# Patient Record
Sex: FEMALE | Race: BLACK OR AFRICAN AMERICAN | NOT HISPANIC OR LATINO | Employment: FULL TIME | URBAN - METROPOLITAN AREA
[De-identification: names, ages, dates, MRNs, and addresses within clinical notes are randomized per-mention and may not be internally consistent; named-entity substitution may affect disease eponyms.]

---

## 2018-06-06 ENCOUNTER — HOSPITAL ENCOUNTER (EMERGENCY)
Facility: HOSPITAL | Age: 43
Discharge: HOME | End: 2018-06-06
Attending: EMERGENCY MEDICINE | Admitting: EMERGENCY MEDICINE
Payer: COMMERCIAL

## 2018-06-06 VITALS
TEMPERATURE: 98.4 F | DIASTOLIC BLOOD PRESSURE: 90 MMHG | SYSTOLIC BLOOD PRESSURE: 167 MMHG | HEART RATE: 74 BPM | OXYGEN SATURATION: 98 % | WEIGHT: 293 LBS | RESPIRATION RATE: 18 BRPM

## 2018-06-06 DIAGNOSIS — R20.8 BURNING SENSATION OF FEET: ICD-10-CM

## 2018-06-06 DIAGNOSIS — R20.2 PARESTHESIA OF BOTH FEET: Primary | ICD-10-CM

## 2018-06-06 DIAGNOSIS — E63.9 NUTRITIONAL DEFICIENCY: ICD-10-CM

## 2018-06-06 DIAGNOSIS — R11.0 NAUSEA: ICD-10-CM

## 2018-06-06 DIAGNOSIS — E87.6 HYPOKALEMIA: ICD-10-CM

## 2018-06-06 DIAGNOSIS — Z98.84 S/P GASTRIC BYPASS: ICD-10-CM

## 2018-06-06 LAB
ALBUMIN SERPL BCP-MCNC: 3.2 G/DL (ref 3.5–5)
ALP SERPL-CCNC: 84 U/L (ref 46–116)
ALT SERPL W P-5'-P-CCNC: 31 U/L (ref 12–78)
ANION GAP SERPL CALCULATED.3IONS-SCNC: 10 MMOL/L (ref 4–13)
AST SERPL W P-5'-P-CCNC: 25 U/L (ref 5–45)
BACTERIA UR QL AUTO: ABNORMAL /HPF
BASOPHILS # BLD AUTO: 0 THOUSANDS/ΜL (ref 0–0.1)
BASOPHILS NFR BLD AUTO: 0 % (ref 0–1)
BILIRUB SERPL-MCNC: 0.56 MG/DL (ref 0.2–1)
BILIRUB UR QL STRIP: ABNORMAL
BUN SERPL-MCNC: 6 MG/DL (ref 5–25)
CALCIUM SERPL-MCNC: 9.4 MG/DL (ref 8.3–10.1)
CHLORIDE SERPL-SCNC: 101 MMOL/L (ref 100–108)
CLARITY UR: CLEAR
CO2 SERPL-SCNC: 31 MMOL/L (ref 21–32)
COLOR UR: ABNORMAL
CREAT SERPL-MCNC: 0.82 MG/DL (ref 0.6–1.3)
EOSINOPHIL # BLD AUTO: 0.04 THOUSAND/ΜL (ref 0–0.61)
EOSINOPHIL NFR BLD AUTO: 1 % (ref 0–6)
ERYTHROCYTE [DISTWIDTH] IN BLOOD BY AUTOMATED COUNT: 14.8 % (ref 11.6–15.1)
EXT PREG TEST URINE: NEGATIVE
FOLATE SERPL-MCNC: 4 NG/ML (ref 3.1–17.5)
GFR SERPL CREATININE-BSD FRML MDRD: 102 ML/MIN/1.73SQ M
GLUCOSE SERPL-MCNC: 104 MG/DL (ref 65–140)
GLUCOSE UR STRIP-MCNC: NEGATIVE MG/DL
HCT VFR BLD AUTO: 44.2 % (ref 34.8–46.1)
HGB BLD-MCNC: 14.9 G/DL (ref 11.5–15.4)
HGB UR QL STRIP.AUTO: NEGATIVE
HYALINE CASTS #/AREA URNS LPF: ABNORMAL /LPF
KETONES UR STRIP-MCNC: ABNORMAL MG/DL
LEUKOCYTE ESTERASE UR QL STRIP: ABNORMAL
LYMPHOCYTES # BLD AUTO: 2.86 THOUSANDS/ΜL (ref 0.6–4.47)
LYMPHOCYTES NFR BLD AUTO: 50 % (ref 14–44)
MCH RBC QN AUTO: 31.2 PG (ref 26.8–34.3)
MCHC RBC AUTO-ENTMCNC: 33.7 G/DL (ref 31.4–37.4)
MCV RBC AUTO: 93 FL (ref 82–98)
MONOCYTES # BLD AUTO: 0.55 THOUSAND/ΜL (ref 0.17–1.22)
MONOCYTES NFR BLD AUTO: 10 % (ref 4–12)
MUCOUS THREADS UR QL AUTO: ABNORMAL
NEUTROPHILS # BLD AUTO: 2.22 THOUSANDS/ΜL (ref 1.85–7.62)
NEUTS SEG NFR BLD AUTO: 39 % (ref 43–75)
NITRITE UR QL STRIP: NEGATIVE
NON-SQ EPI CELLS URNS QL MICRO: ABNORMAL /HPF
NRBC BLD AUTO-RTO: 0 /100 WBCS
PH UR STRIP.AUTO: 7 [PH] (ref 4.5–8)
PLATELET # BLD AUTO: 324 THOUSANDS/UL (ref 149–390)
PMV BLD AUTO: 9.4 FL (ref 8.9–12.7)
POTASSIUM SERPL-SCNC: 3.1 MMOL/L (ref 3.5–5.3)
PROT SERPL-MCNC: 7.5 G/DL (ref 6.4–8.2)
PROT UR STRIP-MCNC: ABNORMAL MG/DL
RBC # BLD AUTO: 4.77 MILLION/UL (ref 3.81–5.12)
RBC #/AREA URNS AUTO: ABNORMAL /HPF
SODIUM SERPL-SCNC: 142 MMOL/L (ref 136–145)
SP GR UR STRIP.AUTO: 1.02 (ref 1–1.03)
UROBILINOGEN UR QL STRIP.AUTO: 1 E.U./DL
VIT B12 SERPL-MCNC: 525 PG/ML (ref 100–900)
WBC # BLD AUTO: 5.67 THOUSAND/UL (ref 4.31–10.16)
WBC #/AREA URNS AUTO: ABNORMAL /HPF

## 2018-06-06 PROCEDURE — 82607 VITAMIN B-12: CPT | Performed by: EMERGENCY MEDICINE

## 2018-06-06 PROCEDURE — 96374 THER/PROPH/DIAG INJ IV PUSH: CPT

## 2018-06-06 PROCEDURE — 99283 EMERGENCY DEPT VISIT LOW MDM: CPT

## 2018-06-06 PROCEDURE — 82746 ASSAY OF FOLIC ACID SERUM: CPT | Performed by: EMERGENCY MEDICINE

## 2018-06-06 PROCEDURE — 36415 COLL VENOUS BLD VENIPUNCTURE: CPT | Performed by: EMERGENCY MEDICINE

## 2018-06-06 PROCEDURE — 87086 URINE CULTURE/COLONY COUNT: CPT

## 2018-06-06 PROCEDURE — 85025 COMPLETE CBC W/AUTO DIFF WBC: CPT | Performed by: EMERGENCY MEDICINE

## 2018-06-06 PROCEDURE — 81001 URINALYSIS AUTO W/SCOPE: CPT

## 2018-06-06 PROCEDURE — 80053 COMPREHEN METABOLIC PANEL: CPT | Performed by: EMERGENCY MEDICINE

## 2018-06-06 PROCEDURE — 81025 URINE PREGNANCY TEST: CPT | Performed by: EMERGENCY MEDICINE

## 2018-06-06 PROCEDURE — 96361 HYDRATE IV INFUSION ADD-ON: CPT

## 2018-06-06 RX ORDER — ACETAMINOPHEN 325 MG/1
650 TABLET ORAL ONCE
Status: COMPLETED | OUTPATIENT
Start: 2018-06-06 | End: 2018-06-06

## 2018-06-06 RX ORDER — ONDANSETRON 4 MG/1
4 TABLET, ORALLY DISINTEGRATING ORAL EVERY 8 HOURS PRN
Qty: 20 TABLET | Refills: 0 | Status: SHIPPED | OUTPATIENT
Start: 2018-06-06 | End: 2018-08-22 | Stop reason: HOSPADM

## 2018-06-06 RX ORDER — ONDANSETRON 2 MG/ML
4 INJECTION INTRAMUSCULAR; INTRAVENOUS ONCE
Status: COMPLETED | OUTPATIENT
Start: 2018-06-06 | End: 2018-06-06

## 2018-06-06 RX ORDER — POTASSIUM CHLORIDE 20 MEQ/1
40 TABLET, EXTENDED RELEASE ORAL ONCE
Status: COMPLETED | OUTPATIENT
Start: 2018-06-06 | End: 2018-06-06

## 2018-06-06 RX ADMIN — ONDANSETRON 4 MG: 2 INJECTION INTRAMUSCULAR; INTRAVENOUS at 02:34

## 2018-06-06 RX ADMIN — ACETAMINOPHEN 650 MG: 325 TABLET ORAL at 03:26

## 2018-06-06 RX ADMIN — POTASSIUM CHLORIDE 40 MEQ: 1500 TABLET, EXTENDED RELEASE ORAL at 03:16

## 2018-06-06 RX ADMIN — SODIUM CHLORIDE 1000 ML: 0.9 INJECTION, SOLUTION INTRAVENOUS at 02:30

## 2018-06-06 NOTE — DISCHARGE INSTRUCTIONS
Gastrectomy   WHAT YOU NEED TO KNOW:   A gastrectomy is surgery to remove part or all of your stomach  DISCHARGE INSTRUCTIONS:   Medicines:   · Antibiotic medicine: This is given to fight or prevent an infection caused by bacteria  Take as directed  · Pain medicine: You may be given a prescription medicine to decrease pain  Do not wait until the pain is severe before you take this medicine  · Take your medicine as directed  Contact your healthcare provider if you think your medicine is not helping or if you have side effects  Tell him or her if you are allergic to any medicine  Keep a list of the medicines, vitamins, and herbs you take  Include the amounts, and when and why you take them  Bring the list or the pill bottles to follow-up visits  Carry your medicine list with you in case of an emergency  Follow up with your healthcare provider as directed: You may need to return to have your stitches, staples, or drains removed  Write down your questions so you remember to ask them during your visits  Bathing with stitches: Follow your healthcare provider's instructions on when you can bathe  Gently wash the part of your body that has the stitches  Do not rub on the stitches to dry your skin  Pat the area gently with a towel  When the area is dry, put on a clean, new bandage as directed  Nutrition:  You may need to eat foods that are soft and easy to digest for up to 8 weeks after your surgery  Examples of soft foods are applesauce, bananas, cooked cereal, cottage cheese, eggs, pudding, and yogurt  Eat small meals often  As you heal, you may be able to increase the variety and amount of food you eat  You may need additional iron, folate, and vitamin B12  Ask for more information about food and dietary supplements  Contact your healthcare provider if:   · You get frequent heartburn  · You have chills, a cough, a sore throat, or feel weak and achy  · You have nausea or are vomiting      · You have frequent constipation or diarrhea  · You have questions or concerns about your condition or care  Seek care immediately or call 911 if:   · You get a fever or chills  · You have abdominal pain that does not go away or gets worse  · Your incision is swollen, red, has pus coming from it, or it starts to come apart  · Blood soaks through your bandage  · You vomit blood  · You suddenly feel lightheaded and short of breath  · You have chest pain when you take a deep breath or cough  You may cough up blood  · Your arm or leg feels warm, tender, and painful  It may look swollen and red  © 2017 [employer]  Information is for End User's use only and may not be sold, redistributed or otherwise used for commercial purposes  All illustrations and images included in CareNotes® are the copyrighted property of [employer]  or [employer]  The above information is an  only  It is not intended as medical advice for individual conditions or treatments  Talk to your doctor, nurse or pharmacist before following any medical regimen to see if it is safe and effective for you  Paresthesia   WHAT YOU NEED TO KNOW:   Paresthesia is numbness and tingling  It can happen in any part of your body, but usually occurs in your legs, feet, arms, or hands  There are a large number of conditions that can cause paresthesia  It affects the nerves that provide sensation and happens when there are changes in nerves or nerve pathways  These changes can be temporary, such as if you take certain medicines or you are not getting enough vitamin B  Paresthesia can become permanent when there is nerve damage  Conditions that may cause nerve damage include diabetes, carpel tunnel syndrome, stroke, and multiple sclerosis  The exact cause of your paresthesia may be unknown    DISCHARGE INSTRUCTIONS:   Medicines:  Ask for more information about medicines you may need to treat the condition causing your paresthesias  · NSAIDs  help decrease swelling and pain or fever  This medicine is available with or without a doctor's order  NSAIDs can cause stomach bleeding or kidney problems in certain people  If you take blood thinner medicine, always ask your healthcare provider if NSAIDs are safe for you  Always read the medicine label and follow directions  · Take your medicine as directed  Contact your healthcare provider if you think your medicine is not helping or if you have side effects  Tell him or her if you are allergic to any medicine  Keep a list of the medicines, vitamins, and herbs you take  Include the amounts, and when and why you take them  Bring the list or the pill bottles to follow-up visits  Carry your medicine list with you in case of an emergency  Follow up with your healthcare provider or neurologist as directed:  Write down your questions so you remember to ask them during your visits  Contact your healthcare provider or neurologist if:   · Your symptoms do not improve  · You have symptoms in more than one part of your body  · You have questions about your condition or care  Return to the emergency department if:   · You have any of the following signs of a stroke:      ¨ Numbness or drooping on one side of your face     ¨ Weakness in an arm or leg    ¨ Confusion or difficulty speaking    ¨ Dizziness, a severe headache, or vision loss    · You are not able to control your urine or bowel movements  · You have severe pain along with numbness and tingling  · Your legs suddenly become cold  You have trouble moving your legs, and they ache  · You have increased weakness in a part of your body  · You have uncontrolled movements, or you have a seizure  © 2017 [employer] Information is for End User's use only and may not be sold, redistributed or otherwise used for commercial purposes   All illustrations and images included in CareNotes® are the copyrighted property of [employer]  or [employer] White  The above information is an  only  It is not intended as medical advice for individual conditions or treatments  Talk to your doctor, nurse or pharmacist before following any medical regimen to see if it is safe and effective for you

## 2018-06-06 NOTE — ED PROVIDER NOTES
History  Chief Complaint   Patient presents with    Foot Pain     Bilateral foot burning for the past 5 days  Weight loss sx in January, believes pain may be realted to nutrient imbalance because she has not been eating well lately  42 yo morbidly obese female presenting with burning and tingling of b/l feet x 5 days  Per pt she had gastric bypass surgery in January at [facility] by Dr Rhonda Sousa and has not been eating great, loss of appetite and nausea at times  Denies fever or chills, no abd pain, no changes in stools  Does report not being compliant with vitamins  Discussed likelihood of this being a nutritional deficiency due to vitamin noncompliance and dietary restrictions  History provided by:  Patient   used: No    Medical Problem   Severity:  Moderate  Onset quality:  Gradual  Duration:  5 days  Timing:  Constant  Progression:  Unchanged  Chronicity:  New  Associated symptoms: nausea    Associated symptoms: no abdominal pain, no chest pain, no congestion, no cough, no diarrhea, no fatigue, no fever, no headaches, no loss of consciousness, no myalgias, no rash, no rhinorrhea, no shortness of breath, no sore throat, no vomiting and no wheezing        None       History reviewed  No pertinent past medical history  History reviewed  No pertinent surgical history  History reviewed  No pertinent family history  I have reviewed and agree with the history as documented  Social History   Substance Use Topics    Smoking status: Current Every Day Smoker     Packs/day: 0 25    Smokeless tobacco: Never Used    Alcohol use No        Review of Systems   Constitutional: Positive for appetite change  Negative for chills, fatigue and fever  HENT: Negative for congestion, rhinorrhea and sore throat  Eyes: Negative for visual disturbance  Respiratory: Negative for cough, shortness of breath and wheezing  Cardiovascular: Negative for chest pain     Gastrointestinal: Positive for nausea  Negative for abdominal pain, diarrhea and vomiting  Genitourinary: Negative for dysuria, frequency, vaginal bleeding and vaginal discharge  Musculoskeletal: Negative for back pain, myalgias, neck pain and neck stiffness  Skin: Negative for pallor and rash  Allergic/Immunologic: Negative for immunocompromised state  Neurological: Positive for numbness (b/l foot tingling, numbness and burning)  Negative for loss of consciousness, syncope, speech difficulty, light-headedness and headaches  Psychiatric/Behavioral: Negative for confusion and hallucinations  All other systems reviewed and are negative  Physical Exam  Physical Exam   Constitutional: She is oriented to person, place, and time  She appears well-developed and well-nourished  No distress  HENT:   Head: Normocephalic and atraumatic  MM tachy   Eyes: EOM are normal  Pupils are equal, round, and reactive to light  Neck: Normal range of motion  Neck supple  Cardiovascular: Normal rate and regular rhythm  No murmur heard  Pulmonary/Chest: Effort normal and breath sounds normal  No respiratory distress  She exhibits no tenderness  Abdominal: Soft  Bowel sounds are normal  There is no tenderness  Musculoskeletal: Normal range of motion  Neurological: She is alert and oriented to person, place, and time  She displays normal reflexes  No cranial nerve deficit or sensory deficit  She exhibits normal muscle tone  Coordination normal    Skin: Skin is warm  Capillary refill takes less than 2 seconds  No rash noted  No pallor  Psychiatric: She has a normal mood and affect  Her behavior is normal    Nursing note and vitals reviewed        Vital Signs  ED Triage Vitals [06/06/18 0210]   Temperature Pulse Respirations Blood Pressure SpO2   98 4 °F (36 9 °C) 80 20 (!) 179/100 99 %      Temp Source Heart Rate Source Patient Position - Orthostatic VS BP Location FiO2 (%)   Oral Monitor Sitting Right arm --      Pain Score Worst Possible Pain           Vitals:    06/06/18 0210   BP: (!) 179/100   Pulse: 80   Patient Position - Orthostatic VS: Sitting       Visual Acuity      ED Medications  Medications   sodium chloride 0 9 % bolus 1,000 mL (1,000 mL Intravenous New Bag 6/6/18 0230)   ondansetron (ZOFRAN) injection 4 mg (4 mg Intravenous Given 6/6/18 0234)   potassium chloride (K-DUR,KLOR-CON) CR tablet 40 mEq (40 mEq Oral Given 6/6/18 0316)   acetaminophen (TYLENOL) tablet 650 mg (650 mg Oral Given 6/6/18 0326)       Diagnostic Studies  Results Reviewed     Procedure Component Value Units Date/Time    Urine Microscopic [[id number]] Collected:  06/06/18 0322    Lab Status:   In process Specimen:  Urine from Urine, Clean Catch Updated:  06/06/18 0323    POCT urinalysis dipstick [[id number]]  (Abnormal) Resulted:  06/06/18 0320    Lab Status:  Final result Specimen:  Urine Updated:  06/06/18 0320    POCT pregnancy, urine [[id number]]  (Normal) Resulted:  06/06/18 0320    Lab Status:  Final result Updated:  06/06/18 0320     EXT PREG TEST UR (Ref: Negative) negative    ED Urine Macroscopic [[id number]]  (Abnormal) Collected:  06/06/18 0322    Lab Status:  Final result Specimen:  Urine Updated:  06/06/18 0319     Color, UA Zulema     Clarity, UA Clear     pH, UA 7 0     Leukocytes, UA Small (A)     Nitrite, UA Negative     Protein, UA 30 (1+) (A) mg/dl      Glucose, UA Negative mg/dl      Ketones, UA Trace (A) mg/dl      Urobilinogen, UA 1 0 E U /dl      Bilirubin, UA Interference- unable to analyze (A)     Blood, UA Negative     Specific Gravity, UA 1 020    Narrative:       CLINITEK RESULT    Comprehensive metabolic panel [[id number]]  (Abnormal) Collected:  06/06/18 0230    Lab Status:  Final result Specimen:  Blood from Arm, Left Updated:  06/06/18 0259     Sodium 142 mmol/L      Potassium 3 1 (L) mmol/L      Chloride 101 mmol/L      CO2 31 mmol/L      Anion Gap 10 mmol/L      BUN 6 mg/dL      Creatinine 0 82 mg/dL      Glucose 104 mg/dL Calcium 9 4 mg/dL      AST 25 U/L      ALT 31 U/L      Alkaline Phosphatase 84 U/L      Total Protein 7 5 g/dL      Albumin 3 2 (L) g/dL      Total Bilirubin 0 56 mg/dL      eGFR 102 ml/min/1 73sq m     Narrative:         National Kidney Disease Education Program recommendations are as follows:  GFR calculation is accurate only with a steady state creatinine  Chronic Kidney disease less than 60 ml/min/1 73 sq  meters  Kidney failure less than 15 ml/min/1 73 sq  meters  CBC and differential [[id number]]  (Abnormal) Collected:  06/06/18 0230    Lab Status:  Final result Specimen:  Blood from Arm, Left Updated:  06/06/18 0240     WBC 5 67 Thousand/uL      RBC 4 77 Million/uL      Hemoglobin 14 9 g/dL      Hematocrit 44 2 %      MCV 93 fL      MCH 31 2 pg      MCHC 33 7 g/dL      RDW 14 8 %      MPV 9 4 fL      Platelets 464 Thousands/uL      nRBC 0 /100 WBCs      Neutrophils Relative 39 (L) %      Lymphocytes Relative 50 (H) %      Monocytes Relative 10 %      Eosinophils Relative 1 %      Basophils Relative 0 %      Neutrophils Absolute 2 22 Thousands/µL      Lymphocytes Absolute 2 86 Thousands/µL      Monocytes Absolute 0 55 Thousand/µL      Eosinophils Absolute 0 04 Thousand/µL      Basophils Absolute 0 00 Thousands/µL     Vitamin B12 [[id number]] Collected:  06/06/18 0230    Lab Status: In process Specimen:  Blood from Arm, Left Updated:  06/06/18 0238    Folate [[id number]] Collected:  06/06/18 0230    Lab Status: In process Specimen:  Blood from Arm, Left Updated:  06/06/18 0238                 No orders to display              Procedures  Procedures       Phone Contacts  ED Phone Contact    ED Course  ED Course as of Jun 06 0343   Wed Jun 06, 2018   0214 Pt seen and examined  44 yo morbidly obese female presenting with burning and tingling of b/l feet x 5 days  Per pt she had gastric bypass surgery in January at [facility] by Dr Keny Monroe and has not been eating great, loss of appetite and nausea at times    Denies fever or chills, no abd pain, no changes in stools  Does report not being compliant with vitamins  Discussed likelihood of this being a nutritional deficiency due to vitamin noncompliance and dietary restrictions  Will give IVF, zofran and check labs including B12/folate - aware she will need to f/u with PCP and her surgeon for results  3081 K 3 1 - will orally replete      7524 Reviewed results with pt - requests something for pain, tylenol ordered  Will d/c home for f/u with PCP and Dr Bladimir Rae her surgeon  [facility]  CritCare Time    Disposition  Final diagnoses:   Paresthesia of both feet   Burning sensation of feet   Nutritional deficiency   S/P gastric bypass   Nausea   Hypokalemia     Time reflects when diagnosis was documented in both MDM as applicable and the Disposition within this note     Time User Action Codes Description Comment    6/6/2018  2:47 AM Bernadene Seo M Add [R20 2] Paresthesia of both feet     6/6/2018  2:47 AM Bernadene Seo M Add [R20 8] Burning sensation of feet     6/6/2018  2:47 AM Bernadene Seo M Add [E63 9] Nutritional deficiency     6/6/2018  2:48 AM Bernadene Seo M Add [Z98 84] S/P gastric bypass     6/6/2018  2:49 AM Bernadene Seo M Add [R11 0] Nausea     6/6/2018  3:07 AM Annemarie Peraza Add [E87 6] Hypokalemia       ED Disposition     ED Disposition Condition Comment    Discharge  [address] [facility] discharge to home/self care      Condition at discharge: Good        Follow-up Information     Follow up With Specialties Details Why Contact Info    your PCP and surgeon at [facility]  Schedule an appointment as soon as possible for a visit            Patient's Medications   Discharge Prescriptions    ONDANSETRON (ZOFRAN-ODT) 4 MG DISINTEGRATING TABLET    Take 1 tablet (4 mg total) by mouth every 8 (eight) hours as needed for nausea or vomiting for up to 7 days       Start Date: 6/6/2018  End Date: 6/13/2018       Order Dose: 4 mg       Quantity: 20 tablet Refills: 0     No discharge procedures on file      ED Provider  Electronically Signed by           Venkat Lyman DO  06/06/18 5316

## 2018-06-07 LAB — BACTERIA UR CULT: NORMAL

## 2018-06-22 LAB
EST. AVERAGE GLUCOSE BLD GHB EST-MCNC: 108 MG/DL
HBA1C MFR BLD HPLC: 5.4 %

## 2018-06-25 ENCOUNTER — TELEPHONE (OUTPATIENT)
Dept: FAMILY MEDICINE CLINIC | Facility: CLINIC | Age: 43
End: 2018-06-25

## 2018-06-26 ENCOUNTER — TELEPHONE (OUTPATIENT)
Dept: FAMILY MEDICINE CLINIC | Facility: CLINIC | Age: 43
End: 2018-06-26

## 2018-06-26 NOTE — TELEPHONE ENCOUNTER
Patient's blood work came back fine  I believe Dr Rory Jackson is the doctor who was initially seeing this patient for the issue of tingling of her toes  Would you mind calling her to let her know of the result and schedule her to see Dr Rory Jackson if she wishes to make another appointment for this

## 2018-06-29 ENCOUNTER — TELEPHONE (OUTPATIENT)
Dept: FAMILY MEDICINE CLINIC | Facility: CLINIC | Age: 43
End: 2018-06-29

## 2018-06-29 NOTE — TELEPHONE ENCOUNTER
I had the  call her about her blood work results earlier this week when she called the first time- blood work was normal  She has been here 3 times for the same thing  Unsure of what to tell her  Dr Kandis Rodriguez initiated the work up for her  I don't know where to go from here with everything coming back normal  Even if she does come in, I really don't have a solution to her pain  She was insinuating she wanted narcotics last time  I told her no

## 2018-06-29 NOTE — TELEPHONE ENCOUNTER
Pt tells me she has been having a rash on her face since starting the 600mg gabapentin  She also states it is not working for her pain  She was upset that she hasn't received a call back with her blood work results  I offered her an appt for next week but she asked "then what am I supposed to over the weekend?  Be in pain?"

## 2018-07-02 ENCOUNTER — TELEPHONE (OUTPATIENT)
Dept: FAMILY MEDICINE CLINIC | Facility: CLINIC | Age: 43
End: 2018-07-02

## 2018-07-02 ENCOUNTER — OFFICE VISIT (OUTPATIENT)
Dept: FAMILY MEDICINE CLINIC | Facility: CLINIC | Age: 43
End: 2018-07-02

## 2018-07-02 VITALS
OXYGEN SATURATION: 99 % | HEART RATE: 89 BPM | DIASTOLIC BLOOD PRESSURE: 106 MMHG | BODY MASS INDEX: 45.99 KG/M2 | HEIGHT: 67 IN | SYSTOLIC BLOOD PRESSURE: 136 MMHG | RESPIRATION RATE: 16 BRPM | TEMPERATURE: 97.4 F | WEIGHT: 293 LBS

## 2018-07-02 DIAGNOSIS — G62.9 PERIPHERAL POLYNEUROPATHY: Primary | ICD-10-CM

## 2018-07-02 DIAGNOSIS — L70.0 COMEDONAL ACNE: ICD-10-CM

## 2018-07-02 PROBLEM — E66.01 MORBID OBESITY (HCC): Status: ACTIVE | Noted: 2017-06-20

## 2018-07-02 PROBLEM — I26.99 PULMONARY EMBOLISM (HCC): Status: ACTIVE | Noted: 2017-06-20

## 2018-07-02 PROBLEM — M79.2 PERIPHERAL NEUROGENIC PAIN: Status: ACTIVE | Noted: 2018-07-02

## 2018-07-02 PROCEDURE — 99213 OFFICE O/P EST LOW 20 MIN: CPT | Performed by: FAMILY MEDICINE

## 2018-07-02 RX ORDER — GABAPENTIN 600 MG/1
600 TABLET ORAL 3 TIMES DAILY
Refills: 1 | COMMUNITY
Start: 2018-06-14 | End: 2018-07-02

## 2018-07-02 RX ORDER — CYANOCOBALAMIN 1000 UG/ML
INJECTION INTRAMUSCULAR; SUBCUTANEOUS
COMMUNITY
Start: 2018-06-08 | End: 2020-08-16 | Stop reason: ALTCHOICE

## 2018-07-02 RX ORDER — PREGABALIN 25 MG/1
25 CAPSULE ORAL 3 TIMES DAILY
Qty: 90 CAPSULE | Refills: 0 | Status: SHIPPED | OUTPATIENT
Start: 2018-07-02 | End: 2018-07-11 | Stop reason: SDUPTHER

## 2018-07-02 RX ORDER — ONDANSETRON HYDROCHLORIDE 8 MG/1
TABLET, FILM COATED ORAL
Refills: 0 | COMMUNITY
Start: 2018-06-14 | End: 2018-08-22 | Stop reason: HOSPADM

## 2018-07-02 NOTE — ASSESSMENT & PLAN NOTE
Occurred in 2012  Provoked as she was on birth control pills and smoking  Completed treatment for 6 months

## 2018-07-02 NOTE — ASSESSMENT & PLAN NOTE
Started recently  No pain    - Use benzyl peroxide with moisturizer  - avoid direct sunlight/use sunscreen for duration of use with benzyl peroxide

## 2018-07-02 NOTE — PROGRESS NOTES
Assessment/Plan:    Pulmonary embolism ([facility] )  Occurred in 2012  Provoked as she was on birth control pills and smoking  Completed treatment for 6 months  Peripheral neurogenic pain  Started about 1 month ago  Started in bilateral toes  A couple days later feet started burning with stabbing, sharp pains shooting up to the hips  Numbness in the toes  Only worsening  Gabapentin is not helping  Feels like it is causing acne on the face  No accidents or injuries  Get an EMG to assess whether it is a large nerve vs small superficial nerves causing the symptoms   - Check TSH, vit b1,2,6,12  - stop using gabapentin  - start lyrica 25 mg BID and titrate up by one tablet per week to max of QID, call if experiencing side effects  - RTC in 2 weeks for fu    Morbid obesity ([facility] )  Had baratric surgery in January 2018  Has been consistently losing weight    - fu with surgery as directed  - take vitamin suppliments including vit d, b complex, b12, folate, vitamin c    Comedonal acne  Started recently  No pain  - Use benzyl peroxide with moisturizer  - avoid direct sunlight/use sunscreen for duration of use with benzyl peroxide       Diagnoses and all orders for this visit:    Peripheral polyneuropathy  -     EMG 2 limb lower extremity; Future  -     TSH baseline; Future  -     Vitamin B12; Future  -     Ferritin; Future  -     Vitamin D 25 hydroxy; Future  -     b complex vitamins tablet; Take 1 tablet by mouth daily  -     Vitamin B1, whole blood; Future  -     Vitamin B6; Future  -     Vitamin B2; Future  -     pregabalin (LYRICA) 25 mg capsule; Take 1 capsule (25 mg total) by mouth 3 (three) times a day    Comedonal acne  -     benzoyl peroxide 5 % gel; Apply topically daily    Other orders  -     cyanocobalamin 1,000 mcg/mL;   -     Discontinue: gabapentin (NEURONTIN) 600 MG tablet;  Take 600 mg by mouth 3 (three) times a day  -     ondansetron (ZOFRAN) 8 mg tablet; TAKE 1 TABLET BY ORAL ROUTE EVERY 8 HOURS AS NEEDED FOR NAUSEA          Subjective:      Patient ID: Irma Marcos is a 43 y o  female  Started about 1 month ago  Started in bilateral toes  A couple days later feet started burning with stabbing, sharp pains shooting up to the hips  Numbness in the toes  Only worsening  Gabapentin is not helping  Feels like it is causing acne on the face  Had labs drawn at last visit  Is taking vitamins as directed but was not taking vit b complex  Current Outpatient Prescriptions:     b complex vitamins tablet, Take 1 tablet by mouth daily, Disp: 30 tablet, Rfl: 2    benzoyl peroxide 5 % gel, Apply topically daily, Disp: 45 g, Rfl: 0    cyanocobalamin 1,000 mcg/mL, , Disp: , Rfl:     ondansetron (ZOFRAN) 8 mg tablet, TAKE 1 TABLET BY ORAL ROUTE EVERY 8 HOURS AS NEEDED FOR NAUSEA, Disp: , Rfl: 0    ondansetron (ZOFRAN-ODT) 4 mg disintegrating tablet, Take 1 tablet (4 mg total) by mouth every 8 (eight) hours as needed for nausea or vomiting for up to 7 days, Disp: 20 tablet, Rfl: 0    pregabalin (LYRICA) 25 mg capsule, Take 1 capsule (25 mg total) by mouth 3 (three) times a day, Disp: 90 capsule, Rfl: 0    The following portions of the patient's history were reviewed and updated as appropriate: allergies, current medications, past family history, past medical history, past social history, past surgical history and problem list     Review of Systems   Constitutional: Negative for fever and unexpected weight change  HENT: Negative for ear pain, sore throat and trouble swallowing  Eyes: Negative for pain and visual disturbance  Respiratory: Negative for cough, chest tightness, shortness of breath and wheezing  Cardiovascular: Negative for chest pain  Gastrointestinal: Negative for abdominal distention, abdominal pain, blood in stool, constipation, diarrhea, nausea and vomiting  Endocrine: Negative for polydipsia and polyuria  Genitourinary: Negative for dysuria and hematuria     Musculoskeletal: Negative for back pain and myalgias  Skin: Negative for rash  Neurological: Positive for dizziness and numbness  Negative for seizures, syncope, weakness and headaches  Psychiatric/Behavioral: Negative for suicidal ideas  Objective:      BP (!) 136/106 (BP Location: Left arm, Patient Position: Sitting, Cuff Size: Thigh)   Pulse 89   Temp (!) 97 4 °F (36 3 °C) (Tympanic)   Resp 16   Ht 5' 7" (1 702 m)   Wt (!) 155 kg (341 lb 6 oz)   SpO2 99%   BMI 53 47 kg/m²          Physical Exam   Constitutional: She is oriented to person, place, and time  She appears well-developed  Morbidly obese   HENT:   Head: Normocephalic and atraumatic  Right Ear: External ear normal    Left Ear: External ear normal    Mouth/Throat: No oropharyngeal exudate  Eyes: Conjunctivae and EOM are normal  Pupils are equal, round, and reactive to light  No scleral icterus  Neck: Normal range of motion  Neck supple  Cardiovascular: Normal rate, regular rhythm and intact distal pulses  Exam reveals no gallop and no friction rub  No murmur heard  Pulmonary/Chest: Effort normal and breath sounds normal  No respiratory distress  She has no wheezes  She has no rales  Abdominal: Soft  Bowel sounds are normal  She exhibits no distension and no mass  There is no tenderness  There is no rebound  Musculoskeletal: Normal range of motion  She exhibits no edema  Neurological: She is alert and oriented to person, place, and time  She has normal reflexes  She displays normal reflexes  No cranial nerve deficit  She exhibits normal muscle tone (proprioception and sensation intact, 5/5 strength and no pain with straight leg raise)  Skin: Skin is warm and dry  Psychiatric: She has a normal mood and affect

## 2018-07-02 NOTE — ASSESSMENT & PLAN NOTE
Started about 1 month ago  Started in bilateral toes  A couple days later feet started burning with stabbing, sharp pains shooting up to the hips  Numbness in the toes  Only worsening  Gabapentin is not helping  Feels like it is causing acne on the face  No accidents or injuries   Get an EMG to assess whether it is a large nerve vs small superficial nerves causing the symptoms   - Check TSH, vit b1,2,6,12  - stop using gabapentin  - start lyrica 25 mg BID and titrate up by one tablet per week to max of QID, call if experiencing side effects  - RTC in 2 weeks for fu

## 2018-07-02 NOTE — ASSESSMENT & PLAN NOTE
Had baratric surgery in January 2018   Has been consistently losing weight    - fu with surgery as directed  - take vitamin suppliments including vit d, b complex, b12, folate, vitamin c

## 2018-07-05 ENCOUNTER — TELEPHONE (OUTPATIENT)
Dept: FAMILY MEDICINE CLINIC | Facility: CLINIC | Age: 43
End: 2018-07-05

## 2018-07-05 NOTE — TELEPHONE ENCOUNTER
Talked with patient  Lyrica was denied by insurance but I did not receive a notice about this  Told the patient to ask pharmacy how much the lyrica will cost out of pocket  Will have to look for form and find out if insurance provided alternatives

## 2018-07-05 NOTE — TELEPHONE ENCOUNTER
Pt called again would like to know the status of medication lyrica pt states is in a lot of pain has not had med in couple of days

## 2018-07-05 NOTE — TELEPHONE ENCOUNTER
Pt states insurance will not pay for medication lyrica please call pt states has none left    Bartholome Pinks Bartholome Pinks Bartholome Pinks

## 2018-07-06 ENCOUNTER — TELEPHONE (OUTPATIENT)
Dept: FAMILY MEDICINE CLINIC | Facility: CLINIC | Age: 43
End: 2018-07-06

## 2018-07-06 NOTE — TELEPHONE ENCOUNTER
Pt is in the office today 7/6/18 about her lyrica we printed out prior auth form from web site to be completed and faxed over to insurance so pt can have meds    Or alternate that is covered pt states she is without medication the form will be in front office if doctor has not received form   due to insurance it was denied pt is in a lot of pain if you can send alternate medication to pharmacy    Peyman Cochran

## 2018-07-09 NOTE — TELEPHONE ENCOUNTER
Need to prescribe an alternate medication  Called patient but answering machine is full  Left text message to call office back

## 2018-07-11 DIAGNOSIS — G62.9 PERIPHERAL POLYNEUROPATHY: ICD-10-CM

## 2018-07-11 DIAGNOSIS — G60.9 IDIOPATHIC PERIPHERAL NEUROPATHY: Primary | ICD-10-CM

## 2018-07-11 RX ORDER — PREGABALIN 25 MG/1
25 CAPSULE ORAL 3 TIMES DAILY
Qty: 90 CAPSULE | Refills: 0 | Status: SHIPPED | OUTPATIENT
Start: 2018-07-11 | End: 2018-07-11 | Stop reason: ALTCHOICE

## 2018-07-11 RX ORDER — DULOXETIN HYDROCHLORIDE 30 MG/1
30 CAPSULE, DELAYED RELEASE ORAL DAILY
Qty: 60 CAPSULE | Refills: 0 | Status: SHIPPED | OUTPATIENT
Start: 2018-07-11 | End: 2018-08-13 | Stop reason: SINTOL

## 2018-07-11 NOTE — PROGRESS NOTES
Talked with patient  Lyrica was denied  She bought 16 pills and has been feeling some relief with that but cannot afford any more  Will give duloxetine 30mg for a week and then if tolerated the patient will increase to 60mg a day  Aware of black box warning suicidallity and if having suicidal ideations will stop medication and call office  Will come for a follow up appt after EMG is done

## 2018-07-11 NOTE — TELEPHONE ENCOUNTER
Pt states insurance is not covering medication  Pt is requesting other type of medication since pt states has been in pain

## 2018-08-13 ENCOUNTER — TELEPHONE (OUTPATIENT)
Dept: FAMILY MEDICINE CLINIC | Facility: CLINIC | Age: 43
End: 2018-08-13

## 2018-08-13 DIAGNOSIS — G60.9 IDIOPATHIC PERIPHERAL NEUROPATHY: Primary | ICD-10-CM

## 2018-08-13 RX ORDER — GABAPENTIN 800 MG/1
800 TABLET ORAL 3 TIMES DAILY
Qty: 90 TABLET | Refills: 2 | Status: SHIPPED | OUTPATIENT
Start: 2018-08-13 | End: 2018-08-22 | Stop reason: HOSPADM

## 2018-08-14 ENCOUNTER — TELEPHONE (OUTPATIENT)
Dept: FAMILY MEDICINE CLINIC | Facility: CLINIC | Age: 43
End: 2018-08-14

## 2018-08-17 ENCOUNTER — TELEPHONE (OUTPATIENT)
Dept: FAMILY MEDICINE CLINIC | Facility: CLINIC | Age: 43
End: 2018-08-17

## 2018-08-17 NOTE — TELEPHONE ENCOUNTER
Pt left message requesting refill of her "water pill"  States she does not know the name  Pt has 2 charts, which I have marked for merge  I found nothing on this chart but it appears she was given Lasix previously on her other chart

## 2018-08-18 DIAGNOSIS — R60.0 LOWER EXTREMITY EDEMA: Primary | ICD-10-CM

## 2018-08-18 RX ORDER — FUROSEMIDE 20 MG/1
20 TABLET ORAL 2 TIMES DAILY
Qty: 3 TABLET | Refills: 2 | Status: SHIPPED | OUTPATIENT
Start: 2018-08-18 | End: 2018-08-30

## 2018-08-18 NOTE — PROGRESS NOTES
Health call  Pt requests lasix  She was prescribed it once from [facility] for recurrent left LE edema to be used PRN  It resolved the issue in the past and she wants refills  Refills were sent to the pharmacy  The patient says these symptoms have been occurring for years  Started after having a blood clot in that leg  Likely she has post thrombotic syndrome  She wears compression stockings and swelling resolves with leg elevation  She says the leg is warm but this is not new  No pain  Advised to schedule and appt for evaluation in clinic because there could possible be a recurrent DVT or a cellulitis  Advised to go to ER is fevers/chills or leg pain occur

## 2018-08-19 ENCOUNTER — APPOINTMENT (EMERGENCY)
Dept: CT IMAGING | Facility: HOSPITAL | Age: 43
DRG: 134 | End: 2018-08-19
Payer: COMMERCIAL

## 2018-08-19 ENCOUNTER — HOSPITAL ENCOUNTER (INPATIENT)
Facility: HOSPITAL | Age: 43
LOS: 3 days | Discharge: HOME | DRG: 134 | End: 2018-08-22
Attending: EMERGENCY MEDICINE | Admitting: FAMILY MEDICINE
Payer: COMMERCIAL

## 2018-08-19 DIAGNOSIS — N39.0 URINARY TRACT INFECTION: ICD-10-CM

## 2018-08-19 DIAGNOSIS — R77.8 ELEVATED TROPONIN: ICD-10-CM

## 2018-08-19 DIAGNOSIS — R06.00 DYSPNEA: ICD-10-CM

## 2018-08-19 DIAGNOSIS — B37.0 THRUSH: ICD-10-CM

## 2018-08-19 DIAGNOSIS — A41.9 SEPSIS (HCC): ICD-10-CM

## 2018-08-19 DIAGNOSIS — I26.99 PULMONARY EMBOLISM (HCC): Primary | ICD-10-CM

## 2018-08-19 PROBLEM — F17.200 SMOKING: Status: ACTIVE | Noted: 2018-08-19

## 2018-08-19 PROBLEM — Z98.84 H/O GASTRIC BYPASS: Status: ACTIVE | Noted: 2018-08-19

## 2018-08-19 PROBLEM — M79.89 SWELLING OF LOWER EXTREMITY: Status: ACTIVE | Noted: 2018-08-19

## 2018-08-19 PROBLEM — R79.89 ELEVATED LACTIC ACID LEVEL: Status: ACTIVE | Noted: 2018-08-19

## 2018-08-19 PROBLEM — N30.00 ACUTE CYSTITIS: Status: ACTIVE | Noted: 2018-08-19

## 2018-08-19 PROBLEM — IMO0001 SMOKING: Status: ACTIVE | Noted: 2018-08-19

## 2018-08-19 PROBLEM — E87.6 HYPOKALEMIA: Status: ACTIVE | Noted: 2018-08-19

## 2018-08-19 PROBLEM — R79.89 ELEVATED TROPONIN I LEVEL: Status: ACTIVE | Noted: 2018-08-19

## 2018-08-19 LAB
ALBUMIN SERPL BCP-MCNC: 3.7 G/DL (ref 3–5.2)
ALP SERPL-CCNC: 100 U/L (ref 43–122)
ALT SERPL W P-5'-P-CCNC: 29 U/L (ref 9–52)
ANION GAP SERPL CALCULATED.3IONS-SCNC: 12 MMOL/L (ref 5–14)
APTT PPP: 27 SECONDS (ref 23–34)
AST SERPL W P-5'-P-CCNC: 23 U/L (ref 14–36)
BACTERIA UR QL AUTO: ABNORMAL /HPF
BASOPHILS # BLD AUTO: 0.1 THOUSANDS/ΜL (ref 0–0.1)
BASOPHILS NFR BLD AUTO: 1 % (ref 0–1)
BILIRUB SERPL-MCNC: 0.7 MG/DL
BILIRUB UR QL STRIP: ABNORMAL
BUN SERPL-MCNC: 7 MG/DL (ref 5–25)
CALCIUM SERPL-MCNC: 8.8 MG/DL (ref 8.4–10.2)
CHLORIDE SERPL-SCNC: 104 MMOL/L (ref 97–108)
CK SERPL-CCNC: 75 U/L (ref 30–135)
CLARITY UR: CLEAR
CO2 SERPL-SCNC: 27 MMOL/L (ref 22–30)
COLOR UR: ABNORMAL
CREAT SERPL-MCNC: 0.69 MG/DL (ref 0.6–1.2)
D-DIMER: 3.93 MG/L
EOSINOPHIL # BLD AUTO: 0 THOUSAND/ΜL (ref 0–0.4)
EOSINOPHIL NFR BLD AUTO: 1 % (ref 0–6)
ERYTHROCYTE [DISTWIDTH] IN BLOOD BY AUTOMATED COUNT: 16 %
GFR SERPL CREATININE-BSD FRML MDRD: 124 ML/MIN/1.73SQ M
GLUCOSE SERPL-MCNC: 94 MG/DL (ref 70–99)
GLUCOSE UR STRIP-MCNC: NEGATIVE MG/DL
HCG SERPL QL: NEGATIVE
HCT VFR BLD AUTO: 41.7 % (ref 36–46)
HGB BLD-MCNC: 13.8 G/DL (ref 12–16)
HGB UR QL STRIP.AUTO: 25
INR PPP: 1.03 (ref 0.89–1.1)
KETONES UR STRIP-MCNC: ABNORMAL MG/DL
LACTATE SERPL-SCNC: 1.5 MMOL/L (ref 0.7–2)
LACTATE SERPL-SCNC: 2.2 MMOL/L (ref 0.7–2)
LACTATE SERPL-SCNC: 3.1 MMOL/L (ref 0.7–2)
LEUKOCYTE ESTERASE UR QL STRIP: 500
LYMPHOCYTES # BLD AUTO: 2 THOUSANDS/ΜL (ref 0.5–4)
LYMPHOCYTES NFR BLD AUTO: 36 % (ref 20–50)
MCH RBC QN AUTO: 32.8 PG (ref 26–34)
MCHC RBC AUTO-ENTMCNC: 33.2 G/DL (ref 31–36)
MCV RBC AUTO: 99 FL (ref 80–100)
MONOCYTES # BLD AUTO: 0.4 THOUSAND/ΜL (ref 0.2–0.9)
MONOCYTES NFR BLD AUTO: 7 % (ref 1–10)
MUCOUS THREADS UR QL AUTO: ABNORMAL
NEUTROPHILS # BLD AUTO: 3.1 THOUSANDS/ΜL (ref 1.8–7.8)
NEUTS SEG NFR BLD AUTO: 55 % (ref 45–65)
NITRITE UR QL STRIP: POSITIVE
NON-SQ EPI CELLS URNS QL MICRO: ABNORMAL /HPF
NT-PROBNP SERPL-MCNC: 2430 PG/ML (ref 0–299)
PH UR STRIP.AUTO: 5 [PH] (ref 4.5–8)
PLATELET # BLD AUTO: 247 THOUSANDS/UL (ref 150–450)
PMV BLD AUTO: 7.2 FL (ref 8.9–12.7)
POTASSIUM SERPL-SCNC: 3.3 MMOL/L (ref 3.6–5)
PROT SERPL-MCNC: 7.2 G/DL (ref 5.9–8.4)
PROT UR STRIP-MCNC: ABNORMAL MG/DL
PROTHROMBIN TIME: 10.9 SECONDS (ref 9.5–11.6)
RBC # BLD AUTO: 4.22 MILLION/UL (ref 4–5.2)
RBC #/AREA URNS AUTO: ABNORMAL /HPF
SODIUM SERPL-SCNC: 143 MMOL/L (ref 137–147)
SP GR UR STRIP.AUTO: 1.02 (ref 1–1.04)
TROPONIN I SERPL-MCNC: 0.16 NG/ML (ref 0–0.03)
TROPONIN I SERPL-MCNC: 0.17 NG/ML (ref 0–0.03)
TROPONIN I SERPL-MCNC: 0.18 NG/ML (ref 0–0.03)
TSH SERPL DL<=0.05 MIU/L-ACNC: 2.12 UIU/ML (ref 0.47–4.68)
UROBILINOGEN UA: 4 MG/DL
WBC # BLD AUTO: 5.6 THOUSAND/UL (ref 4.5–11)
WBC #/AREA URNS AUTO: ABNORMAL /HPF

## 2018-08-19 PROCEDURE — 87086 URINE CULTURE/COLONY COUNT: CPT | Performed by: EMERGENCY MEDICINE

## 2018-08-19 PROCEDURE — 85025 COMPLETE CBC W/AUTO DIFF WBC: CPT | Performed by: EMERGENCY MEDICINE

## 2018-08-19 PROCEDURE — 99285 EMERGENCY DEPT VISIT HI MDM: CPT

## 2018-08-19 PROCEDURE — 81001 URINALYSIS AUTO W/SCOPE: CPT | Performed by: EMERGENCY MEDICINE

## 2018-08-19 PROCEDURE — 99223 1ST HOSP IP/OBS HIGH 75: CPT | Performed by: FAMILY MEDICINE

## 2018-08-19 PROCEDURE — 84484 ASSAY OF TROPONIN QUANT: CPT | Performed by: EMERGENCY MEDICINE

## 2018-08-19 PROCEDURE — 84484 ASSAY OF TROPONIN QUANT: CPT | Performed by: FAMILY MEDICINE

## 2018-08-19 PROCEDURE — 84703 CHORIONIC GONADOTROPIN ASSAY: CPT | Performed by: EMERGENCY MEDICINE

## 2018-08-19 PROCEDURE — 85610 PROTHROMBIN TIME: CPT | Performed by: EMERGENCY MEDICINE

## 2018-08-19 PROCEDURE — 71275 CT ANGIOGRAPHY CHEST: CPT

## 2018-08-19 PROCEDURE — 85379 FIBRIN DEGRADATION QUANT: CPT | Performed by: EMERGENCY MEDICINE

## 2018-08-19 PROCEDURE — 96372 THER/PROPH/DIAG INJ SC/IM: CPT

## 2018-08-19 PROCEDURE — 36415 COLL VENOUS BLD VENIPUNCTURE: CPT | Performed by: EMERGENCY MEDICINE

## 2018-08-19 PROCEDURE — 83880 ASSAY OF NATRIURETIC PEPTIDE: CPT | Performed by: EMERGENCY MEDICINE

## 2018-08-19 PROCEDURE — 87040 BLOOD CULTURE FOR BACTERIA: CPT | Performed by: EMERGENCY MEDICINE

## 2018-08-19 PROCEDURE — 93005 ELECTROCARDIOGRAM TRACING: CPT

## 2018-08-19 PROCEDURE — 96374 THER/PROPH/DIAG INJ IV PUSH: CPT

## 2018-08-19 PROCEDURE — 85730 THROMBOPLASTIN TIME PARTIAL: CPT | Performed by: EMERGENCY MEDICINE

## 2018-08-19 PROCEDURE — 81003 URINALYSIS AUTO W/O SCOPE: CPT | Performed by: EMERGENCY MEDICINE

## 2018-08-19 PROCEDURE — 82550 ASSAY OF CK (CPK): CPT | Performed by: EMERGENCY MEDICINE

## 2018-08-19 PROCEDURE — 80053 COMPREHEN METABOLIC PANEL: CPT | Performed by: EMERGENCY MEDICINE

## 2018-08-19 PROCEDURE — 84443 ASSAY THYROID STIM HORMONE: CPT | Performed by: EMERGENCY MEDICINE

## 2018-08-19 PROCEDURE — 83605 ASSAY OF LACTIC ACID: CPT | Performed by: EMERGENCY MEDICINE

## 2018-08-19 PROCEDURE — 83605 ASSAY OF LACTIC ACID: CPT | Performed by: FAMILY MEDICINE

## 2018-08-19 PROCEDURE — 96375 TX/PRO/DX INJ NEW DRUG ADDON: CPT

## 2018-08-19 RX ORDER — POTASSIUM CHLORIDE 750 MG/1
20 TABLET, EXTENDED RELEASE ORAL ONCE
Status: COMPLETED | OUTPATIENT
Start: 2018-08-19 | End: 2018-08-19

## 2018-08-19 RX ORDER — HEPARIN SODIUM 10000 [USP'U]/100ML
INJECTION, SOLUTION INTRAVENOUS
Status: COMPLETED
Start: 2018-08-19 | End: 2018-08-19

## 2018-08-19 RX ORDER — GABAPENTIN 600 MG/1
800 TABLET ORAL 3 TIMES DAILY
Refills: 1 | COMMUNITY
Start: 2018-07-11 | End: 2018-08-30

## 2018-08-19 RX ORDER — POTASSIUM CHLORIDE 750 MG/1
TABLET, EXTENDED RELEASE ORAL
Status: COMPLETED
Start: 2018-08-19 | End: 2018-08-19

## 2018-08-19 RX ORDER — ACETAMINOPHEN 325 MG/1
650 TABLET ORAL EVERY 6 HOURS PRN
Status: DISCONTINUED | OUTPATIENT
Start: 2018-08-19 | End: 2018-08-22 | Stop reason: HOSPADM

## 2018-08-19 RX ORDER — HEPARIN SODIUM 5000 [USP'U]/ML
5000 INJECTION, SOLUTION INTRAVENOUS; SUBCUTANEOUS ONCE
Status: COMPLETED | OUTPATIENT
Start: 2018-08-19 | End: 2018-08-19

## 2018-08-19 RX ORDER — SULFAMETHOXAZOLE AND TRIMETHOPRIM 800; 160 MG/1; MG/1
1 TABLET ORAL EVERY 12 HOURS SCHEDULED
Status: DISCONTINUED | OUTPATIENT
Start: 2018-08-20 | End: 2018-08-22 | Stop reason: HOSPADM

## 2018-08-19 RX ORDER — HEPARIN SODIUM 1000 [USP'U]/ML
10000 INJECTION, SOLUTION INTRAVENOUS; SUBCUTANEOUS AS NEEDED
Status: DISCONTINUED | OUTPATIENT
Start: 2018-08-19 | End: 2018-08-21

## 2018-08-19 RX ORDER — FOLIC ACID 1 MG/1
1 TABLET ORAL EVERY 24 HOURS
COMMUNITY
Start: 2018-02-09

## 2018-08-19 RX ORDER — HEPARIN SODIUM 5000 [USP'U]/ML
INJECTION, SOLUTION INTRAVENOUS; SUBCUTANEOUS
Status: COMPLETED
Start: 2018-08-19 | End: 2018-08-19

## 2018-08-19 RX ORDER — METHION/INOS/CHOL BT/B COM/LIV 110MG-86MG
100 CAPSULE ORAL DAILY
COMMUNITY
Start: 2018-02-09 | End: 2018-09-11 | Stop reason: SDUPTHER

## 2018-08-19 RX ORDER — FUROSEMIDE 10 MG/ML
80 INJECTION INTRAMUSCULAR; INTRAVENOUS ONCE
Status: COMPLETED | OUTPATIENT
Start: 2018-08-19 | End: 2018-08-19

## 2018-08-19 RX ORDER — FUROSEMIDE 10 MG/ML
INJECTION INTRAMUSCULAR; INTRAVENOUS
Status: COMPLETED
Start: 2018-08-19 | End: 2018-08-19

## 2018-08-19 RX ORDER — FOLIC ACID 1 MG/1
1 TABLET ORAL EVERY 24 HOURS
Status: DISCONTINUED | OUTPATIENT
Start: 2018-08-19 | End: 2018-08-19 | Stop reason: ALTCHOICE

## 2018-08-19 RX ORDER — ASPIRIN 81 MG/1
324 TABLET, CHEWABLE ORAL ONCE
Status: COMPLETED | OUTPATIENT
Start: 2018-08-19 | End: 2018-08-19

## 2018-08-19 RX ORDER — LANOLIN ALCOHOL/MO/W.PET/CERES
1000 CREAM (GRAM) TOPICAL DAILY
Status: DISCONTINUED | OUTPATIENT
Start: 2018-08-20 | End: 2018-08-22 | Stop reason: HOSPADM

## 2018-08-19 RX ORDER — CLOPIDOGREL BISULFATE 75 MG/1
TABLET ORAL
Status: COMPLETED
Start: 2018-08-19 | End: 2018-08-19

## 2018-08-19 RX ORDER — CLOPIDOGREL BISULFATE 75 MG/1
300 TABLET ORAL ONCE
Status: COMPLETED | OUTPATIENT
Start: 2018-08-19 | End: 2018-08-19

## 2018-08-19 RX ORDER — LEVOFLOXACIN 5 MG/ML
750 INJECTION, SOLUTION INTRAVENOUS ONCE
Status: COMPLETED | OUTPATIENT
Start: 2018-08-19 | End: 2018-08-19

## 2018-08-19 RX ORDER — GABAPENTIN 400 MG/1
800 CAPSULE ORAL 3 TIMES DAILY
Status: DISCONTINUED | OUTPATIENT
Start: 2018-08-19 | End: 2018-08-20

## 2018-08-19 RX ORDER — HEPARIN SODIUM 1000 [USP'U]/ML
5000 INJECTION, SOLUTION INTRAVENOUS; SUBCUTANEOUS AS NEEDED
Status: DISCONTINUED | OUTPATIENT
Start: 2018-08-19 | End: 2018-08-21

## 2018-08-19 RX ORDER — ASPIRIN 81 MG/1
TABLET, CHEWABLE ORAL
Status: COMPLETED
Start: 2018-08-19 | End: 2018-08-19

## 2018-08-19 RX ORDER — SODIUM CHLORIDE 9 MG/ML
125 INJECTION, SOLUTION INTRAVENOUS CONTINUOUS
Status: DISCONTINUED | OUTPATIENT
Start: 2018-08-19 | End: 2018-08-21

## 2018-08-19 RX ORDER — HEPARIN SODIUM 1000 [USP'U]/ML
INJECTION, SOLUTION INTRAVENOUS; SUBCUTANEOUS
Status: DISCONTINUED
Start: 2018-08-19 | End: 2018-08-19 | Stop reason: WASHOUT

## 2018-08-19 RX ORDER — HEPARIN SODIUM 10000 [USP'U]/100ML
3-30 INJECTION, SOLUTION INTRAVENOUS
Status: DISCONTINUED | OUTPATIENT
Start: 2018-08-19 | End: 2018-08-21

## 2018-08-19 RX ORDER — LEVOFLOXACIN 5 MG/ML
INJECTION, SOLUTION INTRAVENOUS
Status: COMPLETED
Start: 2018-08-19 | End: 2018-08-19

## 2018-08-19 RX ORDER — HEPARIN SODIUM 1000 [USP'U]/ML
80 INJECTION, SOLUTION INTRAVENOUS; SUBCUTANEOUS ONCE
Status: COMPLETED | OUTPATIENT
Start: 2018-08-19 | End: 2018-08-19

## 2018-08-19 RX ORDER — CHOLECALCIFEROL (VITAMIN D3) 25 MCG
1 TABLET,CHEWABLE ORAL DAILY
Refills: 2 | COMMUNITY
Start: 2018-08-07 | End: 2020-08-16 | Stop reason: ALTCHOICE

## 2018-08-19 RX ADMIN — HEPARIN SODIUM 12400 UNITS: 1000 INJECTION, SOLUTION INTRAVENOUS; SUBCUTANEOUS at 18:55

## 2018-08-19 RX ADMIN — HEPARIN SODIUM AND DEXTROSE 25000 UNITS: 10000; 5 INJECTION INTRAVENOUS at 18:56

## 2018-08-19 RX ADMIN — CLOPIDOGREL BISULFATE 300 MG: 75 TABLET ORAL at 16:10

## 2018-08-19 RX ADMIN — LEVOFLOXACIN 750 MG: 5 INJECTION, SOLUTION INTRAVENOUS at 16:26

## 2018-08-19 RX ADMIN — LEVOFLOXACIN 750 MG: 750 INJECTION, SOLUTION INTRAVENOUS at 16:26

## 2018-08-19 RX ADMIN — POTASSIUM CHLORIDE 20 MEQ: 750 TABLET, EXTENDED RELEASE ORAL at 16:09

## 2018-08-19 RX ADMIN — SODIUM CHLORIDE 1000 ML: 9 INJECTION, SOLUTION INTRAVENOUS at 16:11

## 2018-08-19 RX ADMIN — HEPARIN SODIUM 5000 UNITS: 5000 INJECTION, SOLUTION INTRAVENOUS; SUBCUTANEOUS at 16:08

## 2018-08-19 RX ADMIN — ASPIRIN 81 MG 324 MG: 81 TABLET ORAL at 16:08

## 2018-08-19 RX ADMIN — GABAPENTIN 800 MG: 400 CAPSULE ORAL at 22:30

## 2018-08-19 RX ADMIN — FUROSEMIDE 80 MG: 10 INJECTION, SOLUTION INTRAVENOUS at 16:25

## 2018-08-19 RX ADMIN — FUROSEMIDE 80 MG: 10 INJECTION INTRAMUSCULAR; INTRAVENOUS at 16:25

## 2018-08-19 RX ADMIN — IOHEXOL 100 ML: 350 INJECTION, SOLUTION INTRAVENOUS at 15:51

## 2018-08-19 RX ADMIN — SODIUM CHLORIDE 125 ML/HR: 9 INJECTION, SOLUTION INTRAVENOUS at 20:00

## 2018-08-19 RX ADMIN — ASPIRIN 324 MG: 81 TABLET, CHEWABLE ORAL at 16:08

## 2018-08-19 NOTE — ED PROVIDER NOTES
History  Chief Complaint   Patient presents with    Shortness of Breath     "I'm SOB and dizzy since yesterday and I have CP in the middle of my chest   I had the same thing 5 yrs ago when I had blood clots in my lungs"    Dizziness     Patient reported a 1 day history of shortness of breath described as smile associated with coughing denied wheezing  Onset gradual recurrence which prompted the patient to come to ER because the symptoms as similar to when she had a blood clot 5 years ago  Prior to Admission Medications   Prescriptions Last Dose Informant Patient Reported? Taking?   b complex vitamins tablet   No No   Sig: Take 1 tablet by mouth daily   benzoyl peroxide 5 % gel   No No   Sig: Apply topically daily   cyanocobalamin 1,000 mcg/mL   Yes No   furosemide (LASIX) 20 mg tablet   No No   Sig: Take 1 tablet (20 mg total) by mouth 2 (two) times a day   gabapentin (NEURONTIN) 800 mg tablet   No No   Sig: Take 1 tablet (800 mg total) by mouth 3 (three) times a day   ondansetron (ZOFRAN) 8 mg tablet   Yes No   Sig: TAKE 1 TABLET BY ORAL ROUTE EVERY 8 HOURS AS NEEDED FOR NAUSEA   ondansetron (ZOFRAN-ODT) 4 mg disintegrating tablet   No No   Sig: Take 1 tablet (4 mg total) by mouth every 8 (eight) hours as needed for nausea or vomiting for up to 7 days      Facility-Administered Medications: None       Past Medical History:   Diagnosis Date    DVT (deep venous thrombosis) (HCC)     Pulmonary embolism (HCC)        Past Surgical History:   Procedure Laterality Date    ANKLE SURGERY Left 1995    GASTRIC BYPASS         Family History   Problem Relation Age of Onset    Hypertension Mother      I have reviewed and agree with the history as documented      Social History   Substance Use Topics    Smoking status: Current Every Day Smoker     Packs/day: 0 25     Years: 24 00    Smokeless tobacco: Never Used      Comment: 5-10 cigarettes / daily  [employer]AN[employer] FORMER [employer]MO[employer]ER QUIT DATE 1/1/2017    Alcohol use Yes      Comment: social drinker        Review of Systems   Respiratory: Negative  Cardiovascular: Negative  Gastrointestinal: Negative for abdominal distention  Neurological: Positive for dizziness  All other systems reviewed and are negative  Physical Exam  Physical Exam   Constitutional: She is oriented to person, place, and time  She appears well-developed and well-nourished  HENT:   Head: Normocephalic  Eyes: Pupils are equal, round, and reactive to light  Neck: Normal range of motion  Cardiovascular: Normal rate  Pulmonary/Chest: Effort normal    Abdominal: Soft  Bowel sounds are normal    Musculoskeletal: Normal range of motion  Neurological: She is alert and oriented to person, place, and time  Skin: Skin is warm and dry  Psychiatric: She has a normal mood and affect  Nursing note and vitals reviewed        Vital Signs  ED Triage Vitals   Temperature Pulse Respirations Blood Pressure SpO2   08/19/18 1301 08/19/18 1301 08/19/18 1301 08/19/18 1301 08/19/18 1301   97 5 °F (36 4 °C) 100 (!) 30 146/81 95 %      Temp src Heart Rate Source Patient Position - Orthostatic VS BP Location FiO2 (%)   -- 08/19/18 1515 08/19/18 1515 08/19/18 1515 --    Monitor Lying Right arm       Pain Score       08/19/18 1406       6           Vitals:    08/19/18 1301 08/19/18 1515 08/19/18 1604   BP: 146/81 115/75 113/61   Pulse: 100 90 93   Patient Position - Orthostatic VS:  Lying Lying       Visual Acuity      ED Medications  Medications   levofloxacin (LEVAQUIN) IVPB (premix) 750 mg (750 mg Intravenous New Bag 8/19/18 1626)   sodium chloride 0 9 % bolus 1,000 mL (1,000 mL Intravenous New Bag 8/19/18 1611)   iohexol (OMNIPAQUE) 350 MG/ML injection (MULTI-DOSE) 100 mL (100 mL Intravenous Given 8/19/18 1551)   aspirin chewable tablet 324 mg (324 mg Oral Given 8/19/18 1608)   clopidogrel (PLAVIX) tablet 300 mg (300 mg Oral Given 8/19/18 1610)   heparin (porcine) subcutaneous injection 5,000 Units (5,000 Units Subcutaneous Given 8/19/18 1608)   potassium chloride (K-DUR,KLOR-CON) CR tablet 20 mEq (20 mEq Oral Given 8/19/18 1609)   furosemide (LASIX) injection 80 mg (80 mg Intravenous Given 8/19/18 1625)       Diagnostic Studies  Results Reviewed     Procedure Component Value Units Date/Time    Lactic acid, plasma [[id number]] Collected:  08/19/18 1621    Lab Status:  No result Specimen:  Blood from Arm, Left     D-dimer, quantitative [[id number]]  (Abnormal) Collected:  08/19/18 1528    Lab Status:  Final result Specimen:  Blood from Arm, Left Updated:  08/19/18 1551     D-DIMER 3 93 (H) mg/L     Narrative:       D-DIMER:      Blood culture #2 [[id number]] Collected:  08/19/18 1528    Lab Status: In process Specimen:  Blood from Arm, Left Updated:  08/19/18 1534    Blood culture #1 [[id number]] Collected:  08/19/18 1528    Lab Status:  No result Specimen:  Blood from Arm, Left     TSH, 3rd generation [[id number]]  (Normal) Collected:  08/19/18 1359    Lab Status:  Final result Specimen:  Blood from Arm, Left Updated:  08/19/18 1503     TSH 3RD GENERATON 2 120 uIU/mL     Narrative:         Patients undergoing fluorescein dye angiography may retain small amounts of fluorescein in the body for 48-72 hours post procedure  Samples containing fluorescein can produce falsely depressed TSH values  If the patient had this procedure,a specimen should be resubmitted post fluorescein clearance            The recommended reference ranges for TSH during pregnancy are as follows:  First trimester 0 1 to 2 5 uIU/mL  Second trimester  0 2 to 3 0 uIU/mL  Third trimester 0 3 to 3 0 uIU/m      Pregnancy Test (HCG Qualitative) [[id number]]  (Normal) Collected:  08/19/18 1359    Lab Status:  Final result Specimen:  Blood from Arm, Left Updated:  08/19/18 1447     Preg, Serum Negative    Narrative:       PREGNANCY, SERUM: ef5    Troponin I [[id number]]  (Abnormal) Collected:  08/19/18 1359    Lab Status:  Final result Specimen:  Blood from Arm, Left Updated:  08/19/18 1442     Troponin I 0 17 (H) ng/mL     Narrative:       Hemolysis    NT-BNP PRO [[id number]]  (Abnormal) Collected:  08/19/18 1359    Lab Status:  Final result Specimen:  Blood from Arm, Left Updated:  08/19/18 1442     NT-proBNP 2,430 (H) pg/mL     Protime-INR [[id number]]  (Normal) Collected:  08/19/18 1359    Lab Status:  Final result Specimen:  Blood from Arm, Left Updated:  08/19/18 1439     Protime 10 9 seconds      INR 1 03    Narrative:       INR:  ,PROTIME:      APTT [[id number]]  (Normal) Collected:  08/19/18 1359    Lab Status:  Final result Specimen:  Blood from Arm, Left Updated:  08/19/18 1439     PTT 27 seconds     Narrative:       PTT:      Urine Microscopic [[id number]]  (Abnormal) Collected:  08/19/18 1359    Lab Status:  Final result Specimen:  Urine from Urine, Clean Catch Updated:  08/19/18 1439     RBC, UA 4-10 (A) /hpf      WBC, UA 10-20 (A) /hpf      Epithelial Cells Innumerable (A) /hpf      Bacteria, UA Moderate (A) /hpf      MUCOUS THREADS Innumerable (A)    Urine culture [[id number]] Collected:  08/19/18 1359    Lab Status: In process Specimen:  Urine from Urine, Clean Catch Updated:  08/19/18 1439    Lactic acid, plasma [[id number]]  (Abnormal) Collected:  08/19/18 1359    Lab Status:  Final result Specimen:  Blood from Arm, Left Updated:  08/19/18 1432     LACTIC ACID 3 1 (HH) mmol/L     Narrative:         Result may be elevated if tourniquet was used during collection      CK [[id number]]  (Normal) Collected:  08/19/18 1359    Lab Status:  Final result Specimen:  Blood from Arm, Left Updated:  08/19/18 1431     Total CK 75 U/L     Comprehensive metabolic panel [[id number]]  (Abnormal) Collected:  08/19/18 1359    Lab Status:  Final result Specimen:  Blood from Arm, Left Updated:  08/19/18 1431     Sodium 143 mmol/L      Potassium 3 3 (L) mmol/L      Chloride 104 mmol/L      CO2 27 mmol/L      Anion Gap 12 mmol/L      BUN 7 mg/dL      Creatinine 0 69 mg/dL      Glucose 94 mg/dL Calcium 8 8 mg/dL      AST 23 U/L      ALT 29 U/L      Alkaline Phosphatase 100 U/L      Total Protein 7 2 g/dL      Albumin 3 7 g/dL      Total Bilirubin 0 70 mg/dL      eGFR 124 ml/min/1 73sq m     Narrative:         National Kidney Disease Education Program recommendations are as follows:  GFR calculation is accurate only with a steady state creatinine  Chronic Kidney disease less than 60 ml/min/1 73 sq  meters  Kidney failure less than 15 ml/min/1 73 sq  meters  UA w Reflex to Microscopic w Reflex to Culture [[id number]]  (Abnormal) Collected:  08/19/18 1359    Lab Status:  Final result Specimen:  Urine from Urine, Clean Catch Updated:  08/19/18 1424     Color, UA Zulema (A)     Clarity, UA Clear     Specific Gravity, UA 1 025     pH, UA 5 0     Leukocytes,  0 (A)     Nitrite, UA Positive (A)     Protein,  (2+) (A) mg/dl      Glucose, UA Negative mg/dl      Ketones, UA 5 (Trace) (A) mg/dl      Bilirubin, UA 1 mg/dL (A)     Blood, UA 25 0 (A)     UROBILINOGEN UA 4 0 (A) mg/dL     CBC and differential [[id number]]  (Abnormal) Collected:  08/19/18 1359    Lab Status:  Final result Specimen:  Blood from Arm, Left Updated:  08/19/18 1419     WBC 5 60 Thousand/uL      RBC 4 22 Million/uL      Hemoglobin 13 8 g/dL      Hematocrit 41 7 %      MCV 99 fL      MCH 32 8 pg      MCHC 33 2 g/dL      RDW 16 0 (H) %      MPV 7 2 (L) fL      Platelets 332 Thousands/uL      Neutrophils Relative 55 %      Lymphocytes Relative 36 %      Monocytes Relative 7 %      Eosinophils Relative 1 %      Basophils Relative 1 %      Neutrophils Absolute 3 10 Thousands/µL      Lymphocytes Absolute 2 00 Thousands/µL      Monocytes Absolute 0 40 Thousand/µL      Eosinophils Absolute 0 00 Thousand/µL      Basophils Absolute 0 10 Thousands/µL                  CTA ED chest PE study   Final Result by Clare Acosta MD (08/19 1608)      Large bilateral pulmonary emboli        The calculated ratio of right ventricular to left ventricular diameter (RV/LV ratio) is 1 6  This is greater than 0 9, which is abnormal and indicates right heart strain  An abnormal RV/LV ratio has been shown to be associated with an increased risk of    30 day mortality in the setting of acute pulmonary embolism  Fatty liver  I personally discussed this study with Verónica Prieto on 8/19/2018 at 3:58 PM                Workstation performed: [id number]                    Procedures  Procedures       Phone Contacts  ED Phone Contact    ED Course     the 30 mL/kg dose of NS was not administered secondary to pt's BNP of 2430, troponin of  17 & ECG indicated cardiac ischemia     I spoke to the admitting resident who consulted with her attending admission excepted directed that I admit patient to the step-down unit 4th floor                Initial Sepsis Screening     [address] Name 08/19/18 7440                Is the patient's history suggestive of a new or worsening infection? (!)  Yes (Proceed)  -RF        Suspected source of infection urinary tract infection  -RF        Are two or more of the following signs & symptoms of infection both present and new to the patient? (!)  Yes (Proceed)  -RF        Indicate SIRS criteria Tachycardia > 90 bpm;Tachypnea > 20 resp per min  -RF        If the answer is yes to both questions, suspicion of sepsis is present          If severe sepsis is present AND tissue hypoperfusion perists in the hour after fluid resuscitation or lactate > 4, the patient meets criteria for SEPTIC SHOCK          Are any of the following organ dysfunction criteria present within 6 hours of suspected infection and SIRS criteria that are NOT considered to be chronic conditions?  (!)  Yes  -RF        Organ dysfunction Lactate > 2 0 mmol/L  -RF        Date of presentation of severe sepsis          Time of presentation of severe sepsis          Tissue hypoperfusion persists in the hour after crystalloid fluid administration, evidenced, by either:   Was hypotension present within one hour of the conclusion of crystalloid fluid administration? No  -RF        Date of presentation of septic shock          Time of presentation of septic shock            User Key  (r) = Recorded By, (t) = Taken By, (c) = Cosigned By    Initials Name Provider Type    RF Saul Luis MD Physician                  MDM  CritCare Time    Disposition  Final diagnoses:   Pulmonary embolism ([address] )   Elevated troponin   Dyspnea   Sepsis ([facility] [address] )   Urinary tract infection     Time reflects when diagnosis was documented in both MDM as applicable and the Disposition within this note     Time User Action Codes Description Comment    8/19/2018  4:24 PM Melvena Cones Add [I26 99] Pulmonary embolism ([address] )     8/19/2018  4:24 PM Melvena Cones Add [R74 8] Elevated troponin     8/19/2018  4:25 PM Melvena Cones Add [R06 00] Dyspnea     8/19/2018  4:25 PM Melvena Cones Add [A41 9] Sepsis ([facility] [address] )     8/19/2018  4:25 PM Melvena Cones Add [N39 0] Urinary tract infection       ED Disposition     ED Disposition Condition Comment    Admit  Case was discussed with the resident & Dr Reubin Phoenix    None         Patient's Medications   Discharge Prescriptions    No medications on file     No discharge procedures on file      ED Provider  Electronically Signed by           Saul Luis MD  08/19/18 8304

## 2018-08-19 NOTE — NURSING NOTE
Patient received from ER in stable condition  Ambulated from litter to bedside commode independently  +SOB on exertion noted  Voided clear yellow urine  VSS  SR on monitor with occasional tachycardia noted  O2 at 2L with sats %  Per patient she did not receive dinner, sandwich provided and patient tolerated without difficulty  Heparin drip started per md orders and bolus amount given  Discussed with Lynn Marroquin from pharmacy regarding rate,confirmed max starting dose at 125kg weight  Spoke with Dr Dewayne Borges regarding same  Lab times adjusted based on start time of heparin gtt  No additional complaints at this time, call bell within reach, will continue to monitor

## 2018-08-19 NOTE — ED NOTES
Patient urine preg  done results given to [address]  08/19/18 1635 Ronnie Hutchison  08/19/18 6773

## 2018-08-19 NOTE — SEPSIS NOTE
Sepsis Note   Ruth Cox 43 y o  female MRN: [id number]  Unit/Bed#: ED 02 Encounter: [id number]            Initial Sepsis Screening     [address] Name 08/19/18 5005                Is the patient's history suggestive of a new or worsening infection? (!)  Yes (Proceed)  -RF        Suspected source of infection urinary tract infection  -RF        Are two or more of the following signs & symptoms of infection both present and new to the patient? (!)  Yes (Proceed)  -RF        Indicate SIRS criteria Tachycardia > 90 bpm;Tachypnea > 20 resp per min  -RF        If the answer is yes to both questions, suspicion of sepsis is present          If severe sepsis is present AND tissue hypoperfusion perists in the hour after fluid resuscitation or lactate > 4, the patient meets criteria for SEPTIC SHOCK          Are any of the following organ dysfunction criteria present within 6 hours of suspected infection and SIRS criteria that are NOT considered to be chronic conditions? (!)  Yes  -RF        Organ dysfunction Lactate > 2 0 mmol/L  -RF        Date of presentation of severe sepsis          Time of presentation of severe sepsis          Tissue hypoperfusion persists in the hour after crystalloid fluid administration, evidenced, by either:          Was hypotension present within one hour of the conclusion of crystalloid fluid administration?  No  -RF        Date of presentation of septic shock          Time of presentation of septic shock            User Key  (r) = Recorded By, (t) = Taken By, (c) = Cosigned By    [address] Name Provider Type    RF Lynn Ponce MD Physician

## 2018-08-19 NOTE — H&P
H&P- Maria E Asif 1975, 43 y o  female MRN: [id number]    Unit/Bed#:  Encounter: [id number]    Primary Care Provider: Cora Arias MD   Date and time admitted to hospital: 8/19/2018  1:04 PM        * Pulmonary embolism [facility])   Assessment & Plan    History of provoked PE 6 years ago (2012)  Presented to ED after sudden onset SOB with dizziness and pleuritic chest pain  Well's score of 6 for history of diagnosed PE, with PE being #1 diagnosis, as well as tachycardia, representing moderate risk for PE  CTPE confirmed large filling defect involving the distal aspect of both the left and right pulmonary arteries  ECG showed S wave in lead I, Q wave and T wave inversions in lead III  BNP 2430, troponin 0 17, lactic acid 3 1 --> 2 2  PT/INR 10 9/1 03 PTT 27  Received aspirin 324 mg, plavix 300 mg, and heparin 5000 units subq in ED      - admit as level 1 step down  - heparin bolus 80 units/kg followed by heparin drip 18 units/kg/hr  - echo in am  - ecg in am  - trend troponin  - trend lactic acid  - monitor H/H  - cardiology consult   - monitor for any signs of bleeding  Elevated lactic acid level   Assessment & Plan    Likely 2/2 acute PE  Found to be 3 1 on admission  Repeat lactic acid was 2 2     - received IV NS bolus in the ED  - trend lactic acid  - continue IV NS at 125cc/hr        Elevated troponin I level   Assessment & Plan    Likely 2/2 acute PE  Found to be 0 17 on admission      - will trend troponin  - cardiology consulted  Acute cystitis   Assessment & Plan    Pt complained of several day history of dysuria and suprapubic tenderness  Afebrile, no leukocytosis, no evidence of pyelonephritis  U/a was positive for nitrites, leukocytes, 2+ protein, and moderate bacteria  Received dose of levofloxacin 750 mg in the ED  - will continue with bactrim 800-160 mg q12h starting in am        Hypokalemia   Assessment & Plan    Hypokalemia noted on admission  3 3  Received 20 mEq PO in the ED      - BMP am        H/O gastric bypass   Assessment & Plan    History of gastric bypass surgery in January 2018  BMI 53 52     - bariatric diet  Morbid obesity ([address] )   Assessment & Plan    BMI 53 52  Post bariatric surgery  - counseled healthy diet  Smoking   Assessment & Plan    Has a 12 5 pack year smoking history  - counseled cessation  Peripheral neurogenic pain   Assessment & Plan    History of numbness/tingling in the bilateral lower extremities  Was on gabapentin 600 mg TID at home  PCP recently increased to 800 mg TID  - continue gabapentin 800 mg TID  Swelling of lower extremity   Assessment & Plan    History of bilateral lower extremity edema  Was taking lasix 20 mg PRN  VTE Prophylaxis: acute bilateral PE currently on heparin drip  / reason for no mechanical VTE prophylaxis currently on heparin drip   Code Status: Level 1  POLST: POLST form is not discussed and not completed at this time  Discussion with family: none    Anticipated Length of Stay:  Patient will be admitted on an Inpatient basis with an anticipated length of stay of  > 2 midnights  Justification for Hospital Stay: acute bilateral PE    Total Time for Visit, including Counseling / Coordination of Care: 45 minutes  Greater than 50% of this total time spent on direct patient counseling and coordination of care  Chief Complaint:   SOB and chest pain     History of Present Illness:    Jia Mcclelland is a 43 y o  female who presents with complaints of SOB  She has a history of provoked PE 6 years ago (2012), which was treated for 6 months  She was on oral contraceptives and smoking at the same time  She complains of several days of cough  Then, yesterday, she experienced sudden onset SOB and dizziness when she go home from work  She assumed it was because she did not eat   The next morning, she continued to experience SOB and dizziness, as well as substernal chest pain associated with cough and worsened with deep breathing  She called a cab and came to the ED  She denies any recent prolonged travel, but admits that her work keeps her sedentary  She sits for almost 8 hours daily  She is not currently taking any oral contraceptives  In the ED, she was tachypneic and tachycardic  She was found to have elevated troponin (0 17), elevated lactic acid (3 1), d-dimer (3 93), CTPE was positive for large bilateral PE  She was given 5000 units heparin subcutaneously, as well as aspirin 324 mg and plavix 300 mg  On admission she was given a bolus of heparin 80 units/kg, and started on a heparin drip  She was also complaining of several days of dysuria and suprapubic discomfort  She was scheduled to see her PCP on Tuesday on 21-AUG-2018  Urinalysis in the ED was positive for nitrites, leukocytes, 2+ protein and moderate bacteria  She was given levaquin 750 mg in the ED  She is s/p bariatric surgery 6 months ago  She states she is not following bariatric diet, but eats small portions  She is currently on multiple vitamins  She also notes bilateral lower extremity edema more than her baseline  She was on lasix 20 mg PRN at home  She also complains of numbness and tingling in her lower extremities which she reports is secondary to her gastric bypass surgery  She is currently taking B complex vitamins and folic acid  She denies any other complains except as described above  Review of Systems:    Review of Systems   Constitutional: Negative for fatigue and fever  Respiratory: Positive for cough and shortness of breath  Cardiovascular: Positive for chest pain and leg swelling  Negative for palpitations  Gastrointestinal: Negative for abdominal pain  Genitourinary: Positive for dysuria  Negative for flank pain and hematuria  Musculoskeletal: Negative for back pain and myalgias  Skin: Negative for rash  Neurological: Positive for numbness  Negative for dizziness and weakness  Past Medical and Surgical History:     Past Medical History:   Diagnosis Date    DVT (deep venous thrombosis) (HCC)     Pulmonary embolism (HCC)        Past Surgical History:   Procedure Laterality Date    ANKLE SURGERY Left 1995    GASTRIC BYPASS         Meds/Allergies:    Prior to Admission medications    Medication Sig Start Date End Date Taking? Authorizing Provider   Acetaminophen (TYLENOL) 325 MG CAPS 325 mg every 6 (six) hours 2/9/18  Yes Historical Provider, MD   Cyanocobalamin (VITAMIN B 12 PO) 1,000 mg 2/9/18  Yes Historical Provider, MD   folic acid (FOLVITE) 1 mg tablet Take 1 mg by mouth every 24 hours 2/9/18  Yes Historical Provider, MD   Thiamine HCl (VITAMIN B-1) 100 MG TABS Take 100 mg by mouth daily 2/9/18  Yes Historical Provider, MD   b complex vitamins tablet Take 1 tablet by mouth daily 7/2/18   Radha Vogt MD   B Complex-C-Folic Acid (SUPER B-COMPLEX/VIT C/FA) TABS Take 1 tablet by mouth daily 8/7/18   Historical Provider, MD   benzoyl peroxide 5 % gel Apply topically daily 7/2/18   Radha Vogt MD   cyanocobalamin 1,000 mcg/mL  6/8/18   Historical Provider, MD   furosemide (LASIX) 20 mg tablet Take 1 tablet (20 mg total) by mouth 2 (two) times a day 8/18/18   Radha Vogt MD   gabapentin (NEURONTIN) 600 MG tablet Take 800 mg by mouth 3 (three) times a day 7/11/18   Historical Provider, MD   gabapentin (NEURONTIN) 800 mg tablet Take 1 tablet (800 mg total) by mouth 3 (three) times a day 8/13/18   Radha Vogt MD   ondansetron (ZOFRAN) 8 mg tablet TAKE 1 TABLET BY ORAL ROUTE EVERY 8 HOURS AS NEEDED FOR NAUSEA 6/14/18   Historical Provider, MD   ondansetron (ZOFRAN-ODT) 4 mg disintegrating tablet Take 1 tablet (4 mg total) by mouth every 8 (eight) hours as needed for nausea or vomiting for up to 7 days 6/6/18 6/13/18  Beronica Ng, DO     I have reviewed home medications with patient personally      Allergies:   No Known Allergies    Social History:     Marital Status: Unknown   Occupation: works in an office  Patient Pre-hospital Living Situation: home  Patient Pre-hospital Level of Mobility: ambulates without assistance  Patient Pre-hospital Diet Restrictions: none  Substance Use History:   History   Alcohol Use    Yes     Comment: social drinker     History   Smoking Status    Current Every Day Smoker    Packs/day: 0 00    Years: 25 00   Smokeless Tobacco    Never Used     Comment: 5-10 cigarettes / daily  NEXGEN SAYS FORMER SMOKER QUIT DATE 1/1/2017     History   Drug Use No       Family History:    Family History   Problem Relation Age of Onset    Hypertension Mother        Physical Exam:     Vitals:   Blood Pressure: 150/95 (08/19/18 1900)  Pulse: 96 (08/19/18 1900)  Temperature: 97 8 °F (36 6 °C) (08/19/18 1900)  Temp Source: Temporal (08/19/18 1900)  Respirations: 16 (08/19/18 1900)  Height: 5' 7" (170 2 cm) (08/19/18 1849)  Weight - Scale: (!) 155 kg (341 lb 7 9 oz) (08/19/18 1849)  SpO2: 100 % (08/19/18 1900)    Physical Exam   Constitutional: She is oriented to person, place, and time  She appears well-developed and well-nourished  No distress  obese   HENT:   Head: Normocephalic and atraumatic  Eyes: Conjunctivae and EOM are normal  Pupils are equal, round, and reactive to light  No scleral icterus  Neck: Normal range of motion  Neck supple  Cardiovascular: Normal rate, regular rhythm and normal heart sounds  Exam reveals no friction rub  No murmur heard  Pulmonary/Chest: Breath sounds normal  She is in respiratory distress (mild)  She has no wheezes  Abdominal: Soft  Bowel sounds are normal  There is no tenderness  Obese   Musculoskeletal: Normal range of motion  She exhibits edema (3+ bilateral lower extremities  )  She exhibits no deformity  Lymphadenopathy:     She has no cervical adenopathy  Neurological: She is alert and oriented to person, place, and time  No cranial nerve deficit  Skin: Skin is warm and dry  No rash noted  Psychiatric: She has a normal mood and affect  Additional Data:     Lab Results: I have personally reviewed pertinent reports  Results from last 7 days  Lab Units 08/19/18  1359   WBC Thousand/uL 5 60   HEMOGLOBIN g/dL 13 8   HEMATOCRIT % 41 7   PLATELETS Thousands/uL 247   NEUTROS PCT % 55   LYMPHS PCT % 36   MONOS PCT % 7   EOS PCT % 1       Results from last 7 days  Lab Units 08/19/18  1359   SODIUM mmol/L 143   POTASSIUM mmol/L 3 3*   CHLORIDE mmol/L 104   CO2 mmol/L 27   BUN mg/dL 7   CREATININE mg/dL 0 69   CALCIUM mg/dL 8 8   TOTAL PROTEIN g/dL 7 2   BILIRUBIN TOTAL mg/dL 0 70   ALK PHOS U/L 100   ALT U/L 29   AST U/L 23   GLUCOSE RANDOM mg/dL 94       Results from last 7 days  Lab Units 08/19/18  1359   INR  1 03               Imaging: I have personally reviewed pertinent reports  CTA ED chest PE study   Final Result by Afshin Mcdaniels MD (08/19 1608)      Large bilateral pulmonary emboli  The calculated ratio of right ventricular to left ventricular diameter (RV/LV ratio) is 1 6  This is greater than 0 9, which is abnormal and indicates right heart strain  An abnormal RV/LV ratio has been shown to be associated with an increased risk of    30 day mortality in the setting of acute pulmonary embolism  Fatty liver  I personally discussed this study with Ally Reese on 8/19/2018 at 3:58 PM                Workstation performed: [id number]             EKG, Pathology, and Other Studies Reviewed on Admission:   · EKG: S wave in lead I, Q waves and T wave inversions noted in lead III  Allscripts / Epic Records Reviewed: Yes     ** Please Note: This note has been constructed using a voice recognition system   **

## 2018-08-20 ENCOUNTER — APPOINTMENT (INPATIENT)
Dept: NON INVASIVE DIAGNOSTICS | Facility: HOSPITAL | Age: 43
DRG: 134 | End: 2018-08-20
Payer: COMMERCIAL

## 2018-08-20 LAB
ALBUMIN SERPL BCP-MCNC: 2.8 G/DL (ref 3–5.2)
ALP SERPL-CCNC: 76 U/L (ref 43–122)
ALT SERPL W P-5'-P-CCNC: 23 U/L (ref 9–52)
ANION GAP SERPL CALCULATED.3IONS-SCNC: 7 MMOL/L (ref 5–14)
APTT PPP: 52 SECONDS (ref 23–34)
APTT PPP: 81 SECONDS (ref 23–34)
APTT PPP: 98 SECONDS (ref 23–34)
AST SERPL W P-5'-P-CCNC: 49 U/L (ref 14–36)
ATRIAL RATE: 89 BPM
ATRIAL RATE: 95 BPM
BACTERIA UR CULT: NORMAL
BILIRUB SERPL-MCNC: 1.3 MG/DL
BUN SERPL-MCNC: 8 MG/DL (ref 5–25)
CALCIUM SERPL-MCNC: 7.6 MG/DL (ref 8.4–10.2)
CHLORIDE SERPL-SCNC: 107 MMOL/L (ref 97–108)
CO2 SERPL-SCNC: 23 MMOL/L (ref 22–30)
CREAT SERPL-MCNC: 0.55 MG/DL (ref 0.6–1.2)
ERYTHROCYTE [DISTWIDTH] IN BLOOD BY AUTOMATED COUNT: 15.9 %
GFR SERPL CREATININE-BSD FRML MDRD: 134 ML/MIN/1.73SQ M
GLUCOSE SERPL-MCNC: 92 MG/DL (ref 70–99)
HCT VFR BLD AUTO: 38.3 % (ref 36–46)
HGB BLD-MCNC: 12.6 G/DL (ref 12–16)
MCH RBC QN AUTO: 32.7 PG (ref 26–34)
MCHC RBC AUTO-ENTMCNC: 32.9 G/DL (ref 31–36)
MCV RBC AUTO: 99 FL (ref 80–100)
P AXIS: 62 DEGREES
PLATELET # BLD AUTO: 232 THOUSANDS/UL (ref 150–450)
PMV BLD AUTO: 7.4 FL (ref 8.9–12.7)
POTASSIUM SERPL-SCNC: 3.2 MMOL/L (ref 3.6–5)
PR INTERVAL: 108 MS
PR INTERVAL: 142 MS
PROT SERPL-MCNC: 5.8 G/DL (ref 5.9–8.4)
QRS AXIS: 0 DEGREES
QRS AXIS: 27 DEGREES
QRSD INTERVAL: 86 MS
QRSD INTERVAL: 96 MS
QT INTERVAL: 406 MS
QT INTERVAL: 434 MS
QTC INTERVAL: 510 MS
QTC INTERVAL: 528 MS
RBC # BLD AUTO: 3.85 MILLION/UL (ref 4–5.2)
SODIUM SERPL-SCNC: 137 MMOL/L (ref 137–147)
T WAVE AXIS: -14 DEGREES
T WAVE AXIS: -34 DEGREES
TROPONIN I SERPL-MCNC: 0.05 NG/ML (ref 0–0.03)
TROPONIN I SERPL-MCNC: 0.12 NG/ML (ref 0–0.03)
VENTRICULAR RATE: 89 BPM
VENTRICULAR RATE: 95 BPM
WBC # BLD AUTO: 6.4 THOUSAND/UL (ref 4.5–11)

## 2018-08-20 PROCEDURE — 94762 N-INVAS EAR/PLS OXIMTRY CONT: CPT

## 2018-08-20 PROCEDURE — 80053 COMPREHEN METABOLIC PANEL: CPT | Performed by: FAMILY MEDICINE

## 2018-08-20 PROCEDURE — 85730 THROMBOPLASTIN TIME PARTIAL: CPT | Performed by: FAMILY MEDICINE

## 2018-08-20 PROCEDURE — 84484 ASSAY OF TROPONIN QUANT: CPT | Performed by: FAMILY MEDICINE

## 2018-08-20 PROCEDURE — 94664 DEMO&/EVAL PT USE INHALER: CPT

## 2018-08-20 PROCEDURE — 85027 COMPLETE CBC AUTOMATED: CPT | Performed by: FAMILY MEDICINE

## 2018-08-20 PROCEDURE — 93306 TTE W/DOPPLER COMPLETE: CPT

## 2018-08-20 PROCEDURE — 93306 TTE W/DOPPLER COMPLETE: CPT | Performed by: INTERNAL MEDICINE

## 2018-08-20 PROCEDURE — 93005 ELECTROCARDIOGRAM TRACING: CPT

## 2018-08-20 PROCEDURE — 93010 ELECTROCARDIOGRAM REPORT: CPT | Performed by: INTERNAL MEDICINE

## 2018-08-20 PROCEDURE — 94640 AIRWAY INHALATION TREATMENT: CPT

## 2018-08-20 PROCEDURE — 99233 SBSQ HOSP IP/OBS HIGH 50: CPT | Performed by: FAMILY MEDICINE

## 2018-08-20 PROCEDURE — 99254 IP/OBS CNSLTJ NEW/EST MOD 60: CPT | Performed by: INTERNAL MEDICINE

## 2018-08-20 RX ORDER — GABAPENTIN 300 MG/1
600 CAPSULE ORAL 3 TIMES DAILY
Status: DISCONTINUED | OUTPATIENT
Start: 2018-08-20 | End: 2018-08-22 | Stop reason: HOSPADM

## 2018-08-20 RX ORDER — POTASSIUM CHLORIDE 750 MG/1
40 TABLET, EXTENDED RELEASE ORAL ONCE
Status: COMPLETED | OUTPATIENT
Start: 2018-08-20 | End: 2018-08-20

## 2018-08-20 RX ORDER — IPRATROPIUM BROMIDE AND ALBUTEROL SULFATE 2.5; .5 MG/3ML; MG/3ML
3 SOLUTION RESPIRATORY (INHALATION)
Status: DISCONTINUED | OUTPATIENT
Start: 2018-08-20 | End: 2018-08-20

## 2018-08-20 RX ORDER — IPRATROPIUM BROMIDE AND ALBUTEROL SULFATE 2.5; .5 MG/3ML; MG/3ML
3 SOLUTION RESPIRATORY (INHALATION) AS NEEDED
Status: DISCONTINUED | OUTPATIENT
Start: 2018-08-20 | End: 2018-08-22 | Stop reason: HOSPADM

## 2018-08-20 RX ADMIN — SODIUM CHLORIDE 125 ML/HR: 9 INJECTION, SOLUTION INTRAVENOUS at 03:53

## 2018-08-20 RX ADMIN — SODIUM CHLORIDE 125 ML/HR: 9 INJECTION, SOLUTION INTRAVENOUS at 12:20

## 2018-08-20 RX ADMIN — SULFAMETHOXAZOLE AND TRIMETHOPRIM 1 TABLET: 800; 160 TABLET ORAL at 09:06

## 2018-08-20 RX ADMIN — HEPARIN SODIUM AND DEXTROSE 15 UNITS/KG/HR: 10000; 5 INJECTION INTRAVENOUS at 02:33

## 2018-08-20 RX ADMIN — NYSTATIN 500000 UNITS: 100000 SUSPENSION ORAL at 10:25

## 2018-08-20 RX ADMIN — GABAPENTIN 600 MG: 300 CAPSULE ORAL at 22:04

## 2018-08-20 RX ADMIN — POTASSIUM CHLORIDE 40 MEQ: 750 TABLET, EXTENDED RELEASE ORAL at 16:01

## 2018-08-20 RX ADMIN — HEPARIN SODIUM AND DEXTROSE 13 UNITS/KG/HR: 10000; 5 INJECTION INTRAVENOUS at 22:08

## 2018-08-20 RX ADMIN — IPRATROPIUM BROMIDE AND ALBUTEROL SULFATE 3 ML: 2.5; .5 SOLUTION RESPIRATORY (INHALATION) at 10:45

## 2018-08-20 RX ADMIN — SULFAMETHOXAZOLE AND TRIMETHOPRIM 1 TABLET: 800; 160 TABLET ORAL at 22:04

## 2018-08-20 RX ADMIN — HEPARIN SODIUM AND DEXTROSE 15 UNITS/KG/HR: 10000; 5 INJECTION INTRAVENOUS at 06:54

## 2018-08-20 RX ADMIN — SODIUM CHLORIDE 125 ML/HR: 9 INJECTION, SOLUTION INTRAVENOUS at 21:11

## 2018-08-20 RX ADMIN — GABAPENTIN 600 MG: 300 CAPSULE ORAL at 16:02

## 2018-08-20 RX ADMIN — POTASSIUM CHLORIDE 40 MEQ: 10 TABLET, EXTENDED RELEASE ORAL at 10:24

## 2018-08-20 RX ADMIN — B-COMPLEX W/ C & FOLIC ACID TAB 1 TABLET: TAB at 09:06

## 2018-08-20 RX ADMIN — GABAPENTIN 800 MG: 400 CAPSULE ORAL at 09:04

## 2018-08-20 RX ADMIN — CYANOCOBALAMIN TAB 1000 MCG 1000 MCG: 1000 TAB at 09:04

## 2018-08-20 RX ADMIN — NYSTATIN 500000 UNITS: 100000 SUSPENSION ORAL at 22:04

## 2018-08-20 RX ADMIN — NYSTATIN 500000 UNITS: 100000 SUSPENSION ORAL at 17:08

## 2018-08-20 NOTE — CASE MANAGEMENT
Initial Clinical Review    Admission: Date/Time/Statement: Inpatient 8/19/18 @ 1626     Orders Placed This Encounter   Procedures    Inpatient Admission (expected length of stay for this patient is greater than two midnights)     Standing Status:   Standing     Number of Occurrences:   1     Order Specific Question:   Admitting Physician     Answer:   Kayla St     Order Specific Question:   Level of Care     Answer:   Level 2 Stepdown / HOT [14]     Order Specific Question:   Estimated length of stay     Answer:   More than 2 Midnights     Order Specific Question:   Certification     Answer:   I certify that inpatient services are medically necessary for this patient for a duration of greater than two midnights  See H&P and MD Progress Notes for additional information about the patient's course of treatment  ED: Date/Time/Mode of Arrival:   ED Arrival Information     Expected Arrival Acuity Means of Arrival Escorted By Service Admission Type    - 8/19/2018 12:43 Urgent Walk-In Self General Medicine Urgent    Arrival Complaint    dizziness sob          Chief Complaint:   Patient presents with    Shortness of Breath     "I'm SOB and dizzy since yesterday and I have CP in the middle of my chest   I had the same thing 5 yrs ago when I had blood clots in my lungs"    Dizziness       History of Illness: 43 y o  female who presents with complaints of SOB  She has a history of provoked PE 6 years ago (2012), which was treated for 6 months  She was on oral contraceptives and smoking at the same time  She complains of several days of cough  Yesterday she experienced sudden onset SOB and dizziness when she go home from work  The next morning, she continued to experience SOB and dizziness, as well as substernal chest pain associated with cough and worsened with deep breathing  She called a cab and came to the ED  She denies any recent prolonged travel, but admits that her work keeps her sedentary   She sits for almost 8 hours daily  She is not currently taking any oral contraceptives  In the ED, she was tachypneic and tachycardic  She was found to have elevated troponin (0 17), elevated lactic acid (3 1), d-dimer (3 93), CTPE was positive for large bilateral PE  She was given 5000 units heparin subcutaneously, as well as aspirin 324 mg and plavix 300 mg  On admission she was given a bolus of heparin 80 units/kg, and started on a heparin drip       She was also complaining of several days of dysuria and suprapubic discomfort  She was scheduled to see her PCP on Tuesday on 21-AUG-2018  Urinalysis in the ED was positive for nitrites, leukocytes, 2+ protein and moderate bacteria  She was given levaquin 750 mg in the ED        She is s/p bariatric surgery 6 months ago  She states she is not following bariatric diet, but eats small portions  She is currently on multiple vitamins       She also notes bilateral lower extremity edema more than her baseline  She was on lasix 20 mg PRN at home    ED Vital Signs:   Temperature Pulse Respirations Blood Pressure SpO2   97 5 °F (36 4 °C) 100 (!) 30 146/81 95 %      Temporal Monitor Lying Right arm       6        Wt Readings from Last 1 Encounters:   08/20/18 (!) 152 kg (335 lb 1 6 oz)       Abnormal Labs/Diagnostic Test Results:   CTA chest Large bilateral pulmonary emboli  The calculated ratio of right ventricular to left ventricular diameter (RV/LV ratio) is 1 6  This is greater than 0 9, which is abnormal and indicates right heart strain  An abnormal RV/LV ratio has been shown to be associated with an increased risk of   30 day mortality in the setting of acute pulmonary embolism  Fatty liver  K 3 3  BNP 2430  D dimer 3 93   Troponin 0 17 > 0 16 > 0 18 > 0 12 > 0 05   Lactic acid 3 1 > 2 2   ECG sinus rhythm at 95 beats per minute with presence of left axis deviation, QT prolongation corrected QT interval of 510 milliseconds, presence of acute 50 and T inversion 3 pattern  Nonspecific ST T wave abnormalities  U/A Blood 25, (+) nitrites, 500 leukocytes, 4-10 rbc's, 10-20 wbc's, moderate bacteria, innumerable mucous     8/20 EKG Repeat sinus rhythm at 89 beats per minute with presence of normal axis presence of QT prolongation, corrected QT interval of 528 milliseconds, presence of the QT 3 and diffuse downsloping ST changes and T-wave inversions in all precordial leads and in V3 and AVF, suggestive of repolarization changes or possible ischemia  ED Treatment:   Medication Administration from 08/19/2018 1243 to 08/19/2018 1834       Date/Time Order Dose Route     08/19/2018 1608 aspirin chewable tablet 324 mg 324 mg Oral     08/19/2018 1610 clopidogrel (PLAVIX) tablet 300 mg 300 mg Oral     08/19/2018 1608 heparin (porcine) subcutaneous injection 5,000 Units 5,000 Units Subcutaneous     08/19/2018 1626 levofloxacin (LEVAQUIN) IVPB (premix) 750 mg 750 mg Intravenous     08/19/2018 1609 potassium chloride (K-DUR,KLOR-CON) CR tablet 20 mEq 20 mEq Oral     08/19/2018 1611 sodium chloride 0 9 % bolus 1,000 mL 1,000 mL Intravenous     08/19/2018 1625 furosemide (LASIX) injection 80 mg 80 mg Intravenous          Past Medical/Surgical History:    Active Ambulatory Problems     Diagnosis Date Noted    Morbid obesity ([facility] [address] ) 06/20/2017    Pulmonary embolism ([facility] [address] ) 06/20/2017    Peripheral neurogenic pain 07/02/2018    Comedonal acne 07/02/2018    Swelling of lower extremity 08/19/2018     Resolved Ambulatory Problems     Diagnosis Date Noted    No Resolved Ambulatory Problems     Past Medical History:   Diagnosis Date    DVT (deep venous thrombosis) (HCC)     Pulmonary embolism (HCC)        Admitting Diagnosis: Shortness of breath [R06 02]  Pulmonary embolism (HCC) [I26 99]  Dyspnea [R06 00]  Urinary tract infection [N39 0]  Elevated troponin [R74 8]  Sepsis ([facility] [address] ) [A41 9]    Age/Sex: 43 y o  female    Assessment/Plan:   Pulmonary embolism (HCC)   Assessment & Plan     History of provoked PE 6 years ago (2012)  Presented to ED after sudden onset SOB with dizziness and pleuritic chest pain  Well's score of 6 for history of diagnosed PE, with PE being #1 diagnosis, as well as tachycardia, representing moderate risk for PE  CTPE confirmed large filling defect involving the distal aspect of both the left and right pulmonary arteries  ECG showed S wave in lead I, Q wave and T wave inversions in lead III  BNP 2430, troponin 0 17, lactic acid 3 1 --> 2 2  PT/INR 10 9/1 03 PTT 27  Received aspirin 324 mg, plavix 300 mg, and heparin 5000 units subq in ED       - admit as level 1 step down  - heparin bolus 80 units/kg followed by heparin drip 18 units/kg/hr  - echo in am  - ecg in am  - trend troponin  - trend lactic acid  - monitor H/H  - cardiology consult   - monitor for any signs of bleeding         Elevated lactic acid level   Assessment & Plan     Likely 2/2 acute PE  Found to be 3 1 on admission  Repeat lactic acid was 2 2    received IV NS bolus in the ED  -trend lactic acid  -continue IV NS at 125cc/hr   Elevated troponin I level   Assessment & Plan     Likely 2/2 acute PE  Found to be 0 17 on admission     will trend troponin    -cardiology consulted  Acute cystitis   Assessment & Plan     Pt complained of several day history of dysuria and suprapubic tenderness  Afebrile, no leukocytosis, no evidence of pyelonephritis  U/a was positive for nitrites, leukocytes, 2+ protein, and moderate bacteria  Received dose of levofloxacin 750 mg in the ED     - will continue with bactrim 800-160 mg q12h starting in am       Hypokalemia   Assessment & Plan     Hypokalemia noted on admission  3 3  Received 20 mEq PO in the ED     BMP am   H/O gastric bypass   Assessment & Plan     History of gastric bypass surgery in January 2018  BMI 53  52    bariatric diet  Morbid obesity ([address] )   Assessment & Plan     BMI 53 52  Post bariatric surgery     counseled healthy diet      Peripheral neurogenic pain Assessment & Plan     History of numbness/tingling in the bilateral lower extremities  Was on gabapentin 600 mg TID at home  PCP recently increased to 800 mg TID    continue gabapentin 800 mg TID        Swelling of lower extremity   Assessment & Plan     History of bilateral lower extremity edema  Was taking lasix 20 mg PRN        Pulmonary/Chest: Breath sounds normal  She is in respiratory distress (mild)  She has no wheezes  Abdominal: Soft  Bowel sounds are normal  There is no tenderness  Obese   Musculoskeletal: Normal range of motion  She exhibits edema (3+ bilateral lower extremities  )  Anticipated Length of Stay:  Patient will be admitted on an Inpatient basis with an anticipated length of stay of  > 2 midnights  Justification for Hospital Stay: acute bilateral PE    8/20 Per Cardiology Large unprovoked bilateral pulmonary emboli  Suspected secondary pulmonary hypertension  Hypoxemic respiratory failure due to PE  Suspected hypercoagulable state  QT prolongation     CARDIOLOGY PLAN:  -lower extremity ultrasound studies pending to evaluate for DVT  -echocardiogram to assess cardiac structure and function and evaluate for right ventricular strain and pulmonary hypertension   -Transition to newer oral anticoagulation agent Xarelto 15 mg twice daily for 3 weeks followed by 20 mg once daily  Discontinue heparin drip with was dose of Xarelto tomorrow morning   -workup for hypercoagulable disorder upon discharge  Please arrange consultation with Hematology upon discharge   -patient advised and counseled about complete smoking cessation   -check ambulatory oxygen saturations this afternoon or tomorrow morning   -out of bed to chair today  -replace potassium  Check magnesium with lax lab draw    -patient is on levofloxacin  Has QT prolongation  Recommend changing this medicine if possible  Monitor closely for any arrhythmias  Repeat ECG in a m       Admission Orders:  Scheduled Meds:   Current Facility-Administered Medications:  acetaminophen 650 mg Oral Q6H PRN   vitamin B-12 1,000 mcg Oral Daily   gabapentin 800 mg Oral TID   heparin (porcine) 3-30 Units/kg/hr (Order-Specific) Intravenous Titrated   heparin (porcine) 10,000 Units Intravenous PRN   heparin (porcine) 5,000 Units Intravenous PRN   ipratropium-albuterol 3 mL Nebulization PRN   multivitamin stress formula 1 tablet Oral Daily   nystatin 500,000 Units Swish & Swallow 4x Daily   sodium chloride 125 mL/hr Intravenous Continuous   sulfamethoxazole-trimethoprim 1 tablet Oral Q12H [facility]     Continuous Infusions:   sodium chloride 125 mL/hr Last Rate: 125 mL/hr (08/20/18 0353)     Consult cardiology   Telemetry  Echocardiogram   Daily weight   Heparin drip per protocol   PTT per protocol   Bariatric diet   Venous U/S b/l LE's     8/20/18 Remains on heparin drip  Will start xarelto in am    Thank you,  [facility]  Utilization Review Department  Phone: [phone]; Fax [phone]  ATTENTION: Please call with any questions or concerns to [phone]  and carefully follow the prompts so that you are directed to the right person  Send all requests for admission clinical reviews, approved or denied determinations and any other requests to fax [phone]   All voicemails are confidential

## 2018-08-20 NOTE — NURSING NOTE
Received to 7T via w/c from ICU  Transferred to bed from wheelchair with guidance  Dyspnea with minimal activity  BS clear, diminished at bases   O2 sat on 2L NC, 96% after activity

## 2018-08-20 NOTE — PROGRESS NOTES
Progress Note - Vinny Becerra 1975, 43 y o  female MRN: [id number]    Unit/Bed#:  Encounter: [id number]    Primary Care Provider: Dallas Martin MD   Date and time admitted to hospital: 8/19/2018  1:04 PM    * Pulmonary embolism [facility])   Assessment & Plan    -Well's score of 6 (moderate)  H/O provoked PE in 2012 (smoking, OCP use)  -CTPE confirmed large filling defect involving the distal aspect of both the left and right pulmonary arteries  -ECG on admission showed S wave in lead I, Q wave and T wave inversions in lead III  -Continue with telemetry  -Troponin (#3) and repeat lactic acid pending  -Saturating at 100% on O2 3L via nasal canula  -Continue with heparin @ 18 units/kg/hr (heparin infusion protocol)  -ECHO and repeat ECG in AM  Cardiology consult in AM   -Monitor H/H  Patient educated about signs of bleeding, all questions answered  Acute cystitis   Assessment & Plan    -C/O H/O dysuria and suprapubic tenderness  Afebrile, no leukocytosis, UA consistent w/ cystitis  -S/P one time dose of levofloxacin 750 mg in the ED  -Start Bactrim 800-160 mg Q12H in AM        Elevated troponin I level   Assessment & Plan    -Likely 2/2 acute PE  -0 17 on admission => 0 16 => pending  -Cardiology consult in AM        Elevated lactic acid level   Assessment & Plan    -Likely 2/2 acute PE  -3 1 on admission => 2 2 => pending  -Will stop trending @ < 2 0  -Continue IV NS at 125cc/hr   -Continue to monitor I/Os        Hypokalemia   Assessment & Plan    -Potassium of 3 3 on admission  -Received 20 mEq PO in ED   -Repeat BMP in AM        H/O gastric bypass   Assessment & Plan    -S/P bariatric surgery 1/2018  -Bariatric diet and multivitamin ordered        Smoking   Assessment & Plan    -12 5 pack year smoking history    -Counseled about smoking cessation     -NRT held due to persistent tachycardia and right heart strain on EKG        Peripheral neurogenic pain   Assessment & Plan    -Unclear etiology as B12 WNL and A1C 5 4 done 6/2018   -Was started on B12 and gabapentin by PCP, will continue during hospitalization  -Continue vitamin B12 supplements daily  -Continue home regimen of Gabapentin 800 mg PO TID and Tylenol 650 mg Q8H PRN        Morbid obesity (HCC)   Assessment & Plan    -S/P bariatric surgery 1/2018  -BMI 53 49          Subjective/Objective     Subjective:   Patient seen and examined at bedside  She was talking on her cell phone  Appeared mildly short of breath  On 3L O2 via NC  Receiving heparin drip and IVF  Does not offer any new complaints  Reports improvement in SOB since arrival at hospital     Objective:  Vitals: Blood pressure 150/95, pulse 96, temperature 97 8 °F (36 6 °C), temperature source Temporal, resp  rate 16, height 5' 7" (1 702 m), weight (!) 155 kg (341 lb 7 9 oz), last menstrual period 08/14/2018, SpO2 100 %, not currently breastfeeding  ,Body mass index is 53 49 kg/m²  Intake/Output Summary (Last 24 hours) at 08/19/18 2132  Last data filed at 08/19/18 1901   Gross per 24 hour   Intake              630 ml   Output              200 ml   Net              430 ml       Invasive Devices     Peripheral Intravenous Line            Peripheral IV 08/19/18 Left Antecubital less than 1 day                Physical Exam   Constitutional: She is oriented to person, place, and time  She appears well-developed and well-nourished  No distress  HENT:   Head: Normocephalic and atraumatic  Cardiovascular: Normal rate, regular rhythm and normal heart sounds  Exam reveals no gallop and no friction rub  No murmur heard  Pulmonary/Chest: Breath sounds normal  Tachypnea noted  She is in respiratory distress  Neurological: She is alert and oriented to person, place, and time  Skin: Skin is warm and dry  Psychiatric: She has a normal mood and affect  Her behavior is normal  Judgment and thought content normal    Vitals reviewed      Lab, Imaging and other studies: I have personally reviewed pertinent reports      VTE Pharmacologic Prophylaxis: Heparin  VTE Mechanical Prophylaxis: reason for no mechanical VTE prophylaxis - receiving Heparin

## 2018-08-20 NOTE — PROGRESS NOTES
Progress Note - Maria E Asif 1975, 43 y o  female MRN: [id number]    Unit/Bed#: 7[facility] 716-01 Encounter: [id number]    Primary Care Provider: Cora Arias MD   Date and time admitted to hospital: 8/19/2018  1:04 PM        * Pulmonary embolism ([address] )   Assessment & Plan    -Well's score of 6 (moderate)  H/O provoked PE in 2012 (smoking, OCP use)  -CTPE confirmed large filling defect involving the distal aspect of both the left and right pulmonary arteries  -ECG on admission showed S wave in lead I, Q wave and T wave inversions in lead III  -Continue with telemetry  - Troponin elevated X3  See management below    -Saturating at 100% on O2 3L via nasal canula  - Initially on  heparin @ 18 units/ Kg, but now @ 15 units/kg/hr per heparin infusion protocol based on aPTT 98 this morning  -ECHO and repeat ECG in AM  Cardiology consult placed  -Monitor H/H  Patient educated about signs of bleeding and all questions answered  Acute cystitis   Assessment & Plan    -C/O H/O dysuria and suprapubic tenderness  Afebrile, no leukocytosis, UA consistent w/ cystitis on admission  -S/P one time dose of levofloxacin 750 mg in the ED  -Continue Bactrim 800-160 mg Q12H in AM  Today is day 2/5  Elevated troponin I level   Assessment & Plan    Patient without chest pain this am    -Likely 2/2 acute PE causing a strain of the heart    -Troponin 0 17 on admission => 0 16 => 0 18=> 0 12  -Cardiology consult by Dr Astrid Guzman- recommends echocardiography  Will F/U          Peripheral neurogenic pain   Assessment & Plan    -Unclear etiology as B12 WNL and A1C 5 4 done 6/2018   Onset was about two months ago  Patient was sent for Venous duplex ultrasound, but she didn't go for the test    There is tenderness without erythema of the left calf  I am concerned that a DVT might be present especially in the setting of bilateral PE two months after "neuropathic pain" onset   Will reconsider getting the venous duplex ultrasound  Although it will not change the management of the PE, it might eliminate the need of Gabapentin once DVT if present, gets resolved  -Continue vitamin B12 supplements daily  -Continue home regimen of Gabapentin 600 mg PO TID and Tylenol 650 mg Q8H PRN          H/O gastric bypass   Assessment & Plan    -S/P bariatric surgery 1/2018, lost 140 lb so far  -Bariatric diet and multivitamin ordered  - Patient counseled extensively on smoking cessation especially on its risk of gastric ulcers post Bypass  Patient mentioned that she has quit smoking as from one day ago  Smoking   Assessment & Plan    -12 5 pack year smoking history    -Counseled about smoking cessation  -NRT held due to persistent tachycardia and right heart strain on EKG  Patient is now determined to quit completely  She is scared for her health  I reassured her that will support her and refer to resources to help her succeed  Elevated lactic acid level   Assessment & Plan    -Likely 2/2 acute PE  -3 1 on admission => 2 2 =>1 5  -Continue IV NS at 125cc/hr   - I&O- +1819 4 since admission  Continue to monitor  Hypokalemia   Assessment & Plan    -Potassium of 3 3 on admission  3 2 this morning    -Received 20 mEq PO in ED   - Will supplement once more today  Patient records reveal a potassium of 3 1 in 06/2018    -Repeat BMP in AM        Morbid obesity ([address] )   Assessment & Plan    -S/P bariatric surgery 1/2018  -BMI 53 49  - On Bariatric diet    -Continue multivitamins  VTE Pharmacologic Prophylaxis:   Pharmacologic: Enoxaparin (Lovenox)  Mechanical VTE Prophylaxis in Place: No    Patient Centered Rounds: I have performed bedside rounds with nursing staff today  Discussions with Specialists or Other Care Team Provider: Cardiology    Education and Discussions with Family / Patient: Patient    Time Spent for Care: 30 minutes    More than 50% of total time spent on counseling and coordination of care as described above  Current Length of Stay: 1 day(s)    Current Patient Status: Inpatient   Certification Statement: The patient will continue to require additional inpatient hospital stay due to bilateral pulmonary embolism  Discharge Plan: no    Code Status: Level 1 - Full Code      Subjective:   Patient seen an examined at bedside  She reports resolution of the chest pain and improved dyspnea  She also denied dizziness and white sports in her visionh; these syptoms were present on admission  She feels as though she is improving  Since admission, patient received one loading dose of Heparin 80u/kg and was then  Placed on Heparin drip at 18u/kg/hour  APTT was drawn and was at 98  Heparin dose was reduced to 15 units/ kg/ hour  Heparin protocol in place  Objective:     Vitals:   Temp (24hrs), Av 3 °F (36 3 °C), Min:96 8 °F (36 °C), Max:98 °F (36 7 °C)    HR:  [] 82  Resp:  [14-24] 24  BP: (107-151)/() 145/66  SpO2:  [91 %-100 %] 96 %  Body mass index is 54 11 kg/m²  Input and Output Summary (last 24 hours): Intake/Output Summary (Last 24 hours) at 18 1515  Last data filed at 18 1300   Gross per 24 hour   Intake          2459 41 ml   Output              600 ml   Net          1859 41 ml       Physical Exam:     Physical Exam   Constitutional: She is oriented to person, place, and time  She appears well-developed and well-nourished  She has a sickly appearance  No distress  Nasal cannula in place  HENT:   Head: Normocephalic and atraumatic  Nose: Nose normal    Mouth/Throat: Uvula is midline  Oropharyngeal exudate present  Oral thrush on tongue   Eyes: Conjunctivae are normal  Pupils are equal, round, and reactive to light  Right eye exhibits no discharge  Left eye exhibits no discharge  No scleral icterus  Neck: Neck supple  No JVD present  No tracheal deviation present  No thyromegaly present     Cardiovascular: Normal rate, regular rhythm and normal heart sounds  Exam reveals no gallop and no friction rub  No murmur heard  Pulmonary/Chest: She is in respiratory distress (increased respiratory effort  )  She has wheezes in the right upper field, the right middle field, the right lower field, the left upper field, the left middle field and the left lower field  She has no rales  She exhibits no tenderness  Abdominal: Soft  Bowel sounds are normal  She exhibits distension  She exhibits no mass  There is no tenderness  There is no rebound and no guarding  Musculoskeletal: Normal range of motion  She exhibits no edema or deformity  Right lower leg: Normal  She exhibits no tenderness  Left lower leg: She exhibits tenderness  She exhibits no swelling, no edema, no deformity and no laceration  Lymphadenopathy:     She has no cervical adenopathy  Neurological: She is alert and oriented to person, place, and time  No cranial nerve deficit  Coordination normal    Skin: Skin is warm and dry  No rash noted  She is not diaphoretic  No erythema  No pallor  Psychiatric: She has a normal mood and affect  Her behavior is normal  Judgment and thought content normal          Additional Data:     Labs:      Results from last 7 days  Lab Units 08/20/18  0647 08/19/18  1359   WBC Thousand/uL 6 40 5 60   HEMOGLOBIN g/dL 12 6 13 8   HEMATOCRIT % 38 3 41 7   PLATELETS Thousands/uL 232 247   NEUTROS PCT %  --  55   LYMPHS PCT %  --  36   MONOS PCT %  --  7   EOS PCT %  --  1       Results from last 7 days  Lab Units 08/20/18  0548   SODIUM mmol/L 137   POTASSIUM mmol/L 3 2*   CHLORIDE mmol/L 107   CO2 mmol/L 23   BUN mg/dL 8   CREATININE mg/dL 0 55*   CALCIUM mg/dL 7 6*   TOTAL PROTEIN g/dL 5 8*   BILIRUBIN TOTAL mg/dL 1 30*   ALK PHOS U/L 76   ALT U/L 23   AST U/L 49*   GLUCOSE RANDOM mg/dL 92       Results from last 7 days  Lab Units 08/19/18  1359   INR  1 03                 * I Have Reviewed All Lab Data Listed Above    * Additional Pertinent Lab Tests Reviewed: All Labs Within Last 24 Hours Reviewed    Imaging:    Imaging Reports Reviewed Today Include: CTA   Imaging Personally Reviewed by Myself Includes:      Recent Cultures (last 7 days):           Last 24 Hours Medication List:     Current Facility-Administered Medications:  acetaminophen 650 mg Oral Q6H PRN Helder Iraheta MD    vitamin B-12 1,000 mcg Oral Daily Olivia Salmon MD    gabapentin 600 mg Oral TID Tirso Mcclellan MD    heparin (porcine) 3-30 Units/kg/hr (Order-Specific) Intravenous Titrated Hilda Kimbrough MD Last Rate: 13 Units/kg/hr (08/20/18 1230)   heparin (porcine) 10,000 Units Intravenous PRN Hilda Kimbrough MD    heparin (porcine) 5,000 Units Intravenous PRN Hilda Kimbrough MD    ipratropium-albuterol 3 mL Nebulization PRN Tirso Mcclellan MD    multivitamin stress formula 1 tablet Oral Daily Olivia Salmon MD    nystatin 500,000 Units Swish & Swallow 4x Daily Tirso Mcclellan MD    potassium chloride 40 mEq Oral Once Tirso Mcclellan MD    sodium chloride 125 mL/hr Intravenous Continuous Hilda Kimbrough MD Last Rate: 125 mL/hr (08/20/18 1220)   sulfamethoxazole-trimethoprim 1 tablet Oral Q12H Karen Shepherd MD         Today, Patient Was Seen By: Tirso Mcclellan MD    ** Please Note: Dictation voice to text software may have been used in the creation of this document   **

## 2018-08-20 NOTE — RESPIRATORY THERAPY NOTE
RT Protocol Note  Audelia Potts 43 y o  female MRN: [id number]  Unit/Bed#:  Encounter: [id number]    Assessment    Principal Problem:    Pulmonary embolism ([address] )  Active Problems: Morbid obesity (HCC)    Peripheral neurogenic pain    Hypokalemia    Elevated lactic acid level    Smoking    Elevated troponin I level    Acute cystitis    H/O gastric bypass      Home Pulmonary Medications:    Home Devices/Therapy:  (none)    Past Medical History:   Diagnosis Date    DVT (deep venous thrombosis) (HCC)     Pulmonary embolism (HCC)      Social History     Social History    Marital status: Unknown     Spouse name: N/A    Number of children: N/A    Years of education: N/A     Social History Main Topics    Smoking status: Current Every Day Smoker     Packs/day: 0 00     Years: 25 00    Smokeless tobacco: Never Used      Comment: 5-10 cigarettes / daily  [employer] FORMER SMOKER QUIT DATE 1/1/2017    Alcohol use Yes      Comment: social drinker    Drug use: No    Sexual activity: Not Asked     Other Topics Concern    None     Social History Narrative    None       Subjective    Subjective Data: Pt gets SOB on exceration     Objective    Physical Exam:   Assessment Type: During-treatment  General Appearance: Alert, Awake  Respiratory Pattern: Normal, Irregular  Chest Assessment: Chest expansion asymmetrical  Bilateral Breath Sounds: Clear, Diminished  Cough: None    Vitals:  Blood pressure 124/80, pulse 70, temperature 98 °F (36 7 °C), temperature source Temporal, resp  rate 19, height 5' 7" (1 702 m), weight (!) 152 kg (335 lb 1 6 oz), last menstrual period 08/14/2018, SpO2 100 %, not currently breastfeeding  Imaging and other studies: I have personally reviewed pertinent reports              Plan    Respiratory Plan: No distress/Pulmonary history        Resp Comments: pt currently resting on chair, pt need a PRN treatment and need to change her current to tid

## 2018-08-20 NOTE — CONSULTS
ENCOUNTER DATE: 08/20/18 8:42 AM    PATIENT NAME: Elgin Byrd   1975    [id number]  Age: 43 y o  Sex: female    AUTHOR: Donna Souza MD  PRIMARYCARE PHYSICIAN: Ag Saez MD  PRIMARY INPATIENT PHYSICIAN: Daron Freeman MD  *-*-*-*-*-*-*-*-*-*-*-*-*-*-*-*-*-*-*-*-*-*-*-*-*-*-*-*-*-*-*-*-*-*-*-*-*-*-*-*-*-*-*-*-*-*-*-*-*-*-*-*-*-*-  CURRENT PROBLEM LIST:   Patient Active Problem List   Diagnosis    Morbid obesity ([facility] )    Pulmonary embolism ([facility] )    Peripheral neurogenic pain    Comedonal acne    Hypokalemia    Elevated lactic acid level    Smoking    Elevated troponin I level    Acute cystitis    H/O gastric bypass    Swelling of lower extremity         *-*-*-*-*-*-*-*-*-*-*-*-*-*-*-*-*-*-*-*-*-*-*-*-*-*-*-*-*-*-*-*-*-*-*-*-*-*-*-*-*-*-*-*-*-*-*-*-*-*-*-*-*-*-  HISTORY OF PRESENT ILLNESS:  Patient is a pleasant 59-year-old  female with prior medical history significant for history of morbid obesity status post gastric bypass surgery in January 2018, history of for chronic smoking, history of DVT and PE in 2012 and history of for chronic lower extremity swelling and peripheral neuropathy  Patient presented to the emergency room with 1 day history of sudden onset shortness of breath at rest and with activity at along with midsternal chest pain  Patient reports that she was in her usual state of health until suddenly she developed shortness of breath day before yesterday afternoon  This was associated with substernal chest pain felt as tightness and some dizziness  In the emergency room patient was noted to be tachypneic  There were ECG abnormalities concerning for ischemia and patient was given Plavix 300 mg  A CT scan of the chest confirmed presence of bilateral distal pulmonary emboli  She was started on heparin drip  Overnight she reports feeling better although she is still tachypneic with activity  Patient denies any pain in her legs    She denies any recent long distance travel or prolonged sedentary state  She does report that she has lost approximately 130 lb since undergoing the gastric bypass in January  She is not currently on any birth control pills  *-*-*-*-*-*-*-*-*-*-*-*-*-*-*-*-*-*-*-*-*-*-*-*-*-*-*-*-*-*-*-*-*-*-*-*-*-*-*-*-*-*-*-*-*-*-*-*-*-*-*-*-*-*  PAST MEDICAL HISTORY:   Past Medical History:   Diagnosis Date    DVT (deep venous thrombosis) (HCC)     Pulmonary embolism (HCC)      She has no prior history of hypertension, coronary artery disease, dyslipidemia  She has no prior history of miscarriages or the history of clotting disorder  PAST SURGICAL HISTORY:   Past Surgical History:   Procedure Laterality Date    ANKLE SURGERY Left 1995    GASTRIC BYPASS         FAMILY HISTORY:  Family History   Problem Relation Age of Onset    Hypertension Mother      There is no family history of premature CAD or any history of clotting disorders      SOCIAL HISTORY:  [unfilled]  History   Smoking Status    Current Every Day Smoker    Packs/day: 0 00    Years: 25 00   Smokeless Tobacco    Never Used     Comment: 5-10 cigarettes / daily  NEXGEN SAYS FORMER SMOKER QUIT DATE 1/1/2017     History   Alcohol Use    Yes     Comment: social drinker     History   Drug Use No     [unfilled]    *-*-*-*-*-*-*-*-*-*-*-*-*-*-*-*-*-*-*-*-*-*-*-*-*-*-*-*-*-*-*-*-*-*-*-*-*-*-*-*-*-*-*-*-*-*-*-*-*-*-*-*-*-*  ALLERGIES:  No Known Allergies    CURRENT HOSPITAL MEDICATIONS:   Current Facility-Administered Medications   Medication Dose Route Frequency Provider Last Rate Last Dose    acetaminophen (TYLENOL) tablet 650 mg  650 mg Oral Q6H PRN Arvind Conn MD        cyanocobalamin (VITAMIN B-12) tablet 1,000 mcg  1,000 mcg Oral Daily Shanthi Woods MD        gabapentin (NEURONTIN) capsule 800 mg  800 mg Oral TID Arvind Conn MD   800 mg at 08/19/18 1200    heparin (porcine) 25,000 units in 250 mL infusion (premix)  3-30 Units/kg/hr (Order-Specific) Intravenous Titrated Allyson Aragon MD 18 8 mL/hr at 08/20/18 0654 15 Units/kg/hr at 08/20/18 0654    heparin (porcine) injection 10,000 Units  10,000 Units Intravenous PRN Allyson Aragon MD        heparin (porcine) injection 5,000 Units  5,000 Units Intravenous PRN Allyson Aragon MD        multivitamin stress formula tablet 1 tablet  1 tablet Oral Daily Angeles Hinojosa MD        sodium chloride 0 9 % infusion  125 mL/hr Intravenous Continuous Allyson Aragon  mL/hr at 08/20/18 0353 125 mL/hr at 08/20/18 0353    sulfamethoxazole-trimethoprim (BACTRIM DS) 800-160 mg per tablet 1 tablet  1 tablet Oral Q12H [address] Allyson Aragon MD           CURRENT OUTPATIENT MEDICATIONS:   [unfilled]    *-*-*-*-*-*-*-*-*-*-*-*-*-*-*-*-*-*-*-*-*-*-*-*-*-*-*-*-*-*-*-*-*-*-*-*-*-*-*-*-*-*-*-*-*-*-*-*-*-*-*-*-*-*  REVIEW OF SYMPTOMS:    Positive for:  Shortness of breath, substernal chest pain, dizziness  Negative for: All remaining as reviewed below and in HPI  SYSTEM SYMPTOMS REVIEWED:  Generalweight change, fever, night sweats  Respirato      Cardiovascularchest pain, syncope, dyspnea on exertion, edema, decline in exercise tolerance, claudication   Gastrointestinalpersistent vomiting, diarrhea, abdominal distention, blood in stool   Muscular or skeletaljoint pain or swelling   Neurologicheadaches, syncope, abnormal movement  Hematologichistory of easy bruising and bleeding   Endocrinethyroid enlargement, heat or cold intolerance, polyuria   Psychiatricanxiety, depression     *-*-*-*-*-*-*-*-*-*-*-*-*-*-*-*-*-*-*-*-*-*-*-*-*-*-*-*-*-*-*-*-*-*-*-*-*-*-*-*-*-*-*-*-*-*-*-*-*-*-*-*-*-*-  VITAL SIGNS:  Vitals:    08/20/18 0200 08/20/18 0300 08/20/18 0500 08/20/18 0600   BP: 108/77 107/69 109/73 124/80   BP Location: Left arm Left arm  Left arm   Pulse: 87 85 87 77   Resp: 18 16 14 20   Temp:       TempSrc:       SpO2: 99% 97% 97% 97%   Weight:    (!) 152 kg (335 lb 1 6 oz)   Height: *-*-*-*-*-*-*-*-*-*-*-*-*-*-*-*-*-*-*-*-*-*-*-*-*-*-*-*-*-*-*-*-*-*-*-*-*-*-*-*-*-*-*-*-*-*-*-*-*-*-*-*-*-*-  PHYSICAL EXAM:  General Appearance:  Obese female, in mild respiratory distress during conversations, alert, orientedx3   Head, Eyes, ENT:    No obvious abnormality, moist mucous mebranes  Neck:   Supple, no carotid bruit or JVD   Back:     Symmetric, no curvature  Lungs:     Respirations unlabored at rest  Dicreased BSs bilaterally  Symmetrical    Chest wall:    No tenderness or deformity   Heart:    Regular rate and rhythm, slightly distant HSs, no murmur, rub  or gallop  Abdomen:     Soft, non-tender, No obvious masses, or organomegaly   Extremities:    chronic mild bilateral lower extremity edema  Extremities gautam No cough tenderness noted  Skin:   Skin color, texture, turgor normal, no rashes or lesions     *-*-*-*-*-*-*-*-*-*-*-*-*-*-*-*-*-*-*-*-*-*-*-*-*-*-*-*-*-*-*-*-*-*-*-*-*-*-*-*-*-*-*-*-*-*-*-*-*-*-*-*-*-*-  LABORATORY DATA:  I have personally reviewed pertinent labs      Results from last 7 days  Lab Units 08/20/18  0548 08/19/18  1359   SODIUM mmol/L 137 143   POTASSIUM mmol/L 3 2* 3 3*   CHLORIDE mmol/L 107 104   CO2 mmol/L 23 27   ANION GAP mmol/L 7 12   BUN mg/dL 8 7   CREATININE mg/dL 0 55* 0 69   EGFR ml/min/1 73sq m 134 124   GLUCOSE RANDOM mg/dL 92 94   CALCIUM mg/dL 7 6* 8 8   AST U/L 49* 23   ALT U/L 23 29   ALK PHOS U/L 76 100   TOTAL PROTEIN g/dL 5 8* 7 2   BILIRUBIN TOTAL mg/dL 1 30* 0 70       Results from last 7 days  Lab Units 08/20/18  0647 08/19/18  1359   WBC Thousand/uL 6 40 5 60   HEMOGLOBIN g/dL 12 6 13 8   PLATELETS Thousands/uL 232 247       Results from last 7 days  Lab Units 08/20/18  0033 08/19/18  1359   PTT seconds 98* 27   INR   --  1 03           Invalid input(s): TROPI, CK, CKMBP            Invalid input(s): LDLHDLRAT   Lab Results   Component Value Date    HGBA1C 5 4 06/22/2018           Magnesium:       Coags:   Results from last 7 days  Lab Units 08/20/18  0033 08/19/18  1359   PTT seconds 98* 27   INR   --  1 03       Hemoglobin A1C       Lipid Profile:         *-*-*-*-*-*-*-*-*-*-*-*-*-*-*-*-*-*-*-*-*-*-*-*-*-*-*-*-*-*-*-*-*-*-*-*-*-*-*-*-*-*-*-*-*-*-*-*-*-*-*-*-*-*-  RADIOLOGY RESULTS:  Cta Ed Chest Pe Study    Result Date: 8/19/2018  Impression: Large bilateral pulmonary emboli  The calculated ratio of right ventricular to left ventricular diameter (RV/LV ratio) is 1 6  This is greater than 0 9, which is abnormal and indicates right heart strain  An abnormal RV/LV ratio has been shown to be associated with an increased risk of 30 day mortality in the setting of acute pulmonary embolism  Fatty liver  I personally discussed this study with Myrtle Dobson on 8/19/2018 at 3:58 PM  Workstation performed: [id number]       *-*-*-*-*-*-*-*-*-*-*-*-*-*-*-*-*-*-*-*-*-*-*-*-*-*-*-*-*-*-*-*-*-*-*-*-*-*-*-*-*-*-*-*-*-*-*-*-*-*-*-*-*-*-  CURRENT ECG:  ECG performed August 19th at 2:08 p m  shows sinus rhythm at 95 beats per minute with presence of left axis deviation, QT prolongation corrected QT interval of 510 milliseconds, presence of acute 50 and T inversion 3 pattern  Nonspecific ST T wave abnormalities  Repeat ECG performed this morning at 6:32 a m  shows sinus rhythm at 89 beats per minute with presence of normal axis presence of QT prolongation, corrected QT interval of 528 milliseconds, presence of the QT 3 and diffuse downsloping ST changes and T-wave inversions in all precordial leads and in V3 and AVF, suggestive of repolarization changes or possible ischemia  ECHOCARDIOGRAM AND OTHER CARDIOLOGY RESULTS:  No results found for this or any previous visit  No results found for this or any previous visit  No results found for this or any previous visit  No results found for this or any previous visit  *-*-*-*-*-*-*-*-*-*-*-*-*-*-*-*-*-*-*-*-*-*-*-*-*-*-*-*-*-*-*-*-*-*-*-*-*-*-*-*-*-*-*-*-*-*-*-*-*-*-*-*-*-*-    CARDIOLOGY ASSESSMENT:  1  Large unprovoked bilateral pulmonary emboli  2   Suspected secondary pulmonary hypertension  3  Hypoxemic respiratory failure due to PE  4  Suspected hypercoagulable state  5  QT prolongation    Patient is currently hemodynamically stable though still slightly short of breath with activity  Most recent PTT is in therapeutic range  CARDIOLOGY PLAN:  -lower extremity ultrasound studies pending to evaluate for DVT  -echocardiogram to assess cardiac structure and function and evaluate for right ventricular strain and pulmonary hypertension   -Transition to newer oral anticoagulation agent Xarelto 15 mg twice daily for 3 weeks followed by 20 mg once daily  Discontinue heparin drip with was dose of Xarelto tomorrow morning   -workup for hypercoagulable disorder upon discharge  Please arrange consultation with Hematology upon discharge   -patient advised and counseled about complete smoking cessation   -check ambulatory oxygen saturations this afternoon or tomorrow morning   -out of bed to chair today  -replace potassium  Check magnesium with lax lab draw    -patient is on levofloxacin  Has QT prolongation  Recommend changing this medicine if possible  Monitor closely for any arrhythmias  Repeat ECG in a m       *-*-*-*-*-*-*-*-*-*-*-*-*-*-*-*-*-*-*-*-*-*-*-*-*-*-*-*-*-*-*-*-*-*-*-*-*-*-*-*-*-*-*-*-*-*-*-*-*-*-*-*-*-*-  SIGNATURES:   @YNX@   Donna Souza MD     CC:   Baudilio Saucedo MD   No ref   provider found

## 2018-08-20 NOTE — NURSING NOTE
Pt  As per shift assessment  Noted to have mild SOB with just the exertion of conversation   Seen by Kael Gomez and Harley

## 2018-08-20 NOTE — PROGRESS NOTES
Patient resting in bed' family at bed side , continue with Heparin 18 units/kg/hr  Per heparin infusion protocol, next PTT at 0100 AM, no respiratory distress, SOB on exertion, pox 98% 2 L NC  tolerating Bariatric diet , Afebril appear stable , will continue to monitor

## 2018-08-20 NOTE — SOCIAL WORK
Pt admitted for unprovoked PE to start on Xarelto- 30 day free and monthly discount coupons given and explained to pt with verbal understanding given-being worked up for possible hypercoagulable causes  Pt lives in an apartment with 25 y o  Son having 2 flights of steps to navigate- typically with no difficulty navigating having increased SOB the last few days-> likely related to current dx  Current smoker states is to quit due to current situation verbally understanding of negative effects and higher risk for reoccurrence if fails to quit  Pt uses [facility] on [address] for prescriptions and has an appointment with Dr Brenden Day of [facility] scheduled for 9/6/18 @ 1p m-on AVS   No other dc needs identified at this time- will continue to follow throughout hospitalization

## 2018-08-21 LAB
ANION GAP SERPL CALCULATED.3IONS-SCNC: 3 MMOL/L (ref 5–14)
APTT PPP: 48 SECONDS (ref 23–34)
APTT PPP: 87 SECONDS (ref 23–34)
BUN SERPL-MCNC: 10 MG/DL (ref 5–25)
CALCIUM SERPL-MCNC: 8.3 MG/DL (ref 8.4–10.2)
CHLORIDE SERPL-SCNC: 106 MMOL/L (ref 97–108)
CO2 SERPL-SCNC: 29 MMOL/L (ref 22–30)
CREAT SERPL-MCNC: 0.61 MG/DL (ref 0.6–1.2)
ERYTHROCYTE [DISTWIDTH] IN BLOOD BY AUTOMATED COUNT: 16.1 %
GFR SERPL CREATININE-BSD FRML MDRD: 129 ML/MIN/1.73SQ M
GLUCOSE SERPL-MCNC: 93 MG/DL (ref 70–99)
HCT VFR BLD AUTO: 36 % (ref 36–46)
HGB BLD-MCNC: 12.1 G/DL (ref 12–16)
MCH RBC QN AUTO: 33.3 PG (ref 26–34)
MCHC RBC AUTO-ENTMCNC: 33.5 G/DL (ref 31–36)
MCV RBC AUTO: 99 FL (ref 80–100)
PLATELET # BLD AUTO: 240 THOUSANDS/UL (ref 150–450)
PMV BLD AUTO: 7.6 FL (ref 8.9–12.7)
POTASSIUM SERPL-SCNC: 3.8 MMOL/L (ref 3.6–5)
RBC # BLD AUTO: 3.62 MILLION/UL (ref 4–5.2)
SODIUM SERPL-SCNC: 138 MMOL/L (ref 137–147)
WBC # BLD AUTO: 5.9 THOUSAND/UL (ref 4.5–11)

## 2018-08-21 PROCEDURE — 85730 THROMBOPLASTIN TIME PARTIAL: CPT | Performed by: FAMILY MEDICINE

## 2018-08-21 PROCEDURE — 80048 BASIC METABOLIC PNL TOTAL CA: CPT | Performed by: FAMILY MEDICINE

## 2018-08-21 PROCEDURE — 85027 COMPLETE CBC AUTOMATED: CPT | Performed by: FAMILY MEDICINE

## 2018-08-21 PROCEDURE — 99233 SBSQ HOSP IP/OBS HIGH 50: CPT | Performed by: FAMILY MEDICINE

## 2018-08-21 RX ORDER — SENNOSIDES 8.6 MG
1 TABLET ORAL
Status: DISCONTINUED | OUTPATIENT
Start: 2018-08-21 | End: 2018-08-22 | Stop reason: HOSPADM

## 2018-08-21 RX ORDER — WARFARIN SODIUM 10 MG/1
10 TABLET ORAL
Status: DISCONTINUED | OUTPATIENT
Start: 2018-08-21 | End: 2018-08-22 | Stop reason: HOSPADM

## 2018-08-21 RX ORDER — POLYETHYLENE GLYCOL 3350 17 G/17G
17 POWDER, FOR SOLUTION ORAL DAILY
Status: DISCONTINUED | OUTPATIENT
Start: 2018-08-21 | End: 2018-08-22 | Stop reason: HOSPADM

## 2018-08-21 RX ADMIN — GABAPENTIN 600 MG: 300 CAPSULE ORAL at 09:44

## 2018-08-21 RX ADMIN — SULFAMETHOXAZOLE AND TRIMETHOPRIM 1 TABLET: 800; 160 TABLET ORAL at 21:36

## 2018-08-21 RX ADMIN — WARFARIN SODIUM 10 MG: 10 TABLET ORAL at 18:07

## 2018-08-21 RX ADMIN — GABAPENTIN 600 MG: 300 CAPSULE ORAL at 15:24

## 2018-08-21 RX ADMIN — GABAPENTIN 600 MG: 300 CAPSULE ORAL at 21:36

## 2018-08-21 RX ADMIN — SULFAMETHOXAZOLE AND TRIMETHOPRIM 1 TABLET: 800; 160 TABLET ORAL at 09:43

## 2018-08-21 RX ADMIN — ACETAMINOPHEN 650 MG: 325 TABLET ORAL at 15:12

## 2018-08-21 RX ADMIN — SENNOSIDES 8.6 MG: 8.6 TABLET, FILM COATED ORAL at 21:36

## 2018-08-21 RX ADMIN — HEPARIN SODIUM 5000 UNITS: 1000 INJECTION, SOLUTION INTRAVENOUS; SUBCUTANEOUS at 07:58

## 2018-08-21 RX ADMIN — SODIUM CHLORIDE 125 ML/HR: 9 INJECTION, SOLUTION INTRAVENOUS at 13:29

## 2018-08-21 RX ADMIN — POLYETHYLENE GLYCOL 3350 17 G: 17 POWDER, FOR SOLUTION ORAL at 16:23

## 2018-08-21 RX ADMIN — ENOXAPARIN SODIUM 160 MG: 80 INJECTION SUBCUTANEOUS at 15:45

## 2018-08-21 RX ADMIN — NYSTATIN 500000 UNITS: 100000 SUSPENSION ORAL at 21:36

## 2018-08-21 RX ADMIN — B-COMPLEX W/ C & FOLIC ACID TAB 1 TABLET: TAB at 09:43

## 2018-08-21 RX ADMIN — SODIUM CHLORIDE 125 ML/HR: 9 INJECTION, SOLUTION INTRAVENOUS at 05:18

## 2018-08-21 RX ADMIN — CYANOCOBALAMIN TAB 1000 MCG 1000 MCG: 1000 TAB at 09:43

## 2018-08-21 RX ADMIN — NYSTATIN 500000 UNITS: 100000 SUSPENSION ORAL at 18:07

## 2018-08-21 RX ADMIN — NYSTATIN 500000 UNITS: 100000 SUSPENSION ORAL at 09:43

## 2018-08-21 RX ADMIN — NYSTATIN 500000 UNITS: 100000 SUSPENSION ORAL at 15:13

## 2018-08-21 NOTE — NURSING NOTE
Patient is resting comfortably in bed  No complaints of pain or SOB at this time  Call light within reach  Will continue to monitor

## 2018-08-21 NOTE — NURSING NOTE
Patient's VS: Blood pressure 128/64, pulse 96, temperature 97 5 °F (36 4 °C), temperature source Temporal, resp  rate 20, height 5' 7" (1 702 m), weight (!) 157 kg (345 lb 7 4 oz), last menstrual period 08/14/2018, SpO2 95 %, not currently breastfeeding  Monitor: SR  Dyspnea on exertion; while changing gown OOB on feet; steady gait  No complaints of SOB while in bed resting  SCD's on  Remains AAOx4  Call light within reach  Bed is locked and in lowest position  Will continue to monitor

## 2018-08-21 NOTE — NURSING NOTE
Patient is awake, alert, and oriented to person, place, and time  Denies any pain  States she has shortness of breath but oxygen saturation in 98%  Vital signs are stable and telemetry shows Sinus rhythm  Remains on heparin drip at least until next APTT  Results

## 2018-08-21 NOTE — PROGRESS NOTES
Progress Note - Lobo Pendleton 1975, 43 y o  female MRN: [id number]    Unit/Bed#: 7T [facility] 716-01 Encounter: [id number]    Primary Care Provider: Jerilyn Graham MD   Date and time admitted to hospital: 8/19/2018  1:04 PM        * Pulmonary embolism [facility])   Assessment & Plan    Improving  -Well's score of 6 (moderate) on admission  H/O provoked PE in 2012 (smoking, OCP use)  -CTPE confirmed large filling defect involving the distal aspect of both the left and right pulmonary arteries  -ECG on admission showed S wave in lead I, Q wave and T wave inversions in lead III  -Continue with telemetry  - Troponin elevated X3  See management below    -Saturating at 98% on O2 1 5L via nasal canula  - Initially on  heparin @ 18 units/ Kg, but now @ 15 units/kg/hr per heparin infusion protocol based on aPTT 48  this morning  Patient also received a 5000 units heparin bolus this morning since PTT was subtherapeutic   -ECHO pertinent for mildly elevated pulmonary pressure 45 6 mmHg  -Monitor H/H and platelets  Based on next PTT, we will transition to Lovenox @ 1mg/kg BID and begin 10 mg Coumadin  Patient will be on both Lovenox and Coumadin until 24 hours post reaching INR of 2-3  We will get authorization for 2 weeks of Lovenox  Patient educated about signs of bleeding and all questions answered  Reading material about diet on Vit K antagonist will also be provided upon discharge  Acute cystitis   Assessment & Plan    Improving  C/O H/O dysuria and suprapubic tenderness  Afebrile, no leukocytosis, UA consistent w/ cystitis on admission  -S/P one time dose of levofloxacin 750 mg in the ED  -Continue Bactrim 800-160 mg Q12H in AM  Today is day 3/5          Elevated troponin I level   Assessment & Plan    Patient without chest pain this am    -Likely 2/2 acute PE causing a strain of the heart    -Troponin 0 17 on admission => 0 16 => 0 18=> 0 12  -Cardiology consult by Dr Jacquie Fregoso- recommends echocardiography  Echocardiography  Significant for mild pulmonary hypertension at 45 6mmHg    -Will follow up with cardiology for possible stress testing as outpatient  Peripheral neurogenic pain   Assessment & Plan    -Unclear etiology as B12 WNL and A1C 5 4 done 6/2018   Onset was about two months ago  Patient was sent for Venous duplex ultrasound, but she didn't go for the test    -She is scheduled for Nerve conduction studies on 9/6/18   -Continue vitamin B12 supplements daily  -Continue home regimen of Gabapentin 600 mg PO TID and Tylenol 650 mg Q8H PRN          H/O gastric bypass   Assessment & Plan    -S/P bariatric surgery 1/2018, lost 140 lb so far  -Bariatric diet and multivitamin ordered  - Patient counseled extensively on smoking cessation especially on its risk of gastric ulcers post Bypass  Patient mentioned that she has quit smoking as of the day of admission  Smoking   Assessment & Plan    -12 5 pack year smoking history    -Counseled about smoking cessation  -NRT held due to persistent tachycardia and right heart strain on EKG  Patient is now determined to quit completely  She is scared for her health  I reassured her that will support her and refer to resources to help her succeed  Elevated lactic acid level   Assessment & Plan    Resolved  -Likely 2/2 acute PE  -3 1 on admission => 2 2 =>1 5  -Continue IV NS at 125cc/hr   - I&O- +1819 4 since admission  Continue to monitor  Hypokalemia   Assessment & Plan    Resolved  Potassium of 3 3 on admission  3 8 this morning    -Received 20 mEq PO in ED and 40 mEq on 8/20  Morbid obesity ([address] )   Assessment & Plan    -S/P bariatric surgery 1/2018  -BMI 53 49  - On Bariatric diet    -Continue multivitamins            VTE Pharmacologic Prophylaxis:   Pharmacologic: Enoxaparin (Lovenox)  Mechanical VTE Prophylaxis in Place: Yes    Patient Centered Rounds: I have performed bedside rounds with nursing staff today     Discussions with Specialists or Other Care Team Provider: Cardiology    Education and Discussions with Family / Patient: Patient    Time Spent for Care: 30 minutes  More than 50% of total time spent on counseling and coordination of care as described above  Current Length of Stay: 2 day(s)    Current Patient Status: Inpatient   Certification Statement: The patient will continue to require additional inpatient hospital stay due to bilateral pulmonary embolism  Discharge Plan:     Code Status: Level 1 - Full Code      Subjective:   Patient seen and examined at bedside  She is doing well, feeling better  Slept throughout the night  Today she has mild chest pain which was present since admission  Objective:     Vitals:   Temp (24hrs), Av 7 °F (36 5 °C), Min:96 8 °F (36 °C), Max:98 2 °F (36 8 °C)    HR:  [70-96] 74  Resp:  [17-24] 20  BP: (113-145)/(64-86) 113/67  SpO2:  [95 %-100 %] 98 %  Body mass index is 54 94 kg/m²  Input and Output Summary (last 24 hours): Intake/Output Summary (Last 24 hours) at 18 0847  Last data filed at 18 0836   Gross per 24 hour   Intake              480 ml   Output              200 ml   Net              280 ml       Physical Exam:     Physical Exam   Constitutional: She is oriented to person, place, and time  She appears well-developed and well-nourished  No distress  Morbidly obese   HENT:   Head: Normocephalic and atraumatic  Nose: Nose normal    Mouth/Throat: Oropharynx is clear and moist  No oropharyngeal exudate  Eyes: Conjunctivae are normal  Right eye exhibits no discharge  Left eye exhibits no discharge  No scleral icterus  Neck: Normal range of motion  Neck supple  No JVD present  No tracheal deviation present  No thyromegaly present  Cardiovascular: Normal rate, regular rhythm and normal heart sounds  Exam reveals no gallop and no friction rub  No murmur heard    Pulmonary/Chest: Effort normal and breath sounds normal  No respiratory distress  She has no wheezes  She has no rales  She exhibits no tenderness  Abdominal: Soft  Bowel sounds are normal  She exhibits distension  She exhibits no mass  There is tenderness (epigastric)  There is no rebound and no guarding  Musculoskeletal: Normal range of motion  She exhibits no edema, tenderness or deformity  Lymphadenopathy:     She has no cervical adenopathy  Neurological: She is alert and oriented to person, place, and time  No cranial nerve deficit  Coordination normal    Skin: Skin is warm and dry  No rash noted  She is not diaphoretic  No erythema  No pallor  Psychiatric: She has a normal mood and affect  Her behavior is normal  Thought content normal        Additional Data:     Labs:      Results from last 7 days  Lab Units 08/21/18  0255  08/19/18  1359   WBC Thousand/uL 5 90  < > 5 60   HEMOGLOBIN g/dL 12 1  < > 13 8   HEMATOCRIT % 36 0  < > 41 7   PLATELETS Thousands/uL 240  < > 247   NEUTROS PCT %  --   --  55   LYMPHS PCT %  --   --  36   MONOS PCT %  --   --  7   EOS PCT %  --   --  1   < > = values in this interval not displayed  Results from last 7 days  Lab Units 08/21/18  0255 08/20/18  0548   SODIUM mmol/L 138 137   POTASSIUM mmol/L 3 8 3 2*   CHLORIDE mmol/L 106 107   CO2 mmol/L 29 23   BUN mg/dL 10 8   CREATININE mg/dL 0 61 0 55*   CALCIUM mg/dL 8 3* 7 6*   TOTAL PROTEIN g/dL  --  5 8*   BILIRUBIN TOTAL mg/dL  --  1 30*   ALK PHOS U/L  --  76   ALT U/L  --  23   AST U/L  --  49*   GLUCOSE RANDOM mg/dL 93 92       Results from last 7 days  Lab Units 08/19/18  1359   INR  1 03                 * I Have Reviewed All Lab Data Listed Above  * Additional Pertinent Lab Tests Reviewed:  All Labs Within Last 24 Hours Reviewed    Imaging:    Imaging Reports Reviewed Today Include:   Imaging Personally Reviewed by Myself Includes:      Recent Cultures (last 7 days):       Results from last 7 days  Lab Units 08/19/18  1528 08/19/18  1359   BLOOD CULTURE  No Growth at 24 hrs   No Growth at 24 hrs   --    URINE CULTURE   --  <10,000 cfu/ml        Last 24 Hours Medication List:     Current Facility-Administered Medications:  acetaminophen 650 mg Oral Q6H PRN Dusty Pollard MD    vitamin B-12 1,000 mcg Oral Daily Kehinde Sosa MD    gabapentin 600 mg Oral TID Pari Dorman MD    heparin (porcine) 3-30 Units/kg/hr (Order-Specific) Intravenous Titrated Keaton Guzman MD Last Rate: 15 Units/kg/hr (08/21/18 0805)   heparin (porcine) 10,000 Units Intravenous PRN Keaton Guzman MD    heparin (porcine) 5,000 Units Intravenous PRN Keaton Guzman MD    ipratropium-albuterol 3 mL Nebulization PRN Pari Dorman MD    multivitamin stress formula 1 tablet Oral Daily Kehinde Sosa MD    nystatin 500,000 Units Swish & Swallow 4x Daily Pari Dorman MD    sodium chloride 125 mL/hr Intravenous Continuous Keaton Guzman MD Last Rate: 125 mL/hr (08/21/18 0518)   sulfamethoxazole-trimethoprim 1 tablet Oral Q12H Shirley Treviño MD         Today, Patient Was Seen By: Pari Dorman MD    ** Please Note: Dictation voice to text software may have been used in the creation of this document   **

## 2018-08-21 NOTE — NURSING NOTE
Patient had no complaints throughout the night  No chest pain  VS remain stable  Monitor: SR  Call light within reach

## 2018-08-22 ENCOUNTER — ANTICOAG VISIT (OUTPATIENT)
Dept: FAMILY MEDICINE CLINIC | Facility: CLINIC | Age: 43
End: 2018-08-22

## 2018-08-22 VITALS
DIASTOLIC BLOOD PRESSURE: 81 MMHG | SYSTOLIC BLOOD PRESSURE: 158 MMHG | BODY MASS INDEX: 45.99 KG/M2 | WEIGHT: 293 LBS | HEIGHT: 67 IN | OXYGEN SATURATION: 93 % | TEMPERATURE: 97 F | HEART RATE: 70 BPM | RESPIRATION RATE: 16 BRPM

## 2018-08-22 DIAGNOSIS — I26.99 OTHER ACUTE PULMONARY EMBOLISM WITHOUT ACUTE COR PULMONALE (HCC): ICD-10-CM

## 2018-08-22 DIAGNOSIS — Z79.01 LONG TERM (CURRENT) USE OF ANTICOAGULANTS: ICD-10-CM

## 2018-08-22 PROBLEM — G47.33 OBSTRUCTIVE SLEEP APNEA: Status: ACTIVE | Noted: 2017-06-20

## 2018-08-22 PROBLEM — E66.9 OBESITY: Status: ACTIVE | Noted: 2017-06-20

## 2018-08-22 PROBLEM — B37.0 THRUSH: Status: ACTIVE | Noted: 2018-08-22

## 2018-08-22 PROBLEM — R20.2 PARESTHESIA OF LOWER EXTREMITY: Status: ACTIVE | Noted: 2018-06-08

## 2018-08-22 LAB
ANION GAP SERPL CALCULATED.3IONS-SCNC: 4 MMOL/L (ref 5–14)
BUN SERPL-MCNC: 8 MG/DL (ref 5–25)
CALCIUM SERPL-MCNC: 8.7 MG/DL (ref 8.4–10.2)
CHLORIDE SERPL-SCNC: 106 MMOL/L (ref 97–108)
CO2 SERPL-SCNC: 28 MMOL/L (ref 22–30)
CREAT SERPL-MCNC: 0.65 MG/DL (ref 0.6–1.2)
ERYTHROCYTE [DISTWIDTH] IN BLOOD BY AUTOMATED COUNT: 15.6 %
GFR SERPL CREATININE-BSD FRML MDRD: 127 ML/MIN/1.73SQ M
GLUCOSE SERPL-MCNC: 90 MG/DL (ref 70–99)
HCT VFR BLD AUTO: 36.6 % (ref 36–46)
HGB BLD-MCNC: 12.1 G/DL (ref 12–16)
INR PPP: 1.02 (ref 0.89–1.1)
MCH RBC QN AUTO: 33.1 PG (ref 26–34)
MCHC RBC AUTO-ENTMCNC: 33 G/DL (ref 31–36)
MCV RBC AUTO: 100 FL (ref 80–100)
PLATELET # BLD AUTO: 264 THOUSANDS/UL (ref 150–450)
PMV BLD AUTO: 7.6 FL (ref 8.9–12.7)
POTASSIUM SERPL-SCNC: 3.7 MMOL/L (ref 3.6–5)
PROTHROMBIN TIME: 10.8 SECONDS (ref 9.5–11.6)
RBC # BLD AUTO: 3.64 MILLION/UL (ref 4–5.2)
SODIUM SERPL-SCNC: 138 MMOL/L (ref 137–147)
WBC # BLD AUTO: 4.1 THOUSAND/UL (ref 4.5–11)

## 2018-08-22 PROCEDURE — 85260 CLOT FACTOR X STUART-POWER: CPT | Performed by: FAMILY MEDICINE

## 2018-08-22 PROCEDURE — 85027 COMPLETE CBC AUTOMATED: CPT | Performed by: FAMILY MEDICINE

## 2018-08-22 PROCEDURE — 80048 BASIC METABOLIC PNL TOTAL CA: CPT | Performed by: FAMILY MEDICINE

## 2018-08-22 PROCEDURE — 85610 PROTHROMBIN TIME: CPT | Performed by: FAMILY MEDICINE

## 2018-08-22 PROCEDURE — 99239 HOSP IP/OBS DSCHRG MGMT >30: CPT | Performed by: FAMILY MEDICINE

## 2018-08-22 RX ORDER — SULFAMETHOXAZOLE AND TRIMETHOPRIM 800; 160 MG/1; MG/1
1 TABLET ORAL EVERY 12 HOURS SCHEDULED
Qty: 1 TABLET | Refills: 0 | Status: SHIPPED | OUTPATIENT
Start: 2018-08-22 | End: 2018-08-23

## 2018-08-22 RX ORDER — WARFARIN SODIUM 5 MG/1
10 TABLET ORAL
Qty: 60 TABLET | Refills: 0 | Status: SHIPPED | OUTPATIENT
Start: 2018-08-22 | End: 2018-09-21

## 2018-08-22 RX ORDER — WARFARIN SODIUM 2 MG/1
2 TABLET ORAL
Qty: 30 TABLET | Refills: 0 | Status: SHIPPED | OUTPATIENT
Start: 2018-08-22 | End: 2018-08-30

## 2018-08-22 RX ADMIN — NYSTATIN 500000 UNITS: 100000 SUSPENSION ORAL at 12:41

## 2018-08-22 RX ADMIN — B-COMPLEX W/ C & FOLIC ACID TAB 1 TABLET: TAB at 09:51

## 2018-08-22 RX ADMIN — GABAPENTIN 600 MG: 300 CAPSULE ORAL at 09:51

## 2018-08-22 RX ADMIN — SULFAMETHOXAZOLE AND TRIMETHOPRIM 1 TABLET: 800; 160 TABLET ORAL at 09:52

## 2018-08-22 RX ADMIN — POLYETHYLENE GLYCOL 3350 17 G: 17 POWDER, FOR SOLUTION ORAL at 09:52

## 2018-08-22 RX ADMIN — CYANOCOBALAMIN TAB 1000 MCG 1000 MCG: 1000 TAB at 09:51

## 2018-08-22 RX ADMIN — ENOXAPARIN SODIUM 160 MG: 80 INJECTION SUBCUTANEOUS at 03:15

## 2018-08-22 RX ADMIN — NYSTATIN 500000 UNITS: 100000 SUSPENSION ORAL at 09:52

## 2018-08-22 NOTE — NURSING NOTE
Patient's VS: Blood pressure 120/65, pulse 71, temperature 97 5 °F (36 4 °C), temperature source Temporal, resp  rate 20, height 5' 7" (1 702 m), weight (!) 159 kg (350 lb 12 oz), last menstrual period 08/14/2018, SpO2 95 %, not currently breastfeeding  Monitor: NSR  No complaints of pain, no C/P  No dizziness or SOB  Patient resting comfortably in bed  SCDs are on  Call light within reach  Bed is in lowest position and locked  Will continue to monitor

## 2018-08-22 NOTE — DISCHARGE INSTRUCTIONS
Dysuria   WHAT YOU NEED TO KNOW:   Dysuria is difficulty urinating, or pain, burning, or discomfort with urination  Dysuria is usually a symptom of another problem  DISCHARGE INSTRUCTIONS:   Return to the emergency department if:   · You have severe back, side, or abdominal pain  · You have fever and shaking chills  · You vomit several times in a row  Contact your healthcare provider if:   · Your symptoms do not go away, even after treatment  · You have questions or concerns about your condition or care  Medicines:   · Medicines  may be given to help treat a bacterial infection or help decrease bladder spasms  · Take your medicine as directed  Contact your healthcare provider if you think your medicine is not helping or if you have side effects  Tell him of her if you are allergic to any medicine  Keep a list of the medicines, vitamins, and herbs you take  Include the amounts, and when and why you take them  Bring the list or the pill bottles to follow-up visits  Carry your medicine list with you in case of an emergency  Follow up with your healthcare provider as directed: Your healthcare provider may also refer you to a urologist or nephrologist to have additional testing  Write down your questions so you remember to ask them during your visits  Manage your dysuria:   · Drink more liquids  Liquids help flush out bacteria that may be causing an infection  Ask your healthcare provider how much liquid to drink each day and which liquids are best for you  · Take sitz baths as directed  Fill a bathtub with 4 to 6 inches of warm water  You may also use a sitz bath pan that fits over a toilet  Sit in the sitz bath for 20 minutes  Do this 2 to 3 times a day, or as directed  The warm water can help decrease pain and swelling  © 2017 [employer] Information is for End User's use only and may not be sold, redistributed or otherwise used for commercial purposes   All illustrations and images included in CareNotes® are the copyrighted property of [employer]  or Marcio White  The above information is an  only  It is not intended as medical advice for individual conditions or treatments  Talk to your doctor, nurse or pharmacist before following any medical regimen to see if it is safe and effective for you  Warfarin (WAR-far-in)  Prevents and treats blood clots  May lower the risk of serious complications after a heart attack  This medicine is a blood thinner  Brand Name(s): Coumadin, Jantoven   There may be other brand names for this medicine  When This Medicine Should Not Be Used: This medicine is not right for everyone  Do not use it if you had an allergic reaction to warfarin, if you are pregnant, or if you have health problems that could cause bleeding  How to Use This Medicine:   Tablet  · Take your medicine as directed  Your dose may need to be changed several times to find what works best for you  · This medicine should come with a Medication Guide  Ask your pharmacist for a copy if you do not have one  · Missed dose: Take a dose as soon as you remember  If it is almost time for your next dose, wait until then and take a regular dose  Do not take extra medicine to make up for a missed dose  · Store the medicine in a closed container at room temperature, away from heat, moisture, and direct light  Drugs and Foods to Avoid:   Ask your doctor or pharmacist before using any other medicine, including over-the-counter medicines, vitamins, and herbal products  · Many medicines and foods can affect how warfarin works and may affect the PT/INR test results   Tell your doctor before you start or stop any medicine, especially the following:   ¨ Ginkgo, echinacea, goldenseal, or Catalina's wort  ¨ Another blood thinner, including apixaban, argatroban, bivalirudin, cilostazol, clopidogrel, dabigatran, desirudin, dipyridamole, heparin, lepirudin, prasugrel, rivaroxaban, ticlopidine  ¨ Medicine to treat depression or anxiety, including citalopram, desvenlafaxine, duloxetine, escitalopram, fluoxetine, fluvoxamine, milnacipran, paroxetine, sertraline, venlafaxine, vilazodone  ¨ Medicine to treat an infection  ¨ NSAID pain or arthritis medicine, including aspirin, celecoxib, diclofenac, diflunisal, fenoprofen, ibuprofen, indomethacin, ketoprofen, ketorolac, mefenamic acid, naproxen, oxaprozin, piroxicam, sulindac  Check labels for over-the-counter medicines to find out if they contain an NSAID  ¨ Steroid medicine, including dexamethasone, hydrocortisone, methylprednisolone, prednisolone, prednisone  · Warfarin works best if you eat about the same amount of vitamin K every day  Foods high in vitamin K include asparagus, broccoli, brussels sprouts, cabbage, green leafy vegetables, plums, rhubarb, and canola oil  Talk to your doctor before you make changes to your normal diet  · Do not eat grapefruit or drink grapefruit juice while you are using this medicine  Warnings While Using This Medicine:   · It is not safe to take this medicine during pregnancy  It could harm an unborn baby  Tell your doctor right away if you become pregnant  Use an effective form of birth control to keep from getting pregnant during treatment and for at least 1 month after your last dose  · Tell your doctor if you are breastfeeding, or if you have kidney disease, liver disease, diabetes, heart disease, heart failure, high blood pressure, an infection, a stomach ulcer, or protein C deficiency  Also tell your doctor if you had recent surgery or an injury, or a history of stroke, anemia, severe bleeding or bruising, or problems caused by heparin use    · This medicine may cause the following problems:   ¨ Bleeding, which may be life-threatening  ¨ Gangrene (skin or tissue damage)  ¨ Calciphylaxis or calcium uremic arteriolopathy  ¨ Purple toes syndrome  · You must have a PT/INR blood test while you use this medicine to check how well your blood is clotting  Your doctor will use the test results to make sure the medicine is working properly  Keep all appointments for the PT/INR blood tests  · You may bleed or bruise more easily with warfarin  To prevent injury or cuts, do not play rough sports, be careful with sharp objects, and brush and floss your teeth gently  Conde Colonel your nose gently, and do not pick your nose  · Carry an ID card or wear a medical alert bracelet to let emergency caregivers know that you use warfarin  · Tell any doctor or dentist who treats you that you are using this medicine  You may need to stop using this medicine several days before you have surgery or medical tests  · Keep all medicine out of the reach of children  Never share your medicine with anyone  Possible Side Effects While Using This Medicine:   Call your doctor right away if you notice any of these side effects:  · Allergic reaction: Itching or hives, swelling in your face or hands, swelling or tingling in your mouth or throat, chest tightness, trouble breathing  · Bleeding from your gums or nose, coughing up blood, heavy monthly periods  · Bleeding that does not stop, bruising, or weakness  · Dizziness, fainting, lightheadedness  · Pain, brown or black skin, or skin that is cool to the touch  · Purple toes or feet, or new pain in your leg, foot, or toes  · Purplish red, net-like, blotchy spots on the skin  · Red or dark brown urine, or red or black, tarry stools  · Vomiting blood or material that looks like coffee grounds  If you notice other side effects that you think are caused by this medicine, tell your doctor  Call your doctor for medical advice about side effects  You may report side effects to FDA at 7-791-FDA-2172  © 2017 [address] [employer] Information is for End User's use only and may not be sold, redistributed or otherwise used for commercial purposes    The above information is an  only  It is not intended as medical advice for individual conditions or treatments  Talk to your doctor, nurse or pharmacist before following any medical regimen to see if it is safe and effective for you  Vitamin K in Foods  WHAT YOU NEED TO KNOW:  What do I need to know about warfarin and vitamin K?  · Warfarin is a type of medicine called a blood thinner  It makes your blood clot more slowly  This can help prevent dangerous problems, such as a stroke (a blood clot in the brain)  Vitamin K helps your blood to clot (thicken to stop bleeding)  Warfarin works by making it harder for your body to use vitamin K to clot blood  Changes in the amount of vitamin K that you normally eat can affect how warfarin works  · Your healthcare provider can tell how well warfarin is working from a blood test that you will have regularly  This test is called an international normalized ratio (INR)  It shows how quickly your blood clots  To keep your INR at a healthy level, you need to manage how much vitamin K you eat  How much vitamin K should I eat while I take warfarin? Eat the same amount of vitamin K each day  Do not change the amount of vitamin K you normally have from foods or supplements  This helps keep your INR at the same healthy level  · A big increase in vitamin K can lower your INR  This can cause dangerous clotting in your blood  · A big decrease in vitamin K can raise your INR  This can make it harder for your blood to clot  It can cause you to bleed too much  Do not avoid foods that contain vitamin K  Which foods have vitamin K? Dark green leafy vegetables have the highest amounts of vitamin K  Foods that contain vitamin K include the following:  · Foods with more than 100 mcg per serving:  ? ½ cup of cooked kale (531 mcg)    ? ½ cup of cooked spinach (444 mcg)    ? ½ cup of cooked yazmin greens (418 mcg)    ? 1 cup of cooked broccoli (220 mcg)    ? 1 cup of cooked brussels sprouts (219 mcg)    ?  1 cup of raw yazmin greens (184 mcg)    ? 1 cup of raw spinach (145 mcg)    ? 1 cup of raw endive (116 mcg)    · Foods with 50 to 100 mcg per serving:  ? 1 cup of raw broccoli (89 mcg)    ? ½ cup of cooked cabbage (82 mcg)    ? 1 cup of green leaf lettuce (71 mcg)    ? 1 cup of carlene lettuce (57 mcg)    · Foods with 15 to 50 mcg per serving:  ? 4 varner of asparagus (48 mcg)    ? 1 medium kiwi fruit (31 mcg)    ? 1 cup of raw blackberries or blueberries (29 mcg)    ? 1 cup of red or green grapes (23 mcg)    ? ½ cup of cooked peas (19 mcg)  What are other sources of vitamin K? Multivitamins and other supplements may contain 10 to 80 mcg of vitamin K  These amounts can cause changes in your INR  Read the labels of any supplements you take  Do not take more than 1 supplement that contains vitamin K  Talk to your healthcare provider about the supplements you are taking  When should I contact my healthcare provider? · You have a poor appetite  · You have an upset stomach  · You have hair loss  · You bruise more easily than normal      · You have questions about your medicines, supplements, or the amount of vitamin K you eat  When should I seek immediate care? · You cough up blood  · You have red or black bowel movements  · Your urine is red or dark brown  · You have bleeding from your gums or nose  · You have heavy bleeding from a wound that does not stop, or unusually heavy monthly periods  · You have a severe headache  CARE AGREEMENT:  You have the right to help plan your care  Learn about your health condition and how it may be treated  Discuss treatment options with your caregivers to decide what care you want to receive  You always have the right to refuse treatment  Warfarin (By mouth)   Warfarin (WAR-far-in)  Prevents and treats blood clots  May lower the risk of serious complications after a heart attack  This medicine is a blood thinner     Brand Name(s): Kath Beltran There may be other brand names for this medicine  When This Medicine Should Not Be Used: This medicine is not right for everyone  Do not use it if you had an allergic reaction to warfarin, if you are pregnant, or if you have health problems that could cause bleeding  How to Use This Medicine:   Tablet  · Take your medicine as directed  Your dose may need to be changed several times to find what works best for you  · This medicine should come with a Medication Guide  Ask your pharmacist for a copy if you do not have one  · Missed dose: Take a dose as soon as you remember  If it is almost time for your next dose, wait until then and take a regular dose  Do not take extra medicine to make up for a missed dose  · Store the medicine in a closed container at room temperature, away from heat, moisture, and direct light  Drugs and Foods to Avoid:   Ask your doctor or pharmacist before using any other medicine, including over-the-counter medicines, vitamins, and herbal products  · Many medicines and foods can affect how warfarin works and may affect the PT/INR test results  Tell your doctor before you start or stop any medicine, especially the following:   ¨ Ginkgo, echinacea, goldenseal, or Catalina's wort  ¨ Another blood thinner, including apixaban, argatroban, bivalirudin, cilostazol, clopidogrel, dabigatran, desirudin, dipyridamole, heparin, lepirudin, prasugrel, rivaroxaban, ticlopidine  ¨ Medicine to treat depression or anxiety, including citalopram, desvenlafaxine, duloxetine, escitalopram, fluoxetine, fluvoxamine, milnacipran, paroxetine, sertraline, venlafaxine, vilazodone  ¨ Medicine to treat an infection  ¨ NSAID pain or arthritis medicine, including aspirin, celecoxib, diclofenac, diflunisal, fenoprofen, ibuprofen, indomethacin, ketoprofen, ketorolac, mefenamic acid, naproxen, oxaprozin, piroxicam, sulindac  Check labels for over-the-counter medicines to find out if they contain an NSAID    ¨ Steroid medicine, including dexamethasone, hydrocortisone, methylprednisolone, prednisolone, prednisone  · Warfarin works best if you eat about the same amount of vitamin K every day  Foods high in vitamin K include asparagus, broccoli, brussels sprouts, cabbage, green leafy vegetables, plums, rhubarb, and canola oil  Talk to your doctor before you make changes to your normal diet  · Do not eat grapefruit or drink grapefruit juice while you are using this medicine  Warnings While Using This Medicine:   · It is not safe to take this medicine during pregnancy  It could harm an unborn baby  Tell your doctor right away if you become pregnant  Use an effective form of birth control to keep from getting pregnant during treatment and for at least 1 month after your last dose  · Tell your doctor if you are breastfeeding, or if you have kidney disease, liver disease, diabetes, heart disease, heart failure, high blood pressure, an infection, a stomach ulcer, or protein C deficiency  Also tell your doctor if you had recent surgery or an injury, or a history of stroke, anemia, severe bleeding or bruising, or problems caused by heparin use  · This medicine may cause the following problems:   ¨ Bleeding, which may be life-threatening  ¨ Gangrene (skin or tissue damage)  ¨ Calciphylaxis or calcium uremic arteriolopathy  ¨ Purple toes syndrome  · You must have a PT/INR blood test while you use this medicine to check how well your blood is clotting  Your doctor will use the test results to make sure the medicine is working properly  Keep all appointments for the PT/INR blood tests  · You may bleed or bruise more easily with warfarin  To prevent injury or cuts, do not play rough sports, be careful with sharp objects, and brush and floss your teeth gently  Dorean Yaw your nose gently, and do not pick your nose  · Carry an ID card or wear a medical alert bracelet to let emergency caregivers know that you use warfarin    · Tell any doctor or dentist who treats you that you are using this medicine  You may need to stop using this medicine several days before you have surgery or medical tests  · Keep all medicine out of the reach of children  Never share your medicine with anyone  Possible Side Effects While Using This Medicine:   Call your doctor right away if you notice any of these side effects:  · Allergic reaction: Itching or hives, swelling in your face or hands, swelling or tingling in your mouth or throat, chest tightness, trouble breathing  · Bleeding from your gums or nose, coughing up blood, heavy monthly periods  · Bleeding that does not stop, bruising, or weakness  · Dizziness, fainting, lightheadedness  · Pain, brown or black skin, or skin that is cool to the touch  · Purple toes or feet, or new pain in your leg, foot, or toes  · Purplish red, net-like, blotchy spots on the skin  · Red or dark brown urine, or red or black, tarry stools  · Vomiting blood or material that looks like coffee grounds  If you notice other side effects that you think are caused by this medicine, tell your doctor  Call your doctor for medical advice about side effects  You may report side effects to FDA at 7-111-FDA-1088  © 2017 [address] [employer] Information is for End User's use only and may not be sold, redistributed or otherwise used for commercial purposes  The above information is an  only  It is not intended as medical advice for individual conditions or treatments  Talk to your doctor, nurse or pharmacist before following any medical regimen to see if it is safe and effective for you

## 2018-08-22 NOTE — PLAN OF CARE
Patient continues progressing  Expected discharge home today or tomorrow  Lovenox for home was delivered from the pharmacy  Patient states she doesn't know if she can inject herself  Education on injection process, procedure

## 2018-08-22 NOTE — NURSING NOTE
Patient's VS stable  Remains afebrile  Monitor: SR  Patient was a little SOB upon ambulating to the bathroom  Patient states she feels much better  Denies any pain  SCDs on  Remains AAOx4  Call light within reach

## 2018-08-22 NOTE — PATIENT INSTRUCTIONS
Warfarin (By mouth)   Warfarin (WAR-far-in)  Prevents and treats blood clots  May lower the risk of serious complications after a heart attack  This medicine is a blood thinner  Brand Name(s): Coumadin, Jantoven   There may be other brand names for this medicine  When This Medicine Should Not Be Used: This medicine is not right for everyone  Do not use it if you had an allergic reaction to warfarin, if you are pregnant, or if you have health problems that could cause bleeding  How to Use This Medicine:   Tablet  · Take your medicine as directed  Your dose may need to be changed several times to find what works best for you  · This medicine should come with a Medication Guide  Ask your pharmacist for a copy if you do not have one  · Missed dose: Take a dose as soon as you remember  If it is almost time for your next dose, wait until then and take a regular dose  Do not take extra medicine to make up for a missed dose  · Store the medicine in a closed container at room temperature, away from heat, moisture, and direct light  Drugs and Foods to Avoid:   Ask your doctor or pharmacist before using any other medicine, including over-the-counter medicines, vitamins, and herbal products  · Many medicines and foods can affect how warfarin works and may affect the PT/INR test results   Tell your doctor before you start or stop any medicine, especially the following:   ¨ Ginkgo, echinacea, goldenseal, or Catalina's wort  ¨ Another blood thinner, including apixaban, argatroban, bivalirudin, cilostazol, clopidogrel, dabigatran, desirudin, dipyridamole, heparin, lepirudin, prasugrel, rivaroxaban, ticlopidine  ¨ Medicine to treat depression or anxiety, including citalopram, desvenlafaxine, duloxetine, escitalopram, fluoxetine, fluvoxamine, milnacipran, paroxetine, sertraline, venlafaxine, vilazodone  ¨ Medicine to treat an infection  ¨ NSAID pain or arthritis medicine, including aspirin, celecoxib, diclofenac, diflunisal, fenoprofen, ibuprofen, indomethacin, ketoprofen, ketorolac, mefenamic acid, naproxen, oxaprozin, piroxicam, sulindac  Check labels for over-the-counter medicines to find out if they contain an NSAID  ¨ Steroid medicine, including dexamethasone, hydrocortisone, methylprednisolone, prednisolone, prednisone  · Warfarin works best if you eat about the same amount of vitamin K every day  Foods high in vitamin K include asparagus, broccoli, brussels sprouts, cabbage, green leafy vegetables, plums, rhubarb, and canola oil  Talk to your doctor before you make changes to your normal diet  · Do not eat grapefruit or drink grapefruit juice while you are using this medicine  Warnings While Using This Medicine:   · It is not safe to take this medicine during pregnancy  It could harm an unborn baby  Tell your doctor right away if you become pregnant  Use an effective form of birth control to keep from getting pregnant during treatment and for at least 1 month after your last dose  · Tell your doctor if you are breastfeeding, or if you have kidney disease, liver disease, diabetes, heart disease, heart failure, high blood pressure, an infection, a stomach ulcer, or protein C deficiency  Also tell your doctor if you had recent surgery or an injury, or a history of stroke, anemia, severe bleeding or bruising, or problems caused by heparin use  · This medicine may cause the following problems:   ¨ Bleeding, which may be life-threatening  ¨ Gangrene (skin or tissue damage)  ¨ Calciphylaxis or calcium uremic arteriolopathy  ¨ Purple toes syndrome  · You must have a PT/INR blood test while you use this medicine to check how well your blood is clotting  Your doctor will use the test results to make sure the medicine is working properly  Keep all appointments for the PT/INR blood tests  · You may bleed or bruise more easily with warfarin   To prevent injury or cuts, do not play rough sports, be careful with sharp objects, and brush and floss your teeth gently  Shelda Robbie your nose gently, and do not pick your nose  · Carry an ID card or wear a medical alert bracelet to let emergency caregivers know that you use warfarin  · Tell any doctor or dentist who treats you that you are using this medicine  You may need to stop using this medicine several days before you have surgery or medical tests  · Keep all medicine out of the reach of children  Never share your medicine with anyone  Possible Side Effects While Using This Medicine:   Call your doctor right away if you notice any of these side effects:  · Allergic reaction: Itching or hives, swelling in your face or hands, swelling or tingling in your mouth or throat, chest tightness, trouble breathing  · Bleeding from your gums or nose, coughing up blood, heavy monthly periods  · Bleeding that does not stop, bruising, or weakness  · Dizziness, fainting, lightheadedness  · Pain, brown or black skin, or skin that is cool to the touch  · Purple toes or feet, or new pain in your leg, foot, or toes  · Purplish red, net-like, blotchy spots on the skin  · Red or dark brown urine, or red or black, tarry stools  · Vomiting blood or material that looks like coffee grounds  If you notice other side effects that you think are caused by this medicine, tell your doctor  Call your doctor for medical advice about side effects  You may report side effects to FDA at 0-436-FDA-9951  © 2017 [address] [employer] Information is for End User's use only and may not be sold, redistributed or otherwise used for commercial purposes  The above information is an  only  It is not intended as medical advice for individual conditions or treatments  Talk to your doctor, nurse or pharmacist before following any medical regimen to see if it is safe and effective for you  Pulmonary Embolism   WHAT YOU NEED TO KNOW:   A pulmonary embolism (PE) is the sudden blockage of a blood vessel in the lungs by an embolus  An embolus is a small piece of blood clot, fat, air, or tumor cells  The embolus cuts off the blood supply to your lungs  A pulmonary embolism can become life-threatening  DISCHARGE INSTRUCTIONS:   Call 911 for any of the following:   · You feel lightheaded, short of breath, and have chest pain  · You cough up blood  · You have a seizure  · You have slurred speech, increased sleepiness, or problems seeing, talking, or thinking  · You have weakness or cannot move your arm or leg on one side of your body  Seek care immediately if:   · You feel faint  · You have a severe headache  · Your heart is beating faster than normal   Contact your healthcare provider if:   · The skin on any part of your legs or hips turns purple  · Your gums or nose bleed  · You see blood in your urine or bowel movements  · Your bowel movements are black or darker than normal     · You have questions or concerns about your condition or care  Medicines:   · Blood thinners  help treat the PE and prevent new clots from forming  Examples of blood thinners include heparin, rivaroxaban, apixiban, and warfarin  The following are general safety guidelines to follow while you are taking a blood thinner:     ¨ Watch for bleeding and bruising  Watch for bleeding from your gums or nose  Watch for blood in your urine and bowel movements  Use a soft washcloth on your skin, and a soft toothbrush to brush your teeth  This can keep your skin and gums from bleeding  If you shave, use an electric shaver  Do not play contact sports  ¨ Tell your dentist and other healthcare providers that you take a blood thinner  Wear a bracelet or necklace that says you take this medicine  ¨ Do not start or stop any medicines unless your healthcare provider tells you to  Many medicines cannot be used with blood thinners       ¨ Tell your healthcare provider right away if you forget to take the blood thinner , or if you take too much     ¨ Warfarin  is a blood thinner that you may need to take  The following are additional things you should be aware of if you take warfarin:    § Foods and medicines can affect the amount of warfarin in your blood  Do not make major changes to your diet  Warfarin works best when you eat about the same amount of vitamin K every day  Vitamin K is found in green leafy vegetables and certain other foods  Ask for more information about what to eat or not to eat  § You will need to see your healthcare provider for follow-up visits  You will need regular blood tests to decide how much warfarin you need  · Take your medicine as directed  Contact your healthcare provider if you think your medicine is not helping or if you have side effects  Tell him or her if you are allergic to any medicine  Keep a list of the medicines, vitamins, and herbs you take  Include the amounts, and when and why you take them  Bring the list or the pill bottles to follow-up visits  Carry your medicine list with you in case of an emergency  Prevent another PE:   · Wear pressure stockings  The stockings are tight and put pressure on your legs  This improves blood flow and helps prevent clots  Wear the stockings during the day  Do not wear them when you sleep  · Exercise regularly  Ask about the best exercise plan for you  When you travel by car or work at a desk, take breaks to stand up and move around as much as possible  Rotate your feet in circles often if you sit for a long period of time  · Maintain a healthy weight  Ask your healthcare provider how much you should weigh  Ask him to help you create a weight loss plan if you are overweight  · Do not smoke  Nicotine and other chemicals in cigarettes and cigars can damage blood vessels and increase your risk for another PE  Ask your healthcare provider for information if you currently smoke and need help to quit   E-cigarettes or smokeless tobacco still contain nicotine  Talk to your healthcare provider before you use these products  Follow up with your healthcare provider as directed:  Write down your questions so you remember to ask them during your visits  © 2017 [employer] Information is for End User's use only and may not be sold, redistributed or otherwise used for commercial purposes  All illustrations and images included in CareNotes® are the copyrighted property of [employer]  or Marcio White  The above information is an  only  It is not intended as medical advice for individual conditions or treatments  Talk to your doctor, nurse or pharmacist before following any medical regimen to see if it is safe and effective for you

## 2018-08-22 NOTE — DISCHARGE SUMMARY
Discharge- Julian Tye 1975, 43 y o  female MRN: [id number]    Unit/Bed#: 7T [facility] 716-01 Encounter: [id number]    Primary Care Provider: Imelda Santoro MD   Date and time admitted to hospital: 8/19/2018  1:04 PM        * Pulmonary embolism [facility])   Assessment & Plan    Improving  -Well's score of 6 (moderate) on admission  H/O provoked PE in 2012 (smoking, OCP use)  -CTPE confirmed large filling defect involving the distal aspect of both the left and right pulmonary arteries  -ECG on admission showed S wave in lead I, Q wave and T wave inversions in lead III  - Troponin elevated X3  See management below    -Saturating at 98% on room air  Significant clinical improvement  -ECHO pertinent for mildly elevated pulmonary pressure 45 6 mmHg  - Most recent aPTT 87  8/21/2018  Switched to Lovenox 160 mg q12 hours and Coumadin 10mg daily first dose 8/21/2918  Patient will be on both Lovenox and Coumadin until 24 hours post reaching INR of 2-3  Patient educated about signs of bleeding and all questions answered  Reading material about diet on Vit K antagonist has been provided  INR check on 8/25/2018 ordered  Outpatient follow up scheduled on 8/30/2018        Acute cystitis   Assessment & Plan    Improving  C/O H/O dysuria and suprapubic tenderness  Afebrile, no leukocytosis, UA consistent w/ cystitis on admission  -S/P one time dose of levofloxacin 750 mg in the ED  -Continue Bactrim 800-160 mg Q12H in AM  Today is day 4/5   -one more dose tomorrow 8/23 to complete course  Elevated troponin I level   Assessment & Plan    Resolved  Patient without chest pain this am    -Likely 2/2 acute PE causing a strain of the heart    -Troponin 0 17 on admission => 0 16 => 0 18=> 0 12  -Cardiology consult by Dr Loida Holm- recommended echocardiography  Echocardiography  Significant for mild pulmonary hypertension at 45 6mmHg    -Will follow up with cardiology for possible stress testing as outpatient  Peripheral neurogenic pain   Assessment & Plan    -Unclear etiology as B12 WNL and A1C 5 4 done 6/2018   Onset was about two months ago  Patient was sent for Venous duplex ultrasound, but she didn't go for the test    -She is scheduled for Nerve conduction studies on 9/6/18   -Continue vitamin B12 supplements daily  -Continue home regimen of Gabapentin 600 mg PO TID and Tylenol 650 mg Q8H PRN          Thrush   Assessment & Plan    Continue Nystatin swish and swallow        Personal history of gastric bypass   Assessment & Plan    -S/P bariatric surgery 1/2018, lost 140 lb so far  -Bariatric diet and multivitamin ordered  - Patient counseled extensively on smoking cessation especially on its risk of gastric ulcers post Bypass  Patient mentioned that she has quit smoking as of the day of admission  Smoking   Assessment & Plan    -12 5 pack year smoking history    -Counseled about smoking cessation  -NRT held due to persistent tachycardia and right heart strain on EKG  Patient is now determined to quit completely  She is scared for her health  I reassured her that will support her and refer to resources to help her succeed  She was offered and refused nicotine patch  Elevated lactic acid level   Assessment & Plan    Resolved  -Likely 2/2 acute PE  -3 1 on admission => 2 2 =>1 5  -Continue IV NS at 125cc/hr   - I&O- +1819 4 since admission  Continue to monitor  Hypokalemia   Assessment & Plan    Resolved  Potassium of 3 3 on admission  3 8 this morning    -Received 20 mEq PO in ED and 40 mEq on 8/20  Obesity   Assessment & Plan    -S/P bariatric surgery 1/2018  -BMI 53 49  - On Bariatric diet    -Continue multivitamins                    Discharging Physician / Practitioner: Patrick Diamond MD  PCP: Mariza Rivera MD  Admission Date:   Admission Orders     Ordered        08/19/18 1626  Inpatient Admission (expected length of stay for this patient is greater than two midnights)  Once             Discharge Date: 08/22/18    Resolved Problems  Date Reviewed: 8/19/2018    None          Consultations During Hospital Stay:  · Cardiology    Procedures Performed:     · None    Significant Findings / Test Results:     · Bilateral pulmonary embolism    Incidental Findings:   · none     Test Results Pending at Discharge (will require follow up):   · none     Outpatient Tests Requested:  · INR- 4/17/5052    Complications:  None    Reason for Admission: bilateral pulmonary embolism    Hospital Course:     Reema Valdes is a 43 y o  female patient presented to the hospital on 8/19/2018 with chief complaints of SOB  Patient reported several days of non-productive cough followed by  sudden onset SOB and dizziness  At first, she attributed the SOB to failure to eat the entire day, but symptoms persisted and then she developed substernal chest pain worsened with deep breathing  She has a history of provoked PE 6 in 2007 which was treated with coumadin for 6 months  At that time, she was on oral contraceptives and was actively smoking  Patient denies any recent travel, OCP use or surgery in the past 3 months  Patient also denies any history of miscarriage or pain and swelling in her lower extremities  She admits to a sedentary lifestyle as she sits for almost 8 hours daily at work  In the ED, she was tachypneic and tachycardic  She was found to have elevated troponin (0 17), elevated lactic acid (3 1), d-dimer (3 93), CTPE was positive for large bilateral PE  She was given 5000 units heparin subcutaneously, as well as aspirin 324 mg and plavix 300 mg  On admission she was given a bolus of heparin 80 units/kg, and started on a heparin drip       She was also complaining of several days of dysuria and suprapubic discomfort  She was scheduled to see her PCP on Tuesday on 21-AUG-2018  Urinalysis in the ED was positive for nitrites, leukocytes, 2+ protein and moderate bacteria   She was given levaquin 750 mg in the ED        She is s/p bariatric surgery 6 months ago  She states she is not following bariatric diet, but eats small portions  She is currently on multiple vitamins       She also notes equal bilateral lower extremity edema more than her baseline  She was on lasix 20 mg PRN at home      She also complains of numbness and tingling in her lower extremities for about three months and attributes it to the gastric bypass surgery  She is currently taking B complex vitamins and folic acid and Gabapentin for neuropathy      She denies any other complains except as described above    Please see above list of diagnoses and related plan for additional information  Patient was mildly short of breath  On 3L O2 via NC  Receiving heparin 18 units/kg/hr drip and IVF  Does not offer any new complaints  Reports improvement in SOB since arrival at hospital     HD 1  Patient reports resolution of the chest pain and improved dyspnea  She also denied dizziness and white sports in her visionh; these syptoms were present on admission  She feels as though she is improving  Since admission, patient received one loading dose of Heparin 80u/kg and was then  Placed on Heparin drip at 18u/kg/hour  APTT was drawn and was at 98  Heparin dose was reduced to 15 units/ kg/ hour  Heparin protocol in place  We started Bactrim for UTI  Day 2/5 of antibiotics  HD 2  Patient seen and examined at bedside  She is doing well, feeling better  Slept throughout the night  Today she has mild chest pain which was present since admission  She is on 2L NC  Appears to have labored breathing but otherwise able to complete her sentences  Continuous telemetry  Initially on  heparin @ 18 units/ Kg,=>15=>13 per heparin infusion protocol  aPTT 48  this morning  Patient received a 5000 units heparin bolus this morning since PTT was subtherapeutic and Heparin dose increased to 15u/kg/hr   We followed the PTT in the afternoon and it was 87  We started Lovenox at 1mg/kg q12hr and d/c heparin drip with one hour overlap  We also started patient on 10mg Coumadin  Condition at Discharge: stable     Discharge Day Visit / Exam:     Subjective:  Patient seen and examined at bedside  She is comfortable  Significant clinical improvement  She is breathing ambient air and speaking in complete sentences  She complained of constipation and abdominal distention yesterday and was given miralax  Patient reported one normal bowel movement overnight and resolution of her symptoms  Vitals: Blood Pressure: 158/81 (08/22/18 0800)  Pulse: 70 (08/22/18 0800)  Temperature: (!) 97 °F (36 1 °C) (08/22/18 0800)  Temp Source: Temporal (08/22/18 0800)  Respirations: 16 (08/22/18 0800)  Height: 5' 7" (170 2 cm) (08/19/18 1849)  Weight - Scale: (!) 161 kg (354 lb 0 9 oz) (08/22/18 0600)  SpO2: 93 % (08/22/18 0800)  Exam:   Physical Exam   Constitutional: She is oriented to person, place, and time  She appears well-developed and well-nourished  No distress  Morbidly obese   HENT:   Head: Normocephalic and atraumatic  Nose: Nose normal    Mouth/Throat: Oropharynx is clear and moist  No oropharyngeal exudate  Eyes: Conjunctivae are normal  Pupils are equal, round, and reactive to light  Right eye exhibits no discharge  Left eye exhibits no discharge  No scleral icterus  Neck: Normal range of motion  Neck supple  No JVD present  No tracheal deviation present  No thyromegaly present  Cardiovascular: Normal rate, regular rhythm and normal heart sounds  Exam reveals no gallop and no friction rub  No murmur heard  Pulmonary/Chest: Effort normal and breath sounds normal  No respiratory distress  She has no wheezes  She has no rales  She exhibits no tenderness  Abdominal: Soft  Bowel sounds are normal  She exhibits distension  She exhibits no mass  There is no tenderness  There is no rebound and no guarding  Musculoskeletal: Normal range of motion   She exhibits no edema, tenderness or deformity  Lymphadenopathy:     She has no cervical adenopathy  Neurological: She is alert and oriented to person, place, and time  No cranial nerve deficit  Coordination normal    Skin: Skin is warm and dry  No rash noted  She is not diaphoretic  No erythema  No pallor  Psychiatric: She has a normal mood and affect  Her behavior is normal  Judgment and thought content normal        Discussion with Family: No    Discharge instructions/Information to patient and family:   See after visit summary for information provided to patient and family  Provisions for Follow-Up Care:  See after visit summary for information related to follow-up care and any pertinent home health orders  Disposition:     Home    For Discharges to [facility] SNF:   · Not Applicable to this Patient - Not Applicable to this Patient    Planned Readmission: no     Discharge Statement:  I spent 30 minutes discharging the patient  This time was spent on the day of discharge  I had direct contact with the patient on the day of discharge  Greater than 50% of the total time was spent examining patient, answering all patient questions, arranging and discussing plan of care with patient as well as directly providing post-discharge instructions  Additional time then spent on discharge activities  Discharge Medications:  See after visit summary for reconciled discharge medications provided to patient and family        ** Please Note: This note has been constructed using a voice recognition system **

## 2018-08-22 NOTE — NURSING NOTE
IV removed with tip intact  Pressure held to control bleeding  Discharge instructions discussed with patient and verbalizes understanding  Patient left with all their belongings, prescriptions and discharge instructions

## 2018-08-23 NOTE — CASE MANAGEMENT
Notification of Discharge  This is a Notification of Discharge from our facility [address]  Please be advised that this patient has been discharge from our facility  Below you will find the admission and discharge date and time including the patients disposition  PRESENTATION DATE: 8/19/2018  1:04 PM  IP ADMISSION DATE: 8/19/18 1626  DISCHARGE DATE: 8/22/2018  3:45 PM  DISPOSITION: Home/Self Care    [address] [facility] in the [facility] Util[facility] Review Department  Phone: [phone]; Fax [phone]  ATTENTION: The Network Utilization Review Department is now centralized for our 9 Facilities  Make a note that we have a new phone and fax numbers for our Department  Please call with any questions or concerns to [phone] and carefully follow the prompts so that you are directed to the right person  All voicemails are confidential  Fax any determinations, approvals, denials, and requests for initial or continue stay review clinical to [phone]  Due to HIGH CALL volume, it would be easier if you could please send faxed requests to expedite your requests and in part, help us provide discharge notifications faster

## 2018-08-24 ENCOUNTER — APPOINTMENT (OUTPATIENT)
Dept: LAB | Facility: HOSPITAL | Age: 43
End: 2018-08-24
Payer: COMMERCIAL

## 2018-08-24 ENCOUNTER — TRANSITIONAL CARE MANAGEMENT (OUTPATIENT)
Dept: FAMILY MEDICINE CLINIC | Facility: CLINIC | Age: 43
End: 2018-08-24

## 2018-08-24 DIAGNOSIS — I26.99 PULMONARY EMBOLISM (HCC): ICD-10-CM

## 2018-08-24 LAB
BACTERIA BLD CULT: NORMAL
FACT X ACT/NOR PPP: 98 % (ref 76–183)
INR PPP: 1.25 (ref 0.89–1.1)
PROTHROMBIN TIME: 13.1 SECONDS (ref 9.5–11.6)

## 2018-08-24 PROCEDURE — 36415 COLL VENOUS BLD VENIPUNCTURE: CPT

## 2018-08-24 PROCEDURE — 85610 PROTHROMBIN TIME: CPT

## 2018-08-27 LAB — BACTERIA BLD CULT: NORMAL

## 2018-08-29 DIAGNOSIS — I26.99 OTHER ACUTE PULMONARY EMBOLISM WITHOUT ACUTE COR PULMONALE (HCC): ICD-10-CM

## 2018-08-29 DIAGNOSIS — R04.0 EPISTAXIS: Primary | ICD-10-CM

## 2018-08-30 ENCOUNTER — ANTICOAG VISIT (OUTPATIENT)
Dept: FAMILY MEDICINE CLINIC | Facility: CLINIC | Age: 43
End: 2018-08-30

## 2018-08-30 ENCOUNTER — APPOINTMENT (OUTPATIENT)
Dept: LAB | Facility: HOSPITAL | Age: 43
End: 2018-08-30
Payer: COMMERCIAL

## 2018-08-30 ENCOUNTER — OFFICE VISIT (OUTPATIENT)
Dept: FAMILY MEDICINE CLINIC | Facility: CLINIC | Age: 43
End: 2018-08-30

## 2018-08-30 ENCOUNTER — TRANSCRIBE ORDERS (OUTPATIENT)
Dept: ADMINISTRATIVE | Facility: HOSPITAL | Age: 43
End: 2018-08-30

## 2018-08-30 VITALS
SYSTOLIC BLOOD PRESSURE: 130 MMHG | BODY MASS INDEX: 45.99 KG/M2 | DIASTOLIC BLOOD PRESSURE: 90 MMHG | OXYGEN SATURATION: 98 % | HEART RATE: 73 BPM | HEIGHT: 67 IN | WEIGHT: 293 LBS | TEMPERATURE: 97.4 F | RESPIRATION RATE: 16 BRPM

## 2018-08-30 DIAGNOSIS — G62.9 PERIPHERAL POLYNEUROPATHY: ICD-10-CM

## 2018-08-30 DIAGNOSIS — R04.0 EPISTAXIS: ICD-10-CM

## 2018-08-30 DIAGNOSIS — I26.99 OTHER ACUTE PULMONARY EMBOLISM WITHOUT ACUTE COR PULMONALE (HCC): ICD-10-CM

## 2018-08-30 DIAGNOSIS — Z79.01 LONG TERM (CURRENT) USE OF ANTICOAGULANTS: ICD-10-CM

## 2018-08-30 DIAGNOSIS — F17.200 TOBACCO DEPENDENCE SYNDROME: ICD-10-CM

## 2018-08-30 DIAGNOSIS — Z09 HOSPITAL DISCHARGE FOLLOW-UP: Primary | ICD-10-CM

## 2018-08-30 LAB
25(OH)D3 SERPL-MCNC: 5.5 NG/ML (ref 30–100)
EOSINOPHIL # BLD AUTO: 0.07 THOUSAND/UL (ref 0–0.4)
EOSINOPHIL NFR BLD MANUAL: 2 % (ref 0–6)
ERYTHROCYTE [DISTWIDTH] IN BLOOD BY AUTOMATED COUNT: 15.3 %
FERRITIN SERPL-MCNC: 22 NG/ML (ref 8–388)
HCT VFR BLD AUTO: 39.6 % (ref 36–46)
HGB BLD-MCNC: 12.9 G/DL (ref 12–16)
INR PPP: 3.59 (ref 0.89–1.1)
LYMPHOCYTES # BLD AUTO: 1.75 THOUSAND/UL (ref 0.5–4)
LYMPHOCYTES # BLD AUTO: 50 % (ref 20–50)
MCH RBC QN AUTO: 32.6 PG (ref 26–34)
MCHC RBC AUTO-ENTMCNC: 32.6 G/DL (ref 31–36)
MCV RBC AUTO: 100 FL (ref 80–100)
MONOCYTES # BLD AUTO: 0.32 THOUSAND/UL (ref 0.2–0.9)
MONOCYTES NFR BLD AUTO: 9 % (ref 1–10)
NEUTS SEG # BLD: 1.37 THOUSAND/UL (ref 1.8–7.8)
NEUTS SEG NFR BLD AUTO: 39 %
PLATELET # BLD AUTO: 356 THOUSANDS/UL (ref 150–450)
PLATELET BLD QL SMEAR: ADEQUATE
PMV BLD AUTO: 8.6 FL (ref 8.9–12.7)
PROTHROMBIN TIME: 35.4 SECONDS (ref 9.5–11.6)
RBC # BLD AUTO: 3.97 MILLION/UL (ref 4–5.2)
RBC MORPH BLD: NORMAL
TOTAL CELLS COUNTED SPEC: 100
TSH SERPL DL<=0.05 MIU/L-ACNC: 2.55 UIU/ML (ref 0.47–4.68)
VIT B12 SERPL-MCNC: 1081 PG/ML (ref 100–900)
WBC # BLD AUTO: 3.5 THOUSAND/UL (ref 4.5–11)

## 2018-08-30 PROCEDURE — 85007 BL SMEAR W/DIFF WBC COUNT: CPT

## 2018-08-30 PROCEDURE — 82607 VITAMIN B-12: CPT

## 2018-08-30 PROCEDURE — 3008F BODY MASS INDEX DOCD: CPT | Performed by: FAMILY MEDICINE

## 2018-08-30 PROCEDURE — 84207 ASSAY OF VITAMIN B-6: CPT

## 2018-08-30 PROCEDURE — 99213 OFFICE O/P EST LOW 20 MIN: CPT | Performed by: FAMILY MEDICINE

## 2018-08-30 PROCEDURE — 82306 VITAMIN D 25 HYDROXY: CPT

## 2018-08-30 PROCEDURE — 85027 COMPLETE CBC AUTOMATED: CPT

## 2018-08-30 PROCEDURE — 85610 PROTHROMBIN TIME: CPT

## 2018-08-30 PROCEDURE — 84443 ASSAY THYROID STIM HORMONE: CPT

## 2018-08-30 PROCEDURE — 36415 COLL VENOUS BLD VENIPUNCTURE: CPT

## 2018-08-30 PROCEDURE — 84425 ASSAY OF VITAMIN B-1: CPT

## 2018-08-30 PROCEDURE — 84252 ASSAY OF VITAMIN B-2: CPT

## 2018-08-30 PROCEDURE — 82728 ASSAY OF FERRITIN: CPT

## 2018-08-30 RX ORDER — GABAPENTIN 800 MG/1
800 TABLET ORAL 2 TIMES DAILY
COMMUNITY

## 2018-08-30 RX ORDER — NICOTINE 21 MG/24HR
1 PATCH, TRANSDERMAL 24 HOURS TRANSDERMAL EVERY 24 HOURS
Qty: 28 PATCH | Refills: 0 | Status: SHIPPED | OUTPATIENT
Start: 2018-08-30 | End: 2020-08-16 | Stop reason: ALTCHOICE

## 2018-08-30 NOTE — ASSESSMENT & PLAN NOTE
INR is supratheraputic 3 5  On coumadin 7mg in the am 5mg in pm and lovenox BID      - change coumadin to 10mg at bedtime  - stop taking lovenox  - get INR on monday  - will start process of getting approved for newer anticoagulants  - no cancer or clots in the family  - will discuss with dr Conrad Mueller about workup for coagulation disorder while on coumadin  - referral to pulmonology

## 2018-09-04 LAB
VIT B1 BLD-SCNC: 37.2 NMOL/L (ref 66.5–200)
VIT B6 SERPL-MCNC: 18.7 UG/L (ref 2–32.8)

## 2018-09-05 LAB — VIT B2 BLD-MCNC: 190 UG/L (ref 137–370)

## 2018-09-06 ENCOUNTER — TELEPHONE (OUTPATIENT)
Dept: FAMILY MEDICINE CLINIC | Facility: CLINIC | Age: 43
End: 2018-09-06

## 2018-09-11 ENCOUNTER — APPOINTMENT (OUTPATIENT)
Dept: LAB | Facility: HOSPITAL | Age: 43
End: 2018-09-11
Payer: COMMERCIAL

## 2018-09-11 ENCOUNTER — ANTICOAG VISIT (OUTPATIENT)
Dept: FAMILY MEDICINE CLINIC | Facility: CLINIC | Age: 43
End: 2018-09-11

## 2018-09-11 DIAGNOSIS — Z09 HOSPITAL DISCHARGE FOLLOW-UP: ICD-10-CM

## 2018-09-11 DIAGNOSIS — E51.9 THIAMINE DEFICIENCY: ICD-10-CM

## 2018-09-11 DIAGNOSIS — I26.99 OTHER PULMONARY EMBOLISM WITHOUT ACUTE COR PULMONALE, UNSPECIFIED CHRONICITY (HCC): Primary | ICD-10-CM

## 2018-09-11 DIAGNOSIS — Z79.01 LONG TERM (CURRENT) USE OF ANTICOAGULANTS: ICD-10-CM

## 2018-09-11 DIAGNOSIS — E55.9 VITAMIN D DEFICIENCY: ICD-10-CM

## 2018-09-11 DIAGNOSIS — I26.99 OTHER ACUTE PULMONARY EMBOLISM WITHOUT ACUTE COR PULMONALE (HCC): ICD-10-CM

## 2018-09-11 LAB
INR PPP: 1.48 (ref 0.89–1.1)
PROTHROMBIN TIME: 15.3 SECONDS (ref 9.5–11.6)

## 2018-09-11 PROCEDURE — 36415 COLL VENOUS BLD VENIPUNCTURE: CPT

## 2018-09-11 PROCEDURE — 85610 PROTHROMBIN TIME: CPT

## 2018-09-11 RX ORDER — METHION/INOS/CHOL BT/B COM/LIV 110MG-86MG
100 CAPSULE ORAL DAILY
Qty: 30 TABLET | Refills: 1 | Status: SHIPPED | OUTPATIENT
Start: 2018-09-11

## 2018-09-11 RX ORDER — ERGOCALCIFEROL 1.25 MG/1
50000 CAPSULE ORAL WEEKLY
Qty: 8 CAPSULE | Refills: 0 | Status: SHIPPED | OUTPATIENT
Start: 2018-09-11

## 2018-09-11 RX ORDER — WARFARIN SODIUM 2 MG/1
TABLET ORAL
Refills: 0
Start: 2018-09-11

## 2018-09-11 NOTE — PROGRESS NOTES
Spoke with patient  INR subtherapeutic at 1 48 on 10mg daily  Previously was on 12mg daily and INR was 3 59  Increased to coumadin 12mg daily except on Mon, Wed, Fri where she will be taking 10mg  Recheck in 1 week  Also placed on 50,000 units vit d weekly for 8 weeks and thiamine 100mg daily for vit D def and thiamine def respectively

## 2018-10-09 ENCOUNTER — TELEPHONE (OUTPATIENT)
Dept: FAMILY MEDICINE CLINIC | Facility: CLINIC | Age: 43
End: 2018-10-09

## 2018-10-09 NOTE — TELEPHONE ENCOUNTER
Called patient to go for INR  She said her insurance is not currently active and won't activate until the 17th  I told her to go as soon as her insurance is active and to call us if she has any issues with insurance   ER precautions given

## 2018-10-09 NOTE — TELEPHONE ENCOUNTER
Discussed the importance of having INR monitored  She will go to [facility] and possibly pay out of pocket  Also given resources for discount laboratories

## 2018-11-27 ENCOUNTER — TELEPHONE (OUTPATIENT)
Dept: FAMILY MEDICINE CLINIC | Facility: CLINIC | Age: 43
End: 2018-11-27

## 2018-11-27 NOTE — TELEPHONE ENCOUNTER
Left message to call back  I was just following up since we last spoke she was without insurance and she needed to have INR done

## 2018-12-20 ENCOUNTER — TELEPHONE (OUTPATIENT)
Dept: FAMILY MEDICINE CLINIC | Facility: CLINIC | Age: 43
End: 2018-12-20

## 2018-12-20 NOTE — TELEPHONE ENCOUNTER
Spoke with pt  She will get INR done at a discount lab place  She will schedule an appt for Monday with me

## 2018-12-29 NOTE — SEPSIS NOTE
-- Message is from the [facility]--    Patient is requesting a medication refill - the medication was not listed on the patient's active medication list.    Prescribing Provider: Dr Garcia     Amphetamine-Dextroamphetamine 5 MG Oral Tablet New     Add as: amphetamine-dextroamphetamine (ADDERALL) 5 MG tablet    One tablet in the morning and one tablet at noon.         Duration: 30 days    Pharmacy  [address] [place]    Caller Information       Type Contact Phone    12/29/2018 03:46 PM Phone (Incoming) Wendy (Mother) [phone]          Alternative phone number: n/a    Turnaround time given to caller:   \"This message will be sent to [state Provider's name]. The clinical team will fulfill your request as soon as they review your message when the office opens on Monday (or after the holiday).\"   Sepsis Note   Missy Taylor 43 y o  female MRN: [id number]  Unit/Bed#: ED 02 Encounter: [id number]            Initial Sepsis Screening     [address] Name 08/19/18 5442                Is the patient's history suggestive of a new or worsening infection? (!)  Yes (Proceed)  -RF        Suspected source of infection urinary tract infection  -RF        Are two or more of the following signs & symptoms of infection both present and new to the patient? (!)  Yes (Proceed)  -RF        Indicate SIRS criteria Tachycardia > 90 bpm;Tachypnea > 20 resp per min  -RF        If the answer is yes to both questions, suspicion of sepsis is present          If severe sepsis is present AND tissue hypoperfusion perists in the hour after fluid resuscitation or lactate > 4, the patient meets criteria for SEPTIC SHOCK          Are any of the following organ dysfunction criteria present within 6 hours of suspected infection and SIRS criteria that are NOT considered to be chronic conditions? (!)  Yes  -RF        Organ dysfunction Lactate > 2 0 mmol/L  -RF        Date of presentation of severe sepsis          Time of presentation of severe sepsis          Tissue hypoperfusion persists in the hour after crystalloid fluid administration, evidenced, by either:          Was hypotension present within one hour of the conclusion of crystalloid fluid administration?  No  -RF        Date of presentation of septic shock          Time of presentation of septic shock            User Key  (r) = Recorded By, (t) = Taken By, (c) = Cosigned By    [address] Name Provider Type    RF Denisa Yang MD Physician

## 2019-03-26 ENCOUNTER — TELEPHONE (OUTPATIENT)
Dept: OBGYN CLINIC | Facility: CLINIC | Age: 44
End: 2019-03-26

## 2019-04-02 ENCOUNTER — HOSPITAL ENCOUNTER (EMERGENCY)
Facility: HOSPITAL | Age: 44
Discharge: HOME | End: 2019-04-03
Attending: EMERGENCY MEDICINE | Admitting: EMERGENCY MEDICINE

## 2019-04-02 DIAGNOSIS — Z86.718 HISTORY OF DVT (DEEP VEIN THROMBOSIS): ICD-10-CM

## 2019-04-02 DIAGNOSIS — M79.89 LEFT LEG SWELLING: Primary | ICD-10-CM

## 2019-04-02 PROCEDURE — 99284 EMERGENCY DEPT VISIT MOD MDM: CPT

## 2019-04-02 PROCEDURE — 99283 EMERGENCY DEPT VISIT LOW MDM: CPT | Performed by: EMERGENCY MEDICINE

## 2019-04-03 VITALS
SYSTOLIC BLOOD PRESSURE: 158 MMHG | DIASTOLIC BLOOD PRESSURE: 90 MMHG | RESPIRATION RATE: 16 BRPM | HEART RATE: 73 BPM | BODY MASS INDEX: 53.52 KG/M2 | WEIGHT: 293 LBS | OXYGEN SATURATION: 98 % | TEMPERATURE: 97.9 F

## 2019-04-03 LAB
PLATELET # BLD AUTO: 255 THOUSANDS/UL (ref 149–390)
PMV BLD AUTO: 9 FL (ref 8.9–12.7)

## 2019-04-03 PROCEDURE — 96372 THER/PROPH/DIAG INJ SC/IM: CPT

## 2019-04-03 PROCEDURE — 36415 COLL VENOUS BLD VENIPUNCTURE: CPT | Performed by: EMERGENCY MEDICINE

## 2019-04-03 PROCEDURE — 85049 AUTOMATED PLATELET COUNT: CPT | Performed by: EMERGENCY MEDICINE

## 2019-04-03 RX ORDER — ENOXAPARIN SODIUM 300 MG/3ML
1 INJECTION INTRAVENOUS; SUBCUTANEOUS ONCE
Status: DISCONTINUED | OUTPATIENT
Start: 2019-04-03 | End: 2019-04-03 | Stop reason: CLARIF

## 2019-04-03 RX ADMIN — ENOXAPARIN SODIUM 160 MG: 80 INJECTION SUBCUTANEOUS at 01:47

## 2019-05-31 ENCOUNTER — TELEPHONE (OUTPATIENT)
Dept: FAMILY MEDICINE CLINIC | Facility: CLINIC | Age: 44
End: 2019-05-31

## 2019-12-27 ENCOUNTER — HOSPITAL ENCOUNTER (EMERGENCY)
Facility: HOSPITAL | Age: 44
Discharge: HOME | End: 2019-12-27
Attending: EMERGENCY MEDICINE | Admitting: EMERGENCY MEDICINE

## 2019-12-27 VITALS
WEIGHT: 291.45 LBS | OXYGEN SATURATION: 100 % | DIASTOLIC BLOOD PRESSURE: 93 MMHG | BODY MASS INDEX: 45.65 KG/M2 | SYSTOLIC BLOOD PRESSURE: 123 MMHG | HEART RATE: 72 BPM | TEMPERATURE: 97.3 F | RESPIRATION RATE: 14 BRPM

## 2019-12-27 DIAGNOSIS — I10 HYPERTENSION: ICD-10-CM

## 2019-12-27 DIAGNOSIS — H10.9 CONJUNCTIVITIS: Primary | ICD-10-CM

## 2019-12-27 PROCEDURE — 99282 EMERGENCY DEPT VISIT SF MDM: CPT

## 2019-12-27 PROCEDURE — 99284 EMERGENCY DEPT VISIT MOD MDM: CPT | Performed by: EMERGENCY MEDICINE

## 2019-12-27 RX ORDER — ERYTHROMYCIN 5 MG/G
OINTMENT OPHTHALMIC
Qty: 3.5 G | Refills: 0 | Status: SHIPPED | OUTPATIENT
Start: 2019-12-27

## 2019-12-27 RX ORDER — ERYTHROMYCIN 5 MG/G
0.5 OINTMENT OPHTHALMIC ONCE
Status: COMPLETED | OUTPATIENT
Start: 2019-12-27 | End: 2019-12-27

## 2019-12-27 RX ADMIN — ERYTHROMYCIN 0.5 INCH: 5 OINTMENT OPHTHALMIC at 07:35

## 2019-12-27 NOTE — ED PROVIDER NOTES
History  Chief Complaint   Patient presents with    Eye Redness     bilat eye redness and irritation for a few days  states that she had crust around eyes this morning     42-year-old female presents with complaint eye itching and drainage  She reports that symptoms have been progressive over the past several days  Symptoms are more pronounced in her right eye compared her left she is now having bilateral symptoms  She denies any visual disturbance or other acute issues or concerns  She was noted to hypertensive on arrival   On recheck her blood pressure had improved  She denies any symptoms related to this and denies any known history of hypertension  Eye Problem   Location:  Both eyes  Quality:  Dull and tearing  Severity:  Moderate  Onset quality:  Gradual  Timing:  Constant  Progression:  Worsening  Chronicity:  New  Relieved by:  Nothing  Worsened by:  Nothing  Ineffective treatments:  None tried  Associated symptoms: crusting, redness and tearing    Associated symptoms: no blurred vision, no decreased vision, no discharge, no double vision, no facial rash, no headaches, no inflammation, no itching, no nausea, no numbness, no photophobia, no scotomas, no swelling, no tingling, no vomiting and no weakness        Prior to Admission Medications   Prescriptions Last Dose Informant Patient Reported? Taking?    Acetaminophen (TYLENOL) 325 MG CAPS   Yes No   Si mg every 6 (six) hours   B Complex-C-Folic Acid (SUPER B-COMPLEX/VIT C/FA) TABS   Yes No   Sig: Take 1 tablet by mouth daily   Cyanocobalamin (VITAMIN B 12 PO)   Yes No   Si,000 mg   Thiamine HCl (VITAMIN B-1) 100 MG TABS   No No   Sig: Take 1 tablet (100 mg total) by mouth daily   b complex vitamins tablet   No No   Sig: Take 1 tablet by mouth daily   benzoyl peroxide 5 % gel   No No   Sig: Apply topically daily   cyanocobalamin 1,000 mcg/mL   Yes No   ergocalciferol (VITAMIN D2) 50,000 units   No No   Sig: Take 1 capsule (50,000 Units total) by mouth once a week   folic acid (FOLVITE) 1 mg tablet   Yes No   Sig: Take 1 mg by mouth every 24 hours   gabapentin (NEURONTIN) 800 mg tablet   Yes No   Sig: Take 800 mg by mouth 2 (two) times a day   nicotine (NICODERM CQ) 14 mg/24hr TD 24 hr patch   No No   Sig: Place 1 patch on the skin every 24 hours   nystatin (MYCOSTATIN) 100,000 units/mL suspension   No No   Sig: Swish and swallow 5 mL (500,000 Units total) 4 (four) times a day   warfarin (COUMADIN) 2 mg tablet   No No   Sig: Take Monday, Wednesday, Friday and Saturday along with 10mg warfarin      Facility-Administered Medications: None       Past Medical History:   Diagnosis Date    DVT (deep venous thrombosis) ([facility] )     Pulmonary embolism (HCC)        Past Surgical History:   Procedure Laterality Date    ANKLE SURGERY Left     GASTRIC BYPASS         Family History   Problem Relation Age of Onset    Hypertension Mother      I have reviewed and agree with the history as documented  Social History     Tobacco Use    Smoking status: Current Every Day Smoker     Packs/day: 0 25     Years: 25 00     Pack years: 6 25     Last attempt to quit: 2018     Years since quittin 3    Smokeless tobacco: Never Used    Tobacco comment: 5-10 cigarettes / daily  GIANFR[employer]CO [employer] FORMER SMOKER QUIT DATE 2017   Substance Use Topics    Alcohol use: Yes     Comment: social drinker    Drug use: No        Review of Systems   Constitutional: Negative for appetite change, chills, fatigue and fever  HENT: Negative for postnasal drip, sinus pain and trouble swallowing  Eyes: Positive for redness  Negative for blurred vision, double vision, photophobia, discharge and itching  Respiratory: Negative for chest tightness, shortness of breath and wheezing  Cardiovascular: Negative for chest pain and leg swelling  Gastrointestinal: Negative for abdominal pain, constipation, diarrhea, nausea and vomiting  Endocrine: Negative      Genitourinary: Negative for difficulty urinating and dysuria  Musculoskeletal: Negative for back pain and myalgias  Skin: Negative for rash  Allergic/Immunologic: Negative  Neurological: Negative for dizziness, tingling, weakness, numbness and headaches  Hematological: Negative  Psychiatric/Behavioral: Negative  Physical Exam  Physical Exam   Constitutional: She is oriented to person, place, and time  She appears well-developed and well-nourished  HENT:   Head: Normocephalic and atraumatic  Nose: Nose normal    Mouth/Throat: Oropharynx is clear and moist    Eyes: Pupils are equal, round, and reactive to light  EOM and lids are normal  Right conjunctiva is injected  Moderate injection the right conjunctiva with associated tearing  Minimal injection of the left  Neck: Normal range of motion  Neck supple  Cardiovascular: Normal rate, regular rhythm and normal heart sounds  Pulmonary/Chest: Effort normal and breath sounds normal  No respiratory distress  She has no wheezes  Abdominal: Soft  Bowel sounds are normal  There is no tenderness  There is no guarding  Musculoskeletal: She exhibits no edema, tenderness or deformity  Neurological: She is alert and oriented to person, place, and time  Skin: Skin is warm and dry  Capillary refill takes less than 2 seconds  No rash noted  Psychiatric: She has a normal mood and affect  Her behavior is normal    Nursing note and vitals reviewed        Vital Signs  ED Triage Vitals [12/27/19 0717]   Temperature Pulse Respirations Blood Pressure SpO2   (!) 97 3 °F (36 3 °C) 72 14 (!) 184/79 100 %      Temp Source Heart Rate Source Patient Position - Orthostatic VS BP Location FiO2 (%)   Tympanic Monitor Sitting Left arm --      Pain Score       No Pain           Vitals:    12/27/19 0717 12/27/19 0724   BP: (!) 184/79 123/93   Pulse: 72    Patient Position - Orthostatic VS: Sitting Sitting         Visual Acuity      ED Medications  Medications   erythromycin (ILOTYCIN) 0 5 % ophthalmic ointment 0 5 inch (has no administration in time range)       Diagnostic Studies  Results Reviewed     None                 No orders to display              Procedures  Procedures         ED Course                               MDM  Number of Diagnoses or Management Options  Conjunctivitis:   Hypertension:   Diagnosis management comments: 44-year-old female presents with complaints consistent with conjunctivitis  She was also noted to have an elevated blood pressure  On re-evaluation this had improved  She is not having any symptoms in association with her blood pressure  Discussed need to regularly monitor her blood pressure and she will be re-evaluated by her primary care clinician for this  Disposition  Final diagnoses:   Conjunctivitis   Hypertension     Time reflects when diagnosis was documented in both MDM as applicable and the Disposition within this note     Time User Action Codes Description Comment    12/27/2019  7:27 AM Shelley Beth Add [H10 9] Conjunctivitis     12/27/2019  7:28 AM Shanna Chen Hypertension       ED Disposition     ED Disposition Condition Date/Time Comment    Discharge Stable Fri Dec 27, 2019  7:27 AM Tanesha Hinojosa discharge to home/self care  Follow-up Information    None         Patient's Medications   Discharge Prescriptions    ERYTHROMYCIN (ILOTYCIN) OPHTHALMIC OINTMENT    Place a 1/2 inch ribbon of ointment into the lower eyelid of both eyes 6x/day x 7 days  Start Date: 12/27/2019End Date: --       Order Dose: --       Quantity: 3 5 g    Refills: 0     No discharge procedures on file      ED Provider  Electronically Signed by           Louis Espana DO  12/27/19 6096

## 2020-04-29 ENCOUNTER — TELEMEDICINE (OUTPATIENT)
Dept: FAMILY MEDICINE CLINIC | Facility: CLINIC | Age: 45
End: 2020-04-29

## 2020-04-29 DIAGNOSIS — R21 PAPULAR RASH: Primary | ICD-10-CM

## 2020-04-29 PROCEDURE — G2012 BRIEF CHECK IN BY MD/QHP: HCPCS | Performed by: FAMILY MEDICINE

## 2020-05-04 ENCOUNTER — TELEMEDICINE (OUTPATIENT)
Dept: FAMILY MEDICINE CLINIC | Facility: CLINIC | Age: 45
End: 2020-05-04

## 2020-05-04 DIAGNOSIS — K04.7 DENTAL ABSCESS: Primary | ICD-10-CM

## 2020-05-04 PROCEDURE — G2012 BRIEF CHECK IN BY MD/QHP: HCPCS | Performed by: INTERNAL MEDICINE

## 2020-05-04 RX ORDER — AMOXICILLIN 500 MG/1
500 CAPSULE ORAL EVERY 12 HOURS SCHEDULED
Qty: 20 CAPSULE | Refills: 0 | Status: SHIPPED | OUTPATIENT
Start: 2020-05-04 | End: 2020-05-04

## 2020-05-04 RX ORDER — AMOXICILLIN 500 MG/1
500 CAPSULE ORAL EVERY 8 HOURS SCHEDULED
Qty: 21 CAPSULE | Refills: 0 | Status: SHIPPED | OUTPATIENT
Start: 2020-05-04 | End: 2020-05-11

## 2020-07-15 ENCOUNTER — HOSPITAL ENCOUNTER (EMERGENCY)
Facility: HOSPITAL | Age: 45
Discharge: HOME | End: 2020-07-15
Attending: EMERGENCY MEDICINE | Admitting: EMERGENCY MEDICINE
Payer: COMMERCIAL

## 2020-07-15 VITALS
WEIGHT: 279.76 LBS | HEART RATE: 87 BPM | DIASTOLIC BLOOD PRESSURE: 77 MMHG | TEMPERATURE: 97.1 F | RESPIRATION RATE: 20 BRPM | OXYGEN SATURATION: 100 % | SYSTOLIC BLOOD PRESSURE: 150 MMHG | BODY MASS INDEX: 43.82 KG/M2
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
## 2020-07-15 DIAGNOSIS — M54.9 BACK PAIN: ICD-10-CM

## 2020-07-15 DIAGNOSIS — S46.819A TRAPEZIUS MUSCLE STRAIN, UNSPECIFIED LATERALITY, INITIAL ENCOUNTER: ICD-10-CM

## 2020-07-15 PROCEDURE — 99283 EMERGENCY DEPT VISIT LOW MDM: CPT

## 2020-07-15 PROCEDURE — 99284 EMERGENCY DEPT VISIT MOD MDM: CPT | Performed by: PHYSICIAN ASSISTANT

## 2020-07-15 RX ORDER — ACETAMINOPHEN 325 MG/1
650 TABLET ORAL EVERY 6 HOURS PRN
Qty: 30 TABLET | Refills: 0 | Status: SHIPPED | OUTPATIENT
Start: 2020-07-15 | End: 2020-08-16 | Stop reason: ALTCHOICE

## 2020-07-15 RX ORDER — METHOCARBAMOL 500 MG/1
500 TABLET, FILM COATED ORAL ONCE
Status: COMPLETED | OUTPATIENT
Start: 2020-07-15 | End: 2020-07-15

## 2020-07-15 RX ORDER — ACETAMINOPHEN 325 MG/1
650 TABLET ORAL ONCE
Status: COMPLETED | OUTPATIENT
Start: 2020-07-15 | End: 2020-07-15

## 2020-07-15 RX ORDER — METHOCARBAMOL 500 MG/1
500 TABLET, FILM COATED ORAL 2 TIMES DAILY
Qty: 20 TABLET | Refills: 0 | Status: SHIPPED | OUTPATIENT
Start: 2020-07-15 | End: 2020-08-16 | Stop reason: ALTCHOICE

## 2020-07-15 RX ADMIN — METHOCARBAMOL TABLETS 500 MG: 500 TABLET, COATED ORAL at 20:51

## 2020-07-15 RX ADMIN — ACETAMINOPHEN 650 MG: 325 TABLET ORAL at 20:51
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
## 2020-08-15 ENCOUNTER — HOSPITAL ENCOUNTER (EMERGENCY)
Facility: HOSPITAL | Age: 45
Discharge: HOME | End: 2020-08-16
Attending: EMERGENCY MEDICINE | Admitting: EMERGENCY MEDICINE

## 2020-08-15 ENCOUNTER — APPOINTMENT (EMERGENCY)
Dept: CT IMAGING | Facility: HOSPITAL | Age: 45
End: 2020-08-15

## 2020-08-15 VITALS
HEART RATE: 77 BPM | WEIGHT: 283.51 LBS | TEMPERATURE: 98.1 F | OXYGEN SATURATION: 98 % | BODY MASS INDEX: 44.4 KG/M2 | RESPIRATION RATE: 18 BRPM | SYSTOLIC BLOOD PRESSURE: 167 MMHG | DIASTOLIC BLOOD PRESSURE: 96 MMHG
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
## 2020-08-15 DIAGNOSIS — S00.83XA CONTUSION OF FACE, INITIAL ENCOUNTER: ICD-10-CM

## 2020-08-15 LAB
EXT PREG TEST URINE: NEGATIVE
EXT. CONTROL ED NAV: NORMAL

## 2020-08-15 PROCEDURE — 72125 CT NECK SPINE W/O DYE: CPT

## 2020-08-15 PROCEDURE — 99285 EMERGENCY DEPT VISIT HI MDM: CPT | Performed by: PHYSICIAN ASSISTANT

## 2020-08-15 PROCEDURE — G1004 CDSM NDSC: HCPCS

## 2020-08-15 PROCEDURE — 96372 THER/PROPH/DIAG INJ SC/IM: CPT

## 2020-08-15 PROCEDURE — 70486 CT MAXILLOFACIAL W/O DYE: CPT

## 2020-08-15 PROCEDURE — 99284 EMERGENCY DEPT VISIT MOD MDM: CPT

## 2020-08-15 PROCEDURE — 81025 URINE PREGNANCY TEST: CPT | Performed by: PHYSICIAN ASSISTANT

## 2020-08-15 RX ADMIN — MORPHINE SULFATE 2 MG: 2 INJECTION, SOLUTION INTRAMUSCULAR; INTRAVENOUS at 23:24

## 2020-08-15 NOTE — Clinical Note
Michelle Hatfield was seen and treated in our emergency department on 8/15/2020  Diagnosis:     M[place]    She may return on 08/19/2020  If you have any questions or concerns, please don't hesitate to call        Perez Fields PA-C    ______________________________           _______________          _______________  Hospital Representative                              Date                                Time

## 2020-08-16 RX ORDER — KETOROLAC TROMETHAMINE 10 MG/1
10 TABLET, FILM COATED ORAL EVERY 6 HOURS PRN
Qty: 20 TABLET | Refills: 0 | Status: SHIPPED | OUTPATIENT
Start: 2020-08-16

## 2020-08-16 NOTE — DISCHARGE INSTRUCTIONS
Please follow up with your family doctor  Your imaging today was normal than an enlarged thyroid gland  You should follow up with a family doctor about this  Keep ice to affected area  Tylenol for pain  Please return to the ER with any worsening symptoms

## 2020-08-16 NOTE — ED PROVIDER NOTES
History  Chief Complaint   Patient presents with   Amy [place] Fall     pt states that an hour ago he tripped over a wire and hit the right side of her face and shoulder  pt has fascial swelling  pt denies LOC  pt denies taking anything OTC for the pain     Patient presents for an evaluation of a fall injury  Patient tripped and fell over wires that were on the ground and face planted  She suffered trauma to her lower jaw  Complaining of malocclusion on her right side  Denies any loss of consciousness  Denies any vision changes  Denies any use of blood thinners - she has not taken Coumadin in over a year  Denies any trauma elsewhere  Pain worse on right side jaw and her chin  Complaining also of swelling of her lips  No shortness of breath or difficulty breathing  Denies any dysphagia  No other complaints          Prior to Admission Medications   Prescriptions Last Dose Informant Patient Reported? Taking? Acetaminophen (TYLENOL) 325 MG CAPS   Yes No   Si mg every 6 (six) hours   B Complex-C-Folic Acid (SUPER B-COMPLEX/VIT C/FA) TABS   Yes No   Sig: Take 1 tablet by mouth daily   Cyanocobalamin (VITAMIN B 12 PO)   Yes No   Si,000 mg   Thiamine HCl (VITAMIN B-1) 100 MG TABS   No No   Sig: Take 1 tablet (100 mg total) by mouth daily   acetaminophen (TYLENOL) 325 mg tablet   No No   Sig: Take 2 tablets (650 mg total) by mouth every 6 (six) hours as needed for mild pain Take 2 tablets by mouth every 6 hours as needed for pain   b complex vitamins tablet   No No   Sig: Take 1 tablet by mouth daily   benzoyl peroxide 5 % gel   No No   Sig: Apply topically daily   cyanocobalamin 1,000 mcg/mL   Yes No   ergocalciferol (VITAMIN D2) 50,000 units   No No   Sig: Take 1 capsule (50,000 Units total) by mouth once a week   erythromycin (ILOTYCIN) ophthalmic ointment   No No   Sig: Place a 1/2 inch ribbon of ointment into the lower eyelid of both eyes 6x/day x 7 days     folic acid (FOLVITE) 1 mg tablet   Yes No   Sig: Take 1 mg by mouth every 24 hours   gabapentin (NEURONTIN) 800 mg tablet   Yes No   Sig: Take 800 mg by mouth 2 (two) times a day   methocarbamol (ROBAXIN) 500 mg tablet   No No   Sig: Take 1 tablet (500 mg total) by mouth 2 (two) times a day   mupirocin (BACTROBAN) 2 % ointment   No No   Sig: Apply topically 3 (three) times a day   nicotine (NICODERM CQ) 14 mg/24hr TD 24 hr patch   No No   Sig: Place 1 patch on the skin every 24 hours   nystatin (MYCOSTATIN) 100,000 units/mL suspension   No No   Sig: Swish and swallow 5 mL (500,000 Units total) 4 (four) times a day   warfarin (COUMADIN) 2 mg tablet   No No   Sig: Take Monday, Wednesday, Friday and Saturday along with 10mg warfarin      Facility-Administered Medications: None       Past Medical History:   Diagnosis Date    DVT (deep venous thrombosis) (HCC)     Pulmonary embolism (HCC)        Past Surgical History:   Procedure Laterality Date    ANKLE SURGERY Left     GASTRIC BYPASS         Family History   Problem Relation Age of Onset    Hypertension Mother      I have reviewed and agree with the history as documented  E-Cigarette/Vaping    E-Cigarette Use Never User      E-Cigarette/Vaping Substances     Social History     Tobacco Use    Smoking status: Current Every Day Smoker     Packs/day: 0 50     Years: 25 00     Pack years: 12 50     Last attempt to quit: 2018     Years since quittin 9    Smokeless tobacco: Never Used    Tobacco comment: 5-10 cigarettes / daily  GIAN[employer] FORMER SMOKER QUIT DATE 2017   Substance Use Topics    Alcohol use: Yes     Comment: social drinker    Drug use: No       Review of Systems   Constitutional: Negative for chills and fever  HENT: Negative for congestion, ear pain and sore throat  Eyes: Negative for pain  Respiratory: Negative for cough and shortness of breath  Cardiovascular: Negative for chest pain  Gastrointestinal: Negative for abdominal pain, nausea and vomiting     Genitourinary: Negative for dysuria  Musculoskeletal: Negative for back pain  Skin: Positive for wound  Negative for rash  Neurological: Negative for dizziness and numbness  Psychiatric/Behavioral: Negative for suicidal ideas  All other systems reviewed and are negative  Physical Exam  Physical Exam  Vitals signs reviewed  Constitutional:       General: She is not in acute distress  Appearance: Normal appearance  HENT:      Head: Normocephalic  No raccoon eyes, Gee's sign, right periorbital erythema or left periorbital erythema  Jaw: Tenderness, pain on movement and malocclusion present  No trismus  Right Ear: Tympanic membrane, ear canal and external ear normal       Left Ear: Tympanic membrane, ear canal and external ear normal       Nose: Nose normal       Mouth/Throat:      Mouth: Mucous membranes are moist       Dentition: Dental caries present  Pharynx: Oropharynx is clear  Uvula midline  Comments: Large amount of swelling noted to lower lip  No bleeding noted  Eyes:      Extraocular Movements: Extraocular movements intact  Pupils: Pupils are equal, round, and reactive to light  Neck:      Musculoskeletal: Normal range of motion  Muscular tenderness present  Cardiovascular:      Rate and Rhythm: Normal rate and regular rhythm  Pulmonary:      Effort: Pulmonary effort is normal       Breath sounds: Normal breath sounds  Abdominal:      General: Abdomen is flat  Palpations: Abdomen is soft  Musculoskeletal: Normal range of motion  General: No tenderness, deformity or signs of injury  Skin:     General: Skin is warm and dry  Neurological:      Mental Status: She is alert           Vital Signs  ED Triage Vitals [08/15/20 2313]   Temperature Pulse Respirations Blood Pressure SpO2   98 1 °F (36 7 °C) 77 18 167/96 98 %      Temp Source Heart Rate Source Patient Position - Orthostatic VS BP Location FiO2 (%)   Tympanic Monitor Sitting Left arm -- Pain Score       --           Vitals:    08/15/20 2313   BP: 167/96   Pulse: 77   Patient Position - Orthostatic VS: Sitting         Visual Acuity      ED Medications  Medications   morphine injection 2 mg (2 mg Intramuscular Given 8/15/20 2324)       Diagnostic Studies  Results Reviewed     Procedure Component Value Units Date/Time    POCT pregnancy, urine [[id number]]  (Normal) Resulted:  08/15/20 2323    Lab Status:  Final result Updated:  08/15/20 2324     EXT PREG TEST UR (Ref: Negative) Negative     Control Valid                 CT spine cervical wo contrast   Final Result by Constantino Navarro DO (08/16 3820)      No evidence of acute cervical spine injury  Mild global enlargement of the thyroid gland  Consider nonemergent correlation with the patient's thyroid function tests  CT FACIAL BONES WITHOUT INTRAVENOUS CONTRAST      INDICATION:   Neck pain, recent trauma  COMPARISON: None  TECHNIQUE:  Axial CT images were obtained through the facial bones with additional sagittal and coronal reconstructions  Radiation dose length product (DLP) for this visit:  466 mGy-cm  (accession [id number]), 471 mGy-cm  (accession [id number])  This examination, like all CT scans performed in the [facility], was performed utilizing techniques to minimize    radiation dose exposure, including the use of iterative reconstruction and automated exposure control  IMAGE QUALITY:  Diagnostic  FINDINGS:       FACIAL BONES:   No facial bone fracture identified  Normal alignment of the temporomandibular joints  No no suspicious appearing osseous lesion  ORBITS:  Orbital globes, optic nerves, and extraocular muscles appear symmetric and normal  There is no evidence of retrobulbar mass, abscess, or hematoma  SINUSES:  Mild mucosal thickening in the bilateral maxillary and ethmoid sinuses  Paranasal sinuses otherwise appear grossly clear        SOFT TISSUES: Prominent anterior and right perimandibular soft tissue swelling  IMPRESSION:      Prominent anterior and right perimandibular soft tissue swelling but no acute fracture seen  Other findings as above  Workstation performed: [id number]         CT facial bones without contrast   Final Result by Hallie Bustamante DO (08/16 7927)      No evidence of acute cervical spine injury  Mild global enlargement of the thyroid gland  Consider nonemergent correlation with the patient's thyroid function tests  CT FACIAL BONES WITHOUT INTRAVENOUS CONTRAST      INDICATION:   Neck pain, recent trauma  COMPARISON: None  TECHNIQUE:  Axial CT images were obtained through the facial bones with additional sagittal and coronal reconstructions  Radiation dose length product (DLP) for this visit:  466 mGy-cm  (accession [id number]), 471 mGy-cm  (accession [id number])  This examination, like all CT scans performed in the [facility], was performed utilizing techniques to minimize    radiation dose exposure, including the use of iterative reconstruction and automated exposure control  IMAGE QUALITY:  Diagnostic  FINDINGS:       FACIAL BONES:   No facial bone fracture identified  Normal alignment of the temporomandibular joints  No no suspicious appearing osseous lesion  ORBITS:  Orbital globes, optic nerves, and extraocular muscles appear symmetric and normal  There is no evidence of retrobulbar mass, abscess, or hematoma  SINUSES:  Mild mucosal thickening in the bilateral maxillary and ethmoid sinuses  Paranasal sinuses otherwise appear grossly clear  SOFT TISSUES:  Prominent anterior and right perimandibular soft tissue swelling  IMPRESSION:      Prominent anterior and right perimandibular soft tissue swelling but no acute fracture seen  Other findings as above                     Workstation performed: [id number] Procedures  Procedures         ED Course       US AUDIT      Most Recent Value   Initial Alcohol Screen: US AUDIT-C    1  How often do you have a drink containing alcohol? 4 Filed at: 08/15/2020 2327   2  How many drinks containing alcohol do you have on a typical day you are drinking? 1 Filed at: 08/15/2020 2327   3b  FEMALE Any Age, or MALE 65+: How often do you have 4 or more drinks on one occassion? 4 Filed at: 08/15/2020 2327   Audit-C Score  (!) 9 Filed at: 08/15/2020 2327   Full Alcohol Screen: US AUDIT   4  How often during the last year have you found that you were not able to stop drinking once you had started? 0 Filed at: 08/15/2020 2327   5  How often during past year have you failed to do what was normally expected of you because of drinking? 0 Filed at: 08/15/2020 2327   6  How often in past year have you needed a first drink in the morning to get yourself going after a heavy drinking session? 0 Filed at: 08/15/2020 2327   7  How often in past year have you had feeling of guilt or remorse after drinking? 0 Filed at: 08/15/2020 2327   8  How often in past year have you been unable to remember what happened night before because you had been drinking? 0 Filed at: 08/15/2020 2327   9  Have you or someone else been injured as a result of your drinking? 0 Filed at: 08/15/2020 2327   10  Has a relative, friend, doctor or other health worker been concerned about your drinking and suggested you cut down?   0 Filed at: 08/15/2020 2327   AUDIT Total Score  9 Filed at: 08/15/2020 2327                  DANYELL/DAST-10      Most Recent Value   How many times in the past year have you    Used an illegal drug or used a prescription medication for non-medical reasons? Never Filed at: 08/15/2020 2324                                MDM  Number of Diagnoses or Management Options  Contusion of face, initial encounter:   Fall, initial encounter:   Diagnosis management comments: Imaging negative  Patient made aware of enlarged thyroid  Requesting work note  Stable for discharge    Patient verbalizes understanding and agrees with plan  The management plan was discussed in detail with the patient at bedside and all questions were answered  Prior to discharge, I provided both verbal and written instructions  I discussed with the patient the signs and symptoms for which to return to the emergency department  All questions were answered and patient was comfortable with the plan of care and discharged to home  The patient agrees to return to the Emergency Department for concerns and/or progression of illness  Disposition  Final diagnoses:   Fall, initial encounter   Contusion of face, initial encounter     Time reflects when diagnosis was documented in both MDM as applicable and the Disposition within this note     Time User Action Codes Description Comment    8/16/2020  1:02 AM Cassie Perez Add [O79  XXXA] Fall, initial encounter     8/16/2020  1:02 AM Cassie Perez Add [[id number]] Contusion of face, initial encounter       ED Disposition     ED Disposition Condition Date/Time Comment    Discharge Stable Sun Aug 16, 2020  1:02 AM Alisha Shows discharge to home/self care  Follow-up Information     Follow up With Specialties Details Why [address] [place] [address],[address]  [address]            Patient's Medications   Discharge Prescriptions    KETOROLAC (TORADOL) 10 MG TABLET    Take 1 tablet (10 mg total) by mouth every 6 (six) hours as needed for moderate pain       Start Date: 8/16/2020 End Date: --       Order Dose: 10 mg       Quantity: 20 tablet    Refills: 0     No discharge procedures on file      PDMP Review     None          ED Provider  Electronically Signed by           Anthony Mcclure PA-C  08/16/20 4663

## 2020-12-03 ENCOUNTER — APPOINTMENT (EMERGENCY)
Dept: CT IMAGING | Facility: HOSPITAL | Age: 45
End: 2020-12-03

## 2020-12-03 ENCOUNTER — HOSPITAL ENCOUNTER (EMERGENCY)
Facility: HOSPITAL | Age: 45
Discharge: HOME | End: 2020-12-03
Attending: EMERGENCY MEDICINE | Admitting: EMERGENCY MEDICINE

## 2020-12-03 VITALS
OXYGEN SATURATION: 100 % | SYSTOLIC BLOOD PRESSURE: 142 MMHG | BODY MASS INDEX: 41.38 KG/M2 | HEART RATE: 80 BPM | WEIGHT: 264.2 LBS | TEMPERATURE: 99 F | DIASTOLIC BLOOD PRESSURE: 84 MMHG | RESPIRATION RATE: 16 BRPM

## 2020-12-03 DIAGNOSIS — K80.20 CHOLELITHIASIS: ICD-10-CM

## 2020-12-03 DIAGNOSIS — N39.0 URINARY TRACT INFECTION: Primary | ICD-10-CM

## 2020-12-03 LAB
ALBUMIN SERPL BCP-MCNC: 4 G/DL (ref 3–5.2)
ALP SERPL-CCNC: 94 U/L (ref 43–122)
ALT SERPL W P-5'-P-CCNC: 82 U/L (ref 9–52)
ANION GAP SERPL CALCULATED.3IONS-SCNC: 8 MMOL/L (ref 5–14)
APTT PPP: 31 SECONDS (ref 23–37)
AST SERPL W P-5'-P-CCNC: 121 U/L (ref 14–36)
BACTERIA UR QL AUTO: ABNORMAL /HPF
BASOPHILS # BLD AUTO: 0 THOUSANDS/ΜL (ref 0–0.1)
BASOPHILS NFR BLD AUTO: 0 % (ref 0–1)
BILIRUB SERPL-MCNC: 1.7 MG/DL
BILIRUB UR QL STRIP: NEGATIVE
BUN SERPL-MCNC: 7 MG/DL (ref 5–25)
CALCIUM SERPL-MCNC: 9 MG/DL (ref 8.4–10.2)
CHLORIDE SERPL-SCNC: 101 MMOL/L (ref 97–108)
CLARITY UR: ABNORMAL
CO2 SERPL-SCNC: 29 MMOL/L (ref 22–30)
COLOR UR: ABNORMAL
CREAT SERPL-MCNC: 0.58 MG/DL (ref 0.6–1.2)
EOSINOPHIL # BLD AUTO: 0 THOUSAND/ΜL (ref 0–0.4)
EOSINOPHIL NFR BLD AUTO: 0 % (ref 0–6)
ERYTHROCYTE [DISTWIDTH] IN BLOOD BY AUTOMATED COUNT: 19.8 %
EXT PREG TEST URINE: NEGATIVE
EXT. CONTROL ED NAV: NORMAL
GFR SERPL CREATININE-BSD FRML MDRD: 130 ML/MIN/1.73SQ M
GLUCOSE SERPL-MCNC: 109 MG/DL (ref 70–99)
GLUCOSE UR STRIP-MCNC: NEGATIVE MG/DL
HCT VFR BLD AUTO: 31.9 % (ref 36–46)
HGB BLD-MCNC: 10.4 G/DL (ref 12–16)
HGB UR QL STRIP.AUTO: 10
INR PPP: 1.03 (ref 0.84–1.19)
KETONES UR STRIP-MCNC: ABNORMAL MG/DL
LEUKOCYTE ESTERASE UR QL STRIP: 100
LIPASE SERPL-CCNC: 61 U/L (ref 23–300)
LYMPHOCYTES # BLD AUTO: 0.5 THOUSANDS/ΜL (ref 0.5–4)
LYMPHOCYTES NFR BLD AUTO: 6 % (ref 25–45)
MAGNESIUM SERPL-MCNC: 1.3 MG/DL (ref 1.6–2.3)
MCH RBC QN AUTO: 29.2 PG (ref 26–34)
MCHC RBC AUTO-ENTMCNC: 32.5 G/DL (ref 31–36)
MCV RBC AUTO: 90 FL (ref 80–100)
MONOCYTES # BLD AUTO: 0.4 THOUSAND/ΜL (ref 0.2–0.9)
MONOCYTES NFR BLD AUTO: 4 % (ref 1–10)
MUCOUS THREADS UR QL AUTO: ABNORMAL
NEUTROPHILS # BLD AUTO: 8.2 THOUSANDS/ΜL (ref 1.8–7.8)
NEUTS SEG NFR BLD AUTO: 90 % (ref 45–65)
NITRITE UR QL STRIP: NEGATIVE
NON-SQ EPI CELLS URNS QL MICRO: ABNORMAL /HPF
PH UR STRIP.AUTO: 6 [PH]
PLATELET # BLD AUTO: 330 THOUSANDS/UL (ref 150–450)
PMV BLD AUTO: 8.2 FL (ref 8.9–12.7)
POTASSIUM SERPL-SCNC: 3.8 MMOL/L (ref 3.6–5)
PROT SERPL-MCNC: 7.8 G/DL (ref 5.9–8.4)
PROT UR STRIP-MCNC: ABNORMAL MG/DL
PROTHROMBIN TIME: 13.6 SECONDS (ref 11.6–14.5)
RBC # BLD AUTO: 3.55 MILLION/UL (ref 4–5.2)
RBC #/AREA URNS AUTO: ABNORMAL /HPF
SODIUM SERPL-SCNC: 138 MMOL/L (ref 137–147)
SP GR UR STRIP.AUTO: 1.03 (ref 1–1.04)
UROBILINOGEN UA: 1 MG/DL
WBC # BLD AUTO: 9.2 THOUSAND/UL (ref 4.5–11)
WBC #/AREA URNS AUTO: ABNORMAL /HPF

## 2020-12-03 PROCEDURE — 36415 COLL VENOUS BLD VENIPUNCTURE: CPT | Performed by: PHYSICIAN ASSISTANT

## 2020-12-03 PROCEDURE — 99284 EMERGENCY DEPT VISIT MOD MDM: CPT

## 2020-12-03 PROCEDURE — 74177 CT ABD & PELVIS W/CONTRAST: CPT

## 2020-12-03 PROCEDURE — 83690 ASSAY OF LIPASE: CPT | Performed by: PHYSICIAN ASSISTANT

## 2020-12-03 PROCEDURE — 80053 COMPREHEN METABOLIC PANEL: CPT | Performed by: PHYSICIAN ASSISTANT

## 2020-12-03 PROCEDURE — 81003 URINALYSIS AUTO W/O SCOPE: CPT | Performed by: PHYSICIAN ASSISTANT

## 2020-12-03 PROCEDURE — G1004 CDSM NDSC: HCPCS

## 2020-12-03 PROCEDURE — 96366 THER/PROPH/DIAG IV INF ADDON: CPT

## 2020-12-03 PROCEDURE — 81001 URINALYSIS AUTO W/SCOPE: CPT | Performed by: PHYSICIAN ASSISTANT

## 2020-12-03 PROCEDURE — 96375 TX/PRO/DX INJ NEW DRUG ADDON: CPT

## 2020-12-03 PROCEDURE — 85730 THROMBOPLASTIN TIME PARTIAL: CPT | Performed by: PHYSICIAN ASSISTANT

## 2020-12-03 PROCEDURE — 85025 COMPLETE CBC W/AUTO DIFF WBC: CPT | Performed by: PHYSICIAN ASSISTANT

## 2020-12-03 PROCEDURE — 85610 PROTHROMBIN TIME: CPT | Performed by: PHYSICIAN ASSISTANT

## 2020-12-03 PROCEDURE — 81025 URINE PREGNANCY TEST: CPT | Performed by: PHYSICIAN ASSISTANT

## 2020-12-03 PROCEDURE — 83735 ASSAY OF MAGNESIUM: CPT | Performed by: PHYSICIAN ASSISTANT

## 2020-12-03 PROCEDURE — 99284 EMERGENCY DEPT VISIT MOD MDM: CPT | Performed by: PHYSICIAN ASSISTANT

## 2020-12-03 PROCEDURE — 96365 THER/PROPH/DIAG IV INF INIT: CPT

## 2020-12-03 RX ORDER — ONDANSETRON 2 MG/ML
4 INJECTION INTRAMUSCULAR; INTRAVENOUS ONCE
Status: COMPLETED | OUTPATIENT
Start: 2020-12-03 | End: 2020-12-03

## 2020-12-03 RX ORDER — SODIUM CHLORIDE 9 MG/ML
125 INJECTION, SOLUTION INTRAVENOUS CONTINUOUS
Status: DISCONTINUED | OUTPATIENT
Start: 2020-12-03 | End: 2020-12-04 | Stop reason: HOSPADM

## 2020-12-03 RX ORDER — NITROFURANTOIN 25; 75 MG/1; MG/1
100 CAPSULE ORAL 2 TIMES DAILY
Qty: 10 CAPSULE | Refills: 0 | Status: SHIPPED | OUTPATIENT
Start: 2020-12-03

## 2020-12-03 RX ORDER — PHENAZOPYRIDINE HYDROCHLORIDE 200 MG/1
200 TABLET, FILM COATED ORAL 3 TIMES DAILY
Qty: 6 TABLET | Refills: 0 | Status: SHIPPED | OUTPATIENT
Start: 2020-12-03

## 2020-12-03 RX ORDER — CEFTRIAXONE 1 G/50ML
1000 INJECTION, SOLUTION INTRAVENOUS ONCE
Status: COMPLETED | OUTPATIENT
Start: 2020-12-03 | End: 2020-12-03

## 2020-12-03 RX ORDER — KETOROLAC TROMETHAMINE 30 MG/ML
15 INJECTION, SOLUTION INTRAMUSCULAR; INTRAVENOUS ONCE
Status: COMPLETED | OUTPATIENT
Start: 2020-12-03 | End: 2020-12-03

## 2020-12-03 RX ADMIN — SODIUM CHLORIDE 125 ML/HR: 0.9 INJECTION, SOLUTION INTRAVENOUS at 20:38

## 2020-12-03 RX ADMIN — CEFTRIAXONE 1000 MG: 1 INJECTION, SOLUTION INTRAVENOUS at 21:02

## 2020-12-03 RX ADMIN — IOHEXOL 100 ML: 350 INJECTION, SOLUTION INTRAVENOUS at 21:14

## 2020-12-03 RX ADMIN — KETOROLAC TROMETHAMINE 15 MG: 30 INJECTION, SOLUTION INTRAMUSCULAR; INTRAVENOUS at 20:37

## 2020-12-03 RX ADMIN — ONDANSETRON 4 MG: 2 INJECTION INTRAMUSCULAR; INTRAVENOUS at 21:02

## 2022-01-21 NOTE — ED NOTES
Patient calling back, he would like to speak with a nurse, first available appointment for writer is not until CARLOS Lopez was also offered, Patient prefers nurse return call    Patient informed that she will need to give a urine sample at this time  Patient states that she will go to the bathroom soon       Chris Dear, MOHIT  06/06/18 3090

## 2022-02-22 NOTE — ASSESSMENT & PLAN NOTE
-12 5 pack year smoking history    -Counseled about smoking cessation     -NRT held due to persistent tachycardia and right heart strain on EKG
-12 5 pack year smoking history    -Counseled about smoking cessation  -NRT held due to persistent tachycardia and right heart strain on EKG  Patient is now determined to quit completely  She is scared for her health  I reassured her that will support her and refer to resources to help her succeed 
-12 5 pack year smoking history    -Counseled about smoking cessation  -NRT held due to persistent tachycardia and right heart strain on EKG  Patient is now determined to quit completely  She is scared for her health  I reassured her that will support her and refer to resources to help her succeed 
-12 5 pack year smoking history    -Counseled about smoking cessation  -NRT held due to persistent tachycardia and right heart strain on EKG  Patient is now determined to quit completely  She is scared for her health  I reassured her that will support her and refer to resources to help her succeed  She was offered and refused nicotine patch 
-C/O H/O dysuria and suprapubic tenderness  Afebrile, no leukocytosis, UA consistent w/ cystitis    -S/P one time dose of levofloxacin 750 mg in the ED  -Start Bactrim 800-160 mg Q12H in AM
-C/O H/O dysuria and suprapubic tenderness  Afebrile, no leukocytosis, UA consistent w/ cystitis on admission  -S/P one time dose of levofloxacin 750 mg in the ED  -Continue Bactrim 800-160 mg Q12H in AM  Today is day 2/5 
-Likely 2/2 acute PE  -0 17 on admission => 0 16 => pending  -Cardiology consult in AM
-Likely 2/2 acute PE  -3 1 on admission => 2 2 => pending  -Will stop trending @ < 2 0  -Continue IV NS at 125cc/hr   -Continue to monitor I/Os
-Likely 2/2 acute PE  -3 1 on admission => 2 2 =>1 5  -Continue IV NS at 125cc/hr   - I&O- +1819 4 since admission  Continue to monitor 
-Potassium of 3 3 on admission  -Received 20 mEq PO in ED   -Repeat BMP in AM
-Potassium of 3 3 on admission  3 2 this morning    -Received 20 mEq PO in ED   - Will supplement once more today   Patient records reveal a potassium of 3 1 in 06/2018    -Repeat BMP in AM
-S/P bariatric surgery 1/2018  -BMI 53 49
-S/P bariatric surgery 1/2018  -BMI 53 49  - On Bariatric diet    -Continue multivitamins 
-S/P bariatric surgery 1/2018  -Bariatric diet and multivitamin ordered
-S/P bariatric surgery 1/2018, lost 140 lb so far  -Bariatric diet and multivitamin ordered  - Patient counseled extensively on smoking cessation especially on its risk of gastric ulcers post Bypass  Patient mentioned that she has quit smoking as from one day ago 
-S/P bariatric surgery 1/2018, lost 140 lb so far  -Bariatric diet and multivitamin ordered  - Patient counseled extensively on smoking cessation especially on its risk of gastric ulcers post Bypass  Patient mentioned that she has quit smoking as of the day of admission 
-S/P bariatric surgery 1/2018, lost 140 lb so far  -Bariatric diet and multivitamin ordered  - Patient counseled extensively on smoking cessation especially on its risk of gastric ulcers post Bypass  Patient mentioned that she has quit smoking as of the day of admission 
-Unclear etiology as B12 WNL and A1C 5 4 done 6/2018   -Was started on B12 and gabapentin by PCP, will continue during hospitalization  -Continue vitamin B12 supplements daily  -Continue home regimen of Gabapentin 800 mg PO TID and Tylenol 650 mg Q8H PRN
-Unclear etiology as B12 WNL and A1C 5 4 done 6/2018   Onset was about two months ago   Patient was sent for Venous duplex ultrasound, but she didn't go for the test    -She is scheduled for Nerve conduction studies on 9/6/18   -Continue vitamin B12 supplements daily  -Continue home regimen of Gabapentin 600 mg PO TID and Tylenol 650 mg Q8H PRN
-Unclear etiology as B12 WNL and A1C 5 4 done 6/2018   Onset was about two months ago   Patient was sent for Venous duplex ultrasound, but she didn't go for the test    -She is scheduled for Nerve conduction studies on 9/6/18   -Continue vitamin B12 supplements daily  -Continue home regimen of Gabapentin 600 mg PO TID and Tylenol 650 mg Q8H PRN
-Unclear etiology as B12 WNL and A1C 5 4 done 6/2018   Onset was about two months ago  Patient was sent for Venous duplex ultrasound, but she didn't go for the test    There is tenderness without erythema of the left calf  I am concerned that a DVT might be present especially in the setting of bilateral PE two months after "neuropathic pain" onset  Will reconsider getting the venous duplex ultrasound  Although it will not change the management of the PE, it might eliminate the need of Gabapentin once DVT if present, gets resolved       -Continue vitamin B12 supplements daily  -Continue home regimen of Gabapentin 600 mg PO TID and Tylenol 650 mg Q8H PRN
-Well's score of 6 (moderate)  H/O provoked PE in 2012 (smoking, OCP use)  -CTPE confirmed large filling defect involving the distal aspect of both the left and right pulmonary arteries  -ECG on admission showed S wave in lead I, Q wave and T wave inversions in lead III  -Continue with telemetry  - Troponin elevated X3  See management below    -Saturating at 100% on O2 3L via nasal canula  - Initially on  heparin @ 18 units/ Kg, but now @ 15 units/kg/hr per heparin infusion protocol based on aPTT 98 this morning  -ECHO and repeat ECG in AM  Cardiology consult placed  -Monitor H/H  Patient educated about signs of bleeding and all questions answered 
-Well's score of 6 (moderate)  H/O provoked PE in 2012 (smoking, OCP use)  -CTPE confirmed large filling defect involving the distal aspect of both the left and right pulmonary arteries  -ECG on admission showed S wave in lead I, Q wave and T wave inversions in lead III  -Continue with telemetry  -Troponin (#3) and repeat lactic acid pending  -Saturating at 100% on O2 3L via nasal canula  -Continue with heparin @ 18 units/kg/hr (heparin infusion protocol)  -ECHO and repeat ECG in AM  Cardiology consult in AM   -Monitor H/H  Patient educated about signs of bleeding, all questions answered 
BMI 53 52  Post bariatric surgery  - counseled healthy diet 
Continue Nystatin swish and swallow
Has a 12 5 pack year smoking history  - counseled cessation 
History of bilateral lower extremity edema  Was taking lasix 20 mg PRN 
History of gastric bypass surgery in January 2018  BMI 53 52     - bariatric diet 
History of numbness/tingling in the bilateral lower extremities  Was on gabapentin 600 mg TID at home  PCP recently increased to 800 mg TID  - continue gabapentin 800 mg TID 
History of provoked PE 6 years ago (2012)  Presented to ED after sudden onset SOB with dizziness and pleuritic chest pain  Well's score of 6 for history of diagnosed PE, with PE being #1 diagnosis, as well as tachycardia, representing moderate risk for PE  CTPE confirmed large filling defect involving the distal aspect of both the left and right pulmonary arteries  ECG showed S wave in lead I, Q wave and T wave inversions in lead III  BNP 2430, troponin 0 17, lactic acid 3 1 --> 2 2  PT/INR 10 9/1 03 PTT 27  Received aspirin 324 mg, plavix 300 mg, and heparin 5000 units subq in ED      - admit as level 1 step down  - heparin bolus 80 units/kg followed by heparin drip 18 units/kg/hr  - echo in am  - ecg in am  - trend troponin  - trend lactic acid  - monitor H/H  - cardiology consult   - monitor for any signs of bleeding 
Hypokalemia noted on admission  3 3   Received 20 mEq PO in the ED      - BMP am
Improving  -Well's score of 6 (moderate) on admission  H/O provoked PE in 2012 (smoking, OCP use)  -CTPE confirmed large filling defect involving the distal aspect of both the left and right pulmonary arteries  -ECG on admission showed S wave in lead I, Q wave and T wave inversions in lead III  - Troponin elevated X3  See management below    -Saturating at 98% on room air  Significant clinical improvement  -ECHO pertinent for mildly elevated pulmonary pressure 45 6 mmHg  - Most recent aPTT 87  8/21/2018  Switched to Lovenox 160 mg q12 hours and Coumadin 10mg daily first dose 8/21/2918  Patient will be on both Lovenox and Coumadin until 24 hours post reaching INR of 2-3  Patient educated about signs of bleeding and all questions answered  Reading material about diet on Vit K antagonist has been provided    INR check on 8/25/2018 ordered  Outpatient follow up scheduled on 8/30/2018
Improving  -Well's score of 6 (moderate) on admission  H/O provoked PE in 2012 (smoking, OCP use)  -CTPE confirmed large filling defect involving the distal aspect of both the left and right pulmonary arteries  -ECG on admission showed S wave in lead I, Q wave and T wave inversions in lead III  -Continue with telemetry  - Troponin elevated X3  See management below    -Saturating at 98% on O2 1 5L via nasal canula  - Initially on  heparin @ 18 units/ Kg, but now @ 15 units/kg/hr per heparin infusion protocol based on aPTT 48  this morning  Patient also received a 5000 units heparin bolus this morning since PTT was subtherapeutic   -ECHO pertinent for mildly elevated pulmonary pressure 45 6 mmHg  -Monitor H/H and platelets  Based on next PTT, we will transition to Lovenox @ 1mg/kg BID and begin 10 mg Coumadin  Patient will be on both Lovenox and Coumadin until 24 hours post reaching INR of 2-3  We will get authorization for 2 weeks of Lovenox  Patient educated about signs of bleeding and all questions answered  Reading material about diet on Vit K antagonist will also be provided upon discharge 
Improving  C/O H/O dysuria and suprapubic tenderness  Afebrile, no leukocytosis, UA consistent w/ cystitis on admission  -S/P one time dose of levofloxacin 750 mg in the ED  -Continue Bactrim 800-160 mg Q12H in AM  Today is day 3/5 
Improving  C/O H/O dysuria and suprapubic tenderness  Afebrile, no leukocytosis, UA consistent w/ cystitis on admission  -S/P one time dose of levofloxacin 750 mg in the ED  -Continue Bactrim 800-160 mg Q12H in AM  Today is day 4/5   -one more dose tomorrow 8/23 to complete course 
Likely 2/2 acute PE  Found to be 0 17 on admission      - will trend troponin  - cardiology consulted 
Likely 2/2 acute PE  Found to be 3 1 on admission   Repeat lactic acid was 2 2     - received IV NS bolus in the ED  - trend lactic acid  - continue IV NS at 125cc/hr
Patient without chest pain this am    -Likely 2/2 acute PE causing a strain of the heart    -Troponin 0 17 on admission => 0 16 => 0 18=> 0 12  -Cardiology consult by Dr Garry Fuentes- recommends echocardiography  Echocardiography  Significant for mild pulmonary hypertension at 45 6mmHg    -Will follow up with cardiology for possible stress testing as outpatient 
Patient without chest pain this am    -Likely 2/2 acute PE causing a strain of the heart    -Troponin 0 17 on admission => 0 16 => 0 18=> 0 12  -Cardiology consult by Dr Nery Taylor- recommends echocardiography   Will F/U
Pt complained of several day history of dysuria and suprapubic tenderness  Afebrile, no leukocytosis, no evidence of pyelonephritis  U/a was positive for nitrites, leukocytes, 2+ protein, and moderate bacteria  Received dose of levofloxacin 750 mg in the ED       - will continue with bactrim 800-160 mg q12h starting in am
Resolved  -Likely 2/2 acute PE  -3 1 on admission => 2 2 =>1 5  -Continue IV NS at 125cc/hr   - I&O- +1819 4 since admission  Continue to monitor 
Resolved  -Likely 2/2 acute PE  -3 1 on admission => 2 2 =>1 5  -Continue IV NS at 125cc/hr   - I&O- +1819 4 since admission  Continue to monitor 
Resolved  Patient without chest pain this am    -Likely 2/2 acute PE causing a strain of the heart    -Troponin 0 17 on admission => 0 16 => 0 18=> 0 12  -Cardiology consult by Dr Donna Romo- recommended echocardiography  Echocardiography  Significant for mild pulmonary hypertension at 45 6mmHg    -Will follow up with cardiology for possible stress testing as outpatient 
Resolved  Potassium of 3 3 on admission  3 8 this morning    -Received 20 mEq PO in ED and 40 mEq on 8/20 
Resolved  Potassium of 3 3 on admission  3 8 this morning    -Received 20 mEq PO in ED and 40 mEq on 8/20 
Yes

## 2022-05-09 ENCOUNTER — TELEPHONE (OUTPATIENT)
Dept: FAMILY MEDICINE CLINIC | Facility: CLINIC | Age: 47
End: 2022-05-09

## 2022-05-09 NOTE — TELEPHONE ENCOUNTER
----- Message from Lloyd Never sent at 5/9/2022  8:54 AM EDT -----  Regarding: no ins  Good Morning,    Several attempts to reach patient via ph and mail, no answer  No insurance listed and sfs was mailed back in April  Patient needs to see a FC please  Thank you!

## 2022-05-10 ENCOUNTER — OFFICE VISIT (OUTPATIENT)
Dept: FAMILY MEDICINE CLINIC | Facility: CLINIC | Age: 47
End: 2022-05-10

## 2022-05-10 VITALS
HEART RATE: 76 BPM | OXYGEN SATURATION: 99 % | HEIGHT: 67 IN | RESPIRATION RATE: 16 BRPM | TEMPERATURE: 97.9 F | BODY MASS INDEX: 40.74 KG/M2 | SYSTOLIC BLOOD PRESSURE: 126 MMHG | WEIGHT: 259.6 LBS | DIASTOLIC BLOOD PRESSURE: 88 MMHG

## 2022-05-10 DIAGNOSIS — M79.89 PAIN AND SWELLING OF LEFT LOWER EXTREMITY: Primary | ICD-10-CM

## 2022-05-10 DIAGNOSIS — E66.01 CLASS 3 SEVERE OBESITY DUE TO EXCESS CALORIES WITHOUT SERIOUS COMORBIDITY WITH BODY MASS INDEX (BMI) OF 40.0 TO 44.9 IN ADULT (HCC): ICD-10-CM

## 2022-05-10 DIAGNOSIS — M79.604 PAIN AND SWELLING OF RIGHT LOWER EXTREMITY: ICD-10-CM

## 2022-05-10 DIAGNOSIS — M79.89 PAIN AND SWELLING OF RIGHT LOWER EXTREMITY: ICD-10-CM

## 2022-05-10 DIAGNOSIS — Z13.31 DEPRESSION SCREEN: ICD-10-CM

## 2022-05-10 DIAGNOSIS — Z86.718 HISTORY OF DVT (DEEP VEIN THROMBOSIS): ICD-10-CM

## 2022-05-10 DIAGNOSIS — F17.200 TOBACCO DEPENDENCE SYNDROME: ICD-10-CM

## 2022-05-10 DIAGNOSIS — Z86.711 HISTORY OF PULMONARY EMBOLUS (PE): ICD-10-CM

## 2022-05-10 DIAGNOSIS — M79.605 PAIN AND SWELLING OF LEFT LOWER EXTREMITY: Primary | ICD-10-CM

## 2022-05-10 PROCEDURE — 99214 OFFICE O/P EST MOD 30 MIN: CPT | Performed by: PHYSICIAN ASSISTANT

## 2022-05-10 NOTE — PROGRESS NOTES
Assessment/Plan:  - Patient currently does not have insurance  Will have patient meet with financial counselors  Tobacco dependence syndrome  - Currently smoking about 5-6 cigarettes per day  - Counseled patient on smoking cessation, however patient is not ready to quit at this time and denies nicotine replacement therapy  History of DVT/PE/pain and swelling of bilateral lower extremities  - Per chart review, last PE in August 2018 and patient was placed on Coumadin  Patient was advised to continue anticoagulation for least 1 year, maybe longer  However, patient only was taking Coumadin for about 1 month before she stopped  Patient with excessive swelling of bilateral lower extremities, left leg > right leg with associated pain  High suspicion for recurrent DVT  - Strongly advised patient to report to emergency department for further evaluation, but patient declines  Discussed with patient possible complications if she does not receive treatment quickly including, but not limited to, heart attack, stroke, death  Patient understands  - Patient is agreeable to completing STAT venous duplex bilateral lower extremities  Patient aware to call the office or report ED if symptoms persist, worsen, or new symptoms arise such as chest pain, shortness of breath, and/or worsening swelling/redness/pain        Diagnoses and all orders for this visit:    Pain and swelling of left lower extremity  -     VAS lower limb venous duplex study, complete bilateral; Future    Pain and swelling of right lower extremity  -     VAS lower limb venous duplex study, complete bilateral; Future    History of DVT (deep vein thrombosis)  -     VAS lower limb venous duplex study, complete bilateral; Future    History of pulmonary embolus (PE)  -     VAS lower limb venous duplex study, complete bilateral; Future    Depression screen    Class 3 severe obesity due to excess calories without serious comorbidity with body mass index (BMI) of 40 0 to 44 9 in adult [facility])    Tobacco dependence syndrome          All of patients questions were answered  Patient understands and agrees with the above plan  Return in about 4 weeks (around 6/7/2022) for Next scheduled follow up swelling of legs  Clark Matson PA-C  05/10/22  [address] [facility]          Subjective:     Patient ID: Donnell Chol  is a 55 y o  female with known PHM of history of DVT/PE, peripheral neurogenic pain, tobacco dependence, history of gastric bypass, obstructive sleep apnea who presents today in office for swollen legs  - Patient is a 55 y o  female who presents today for swollen legs  Patient was last seen in office in August 2018  Patient notes that was the last time she took any medications  Patient has history of DVT left lower extremity and PE in 2012, which was provoked as patient was on birth control pills and smoking  Patient did complete treatment with Coumadin for 6 months  Patient was then admitted in August 2018 for PE  No duplex of lower extremities was done because it would not have changed management  Patient was discharged with Coumadin and Lovenox  A few days later, Lovenox was discontinued and patient was to continue Coumadin  However, patient notes today she only took Coumadin until never ran out and then she has been taking it since then  Patient currently does not have insurance  - Patient notes for long time both lower legs have continued to be swollen, but for the past 3 days the swelling has increased  Patient admits to associated pain of bilateral lower extremities  Patient denies any recent travel, birth control use, surgery  Patient notes she was planning on going to the emergency room, but she had this appointment today so she decided to come here instead    Patient denies any fevers, chills, shortness of breath, chest pain, palpitations, headaches, dizziness, nausea, vomiting     - Patient notes she does currently smoke cigarettes, about 5-6 cigarettes a day  Patient notes she drinks alcohol about 3 times a week and denies any drug use including marijuana  Patient notes she does experience peripheral neuropathy and was previously taking gabapentin, but she experienced nausea so the medication was discontinued  Per chart review, patient was then started on Lyrica, but patient is unsure if she actually never started taking his medication  Patient notes she does continue with continuous burning sensation and tingling of the bottom of her feet  Patient notes the symptoms are worse when the weather is bad  Patient notes she also is taking a women's 1 a day  The following portions of the patient's history were reviewed and updated as appropriate: allergies, current medications, past family history, past medical history, past social history, past surgical history and problem list         Review of Systems   Constitutional: Negative for chills, fatigue and fever  HENT: Negative for congestion and sore throat  Eyes: Negative for pain and visual disturbance  Respiratory: Negative for cough, chest tightness and shortness of breath  Cardiovascular: Positive for leg swelling  Negative for chest pain and palpitations  Gastrointestinal: Negative for abdominal pain, constipation, diarrhea, nausea and vomiting  Genitourinary: Negative for difficulty urinating and dysuria  Musculoskeletal: Positive for arthralgias (Bilateral lower legs)  Negative for back pain  Skin: Negative for rash  Neurological: Negative for dizziness and headaches  Psychiatric/Behavioral: The patient is not nervous/anxious  BMI Counseling: Body mass index is 41 27 kg/m²   The BMI is above normal  Nutrition recommendations include decreasing portion sizes, encouraging healthy choices of fruits and vegetables, consuming healthier snacks, limiting drinks that contain sugar, moderation in carbohydrate intake, increasing intake of lean protein and reducing intake of cholesterol  Exercise recommendations include moderate physical activity 150 minutes/week  No pharmacotherapy was ordered  Rationale for BMI follow-up plan is due to patient being overweight or obese  Depression Screening and Follow-up Plan: Patient was screened for depression during today's encounter  They screened negative with a PHQ-2 score of 0  Tobacco Cessation Counseling: Tobacco cessation counseling was provided  The patient is sincerely urged to quit consumption of tobacco  She is not ready to quit tobacco  Medication options and side effects of medication discussed  Patient refused medication  Objective:   Vitals:    05/10/22 1410   BP: 126/88   BP Location: Left arm   Patient Position: Sitting   Cuff Size: Adult   Pulse: 76   Resp: 16   Temp: 97 9 °F (36 6 °C)   TempSrc: Temporal   SpO2: 99%   Weight: 118 kg (259 lb 9 6 oz)   Height: 5' 6 5" (1 689 m)         Physical Exam  Vitals and nursing note reviewed  Constitutional:       General: She is not in acute distress  Appearance: She is well-developed  HENT:      Head: Normocephalic and atraumatic  Right Ear: External ear normal       Left Ear: External ear normal       Nose: Nose normal    Eyes:      Conjunctiva/sclera: Conjunctivae normal    Cardiovascular:      Rate and Rhythm: Normal rate and regular rhythm  Pulses: Normal pulses  Heart sounds: Normal heart sounds  Pulmonary:      Effort: Pulmonary effort is normal  No respiratory distress  Breath sounds: Normal breath sounds  No wheezing  Musculoskeletal:         General: Normal range of motion  Cervical back: Normal range of motion and neck supple  Right lower leg: Edema (+2, calf tenderness noted) present  Left lower leg: Edema (+3; calf tenderness noted) present  Skin:     General: Skin is warm and dry  Neurological:      Mental Status: She is alert and oriented to person, place, and time  Psychiatric:         Behavior: Behavior normal            PHQ-2/9 Depression Screening    Little interest or pleasure in doing things: 0 - not at all  Feeling down, depressed, or hopeless: 0 - not at all  PHQ-2 Score: 0  PHQ-2 Interpretation: Negative depression screen

## 2022-05-12 ENCOUNTER — TELEPHONE (OUTPATIENT)
Dept: FAMILY MEDICINE CLINIC | Facility: CLINIC | Age: 47
End: 2022-05-12

## 2022-05-12 PROBLEM — Z86.718 HISTORY OF DVT (DEEP VEIN THROMBOSIS): Status: ACTIVE | Noted: 2022-05-12

## 2022-05-12 PROBLEM — Z86.711 HISTORY OF PULMONARY EMBOLUS (PE): Status: ACTIVE | Noted: 2022-05-12

## 2022-05-12 NOTE — ASSESSMENT & PLAN NOTE
- Currently smoking about 5-6 cigarettes per day  - Counseled patient on smoking cessation, however patient is not ready to quit at this time and denies nicotine replacement therapy

## 2022-05-13 ENCOUNTER — HOSPITAL ENCOUNTER (OUTPATIENT)
Dept: NON INVASIVE DIAGNOSTICS | Facility: HOSPITAL | Age: 47
Discharge: HOME | End: 2022-05-13

## 2022-05-13 ENCOUNTER — TELEPHONE (OUTPATIENT)
Dept: FAMILY MEDICINE CLINIC | Facility: CLINIC | Age: 47
End: 2022-05-13

## 2022-05-13 DIAGNOSIS — M79.89 PAIN AND SWELLING OF RIGHT LOWER EXTREMITY: ICD-10-CM

## 2022-05-13 DIAGNOSIS — M79.605 PAIN AND SWELLING OF LEFT LOWER EXTREMITY: ICD-10-CM

## 2022-05-13 DIAGNOSIS — M79.89 PAIN AND SWELLING OF LEFT LOWER EXTREMITY: ICD-10-CM

## 2022-05-13 DIAGNOSIS — Z86.718 HISTORY OF DVT (DEEP VEIN THROMBOSIS): ICD-10-CM

## 2022-05-13 DIAGNOSIS — Z86.711 HISTORY OF PULMONARY EMBOLUS (PE): ICD-10-CM

## 2022-05-13 DIAGNOSIS — M79.604 PAIN AND SWELLING OF RIGHT LOWER EXTREMITY: ICD-10-CM

## 2022-05-13 PROCEDURE — 93970 EXTREMITY STUDY: CPT | Performed by: SURGERY

## 2022-05-13 PROCEDURE — 93970 EXTREMITY STUDY: CPT

## 2022-05-13 NOTE — TELEPHONE ENCOUNTER
Pt called the nurse regarding VAS lower limb venous duplex study results completed today  I called pt an advise once resulted we will call her back

## 2022-05-16 NOTE — TELEPHONE ENCOUNTER
Please advised patient her ultrasound was negative for blood clot, which is great news! Would recommend patient schedule follow-up appointment if she is still experiencing lower leg swelling  Thanks!

## 2022-09-28 ENCOUNTER — APPOINTMENT (OUTPATIENT)
Dept: RADIOLOGY | Facility: HOSPITAL | Age: 47
DRG: 280 | End: 2022-09-28
Payer: COMMERCIAL

## 2022-09-28 ENCOUNTER — HOSPITAL ENCOUNTER (INPATIENT)
Facility: HOSPITAL | Age: 47
LOS: 8 days | Discharge: HOME HEALTH | DRG: 280 | End: 2022-10-06
Attending: EMERGENCY MEDICINE | Admitting: FAMILY MEDICINE
Payer: COMMERCIAL

## 2022-09-28 ENCOUNTER — APPOINTMENT (EMERGENCY)
Dept: CT IMAGING | Facility: HOSPITAL | Age: 47
DRG: 280 | End: 2022-09-28
Payer: COMMERCIAL

## 2022-09-28 DIAGNOSIS — N39.0 UTI (URINARY TRACT INFECTION): ICD-10-CM

## 2022-09-28 DIAGNOSIS — Z78.9 PROBLEM WITH VASCULAR ACCESS: ICD-10-CM

## 2022-09-28 DIAGNOSIS — N92.1 MENOMETRORRHAGIA: ICD-10-CM

## 2022-09-28 DIAGNOSIS — K70.10 ALCOHOLIC HEPATITIS: ICD-10-CM

## 2022-09-28 DIAGNOSIS — K81.9 CHOLECYSTITIS: ICD-10-CM

## 2022-09-28 DIAGNOSIS — K59.09 OTHER CONSTIPATION: ICD-10-CM

## 2022-09-28 DIAGNOSIS — R10.11 RIGHT UPPER QUADRANT PAIN: ICD-10-CM

## 2022-09-28 DIAGNOSIS — Z86.718 HISTORY OF DVT (DEEP VEIN THROMBOSIS): ICD-10-CM

## 2022-09-28 DIAGNOSIS — D64.89 ANEMIA DUE TO OTHER CAUSE, NOT CLASSIFIED: ICD-10-CM

## 2022-09-28 DIAGNOSIS — Z79.01 LONG TERM (CURRENT) USE OF ANTICOAGULANTS: ICD-10-CM

## 2022-09-28 DIAGNOSIS — K72.90 LIVER FAILURE (HCC): ICD-10-CM

## 2022-09-28 DIAGNOSIS — K74.60 LIVER CIRRHOSIS (HCC): Primary | ICD-10-CM

## 2022-09-28 DIAGNOSIS — F10.10 ALCOHOL ABUSE: ICD-10-CM

## 2022-09-28 DIAGNOSIS — Z59.9 FINANCIAL DIFFICULTIES: ICD-10-CM

## 2022-09-28 PROBLEM — Z72.0 TOBACCO ABUSE: Status: ACTIVE | Noted: 2022-09-28

## 2022-09-28 PROBLEM — K74.5 BILIARY CIRRHOSIS (HCC): Status: ACTIVE | Noted: 2022-09-28

## 2022-09-28 LAB
ALBUMIN SERPL BCP-MCNC: 2.7 G/DL (ref 3.5–5)
ALP SERPL-CCNC: 193 U/L (ref 43–122)
ALT SERPL W P-5'-P-CCNC: 75 U/L
ANION GAP SERPL CALCULATED.3IONS-SCNC: 9 MMOL/L (ref 5–14)
APTT PPP: 39 SECONDS (ref 23–37)
AST SERPL W P-5'-P-CCNC: 191 U/L (ref 14–36)
BASOPHILS # BLD AUTO: 0.02 THOUSANDS/ΜL (ref 0–0.1)
BASOPHILS NFR BLD AUTO: 0 % (ref 0–1)
BILIRUB SERPL-MCNC: 13.68 MG/DL (ref 0.2–1)
BUN SERPL-MCNC: 6 MG/DL (ref 5–25)
CALCIUM ALBUM COR SERPL-MCNC: 8.9 MG/DL (ref 8.3–10.1)
CALCIUM SERPL-MCNC: 7.9 MG/DL (ref 8.4–10.2)
CHLORIDE SERPL-SCNC: 95 MMOL/L (ref 96–108)
CO2 SERPL-SCNC: 29 MMOL/L (ref 21–32)
CREAT SERPL-MCNC: 0.76 MG/DL (ref 0.6–1.2)
EOSINOPHIL # BLD AUTO: 0.02 THOUSAND/ΜL (ref 0–0.61)
EOSINOPHIL NFR BLD AUTO: 0 % (ref 0–6)
ETHANOL SERPL-MCNC: <10 MG/DL (ref 0–10)
EXT PREG TEST URINE: NEGATIVE
EXT. CONTROL ED NAV: NORMAL
GFR SERPL CREATININE-BSD FRML MDRD: 94 ML/MIN/1.73SQ M
GLUCOSE SERPL-MCNC: 90 MG/DL (ref 70–99)
HCT VFR BLD AUTO: 23.4 % (ref 34.8–46.1)
HGB BLD-MCNC: 8 G/DL (ref 11.5–15.4)
IMM GRANULOCYTES # BLD AUTO: 0.05 THOUSAND/UL (ref 0–0.2)
IMM GRANULOCYTES NFR BLD AUTO: 0 % (ref 0–2)
INR PPP: 1.61 (ref 0.84–1.19)
LIPASE SERPL-CCNC: 47 U/L (ref 23–300)
LYMPHOCYTES # BLD AUTO: 2.11 THOUSANDS/ΜL (ref 0.6–4.47)
LYMPHOCYTES NFR BLD AUTO: 18 % (ref 14–44)
MCH RBC QN AUTO: 36.9 PG (ref 26.8–34.3)
MCHC RBC AUTO-ENTMCNC: 34.2 G/DL (ref 31.4–37.4)
MCV RBC AUTO: 108 FL (ref 82–98)
MONOCYTES # BLD AUTO: 1.37 THOUSAND/ΜL (ref 0.17–1.22)
MONOCYTES NFR BLD AUTO: 11 % (ref 4–12)
NEUTROPHILS # BLD AUTO: 8.51 THOUSANDS/ΜL (ref 1.85–7.62)
NEUTS SEG NFR BLD AUTO: 71 % (ref 43–75)
NRBC BLD AUTO-RTO: 0 /100 WBCS
NT-PROBNP SERPL-MCNC: 237 PG/ML (ref 0–299)
PLATELET # BLD AUTO: 257 THOUSANDS/UL (ref 149–390)
PMV BLD AUTO: 10.3 FL (ref 8.9–12.7)
POTASSIUM SERPL-SCNC: 3.2 MMOL/L (ref 3.5–5.3)
PROT SERPL-MCNC: 7.1 G/DL (ref 6.4–8.4)
PROTHROMBIN TIME: 19.5 SECONDS (ref 11.6–14.5)
RBC # BLD AUTO: 2.17 MILLION/UL (ref 3.81–5.12)
SODIUM SERPL-SCNC: 133 MMOL/L (ref 135–147)
WBC # BLD AUTO: 12.08 THOUSAND/UL (ref 4.31–10.16)

## 2022-09-28 PROCEDURE — 99222 1ST HOSP IP/OBS MODERATE 55: CPT | Performed by: FAMILY MEDICINE

## 2022-09-28 PROCEDURE — 99222 1ST HOSP IP/OBS MODERATE 55: CPT | Performed by: INTERNAL MEDICINE

## 2022-09-28 PROCEDURE — 81025 URINE PREGNANCY TEST: CPT | Performed by: PHYSICIAN ASSISTANT

## 2022-09-28 PROCEDURE — 71046 X-RAY EXAM CHEST 2 VIEWS: CPT

## 2022-09-28 PROCEDURE — 83880 ASSAY OF NATRIURETIC PEPTIDE: CPT | Performed by: PHYSICIAN ASSISTANT

## 2022-09-28 PROCEDURE — 96361 HYDRATE IV INFUSION ADD-ON: CPT

## 2022-09-28 PROCEDURE — 36415 COLL VENOUS BLD VENIPUNCTURE: CPT | Performed by: PHYSICIAN ASSISTANT

## 2022-09-28 PROCEDURE — 85730 THROMBOPLASTIN TIME PARTIAL: CPT | Performed by: PHYSICIAN ASSISTANT

## 2022-09-28 PROCEDURE — 99285 EMERGENCY DEPT VISIT HI MDM: CPT

## 2022-09-28 PROCEDURE — 99285 EMERGENCY DEPT VISIT HI MDM: CPT | Performed by: PHYSICIAN ASSISTANT

## 2022-09-28 PROCEDURE — 85025 COMPLETE CBC W/AUTO DIFF WBC: CPT | Performed by: PHYSICIAN ASSISTANT

## 2022-09-28 PROCEDURE — 85610 PROTHROMBIN TIME: CPT | Performed by: PHYSICIAN ASSISTANT

## 2022-09-28 PROCEDURE — 83690 ASSAY OF LIPASE: CPT | Performed by: PHYSICIAN ASSISTANT

## 2022-09-28 PROCEDURE — 96374 THER/PROPH/DIAG INJ IV PUSH: CPT

## 2022-09-28 PROCEDURE — 82077 ASSAY SPEC XCP UR&BREATH IA: CPT | Performed by: PHYSICIAN ASSISTANT

## 2022-09-28 PROCEDURE — 74177 CT ABD & PELVIS W/CONTRAST: CPT

## 2022-09-28 PROCEDURE — 80053 COMPREHEN METABOLIC PANEL: CPT | Performed by: PHYSICIAN ASSISTANT

## 2022-09-28 RX ORDER — LORAZEPAM 1 MG/1
1 TABLET ORAL EVERY 6 HOURS
Status: COMPLETED | OUTPATIENT
Start: 2022-09-28 | End: 2022-09-29

## 2022-09-28 RX ORDER — KETOROLAC TROMETHAMINE 30 MG/ML
30 INJECTION, SOLUTION INTRAMUSCULAR; INTRAVENOUS EVERY 8 HOURS PRN
Status: DISCONTINUED | OUTPATIENT
Start: 2022-09-28 | End: 2022-09-30

## 2022-09-28 RX ORDER — ONDANSETRON 4 MG/1
4 TABLET, ORALLY DISINTEGRATING ORAL ONCE
Status: COMPLETED | OUTPATIENT
Start: 2022-09-28 | End: 2022-09-28

## 2022-09-28 RX ORDER — POTASSIUM CHLORIDE 20 MEQ/1
40 TABLET, EXTENDED RELEASE ORAL ONCE
Status: COMPLETED | OUTPATIENT
Start: 2022-09-28 | End: 2022-09-28

## 2022-09-28 RX ORDER — FOLIC ACID 1 MG/1
1 TABLET ORAL DAILY
Status: DISCONTINUED | OUTPATIENT
Start: 2022-09-28 | End: 2022-10-06 | Stop reason: HOSPADM

## 2022-09-28 RX ORDER — LORAZEPAM 1 MG/1
1 TABLET ORAL EVERY 12 HOURS
Status: DISCONTINUED | OUTPATIENT
Start: 2022-09-30 | End: 2022-10-01

## 2022-09-28 RX ORDER — SODIUM CHLORIDE 9 MG/ML
125 INJECTION, SOLUTION INTRAVENOUS CONTINUOUS
Status: DISCONTINUED | OUTPATIENT
Start: 2022-09-28 | End: 2022-10-01

## 2022-09-28 RX ORDER — LORAZEPAM 1 MG/1
1 TABLET ORAL EVERY 8 HOURS
Status: COMPLETED | OUTPATIENT
Start: 2022-09-29 | End: 2022-09-30

## 2022-09-28 RX ORDER — TRAZODONE HYDROCHLORIDE 50 MG/1
50 TABLET ORAL
Status: DISCONTINUED | OUTPATIENT
Start: 2022-09-28 | End: 2022-10-02

## 2022-09-28 RX ORDER — LORAZEPAM 1 MG/1
1 TABLET ORAL EVERY 24 HOURS
Status: DISCONTINUED | OUTPATIENT
Start: 2022-10-02 | End: 2022-10-01

## 2022-09-28 RX ORDER — KETOROLAC TROMETHAMINE 30 MG/ML
30 INJECTION, SOLUTION INTRAMUSCULAR; INTRAVENOUS ONCE
Status: COMPLETED | OUTPATIENT
Start: 2022-09-28 | End: 2022-09-28

## 2022-09-28 RX ORDER — IBUPROFEN 400 MG/1
800 TABLET ORAL EVERY 6 HOURS PRN
Status: DISCONTINUED | OUTPATIENT
Start: 2022-09-28 | End: 2022-09-28

## 2022-09-28 RX ORDER — LANOLIN ALCOHOL/MO/W.PET/CERES
100 CREAM (GRAM) TOPICAL DAILY
Status: DISCONTINUED | OUTPATIENT
Start: 2022-09-28 | End: 2022-10-06 | Stop reason: HOSPADM

## 2022-09-28 RX ORDER — MORPHINE SULFATE 4 MG/ML
4 INJECTION, SOLUTION INTRAMUSCULAR; INTRAVENOUS ONCE
Status: COMPLETED | OUTPATIENT
Start: 2022-09-28 | End: 2022-09-28

## 2022-09-28 RX ORDER — DOCUSATE SODIUM 100 MG/1
100 CAPSULE, LIQUID FILLED ORAL 2 TIMES DAILY
Status: DISCONTINUED | OUTPATIENT
Start: 2022-09-28 | End: 2022-09-29

## 2022-09-28 RX ORDER — PANTOPRAZOLE SODIUM 40 MG/1
40 TABLET, DELAYED RELEASE ORAL
Status: DISCONTINUED | OUTPATIENT
Start: 2022-09-28 | End: 2022-09-30

## 2022-09-28 RX ORDER — ENOXAPARIN SODIUM 100 MG/ML
40 INJECTION SUBCUTANEOUS 2 TIMES DAILY
Status: DISCONTINUED | OUTPATIENT
Start: 2022-09-28 | End: 2022-09-30

## 2022-09-28 RX ADMIN — MORPHINE SULFATE 4 MG: 4 INJECTION INTRAVENOUS at 03:58

## 2022-09-28 RX ADMIN — DOCUSATE SODIUM 100 MG: 100 CAPSULE, LIQUID FILLED ORAL at 17:32

## 2022-09-28 RX ADMIN — SODIUM CHLORIDE 1000 ML: 0.9 INJECTION, SOLUTION INTRAVENOUS at 03:58

## 2022-09-28 RX ADMIN — ENOXAPARIN SODIUM 40 MG: 100 INJECTION SUBCUTANEOUS at 17:32

## 2022-09-28 RX ADMIN — FOLIC ACID 1 MG: 1 TABLET ORAL at 10:32

## 2022-09-28 RX ADMIN — MULTIPLE VITAMINS W/ MINERALS TAB 1 TABLET: TAB ORAL at 10:32

## 2022-09-28 RX ADMIN — SODIUM CHLORIDE 125 ML/HR: 0.9 INJECTION, SOLUTION INTRAVENOUS at 20:28

## 2022-09-28 RX ADMIN — LORAZEPAM 1 MG: 1 TABLET ORAL at 10:32

## 2022-09-28 RX ADMIN — ENOXAPARIN SODIUM 40 MG: 100 INJECTION SUBCUTANEOUS at 10:32

## 2022-09-28 RX ADMIN — POTASSIUM CHLORIDE 40 MEQ: 1500 TABLET, EXTENDED RELEASE ORAL at 14:05

## 2022-09-28 RX ADMIN — DOCUSATE SODIUM 100 MG: 100 CAPSULE, LIQUID FILLED ORAL at 10:32

## 2022-09-28 RX ADMIN — ONDANSETRON 4 MG: 4 TABLET, ORALLY DISINTEGRATING ORAL at 03:14

## 2022-09-28 RX ADMIN — SODIUM CHLORIDE 125 ML/HR: 0.9 INJECTION, SOLUTION INTRAVENOUS at 10:33

## 2022-09-28 RX ADMIN — LORAZEPAM 1 MG: 1 TABLET ORAL at 22:48

## 2022-09-28 RX ADMIN — TRAZODONE HYDROCHLORIDE 50 MG: 50 TABLET ORAL at 22:48

## 2022-09-28 RX ADMIN — KETOROLAC TROMETHAMINE 30 MG: 30 INJECTION, SOLUTION INTRAMUSCULAR; INTRAVENOUS at 22:47

## 2022-09-28 RX ADMIN — THIAMINE HCL TAB 100 MG 100 MG: 100 TAB at 10:31

## 2022-09-28 RX ADMIN — IOHEXOL 100 ML: 350 INJECTION, SOLUTION INTRAVENOUS at 05:37

## 2022-09-28 RX ADMIN — LORAZEPAM 1 MG: 1 TABLET ORAL at 16:41

## 2022-09-28 SDOH — ECONOMIC STABILITY - INCOME SECURITY: PROBLEM RELATED TO HOUSING AND ECONOMIC CIRCUMSTANCES, UNSPECIFIED: Z59.9

## 2022-09-28 NOTE — POST FALL NOTE
Post Fall Note    Date of Fall: 9/28/2022  Observer/Reported time of Fall: 1930  Name of Provider Notified: Sujatha Aviles Provider Notified: 1930  Assessment of Patient Injury: No injury noted  Post Fall Interventions: Physician notified; Comforts rounds continued; Fall risk precautions implemented/maitained  Family/Next of kin notified?: No      Brief Description of Events  PCA reported that the patient was on the floor at [address]  Went into patients room and she was sitting on the floor between the toilet and shower  Pt reported that she did not hit her head, that she got up to go to the bathroom  Patient urinated on the floor and slipped  Patient A&O, no injury noted, able to stand up with minimal assist  Patient taken back to bed, doctors at the bedside  Last Recorded Vitals  Blood pressure 100/62, pulse 89, temperature 97 8 °F (36 6 °C), temperature source Temporal, resp  rate 18, height 5' 7" (1 702 m), weight 125 kg (276 lb 3 8 oz), SpO2 99 %, not currently breastfeeding        Principal Problem:    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

## 2022-09-28 NOTE — ASSESSMENT & PLAN NOTE
C/o Nausea, bloating, constipation, and epigastric pain  Hx: PE, DVT, gastric bypass, smoker, alcohol abuse  Labs: Transminitis, anemia, hyperbilirubinemia  Imaging: CT: hepatomegaly; severe steatosis, gallstones with no pericholecystic inflammation  PE: Icteric, abd distention, +4 pitting edema    - Low sodium diet  - Continue CIWA protocol  - Lactulose 20 g bid  - GI consult; appreciate recommendations  - Detox when medically stable  Once liver issue is better, would suggest elective egd/colon for anemia and for screening since pt is over 39 yr old

## 2022-09-28 NOTE — ASSESSMENT & PLAN NOTE
Endorses she stopped taking all her medications because she was unable to afford them  -CM referral in place

## 2022-09-28 NOTE — ASSESSMENT & PLAN NOTE
No nausea, vomiting or constipation  C/o epigastric pain, improved  Hx: alcohol abuse x6 years; 4 cups of vodka daily  AST: 191 > 138 > 122 > 115>118>107  ALT: 75 > 57 > 52 > 58 > 57 >57  ALP: 193 > 149 > 137 > 155 > 135> 136  CT Abd/Pelvis (9/28/22): hepatomegaly; severe steatosis, gallstones with no pericholecystic inflammation  Appreciate GI recommendations  - Ativan 1 mg PO Q6hr PRN for withdrawal symptoms   - Folic acid 1 mg/Thaimine 100 mg/MVit PO daily   - Pantoprazole 40 mg BID  - Transfer to acute rehab when medically stable  - AM CMP and PT-INR    - Alcohol cessation counseling

## 2022-09-28 NOTE — ASSESSMENT & PLAN NOTE
Patient had one soft stool yesterday  Scleral icterus  Appreciate GI recommendations  - Lactulose 20 g PO BID

## 2022-09-28 NOTE — CONSULTS
Patient MRN: [id number]  Date of Service: 9/28/2022  Referring Physician: Loyd Barrientos  Provider Creating Note: Corrina Sandhoff, MD  PCP: Teo Fiore    Reason for Consult: "Cirrhosis"    HISTORY OF PRESENT ILLNESS:  Christine Leach is a 55 y o  female who was admitted with upper abdominal pain constipation and jaundice  She apparently had stopped a lot of her medications  She has a history of PE and recently had swelling of extremities with a negative ultrasound for clot  She noticed her eyes yellow a day or so ago and her urine was dark as well  She admits to drinking 3 to 5 vodka drinks daily depending on the day  Her daughter is present in the room  She denies any history of known hepatitis  She thinks she was hepatitis checked years ago and was negative  Her LFTs were elevated previously but the bilirubin was not as markedly elevated  Her AST is greater than the ALT  She is status post gastric bypass and has a hemoglobin of 8 but denies any melena or changes in bowels  She does complain of a dry mouth with some oropharyngeal symptoms but no actual esophageal dysphagia heartburn etcetera  She was admitted to the medical service with consideration of toxicology evaluation  We are asked to address her liver disease    Review of Systems:    General:   No fever or chills; No significant weight loss or gain  EENT:   No ear pain, facial swelling; No sneezing, sore throat  Skin:   No rashes, color changes  Respiratory:     No shortness of breath, cough, wheezing, stridor  Cardiovascular:     No chest pain, palpitations  Gastrointestinal:    As per HPI  Musculoskeletal:     No arthralgias, myalgias, positive bilateral leg swelling  Neurologic:   No dizziness, numbness, weakness  No speech difficulties     Psych:   No agitation, suicidal ideations    Otherwise, All other twelve-point review of systems normal      Past Medical History:   Diagnosis Date    DVT (deep venous thrombosis) ([facility] [address] )  Pulmonary embolism ([facility] [address] )      Past Surgical History:   Procedure Laterality Date    ANKLE SURGERY Left 1995    GASTRIC BYPASS       Allergies   Allergen Reactions    Gabapentin Rash     Pt is currently on gabapentin  Denies allergy       Medications:  Home Medications  Prior to Admission medications    Medication Sig Start Date End Date Taking? Authorizing Provider   ergocalciferol (VITAMIN D2) 50,000 units Take 1 capsule (50,000 Units total) by mouth once a week  Patient not taking: No sig reported 9/11/18   Ethel Ruiz MD   erythromycin (ILOTYCIN) ophthalmic ointment Place a 1/2 inch ribbon of ointment into the lower eyelid of both eyes 6x/day x 7 days    Patient not taking: No sig reported 12/27/19   Brett Starr DO   folic acid (FOLVITE) 1 mg tablet Take 1 mg by mouth every 24 hours  Patient not taking: No sig reported 2/9/18   Historical Provider, MD   gabapentin (NEURONTIN) 800 mg tablet Take 800 mg by mouth 2 (two) times a day  Patient not taking: No sig reported    Historical Provider, MD   ketorolac (TORADOL) 10 mg tablet Take 1 tablet (10 mg total) by mouth every 6 (six) hours as needed for moderate pain  Patient not taking: No sig reported 8/16/20   Dagoberto Specking LINDSAY Perez   nitrofurantoin (MACROBID) 100 mg capsule Take 1 capsule (100 mg total) by mouth 2 (two) times a day  Patient not taking: No sig reported 12/3/20   Ayanna Mcclure PA-C   phenazopyridine (PYRIDIUM) 200 mg tablet Take 1 tablet (200 mg total) by mouth 3 (three) times a day  Patient not taking: No sig reported 12/3/20   Ayanna Mcclure PA-C   Thiamine HCl (VITAMIN B-1) 100 MG TABS Take 1 tablet (100 mg total) by mouth daily  Patient not taking: No sig reported 9/11/18   Ethel Ruiz MD   warfarin (COUMADIN) 2 mg tablet Take Monday, Wednesday, Friday and Saturday along with 10mg warfarin  Patient not taking: No sig reported 9/11/18   Ethel Ruiz MD       Inhouse Medications    Current Facility-Administered Medications:     docusate sodium (COLACE) capsule 100 mg, 100 mg, Oral, BID, 100 mg at 09/28/22 1032    enoxaparin (LOVENOX) subcutaneous injection 40 mg, 40 mg, Subcutaneous, BID, 40 mg at 69/22/41 4250    folic acid (FOLVITE) tablet 1 mg, 1 mg, Oral, Daily, 1 mg at 09/28/22 1032    LORazepam (ATIVAN) tablet 1 mg, 1 mg, Oral, Q6H, 1 mg at 09/28/22 1032 **FOLLOWED BY** [START ON 9/29/2022] LORazepam (ATIVAN) tablet 1 mg, 1 mg, Oral, Q8H **FOLLOWED BY** [START ON 9/30/2022] LORazepam (ATIVAN) tablet 1 mg, 1 mg, Oral, Q12H **FOLLOWED BY** [START ON 10/2/2022] LORazepam (ATIVAN) tablet 1 mg, 1 mg, Oral, Q24H    multivitamin-minerals (CENTRUM) tablet 1 tablet, 1 tablet, Oral, Daily, 1 tablet at 09/28/22 1032    sodium chloride 0 9 % infusion, 125 mL/hr, Intravenous, Continuous, 125 mL/hr at 09/28/22 1033    thiamine tablet 100 mg, 100 mg, Oral, Daily, 100 mg at 09/28/22 1031      Social History   reports that she has been smoking  She has a 12 50 pack-year smoking history  She has never used smokeless tobacco  She reports current alcohol use  She reports that she does not use drugs      Family History  Family History   Problem Relation Age of Onset    Hypertension Mother          OBJECTIVE:    /74 (BP Location: Left arm)   Pulse 92   Temp (!) 97 °F (36 1 °C) (Temporal)   Resp 18   Ht 5' 7" (1 702 m)   Wt 125 kg (276 lb 3 8 oz)   SpO2 97%   BMI 43 26 kg/m²   HEENT-icteric no nystagmus  Abdomen mild epigastric tenderness, liver not well felt but does not appear pulsatile  Extremities reveal significant peripheral edema    Laboratory Studies:  Results from last 7 days   Lab Units 09/28/22  0357 09/28/22  0317   WBC Thousand/uL  --  12 08*   HEMOGLOBIN g/dL  --  8 0*   HEMATOCRIT %  --  23 4*   MCV fL  --  108*   PLATELETS Thousands/uL  --  257   INR  1 61*  --      Results from last 7 days   Lab Units 09/28/22  0357   POTASSIUM mmol/L 3 2*   CHLORIDE mmol/L 95*   CO2 mmol/L 29   BUN mg/dL 6   CREATININE mg/dL 0 76   CALCIUM mg/dL 7 9*   ALK PHOS U/L 193*   ALT U/L 75*   AST U/L 191*           Imaging and Other Studies:  XR chest pa & lateral    Result Date: 9/28/2022  Narrative: CHEST INDICATION:   abdominal pain  COMPARISON:  1/15/2018 EXAM PERFORMED/VIEWS:  XR CHEST PA & LATERAL Images: 2 FINDINGS: Cardiomediastinal silhouette appears unremarkable  The lungs are clear  No pneumothorax or pleural effusion  Osseous structures appear within normal limits for patient age  Impression: No acute cardiopulmonary disease  Findings are stable Workstation performed: [id number]     CT abdomen pelvis w contrast    Result Date: 9/28/2022  Narrative: CT ABDOMEN AND PELVIS WITH IV CONTRAST INDICATION:   Abdominal Pain  COMPARISON:  12/3/2020 TECHNIQUE:  CT examination of the abdomen and pelvis was performed  Axial, sagittal, and coronal 2D reformatted images were created from the source data and submitted for interpretation  Radiation dose length product (DLP) for this visit:  0113 mGy-cm   This examination, like all CT scans performed in the [facility], was performed utilizing techniques to minimize radiation dose exposure, including the use of iterative reconstruction and automated exposure control  IV Contrast:  100 mL of iohexol (OMNIPAQUE) Enteric Contrast:  Enteric contrast was not administered  FINDINGS: ABDOMEN LOWER CHEST:  No clinically significant abnormality identified in the visualized lower chest  LIVER/BILIARY TREE:  Hepatomegaly with severe fatty infiltration, similar to the prior study  No CT evidence of suspicious hepatic mass  Normal hepatic contours  No biliary dilatation  GALLBLADDER:  There are gallstone(s) within the gallbladder, without pericholecystic inflammatory changes  SPLEEN:  Unremarkable  PANCREAS:  Unremarkable  ADRENAL GLANDS:  Unremarkable  KIDNEYS/URETERS:  Unremarkable  No hydronephrosis   STOMACH AND BOWEL:  Status post Noemi-en-Y gastric bypass  No obstruction or acute inflammatory changes  APPENDIX:  No findings to suggest appendicitis  ABDOMINOPELVIC CAVITY:  Small ascites  No pneumoperitoneum  No lymphadenopathy  VESSELS:  Unremarkable for patient's age  PELVIS REPRODUCTIVE ORGANS:  Unremarkable for patient's age  URINARY BLADDER:  Unremarkable  ABDOMINAL WALL/INGUINAL REGIONS:  Unremarkable  OSSEOUS STRUCTURES:  No acute fracture or destructive osseous lesion  Impression: No evidence of acute pathology  Small ascites  Hepatomegaly with severe steatosis  Workstation performed: [id number]         ASSESSMENT AND PLAN:  1  Probable alcohol-related liver disease although other etiologies need to be ruled out  2  Anemia with probable element of iron deficiency-status post gastric bypass and denies history of colonoscopy  2  Multiple other medical problems including significant peripheral edema  Suggest full hepatitis serologies, reassess LFTs in a m  And consider Maddrey score  Would prefer hepatitis negative, would give empiric PPI  Consider echo because of peripheral edema but has had echo several years ago which only showed trace tricuspid regurg and picture does not seem compatible with right-sided heart failure from a liver standpoint  Watch for renal insufficiency  Absolutely needs to refrain from all alcohol-discussed with patient and daughter in room            Oliverio Whitfield MD

## 2022-09-28 NOTE — H&P
History and Physical - [facility]    Patient Information: Charity Katz 55 y o  female MRN: [id number]  Unit/Bed#: 7T [facility] 708-01 Encounter: [id number]  Admitting Physician: Dr Rhett Zelaya MD  PCP: Tanesha Polanco PA-C  Date of Admission:  09/28/22    Assessment and Plan    * Biliary cirrhosis ([facility] [address] )  Assessment & Plan  C/o Nausea, bloating, constipation, and epigastric pain  Hx: PE, DVT, gastric bypass, smoker, alcohol abuse  Labs: Transminitis, anemia, hyperbilirubinemia  Imaging: CT: hepatomegaly; severe steatosis, gallstones with no pericholecystic inflammation  PE: Icteric, abd distention, +4 pitting edema  No home meds    - NPO  - CIWA protocol  - Lovenox 40 mg SubQ BID  - GI consult; appreciate recommendations  - Detox when medically stable  Alcohol abuse  Assessment & Plan  C/o Nausea, bloating, constipation, and epigastric pain  Hx: alcohol abuse, drinks 4 cups of vodka daily  Labs: Transminitis, anemia  Imaging: CT: hepatomegaly; severe steatosis, gallstones with no pericholecystic inflammation  PE: Sclera Icteric, abd distention, +4 pitting edema      - CIWA protocol  - Ativan 1mg PO Q6hr PO PRN for withdrawal symptoms  - Folic acid 1 mg/Thaimine 100 mg/MVit PO daily  - Transfer to detox when medically stable    - Consider toxicology referral   - GI consult; appreciate recommendations      Other constipation  Assessment & Plan  C/o constipation, bloating and epigastric pain  Sclera icterus    - Colace 100 mg BID   - Appreciate GI recommendation      Hypokalemia  Assessment & Plan  K= 3 2, stable and asymptomatic from cardiac standpoint     - Replete with 40meq KCL PO x 1  - Repeat CMP in AM  - Treat as needed      Tobacco abuse  Assessment & Plan  Smokes 1/2 a PPD  Not decided on quitting    - Nicotine replacement patch pending pregnancy test  - Smoking cessation counseling      Financial difficulties  Assessment & Plan  Endorses she stopped taking all her medications because of she was unable to afford them      - referral in place  VTE Prophylaxis: Enoxaparin (Lovenox)  Code Status: Level 1 - Full Code  Anticipated Length of Stay:  Patient will be admitted on an Inpatient basis with an anticipated length of stay of  than 2 midnights  Justification for Hospital Stay: Suspected Liver failure 2/2 steatosis and alcohol abuse  Total Time for Visit, including Counseling / Coordination of Care: 60 mins  Greater than 50% of this total time spent on direct patient counseling and coordination of care  Patient Information Sharing: With the consent of Nick Moscoso , their loved ones (Robb Marie) were notified today by inpatient team of the patients condition and current plan  All questions answered  Chief Complaint:     Chief Complaint   Patient presents with    Abdominal Pain     Constipation x2 weeks along with rash from hemorrhoid cream  Drinks 5 cups of vodka/day  Took OTC laxatives with no relief  History of Present Illness:    Nick Moscoso is a 55 y o  female  with a pmhx of gastric bypass, pulmonary embolism, half pack/day smoker and alcohol abuse (5 cups of vodka daily) who presented to the ED today with c/o constipation for 2 weeks, consequent hemorrhoid with rash, decreased void (9/27 at 6:00 p m ) and epigastric abdominal pain  She endorses cough that has been ongoing for a duration she could not remember "a long time" and swelling in the lower extremities  She denies chest tenderness/pain, SOB, or headaches  Patient was admitted to our service for possible biliary cirrhosis and transfer to detox once medically cleared       ED Course    VS: 135/65; 110; 100% on RA  Labs: Na: 133, K: 3 2, PT/INR: 19 5/1 61; PTT: 39; WBC: 12 08; hgb:8 0  Imaging: CT abd/pelvis unremarkable except for hepatomegaly with severe steatosis  Meds: NS bolus 1000 ml; Morphine 4 mg IVP, Zofran 4 mg PO    Review of Systems:  Review of Systems   Constitutional: Negative for chills, diaphoresis and fever  HENT: Negative for sinus pressure and sinus pain  Eyes: Negative for visual disturbance  Respiratory: Positive for cough  Negative for choking, chest tightness, shortness of breath and wheezing  Cardiovascular: Positive for leg swelling  Negative for chest pain and palpitations  Gastrointestinal: Positive for abdominal distention, abdominal pain, constipation and nausea  Genitourinary: Positive for decreased urine volume and dysuria  Negative for flank pain and frequency  Skin:        Dry callous feet, no skin breakdown  Neurological: Negative for light-headedness and headaches  Psychiatric/Behavioral: Negative for agitation and confusion  Past Medical and Surgical History:   Past Medical History:   Diagnosis Date    DVT (deep venous thrombosis) (H[facility])     Pulmonary embolism (H[facility])      Past Surgical History:   Procedure Laterality Date    ANKLE SURGERY Left 1995    GASTRIC BYPASS       Meds/Allergies: Allergies: Allergies   Allergen Reactions    Gabapentin Rash     Pt is currently on gabapentin  Denies allergy     Prior to Admission Medications   Prescriptions Last Dose Informant Patient Reported? Taking? Thiamine HCl (VITAMIN B-1) 100 MG TABS Not Taking at Unknown time  No No   Sig: Take 1 tablet (100 mg total) by mouth daily   Patient not taking: No sig reported   ergocalciferol (VITAMIN D2) 50,000 units Not Taking at Unknown time  No No   Sig: Take 1 capsule (50,000 Units total) by mouth once a week   Patient not taking: No sig reported   erythromycin (ILOTYCIN) ophthalmic ointment Not Taking at Unknown time  No No   Sig: Place a 1/2 inch ribbon of ointment into the lower eyelid of both eyes 6x/day x 7 days     Patient not taking: No sig reported   folic acid (FOLVITE) 1 mg tablet Not Taking at Unknown time  Yes No   Sig: Take 1 mg by mouth every 24 hours   Patient not taking: No sig reported   gabapentin (NEURONTIN) 800 mg tablet Not Taking at Unknown time  Yes No   Sig: Take 800 mg by mouth 2 (two) times a day   Patient not taking: No sig reported   ketorolac (TORADOL) 10 mg tablet Not Taking at Unknown time  No No   Sig: Take 1 tablet (10 mg total) by mouth every 6 (six) hours as needed for moderate pain   Patient not taking: No sig reported   nitrofurantoin (MACROBID) 100 mg capsule Not Taking at Unknown time  No No   Sig: Take 1 capsule (100 mg total) by mouth 2 (two) times a day   Patient not taking: No sig reported   phenazopyridine (PYRIDIUM) 200 mg tablet Not Taking at Unknown time  No No   Sig: Take 1 tablet (200 mg total) by mouth 3 (three) times a day   Patient not taking: No sig reported   warfarin (COUMADIN) 2 mg tablet Not Taking at Unknown time  No No   Sig: Take Monday, Wednesday, Friday and Saturday along with 10mg warfarin   Patient not taking: No sig reported      Facility-Administered Medications: None     Social History:     Social History     Socioeconomic History    Marital status: Single     Spouse name: Not on file    Number of children: Not on file    Years of education: Not on file    Highest education level: Not on file   Occupational History    Not on file   Tobacco Use    Smoking status: Current Every Day Smoker     Packs/day: 0 50     Years: 25 00     Pack years: 12 50     Last attempt to quit: 2018     Years since quittin 1    Smokeless tobacco: Never Used    Tobacco comment: 5-10 cigarettes / daily  NUSRATGEN SAYS FORMER SMOKER QUIT DATE 2017   Vaping Use    Vaping Use: Never used   Substance and Sexual Activity    Alcohol use: Yes     Comment: 5 cups vodka/day    Drug use: No    Sexual activity: Not on file   Other Topics Concern    Not on file   Social History Narrative    Not on file     Social Determinants of Health     Financial Resource Strain: High Risk    Difficulty of Paying Living Expenses: Hard   Food Insecurity: No Food Insecurity    Worried About Running Out of Food in the Last Year: Never true    Sarah of Food in the Last Year: Never true   Transportation Needs: No Transportation Needs    Lack of Transportation (Medical): No    Lack of Transportation (Non-Medical): No   Physical Activity: Not on file   Stress: Not on file   Social Connections: Not on file   Intimate Partner Violence: Not on file   Housing Stability: Not on file     Patient Pre-hospital Living Situation: Home  Patient Pre-hospital Level of Mobility: Ambulatory  Patient Pre-hospital Diet Restrictions: None    Family History:  Family History   Problem Relation Age of Onset    Hypertension Mother        Physical Exam:   Vitals:   Blood Pressure: 102/59 (09/28/22 1539)  Pulse: 88 (09/28/22 1539)  Temperature: 97 8 °F (36 6 °C) (09/28/22 1539)  Temp Source: Temporal (09/28/22 1539)  Respirations: 18 (09/28/22 1539)  Height: 5' 7" (170 2 cm) (09/28/22 1432)  Weight - Scale: 125 kg (276 lb 3 8 oz) (09/28/22 1432)  SpO2: 100 % (09/28/22 1539)    Physical Exam  Constitutional:       General: She is not in acute distress  Appearance: She is obese  She is not toxic-appearing  HENT:      Head: Normocephalic  Right Ear: External ear normal       Left Ear: External ear normal       Nose: Nose normal    Eyes:      General: Scleral icterus present  Extraocular Movements: Extraocular movements intact  Cardiovascular:      Rate and Rhythm: Normal rate and regular rhythm  Pulses: Normal pulses  Heart sounds: Normal heart sounds  No murmur heard  No friction rub  No gallop  Pulmonary:      Effort: Pulmonary effort is normal  No accessory muscle usage, respiratory distress or retractions  Breath sounds: Examination of the right-upper field reveals rhonchi  Examination of the left-upper field reveals rhonchi  Examination of the right-lower field reveals rhonchi  Examination of the left-lower field reveals rhonchi  Rhonchi present  Musculoskeletal:         General: Swelling present        Right lower leg: Edema present  Left lower leg: Edema present  Comments: +4 pitting edema up to the knees bilaterally  Pain and numbness in the toes  Feet:      Right foot:      Skin integrity: Callus and dry skin present  No skin breakdown  Toenail Condition: Right toenails are abnormally thick  Left foot:      Skin integrity: Callus and dry skin present  No skin breakdown  Toenail Condition: Left toenails are abnormally thick  Skin:     General: Skin is warm  Neurological:      Mental Status: She is alert  Psychiatric:         Mood and Affect: Mood normal          Behavior: Behavior normal          Lab Results: I have personally reviewed pertinent reports  Results from last 7 days   Lab Units 09/28/22  0317   WBC Thousand/uL 12 08*   HEMOGLOBIN g/dL 8 0*   HEMATOCRIT % 23 4*   PLATELETS Thousands/uL 257   NEUTROS PCT % 71   LYMPHS PCT % 18   MONOS PCT % 11   EOS PCT % 0     Results from last 7 days   Lab Units 09/28/22  0357   POTASSIUM mmol/L 3 2*   CHLORIDE mmol/L 95*   CO2 mmol/L 29   BUN mg/dL 6   CREATININE mg/dL 0 76   CALCIUM mg/dL 7 9*   ALK PHOS U/L 193*   ALT U/L 75*   AST U/L 191*   EGFR ml/min/1 73sq m 94     Results from last 7 days   Lab Units 09/28/22  0357   INR  1 61*                 Results from last 7 days   Lab Units 09/28/22  0357   NT-PRO BNP pg/mL 237            Invalid input(s): URIBILINOGEN          Imaging: I have personally reviewed pertinent reports  XR chest pa & lateral    Result Date: 9/28/2022  Narrative: CHEST INDICATION:   abdominal pain  COMPARISON:  1/15/2018 EXAM PERFORMED/VIEWS:  XR CHEST PA & LATERAL Images: 2 FINDINGS: Cardiomediastinal silhouette appears unremarkable  The lungs are clear  No pneumothorax or pleural effusion  Osseous structures appear within normal limits for patient age  Impression: No acute cardiopulmonary disease   Findings are stable Workstation performed: [id number]     CT abdomen pelvis w contrast    Result Date: 9/28/2022  Narrative: CT ABDOMEN AND PELVIS WITH IV CONTRAST INDICATION:   Abdominal Pain  COMPARISON:  12/3/2020 TECHNIQUE:  CT examination of the abdomen and pelvis was performed  Axial, sagittal, and coronal 2D reformatted images were created from the source data and submitted for interpretation  Radiation dose length product (DLP) for this visit:  6706 mGy-cm   This examination, like all CT scans performed in the [facility], was performed utilizing techniques to minimize radiation dose exposure, including the use of iterative reconstruction and automated exposure control  IV Contrast:  100 mL of iohexol (OMNIPAQUE) Enteric Contrast:  Enteric contrast was not administered  FINDINGS: ABDOMEN LOWER CHEST:  No clinically significant abnormality identified in the visualized lower chest  LIVER/BILIARY TREE:  Hepatomegaly with severe fatty infiltration, similar to the prior study  No CT evidence of suspicious hepatic mass  Normal hepatic contours  No biliary dilatation  GALLBLADDER:  There are gallstone(s) within the gallbladder, without pericholecystic inflammatory changes  SPLEEN:  Unremarkable  PANCREAS:  Unremarkable  ADRENAL GLANDS:  Unremarkable  KIDNEYS/URETERS:  Unremarkable  No hydronephrosis  STOMACH AND BOWEL:  Status post Noemi-en-Y gastric bypass  No obstruction or acute inflammatory changes  APPENDIX:  No findings to suggest appendicitis  ABDOMINOPELVIC CAVITY:  Small ascites  No pneumoperitoneum  No lymphadenopathy  VESSELS:  Unremarkable for patient's age  PELVIS REPRODUCTIVE ORGANS:  Unremarkable for patient's age  URINARY BLADDER:  Unremarkable  ABDOMINAL WALL/INGUINAL REGIONS:  Unremarkable  OSSEOUS STRUCTURES:  No acute fracture or destructive osseous lesion  Impression: No evidence of acute pathology  Small ascites  Hepatomegaly with severe steatosis   Workstation performed: [id number]       EKG, Pathology, and Other Studies Reviewed on Admission:   EKG  Result Date: 09/28/22  Impression:  Not done    Entire H&P was discussed with Dr Andrew Lam who agreed to what is noted above      @ME@  09/28/22  4:28 PM

## 2022-09-28 NOTE — ASSESSMENT & PLAN NOTE
Smokes 1/2 a PPD  Not decided on quitting  Pregnancy test negative (9/28/22)  - Nicotine replacement patch order placed  - Smoking cessation counseling

## 2022-09-28 NOTE — ED PROVIDER NOTES
History  Chief Complaint   Patient presents with    Abdominal Pain     Constipation x2 weeks along with rash from hemorrhoid cream  Drinks 5 cups of vodka/day  Took OTC laxatives with no relief  51-year-old female with history alcohol use, prior pulmonary emboli as well as DVTs presents with several complaints  Patient states that she has been constipated for a week and tried using preparation H because she believes she gave herself a hemorrhoid and now has a rash in the area however on arrival patient states that she thinks she is constipated because her abdomen is swollen bloated and painful  This has been worsening for the past 2-3 weeks  Patient also noted bilateral lower extremity edema and nausea  Difficulty tolerating p o  Intake  Denies urinary complaints  Denies fevers  Denies any other complaints       History provided by:  Patient   used: No        Prior to Admission Medications   Prescriptions Last Dose Informant Patient Reported? Taking? Thiamine HCl (VITAMIN B-1) 100 MG TABS Not Taking at Unknown time  No No   Sig: Take 1 tablet (100 mg total) by mouth daily   Patient not taking: No sig reported   ergocalciferol (VITAMIN D2) 50,000 units Not Taking at Unknown time  No No   Sig: Take 1 capsule (50,000 Units total) by mouth once a week   Patient not taking: No sig reported   erythromycin (ILOTYCIN) ophthalmic ointment Not Taking at Unknown time  No No   Sig: Place a 1/2 inch ribbon of ointment into the lower eyelid of both eyes 6x/day x 7 days     Patient not taking: No sig reported   folic acid (FOLVITE) 1 mg tablet Not Taking at Unknown time  Yes No   Sig: Take 1 mg by mouth every 24 hours   Patient not taking: No sig reported   gabapentin (NEURONTIN) 800 mg tablet Not Taking at Unknown time  Yes No   Sig: Take 800 mg by mouth 2 (two) times a day   Patient not taking: No sig reported   ketorolac (TORADOL) 10 mg tablet Not Taking at Unknown time  No No   Sig: Take 1 tablet (10 mg total) by mouth every 6 (six) hours as needed for moderate pain   Patient not taking: No sig reported   nitrofurantoin (MACROBID) 100 mg capsule Not Taking at Unknown time  No No   Sig: Take 1 capsule (100 mg total) by mouth 2 (two) times a day   Patient not taking: No sig reported   phenazopyridine (PYRIDIUM) 200 mg tablet Not Taking at Unknown time  No No   Sig: Take 1 tablet (200 mg total) by mouth 3 (three) times a day   Patient not taking: No sig reported   warfarin (COUMADIN) 2 mg tablet Not Taking at Unknown time  No No   Sig: Take Monday, Wednesday, Friday and Saturday along with 10mg warfarin   Patient not taking: No sig reported      Facility-Administered Medications: None       Past Medical History:   Diagnosis Date    DVT (deep venous thrombosis) ([facility] [address] )     Pulmonary embolism (HCC)        Past Surgical History:   Procedure Laterality Date    ANKLE SURGERY Left     GASTRIC BYPASS         Family History   Problem Relation Age of Onset    Hypertension Mother      I have reviewed and agree with the history as documented  E-Cigarette/Vaping    E-Cigarette Use Never User      E-Cigarette/Vaping Substances     Social History     Tobacco Use    Smoking status: Current Every Day Smoker     Packs/day: 0 50     Years: 25 00     Pack years: 12 50     Last attempt to quit: 2018     Years since quittin 1    Smokeless tobacco: Never Used    Tobacco comment: 5-10 cigarettes / daily  N[employer]GEN [employer] FORMER SMOKER QUIT DATE 2017   Vaping Use    Vaping Use: Never used   Substance Use Topics    Alcohol use: Yes     Comment: 5 cups vodka/day    Drug use: No       Review of Systems   Constitutional: Negative  Negative for chills and fatigue  HENT: Negative for ear pain and sore throat  Eyes: Negative for photophobia and redness  Respiratory: Negative for apnea, cough and shortness of breath  Cardiovascular: Positive for leg swelling  Negative for chest pain  Gastrointestinal: Positive for abdominal distention, abdominal pain, constipation and nausea  Negative for vomiting  Genitourinary: Negative for dysuria  Musculoskeletal: Negative for arthralgias, neck pain and neck stiffness  Skin: Negative for rash  Neurological: Negative for dizziness, tremors, syncope and weakness  Psychiatric/Behavioral: Negative for suicidal ideas  Physical Exam  Physical Exam  Constitutional:       General: She is not in acute distress  Appearance: She is well-developed  She is not diaphoretic  Eyes:      General: Scleral icterus present  Pupils: Pupils are equal, round, and reactive to light  Cardiovascular:      Rate and Rhythm: Normal rate and regular rhythm  Pulmonary:      Effort: Pulmonary effort is normal  No respiratory distress  Breath sounds: Normal breath sounds  Abdominal:      General: Bowel sounds are normal  There is distension  Palpations: Abdomen is soft  Tenderness: There is abdominal tenderness  Comments: Distended, diffusely tender with guarding worse in the bilateral upper quadrants and epigastrium no rebound no rigidity no peritoneal signs negative CVA tenderness   Genitourinary:     Comments: Rectal exam with perirectal soft tissue irritation however no obvious hemorrhoid or fissure appreciated  Musculoskeletal:         General: Normal range of motion  Cervical back: Normal range of motion and neck supple  Right lower leg: Edema present  Left lower leg: Edema present  Comments: Bilateral 3+ edema   Skin:     General: Skin is warm and dry  Neurological:      Mental Status: She is alert and oriented to person, place, and time           Vital Signs  ED Triage Vitals   Temperature Pulse Respirations Blood Pressure SpO2   09/28/22 0312 09/28/22 0312 09/28/22 0312 09/28/22 0312 09/28/22 0312   98 2 °F (36 8 °C) (!) 110 18 135/65 100 %      Temp Source Heart Rate Source Patient Position - Orthostatic VS BP Location FiO2 (%)   09/28/22 0312 09/28/22 0312 09/28/22 0312 09/28/22 0312 --   Oral Monitor Sitting Left arm       Pain Score       09/28/22 0358       10 - Worst Possible Pain           Vitals:    09/28/22 1300 09/28/22 1539 09/28/22 1709 09/28/22 2003   BP: 112/74 102/59 100/62 130/78   Pulse: 92 88 89 89   Patient Position - Orthostatic VS: Sitting Lying Lying Lying         Visual Acuity      ED Medications  Medications   sodium chloride 0 9 % infusion (125 mL/hr Intravenous New Bag 9/28/22 1033)   docusate sodium (COLACE) capsule 100 mg (100 mg Oral Given 9/28/22 1732)   enoxaparin (LOVENOX) subcutaneous injection 40 mg (40 mg Subcutaneous Given 9/28/22 1732)   LORazepam (ATIVAN) tablet 1 mg (1 mg Oral Given 9/28/22 1641)     Followed by   LORazepam (ATIVAN) tablet 1 mg (has no administration in time range)     Followed by   LORazepam (ATIVAN) tablet 1 mg (has no administration in time range)     Followed by   LORazepam (ATIVAN) tablet 1 mg (has no administration in time range)   thiamine tablet 100 mg (100 mg Oral Given 1/00/64 6178)   folic acid (FOLVITE) tablet 1 mg (1 mg Oral Given 9/28/22 1032)   multivitamin-minerals (CENTRUM) tablet 1 tablet (1 tablet Oral Given 9/28/22 1032)   pantoprazole (PROTONIX) EC tablet 40 mg (40 mg Oral Not Given 9/28/22 1548)   sodium chloride 0 9 % bolus 1,000 mL (0 mL Intravenous Stopped 9/28/22 0700)   morphine injection 4 mg (4 mg Intravenous Given 9/28/22 0358)   ondansetron (ZOFRAN-ODT) dispersible tablet 4 mg (4 mg Oral Given 9/28/22 0314)   iohexol (OMNIPAQUE) 350 MG/ML injection (SINGLE-DOSE) 100 mL (100 mL Intravenous Given 9/28/22 0537)   potassium chloride (K-DUR,KLOR-CON) CR tablet 40 mEq (40 mEq Oral Given 9/28/22 1405)       Diagnostic Studies  Results Reviewed     Procedure Component Value Units Date/Time    TSH, 3rd generation [[id number]]     Lab Status: No result Specimen: Blood     UA w Reflex to Microscopic w Reflex to Culture [[id number]]     Lab Status: No result Specimen: Urine     POCT pregnancy, urine [[id number]]     Lab Status: No result     POCT pregnancy, urine [[id number]]  (Normal) Resulted: 09/28/22 1005    Lab Status: Final result Updated: 09/28/22 1005     EXT PREG TEST UR (Ref: Negative) negative     Control valid    Magnesium [[id number]]     Lab Status: No result Specimen: Blood     Phosphorus [[id number]]     Lab Status: No result Specimen: Blood     Equal mix, APTT [[id number]]     Lab Status: No result Specimen: Blood     Equal mix, PT / INR [[id number]]     Lab Status: No result Specimen: Blood     Thrombin Time Mixing Study [[id number]]     Lab Status: No result Specimen: Blood     Platelet count [[id number]]     Lab Status: No result Specimen: Blood     Protime-INR [[id number]]     Lab Status: No result Specimen: Blood     APTT [[id number]]     Lab Status: No result Specimen: Blood     Thrombin time [[id number]]     Lab Status: No result Specimen: Blood     Fibrinogen [[id number]]     Lab Status: No result Specimen: Blood     NT-BNP PRO [[id number]]  (Normal) Collected: 09/28/22 0357    Lab Status: Final result Specimen: Blood from Arm, Left Updated: 09/28/22 0439     NT-proBNP 237 pg/mL     Lipase [[id number]]  (Normal) Collected: 09/28/22 0357    Lab Status: Final result Specimen: Blood from Arm, Left Updated: 09/28/22 0432     Lipase 47 u/L     Comprehensive metabolic panel [[id number]]  (Abnormal) Collected: 09/28/22 0357    Lab Status: Final result Specimen: Blood from Arm, Left Updated: 09/28/22 0432     Sodium 133 mmol/L      Potassium 3 2 mmol/L      Chloride 95 mmol/L      CO2 29 mmol/L      ANION GAP 9 mmol/L      BUN 6 mg/dL      Creatinine 0 76 mg/dL      Glucose 90 mg/dL      Calcium 7 9 mg/dL      Corrected Calcium 8 9 mg/dL       U/L      ALT 75 U/L      Alkaline Phosphatase 193 U/L      Total Protein 7 1 g/dL      Albumin 2 7 g/dL      Total Bilirubin 13 68 mg/dL      eGFR 94 ml/min/1 73sq m     Narrative:      National Kid[employer] guidelines for Chronic Kidney Disease (CKD):     Stage 1 with normal or high GFR (GFR > 90 mL/min/1 73 square meters)    Stage 2 Mild CKD (GFR = 60-89 mL/min/1 73 square meters)    Stage 3A Moderate CKD (GFR = 45-59 mL/min/1 73 square meters)    Stage 3B Moderate CKD (GFR = 30-44 mL/min/1 73 square meters)    Stage 4 Severe CKD (GFR = 15-29 mL/min/1 73 square meters)    Stage 5 End Stage CKD (GFR <15 mL/min/1 73 square meters)  Note: GFR calculation is accurate only with a steady state creatinine    Protime-INR [[id number]]  (Abnormal) Collected: 09/28/22 0357    Lab Status: Final result Specimen: Blood from Arm, Left Updated: 09/28/22 0432     Protime 19 5 seconds      INR 1 61    APTT [[id number]]  (Abnormal) Collected: 09/28/22 0357    Lab Status: Final result Specimen: Blood from Arm, Left Updated: 09/28/22 0432     PTT 39 seconds     Ethanol [[id number]]  (Normal) Collected: 09/28/22 0357    Lab Status: Final result Specimen: Blood from Arm, Left Updated: 09/28/22 0428     Ethanol Lvl <10 mg/dL     CBC and differential [[id number]]  (Abnormal) Collected: 09/28/22 0317    Lab Status: Final result Specimen: Blood Updated: 09/28/22 0322     WBC 12 08 Thousand/uL      RBC 2 17 Million/uL      Hemoglobin 8 0 g/dL      Hematocrit 23 4 %       fL      MCH 36 9 pg      MCHC 34 2 g/dL      MPV 10 3 fL      Platelets 599 Thousands/uL      nRBC 0 /100 WBCs      Neutrophils Relative 71 %      Immat GRANS % 0 %      Lymphocytes Relative 18 %      Monocytes Relative 11 %      Eosinophils Relative 0 %      Basophils Relative 0 %      Neutrophils Absolute 8 51 Thousands/µL      Immature Grans Absolute 0 05 Thousand/uL      Lymphocytes Absolute 2 11 Thousands/µL      Monocytes Absolute 1 37 Thousand/µL      Eosinophils Absolute 0 02 Thousand/µL      Basophils Absolute 0 02 Thousands/µL     Rapid drug screen, urine [[id number]]     Lab Status: No result Specimen: Urine     UA (URINE) with reflex to Scope [[id number]]     Lab Status: No result Specimen: Urine                  CT abdomen pelvis w contrast   Final Result by Padmini Meeks MD (09/28 6420)      No evidence of acute pathology  Small ascites  Hepatomegaly with severe steatosis  Workstation performed: [id number]         XR chest pa & lateral   ED Interpretation by Arturo Haddad PA-C (09/28 0130)   No obvious fluid overload      Final Result by Jennifer Azul MD (09/28 5972)      No acute cardiopulmonary disease  Findings are stable            Workstation performed: [id number]                    Procedures  Procedures         ED Course                               SBIRT 22yo+    Flowsheet Row Most Recent Value   SBIRT (23 yo +)    In order to provide better care to our patients, we are screening all of our patients for alcohol and drug use  Would it be okay to ask you these screening questions? No Filed at: 09/28/2022 0700                    MDM  Number of Diagnoses or Management Options  Liver failure [facility]): new and requires workup  Diagnosis management comments: Patient had multiple complaints and was found to have new hepatomegaly with ascites and lower extremity edema  Concern for undiagnosed cirrhosis due to chronic alcohol use  Patient is not currently on any of her chronic medications due to financial issues  Patient requires admission to manage her current comorbidities and set up outpatient care  Discussed with family medicine team who agrees with plan to admit  Patient stable for admission to the floor         Amount and/or Complexity of Data Reviewed  Decide to obtain previous medical records or to obtain history from someone other than the patient: yes    Risk of Complications, Morbidity, and/or Mortality  Presenting problems: moderate  Diagnostic procedures: moderate  Management options: moderate    Patient Progress  Patient progress: stable      Disposition  Final diagnoses:   Liver failure ([address] ) Time reflects when diagnosis was documented in both MDM as applicable and the Disposition within this note     Time User Action Codes Description Comment    9/28/2022  8:38 AM Cheryl Leblanc Add [K74 60] Liver cirrhosis ([facility] [address] )     9/28/2022  8:44 AM Cheryl Leblanc Add [Z59 9] Financial difficulties     9/28/2022  8:52 AM Gina Villarreal Add [K72 90] Liver failure ([facility] )     9/28/2022  1:12 PM Best Islas Add [F10 10] Alcohol abuse       ED Disposition     ED Disposition   Admit    Condition   Stable    Date/Time   Wed Sep 28, 2022  8:52 AM    Comment   Case was discussed with [facility] and the patient's admission status was agreed to be Admission Status: inpatient status to the service of Dr Michelle Meneses   Follow-up Information    None         Current Discharge Medication List      CONTINUE these medications which have NOT CHANGED    Details   ergocalciferol (VITAMIN D2) 50,000 units Take 1 capsule (50,000 Units total) by mouth once a week  Qty: 8 capsule, Refills: 0    Associated Diagnoses: Vitamin D deficiency      erythromycin (ILOTYCIN) ophthalmic ointment Place a 1/2 inch ribbon of ointment into the lower eyelid of both eyes 6x/day x 7 days    Qty: 3 5 g, Refills: 0    Associated Diagnoses: Conjunctivitis      folic acid (FOLVITE) 1 mg tablet Take 1 mg by mouth every 24 hours      gabapentin (NEURONTIN) 800 mg tablet Take 800 mg by mouth 2 (two) times a day      ketorolac (TORADOL) 10 mg tablet Take 1 tablet (10 mg total) by mouth every 6 (six) hours as needed for moderate pain  Qty: 20 tablet, Refills: 0    Associated Diagnoses: Fall, initial encounter; Contusion of face, initial encounter      nitrofurantoin (MACROBID) 100 mg capsule Take 1 capsule (100 mg total) by mouth 2 (two) times a day  Qty: 10 capsule, Refills: 0    Associated Diagnoses: Urinary tract infection      phenazopyridine (PYRIDIUM) 200 mg tablet Take 1 tablet (200 mg total) by mouth 3 (three) times a day  Qty: 6 tablet, Refills: 0    Associated Diagnoses: Urinary tract infection      Thiamine HCl (VITAMIN B-1) 100 MG TABS Take 1 tablet (100 mg total) by mouth daily  Qty: 30 tablet, Refills: 1    Associated Diagnoses: Thiamine deficiency      warfarin (COUMADIN) 2 mg tablet Take Monday, Wednesday, Friday and Saturday along with 10mg warfarin  Refills: 0    Associated Diagnoses: Other pulmonary embolism without acute cor pulmonale, unspecified chronicity ([facility] [address] )             No discharge procedures on file      PDMP Review     None          ED Provider  Electronically Signed by           Cholo Penaloza PA-C  09/28/22 2008

## 2022-09-28 NOTE — ED CARE HANDOFF
Emergency Department Sign Out Note        Sign out and transfer of care from [facility]  See Separate Emergency Department note  The patient, Silvia Field, was evaluated by the previous provider for abdominal pain  Workup Completed:  See previous note    ED Course / Workup Pending (followup):  59-year-old female with extensive past medical history, workup complete, patient waiting to be admitted on to family medicine service  They are currently signing out patient's and will accept the patient once they are done transferring overnight care on her service  Patient agreeable pain, no acute pain at this time  Procedures  MDM        Disposition  Final diagnoses:   Liver failure ([facility] )     Time reflects when diagnosis was documented in both MDM as applicable and the Disposition within this note     Time User Action Codes Description Comment    9/28/2022  8:38 AM Valerie Palmer Add [K74 60] Liver cirrhosis ([facility] )     9/28/2022  8:44 AM Valerie Palmer Add [Z59 9] Financial difficulties     9/28/2022  8:52 AM Kady Lyon Add [K72 90] Liver failure ([place] [address] )     9/28/2022  1:12 PM Yolande Cooper Add [F10 10] Alcohol abuse       ED Disposition     ED Disposition   Admit    Condition   Stable    Date/Time   Wed Sep 28, 2022  8:52 AM    Comment   Case was discussed with [facility] and the patient's admission status was agreed to be Admission Status: inpatient status to the service of Dr Ortiz Shabazz   Follow-up Information    None       Current Discharge Medication List      CONTINUE these medications which have NOT CHANGED    Details   ergocalciferol (VITAMIN D2) 50,000 units Take 1 capsule (50,000 Units total) by mouth once a week  Qty: 8 capsule, Refills: 0    Associated Diagnoses: Vitamin D deficiency      erythromycin (ILOTYCIN) ophthalmic ointment Place a 1/2 inch ribbon of ointment into the lower eyelid of both eyes 6x/day x 7 days    Qty: 3 5 g, Refills: 0 Associated Diagnoses: Conjunctivitis      folic acid (FOLVITE) 1 mg tablet Take 1 mg by mouth every 24 hours      gabapentin (NEURONTIN) 800 mg tablet Take 800 mg by mouth 2 (two) times a day      ketorolac (TORADOL) 10 mg tablet Take 1 tablet (10 mg total) by mouth every 6 (six) hours as needed for moderate pain  Qty: 20 tablet, Refills: 0    Associated Diagnoses: Fall, initial encounter; Contusion of face, initial encounter      nitrofurantoin (MACROBID) 100 mg capsule Take 1 capsule (100 mg total) by mouth 2 (two) times a day  Qty: 10 capsule, Refills: 0    Associated Diagnoses: Urinary tract infection      phenazopyridine (PYRIDIUM) 200 mg tablet Take 1 tablet (200 mg total) by mouth 3 (three) times a day  Qty: 6 tablet, Refills: 0    Associated Diagnoses: Urinary tract infection      Thiamine HCl (VITAMIN B-1) 100 MG TABS Take 1 tablet (100 mg total) by mouth daily  Qty: 30 tablet, Refills: 1    Associated Diagnoses: Thiamine deficiency      warfarin (COUMADIN) 2 mg tablet Take Monday, Wednesday, Friday and Saturday along with 10mg warfarin  Refills: 0    Associated Diagnoses: Other pulmonary embolism without acute cor pulmonale, unspecified chronicity ([facility] )           No discharge procedures on file         ED Provider  Electronically Signed by     Ashley Malik DO  09/28/22 3810

## 2022-09-29 ENCOUNTER — APPOINTMENT (INPATIENT)
Dept: INTERVENTIONAL RADIOLOGY/VASCULAR | Facility: HOSPITAL | Age: 47
DRG: 280 | End: 2022-09-29
Payer: COMMERCIAL

## 2022-09-29 ENCOUNTER — APPOINTMENT (INPATIENT)
Dept: NON INVASIVE DIAGNOSTICS | Facility: HOSPITAL | Age: 47
DRG: 280 | End: 2022-09-29
Payer: COMMERCIAL

## 2022-09-29 PROBLEM — K74.5 BILIARY CIRRHOSIS (HCC): Status: RESOLVED | Noted: 2022-09-28 | Resolved: 2022-09-29

## 2022-09-29 PROBLEM — E87.1 HYPONATREMIA: Status: ACTIVE | Noted: 2022-09-29

## 2022-09-29 PROBLEM — K70.10 ALCOHOLIC HEPATITIS: Status: ACTIVE | Noted: 2022-09-29

## 2022-09-29 PROBLEM — E87.6 HYPOKALEMIA: Status: RESOLVED | Noted: 2018-08-19 | Resolved: 2022-09-29

## 2022-09-29 LAB
ALBUMIN SERPL BCP-MCNC: 2.1 G/DL (ref 3.5–5)
ALP SERPL-CCNC: 149 U/L (ref 43–122)
ALT SERPL W P-5'-P-CCNC: 57 U/L
ANION GAP SERPL CALCULATED.3IONS-SCNC: 6 MMOL/L (ref 5–14)
AORTIC ROOT: 3 CM
APICAL FOUR CHAMBER EJECTION FRACTION: 63 %
APTT PPP: 60 SECONDS (ref 23–37)
AST SERPL W P-5'-P-CCNC: 138 U/L (ref 14–36)
BASOPHILS # BLD AUTO: 0.01 THOUSANDS/ΜL (ref 0–0.1)
BASOPHILS NFR BLD AUTO: 0 % (ref 0–1)
BILIRUB SERPL-MCNC: 10.59 MG/DL (ref 0.2–1)
BUN SERPL-MCNC: 6 MG/DL (ref 5–25)
CALCIUM ALBUM COR SERPL-MCNC: 9 MG/DL (ref 8.3–10.1)
CALCIUM SERPL-MCNC: 7.5 MG/DL (ref 8.4–10.2)
CHLORIDE SERPL-SCNC: 101 MMOL/L (ref 96–108)
CO2 SERPL-SCNC: 26 MMOL/L (ref 21–32)
CREAT SERPL-MCNC: 0.87 MG/DL (ref 0.6–1.2)
E WAVE DECELERATION TIME: 311 MS
EOSINOPHIL # BLD AUTO: 0.01 THOUSAND/ΜL (ref 0–0.61)
EOSINOPHIL NFR BLD AUTO: 0 % (ref 0–6)
ERYTHROCYTE [DISTWIDTH] IN BLOOD BY AUTOMATED COUNT: 27 % (ref 11.6–15.1)
FERRITIN SERPL-MCNC: 100 NG/ML (ref 8–388)
FIBRINOGEN PPP-MCNC: 158 MG/DL (ref 227–495)
FRACTIONAL SHORTENING: 33 (ref 28–44)
GFR SERPL CREATININE-BSD FRML MDRD: 80 ML/MIN/1.73SQ M
GLUCOSE SERPL-MCNC: 62 MG/DL (ref 70–99)
GLUCOSE SERPL-MCNC: 65 MG/DL (ref 65–140)
HAV IGM SER QL: NORMAL
HBV CORE AB SER QL: NORMAL
HBV CORE IGM SER QL: NORMAL
HBV CORE IGM SER QL: NORMAL
HBV SURFACE AG SER QL: NORMAL
HBV SURFACE AG SER QL: NORMAL
HCT VFR BLD AUTO: 22.7 % (ref 34.8–46.1)
HCV AB SER QL: NORMAL
HCV AB SER QL: NORMAL
HGB BLD-MCNC: 7.9 G/DL (ref 11.5–15.4)
IMM GRANULOCYTES # BLD AUTO: 0.03 THOUSAND/UL (ref 0–0.2)
IMM GRANULOCYTES NFR BLD AUTO: 0 % (ref 0–2)
INR PPP: 1.78 (ref 0.84–1.19)
INR PPP: 1.95 (ref 0.84–1.19)
INTERVENTRICULAR SEPTUM IN DIASTOLE (PARASTERNAL SHORT AXIS VIEW): 1.1 CM
INTERVENTRICULAR SEPTUM: 1.1 CM (ref 0.6–1.1)
IRON SATN MFR SERPL: 67 % (ref 15–50)
IRON SERPL-MCNC: 128 UG/DL (ref 50–170)
LAAS-AP2: 21.4 CM2
LAAS-AP4: 22.5 CM2
LEFT ATRIUM SIZE: 4 CM
LEFT INTERNAL DIMENSION IN SYSTOLE: 2.8 CM (ref 2.1–4)
LEFT VENTRICULAR INTERNAL DIMENSION IN DIASTOLE: 4.2 CM (ref 3.5–6)
LEFT VENTRICULAR POSTERIOR WALL IN END DIASTOLE: 1.1 CM
LEFT VENTRICULAR STROKE VOLUME: 51 ML
LVSV (TEICH): 51 ML
LYMPHOCYTES # BLD AUTO: 0.78 THOUSANDS/ΜL (ref 0.6–4.47)
LYMPHOCYTES NFR BLD AUTO: 10 % (ref 14–44)
MAGNESIUM SERPL-MCNC: 1.5 MG/DL (ref 1.6–2.3)
MAGNESIUM SERPL-MCNC: 1.6 MG/DL (ref 1.6–2.3)
MCH RBC QN AUTO: 37.3 PG (ref 26.8–34.3)
MCHC RBC AUTO-ENTMCNC: 34.8 G/DL (ref 31.4–37.4)
MCV RBC AUTO: 107 FL (ref 82–98)
MONOCYTES # BLD AUTO: 0.36 THOUSAND/ΜL (ref 0.17–1.22)
MONOCYTES NFR BLD AUTO: 5 % (ref 4–12)
MV E'TISSUE VEL-SEP: 11 CM/S
MV PEAK A VEL: 0.88 M/S
MV PEAK E VEL: 66 CM/S
MV STENOSIS PRESSURE HALF TIME: 90 MS
MV VALVE AREA P 1/2 METHOD: 2.44
NEUTROPHILS # BLD AUTO: 6.58 THOUSANDS/ΜL (ref 1.85–7.62)
NEUTS SEG NFR BLD AUTO: 85 % (ref 43–75)
NRBC BLD AUTO-RTO: 0 /100 WBCS
PHOSPHATE SERPL-MCNC: 3.8 MG/DL (ref 2.5–4.8)
PHOSPHATE SERPL-MCNC: 5.6 MG/DL (ref 2.5–4.8)
PLATELET # BLD AUTO: 264 THOUSANDS/UL (ref 149–390)
PLATELET # BLD AUTO: 282 THOUSANDS/UL (ref 149–390)
PMV BLD AUTO: 9.2 FL (ref 8.9–12.7)
PMV BLD AUTO: 9.7 FL (ref 8.9–12.7)
POTASSIUM SERPL-SCNC: 3.6 MMOL/L (ref 3.5–5.3)
PROT SERPL-MCNC: 5.9 G/DL (ref 6.4–8.4)
PROTHROMBIN TIME: 21.1 SECONDS (ref 11.6–14.5)
PROTHROMBIN TIME: 22.6 SECONDS (ref 11.6–14.5)
RBC # BLD AUTO: 2.12 MILLION/UL (ref 3.81–5.12)
RIGHT ATRIUM AREA SYSTOLE A4C: 19.4 CM2
RIGHT VENTRICLE ID DIMENSION: 3.8 CM
SL CV LEFT ATRIUM LENGTH A2C: 6.6 CM
SL CV PED ECHO LEFT VENTRICLE DIASTOLIC VOLUME (MOD BIPLANE) 2D: 80 ML
SL CV PED ECHO LEFT VENTRICLE SYSTOLIC VOLUME (MOD BIPLANE) 2D: 28 ML
SODIUM SERPL-SCNC: 133 MMOL/L (ref 135–147)
TIBC SERPL-MCNC: 190 UG/DL (ref 250–450)
TR MAX PG: 25 MMHG
TR PEAK VELOCITY: 2.5 M/S
TRICUSPID VALVE PEAK REGURGITATION VELOCITY: 2.51 M/S
TSH SERPL DL<=0.05 MIU/L-ACNC: 5.67 UIU/ML (ref 0.45–4.5)
WBC # BLD AUTO: 7.77 THOUSAND/UL (ref 4.31–10.16)

## 2022-09-29 PROCEDURE — 99231 SBSQ HOSP IP/OBS SF/LOW 25: CPT | Performed by: PHYSICIAN ASSISTANT

## 2022-09-29 PROCEDURE — 86705 HEP B CORE ANTIBODY IGM: CPT | Performed by: INTERNAL MEDICINE

## 2022-09-29 PROCEDURE — 82390 ASSAY OF CERULOPLASMIN: CPT | Performed by: PHYSICIAN ASSISTANT

## 2022-09-29 PROCEDURE — 84443 ASSAY THYROID STIM HORMONE: CPT | Performed by: STUDENT IN AN ORGANIZED HEALTH CARE EDUCATION/TRAINING PROGRAM

## 2022-09-29 PROCEDURE — 84100 ASSAY OF PHOSPHORUS: CPT

## 2022-09-29 PROCEDURE — 82948 REAGENT STRIP/BLOOD GLUCOSE: CPT

## 2022-09-29 PROCEDURE — 80053 COMPREHEN METABOLIC PANEL: CPT | Performed by: STUDENT IN AN ORGANIZED HEALTH CARE EDUCATION/TRAINING PROGRAM

## 2022-09-29 PROCEDURE — 86704 HEP B CORE ANTIBODY TOTAL: CPT | Performed by: INTERNAL MEDICINE

## 2022-09-29 PROCEDURE — 76937 US GUIDE VASCULAR ACCESS: CPT

## 2022-09-29 PROCEDURE — 86015 ACTIN ANTIBODY EACH: CPT | Performed by: PHYSICIAN ASSISTANT

## 2022-09-29 PROCEDURE — 85732 THROMBOPLASTIN TIME PARTIAL: CPT

## 2022-09-29 PROCEDURE — 86038 ANTINUCLEAR ANTIBODIES: CPT | Performed by: PHYSICIAN ASSISTANT

## 2022-09-29 PROCEDURE — 85025 COMPLETE CBC W/AUTO DIFF WBC: CPT | Performed by: STUDENT IN AN ORGANIZED HEALTH CARE EDUCATION/TRAINING PROGRAM

## 2022-09-29 PROCEDURE — 85670 THROMBIN TIME PLASMA: CPT

## 2022-09-29 PROCEDURE — 83735 ASSAY OF MAGNESIUM: CPT

## 2022-09-29 PROCEDURE — 83735 ASSAY OF MAGNESIUM: CPT | Performed by: STUDENT IN AN ORGANIZED HEALTH CARE EDUCATION/TRAINING PROGRAM

## 2022-09-29 PROCEDURE — 86381 MITOCHONDRIAL ANTIBODY EACH: CPT | Performed by: PHYSICIAN ASSISTANT

## 2022-09-29 PROCEDURE — 93306 TTE W/DOPPLER COMPLETE: CPT

## 2022-09-29 PROCEDURE — 99232 SBSQ HOSP IP/OBS MODERATE 35: CPT | Performed by: FAMILY MEDICINE

## 2022-09-29 PROCEDURE — 84100 ASSAY OF PHOSPHORUS: CPT | Performed by: STUDENT IN AN ORGANIZED HEALTH CARE EDUCATION/TRAINING PROGRAM

## 2022-09-29 PROCEDURE — 93306 TTE W/DOPPLER COMPLETE: CPT | Performed by: INTERNAL MEDICINE

## 2022-09-29 PROCEDURE — 85610 PROTHROMBIN TIME: CPT

## 2022-09-29 PROCEDURE — 82103 ALPHA-1-ANTITRYPSIN TOTAL: CPT | Performed by: PHYSICIAN ASSISTANT

## 2022-09-29 PROCEDURE — 80074 ACUTE HEPATITIS PANEL: CPT | Performed by: INTERNAL MEDICINE

## 2022-09-29 PROCEDURE — 85384 FIBRINOGEN ACTIVITY: CPT

## 2022-09-29 PROCEDURE — 85049 AUTOMATED PLATELET COUNT: CPT

## 2022-09-29 PROCEDURE — 82728 ASSAY OF FERRITIN: CPT | Performed by: STUDENT IN AN ORGANIZED HEALTH CARE EDUCATION/TRAINING PROGRAM

## 2022-09-29 PROCEDURE — 86803 HEPATITIS C AB TEST: CPT | Performed by: INTERNAL MEDICINE

## 2022-09-29 PROCEDURE — 85610 PROTHROMBIN TIME: CPT | Performed by: STUDENT IN AN ORGANIZED HEALTH CARE EDUCATION/TRAINING PROGRAM

## 2022-09-29 PROCEDURE — 82104 ALPHA-1-ANTITRYPSIN PHENO: CPT | Performed by: PHYSICIAN ASSISTANT

## 2022-09-29 PROCEDURE — 87340 HEPATITIS B SURFACE AG IA: CPT | Performed by: INTERNAL MEDICINE

## 2022-09-29 PROCEDURE — 83550 IRON BINDING TEST: CPT | Performed by: STUDENT IN AN ORGANIZED HEALTH CARE EDUCATION/TRAINING PROGRAM

## 2022-09-29 PROCEDURE — 85730 THROMBOPLASTIN TIME PARTIAL: CPT

## 2022-09-29 PROCEDURE — C1751 CATH, INF, PER/CENT/MIDLINE: HCPCS

## 2022-09-29 PROCEDURE — 83540 ASSAY OF IRON: CPT | Performed by: STUDENT IN AN ORGANIZED HEALTH CARE EDUCATION/TRAINING PROGRAM

## 2022-09-29 RX ORDER — FUROSEMIDE 10 MG/ML
20 INJECTION INTRAMUSCULAR; INTRAVENOUS DAILY
Status: DISCONTINUED | OUTPATIENT
Start: 2022-09-29 | End: 2022-09-30

## 2022-09-29 RX ORDER — LACTULOSE 20 G/30ML
20 SOLUTION ORAL 2 TIMES DAILY
Status: DISCONTINUED | OUTPATIENT
Start: 2022-09-29 | End: 2022-10-04

## 2022-09-29 RX ORDER — PREDNISOLONE SODIUM PHOSPHATE 15 MG/5ML
40 SOLUTION ORAL DAILY
Status: DISCONTINUED | OUTPATIENT
Start: 2022-09-29 | End: 2022-10-06 | Stop reason: HOSPADM

## 2022-09-29 RX ORDER — POLYETHYLENE GLYCOL 3350 17 G/17G
17 POWDER, FOR SOLUTION ORAL DAILY
Status: DISCONTINUED | OUTPATIENT
Start: 2022-09-29 | End: 2022-09-29

## 2022-09-29 RX ORDER — SODIUM PHOSPHATE, DIBASIC AND SODIUM PHOSPHATE, MONOBASIC 7; 19 G/133ML; G/133ML
1 ENEMA RECTAL ONCE
Status: COMPLETED | OUTPATIENT
Start: 2022-09-29 | End: 2022-09-29

## 2022-09-29 RX ADMIN — LORAZEPAM 1 MG: 1 TABLET ORAL at 21:58

## 2022-09-29 RX ADMIN — SODIUM CHLORIDE 125 ML/HR: 0.9 INJECTION, SOLUTION INTRAVENOUS at 02:29

## 2022-09-29 RX ADMIN — LORAZEPAM 1 MG: 1 TABLET ORAL at 06:13

## 2022-09-29 RX ADMIN — ENOXAPARIN SODIUM 40 MG: 100 INJECTION SUBCUTANEOUS at 08:10

## 2022-09-29 RX ADMIN — TRAZODONE HYDROCHLORIDE 50 MG: 50 TABLET ORAL at 21:59

## 2022-09-29 RX ADMIN — ENOXAPARIN SODIUM 40 MG: 100 INJECTION SUBCUTANEOUS at 17:01

## 2022-09-29 RX ADMIN — LACTULOSE 20 G: 10 SOLUTION ORAL at 09:23

## 2022-09-29 RX ADMIN — SODIUM CHLORIDE 125 ML/HR: 0.9 INJECTION, SOLUTION INTRAVENOUS at 14:29

## 2022-09-29 RX ADMIN — SALINE LAXATIVE 1 ENEMA: 7; 19 ENEMA RECTAL at 10:48

## 2022-09-29 RX ADMIN — DOCUSATE SODIUM 100 MG: 100 CAPSULE, LIQUID FILLED ORAL at 08:10

## 2022-09-29 RX ADMIN — KETOROLAC TROMETHAMINE 30 MG: 30 INJECTION, SOLUTION INTRAMUSCULAR; INTRAVENOUS at 21:59

## 2022-09-29 RX ADMIN — MULTIPLE VITAMINS W/ MINERALS TAB 1 TABLET: TAB ORAL at 08:10

## 2022-09-29 RX ADMIN — FOLIC ACID 1 MG: 1 TABLET ORAL at 08:10

## 2022-09-29 RX ADMIN — PANTOPRAZOLE SODIUM 40 MG: 40 TABLET, DELAYED RELEASE ORAL at 06:13

## 2022-09-29 RX ADMIN — THIAMINE HCL TAB 100 MG 100 MG: 100 TAB at 08:10

## 2022-09-29 RX ADMIN — LACTULOSE 20 G: 10 SOLUTION ORAL at 17:01

## 2022-09-29 RX ADMIN — LORAZEPAM 1 MG: 1 TABLET ORAL at 13:22

## 2022-09-29 RX ADMIN — PREDNISOLONE SODIUM PHOSPHATE 40 MG: 15 SOLUTION ORAL at 10:47

## 2022-09-29 NOTE — ASSESSMENT & PLAN NOTE
No nausea, vomiting, and constipation  Hx: PE, DVT, gastric bypass, smoker, alcohol abuse  Labs: Transminitis, anemia, hyperbilirubinemia  CT Abd/Pelvis (9/28/22): hepatomegaly; severe steatosis, gallstones with no pericholecystic inflammation  X-ray chest PA (9/28/22): No acute cardiopulmonary disease  GI on board; feedback appreciated  Steroids initiated on 9/29/22     - Continue Lactulose 20 g BID    - Continue Prednisolone 40 mg Daily as per GI (Day 4/28) with no plan for taper  - If bleeding, discontinue Prednisolone, start PPI IV and get upper endoscopy  - Continue Pantoprazole 40 mg BID   - AM CMP and PT-INR    - Discharge to acute rehab when medically stable

## 2022-09-29 NOTE — PROGRESS NOTES
Patient Name: Elvin Sanders  Patient MRN: [id number]  Date: 09/29/22  Service: [facility]    Subjective   Labs, notes reviewed  Patient slipped/fell in bathroom yesterday evening  Did not hit head, no LOC  Vitals stable  Denies abdominal pain, nausea, vomiting  No BMs since admission  She denies confusion but hasn't been sleeping well  Sleeping pill helped last night  Vitals  Blood pressure 106/66, pulse 91, temperature 97 7 °F (36 5 °C), temperature source Temporal, resp  rate 18, height 5' 7" (1 702 m), weight 127 kg (279 lb 12 2 oz), SpO2 97 %, not currently breastfeeding  Physical Exam:     General Appearance:    Awake, alert, oriented x3, no distress, well developed, well    nourished   Head:    Normocephalic without obvious abnormality   Eyes:    PERRL, EOM's intact +scleral icterus   Neck:   Supple, no adenopathy   Throat:   Mucous membranes moist   Lungs:     Clear to auscultation bilaterally, no wheezing or rhonchi   Heart:    Regular rate and rhythm, S1 and S2 normal, no murmur   Abdomen:     Soft, obese, non-tender, non-distended  bowel sounds active  No masses, rebound or guarding  Extremities:   Extremities with edema   Psych  Derm:   Normal affect    +jaundice   Neurologic:   CNII-XII grossly intact   Speech intact +tremor, no liver flap         Laboratory Studies  Results from last 7 days   Lab Units 09/28/22  0357 09/28/22  0317   WBC Thousand/uL  --  12 08*   HEMOGLOBIN g/dL  --  8 0*   HEMATOCRIT %  --  23 4*   PLATELETS Thousands/uL  --  257   INR  1 61*  --      Results from last 7 days   Lab Units 09/28/22  0357   SODIUM mmol/L 133*   POTASSIUM mmol/L 3 2*   CHLORIDE mmol/L 95*   CO2 mmol/L 29   BUN mg/dL 6   CREATININE mg/dL 0 76   CALCIUM mg/dL 7 9*   ALBUMIN g/dL 2 7*   TOTAL BILIRUBIN mg/dL 13 68*   ALK PHOS U/L 193*   ALT U/L 75*   AST U/L 191*         Imaging and Other Studies      Inhouse Medications     Current Facility-Administered Medications:     docusate sodium (COLACE) capsule 100 mg, 100 mg, Oral, BID, 100 mg at 09/28/22 1732    enoxaparin (LOVENOX) subcutaneous injection 40 mg, 40 mg, Subcutaneous, BID, 40 mg at 24/24/93 8614    folic acid (FOLVITE) tablet 1 mg, 1 mg, Oral, Daily, 1 mg at 09/28/22 1032    ketorolac (TORADOL) injection 30 mg, 30 mg, Intravenous, Q8H PRN    [COMPLETED] LORazepam (ATIVAN) tablet 1 mg, 1 mg, Oral, Q6H, 1 mg at 09/29/22 0613 **FOLLOWED BY** LORazepam (ATIVAN) tablet 1 mg, 1 mg, Oral, Q8H **FOLLOWED BY** [START ON 9/30/2022] LORazepam (ATIVAN) tablet 1 mg, 1 mg, Oral, Q12H **FOLLOWED BY** [START ON 10/2/2022] LORazepam (ATIVAN) tablet 1 mg, 1 mg, Oral, Q24H    multivitamin-minerals (CENTRUM) tablet 1 tablet, 1 tablet, Oral, Daily, 1 tablet at 09/28/22 1032    pantoprazole (PROTONIX) EC tablet 40 mg, 40 mg, Oral, Early Morning, 40 mg at 09/29/22 7866    sodium chloride 0 9 % infusion, 125 mL/hr, Intravenous, Continuous, 125 mL/hr at 09/29/22 0229    thiamine tablet 100 mg, 100 mg, Oral, Daily, 100 mg at 09/28/22 1031    traZODone (DESYREL) tablet 50 mg, 50 mg, Oral, HS, 50 mg at 09/28/22 0128      Assessment/Plan:    1  Elevated LFTs trending down, suspect alcohol-related liver disease c/w alcoholic hepatitis  Steroids indicated based on Madey DF 40  Add prednisolone 40mg/day x 4 weeks with taper  CT evidence of severe steatosis, small ascites  Liver serologies to r/o autoimmune, hereditary causes of liver disease  Strict alcohol abstinence/rehab  Echo pending  2  AUD on CIWA protocol  Plan to transfer to Detox unit once medically stable  Mgmt per primary service  3  Constipation - continue bowel regimen  Changed colace to lactulose BID, titrate to produce 2-3 soft BMs daily  4  Macrocytic anemia in the setting of AUD, gastric bypass history and tobacco use  Iron panel pending  Prophylactic PPI  No active gi bleeding reported  No plans for endoscopy currently   Consider checking folate, B12    5  Morbid obesity s/p bariatric surgery 2018 - recommend weight loss, lifestyle modification  Follow up with Bariatrics  6  History of DVT/PE 2012, noncompliant with AC  Mgmt per primary service        Gary Campbell PA-C

## 2022-09-29 NOTE — PROCEDURES
Midline Insertion (Bedside)    Date/Time: 9/29/2022 12:57 PM  Performed by: Gregorio Meneses RN  Authorized by: Jie Ellison MD     Patient location:  Bedside  Other Assisting Provider: No    Consent:     Consent obtained:  Verbal    Consent given by:  Patient  Universal protocol:     Procedure explained and questions answered to patient or proxy's satisfaction: yes      Relevant documents present and verified: yes      Site/side marked: yes      Immediately prior to procedure, a time out was called: yes      Patient identity confirmed:  Verbally with patient, arm band and hospital-assigned identification number  Pre-procedure details:     Hand hygiene: Hand hygiene performed prior to insertion      Sterile barrier technique: All elements of maximal sterile technique followed      Skin preparation:  ChloraPrep    Skin preparation agent: Skin preparation agent completely dried prior to procedure    Indications:     Midline indications: other (comment)      Midline indications comment:  Pt with IV access but difficulty noted with blood draws  Anesthesia (see MAR for exact dosages): Anesthesia method:  Local infiltration    Local anesthetic:  Lidocaine 1% w/o epi  Procedure details:     Location:  Right basilic    Catheter size:  4 Fr    Ultrasound guidance: yes      Ultrasound image availability:  Not saved    Sterile ultrasound techniques: Sterile gel and sterile probe covers were used      Number of attempts:  1    Successful placement: yes      Catheter length (cm):  20    Exposed catheter length (cm):  0  Post-procedure details:     Post-procedure:  Securement device placed and dressing applied    Assessment:  Blood return through all ports and free fluid flow    Post-procedure complications: none      Patient tolerance of procedure:   Tolerated well, no immediate complications

## 2022-09-29 NOTE — PLAN OF CARE
Problem: Potential for Falls  Goal: Patient will remain free of falls  Description: INTERVENTIONS:  - Educate patient/family on patient safety including physical limitations  - Instruct patient to call for assistance with activity   - Consult OT/PT to assist with strengthening/mobility   - Keep Call bell within reach  - Keep bed low and locked with side rails adjusted as appropriate  - Keep care items and personal belongings within reach  - Initiate and maintain comfort rounds  - Make Fall Risk Sign visible to staff  - Apply yellow socks and bracelet for high fall risk patients  - Consider moving patient to room near nurses station  9/29/2022 0241 by Radha Couch  Outcome: Progressing  9/29/2022 0241 by Radha Couch  Outcome: Progressing     Problem: Nutrition/Hydration-ADULT  Goal: Nutrient/Hydration intake appropriate for improving, restoring or maintaining nutritional needs  Description: Monitor and assess patient's nutrition/hydration status for malnutrition  Collaborate with interdisciplinary team and initiate plan and interventions as ordered  Monitor patient's weight and dietary intake as ordered or per policy  Utilize nutrition screening tool and intervene as necessary  Determine patient's food preferences and provide high-protein, high-caloric foods as appropriate       INTERVENTIONS:  - Monitor oral intake, urinary output, labs, and treatment plans  - Assess nutrition and hydration status and recommend course of action  - Evaluate amount of meals eaten  - Assist patient with eating if necessary   - Allow adequate time for meals  - Recommend/ encourage appropriate diets, oral nutritional supplements, and vitamin/mineral supplements   Assess need for intravenous fluids  - Provide specific nutrition/hydration education as appropriate  - Include patient/family/caregiver in decisions related to nutrition  9/29/2022 0241 by Radha Couch  Outcome: Progressing  9/29/2022 0241 by Radha Couch  Outcome: Progressing     Problem: SAFETY ADULT  Goal: Patient will remain free of falls  Description: INTERVENTIONS:  - Educate patient/family on patient safety including physical limitations  - Instruct patient to call for assistance with activity   - Consult OT/PT to assist with strengthening/mobility   - Keep Call bell within reach  - Keep bed low and locked with side rails adjusted as appropriate  - Keep care items and personal belongings within reach  - Initiate and maintain comfort rounds  - Make Fall Risk Sign visible to staff  - Apply yellow socks and bracelet for high fall risk patients  - Consider moving patient to room near nurses station  9/29/2022 0241 by Jackie Sine  Outcome: Progressing  9/29/2022 0241 by Jackie Sine  Outcome: Progressing  Goal: Maintain or return to baseline ADL function  Description: INTERVENTIONS:  -  Assess patient's ability to carry out ADLs; assess patient's baseline for ADL function and identify physical deficits which impact ability to perform ADLs (bathing, care of mouth/teeth, toileting, grooming, dressing, etc )  - Assess/evaluate cause of self-care deficits   - Assess range of motion  - Assess patient's mobility; develop plan if impaired  - Assess patient's need for assistive devices and provide as appropriate  - Encourage maximum independence but intervene and supervise when necessary  - Involve family in performance of ADLs  - Assess for home care needs following discharge   - Consider OT consult to assist with ADL evaluation and planning for discharge  - Provide patient education as appropriate  Outcome: Progressing  Goal: Maintains/Returns to pre admission functional level  Description: INTERVENTIONS:  - Perform BMAT or MOVE assessment daily    - Set and communicate daily mobility goal to care team and patient/family/caregiver     - Collaborate with rehabilitation services on mobility goals if consulted  - Out of bed for toileting  - Record patient progress and toleration of activity level   Outcome: Progressing     Problem: Knowledge Deficit  Goal: Patient/family/caregiver demonstrates understanding of disease process, treatment plan, medications, and discharge instructions  Description: Complete learning assessment and assess knowledge base    Interventions:  - Provide teaching at level of understanding  - Provide teaching via preferred learning methods  Outcome: Progressing     Problem: GASTROINTESTINAL - ADULT  Goal: Minimal or absence of nausea and/or vomiting  Description: INTERVENTIONS:  - Administer IV fluids if ordered to ensure adequate hydration  - Maintain NPO status until nausea and vomiting are resolved  - Nasogastric tube if ordered  - Administer ordered antiemetic medications as needed  - Provide nonpharmacologic comfort measures as appropriate  - Advance diet as tolerated, if ordered  - Consider nutrition services referral to assist patient with adequate nutrition and appropriate food choices  Outcome: Progressing  Goal: Maintains or returns to baseline bowel function  Description: INTERVENTIONS:  - Assess bowel function  - Encourage oral fluids to ensure adequate hydration  - Administer IV fluids if ordered to ensure adequate hydration  - Administer ordered medications as needed  - Encourage mobilization and activity  - Consider nutritional services referral to assist patient with adequate nutrition and appropriate food choices  Outcome: Progressing  Goal: Maintains adequate nutritional intake  Description: INTERVENTIONS:  - Monitor percentage of each meal consumed  - Identify factors contributing to decreased intake, treat as appropriate  - Assist with meals as needed  - Monitor I&O, weight, and lab values if indicated  - Obtain nutrition services referral as needed  Outcome: Progressing  Goal: Establish and maintain optimal ostomy function  Description: INTERVENTIONS:  - Assess bowel function  - Encourage oral fluids to ensure adequate hydration  - Administer IV fluids if ordered to ensure adequate hydration   - Administer ordered medications as needed  - Encourage mobilization and activity  - Nutrition services referral to assist patient with appropriate food choices  - Assess stoma site  - Consider wound care consult   Outcome: Progressing  Goal: Oral mucous membranes remain intact  Description: INTERVENTIONS  - Assess oral mucosa and hygiene practices  - Implement preventative oral hygiene regimen  - Implement oral medicated treatments as ordered  - Initiate Nutrition services referral as needed  Outcome: Progressing

## 2022-09-29 NOTE — PLAN OF CARE
Problem: Nutrition/Hydration-ADULT  Goal: Nutrient/Hydration intake appropriate for improving, restoring or maintaining nutritional needs  Description: Monitor and assess patient's nutrition/hydration status for malnutrition  Collaborate with interdisciplinary team and initiate plan and interventions as ordered  Monitor patient's weight and dietary intake as ordered or per policy  Utilize nutrition screening tool and intervene as necessary  Determine patient's food preferences and provide high-protein, high-caloric foods as appropriate       INTERVENTIONS:  - Monitor oral intake, urinary output, labs, and treatment plans  - Assess nutrition and hydration status and recommend course of action  - Evaluate amount of meals eaten  - Assist patient with eating if necessary   - Allow adequate time for meals  - Recommend/ encourage appropriate diets, oral nutritional supplements, and vitamin/mineral supplements   Assess need for intravenous fluids  - Provide specific nutrition/hydration education as appropriate  - Include patient/family/caregiver in decisions related to nutrition  Outcome: Progressing     Problem: SAFETY ADULT  Goal: Patient will remain free of falls  Description: INTERVENTIONS:  - Educate patient/family on patient safety including physical limitations  - Instruct patient to call for assistance with activity   - Consult OT/PT to assist with strengthening/mobility   - Keep Call bell within reach  - Keep bed low and locked with side rails adjusted as appropriate  - Keep care items and personal belongings within reach  - Initiate and maintain comfort rounds  - Make Fall Risk Sign visible to staff  - Apply yellow socks and bracelet for high fall risk patients  - Consider moving patient to room near nurses station  Outcome: Progressing     Problem: MOBILITY - ADULT  Goal: Maintain or return to baseline ADL function  Description: INTERVENTIONS:  -  Assess patient's ability to carry out ADLs; assess patient's baseline for ADL function and identify physical deficits which impact ability to perform ADLs (bathing, care of mouth/teeth, toileting, grooming, dressing, etc )  - Assess/evaluate cause of self-care deficits   - Assess range of motion  - Assess patient's mobility; develop plan if impaired  - Assess patient's need for assistive devices and provide as appropriate  - Encourage maximum independence but intervene and supervise when necessary  - Involve family in performance of ADLs  - Assess for home care needs following discharge   - Consider OT consult to assist with ADL evaluation and planning for discharge  - Provide patient education as appropriate  Outcome: Progressing

## 2022-09-29 NOTE — UTILIZATION REVIEW
Initial Clinical Review    Admission: Date/Time/Statement:   Admission Orders (From admission, onward)     Ordered        09/28/22 0845  Inpatient Admission  Once                      Orders Placed This Encounter   Procedures    Inpatient Admission     Standing Status:   Standing     Number of Occurrences:   1     Order Specific Question:   Level of Care     Answer:   Med Surg [16]     Order Specific Question:   Estimated length of stay     Answer:   More than 2 Midnights     Order Specific Question:   Certification     Answer:   I certify that inpatient services are medically necessary for this patient for a duration of greater than two midnights  See H&P and MD Progress Notes for additional information about the patient's course of treatment  ED Arrival Information     Expected   9/28/2022 01:25    Arrival   9/28/2022 01:25    Acuity   Urgent            Means of arrival   Walk-In    Escorted by   Self    Service   Family Medicine    Admission type   Emergency            Arrival complaint   abdominal pain           Chief Complaint   Patient presents with    Abdominal Pain     Constipation x2 weeks along with rash from hemorrhoid cream  Drinks 5 cups of vodka/day  Took OTC laxatives with no relief  Initial Presentation: 55 y o  female who presented self from home to [facility]  Inpatient admission for evaluation and treatment of biliary cirrhosis  PMHx: AUD, DVT, PE, gastric bypass  Presented w/ constipation, hemorrhoids w/ rash, epigastric abdominal pain  On exam, scleral icterus, rhonchi, b/l LE 4+ edema, b/l feet w/ callus and dry skin  Plan: NPO, CIWA monitoring, Lovenox, folic acid and thiamine supplements, bowel regimen, Trend labs, replete electrolytes as needed  GI consulted  GI Consult: Pt w/ upper abdominal pain and jaundice  Reports having stopped a lot of meds, noticed her eyes yellowing for a day and urine was dark  Reports ongoing 3-5 vodka drinks daily   On exam, abdominal tenderness  Plan: hepatitis serologies, trend LFTs, PPI  Encouraged to stop consuming alcohol  Date: 09/29/22`   Day 2: Denies confusion but hasn't been sleeping well  On exam, jaundiced, tremors, b/l LE edema  LFTs trending down today  Maddrey score 40, steroids indicated  Plan: start prednisolone w/ taper, liver serologies to r/o autoimmune/hereditary causes, CIWA monitoring, bowel regimen change colace to lactulose BID titrated for 2-3 BM daily, PPI  Trend labs, replete electrolytes as needed  Regular Diet       ED Triage Vitals   Temperature Pulse Respirations Blood Pressure SpO2   09/28/22 0312 09/28/22 0312 09/28/22 0312 09/28/22 0312 09/28/22 0312   98 2 °F (36 8 °C) (!) 110 18 135/65 100 %      Temp Source Heart Rate Source Patient Position - Orthostatic VS BP Location FiO2 (%)   09/28/22 0312 09/28/22 0312 09/28/22 0312 09/28/22 0312 --   Oral Monitor Sitting Left arm       Pain Score       09/28/22 0358       10 - Worst Possible Pain          Wt Readings from Last 1 Encounters:   09/29/22 127 kg (279 lb 12 2 oz)     Additional Vital Signs:   Date/Time Temp Pulse Resp BP MAP (mmHg) SpO2 O2 Device   09/29/22 0732 97 9 °F (36 6 °C) 88 20 98/67 -- 96 % None (Room air)   09/29/22 0600 -- 91 -- 106/66 -- -- --   09/28/22 2325 97 7 °F (36 5 °C) 86 18 102/65 -- 97 % None (Room air)   09/28/22 2003 97 2 °F (36 2 °C) Abnormal  89 18 130/78 95 98 % None (Room air)   09/28/22 1709 97 8 °F (36 6 °C) 89 18 100/62 74 99 % None (Room air)   09/28/22 1539 97 8 °F (36 6 °C) 88 18 102/59 70 100 % None (Room air)   09/28/22 1300 97 °F (36 1 °C) Abnormal  92 18 112/74 -- 97 % None (Room air)   09/28/22 1045 -- 95 16 -- -- 100 % None (Room air)   09/28/22 0715 -- 92 16 -- -- 100 % None (Room air)   09/28/22 0520 -- 94 18 111/50 -- 99 % None (Room air)   09/28/22 0403 98 4 °F (36 9 °C) 97 18 112/60 -- 99 % None (Room air)       Pertinent Labs/Diagnostic Test Results:   CT abdomen pelvis w contrast   Final Result by Megha Gunter MD (09/28 1092)      No evidence of acute pathology  Small ascites  Hepatomegaly with severe steatosis  Workstation performed: [id number]         XR chest pa & lateral   ED Interpretation by Isiah Treviño PA-C (09/28 0530)   No obvious fluid overload      Final Result by Kate Rivera MD (09/28 4246)      No acute cardiopulmonary disease        Findings are stable            Workstation performed: [id number]               Results from last 7 days   Lab Units 09/28/22  0317   WBC Thousand/uL 12 08*   HEMOGLOBIN g/dL 8 0*   HEMATOCRIT % 23 4*   PLATELETS Thousands/uL 257   NEUTROS ABS Thousands/µL 8 51*         Results from last 7 days   Lab Units 09/29/22  0530 09/28/22  0357   SODIUM mmol/L 133* 133*   POTASSIUM mmol/L 3 6 3 2*   CHLORIDE mmol/L 101 95*   CO2 mmol/L 26 29   ANION GAP mmol/L 6 9   BUN mg/dL 6 6   CREATININE mg/dL 0 87 0 76   EGFR ml/min/1 73sq m 80 94   CALCIUM mg/dL 7 5* 7 9*   MAGNESIUM mg/dL 1 6  --    PHOSPHORUS mg/dL 3 8  --      Results from last 7 days   Lab Units 09/29/22  0530 09/28/22  0357   AST U/L 138* 191*   ALT U/L 57* 75*   ALK PHOS U/L 149* 193*   TOTAL PROTEIN g/dL 5 9* 7 1   ALBUMIN g/dL 2 1* 2 7*   TOTAL BILIRUBIN mg/dL 10 59* 13 68*         Results from last 7 days   Lab Units 09/29/22  0530 09/28/22  0357   GLUCOSE RANDOM mg/dL 62* 90     Results from last 7 days   Lab Units 09/29/22  0530 09/28/22  0357   PROTIME seconds 22 6* 19 5*   INR  1 95* 1 61*   PTT seconds  --  39*     Results from last 7 days   Lab Units 09/28/22  0357   NT-PRO BNP pg/mL 237     Results from last 7 days   Lab Units 09/28/22  0357   LIPASE u/L 47     Results from last 7 days   Lab Units 09/28/22  0357   ETHANOL LVL mg/dL <10         ED Treatment:   Medication Administration from 09/28/2022 0219 to 09/28/2022 1250       Date/Time Order Dose Route Action     09/28/2022 0358 sodium chloride 0 9 % bolus 1,000 mL 1,000 mL Intravenous New Bag     09/28/2022 0358 morphine injection 4 mg 4 mg Intravenous Given     09/28/2022 0314 ondansetron (ZOFRAN-ODT) dispersible tablet 4 mg 4 mg Oral Given     09/28/2022 0537 iohexol (OMNIPAQUE) 350 MG/ML injection (SINGLE-DOSE) 100 mL 100 mL Intravenous Given     09/28/2022 1033 sodium chloride 0 9 % infusion 125 mL/hr Intravenous New Bag     09/28/2022 1032 docusate sodium (COLACE) capsule 100 mg 100 mg Oral Given     09/28/2022 1032 enoxaparin (LOVENOX) subcutaneous injection 40 mg 40 mg Subcutaneous Given     09/28/2022 1032 LORazepam (ATIVAN) tablet 1 mg 1 mg Oral Given     09/28/2022 1031 thiamine tablet 100 mg 100 mg Oral Given     03/90/9034 9801 folic acid (FOLVITE) tablet 1 mg 1 mg Oral Given     09/28/2022 1032 multivitamin-minerals (CENTRUM) tablet 1 tablet 1 tablet Oral Given        Past Medical History:   Diagnosis Date    [facility] venous thrombosis) ([facility] )     Pulmonary embolism ([facility] )      Present on Admission:   Biliary cirrhosis ([facility] )   Alcohol abuse   Other constipation   Tobacco abuse   Hypokalemia      Admitting Diagnosis: Liver cirrhosis ([place] [address] ) [K74 60]  Liver failure ([place] [address] ) [K72 90]  Financial difficulties [Z59 9]  Age/Sex: 55 y o  female  Admission Orders:  NPO  -- Regular Diet  Fall precautions  CIWA monitoring  DW  I&O      Scheduled Medications:  enoxaparin, 40 mg, Subcutaneous, BID  folic acid, 1 mg, Oral, Daily  lactulose, 20 g, Oral, BID  LORazepam, 1 mg, Oral, Q8H   Followed by  [START ON 9/30/2022] LORazepam, 1 mg, Oral, Q12H   Followed by  [START ON 10/2/2022] LORazepam, 1 mg, Oral, Q24H  multivitamin-minerals, 1 tablet, Oral, Daily  pantoprazole, 40 mg, Oral, Early Morning  prednisoLONE, 40 mg, Oral, Daily  thiamine, 100 mg, Oral, Daily  traZODone, 50 mg, Oral, HS    Continuous IV Infusions:  sodium chloride, 125 mL/hr, Intravenous, Continuous    PRN Meds:  ketorolac, 30 mg, Intravenous, Q8H PRN; 9/28 x1  potassium chloride, 40 mEq, Oral; 9/28 x1      IP CONSULT TO GASTROENTEROLOGY  IP CONSULT TO CASE MANAGEMENT    Network Utilization Review Department  ATTENTION: Please call with any questions or concerns to [phone] and carefully listen to the prompts so that you are directed to the right person  All voicemails are confidential   Aye Becerril all requests for admission clinical reviews, approved or denied determinations and any other requests to dedicated fax number below belonging to the campus where the patient is receiving treatment   List of dedicated fax numbers for the Facilities:  [address] DENIALS (Administrative/Medical Necessity) [phone]   [address] (Maternity/NICU/Pediatrics) [phone] [address]  [address] [facility] [place] [address] [id number] [place] [address] [address] [facility] [place] [address] [phone]

## 2022-09-29 NOTE — PROGRESS NOTES
Progress Note    Charity Katz 55 y o  female MRN: [id number]  Unit/Bed#: 7T [facility] 708-01 Encounter: [id number]  Admitting Physician: Ruth Post  PCP: Tanesha Polanco PA-C  Date of Admission:  9/28/2022  2:31 AM    Assessment and Plan    * Severe Alcoholic hepatitis  Assessment & Plan  C/o Nausea, bloating, constipation, and epigastric pain  Hx: PE, DVT, gastric bypass, smoker, alcohol abuse  Labs: Transminitis, anemia, hyperbilirubinemia  Imaging: CT: hepatomegaly; severe steatosis, gallstones with no pericholecystic inflammation  PE: Icteric, abd distention, +4 pitting edema    - Low sodium diet  - Continue CIWA protocol  - Lactulose 20 g bid  - GI consult; appreciate recommendations  - Detox when medically stable  - Once liver issue is better, consider elective egd/colon for anemia and for screening since pt is over 39 yr old  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  Assessment & Plan  C/o Nausea, bloating, constipation, and epigastric pain  Hx: PE, DVT, gastric bypass, smoker, alcohol abuse  Labs: Transminitis, anemia, hyperbilirubinemia  Imaging: CT: hepatomegaly; severe steatosis, gallstones with no pericholecystic inflammation  PE: Icteric, abd distention, +4 pitting edema    - Low sodium diet  - Continue CIWA protocol  - Lactulose 20 g bid  - GI consult; appreciate recommendations  - Detox when medically stable  Once liver issue is better, would suggest elective egd/colon for anemia and for screening since pt is over 39 yr old      Alcohol abuse  Assessment & Plan  C/o Nausea, bloating, constipation, and epigastric pain  Hx: alcohol abuse, drinks 4 cups of vodka daily  Labs: Transminitis, anemia  Imaging: CT: hepatomegaly; severe steatosis, gallstones with no pericholecystic inflammation  PE: Sclera Icteric, abd distention, +4 pitting edema  GI consulted; appreciate recommendations    - Continue CIWA protocol  - Ativan 1mg PO Q6hr PO PRN for withdrawal symptoms  - Folic acid 1 mg/Thaimine 100 mg/MVit PO daily  - Transfer to detox when medically stable  - Continue PPI empirically  - Low sodium diet per GI recommendation        Other constipation  Assessment & Plan  C/o constipation, bloating and epigastric pain  Sclera icterus   Appreciate GI recommendations      - Colace 100 mg BID   - Lactulose for constipation; 2-3 bm daily       Hypokalemia-resolved as of 9/29/2022  Assessment & Plan  K= 3 6  resolved  Tobacco abuse  Assessment & Plan  Smokes 1/2 a PPD  Not decided on quitting    - Nicotine replacement patch pending pregnancy test  - Smoking cessation counseling      Financial difficulties  Assessment & Plan  Endorses she stopped taking all her medications because of she was unable to afford them      -CM referral in place  Hyponatremia  Assessment & Plan  Present on admission  Likely due to alcohol intake in the setting of heart failure  - Continue to monitor  VTE Pharmacologic Prophylaxis: Yes  Pharmacologic: Enoxaparin (Lovenox)  Mechanical VTE Prophylaxis in Place: No    Patient Centered Rounds: I have performed bedside rounds with nursing staff today  Discussions with Specialists or Other Care Team Provider: , patient care manager, gastroenterology, and nursing staff  Education and Discussions with Family / Patient: Yes  Patient Information Sharing: With the consent of Marcio Guzman , their loved ones (Rose Marie) were notified today by inpatient team of the patients condition and current plan  All questions answered  Time Spent for Care: 30 minutes  More than 50% of total time spent on counseling and coordination of care as described above  Current Length of Stay: 1 day(s)    Current Patient Status: Inpatient   Certification Statement: The patient will continue to require additional inpatient hospital stay due to Severe Alcoholic Hepatitis    Discharge Plan: Discharged to detox for alcohol abuse      Code Status: Level 1 - Full Code      Subjective:   Patient was seen today at bedside during rounds with the attending physician and resident  She was resting in bed but appeared uncomfortable stating she believes she is starting to withdraw from the alcohol  Discussed the use of the CIWA score and patient understands the plan should she need intervention  Morning labs were a challenge and IR was consulted for midline placement  She is stable at this time and denies nausea, vomiting, chest pain or tightness  She however endorses abdominal pain although she states it is less intense and well controlled with medication  Will continue to monitor  Objective:     Vitals:   Temp (24hrs), Av 5 °F (36 4 °C), Min:97 2 °F (36 2 °C), Max:97 9 °F (36 6 °C)    Temp:  [97 2 °F (36 2 °C)-97 9 °F (36 6 °C)] 97 2 °F (36 2 °C)  HR:  [86-94] 92  Resp:  [18-20] 20  BP: ()/(56-78) 103/75  SpO2:  [95 %-98 %] 98 %  Body mass index is 43 7 kg/m²  Input and Output Summary (last 24 hours): Intake/Output Summary (Last 24 hours) at 2022 1749  Last data filed at 2022 1429  Gross per 24 hour   Intake 3611 67 ml   Output --   Net 3611 67 ml       Physical Exam:     Physical Exam  Constitutional:       General: She is not in acute distress  Appearance: She is obese  She is not ill-appearing, toxic-appearing or diaphoretic  HENT:      Head: Normocephalic  Mouth/Throat:      Mouth: Mucous membranes are dry  Eyes:      General: Scleral icterus present  Extraocular Movements: Extraocular movements intact  Cardiovascular:      Rate and Rhythm: Normal rate and regular rhythm  Pulses: Normal pulses  Heart sounds: Normal heart sounds  No murmur heard  No friction rub  No gallop  Pulmonary:      Effort: Pulmonary effort is normal       Breath sounds: Normal breath sounds  Abdominal:      General: Abdomen is protuberant  Bowel sounds are decreased  There is distension  Tenderness:  There is abdominal tenderness in the right upper quadrant, epigastric area and left upper quadrant  There is guarding and rebound  Musculoskeletal:      Right lower leg: Edema present  Left lower leg: Edema present  Comments: +4 edema in bilateral lower extremities up to the knees     Skin:     General: Skin is warm  Neurological:      General: No focal deficit present  Mental Status: She is alert  Psychiatric:         Behavior: Behavior normal          Thought Content: Thought content normal          Judgment: Judgment normal          Additional Data:     Labs:    Results from last 7 days   Lab Units 09/29/22  1341   WBC Thousand/uL 7 77   HEMOGLOBIN g/dL 7 9*   HEMATOCRIT % 22 7*   PLATELETS Thousands/uL 282  264   NEUTROS PCT % 85*   LYMPHS PCT % 10*   MONOS PCT % 5   EOS PCT % 0     Results from last 7 days   Lab Units 09/29/22  0530   POTASSIUM mmol/L 3 6   CHLORIDE mmol/L 101   CO2 mmol/L 26   BUN mg/dL 6   CREATININE mg/dL 0 87   CALCIUM mg/dL 7 5*   ALK PHOS U/L 149*   ALT U/L 57*   AST U/L 138*     Results from last 7 days   Lab Units 09/29/22  1341   INR  1 78*     Results from last 7 days   Lab Units 09/29/22  1112   POC GLUCOSE mg/dl 65           * I Have Reviewed All Lab Data Listed Above  * Additional Pertinent Lab Tests Reviewed:  All Labs Within Last 24 Hours Reviewed    Imaging:    Imaging Reports Reviewed Today Include: CT abd/pelvis w/contrast; XR chest PA & lateral  Imaging Personally Reviewed by Myself Includes:  CT abd/pelvis w/contrast; XR chest PA & lateral    Recent Cultures (last 7 days):       Last 24 Hours Medication List:   Current Facility-Administered Medications   Medication Dose Route Frequency Provider Last Rate    enoxaparin  40 mg Subcutaneous BID Yovana Trejo MD      folic acid  1 mg Oral Daily Alfonzouwatofamilia Balbuena MD      furosemide  20 mg Intravenous Daily Yovana Trejo MD      ketorolac  30 mg Intravenous Q8H PRN Julia Becker MD      lactulose  20 g Oral BID Kourtney Rosado PA-C      LORazepam  1 mg Oral Q8H Maribell Zamudio MD      Followed by   Donna Gallego ON 9/30/2022] LORazepam  1 mg Oral Q12H Patti Garner MD      Followed by   Donna Gallego ON 10/2/2022] LORazepam  1 mg Oral Q24H Patti Garner MD      multivitamin-minerals  1 tablet Oral Daily Patti Garner MD      pantoprazole  40 mg Oral Early Morning Armin Austin IV, MD      prednisoLONE  40 mg Oral Daily Kourtney Rosado PA-C      sodium chloride  125 mL/hr Intravenous Continuous Dary Man  mL/hr (09/29/22 2516)    thiamine  100 mg Oral Daily Maribell Zamudio MD      traZODone  50 mg Oral HS Clare Alanis DO          ** Please Note: Dictation voice to text software may have been used in the creation of this document   **    Patti Tran  09/29/22  5:49 PM

## 2022-09-29 NOTE — ASSESSMENT & PLAN NOTE
Present on admission  Bilateral +3 edema in LE   9/29/22 Echo: Early grade 1 diastolic dysfunction  EF is estimated as around 64%  Trace tricuspid valve regurgitation  No obvious pulmonary hypertension  No pericardial effusion     - Continue to monitor

## 2022-09-30 PROBLEM — D64.89 OTHER SPECIFIED ANEMIAS: Status: ACTIVE | Noted: 2022-09-30

## 2022-09-30 LAB
ABO GROUP BLD: NORMAL
ABO GROUP BLD: NORMAL
ACTIN IGG SERPL-ACNC: 26 UNITS (ref 0–19)
ALBUMIN SERPL BCP-MCNC: 2.2 G/DL (ref 3.5–5)
ALP SERPL-CCNC: 137 U/L (ref 43–122)
ALT SERPL W P-5'-P-CCNC: 52 U/L
AMPHETAMINES SERPL QL SCN: NEGATIVE
ANION GAP SERPL CALCULATED.3IONS-SCNC: 7 MMOL/L (ref 5–14)
APTT 1H NP PPP: 38 SECONDS (ref 24–36)
APTT 1H NP PPP: 40 SECONDS (ref 24–36)
APTT IMM NP PPP: 35.1 SECONDS (ref 24–36)
APTT IMM NP PPP: 38 SECONDS (ref 24–36)
APTT PPP: 46 SECONDS (ref 23–37)
APTT PPP: 55 SECONDS (ref 23–37)
APTT PPP: 55 SECONDS (ref 23–37)
APTT PPP: 61 SECONDS (ref 23–37)
AST SERPL W P-5'-P-CCNC: 122 U/L (ref 14–36)
BACTERIA UR QL AUTO: ABNORMAL /HPF
BARBITURATES UR QL: NEGATIVE
BASOPHILS # BLD AUTO: 0.01 THOUSANDS/ΜL (ref 0–0.1)
BASOPHILS NFR BLD AUTO: 0 % (ref 0–1)
BENZODIAZ UR QL: NEGATIVE
BILIRUB SERPL-MCNC: 10.63 MG/DL (ref 0.2–1)
BILIRUB UR QL STRIP: ABNORMAL
BLD GP AB SCN SERPL QL: NEGATIVE
BUN SERPL-MCNC: 8 MG/DL (ref 5–25)
CALCIUM ALBUM COR SERPL-MCNC: 8.7 MG/DL (ref 8.3–10.1)
CALCIUM SERPL-MCNC: 7.3 MG/DL (ref 8.4–10.2)
CERULOPLASMIN SERPL-MCNC: 20.2 MG/DL (ref 19–39)
CHLORIDE SERPL-SCNC: 102 MMOL/L (ref 96–108)
CLARITY UR: ABNORMAL
CO2 SERPL-SCNC: 26 MMOL/L (ref 21–32)
COCAINE UR QL: NEGATIVE
COLOR UR: ABNORMAL
CREAT SERPL-MCNC: 1.14 MG/DL (ref 0.6–1.2)
EOSINOPHIL # BLD AUTO: 0 THOUSAND/ΜL (ref 0–0.61)
EOSINOPHIL NFR BLD AUTO: 0 % (ref 0–6)
ERYTHROCYTE [DISTWIDTH] IN BLOOD BY AUTOMATED COUNT: 26.9 % (ref 11.6–15.1)
FIBRINOGEN PPP-MCNC: 157 MG/DL (ref 227–495)
FOLATE SERPL-MCNC: 4.8 NG/ML (ref 3.1–17.5)
GFR SERPL CREATININE-BSD FRML MDRD: 57 ML/MIN/1.73SQ M
GLUCOSE SERPL-MCNC: 130 MG/DL (ref 65–140)
GLUCOSE SERPL-MCNC: 76 MG/DL (ref 70–99)
GLUCOSE UR STRIP-MCNC: NEGATIVE MG/DL
HCT VFR BLD AUTO: 19.6 % (ref 34.8–46.1)
HCT VFR BLD AUTO: 27.2 % (ref 34.8–46.1)
HGB BLD-MCNC: 6.7 G/DL (ref 11.5–15.4)
HGB BLD-MCNC: 9 G/DL (ref 11.5–15.4)
HGB RETIC QN AUTO: 37.4 PG (ref 30–38.3)
HGB UR QL STRIP.AUTO: 50
IMM GRANULOCYTES # BLD AUTO: 0.06 THOUSAND/UL (ref 0–0.2)
IMM GRANULOCYTES NFR BLD AUTO: 1 % (ref 0–2)
IMM RETICS NFR: 29.2 % (ref 0–14)
INR PPP: 1.66 (ref 0.84–1.19)
INR PPP: 1.89 (ref 0.84–1.19)
KETONES UR STRIP-MCNC: ABNORMAL MG/DL
LEUKOCYTE ESTERASE UR QL STRIP: 25
LYMPHOCYTES # BLD AUTO: 1.42 THOUSANDS/ΜL (ref 0.6–4.47)
LYMPHOCYTES NFR BLD AUTO: 16 % (ref 14–44)
MCH RBC QN AUTO: 36.6 PG (ref 26.8–34.3)
MCHC RBC AUTO-ENTMCNC: 34.2 G/DL (ref 31.4–37.4)
MCV RBC AUTO: 107 FL (ref 82–98)
METHADONE UR QL: NEGATIVE
MITOCHONDRIA M2 IGG SER-ACNC: <20 UNITS (ref 0–20)
MONOCYTES # BLD AUTO: 0.85 THOUSAND/ΜL (ref 0.17–1.22)
MONOCYTES NFR BLD AUTO: 10 % (ref 4–12)
NEUTROPHILS # BLD AUTO: 6.52 THOUSANDS/ΜL (ref 1.85–7.62)
NEUTS SEG NFR BLD AUTO: 73 % (ref 43–75)
NITRITE UR QL STRIP: NEGATIVE
NON-SQ EPI CELLS URNS QL MICRO: ABNORMAL /HPF
NRBC BLD AUTO-RTO: 0 /100 WBCS
OPIATES UR QL SCN: POSITIVE
OTHER STN SPEC: ABNORMAL
OXYCODONE+OXYMORPHONE UR QL SCN: NEGATIVE
PCP UR QL: NEGATIVE
PH UR STRIP.AUTO: 5 [PH]
PLATELET # BLD AUTO: 269 THOUSANDS/UL (ref 149–390)
PLATELET # BLD AUTO: 269 THOUSANDS/UL (ref 149–390)
PMV BLD AUTO: 10.3 FL (ref 8.9–12.7)
PMV BLD AUTO: 10.3 FL (ref 8.9–12.7)
POTASSIUM SERPL-SCNC: 3.8 MMOL/L (ref 3.5–5.3)
PROT SERPL-MCNC: 6 G/DL (ref 6.4–8.4)
PROT UR STRIP-MCNC: ABNORMAL MG/DL
PROTHROMBIN TIME: 20 SECONDS (ref 11.6–14.5)
PROTHROMBIN TIME: 22.1 SECONDS (ref 11.6–14.5)
PROTHROMBIN TIME: 23.2 SECONDS (ref 11.6–14.5)
PT 1H NP PPP: 15.7 SEC (ref 12–14.3)
PT IMM NP PPP: 14.8 SECONDS (ref 12–14.3)
RBC # BLD AUTO: 1.83 MILLION/UL (ref 3.81–5.12)
RBC #/AREA URNS AUTO: ABNORMAL /HPF
RETICS # AUTO: ABNORMAL 10*3/UL (ref 14097–95744)
RETICS # CALC: 3.95 % (ref 0.37–1.87)
RH BLD: POSITIVE
RH BLD: POSITIVE
RYE IGE QN: NEGATIVE
SODIUM SERPL-SCNC: 135 MMOL/L (ref 135–147)
SP GR UR STRIP.AUTO: 1.02 (ref 1–1.04)
SPECIMEN EXPIRATION DATE: NORMAL
THC UR QL: NEGATIVE
THROMBIN TIME MIXING INCUBATED: 22.3 SECONDS (ref 14.7–18.4)
THROMBIN TIME MIXING INCUBATED: 22.3 SECONDS (ref 14.7–18.4)
THROMBIN TIME MIXING ROOM TEMP: 21.9 SECONDS (ref 14.7–18.4)
THROMBIN TIME MIXING ROOM TEMP: 21.9 SECONDS (ref 14.7–18.4)
THROMBIN TIME: 24.8 SECONDS (ref 14.7–18.4)
THROMBIN TIME: 25.1 SECONDS (ref 14.7–18.4)
THROMBIN TIME: 25.7 SECONDS (ref 14.7–18.4)
UROBILINOGEN UA: 8 MG/DL
VIT B12 SERPL-MCNC: 1586 PG/ML (ref 100–900)
WBC # BLD AUTO: 8.86 THOUSAND/UL (ref 4.31–10.16)
WBC #/AREA URNS AUTO: ABNORMAL /HPF

## 2022-09-30 PROCEDURE — 85025 COMPLETE CBC W/AUTO DIFF WBC: CPT | Performed by: PHYSICIAN ASSISTANT

## 2022-09-30 PROCEDURE — 87086 URINE CULTURE/COLONY COUNT: CPT | Performed by: STUDENT IN AN ORGANIZED HEALTH CARE EDUCATION/TRAINING PROGRAM

## 2022-09-30 PROCEDURE — 81003 URINALYSIS AUTO W/O SCOPE: CPT | Performed by: STUDENT IN AN ORGANIZED HEALTH CARE EDUCATION/TRAINING PROGRAM

## 2022-09-30 PROCEDURE — 86901 BLOOD TYPING SEROLOGIC RH(D): CPT | Performed by: STUDENT IN AN ORGANIZED HEALTH CARE EDUCATION/TRAINING PROGRAM

## 2022-09-30 PROCEDURE — 85014 HEMATOCRIT: CPT | Performed by: STUDENT IN AN ORGANIZED HEALTH CARE EDUCATION/TRAINING PROGRAM

## 2022-09-30 PROCEDURE — 86920 COMPATIBILITY TEST SPIN: CPT

## 2022-09-30 PROCEDURE — P9016 RBC LEUKOCYTES REDUCED: HCPCS

## 2022-09-30 PROCEDURE — 85018 HEMOGLOBIN: CPT | Performed by: STUDENT IN AN ORGANIZED HEALTH CARE EDUCATION/TRAINING PROGRAM

## 2022-09-30 PROCEDURE — 85384 FIBRINOGEN ACTIVITY: CPT | Performed by: STUDENT IN AN ORGANIZED HEALTH CARE EDUCATION/TRAINING PROGRAM

## 2022-09-30 PROCEDURE — 81001 URINALYSIS AUTO W/SCOPE: CPT | Performed by: STUDENT IN AN ORGANIZED HEALTH CARE EDUCATION/TRAINING PROGRAM

## 2022-09-30 PROCEDURE — 82607 VITAMIN B-12: CPT | Performed by: PHYSICIAN ASSISTANT

## 2022-09-30 PROCEDURE — 80307 DRUG TEST PRSMV CHEM ANLYZR: CPT | Performed by: STUDENT IN AN ORGANIZED HEALTH CARE EDUCATION/TRAINING PROGRAM

## 2022-09-30 PROCEDURE — 85730 THROMBOPLASTIN TIME PARTIAL: CPT | Performed by: STUDENT IN AN ORGANIZED HEALTH CARE EDUCATION/TRAINING PROGRAM

## 2022-09-30 PROCEDURE — 85670 THROMBIN TIME PLASMA: CPT

## 2022-09-30 PROCEDURE — 82746 ASSAY OF FOLIC ACID SERUM: CPT | Performed by: PHYSICIAN ASSISTANT

## 2022-09-30 PROCEDURE — 97167 OT EVAL HIGH COMPLEX 60 MIN: CPT

## 2022-09-30 PROCEDURE — C9113 INJ PANTOPRAZOLE SODIUM, VIA: HCPCS | Performed by: INTERNAL MEDICINE

## 2022-09-30 PROCEDURE — 85046 RETICYTE/HGB CONCENTRATE: CPT

## 2022-09-30 PROCEDURE — 86850 RBC ANTIBODY SCREEN: CPT | Performed by: STUDENT IN AN ORGANIZED HEALTH CARE EDUCATION/TRAINING PROGRAM

## 2022-09-30 PROCEDURE — 30233N1 TRANSFUSION OF NONAUTOLOGOUS RED BLOOD CELLS INTO PERIPHERAL VEIN, PERCUTANEOUS APPROACH: ICD-10-PCS | Performed by: STUDENT IN AN ORGANIZED HEALTH CARE EDUCATION/TRAINING PROGRAM

## 2022-09-30 PROCEDURE — 99232 SBSQ HOSP IP/OBS MODERATE 35: CPT | Performed by: FAMILY MEDICINE

## 2022-09-30 PROCEDURE — 82948 REAGENT STRIP/BLOOD GLUCOSE: CPT

## 2022-09-30 PROCEDURE — 87077 CULTURE AEROBIC IDENTIFY: CPT | Performed by: STUDENT IN AN ORGANIZED HEALTH CARE EDUCATION/TRAINING PROGRAM

## 2022-09-30 PROCEDURE — 87186 SC STD MICRODIL/AGAR DIL: CPT | Performed by: STUDENT IN AN ORGANIZED HEALTH CARE EDUCATION/TRAINING PROGRAM

## 2022-09-30 PROCEDURE — 85611 PROTHROMBIN TEST: CPT

## 2022-09-30 PROCEDURE — 85610 PROTHROMBIN TIME: CPT

## 2022-09-30 PROCEDURE — 86900 BLOOD TYPING SEROLOGIC ABO: CPT | Performed by: STUDENT IN AN ORGANIZED HEALTH CARE EDUCATION/TRAINING PROGRAM

## 2022-09-30 PROCEDURE — 85384 FIBRINOGEN ACTIVITY: CPT

## 2022-09-30 PROCEDURE — 85610 PROTHROMBIN TIME: CPT | Performed by: STUDENT IN AN ORGANIZED HEALTH CARE EDUCATION/TRAINING PROGRAM

## 2022-09-30 PROCEDURE — 85730 THROMBOPLASTIN TIME PARTIAL: CPT

## 2022-09-30 PROCEDURE — 99232 SBSQ HOSP IP/OBS MODERATE 35: CPT | Performed by: PHYSICIAN ASSISTANT

## 2022-09-30 PROCEDURE — 97163 PT EVAL HIGH COMPLEX 45 MIN: CPT

## 2022-09-30 PROCEDURE — 85732 THROMBOPLASTIN TIME PARTIAL: CPT

## 2022-09-30 PROCEDURE — 80053 COMPREHEN METABOLIC PANEL: CPT | Performed by: PHYSICIAN ASSISTANT

## 2022-09-30 PROCEDURE — 85049 AUTOMATED PLATELET COUNT: CPT

## 2022-09-30 RX ORDER — PANTOPRAZOLE SODIUM 40 MG/10ML
40 INJECTION, POWDER, LYOPHILIZED, FOR SOLUTION INTRAVENOUS EVERY 12 HOURS SCHEDULED
Status: DISCONTINUED | OUTPATIENT
Start: 2022-09-30 | End: 2022-10-04

## 2022-09-30 RX ORDER — MAGNESIUM GLUCONATE 27 MG(500)
500 TABLET ORAL DAILY
Status: DISCONTINUED | OUTPATIENT
Start: 2022-10-01 | End: 2022-09-30

## 2022-09-30 RX ORDER — ACETAMINOPHEN 325 MG/1
325 TABLET ORAL ONCE
Status: COMPLETED | OUTPATIENT
Start: 2022-09-30 | End: 2022-09-30

## 2022-09-30 RX ORDER — DIPHENHYDRAMINE HYDROCHLORIDE 50 MG/ML
25 INJECTION INTRAMUSCULAR; INTRAVENOUS ONCE
Status: COMPLETED | OUTPATIENT
Start: 2022-09-30 | End: 2022-09-30

## 2022-09-30 RX ORDER — UREA 10 %
500 LOTION (ML) TOPICAL ONCE
Status: COMPLETED | OUTPATIENT
Start: 2022-09-30 | End: 2022-09-30

## 2022-09-30 RX ADMIN — TRAZODONE HYDROCHLORIDE 50 MG: 50 TABLET ORAL at 22:06

## 2022-09-30 RX ADMIN — PANTOPRAZOLE SODIUM 40 MG: 40 INJECTION, POWDER, FOR SOLUTION INTRAVENOUS at 22:07

## 2022-09-30 RX ADMIN — THIAMINE HCL TAB 100 MG 100 MG: 100 TAB at 09:08

## 2022-09-30 RX ADMIN — PREDNISOLONE SODIUM PHOSPHATE 40 MG: 15 SOLUTION ORAL at 09:08

## 2022-09-30 RX ADMIN — FOLIC ACID 1 MG: 1 TABLET ORAL at 09:08

## 2022-09-30 RX ADMIN — Medication 500 MG: at 22:06

## 2022-09-30 RX ADMIN — MULTIPLE VITAMINS W/ MINERALS TAB 1 TABLET: TAB ORAL at 09:08

## 2022-09-30 RX ADMIN — ACETAMINOPHEN 325 MG: 325 TABLET ORAL at 12:28

## 2022-09-30 RX ADMIN — SODIUM CHLORIDE 125 ML/HR: 0.9 INJECTION, SOLUTION INTRAVENOUS at 01:16

## 2022-09-30 RX ADMIN — PANTOPRAZOLE SODIUM 40 MG: 40 TABLET, DELAYED RELEASE ORAL at 05:37

## 2022-09-30 RX ADMIN — DIPHENHYDRAMINE HYDROCHLORIDE 25 MG: 50 INJECTION, SOLUTION INTRAMUSCULAR; INTRAVENOUS at 12:28

## 2022-09-30 RX ADMIN — LORAZEPAM 1 MG: 1 TABLET ORAL at 05:37

## 2022-09-30 RX ADMIN — PANTOPRAZOLE SODIUM 40 MG: 40 INJECTION, POWDER, FOR SOLUTION INTRAVENOUS at 12:28

## 2022-09-30 NOTE — PROGRESS NOTES
Patient Name: Stevan Apple  Patient MRN: [id number]  Date: 09/30/22  Service: [facility]    Subjective   Labs, notes reviewed  Midline placed by IR yesterday  Vitals stable  Drop in hemoglobin noted 8-->7 9-->6 7, macrocytic  Iron panel not c/w iron def  No active gi bleeding  No large bruises following fall 2 days ago  Patient/RN report 4 loose nonbloody stools on lactulose yesterday  She does complain of fatigue and SOB  No nausea, vomiting, hematemesis  Patient denies prior endoscopy  Vitals  Blood pressure 92/61, pulse 91, temperature (!) 97 2 °F (36 2 °C), temperature source Temporal, resp  rate 20, height 5' 7" (1 702 m), weight 130 kg (286 lb 9 6 oz), SpO2 94 %, not currently breastfeeding  Physical Exam:     General Appearance:    Awake, alert, oriented x3, no distress, well developed, well    nourished   Head:    Normocephalic without obvious abnormality   Eyes:    PERRL, EOM's intact +icterus   Neck:   Supple, no adenopathy   Throat:   Mucous membranes moist   Lungs:     Clear to auscultation bilaterally, no wheezing or rhonchi   Heart:    Regular rate and rhythm, S1 and S2 normal, no murmur   Abdomen:     Soft, obese, non-tender, non-distended  bowel sounds active  No masses, rebound or guarding  Extremities:   Extremities with edema   Psych  Derm:   Normal affect    +jaundice (difficult to determine 2/2 skin tone)   Neurologic:   CNII-XII grossly intact   Speech intact +tremor         Laboratory Studies  Results from last 7 days   Lab Units 09/30/22  0534 09/29/22  1341 09/29/22  0530 09/28/22  0357 09/28/22  0317   WBC Thousand/uL 8 86 7 77  --   --  12 08*   HEMOGLOBIN g/dL 6 7* 7 9*  --   --  8 0*   HEMATOCRIT % 19 6* 22 7*  --   --  23 4*   PLATELETS Thousands/uL 269  269 282  264  --   --  257   INR  1 89* 1 78* 1 95* 1 61*  --      Results from last 7 days   Lab Units 09/30/22  0534 09/29/22  0530 09/28/22  0357   SODIUM mmol/L 135 133* 133*   POTASSIUM mmol/L 3 8 3 6 3 2*   CHLORIDE mmol/L 102 101 95*   CO2 mmol/L 26 26 29   BUN mg/dL 8 6 6   CREATININE mg/dL 1 14 0 87 0 76   CALCIUM mg/dL 7 3* 7 5* 7 9*   ALBUMIN g/dL 2 2* 2 1* 2 7*   TOTAL BILIRUBIN mg/dL 10 63* 10 59* 13 68*   ALK PHOS U/L 137* 149* 193*   ALT U/L 52* 57* 75*   AST U/L 122* 138* 191*     BIJAN, AMA, ASMA, A1AT pending  Iron 128, 67% sat, TIBC 190, ferritin 100    Imaging and Other Studies      Inhouse Medications     Current Facility-Administered Medications:     folic acid (FOLVITE) tablet 1 mg, 1 mg, Oral, Daily, 1 mg at 09/30/22 0908    furosemide (LASIX) injection 20 mg, 20 mg, Intravenous, Daily    lactulose oral solution 20 g, 20 g, Oral, BID, 20 g at 09/29/22 1701    [COMPLETED] LORazepam (ATIVAN) tablet 1 mg, 1 mg, Oral, Q6H, 1 mg at 09/29/22 0613 **FOLLOWED BY** [COMPLETED] LORazepam (ATIVAN) tablet 1 mg, 1 mg, Oral, Q8H, 1 mg at 09/30/22 0537 **FOLLOWED BY** LORazepam (ATIVAN) tablet 1 mg, 1 mg, Oral, Q12H **FOLLOWED BY** [START ON 10/2/2022] LORazepam (ATIVAN) tablet 1 mg, 1 mg, Oral, Q24H    multivitamin-minerals (CENTRUM) tablet 1 tablet, 1 tablet, Oral, Daily, 1 tablet at 09/30/22 0908    pantoprazole (PROTONIX) EC tablet 40 mg, 40 mg, Oral, Early Morning, 40 mg at 09/30/22 0537    prednisoLONE (ORAPRED) oral solution 40 mg, 40 mg, Oral, Daily, 40 mg at 09/30/22 0908    sodium chloride 0 9 % infusion, 125 mL/hr, Intravenous, Continuous, 125 mL/hr at 09/30/22 0116    thiamine tablet 100 mg, 100 mg, Oral, Daily, 100 mg at 09/30/22 0908    traZODone (DESYREL) tablet 50 mg, 50 mg, Oral, HS, 50 mg at 09/29/22 2159      Assessment/Plan:    1  Elevated liver enzymes trending down, suspect alcohol-related liver disease c/w alcoholic hepatitis  Steroids initiated 9/29 (prednisolone 40mg/day x 4 weeks with taper), check Lille score on Day 7  Liver serologic testing ongoing, elevated iron sat however ferritin normal  Strict alcohol abstinence/rehab  Echo without significant change from 2018   Low sodium diet  2  AUD on CIWA protocol  Plan to transfer to Detox unit once medically stable  Mgmt per primary service  3  Constipation - resolved with bowel regimen  Changed colace to lactulose BID, titrate to produce 2-3 soft BMs daily  D/W RN - hold lactulose  4  Acute on chronic macrocytic anemia in the setting of AUD, gastric bypass history and tobacco use  Iron panel noted  Suspect bone marrow suppression related to alcohol, dilutional component  Prophylactic PPI  No active gi bleeding reported  BUN:Cr normal  No plans for endoscopy currently - will discuss timing with GI attending  Consider checking folate, B12, retic, fobt  D/W primary service - 2u pRBCs ordered  Continue to monitor cbc   5  Morbid obesity s/p bariatric surgery 2018 - recommend weight loss, lifestyle modification  Follow up with Bariatrics  6  History of DVT/PE 2012, noncompliant with AC  Mgmt per primary service  Lovenox on hold 2/2 anemia          Ana Moore PA-C

## 2022-09-30 NOTE — OCCUPATIONAL THERAPY NOTE
Occupational Therapy Evaluation     Patient Name: Naomie BRENNAN Date: 9/30/2022  Problem List  Principal Problem:    Severe Alcoholic hepatitis  Active Problems:    Alcohol abuse    Other constipation    Tobacco abuse    Financial difficulties    Hyponatremia    Other specified anemias    Past Medical History  Past Medical History:   Diagnosis Date    DVT (deep venous thrombosis) ([address] )     Pulmonary embolism (HCC)      Past Surgical History  Past Surgical History:   Procedure Laterality Date    ANKLE SURGERY Left 1995    GASTRIC BYPASS              09/30/22 1017   OT Last Visit   OT Visit Date 09/30/22   Note Type   Note type Evaluation   Restrictions/Precautions   Weight Bearing Precautions Per Order No   Other Precautions Fall Risk;Telemetry;Multiple lines; Bed Alarm;Pain   Pain Assessment   Pain Assessment Tool 0-10   Pain Score 3   Pain Location/Orientation Orientation: Bilateral;Location: Leg   Home Living   Type of Home Apartment  (3rd floor)   Home Layout One level;Stairs to enter with rails; Performs ADLs on one level  ((3) FF to access apartment)   Bathroom Shower/Tub Walk-in shower   [address]   (no prior DME)   Prior Function   Level of [place] Independent with ADLs and functional mobility   Lives With Son   Receives Help From Family   ADL Assistance Independent   IADLs Independent   Falls in the last 6 months 1 to 4   Comments son assists carrying groceries up steps   Psychosocial   Psychosocial (WDL) WDL   Subjective   Subjective "my legs hurt"   ADL   Eating Assistance 7  Independent   Grooming Assistance 5  Supervision/Setup   UB Bathing Assistance 5  Supervision/Setup   LB Bathing Assistance 3  Moderate Assistance   UB Dressing Assistance 5  Supervision/Setup   LB Dressing Assistance 3  Moderate [address]  3  Moderate Assistance   Bed Mobility   Supine to Sit 4  Minimal assistance   Additional items Assist x 1;Bedrails; Increased time required;Verbal cues   Sit to Supine 4  Minimal assistance   Additional items Assist x 1;Bedrails; Increased time required;Verbal cues   Transfers   Sit to Stand 4  Minimal assistance   Additional items Assist x 1; Armrests; Increased time required;Verbal cues   Stand to Sit 4  Minimal assistance   Additional items Assist x 1; Armrests; Increased time required;Verbal cues   Additional Comments performed with RW; orthostatics obtained w/ c/o dizziness -- seated 99/71, standing 87/67   Functional Mobility   Functional Mobility 4  Minimal assistance   Additional Comments ~5 ft with RW, distance limited by fatigue and onset of dizziness   Additional items Rolling walker   Balance   Static Sitting Fair   Dynamic Sitting Fair   Static Standing Fair -   Dynamic Standing Fair -   Ambulatory Fair -   Activity Tolerance   Activity Tolerance Patient limited by fatigue  (awaiting blood transfusion)   Medical Staff Made Aware Spoke to PT   Nurse Made Aware Spoke to RN -- reported BP's   RUE Assessment   RUE Assessment WFL   LUE Assessment   LUE Assessment WFL   Hand Function   Gross Motor Coordination Functional   Fine Motor Coordination Functional   Perception   Inattention/Neglect Appears intact   Cognition   Overall Cognitive Status WFL   Arousal/Participation Alert; Cooperative   Attention Within functional limits   Orientation Level Oriented X4   Memory Within functional limits   Following Commands Follows all commands and directions without difficulty   Assessment   Limitation Decreased ADL status; Decreased endurance;Decreased self-care trans;Decreased high-level ADLs   Prognosis Good   Assessment Pt is a 55 y o  female seen for OT evaluation s/p [facility]33 w/ Alcoholic hepatitis  OT completed an extensive review of pt's medical and social history  Pt has a past medical history of DVT (deep venous thrombosis) (HCC) and Pulmonary embolism ([address] )   As per pt report, pta living with son in 3rd floor apartment with (3) FF to access  Previously Independent with ADLs/IADls and mobility with no AD  Upon evaluation, pt requires Min A with transfers and mobility using RW  Dizziness with positional changes, obtained BP's -- seated 99/71, standing 87/67  Pt currently requires Supervision UB ADLs, Mod A LB ADLs, and Mod A toileting  2* the following deficits impacting occupational performance  Pt presents to OT below baseline due to the following performance deficits: weakness, decreased ROM, decreased strength, decreased balance, decreased tolerance, and increased pain  Pt to benefit from continued skilled OT services while in the hospital to address deficits as defined above and maximize level of functional independence w ADL's and functional mobility  Occupational performance areas to address include: bathing/shower, toilet hygiene, dressing, functional mobility, and clothing management  The patient's raw score on the -PAC Daily Activity inpatient short form is 18, standardized score is 38 66, less than 39 4  Patients at this level are likely to benefit from discharge to post-acute rehabilitation services  Please refer to the recommendation of the Occupational Therapist for safe discharge planning  Based on OT evaluation, assessment(s), performance deficits listed, and current level of function, pt identified as a HIGH  complexity evaluation  From OT standpoint, recommendation at time of d/c would be IP rehab to further progress independence with ADLs, mobility, and safety at home to reduce burden of care on family and reduce risk of falls/readmission  Goals   Patient Goals to walk better   Plan   Treatment Interventions ADL retraining;Functional transfer training;UE strengthening/ROM; Endurance training;Patient/family training;Equipment evaluation/education; Compensatory technique education; Energy conservation; Activityengagement   Goal Expiration Date 10/14/22   OT Treatment Day 0   OT Frequency 2-3x/wk   Recommendation   OT Discharge Recommendation Post acute rehabilitation services   AM-PAC Daily Activity Inpatient   Lower Body Dressing 2   Bathing 2   Toileting 2   Upper Body Dressing 4   Grooming 4   Eating 4   Daily Activity Raw Score 18   Daily Activity Standardized Score (Calc for Raw Score >=11) 38 66       Pt will complete the following goals in 14 days    (1) Pt will complete LB dressing/self care MI using adaptive device and DME as needed  (2) Pt will complete bathroom mobility MI using AD as needed  (3) Pt will complete toileting  MI w/ G hygiene/thoroughness using DME as needed       (4) Pt will improve functional transfers to MI on/off all surfaces using DME as needed w/ F+ balance/safety             Jesus Manuel Biggs MS, OTR/L

## 2022-09-30 NOTE — PLAN OF CARE
Problem: PHYSICAL THERAPY ADULT  Goal: Performs mobility at highest level of function for planned discharge setting  See evaluation for individualized goals  Description: Treatment/Interventions: ADL retraining, Functional transfer training, LE strengthening/ROM, Elevations, Therapeutic exercise, Endurance training, Patient/family training, Equipment eval/education, Bed mobility, Gait training, Spoke to nursing, Spoke to case management, OT  Equipment Recommended: Gisel Brown       See flowsheet documentation for full assessment, interventions and recommendations  Outcome: Progressing  Note: Prognosis: Fair  Problem List: Decreased strength, Decreased endurance, Impaired balance, Decreased mobility, Decreased coordination, Impaired sensation, Decreased safety awareness, Obesity, Decreased skin integrity, Pain     Barriers to Discharge: Inaccessible home environment, Decreased caregiver support     PT Discharge Recommendation: Post acute rehabilitation services    See flowsheet documentation for full assessment

## 2022-09-30 NOTE — PHYSICAL THERAPY NOTE
Physical Therapy Evaluation    Patient's Name: Tommy Bonds    Admitting Diagnosis  Liver cirrhosis ([facility] [address] ) [K74 60]  Liver failure ([facility] [address] ) [K72 90]  Financial difficulties [Z59 9]    Problem List  Patient Active Problem List   Diagnosis    Obesity    Pulmonary embolism ([facility] [address] )    Peripheral neurogenic pain    Comedonal acne    Elevated lactic acid level    Smoking    Elevated troponin I level    Acute cystitis    Personal history of gastric bypass    Swelling of lower extremity    Obstructive sleep apnea    Paresthesia of lower extremity    Tobacco dependence syndrome    Long term (current) use of anticoagulants [Z79 01]    [facility] discharge follow-up    Papular rash    Dental abscess    History of DVT (deep vein thrombosis)    History of pulmonary embolus (PE)    Alcohol abuse    Other constipation    Tobacco abuse    Financial difficulties    Hyponatremia    Severe Alcoholic hepatitis    Other specified anemias       Past Medical History  Past Medical History:   Diagnosis Date    DVT (deep venous thrombosis) ([facility] [address] )     Pulmonary embolism (HCC)        Past Surgical History  Past Surgical History:   Procedure Laterality Date    ANKLE SURGERY Left 1995    GASTRIC BYPASS         Recent Imaging  CT abdomen pelvis w contrast   Final Result by Rosales Nelson MD (09/28 9855)      No evidence of acute pathology  Small ascites  Hepatomegaly with severe steatosis  Workstation performed: [id number]         XR chest pa & lateral   ED Interpretation by Ashley Enriquez PA-C (09/28 8814)   No obvious fluid overload      Final Result by Wilson Agrawal MD (09/28 6490)      No acute cardiopulmonary disease        Findings are stable            Workstation performed: [id number]         IR other    (Results Pending)       Recent Vital Signs  Vitals:    09/30/22 0757 09/30/22 0905 09/30/22 1235 09/30/22 1255   BP: 93/57 92/61 104/64 100/70   BP Location: Left arm  Right arm    Pulse: 82 91 92 91 Resp: 20  18 18   Temp: (!) 97 2 °F (36 2 °C)  97 7 °F (36 5 °C) 97 5 °F (36 4 °C)   TempSrc: Temporal  Temporal Temporal   SpO2: 94%  98% 99%   Weight:       Height:            09/30/22 1018   PT Last Visit   PT Visit Date 09/30/22   Note Type   Note type Evaluation   Pain Assessment   Pain Assessment Tool 0-10   Pain Score 3   Pain Location/Orientation Orientation: Bilateral;Location: Leg   Restrictions/Precautions   Weight Bearing Precautions Per Order No   Other Precautions Fall Risk;Bed Alarm; Chair Alarm;Pain;Telemetry   Home Living   Type of Home Apartment  (3rd floor apt)   Home Layout One level;Stairs to enter with rails   Bathroom Shower/Tub Walk-in shower   Bathroom Accessibility Accessible   Prior Function   Level of [place] Independent with ADLs and functional mobility   Lives With Son   Receives Help From [facility] [address] in the last 6 months 1 to 4   Comments son assist with some IADLs   General   Family/Caregiver Present No   Cognition   Overall Cognitive Status WFL   Arousal/Participation Alert   Attention Within functional limits   Orientation Level Oriented X4   Memory Within functional limits   Following Commands Follows all commands and directions without difficulty   RLE Assessment   RLE Assessment   (3+/5)   LLE Assessment   LLE Assessment   (3+/5)   Coordination   Movements are Fluid and Coordinated 0   Coordination and Movement Description unsteady gait and postural sway in standing   Sensation X   Light Touch   RLE Light Touch Impaired   RLE Light Touch Comments numbness/tingling   LLE Light Touch Impaired   LLE Light Touch Comments numbness tingling   Bed Mobility   Supine to Sit 4  Minimal assistance   Additional items Assist x 1;Bedrails; Increased time required;Verbal cues   Sit to Supine 4  Minimal assistance   Additional items Assist x 1;Bedrails; Increased time required;Verbal cues   Transfers   Sit to Stand 4  Minimal assistance Additional items Assist x 1; Armrests; Increased time required;Verbal cues   Stand to Sit 4  Minimal assistance   Additional items Assist x 1; Armrests; Increased time required;Verbal cues   Additional Comments with RW; lightheadedness in standing BP low 80s/50s   Ambulation/Elevation   Gait pattern Decreased toe off;Decreased heel strike; Step to;Short stride; Foward flexed;Decreased foot clearance; Wide YAYA;Shuffling; Improper Weight shift   Gait Assistance 4  Minimal assist   Additional items Assist x 1;Verbal cues; Tactile cues   Assistive Device Rolling walker   Distance 6ft   Balance   Static Sitting Fair   Dynamic Sitting Fair   Static Standing Fair -   Dynamic Standing Fair -   Ambulatory Fair -   Endurance Deficit   Endurance Deficit Yes   Endurance Deficit Description lightheadedness and fatigue   Activity Tolerance   Activity Tolerance Patient limited by fatigue   Medical Staff Made Aware spoke to CM   Nurse Made Aware spoke to RN   Assessment   Prognosis Fair   Problem List Decreased strength;Decreased endurance; Impaired balance;Decreased mobility; Decreased coordination; Impaired sensation;Decreased safety awareness; Obesity; Decreased skin integrity;Pain   Barriers to Discharge Inaccessible home environment;Decreased caregiver support   Goals   Patient Goals to walk better   STG Expiration Date 10/10/22   Short Term Goal #1 see eval note   Plan   Treatment/Interventions ADL retraining;Functional transfer training;LE strengthening/ROM; Elevations; Therapeutic exercise; Endurance training;Patient/family training;Equipment eval/education; Bed mobility;Gait training;Spoke to nursing;Spoke to case management;OT   PT Frequency 2-3x/wk   Recommendation   PT Discharge Recommendation Post acute rehabilitation services   Equipment Recommended [facility] Recommended Wheeled walker   AM-PAC Basic Mobility Inpatient   Turning in Bed Without Bedrails 3   Lying on Back to Sitting on Edge of Flat Bed 3   Moving Bed to Chair 3   Standing Up From Chair 3   Walk in Room 2   Climb 3-5 Stairs 2   Basic Mobility Inpatient Raw Score 16   Basic Mobility Standardized Score 38 32   Highest Level Of Mobility   -HLM Goal 5: Stand one or more mins   JH-HLM Achieved 5: Stand (1 or more minutes)   End of Consult   Patient Position at End of Consult Bedside chair; All needs within reach         ASSESSMENT                                                                                                                     Mckenna Cat is a 55 y o  female admitted to [facility] on 9/00/9633 for Alcoholic hepatitis  Pt  has a past medical history of DVT (deep venous thrombosis) (HCC) and Pulmonary embolism ([address] )    PT was consulted and pt was seen on 9/30/2022 for mobility assessment and d/c planning  Pt presents supine in bed alert and agreeable to therapy  Impairments limiting pt at this time include impaired balance, decreased endurance, decreased coordination, increased fall risk, new onset of impairment of functional mobility, decreased ADLS, decreased IADLS, pain, decreased activity tolerance, decreased safety awareness, decreased sensation, and decreased strength  Pt is currently functioning at a minimum assistance x1 level for bed mobility, minimum assistance x1 level for transfers, minimum assistance x1 level for ambulation with Rolling Walker  The patient's AM-PAC Basic Mobility Inpatient Short Form Raw Score is 16  A Raw score of less than or equal to 16 suggests the patient may benefit from discharge to post-acute rehabilitation services  Please also refer to the recommendation of the Physical Therapist for safe discharge planning      Goals                                                                                                                                    1) Bed mobility skills with modified independent assistance to facilitate safe return to previous living environment 2) Functional transfers with modified independent assistance to facilitate safe return to previous living environment  3) Ambulation with least restrictive AD modified independent assistance without LOB and stable vitals for safe ambulation home/ community distances  4) Stair training up/down flight 36 step/s with appropriate rail/s  and modified independent assistance for safe access to previous living environment  5) Improve balance grades to fair + to reduce risk of falls  6)Improve LE strength grades by 1 to increase independence w/ transfers and gait  7) PT for ongoing pt and family education; DME needs and D/C planning to promote highest level of function in least restrictive environment  Recommendations                                                                                                              Pt will benefit from continued skilled IP PT to address the above mentioned impairments in order to maximize recovery and increase functional independence when completing mobility and ADLs  See flow sheet for goals and POC       DME: Kehinde Scott    Discharge Disposition:  Post Acute Rehab Services      Alana Raymond PT, DPT

## 2022-09-30 NOTE — PLAN OF CARE
Problem: SAFETY ADULT  Goal: Maintain or return to baseline ADL function  Description: INTERVENTIONS:  -  Assess patient's ability to carry out ADLs; assess patient's baseline for ADL function and identify physical deficits which impact ability to perform ADLs (bathing, care of mouth/teeth, toileting, grooming, dressing, etc )  - Assess/evaluate cause of self-care deficits   - Assess range of motion  - Assess patient's mobility; develop plan if impaired  - Assess patient's need for assistive devices and provide as appropriate  - Encourage maximum independence but intervene and supervise when necessary  - Involve family in performance of ADLs  - Assess for home care needs following discharge   - Consider OT consult to assist with ADL evaluation and planning for discharge  - Provide patient education as appropriate  Outcome: Progressing  Goal: Maintains/Returns to pre admission functional level  Description: INTERVENTIONS:  - Perform BMAT or MOVE assessment daily    - Set and communicate daily mobility goal to care team and patient/family/caregiver     - Collaborate with rehabilitation services on mobility goals if consulted  - Out of bed for toileting  - Record patient progress and toleration of activity level   Outcome: Progressing

## 2022-09-30 NOTE — PROGRESS NOTES
Progress Note    Maureen Mcleod 55 y o  female MRN: [id number]  Unit/Bed#: 7T [facility] 708-01 Encounter: [id number]  Admitting Physician: Ruperto Walker  PCP: Louise Sebastian PA-C  Date of Admission:  9/28/2022  2:31 AM    Assessment and Plan    * Severe Alcoholic hepatitis  Assessment & Plan  C/o Nausea, bloating, constipation, and epigastric pain but stable  Hx: PE, DVT, gastric bypass, smoker, alcohol abuse  Labs: Transminitis, anemia, hyperbilirubinemia,   Imaging: CT: hepatomegaly; severe steatosis, gallstones with no pericholecystic inflammation  PE: Icteric, abd distention, +4 pitting edema  Pt is on prednisolone 40 mg qd x28 as per GI team  Plans to monitor  GI on board; feedback appreciated  - Low sodium diet  - Continue CIWA protocol  - Continue Lactulose 20 g bid  - Continue Prednisolone 40 mg qd as per GI  - If bleeding, d'c prednisolone and appreciate GI recommendations  - Discharge to detox when medically stable  - Consider elective egd/colonoscopy to r/o bleeding sources  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  Assessment & Plan  C/o Nausea, bloating, constipation, and epigastric pain  Hx: PE, DVT, gastric bypass, smoker, alcohol abuse  Labs: Transminitis, anemia, hyperbilirubinemia  Imaging: CT: hepatomegaly; severe steatosis, gallstones with no pericholecystic inflammation  PE: Icteric, abd distention, +4 pitting edema    - Low sodium diet  - Continue CIWA protocol  - Lactulose 20 g bid  - GI consult; appreciate recommendations  - Detox when medically stable  Once liver issue is better, would suggest elective egd/colon for anemia and for screening since pt is over 39 yr old      Other specified anemias  Assessment & Plan  9/29/22- hgb 7 9   9/30/22- hgb 6 7    MAP trending down; last  on 9/30/22 MAP- 66, hemodynamically stable and asymptomatic  No previous history of blood transfusion  No hematemesis, melena or heamatochezia   Per GI- Likely an anastomic ulcer given her gastric bypass history and tobacco use  Plan  - Appreciate GI recommendation for EGD after hemoglobin stabilizes  - Type and cross  - Transfuse 2 units of leukoreduced PRBCs today  - Appreciate GI recommendation to premedicate with Tylenol 325 mg and benadryl before blood transfusion  Alcohol abuse  Assessment & Plan  C/o Nausea, bloating, constipation, and epigastric pain  Hx: alcohol abuse, drinks 4 cups of vodka daily  Labs: Transminitis, anemia  Imaging: CT: hepatomegaly; severe steatosis, gallstones with no pericholecystic inflammation  PE: Sclera Icteric, abd distention, +4 pitting edema  GI consulted; appreciate recommendations    - Continue CIWA protocol  - Ativan 1mg PO Q6hr PO PRN for withdrawal symptoms  - Folic acid 1 mg/Thaimine 100 mg/MVit PO daily  - Transfer to detox when medically stable  - Continue PPI empirically  - Low sodium diet per GI recommendation        Other constipation  Assessment & Plan  C/o constipation, bloating and epigastric pain  Sclera icterus  Appreciate GI recommendations  Had 3 BM last night 9/29/2022      - Colace 100 mg BID   - Lactulose for constipation; 2-3 bm daily       Hypokalemia-resolved as of 9/29/2022  Assessment & Plan  K= 3 6  resolved  Tobacco abuse  Assessment & Plan  Smokes 1/2 a PPD  Not decided on quitting    - Nicotine replacement patch pending pregnancy test  - Smoking cessation counseling      Financial difficulties  Assessment & Plan  Endorses she stopped taking all her medications because of she was unable to afford them      -CM referral in place  Hyponatremia  Assessment & Plan  Present on admission  Bilateral +4 edema in LE  9/29/22 Echo: Early grade 1 diastolic dysfunction  EF is estimated as around 64%  Trace tricuspid valve regurgitation  No obvious pulmonary hypertension  No pericardial effusion     - Continue to monitor         VTE Pharmacologic Prophylaxis:   Pharmacologic: None  Mechanical VTE Prophylaxis in Place: No    Patient Centered Rounds: I have performed bedside rounds with nursing staff today  Discussions with Specialists or Other Care Team Provider: Gastroenterology, patient care manager, , physical therapy and nursing staff  Education and Discussions with Family / Patient: Discussed lab results, blood pressure readings, and care management with patient  Discussed need for blood transfusion 2/2 lab results with patient and reviewed the risks and benefits  She verbalized understanding and was in agreement with plan of care and management  Patient Information Sharing: With the consent of Stevan Apple , their loved ones Zachary Wall) were notified today by inpatient team of the patients condition and current plan  All questions answered  Time Spent for Care: 1 hour  More than 50% of total time spent on counseling and coordination of care as described above  Current Length of Stay: 2 day(s)    Current Patient Status: Inpatient   Certification Statement: The patient will continue to require additional inpatient hospital stay due to possible alcoholic hepatitis  Discharge Plan: Plan to discharge to detox  Code Status: Level 1 - Full Code      Subjective:   Patient was seen at bedside during rounds today with the attending and resident  Patient appeared anxious about her condition  Her lab results were extensively discussed and explained re: low hemoglobin and down trending MAP  The medical team spent significant amount of time discussing the risk factors versus the benefits of blood transfused as patient was very apprehensive about it  She eventually consented and was in full agreement  All her questions were answered  She denies dizziness, headaches, palpitations, SOB or chest pain  Will continue to monitor       Objective:     Vitals:   Temp (24hrs), Av 6 °F (36 4 °C), Min:97 2 °F (36 2 °C), Max:98 °F (36 7 °C)    Temp:  [97 2 °F (36 2 °C)-98 °F (36 7 °C)] 97 6 °F (36 4 °C)  HR: [82-98] 87  Resp:  [18-20] 20  BP: ()/(50-76) 96/72  SpO2:  [93 %-99 %] 99 %  Body mass index is 44 89 kg/m²  Input and Output Summary (last 24 hours): Intake/Output Summary (Last 24 hours) at 9/30/2022 1452  Last data filed at 9/30/2022 0601  Gross per 24 hour   Intake 2061 67 ml   Output --   Net 2061 67 ml       Physical Exam:     Physical Exam  Vitals reviewed  Constitutional:       General: She is not in acute distress  Appearance: She is obese  She is not ill-appearing, toxic-appearing or diaphoretic  HENT:      Head: Normocephalic  Right Ear: External ear normal       Left Ear: External ear normal       Nose: Nose normal       Mouth/Throat:      Mouth: Mucous membranes are moist    Eyes:      General: Scleral icterus present  Extraocular Movements: Extraocular movements intact  Cardiovascular:      Rate and Rhythm: Normal rate and regular rhythm  Pulses: Normal pulses  Heart sounds: Normal heart sounds  No murmur heard  No friction rub  No gallop  Pulmonary:      Effort: Pulmonary effort is normal    Abdominal:      General: Bowel sounds are normal       Palpations: Abdomen is soft  There is no mass  Tenderness: There is no rebound  Musculoskeletal:         General: No tenderness  Cervical back: Neck supple  Right lower leg: Edema present  Left lower leg: Edema present  Skin:     Coloration: Skin is not jaundiced  Neurological:      General: No focal deficit present  Mental Status: She is alert and oriented to person, place, and time  Psychiatric:         Mood and Affect: Mood normal          Behavior: Behavior normal          Thought Content:  Thought content normal          Judgment: Judgment normal        Additional Data:     Labs:    Results from last 7 days   Lab Units 09/30/22  0534   WBC Thousand/uL 8 86   HEMOGLOBIN g/dL 6 7*   HEMATOCRIT % 19 6*   PLATELETS Thousands/uL 269  269   NEUTROS PCT % 73   LYMPHS PCT % 16 MONOS PCT % 10   EOS PCT % 0     Results from last 7 days   Lab Units 09/30/22  0534   POTASSIUM mmol/L 3 8   CHLORIDE mmol/L 102   CO2 mmol/L 26   BUN mg/dL 8   CREATININE mg/dL 1 14   CALCIUM mg/dL 7 3*   ALK PHOS U/L 137*   ALT U/L 52*   AST U/L 122*     Results from last 7 days   Lab Units 09/30/22  0534   INR  1 89*     Results from last 7 days   Lab Units 09/29/22  1112   POC GLUCOSE mg/dl 65             * I Have Reviewed All Lab Data Listed Above  * Additional Pertinent Lab Tests Reviewed: All Labs Within Last 24 Hours Reviewed    Imaging:    Imaging Reports Reviewed Today Include: None  Imaging Personally Reviewed by Myself Includes:  None    Recent Cultures (last 7 days): None        Last 24 Hours Medication List:   Current Facility-Administered Medications   Medication Dose Route Frequency Provider Last Rate    folic acid  1 mg Oral Daily Patti Jacobs MD      lactulose  20 g Oral BID Kimmie Barnhart PA-C      LORazepam  1 mg Oral Q12H Patti Jacobs MD      Followed by   Beatriz Navarro ON 10/2/2022] LORazepam  1 mg Oral Q24H Patti Jacobs MD      multivitamin-minerals  1 tablet Oral Daily Patti Jacobs MD      pantoprazole  40 mg Intravenous Q12H [facility] Bobbie Siemens, MD      prednisoLONE  40 mg Oral Daily LINDSAY Irene      sodium chloride  125 mL/hr Intravenous Continuous Mamie Ulrich  mL/hr (09/30/22 0116)    thiamine  100 mg Oral Daily Sjacacia Tran MD      traZODone  50 mg Oral HS Sydney Alanis DO          ** Please Note: Dictation voice to text software may have been used in the creation of this document   **    Patti Tran  09/30/22  2:52 PM

## 2022-09-30 NOTE — PLAN OF CARE
Problem: OCCUPATIONAL THERAPY ADULT  Goal: Performs self-care activities at highest level of function for planned discharge setting  See evaluation for individualized goals  Description: Treatment Interventions: ADL retraining, Functional transfer training, UE strengthening/ROM, Endurance training, Patient/family training, Equipment evaluation/education, Compensatory technique education, Energy conservation, Activityengagement          See flowsheet documentation for full assessment, interventions and recommendations     Outcome: Progressing  Note: Limitation: Decreased ADL status, Decreased endurance, Decreased self-care trans, Decreased high-level ADLs  Prognosis: Good  Assessment: see note     OT Discharge Recommendation: Post acute rehabilitation services

## 2022-09-30 NOTE — CASE MANAGEMENT
Case Management Assessment & Discharge Planning Note    Patient name Steven Jordan  Location 7T [facility] 708/7T [facility] 708-01 MRN [id number]  : 1975 Date 2022       Current Admission Date: 2022  Current Admission Diagnosis:Severe Alcoholic hepatitis   Patient Active Problem List    Diagnosis Date Noted    Other specified anemias 2022    Hyponatremia 2022    Severe Alcoholic hepatitis     Alcohol abuse 2022    Other constipation 2022    Tobacco abuse 2022    Financial difficulties 2022    History of DVT (deep vein thrombosis) 2022    History of pulmonary embolus (PE) 2022    Dental abscess 2020    [facility]ular rash 2020   [facility]    Long term (current) use of anticoagulants [Z79 01] 2018    Thrush 2018    Elevated lactic acid level 2018    Smoking 2018    Elevated troponin I level 2018    Acute cystitis 2018    Personal history of gastric bypass 2018    Swelling of lower extremity 2018    Peripheral neurogenic pain 2018    Comedonal acne 2018    Paresthesia of lower extremity 2018    Obesity 2017    Pulmonary embolism ([facility] [address] ) 2017    Obstructive sleep apnea 2017    Tobacco dependence syndrome 2015      LOS (days): 2  Geometric Mean LOS (GMLOS) (days): 3 30  Days to GMLOS:1 1     OBJECTIVE:    Risk of Unplanned Readmission Score: 11 68         Current admission status: Inpatient       Preferred Pharmacy:   [address] [place] [address], [facility] [address] [place] [address]  Phone: [phone] Fax: [phone]    Primary Care Provider: Sri Varghese PA-C    Primary Insurance:   Secondary Insurance:     ASSESSMENT:  Hailey Gusman Proxies    There are no active Health Care Proxies on file         Readmission Root Cause  30 Day Readmission: No    Patient Information  Admitted from[de-identified] Home  Mental Status: Alert  During Assessment patient was accompanied by: Not accompanied during assessment  Assessment information provided by[de-identified] Patient, Daughter  Primary Caregiver: Self  Support Systems: Self, Daughter, Son  [place] of Residence: [address] [facility] do you live in?: [address] [facility] entry access options   Select all that apply : Stairs  Number of steps to enter home : One Flight  Do the steps have railings?: Yes  Type of Current Residence: Apartment  Upon entering residence, is there a bedroom on the main floor (no further steps)?: Yes  Upon entering residence, is there a bathroom on the main floor (no further steps)?: Yes  In the last 12 months, was there a time when you were not able to pay the mortgage or rent on time?: No  In the last 12 months, how many places have you lived?: 1  Homeless/housing insecurity resource given?: No  Living Arrangements: Lives w/ Spouse/significant other    Activities of Daily Living Prior to Admission  Functional Status: Independent  Completes ADLs independently?: Yes  Ambulates independently?: Yes  Does patient use assisted devices?: No  Does patient currently own DME?: No  Does patient have a history of Outpatient Therapy (PT/OT)?: No  Does the patient have a history of Short-Term Rehab?: No  Does patient have a history of HHC?: No  [place] have [place] [address]?: No      Patient Information Continued  Income Source: SSI/SSD  Does patient have prescription coverage?: No  Within the past 12 months, you worried that your food would run out before you got the money to buy more : Never true  Within the past 12 months, the food you bought just didn't last and you didn't have money to get more : Never true  Food insecurity resource given?: No  Does patient receive dialysis treatments?: No  Does patient have a history of substance abuse?: Yes  Historical substance use preference: Alcohol/ETOH (Tobacco dependent)  History of Withdrawal Symptoms: Denies past symptoms  Is patient currently in treatment for substance abuse?: No  Treatment options provided    [facility] 2/9 Screening   Reviewed PHQ 2/9 Depression Screening Score?: No    Means of Transportation  Means of Transport to Appts[de-identified] Drives Self  In the past 12 months, has lack of transportation kept you from medical appointments or from getting medications?: No  In the past 12 months, has lack of transportation kept you from meetings, work, or from getting things needed for daily living?: No  Was application for public transport provided?: Refused    DISCHARGE DETAILS:    Discharge planning discussed with[de-identified] pt and pt's dtr  Freedom of Choice: Yes  Comments - Freedom of Choice: yes  CM contacted family/caregiver?: Yes  Were Treatment Team discharge recommendations reviewed with patient/caregiver?: Yes  Did patient/caregiver verbalize understanding of patient care needs?: Yes  Were patient/caregiver advised of the risks associated with not following Treatment Team discharge recommendations?: Yes    Contacts  Patient Contacts: Michell Avila (Daughter)  Relationship to Patient[de-identified] Family  Contact Method: Phone  Phone Number: [phone] (I)  Reason/Outcome: Continuity of Care, Discharge [address]         Is the patient interested in [address] at discharge?: No    DME Referral Provided  Referral made for DME?: No    Other Referral/Resources/Interventions Provided:  Referral Comments: PT  recommendation is post acute rehab  Pt and pt's Dtr stated that they need sometime to decide about going to a SNF rehab   CM was notified pt is vaccinated with booster    Would you like to participate in our [address] service program?  : No - Declined    Treatment Team Recommendation: Home, SNF, Short Term Rehab  Discharge Destination Plan[de-identified] Home, SNF, Short Term Rehab       Additional Comments: CM sent multiple referrals to SNF, pending bed acceptance and availability Vs Home with Flaco Oconnor     CM department will continue to follow through pt's D/C

## 2022-09-30 NOTE — ASSESSMENT & PLAN NOTE
Hgb since admission: 8 0 > 7 9 > 6 7 > 9 0 (2 hrs post-transfusion 9/30/2022) > 8 7>8 5>8 0  Hgb today 10/4/2022- 8 3    Patient is hemodynamically stable  No previous history of blood transfusion  No hematemesis, melena or hematochezia  Patient currently menstruating; cycle started on 10/1/2022  Hx: Menometrorrhagia, DVT, and PE  TVUS:  Per GI- Likely an anastomic ulcer given her gastric bypass history (2018) and tobacco use      - Per GI- If evidence of GI bleed, start IV PPI and upper endoscopy  - Stop steroids if bleeding   - AM CBC

## 2022-09-30 NOTE — PLAN OF CARE
Problem: Potential for Falls  Goal: Patient will remain free of falls  Description: INTERVENTIONS:  - Educate patient/family on patient safety including physical limitations  - Instruct patient to call for assistance with activity   - Consult OT/PT to assist with strengthening/mobility   - Keep Call bell within reach  - Keep bed low and locked with side rails adjusted as appropriate  - Keep care items and personal belongings within reach  - Initiate and maintain comfort rounds  - Make Fall Risk Sign visible to staff  - Apply yellow socks and bracelet for high fall risk patients  - Consider moving patient to room near nurses station  Outcome: Progressing     Problem: Nutrition/Hydration-ADULT  Goal: Nutrient/Hydration intake appropriate for improving, restoring or maintaining nutritional needs  Description: Monitor and assess patient's nutrition/hydration status for malnutrition  Collaborate with interdisciplinary team and initiate plan and interventions as ordered  Monitor patient's weight and dietary intake as ordered or per policy  Utilize nutrition screening tool and intervene as necessary  Determine patient's food preferences and provide high-protein, high-caloric foods as appropriate       INTERVENTIONS:  - Monitor oral intake, urinary output, labs, and treatment plans  - Assess nutrition and hydration status and recommend course of action  - Evaluate amount of meals eaten  - Assist patient with eating if necessary   - Allow adequate time for meals  - Recommend/ encourage appropriate diets, oral nutritional supplements, and vitamin/mineral supplements   Assess need for intravenous fluids  - Provide specific nutrition/hydration education as appropriate  - Include patient/family/caregiver in decisions related to nutrition  Outcome: Progressing     Problem: MOBILITY - ADULT  Goal: Maintain or return to baseline ADL function  Description: INTERVENTIONS:  - Educate patient/family on patient safety including physical limitations  - Instruct patient to call for assistance with activity   - Consult OT/PT to assist with strengthening/mobility   - Keep Call bell within reach  - Keep bed low and locked with side rails adjusted as appropriate  - Keep care items and personal belongings within reach  - Initiate and maintain comfort rounds  - Make Fall Risk Sign visible to staff  - Apply yellow socks and bracelet for high fall risk patients  - Consider moving patient to room near nurses station  Outcome: Progressing

## 2022-10-01 PROBLEM — A59.01 TRICHOMONAS VAGINALIS (TV) INFECTION: Status: ACTIVE | Noted: 2022-10-01

## 2022-10-01 PROBLEM — N39.0 UTI (URINARY TRACT INFECTION): Status: ACTIVE | Noted: 2022-10-01

## 2022-10-01 PROBLEM — N17.9 AKI (ACUTE KIDNEY INJURY) (HCC): Status: ACTIVE | Noted: 2022-10-01

## 2022-10-01 LAB
ABO GROUP BLD BPU: NORMAL
ABO GROUP BLD BPU: NORMAL
ALBUMIN SERPL BCP-MCNC: 2.2 G/DL (ref 3.5–5)
ALP SERPL-CCNC: 155 U/L (ref 43–122)
ALT SERPL W P-5'-P-CCNC: 58 U/L
ANION GAP SERPL CALCULATED.3IONS-SCNC: 6 MMOL/L (ref 5–14)
AST SERPL W P-5'-P-CCNC: 115 U/L (ref 14–36)
BASOPHILS # BLD AUTO: 0.01 THOUSANDS/ΜL (ref 0–0.1)
BASOPHILS NFR BLD AUTO: 0 % (ref 0–1)
BILIRUB SERPL-MCNC: 10.54 MG/DL (ref 0.2–1)
BPU ID: NORMAL
BPU ID: NORMAL
BUN SERPL-MCNC: 9 MG/DL (ref 5–25)
CALCIUM ALBUM COR SERPL-MCNC: 9 MG/DL (ref 8.3–10.1)
CALCIUM SERPL-MCNC: 7.6 MG/DL (ref 8.4–10.2)
CHLORIDE SERPL-SCNC: 104 MMOL/L (ref 96–108)
CO2 SERPL-SCNC: 28 MMOL/L (ref 21–32)
CREAT SERPL-MCNC: 1.77 MG/DL (ref 0.6–1.2)
CROSSMATCH: NORMAL
CROSSMATCH: NORMAL
EOSINOPHIL # BLD AUTO: 0 THOUSAND/ΜL (ref 0–0.61)
EOSINOPHIL NFR BLD AUTO: 0 % (ref 0–6)
FIBRINOGEN PPP-MCNC: 155 MG/DL (ref 227–495)
GFR SERPL CREATININE-BSD FRML MDRD: 33 ML/MIN/1.73SQ M
GLUCOSE SERPL-MCNC: 86 MG/DL (ref 70–99)
GLUCOSE SERPL-MCNC: 90 MG/DL (ref 65–140)
HCT VFR BLD AUTO: 25.9 % (ref 34.8–46.1)
HGB BLD-MCNC: 8.7 G/DL (ref 11.5–15.4)
IMM GRANULOCYTES # BLD AUTO: 0.05 THOUSAND/UL (ref 0–0.2)
IMM GRANULOCYTES NFR BLD AUTO: 1 % (ref 0–2)
INR PPP: 1.62 (ref 0.84–1.19)
LYMPHOCYTES # BLD AUTO: 1.62 THOUSANDS/ΜL (ref 0.6–4.47)
LYMPHOCYTES NFR BLD AUTO: 18 % (ref 14–44)
MAGNESIUM SERPL-MCNC: 1.6 MG/DL (ref 1.6–2.3)
MCH RBC QN AUTO: 34.5 PG (ref 26.8–34.3)
MCHC RBC AUTO-ENTMCNC: 33.6 G/DL (ref 31.4–37.4)
MCV RBC AUTO: 103 FL (ref 82–98)
MONOCYTES # BLD AUTO: 1.03 THOUSAND/ΜL (ref 0.17–1.22)
MONOCYTES NFR BLD AUTO: 11 % (ref 4–12)
NEUTROPHILS # BLD AUTO: 6.37 THOUSANDS/ΜL (ref 1.85–7.62)
NEUTS SEG NFR BLD AUTO: 70 % (ref 43–75)
NRBC BLD AUTO-RTO: 0 /100 WBCS
PHOSPHATE SERPL-MCNC: 5.1 MG/DL (ref 2.5–4.8)
PLATELET # BLD AUTO: 273 THOUSANDS/UL (ref 149–390)
PMV BLD AUTO: 9.4 FL (ref 8.9–12.7)
POTASSIUM SERPL-SCNC: 3.5 MMOL/L (ref 3.5–5.3)
PROT SERPL-MCNC: 6.3 G/DL (ref 6.4–8.4)
PROTHROMBIN TIME: 19.6 SECONDS (ref 11.6–14.5)
RBC # BLD AUTO: 2.52 MILLION/UL (ref 3.81–5.12)
SODIUM SERPL-SCNC: 138 MMOL/L (ref 135–147)
UNIT DISPENSE STATUS: NORMAL
UNIT DISPENSE STATUS: NORMAL
UNIT PRODUCT CODE: NORMAL
UNIT PRODUCT CODE: NORMAL
UNIT PRODUCT VOLUME: 350 ML
UNIT PRODUCT VOLUME: 350 ML
UNIT RH: NORMAL
UNIT RH: NORMAL
WBC # BLD AUTO: 9.08 THOUSAND/UL (ref 4.31–10.16)

## 2022-10-01 PROCEDURE — 85025 COMPLETE CBC W/AUTO DIFF WBC: CPT | Performed by: PHYSICIAN ASSISTANT

## 2022-10-01 PROCEDURE — 84100 ASSAY OF PHOSPHORUS: CPT | Performed by: STUDENT IN AN ORGANIZED HEALTH CARE EDUCATION/TRAINING PROGRAM

## 2022-10-01 PROCEDURE — 87491 CHLMYD TRACH DNA AMP PROBE: CPT

## 2022-10-01 PROCEDURE — C9113 INJ PANTOPRAZOLE SODIUM, VIA: HCPCS | Performed by: INTERNAL MEDICINE

## 2022-10-01 PROCEDURE — 85610 PROTHROMBIN TIME: CPT | Performed by: PHYSICIAN ASSISTANT

## 2022-10-01 PROCEDURE — 80053 COMPREHEN METABOLIC PANEL: CPT | Performed by: PHYSICIAN ASSISTANT

## 2022-10-01 PROCEDURE — 82948 REAGENT STRIP/BLOOD GLUCOSE: CPT

## 2022-10-01 PROCEDURE — 83735 ASSAY OF MAGNESIUM: CPT | Performed by: STUDENT IN AN ORGANIZED HEALTH CARE EDUCATION/TRAINING PROGRAM

## 2022-10-01 PROCEDURE — 87591 N.GONORRHOEAE DNA AMP PROB: CPT

## 2022-10-01 PROCEDURE — 99232 SBSQ HOSP IP/OBS MODERATE 35: CPT | Performed by: INTERNAL MEDICINE

## 2022-10-01 PROCEDURE — 99232 SBSQ HOSP IP/OBS MODERATE 35: CPT | Performed by: FAMILY MEDICINE

## 2022-10-01 RX ORDER — ACETAMINOPHEN 325 MG/1
650 TABLET ORAL EVERY 8 HOURS PRN
Status: DISPENSED | OUTPATIENT
Start: 2022-10-01 | End: 2022-10-04

## 2022-10-01 RX ORDER — METRONIDAZOLE 500 MG/1
500 TABLET ORAL EVERY 12 HOURS SCHEDULED
Status: DISCONTINUED | OUTPATIENT
Start: 2022-10-01 | End: 2022-10-06 | Stop reason: HOSPADM

## 2022-10-01 RX ORDER — SODIUM CHLORIDE 9 MG/ML
125 INJECTION, SOLUTION INTRAVENOUS CONTINUOUS
Status: DISCONTINUED | OUTPATIENT
Start: 2022-10-01 | End: 2022-10-05

## 2022-10-01 RX ORDER — CEFTRIAXONE 1 G/50ML
1000 INJECTION, SOLUTION INTRAVENOUS ONCE
Status: COMPLETED | OUTPATIENT
Start: 2022-10-01 | End: 2022-10-01

## 2022-10-01 RX ORDER — NICOTINE 21 MG/24HR
14 PATCH, TRANSDERMAL 24 HOURS TRANSDERMAL DAILY
Status: DISCONTINUED | OUTPATIENT
Start: 2022-10-01 | End: 2022-10-06 | Stop reason: HOSPADM

## 2022-10-01 RX ORDER — CEFTRIAXONE 1 G/50ML
1000 INJECTION, SOLUTION INTRAVENOUS EVERY 24 HOURS
Status: DISCONTINUED | OUTPATIENT
Start: 2022-10-01 | End: 2022-10-01

## 2022-10-01 RX ADMIN — MULTIPLE VITAMINS W/ MINERALS TAB 1 TABLET: TAB ORAL at 09:32

## 2022-10-01 RX ADMIN — LACTULOSE 20 G: 10 SOLUTION ORAL at 18:13

## 2022-10-01 RX ADMIN — PANTOPRAZOLE SODIUM 40 MG: 40 INJECTION, POWDER, FOR SOLUTION INTRAVENOUS at 09:32

## 2022-10-01 RX ADMIN — METRONIDAZOLE 500 MG: 500 TABLET ORAL at 21:39

## 2022-10-01 RX ADMIN — FOLIC ACID 1 MG: 1 TABLET ORAL at 09:32

## 2022-10-01 RX ADMIN — ACETAMINOPHEN 650 MG: 325 TABLET ORAL at 21:39

## 2022-10-01 RX ADMIN — SODIUM CHLORIDE 150 ML/HR: 0.9 INJECTION, SOLUTION INTRAVENOUS at 21:39

## 2022-10-01 RX ADMIN — TRAZODONE HYDROCHLORIDE 50 MG: 50 TABLET ORAL at 21:39

## 2022-10-01 RX ADMIN — THIAMINE HCL TAB 100 MG 100 MG: 100 TAB at 09:32

## 2022-10-01 RX ADMIN — LACTULOSE 20 G: 10 SOLUTION ORAL at 09:32

## 2022-10-01 RX ADMIN — NICOTINE 14 MG: 14 PATCH, EXTENDED RELEASE TRANSDERMAL at 12:08

## 2022-10-01 RX ADMIN — PREDNISOLONE SODIUM PHOSPHATE 40 MG: 15 SOLUTION ORAL at 09:33

## 2022-10-01 RX ADMIN — SODIUM CHLORIDE 150 ML/HR: 0.9 INJECTION, SOLUTION INTRAVENOUS at 09:32

## 2022-10-01 RX ADMIN — CEFTRIAXONE 1000 MG: 1 INJECTION, SOLUTION INTRAVENOUS at 18:05

## 2022-10-01 RX ADMIN — SODIUM CHLORIDE 150 ML/HR: 0.9 INJECTION, SOLUTION INTRAVENOUS at 13:49

## 2022-10-01 RX ADMIN — PANTOPRAZOLE SODIUM 40 MG: 40 INJECTION, POWDER, FOR SOLUTION INTRAVENOUS at 21:39

## 2022-10-01 NOTE — ASSESSMENT & PLAN NOTE
Creatinine since admission: 0 76 (9/28) > 0 87 (9/29/) >1 14 (9/30) >1 77 (10/1)>1 59 (10/2)  10/3: 1 19 10/4 1 04  Baseline 0 6-0 7      - IV maintenance fluids 125 ml/h    - Daily weights and I/Os  - As per GI, low sodium diet     - AM CMP

## 2022-10-01 NOTE — ASSESSMENT & PLAN NOTE
UA 09/30/22: positive for opiates,bacteria, WBCs, negative nitirite  Urine culture 9/30/22:  >100 000 Gram negative kenroy   Denies dysuria, urgency or frequency  - S/p Rocephin 1000 mg IV once

## 2022-10-01 NOTE — PROGRESS NOTES
Progress Note    Krishna Farooq 55 y o  female MRN: [id number]  Unit/Bed#: 7T [facility] 708-01 Encounter: [id number]  Admitting Physician: Janna Amin  PCP: Daryl Mcleod PA-C  Date of Admission:  9/28/2022  2:31 AM    Assessment and Plan    * Severe Alcoholic hepatitis  Assessment & Plan  No nausea, bloating, constipation, and improvement in epigastric pain since yesterday  Hx: PE, DVT, gastric bypass, smoker, alcohol abuse  Labs: Transminitis, anemia, hyperbilirubinemia  CT Abd/Pelvis (9/28/22): hepatomegaly; severe steatosis, gallstones with no pericholecystic inflammation  X-ray chest PA (9/28/22): No acute cardiopulmonary disease  GI on board; feedback appreciated  Steroids initiated on 9/29/22 based on Maddrey DF score of 40  (PT 19 6 and bilirubin 10 54) - Maddrey score today 34  - Low sodium diet  - [facility] protocol   - Continue Lactulose 20 g BID    - Continue Prednisolone 40 mg Daily as per GI (Day 3/28) with taper  - If bleeding, discontinue Prednisolone and appreciate GI recommendations  - Pantoprazole 40 mg Daily  - AM CMP and PT-INR    - Discharge to Detox when medically stable  - Consider elective EGD/colonoscopy to rule out bleeding sources  VINI (acute kidney injury) ([facility] [address] )  Assessment & Plan  Creatinine since admission: 0 76 (9/28) > 0 87 (9/29/) >1 14 (9/30) >1 77 (10/1)  Baseline 0 6-0 7      - IV maintenance fluids 150 ml/h    - Daily weights and I/Os  - Low sodium diet  - AM CBC and CMP, Mg and Phos  Alcohol abuse  Assessment & Plan  No nausea, bloating, constipation  Mild epigastric pain present, improved since yesterday  Hx: alcohol abuse, drinks 4 cups of vodka daily  AST: 191 > 138 > 122 > 115  ALT: 75 > 57 > 52 > 58   ALP: 193 > 149 > 137 > 155  CT Abd/Pelvis (9/28/22): hepatomegaly; severe steatosis, gallstones with no pericholecystic inflammation  GI consulted; appreciate recommendations       - [facility] protocol   - Ativan 1 mg PO Q6hr PRN for withdrawal symptoms   - Folic acid 1 mg/Thaimine 100 mg/MVit PO daily   - Pantoprazole 40 mg Daily    - Transfer to Detox when medically stable  - Low sodium diet per GI recommendation  - AM CMP and PT-INR    - Alcohol cessation counseling  Other specified anemias  Assessment & Plan  Hgb since admission: 8 0 > 7 9 > 6 7 > 9 0 (2 hrs post-transfusion) > 8 7   MCV since admission: 108 > 107 > 107 > 103    MAP trending down; last on 10/1/22 MAP- 83, hemodynamically stable and asymptomatic  No previous history of blood transfusion  No hematemesis, melena or hematochezia  Per GI- Likely an anastomic ulcer given her gastric bypass history and tobacco use  - Appreciate GI recommendation for EGD after hemoglobin stabilizes  - Received 2 units of leukoreduced PRBCs yesterday (9/30/22)  - Stool occult blood order placed, collect once patient has a bowel movement  - AM CBC  Other constipation  Assessment & Plan  Patient had one soft stool yesterday  Scleral icterus  Appreciate GI recommendations  - Lactulose 20 g PO BID  Trichomonas vaginalis (TV) infection  Assessment & Plan  Urinalysis 9/30/22: Trichomonas Vaginalis noted  Last sexual encounter was a month and a half ago and has one sexual partner  Reports yellow discharge, which occurs a few days prior to menstruating      - Flagyl 500 mg BID    - Discussed with patient the importance of partner treatment as well  - Urine GC/Chlamydia ordered and follow up  UTI (urinary tract infection)  Assessment & Plan  UA 09/30/22: positive for opiates,bacteria, WBCs, negative nitirite  Urine culture 9/30/22:  >100 000 Gram negative kenroy     - Rocephin 1000 mg IV once  Hyponatremia  Assessment & Plan  Present on admission  Bilateral +3 edema in LE   9/29/22 Echo: Early grade 1 diastolic dysfunction  EF is estimated as around 64%  Trace tricuspid valve regurgitation  No obvious pulmonary hypertension   No pericardial effusion     - Continue to monitor  Financial difficulties  Assessment & Plan  Endorses she stopped taking all her medications because of she was unable to afford them  -CM referral in place  Tobacco abuse  Assessment & Plan  Smokes 1/2 a PPD  Not decided on quitting  Pregnancy test negative (9/28/22)  - Nicotine replacement patch order placed  - Smoking cessation counseling  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  Assessment & Plan  C/o Nausea, bloating, constipation, and epigastric pain  Hx: PE, DVT, gastric bypass, smoker, alcohol abuse  Labs: Transminitis, anemia, hyperbilirubinemia  Imaging: CT: hepatomegaly; severe steatosis, gallstones with no pericholecystic inflammation  PE: Icteric, abd distention, +4 pitting edema    - Low sodium diet  - Continue CIWA protocol  - Lactulose 20 g bid  - GI consult; appreciate recommendations  - Detox when medically stable  Once liver issue is better, would suggest elective egd/colon for anemia and for screening since pt is over 39 yr old  Hypokalemia-resolved as of 9/29/2022  Assessment & Plan  K= 3 6  resolved  VTE Pharmacologic Prophylaxis:   Pharmacologic: None  Mechanical VTE Prophylaxis in Place: No    Patient Centered Rounds: I have performed bedside rounds with nursing staff today  Discussions with Specialists or Other Care Team Provider: Gastroenterology, PT/OT  Education and Discussions with Family / Patient: Patient  Time Spent for Care: 45 minutes  More than 50% of total time spent on counseling and coordination of care as described above  Current Length of Stay: 3 day(s)    Current Patient Status: Inpatient   Certification Statement: The patient, admitted on an observation basis, will now require > 2 midnight hospital stay due to possible alcoholic hepatitis  Discharge Plan: Plan to discharge to Detox  Code Status: Level 1 - Full Code      Subjective:   Patient was seen and examined at bedside   Patient was resting comfortably and had no current complaints  Hospital day #4  Her abdominal pain has improved since yesterday  Patient denies nausea, vomiting, diarrhea, constipation  Last BM was yesterday, soft brown  Denies chest pain, SOB, palpitations, headaches  Objective:     Vitals:   Temp (24hrs), Av 5 °F (36 4 °C), Min:97 2 °F (36 2 °C), Max:97 8 °F (36 6 °C)    Temp:  [97 2 °F (36 2 °C)-97 8 °F (36 6 °C)] 97 6 °F (36 4 °C)  HR:  [75-91] 91  Resp:  [18] 18  BP: (101-150)/() 129/87  SpO2:  [97 %-100 %] 100 %  Body mass index is 43 4 kg/m²  Input and Output Summary (last 24 hours): Intake/Output Summary (Last 24 hours) at 10/1/2022 2102  Last data filed at 10/1/2022 2038  Gross per 24 hour   Intake 480 ml   Output 200 ml   Net 280 ml       Physical Exam:     Physical Exam  Vitals and nursing note reviewed  Constitutional:       General: She is not in acute distress  Appearance: Normal appearance  She is not ill-appearing, toxic-appearing or diaphoretic  Comments: In no apparent distress  SpO2 on RA 97%  HENT:      Head: Normocephalic and atraumatic  Right Ear: External ear normal       Left Ear: External ear normal       Nose: Nose normal  No congestion or rhinorrhea  Mouth/Throat:      Mouth: Mucous membranes are moist       Pharynx: Oropharynx is clear  Eyes:      Extraocular Movements: Extraocular movements intact  Comments: Icteric sclera  Cardiovascular:      Rate and Rhythm: Normal rate and regular rhythm  Pulses: Normal pulses  Heart sounds: Normal heart sounds  No murmur heard  No gallop  Pulmonary:      Effort: Pulmonary effort is normal  No respiratory distress  Breath sounds: Normal breath sounds  No stridor  No wheezing  Abdominal:      General: Abdomen is flat  There is no distension  Palpations: Abdomen is soft  Tenderness: There is no abdominal tenderness  Hernia: No hernia is present        Comments: Hepatomegaly 6 cm bellow the right costal margin  Musculoskeletal:         General: Normal range of motion  Cervical back: Normal range of motion and neck supple  Comments: Bilateral lower extremity pitting edema, 3+ up to the knees  Skin:     General: Skin is warm  Findings: No erythema or rash  Neurological:      General: No focal deficit present  Mental Status: She is alert  Additional Data:     Labs:    Results from last 7 days   Lab Units 10/01/22  0433   WBC Thousand/uL 9 08   HEMOGLOBIN g/dL 8 7*   HEMATOCRIT % 25 9*   PLATELETS Thousands/uL 273   NEUTROS PCT % 70   LYMPHS PCT % 18   MONOS PCT % 11   EOS PCT % 0     Results from last 7 days   Lab Units 10/01/22  0433   POTASSIUM mmol/L 3 5   CHLORIDE mmol/L 104   CO2 mmol/L 28   BUN mg/dL 9   CREATININE mg/dL 1 77*   CALCIUM mg/dL 7 6*   ALK PHOS U/L 155*   ALT U/L 58*   AST U/L 115*     Results from last 7 days   Lab Units 10/01/22  0433   INR  1 62*     Results from last 7 days   Lab Units 10/01/22  1118 09/30/22  1347 09/29/22  1112   POC GLUCOSE mg/dl 90 130 65             * I Have Reviewed All Lab Data Listed Above  * Additional Pertinent Lab Tests Reviewed:  [address] Admission Reviewed    Imaging:    Imaging Reports Reviewed Today Include: None   Imaging Personally Reviewed by Myself Includes:  None    Recent Cultures (last 7 days):     Results from last 7 days   Lab Units 09/30/22  1113   URINE CULTURE  >100,000 cfu/ml Gram Negative Raji*       Last 24 Hours Medication List:   Current Facility-Administered Medications   Medication Dose Route Frequency Provider Last Rate    acetaminophen  650 mg Oral Q8H PRN Kourtkaterina Nascimento MD      folic acid  1 mg Oral Daily Oluwatomi Ellyn Aschoff, MD      lactulose  20 g Oral BID Maurilio Thompson PA-C      metroNIDAZOLE  500 mg Oral Q12H [facility] Gabriella Bruce MD      multivitamin-minerals  1 tablet Oral Daily Patti Cotter Andrea Palmer MD      nicotine  14 mg Transdermal Daily Andres Doss MD      pantoprazole  40 mg Intravenous Q12H [address] Lynda Hinson MD      prednisoLONE  40 mg Oral Daily [facility] [place]      sodium chloride  150 mL/hr Intravenous Continuous Andres Doss  mL/hr (10/01/22 4529)    thiamine  100 mg Oral Daily Meryle Croft, MD      traZODone  50 mg Oral HS DO [place]  10/01/22  9:02 PM

## 2022-10-01 NOTE — PLAN OF CARE
Problem: Potential for Falls  Goal: Patient will remain free of falls  Description: INTERVENTIONS:  - Educate patient/family on patient safety including physical limitations  - Instruct patient to call for assistance with activity   - Consult OT/PT to assist with strengthening/mobility   - Keep Call bell within reach  - Keep bed low and locked with side rails adjusted as appropriate  - Keep care items and personal belongings within reach  - Initiate and maintain comfort rounds  - Make Fall Risk Sign visible to staff  - Apply yellow socks and bracelet for high fall risk patients  - Consider moving patient to room near nurses station  Outcome: Progressing     Problem: Nutrition/Hydration-ADULT  Goal: Nutrient/Hydration intake appropriate for improving, restoring or maintaining nutritional needs  Description: Monitor and assess patient's nutrition/hydration status for malnutrition  Collaborate with interdisciplinary team and initiate plan and interventions as ordered  Monitor patient's weight and dietary intake as ordered or per policy  Utilize nutrition screening tool and intervene as necessary  Determine patient's food preferences and provide high-protein, high-caloric foods as appropriate       INTERVENTIONS:  - Monitor oral intake, urinary output, labs, and treatment plans  - Assess nutrition and hydration status and recommend course of action  - Evaluate amount of meals eaten  - Assist patient with eating if necessary   - Allow adequate time for meals  - Recommend/ encourage appropriate diets, oral nutritional supplements, and vitamin/mineral supplements   Assess need for intravenous fluids  - Provide specific nutrition/hydration education as appropriate  - Include patient/family/caregiver in decisions related to nutrition  Outcome: Progressing     Problem: SAFETY ADULT  Goal: Patient will remain free of falls  Description: INTERVENTIONS:  - Educate patient/family on patient safety including physical limitations  - Instruct patient to call for assistance with activity   - Consult OT/PT to assist with strengthening/mobility   - Keep Call bell within reach  - Keep bed low and locked with side rails adjusted as appropriate  - Keep care items and personal belongings within reach  - Initiate and maintain comfort rounds  - Make Fall Risk Sign visible to staff  - Apply yellow socks and bracelet for high fall risk patients  - Consider moving patient to room near nurses station  Outcome: Progressing  Goal: Maintain or return to baseline ADL function  Description: INTERVENTIONS:  -  Assess patient's ability to carry out ADLs; assess patient's baseline for ADL function and identify physical deficits which impact ability to perform ADLs (bathing, care of mouth/teeth, toileting, grooming, dressing, etc )  - Assess/evaluate cause of self-care deficits   - Assess range of motion  - Assess patient's mobility; develop plan if impaired  - Assess patient's need for assistive devices and provide as appropriate  - Encourage maximum independence but intervene and supervise when necessary  - Involve family in performance of ADLs  - Assess for home care needs following discharge   - Consider OT consult to assist with ADL evaluation and planning for discharge  - Provide patient education as appropriate  Outcome: Progressing  Goal: Maintains/Returns to pre admission functional level  Description: INTERVENTIONS:  - Perform BMAT or MOVE assessment daily    - Set and communicate daily mobility goal to care team and patient/family/caregiver     - Collaborate with rehabilitation services on mobility goals if consulted  - Out of bed for toileting  - Record patient progress and toleration of activity level   Outcome: Progressing     Problem: DISCHARGE PLANNING  Goal: Discharge to home or other facility with appropriate resources  Description: INTERVENTIONS:  - Identify barriers to discharge w/patient and caregiver  - Arrange for needed discharge resources and transportation as appropriate  - Identify discharge learning needs (meds, wound care, etc )  - Arrange for interpretive services to assist at discharge as needed  - Refer to Case Management Department for coordinating discharge planning if the patient needs post-hospital services based on physician/advanced practitioner order or complex needs related to functional status, cognitive ability, or social support system  Outcome: Progressing     Problem: Knowledge Deficit  Goal: Patient/family/caregiver demonstrates understanding of disease process, treatment plan, medications, and discharge instructions  Description: Complete learning assessment and assess knowledge base    Interventions:  - Provide teaching at level of understanding  - Provide teaching via preferred learning methods  Outcome: Progressing     Problem: GASTROINTESTINAL - ADULT  Goal: Minimal or absence of nausea and/or vomiting  Description: INTERVENTIONS:  - Administer IV fluids if ordered to ensure adequate hydration  - Maintain NPO status until nausea and vomiting are resolved  - Nasogastric tube if ordered  - Administer ordered antiemetic medications as needed  - Provide nonpharmacologic comfort measures as appropriate  - Advance diet as tolerated, if ordered  - Consider nutrition services referral to assist patient with adequate nutrition and appropriate food choices  Outcome: Progressing  Goal: Maintains or returns to baseline bowel function  Description: INTERVENTIONS:  - Assess bowel function  - Encourage oral fluids to ensure adequate hydration  - Administer IV fluids if ordered to ensure adequate hydration  - Administer ordered medications as needed  - Encourage mobilization and activity  - Consider nutritional services referral to assist patient with adequate nutrition and appropriate food choices  Outcome: Progressing  Goal: Maintains adequate nutritional intake  Description: INTERVENTIONS:  - Monitor percentage of each meal consumed  - Identify factors contributing to decreased intake, treat as appropriate  - Assist with meals as needed  - Monitor I&O, weight, and lab values if indicated  - Obtain nutrition services referral as needed  Outcome: Progressing  Goal: Establish and maintain optimal ostomy function  Description: INTERVENTIONS:  - Assess bowel function  - Encourage oral fluids to ensure adequate hydration  - Administer IV fluids if ordered to ensure adequate hydration   - Administer ordered medications as needed  - Encourage mobilization and activity  - Nutrition services referral to assist patient with appropriate food choices  - Assess stoma site  - Consider wound care consult   Outcome: Progressing  Goal: Oral mucous membranes remain intact  Description: INTERVENTIONS  - Assess oral mucosa and hygiene practices  - Implement preventative oral hygiene regimen  - Implement oral medicated treatments as ordered  - Initiate Nutrition services referral as needed  Outcome: Progressing     Problem: MOBILITY - ADULT  Goal: Maintain or return to baseline ADL function  Description: INTERVENTIONS:  -  Assess patient's ability to carry out ADLs; assess patient's baseline for ADL function and identify physical deficits which impact ability to perform ADLs (bathing, care of mouth/teeth, toileting, grooming, dressing, etc )  - Assess/evaluate cause of self-care deficits   - Assess range of motion  - Assess patient's mobility; develop plan if impaired  - Assess patient's need for assistive devices and provide as appropriate  - Encourage maximum independence but intervene and supervise when necessary  - Involve family in performance of ADLs  - Assess for home care needs following discharge   - Consider OT consult to assist with ADL evaluation and planning for discharge  - Provide patient education as appropriate  Outcome: Progressing  Goal: Maintains/Returns to pre admission functional level  Description: INTERVENTIONS:  - Perform BMAT or MOVE assessment daily    - Set and communicate daily mobility goal to care team and patient/family/caregiver     - Collaborate with rehabilitation services on mobility goals if consulted  - Out of bed for toileting  - Record patient progress and toleration of activity level   Outcome: Progressing

## 2022-10-01 NOTE — PROGRESS NOTES
Progress Note- Kerney Seip 55 y o  female MRN: [id number]  ·   Unit/Bed#: 7T [facility] 708-01 Encounter: [id number]      Assessment and Plan:    44-year-old female admitted with icterus, elevated LFTs with AST to ALT ratio greater than 2:1   · Suspect this is secondary to alcoholic hepatitis with significantly elevated discriminant function  Prednisolone started on 09/29/2022  · No evidence of infection or GI bleeding at this time  Viral studies negative  · Continue steroids for now  Checked lately model score tomorrow  · Continue CIWA protocol  · Continue PPI daily  · Hemoglobin is stable at this time, continue to check daily  If there is any evidence of active GI bleeding discontinue steroids, start IV PPI and will plan for possible upper endoscopy  · Counseled on the importance of absolute alcohol cessation  · Encouraged good p o  Intake with 3 normal meals and 3 large snacks throughout the day  Patient may benefit from nutritional shakes that she is not completing her meals  Consider nutrition consult       ______________________________________________________________________    Subjective:     Patient is sleeping comfortably, without complaints at this time  She denied nausea, vomiting, abdominal pain  She denies diarrhea constipation  She denies any bloody or black stools      Medication Administration - last 24 hours from 09/30/2022 1641 to 10/01/2022 1641       Date/Time Order Dose Route Action Action by     10/01/2022 0709 sodium chloride 0 9 % infusion 0 mL/hr Intravenous Stopped Shira Fine RN     10/01/2022 0944 LORazepam (ATIVAN) tablet 1 mg 1 mg Oral Not Given Eladia Ramos, MOHIT     09/30/2022 2208 LORazepam (ATIVAN) tablet 1 mg 1 mg Oral Not Given Shira Fine RN     10/01/2022 0932 thiamine tablet 100 mg 100 mg Oral Given Eladia Ramos, MOHIT     91/41/4056 4903 folic acid (FOLVITE) tablet 1 mg 1 mg Oral Given Eladia Ramos RN     10/01/2022 0932 multivitamin-minerals (CENTRUM) tablet 1 tablet 1 tablet Oral Given Ysabel Watson, MOHIT     09/30/2022 2206 traZODone (DESYREL) tablet 50 mg 50 mg Oral Given Micah Grey RN     10/01/2022 0933 prednisoLONE (ORAPRED) oral solution 40 mg 40 mg Oral Given Ysabel Watson, MOHIT     10/01/2022 0932 lactulose oral solution 20 g 20 g Oral Given Ysabel Watson, MOHIT     09/30/2022 1710 lactulose oral solution 20 g 20 g Oral Not Given Jack Falcon RN     10/01/2022 0932 pantoprazole (PROTONIX) injection 40 mg 40 mg Intravenous Given Ysabel Watson, MOHIT     09/30/2022 2207 pantoprazole (PROTONIX) injection 40 mg 40 mg Intravenous Given Micah Grey RN     09/30/2022 2206 magnesium gluconate (MAGONATE) tablet 500 mg 500 mg Oral Given Micah Grey RN     10/01/2022 1349 sodium chloride 0 9 % infusion 150 mL/hr Intravenous [address]     10/01/2022 0932 sodium chloride 0 9 % infusion 150 mL/hr Intravenous [address] [place] [address], MOHIT     10/01/2022 1208 nicotine (NICODERM CQ) 14 mg/24hr TD 24 hr patch 14 mg 14 mg Transdermal Medication Applied Vanessa Wade RN          Objective:     Vitals: Blood pressure 126/97, pulse 77, temperature 97 6 °F (36 4 °C), temperature source Temporal, resp  rate 18, height 5' 7" (1 702 m), weight 126 kg (277 lb 1 6 oz), SpO2 100 %, not currently breastfeeding  ,Body mass index is 43 4 kg/m²  Intake/Output Summary (Last 24 hours) at 10/1/2022 1641  Last data filed at 10/1/2022 1200  Gross per 24 hour   Intake 1134 58 ml   Output --   Net 1134 58 ml       Physical Exam:   General Appearance: Awake and alert, in no acute distress  Abdomen: Soft, non-tender, distended, bowel sounds normal; no masses, +hepatomegaly, icteric sclera    Invasive Devices  Report    Peripheral Intravenous Line  Duration           Long-Dwell Peripheral IV (Midline) 09/29/22 2 days                Lab Results:  No results displayed because visit has over 200 results           Latest Reference Range & Units 10/01/22 04:33 09/30/22 9/26/22 9/28/22   AST 14 - 36 U/L 115 (H) 122 138 191   ALT <35 U/L 58 (H) 52 57 75   Alkaline Phosphatase 43 - 122 U/L 155 (H) 137 149 193   Total Protein 6 4 - 8 4 g/dL 6 3 (L) 6 0 5 9 7 1   Albumin 3 5 - 5 0 g/dL 2 2 (L) 2 2 2 1 2 7   TOTAL BILIRUBIN 0 20 - 1 00 mg/dL 10 54 (H) 10 36 10 59 13 68       Imaging Studies: I have personally reviewed pertinent imaging studies

## 2022-10-02 PROBLEM — E87.1 HYPONATREMIA: Status: RESOLVED | Noted: 2022-09-29 | Resolved: 2022-10-02

## 2022-10-02 PROBLEM — E83.42 HYPOMAGNESEMIA: Status: ACTIVE | Noted: 2022-10-02

## 2022-10-02 LAB
ALBUMIN SERPL BCP-MCNC: 2 G/DL (ref 3.5–5)
ALP SERPL-CCNC: 135 U/L (ref 43–122)
ALT SERPL W P-5'-P-CCNC: 57 U/L
ANION GAP SERPL CALCULATED.3IONS-SCNC: 5 MMOL/L (ref 5–14)
AST SERPL W P-5'-P-CCNC: 118 U/L (ref 14–36)
BACTERIA UR CULT: ABNORMAL
BACTERIA UR CULT: ABNORMAL
BASOPHILS # BLD AUTO: 0.01 THOUSANDS/ΜL (ref 0–0.1)
BASOPHILS NFR BLD AUTO: 0 % (ref 0–1)
BILIRUB SERPL-MCNC: 10.26 MG/DL (ref 0.2–1)
BUN SERPL-MCNC: 12 MG/DL (ref 5–25)
CALCIUM ALBUM COR SERPL-MCNC: 9.1 MG/DL (ref 8.3–10.1)
CALCIUM SERPL-MCNC: 7.5 MG/DL (ref 8.4–10.2)
CHLORIDE SERPL-SCNC: 106 MMOL/L (ref 96–108)
CO2 SERPL-SCNC: 27 MMOL/L (ref 21–32)
CREAT SERPL-MCNC: 1.59 MG/DL (ref 0.6–1.2)
EOSINOPHIL # BLD AUTO: 0.01 THOUSAND/ΜL (ref 0–0.61)
EOSINOPHIL NFR BLD AUTO: 0 % (ref 0–6)
GFR SERPL CREATININE-BSD FRML MDRD: 38 ML/MIN/1.73SQ M
GLUCOSE SERPL-MCNC: 84 MG/DL (ref 70–99)
HCT VFR BLD AUTO: 25.5 % (ref 34.8–46.1)
HEMOCCULT STL QL: NEGATIVE
HEMOCCULT STL QL: NORMAL
HEMOCCULT STL QL: NORMAL
HGB BLD-MCNC: 8.5 G/DL (ref 11.5–15.4)
IMM GRANULOCYTES # BLD AUTO: 0.16 THOUSAND/UL (ref 0–0.2)
IMM GRANULOCYTES NFR BLD AUTO: 2 % (ref 0–2)
INR PPP: 1.75 (ref 0.84–1.19)
LYMPHOCYTES # BLD AUTO: 1.84 THOUSANDS/ΜL (ref 0.6–4.47)
LYMPHOCYTES NFR BLD AUTO: 18 % (ref 14–44)
MAGNESIUM SERPL-MCNC: 1.5 MG/DL (ref 1.6–2.3)
MCH RBC QN AUTO: 34.8 PG (ref 26.8–34.3)
MCHC RBC AUTO-ENTMCNC: 33.3 G/DL (ref 31.4–37.4)
MCV RBC AUTO: 105 FL (ref 82–98)
MONOCYTES # BLD AUTO: 0.86 THOUSAND/ΜL (ref 0.17–1.22)
MONOCYTES NFR BLD AUTO: 8 % (ref 4–12)
NEUTROPHILS # BLD AUTO: 7.5 THOUSANDS/ΜL (ref 1.85–7.62)
NEUTS SEG NFR BLD AUTO: 72 % (ref 43–75)
NRBC BLD AUTO-RTO: 0 /100 WBCS
PHOSPHATE SERPL-MCNC: 4.8 MG/DL (ref 2.5–4.8)
PLATELET # BLD AUTO: 252 THOUSANDS/UL (ref 149–390)
PMV BLD AUTO: 9.2 FL (ref 8.9–12.7)
POTASSIUM SERPL-SCNC: 3.4 MMOL/L (ref 3.5–5.3)
PROT SERPL-MCNC: 6 G/DL (ref 6.4–8.4)
PROTHROMBIN TIME: 20.9 SECONDS (ref 11.6–14.5)
RBC # BLD AUTO: 2.44 MILLION/UL (ref 3.81–5.12)
SODIUM SERPL-SCNC: 138 MMOL/L (ref 135–147)
WBC # BLD AUTO: 10.38 THOUSAND/UL (ref 4.31–10.16)

## 2022-10-02 PROCEDURE — 99232 SBSQ HOSP IP/OBS MODERATE 35: CPT | Performed by: FAMILY MEDICINE

## 2022-10-02 PROCEDURE — 83735 ASSAY OF MAGNESIUM: CPT

## 2022-10-02 PROCEDURE — 84100 ASSAY OF PHOSPHORUS: CPT

## 2022-10-02 PROCEDURE — 85610 PROTHROMBIN TIME: CPT

## 2022-10-02 PROCEDURE — 85025 COMPLETE CBC W/AUTO DIFF WBC: CPT

## 2022-10-02 PROCEDURE — 82272 OCCULT BLD FECES 1-3 TESTS: CPT

## 2022-10-02 PROCEDURE — 80053 COMPREHEN METABOLIC PANEL: CPT

## 2022-10-02 PROCEDURE — 99232 SBSQ HOSP IP/OBS MODERATE 35: CPT | Performed by: INTERNAL MEDICINE

## 2022-10-02 PROCEDURE — C9113 INJ PANTOPRAZOLE SODIUM, VIA: HCPCS | Performed by: INTERNAL MEDICINE

## 2022-10-02 RX ORDER — LORAZEPAM 1 MG/1
1 TABLET ORAL
Status: DISCONTINUED | OUTPATIENT
Start: 2022-10-02 | End: 2022-10-02

## 2022-10-02 RX ORDER — POTASSIUM CHLORIDE 20 MEQ/1
40 TABLET, EXTENDED RELEASE ORAL 2 TIMES DAILY
Status: COMPLETED | OUTPATIENT
Start: 2022-10-02 | End: 2022-10-02

## 2022-10-02 RX ORDER — ONDANSETRON 4 MG/1
4 TABLET, ORALLY DISINTEGRATING ORAL EVERY 6 HOURS PRN
Status: DISCONTINUED | OUTPATIENT
Start: 2022-10-02 | End: 2022-10-06 | Stop reason: HOSPADM

## 2022-10-02 RX ORDER — LORAZEPAM 2 MG/ML
1 INJECTION INTRAMUSCULAR
Status: DISCONTINUED | OUTPATIENT
Start: 2022-10-02 | End: 2022-10-02

## 2022-10-02 RX ORDER — MAGNESIUM SULFATE HEPTAHYDRATE 40 MG/ML
4 INJECTION, SOLUTION INTRAVENOUS ONCE
Status: COMPLETED | OUTPATIENT
Start: 2022-10-02 | End: 2022-10-02

## 2022-10-02 RX ORDER — OXYCODONE HYDROCHLORIDE 5 MG/1
5 TABLET ORAL ONCE
Status: COMPLETED | OUTPATIENT
Start: 2022-10-02 | End: 2022-10-02

## 2022-10-02 RX ORDER — MAGNESIUM SULFATE HEPTAHYDRATE 40 MG/ML
1 INJECTION, SOLUTION INTRAVENOUS ONCE
Status: DISCONTINUED | OUTPATIENT
Start: 2022-10-02 | End: 2022-10-02

## 2022-10-02 RX ORDER — LORAZEPAM 2 MG/ML
2 INJECTION INTRAMUSCULAR EVERY 6 HOURS PRN
Status: DISCONTINUED | OUTPATIENT
Start: 2022-10-02 | End: 2022-10-06 | Stop reason: HOSPADM

## 2022-10-02 RX ADMIN — PANTOPRAZOLE SODIUM 40 MG: 40 INJECTION, POWDER, FOR SOLUTION INTRAVENOUS at 09:25

## 2022-10-02 RX ADMIN — METRONIDAZOLE 500 MG: 500 TABLET ORAL at 09:26

## 2022-10-02 RX ADMIN — METRONIDAZOLE 500 MG: 500 TABLET ORAL at 21:35

## 2022-10-02 RX ADMIN — PREDNISOLONE SODIUM PHOSPHATE 40 MG: 15 SOLUTION ORAL at 09:22

## 2022-10-02 RX ADMIN — FOLIC ACID 1 MG: 1 TABLET ORAL at 09:26

## 2022-10-02 RX ADMIN — SODIUM CHLORIDE 150 ML/HR: 0.9 INJECTION, SOLUTION INTRAVENOUS at 04:01

## 2022-10-02 RX ADMIN — MAGNESIUM SULFATE HEPTAHYDRATE 4 G: 40 INJECTION, SOLUTION INTRAVENOUS at 09:26

## 2022-10-02 RX ADMIN — OXYCODONE HYDROCHLORIDE 5 MG: 5 TABLET ORAL at 20:38

## 2022-10-02 RX ADMIN — LACTULOSE 20 G: 10 SOLUTION ORAL at 09:25

## 2022-10-02 RX ADMIN — POTASSIUM CHLORIDE 40 MEQ: 1500 TABLET, EXTENDED RELEASE ORAL at 17:16

## 2022-10-02 RX ADMIN — PANTOPRAZOLE SODIUM 40 MG: 40 INJECTION, POWDER, FOR SOLUTION INTRAVENOUS at 21:35

## 2022-10-02 RX ADMIN — THIAMINE HCL TAB 100 MG 100 MG: 100 TAB at 09:26

## 2022-10-02 RX ADMIN — POTASSIUM CHLORIDE 40 MEQ: 1500 TABLET, EXTENDED RELEASE ORAL at 09:26

## 2022-10-02 RX ADMIN — TRAZODONE HYDROCHLORIDE 150 MG: 100 TABLET ORAL at 21:35

## 2022-10-02 RX ADMIN — LACTULOSE 20 G: 10 SOLUTION ORAL at 17:16

## 2022-10-02 RX ADMIN — SODIUM CHLORIDE 125 ML/HR: 0.9 INJECTION, SOLUTION INTRAVENOUS at 22:36

## 2022-10-02 RX ADMIN — MULTIPLE VITAMINS W/ MINERALS TAB 1 TABLET: TAB ORAL at 09:25

## 2022-10-02 RX ADMIN — NICOTINE 14 MG: 14 PATCH, EXTENDED RELEASE TRANSDERMAL at 09:22

## 2022-10-02 RX ADMIN — SODIUM CHLORIDE 125 ML/HR: 0.9 INJECTION, SOLUTION INTRAVENOUS at 14:37

## 2022-10-02 NOTE — PROGRESS NOTES
Progress Note    Brenton Mcpherson 55 y o  female MRN: [id number]  Unit/Bed#: 7T [facility] 708-01 Encounter: [id number]  Admitting Physician: Wellington Heller  PCP: Allie Belcher PA-C  Date of Admission:  9/28/2022  2:31 AM    Assessment and Plan    Severe Alcoholic hepatitis  Assessment & Plan  No nausea, bloating, constipation, and improvement in epigastric pain since yesterday  Hx: PE, DVT, gastric bypass, smoker, alcohol abuse  Labs: Transminitis, anemia, hyperbilirubinemia  CT Abd/Pelvis (9/28/22): hepatomegaly; severe steatosis, gallstones with no pericholecystic inflammation  X-ray chest PA (9/28/22): No acute cardiopulmonary disease  GI on board; feedback appreciated  Steroids initiated on 9/29/22     - Low sodium diet  - CIWA protocol   - Continue Lactulose 20 g BID    - Continue Prednisolone 40 mg Daily as per GI (Day 4/28) with no plan for taper  - If bleeding, discontinue Prednisolone, start PPI IV and get upper endoscopy  - Continue Pantoprazole 40 mg BID   - AM CMP and PT-INR    - Discharge to Detox when medically stable  VINI (acute kidney injury) ([facility] [address] )  Assessment & Plan  Creatinine since admission: 0 76 (9/28) > 0 87 (9/29/) >1 14 (9/30) >1 77 (10/1)  10/1: 1 59  Baseline 0 6-0 7      - IV maintenance fluids 125 ml/h    - Daily weights and I/Os  - As per GI, low sodium diet  - AM CMP       Other specified anemias  Assessment & Plan  Hgb since admission: 8 0 > 7 9 > 6 7 > 9 0 (2 hrs post-transfusion 9/30/2022) > 8 7   Hgb today 10/2/2022- 8 5  Patient is hemodynamically stable and asymptomatic  No previous history of blood transfusion  No hematemesis, melena or hematochezia  Per GI- Likely an anastomic ulcer given her gastric bypass history (2018) and tobacco use  - Per GI- If evidence of GI bleed, start IV PPI and upper endoscopy  - Stop steroids if bleeding or worsening infection  - Stool occult blood order placed, collect once patient has a bowel movement  - AM CBC  Hypomagnesemia  Assessment & Plan  10/1- 1 6  10/2- 1 5    Goal ~2    -Replete Magnesium 4 g/100ml IVPB (premix) x1  - AM Magnesium    Hypokalemia  Assessment & Plan  10/2/2022- K=3 4    - Replete potassium 40 meq po bid x1 (2 total doses)  - AM CMP        Trichomonas vaginalis (TV) infection  Assessment & Plan  Urinalysis 9/30/22: Trichomonas Vaginalis noted  Last sexual encounter was a month and a half ago and has one sexual partner  Reports yellow discharge, which occurs a few days prior to menstruating      - Flagyl 500 mg BID for 7 days (day 1/7)   - Discussed with patient the importance of partner treatment as well  - Urine GC/Chlamydia ordered and follow up  UTI (urinary tract infection)  Assessment & Plan  UA 09/30/22: positive for opiates,bacteria, WBCs, negative nitirite  Urine culture 9/30/22:  >100 000 Gram negative kenroy   Denies dysuria, urgency or frequency  - S/p Rocephin 1000 mg IV once  Other constipation  Assessment & Plan  Patient had one soft stool yesterday  Scleral icterus  Appreciate GI recommendations  - Lactulose 20 g PO BID  Financial difficulties  Assessment & Plan  Endorses she stopped taking all her medications because of she was unable to afford them  -CM referral in place  Tobacco abuse  Assessment & Plan  Smokes 1/2 a PPD  Not decided on quitting  Pregnancy test negative (9/28/22)  - Nicotine replacement patch order placed  - Smoking cessation counseling  Alcohol abuse  Assessment & Plan  No nausea, bloating, constipation  Mild epigastric pain present, improved since yesterday  Hx: alcohol abuse, drinks 4 cups of vodka daily  AST: 191 > 138 > 122 > 115  ALT: 75 > 57 > 52 > 58   ALP: 193 > 149 > 137 > 155  CT Abd/Pelvis (9/28/22): hepatomegaly; severe steatosis, gallstones with no pericholecystic inflammation  GI consulted; appreciate recommendations       - [facility] protocol   - Ativan 1 mg PO Q6hr PRN for withdrawal symptoms   - Folic acid 1 mg/Thaimine 100 mg/MVit PO daily   - Pantoprazole 40 mg BID  - Transfer to Detox when medically stable  - Low sodium diet per GI recommendation  - AM CMP and PT-INR    - Alcohol cessation counseling  Hyponatremia-resolved as of 10/2/2022  Assessment & Plan  Present on admission  Bilateral +3 edema in LE   9/29/22 Echo: Early grade 1 diastolic dysfunction  EF is estimated as around 64%  Trace tricuspid valve regurgitation  No obvious pulmonary hypertension  No pericardial effusion     - Continue to monitor  VTE Pharmacologic Prophylaxis: None  Pharmacologic: N/A  Mechanical VTE Prophylaxis in Place: Yes    Patient Centered Rounds: I have performed bedside rounds with nursing staff today  Discussions with Specialists or Other Care Team Provider: Gastroenterology and Nursing staff  Education and Discussions with Family / Patient: Discussed plan of care with patient in detail along with lab results and recommendations from GI consult  She was also informed that nutrition consult has been sought and will likely be in effect tomorrow morning  Reported her concerns to nursing and will be reassesed for any improvements  All her questions and concerns were addressed  Patient Information Sharing: Naomie Go does not wish inpatient team to notify their loved ones of their condition and current plan as it stands today  She is comfortable with the information provided and will be happy to pass it along to family as she sees fit  Time Spent for Care: 1 hour  More than 50% of total time spent on counseling and coordination of care as described above  Current Length of Stay: 4 day(s)    Current Patient Status: Inpatient   Certification Statement: The patient will continue to require additional inpatient hospital stay due to Alcoholic hepatitis and the management of her anemia    Discharge Plan: Plan to discharge to detox when clinically stable       Code Status: Level 1 - Full Code      Subjective:   Patient was seen during rounds this morning with the attending and senior resident  She stated she has been having body aches that began during the night with chills and fever  She also stated she did not sleep well last night even though she was medicated  She denies SOB, wheezing, chest pain/tightness, nausea or vomiting  We will continue to monitor  Objective:     Vitals:   Temp (24hrs), Av 6 °F (36 4 °C), Min:96 7 °F (35 9 °C), Max:98 4 °F (36 9 °C)    Temp:  [96 7 °F (35 9 °C)-98 4 °F (36 9 °C)] 96 7 °F (35 9 °C)  HR:  [77-96] 88  Resp:  [18-20] 18  BP: (100-150)/() 110/68  SpO2:  [96 %-100 %] 99 %  Body mass index is 43 16 kg/m²  Input and Output Summary (last 24 hours): Intake/Output Summary (Last 24 hours) at 10/2/2022 1410  Last data filed at 10/2/2022 1208  Gross per 24 hour   Intake 3132 5 ml   Output 200 ml   Net 2932 5 ml       Physical Exam:     Physical Exam  Vitals and nursing note reviewed  Constitutional:       General: She is not in acute distress  Appearance: She is obese  She is not ill-appearing, toxic-appearing or diaphoretic  HENT:      Head: Normocephalic  Right Ear: External ear normal       Left Ear: External ear normal       Nose: Nose normal  No congestion or rhinorrhea  Mouth/Throat:      Mouth: Mucous membranes are dry  Eyes:      General: Scleral icterus present  Extraocular Movements: Extraocular movements intact  Cardiovascular:      Rate and Rhythm: Normal rate and regular rhythm  Pulses: Normal pulses  Heart sounds: Normal heart sounds  No murmur heard  No friction rub  No gallop  Pulmonary:      Effort: Pulmonary effort is normal       Breath sounds: Normal breath sounds  No wheezing, rhonchi or rales  Abdominal:      Tenderness: There is abdominal tenderness  Musculoskeletal:      Right lower leg: Edema present  Left lower leg: Edema present        Comments: +3 pitting edema LLE>RLE   Skin:     General: Skin is warm and dry  Capillary Refill: Capillary refill takes 2 to 3 seconds  Coloration: Skin is pale  Neurological:      General: No focal deficit present  Mental Status: She is alert and oriented to person, place, and time  Psychiatric:         Thought Content: Thought content normal        Additional Data:     Labs:    Results from last 7 days   Lab Units 10/02/22  0418   WBC Thousand/uL 10 38*   HEMOGLOBIN g/dL 8 5*   HEMATOCRIT % 25 5*   PLATELETS Thousands/uL 252   NEUTROS PCT % 72   LYMPHS PCT % 18   MONOS PCT % 8   EOS PCT % 0     Results from last 7 days   Lab Units 10/02/22  0418   POTASSIUM mmol/L 3 4*   CHLORIDE mmol/L 106   CO2 mmol/L 27   BUN mg/dL 12   CREATININE mg/dL 1 59*   CALCIUM mg/dL 7 5*   ALK PHOS U/L 135*   ALT U/L 57*   AST U/L 118*     Results from last 7 days   Lab Units 10/02/22  0418   INR  1 75*     Results from last 7 days   Lab Units 10/01/22  1118 09/30/22  1347 09/29/22  1112   POC GLUCOSE mg/dl 90 130 65             * I Have Reviewed All Lab Data Listed Above  * Additional Pertinent Lab Tests Reviewed:  Charli [facility] Admission Reviewed    Imaging:    Imaging Reports Reviewed Today Include: None  Imaging Personally Reviewed by Myself Includes:  None    Recent Cultures (last 7 days):     Results from last 7 days   Lab Units 09/30/22  1113   URINE CULTURE  >100,000 cfu/ml Gram Negative Raji*       Last 24 Hours Medication List:   Current Facility-Administered Medications   Medication Dose Route Frequency Provider Last Rate    acetaminophen  650 mg Oral Q8H PRN Michel Obregon MD      folic acid  1 mg Oral Daily Patti Hill MD      lactulose  20 g Oral BID Chris Lott PA-C      metroNIDAZOLE  500 mg Oral Q12H [facility] Ej Hinojosa MD      multivitamin-minerals  1 tablet Oral Daily Patti Hill MD      nicotine  14 mg Transdermal Daily Dari Welch MD Brennan      ondansetron  4 mg Oral Q6H PRN Aleta Hernandez MD      pantoprazole  40 mg Intravenous Q12H [address] Barbara Guzman IV, MD      potassium chloride  40 mEq Oral BID Patti Shepherd MD      prednisoLONE  40 mg Oral Daily LINDSAY Irene      sodium chloride  125 mL/hr Intravenous Continuous Dannial MD David 125 mL/hr (10/02/22 1034)    thiamine  100 mg Oral Daily Maliha Tran MD      traZODone  50 mg Oral HS Andreina Alanis,           ** Please Note: Dictation voice to text software may have been used in the creation of this document   **    Patti Tran  10/02/22  2:10 PM

## 2022-10-02 NOTE — PROGRESS NOTES
Progress Note- Naomie Go 55 y o  female MRN: [id number]  ·   Unit/Bed#: 7T [facility] 708-01 Encounter: [id number]      Assessment and Plan:    68-year-old female admitted with icterus, elevated LFTs with AST to ALT ratio greater than 2:1   · Suspect this is secondary to alcoholic hepatitis with significantly elevated discriminant function  Prednisolone started on 09/29/2022  · No evidence of GI bleeding at this time  Viral studies negative  Being treated for UTI  · Based on blood work from admission and bilirubin from today likely model score is favorable for alcoholic hepatitis response to steroids  Will plan to Continue steroids for now  Of course, if she has evidence of worsening infection and or active GI bleeding can stop steroids  Otherwise will plan for a full 28 day course with no need for taper  · Continue CIWA protocol  · Continue PPI daily  · Hemoglobin is stable at this time, continue to check daily  If there is any evidence of active GI bleeding discontinue steroids, start IV PPI and will plan for possible upper endoscopy  · Counseled on the importance of absolute alcohol cessation  · Encouraged good p o  Intake with 3 normal meals and 3 large snacks throughout the day  She has agreed to start drinking nutritional shakes in between meals  ______________________________________________________________________    Subjective:     Patient up and eating breakfast   She feels better today  She is more awake today  She denies any significant abdominal pain, nausea  She denies fevers or chills      Medication Administration - last 24 hours from 10/01/2022 1018 to 10/02/2022 1018       Date/Time Order Dose Route Action Action by     10/02/2022 0926 thiamine tablet 100 mg 100 mg Oral Given Joana St RN     11/74/4680 1760 folic acid (FOLVITE) tablet 1 mg 1 mg Oral Given Joana St RN     10/02/2022 5766 multivitamin-minerals (CENTRUM) tablet 1 tablet 1 tablet Oral Given Verta Cardoso Tigre, RN     10/01/2022 2139 traZODone (DESYREL) tablet 50 mg 50 mg Oral Given Dayla Marine, RN     10/02/2022 3466 prednisoLONE (ORAPRED) oral solution 40 mg 40 mg Oral Given Alind Patrick, RN     10/02/2022 0925 lactulose oral solution 20 g 20 g Oral Given [facility] Patrick, RN     10/01/2022 1813 lactulose oral solution 20 g 20 g Oral Given [facility], RN     10/02/2022 0925 pantoprazole (PROTONIX) injection 40 mg 40 mg Intravenous Given Alinda Patrick, RN     10/01/2022 2139 pantoprazole (PROTONIX) injection 40 mg 40 mg Intravenous Given Dayla Marine, RN     10/02/2022 0401 sodium chloride 0 9 % infusion 150 mL/hr Intravenous [facility], RN     10/01/2022 2139 sodium chloride 0 9 % infusion 150 mL/hr Intravenous [facility], RN     10/01/2022 1349 sodium chloride 0 9 % infusion 150 mL/hr Intravenous [facility], [address] [place] [address]     10/02/2022 5990 nicotine (NICODERM CQ) 14 mg/24hr TD 24 hr patch 14 mg 14 mg Transdermal Medication Applied Hannah Patrick, RN     10/02/2022 3213 nicotine (NICODERM CQ) 14 mg/24hr TD 24 hr patch 14 mg 14 mg Transdermal Patch Removed 1645 [facility],      10/01/2022 1208 nicotine (NICODERM CQ) 14 mg/24hr TD 24 hr patch 14 mg 14 mg Transdermal Medication Applied [facility], MOHIT     10/01/2022 1835 cefTRIAXone (ROCEPHIN) IVPB (premix in dextrose) 1,000 mg 50 mL 0 mg Intravenous Stopped Westjuan manuel Carlos, RN     10/01/2022 1805 cefTRIAXone (ROCEPHIN) IVPB (premix in dextrose) 1,000 mg 50 mL 1,000 mg Intravenous [address] [facility], RN     10/02/2022 9344 metroNIDAZOLE (FLAGYL) tablet 500 mg 500 mg Oral Given Hannaha Patrick, RN     10/01/2022 2139 metroNIDAZOLE (FLAGYL) tablet 500 mg 500 mg Oral Given Dayla Marine, RN     10/01/2022 2139 acetaminophen (TYLENOL) tablet 650 mg 650 mg Oral Given Alvaro Benitez RN     10/02/2022 0909 magnesium sulfate 40 g/1000 mL infusion (premix) 1 g/hr Intravenous Not Given Ana María Nguyen RN     10/02/2022 0926 potassium chloride (K-DUR,KLOR-CON) CR tablet 40 mEq 40 mEq Oral Given Yaquelin Shepherd RN     10/02/2022 7120 magnesium sulfate 4 g/100 mL IVPB (premix) 4 g 4 g Intravenous New Bag Yaquelin Shepherd RN          Objective:     Vitals: Blood pressure 110/68, pulse 88, temperature (!) 96 7 °F (35 9 °C), temperature source Temporal, resp  rate 18, height 5' 7" (1 702 m), weight 125 kg (275 lb 9 2 oz), SpO2 99 %, not currently breastfeeding  ,Body mass index is 43 16 kg/m²  Intake/Output Summary (Last 24 hours) at 10/2/2022 1018  Last data filed at 10/2/2022 0401  Gross per 24 hour   Intake 3252 5 ml   Output 200 ml   Net 3052 5 ml       Physical Exam:   General Appearance: Awake and alert, in no acute distress  Abdomen: Soft, non-tender, distended, bowel sounds normal; no masses, +hepatomegaly, icteric sclera    Invasive Devices  Report    Peripheral Intravenous Line  Duration           Long-Dwell Peripheral IV (Midline) 09/29/22 2 days                Lab Results:  No results displayed because visit has over 200 results  Latest Reference Range & Units 10/01/22 04:33 09/30/22 9/26/22 9/28/22   AST 14 - 36 U/L 115 (H) 122 138 191   ALT <35 U/L 58 (H) 52 57 75   Alkaline Phosphatase 43 - 122 U/L 155 (H) 137 149 193   Total Protein 6 4 - 8 4 g/dL 6 3 (L) 6 0 5 9 7 1   Albumin 3 5 - 5 0 g/dL 2 2 (L) 2 2 2 1 2 7   TOTAL BILIRUBIN 0 20 - 1 00 mg/dL 10 54 (H) 10 36 10 59 13 68       Imaging Studies: I have personally reviewed pertinent imaging studies

## 2022-10-02 NOTE — CASE MANAGEMENT
Case Management Progress Note    Patient name Silvia Field  Location 7T U 708/7T U 708-01 MRN [id number]  : 1975 Date 10/2/2022       LOS (days): 4  Geometric Mean LOS (GMLOS) (days): 3 30  Days to GMLOS:-1        OBJECTIVE:        Current admission status: Inpatient  Preferred Pharmacy:   [address] [place] [address],[address] [place] [address]  Phone: [phone] Fax: [phone]    Primary Care Provider: Wellington Box PA-C    Primary Insurance:   Secondary Insurance:     PROGRESS NOTE:      CM CONSULT for Post Acute Needs- [facility] services v PLACEMENT/STR  Consult addressed by Weekday CM     CM CONSULT CLOSED

## 2022-10-02 NOTE — ASSESSMENT & PLAN NOTE
Urinalysis 9/30/22: Trichomonas Vaginalis noted  Last sexual encounter was a month and a half ago and has one sexual partner  Reports yellow discharge, which occurs a few days prior to menstruating  Patient currently menstruation with 1st day on 10/1/2022    - Flagyl 500 mg BID for 7 days (day 3/7)   - Discussed with patient the importance of partner treatment as well  - Urine GC/Chlamydia ordered and follow up

## 2022-10-03 ENCOUNTER — APPOINTMENT (OUTPATIENT)
Dept: ULTRASOUND IMAGING | Facility: HOSPITAL | Age: 47
DRG: 280 | End: 2022-10-03
Payer: COMMERCIAL

## 2022-10-03 PROBLEM — K80.20 CHOLELITHIASIS: Status: ACTIVE | Noted: 2022-10-03

## 2022-10-03 LAB
ALBUMIN SERPL BCP-MCNC: 2.1 G/DL (ref 3.5–5)
ALP SERPL-CCNC: 136 U/L (ref 43–122)
ALT SERPL W P-5'-P-CCNC: 57 U/L
ANION GAP SERPL CALCULATED.3IONS-SCNC: 4 MMOL/L (ref 5–14)
AST SERPL W P-5'-P-CCNC: 107 U/L (ref 14–36)
BASOPHILS # BLD AUTO: 0 THOUSANDS/ΜL (ref 0–0.1)
BASOPHILS NFR BLD AUTO: 0 % (ref 0–1)
BILIRUB SERPL-MCNC: 9.74 MG/DL (ref 0.2–1)
BUN SERPL-MCNC: 13 MG/DL (ref 5–25)
CALCIUM ALBUM COR SERPL-MCNC: 9.3 MG/DL (ref 8.3–10.1)
CALCIUM SERPL-MCNC: 7.8 MG/DL (ref 8.4–10.2)
CHLORIDE SERPL-SCNC: 107 MMOL/L (ref 96–108)
CO2 SERPL-SCNC: 26 MMOL/L (ref 21–32)
CREAT SERPL-MCNC: 1.19 MG/DL (ref 0.6–1.2)
DAT POLY-SP REAG RBC QL: NEGATIVE
EOSINOPHIL # BLD AUTO: 0 THOUSAND/ΜL (ref 0–0.61)
EOSINOPHIL NFR BLD AUTO: 0 % (ref 0–6)
GFR SERPL CREATININE-BSD FRML MDRD: 54 ML/MIN/1.73SQ M
GLUCOSE SERPL-MCNC: 99 MG/DL (ref 70–99)
HCT VFR BLD AUTO: 24.8 % (ref 34.8–46.1)
HGB BLD-MCNC: 8 G/DL (ref 11.5–15.4)
IGA SERPL-MCNC: 823 MG/DL (ref 70–400)
IGG SERPL-MCNC: 1970 MG/DL (ref 700–1600)
IGM SERPL-MCNC: 203 MG/DL (ref 40–230)
IMM GRANULOCYTES # BLD AUTO: 0.1 THOUSAND/UL (ref 0–0.2)
IMM GRANULOCYTES NFR BLD AUTO: 1 % (ref 0–2)
INR PPP: 1.68 (ref 0.84–1.19)
LYMPHOCYTES # BLD AUTO: 1.71 THOUSANDS/ΜL (ref 0.6–4.47)
LYMPHOCYTES NFR BLD AUTO: 19 % (ref 14–44)
MAGNESIUM SERPL-MCNC: 2.2 MG/DL (ref 1.6–2.3)
MCH RBC QN AUTO: 34.8 PG (ref 26.8–34.3)
MCHC RBC AUTO-ENTMCNC: 32.3 G/DL (ref 31.4–37.4)
MCV RBC AUTO: 108 FL (ref 82–98)
MONOCYTES # BLD AUTO: 1.33 THOUSAND/ΜL (ref 0.17–1.22)
MONOCYTES NFR BLD AUTO: 15 % (ref 4–12)
NEUTROPHILS # BLD AUTO: 6.03 THOUSANDS/ΜL (ref 1.85–7.62)
NEUTS SEG NFR BLD AUTO: 65 % (ref 43–75)
NRBC BLD AUTO-RTO: 0 /100 WBCS
PHOSPHATE SERPL-MCNC: 3.3 MG/DL (ref 2.5–4.8)
PLATELET # BLD AUTO: 227 THOUSANDS/UL (ref 149–390)
PMV BLD AUTO: 9.2 FL (ref 8.9–12.7)
POTASSIUM SERPL-SCNC: 3.7 MMOL/L (ref 3.5–5.3)
PROT SERPL-MCNC: 6.1 G/DL (ref 6.4–8.4)
PROTHROMBIN TIME: 20.2 SECONDS (ref 11.6–14.5)
RBC # BLD AUTO: 2.3 MILLION/UL (ref 3.81–5.12)
SODIUM SERPL-SCNC: 137 MMOL/L (ref 135–147)
T4 SERPL-MCNC: 4.2 UG/DL (ref 4.7–13.3)
WBC # BLD AUTO: 9.17 THOUSAND/UL (ref 4.31–10.16)

## 2022-10-03 PROCEDURE — 85732 THROMBOPLASTIN TIME PARTIAL: CPT | Performed by: STUDENT IN AN ORGANIZED HEALTH CARE EDUCATION/TRAINING PROGRAM

## 2022-10-03 PROCEDURE — 85025 COMPLETE CBC W/AUTO DIFF WBC: CPT | Performed by: STUDENT IN AN ORGANIZED HEALTH CARE EDUCATION/TRAINING PROGRAM

## 2022-10-03 PROCEDURE — 82784 ASSAY IGA/IGD/IGG/IGM EACH: CPT | Performed by: STUDENT IN AN ORGANIZED HEALTH CARE EDUCATION/TRAINING PROGRAM

## 2022-10-03 PROCEDURE — 83735 ASSAY OF MAGNESIUM: CPT | Performed by: STUDENT IN AN ORGANIZED HEALTH CARE EDUCATION/TRAINING PROGRAM

## 2022-10-03 PROCEDURE — 84436 ASSAY OF TOTAL THYROXINE: CPT | Performed by: STUDENT IN AN ORGANIZED HEALTH CARE EDUCATION/TRAINING PROGRAM

## 2022-10-03 PROCEDURE — 85670 THROMBIN TIME PLASMA: CPT | Performed by: STUDENT IN AN ORGANIZED HEALTH CARE EDUCATION/TRAINING PROGRAM

## 2022-10-03 PROCEDURE — 99232 SBSQ HOSP IP/OBS MODERATE 35: CPT | Performed by: FAMILY MEDICINE

## 2022-10-03 PROCEDURE — 99231 SBSQ HOSP IP/OBS SF/LOW 25: CPT | Performed by: PHYSICIAN ASSISTANT

## 2022-10-03 PROCEDURE — 99254 IP/OBS CNSLTJ NEW/EST MOD 60: CPT | Performed by: NURSE PRACTITIONER

## 2022-10-03 PROCEDURE — 76705 ECHO EXAM OF ABDOMEN: CPT

## 2022-10-03 PROCEDURE — 86147 CARDIOLIPIN ANTIBODY EA IG: CPT | Performed by: STUDENT IN AN ORGANIZED HEALTH CARE EDUCATION/TRAINING PROGRAM

## 2022-10-03 PROCEDURE — 85245 CLOT FACTOR VIII VW RISTOCTN: CPT | Performed by: STUDENT IN AN ORGANIZED HEALTH CARE EDUCATION/TRAINING PROGRAM

## 2022-10-03 PROCEDURE — 76856 US EXAM PELVIC COMPLETE: CPT

## 2022-10-03 PROCEDURE — 84100 ASSAY OF PHOSPHORUS: CPT | Performed by: STUDENT IN AN ORGANIZED HEALTH CARE EDUCATION/TRAINING PROGRAM

## 2022-10-03 PROCEDURE — 86880 COOMBS TEST DIRECT: CPT | Performed by: STUDENT IN AN ORGANIZED HEALTH CARE EDUCATION/TRAINING PROGRAM

## 2022-10-03 PROCEDURE — 85598 HEXAGNAL PHOSPH PLTLT NEUTRL: CPT | Performed by: STUDENT IN AN ORGANIZED HEALTH CARE EDUCATION/TRAINING PROGRAM

## 2022-10-03 PROCEDURE — C9113 INJ PANTOPRAZOLE SODIUM, VIA: HCPCS | Performed by: INTERNAL MEDICINE

## 2022-10-03 PROCEDURE — 76830 TRANSVAGINAL US NON-OB: CPT

## 2022-10-03 PROCEDURE — 84479 ASSAY OF THYROID (T3 OR T4): CPT | Performed by: STUDENT IN AN ORGANIZED HEALTH CARE EDUCATION/TRAINING PROGRAM

## 2022-10-03 PROCEDURE — 85246 CLOT FACTOR VIII VW ANTIGEN: CPT | Performed by: STUDENT IN AN ORGANIZED HEALTH CARE EDUCATION/TRAINING PROGRAM

## 2022-10-03 PROCEDURE — 85705 THROMBOPLASTIN INHIBITION: CPT | Performed by: STUDENT IN AN ORGANIZED HEALTH CARE EDUCATION/TRAINING PROGRAM

## 2022-10-03 PROCEDURE — 85613 RUSSELL VIPER VENOM DILUTED: CPT | Performed by: STUDENT IN AN ORGANIZED HEALTH CARE EDUCATION/TRAINING PROGRAM

## 2022-10-03 PROCEDURE — 85610 PROTHROMBIN TIME: CPT | Performed by: STUDENT IN AN ORGANIZED HEALTH CARE EDUCATION/TRAINING PROGRAM

## 2022-10-03 PROCEDURE — 85240 CLOT FACTOR VIII AHG 1 STAGE: CPT | Performed by: STUDENT IN AN ORGANIZED HEALTH CARE EDUCATION/TRAINING PROGRAM

## 2022-10-03 PROCEDURE — 85300 ANTITHROMBIN III ACTIVITY: CPT | Performed by: STUDENT IN AN ORGANIZED HEALTH CARE EDUCATION/TRAINING PROGRAM

## 2022-10-03 PROCEDURE — 80053 COMPREHEN METABOLIC PANEL: CPT | Performed by: STUDENT IN AN ORGANIZED HEALTH CARE EDUCATION/TRAINING PROGRAM

## 2022-10-03 RX ORDER — OXYCODONE HYDROCHLORIDE 5 MG/1
5 TABLET ORAL ONCE
Status: DISCONTINUED | OUTPATIENT
Start: 2022-10-03 | End: 2022-10-05

## 2022-10-03 RX ORDER — POTASSIUM CHLORIDE 20 MEQ/1
40 TABLET, EXTENDED RELEASE ORAL ONCE
Status: COMPLETED | OUTPATIENT
Start: 2022-10-03 | End: 2022-10-03

## 2022-10-03 RX ADMIN — SODIUM CHLORIDE 125 ML/HR: 0.9 INJECTION, SOLUTION INTRAVENOUS at 05:45

## 2022-10-03 RX ADMIN — LACTULOSE 20 G: 10 SOLUTION ORAL at 08:20

## 2022-10-03 RX ADMIN — ACETAMINOPHEN 650 MG: 325 TABLET ORAL at 08:32

## 2022-10-03 RX ADMIN — LACTULOSE 20 G: 10 SOLUTION ORAL at 17:48

## 2022-10-03 RX ADMIN — SODIUM CHLORIDE 125 ML/HR: 0.9 INJECTION, SOLUTION INTRAVENOUS at 14:03

## 2022-10-03 RX ADMIN — METRONIDAZOLE 500 MG: 500 TABLET ORAL at 21:17

## 2022-10-03 RX ADMIN — THIAMINE HCL TAB 100 MG 100 MG: 100 TAB at 08:21

## 2022-10-03 RX ADMIN — FOLIC ACID 1 MG: 1 TABLET ORAL at 08:21

## 2022-10-03 RX ADMIN — NICOTINE 14 MG: 14 PATCH, EXTENDED RELEASE TRANSDERMAL at 08:22

## 2022-10-03 RX ADMIN — METRONIDAZOLE 500 MG: 500 TABLET ORAL at 08:20

## 2022-10-03 RX ADMIN — TRAZODONE HYDROCHLORIDE 150 MG: 100 TABLET ORAL at 21:16

## 2022-10-03 RX ADMIN — PANTOPRAZOLE SODIUM 40 MG: 40 INJECTION, POWDER, FOR SOLUTION INTRAVENOUS at 08:21

## 2022-10-03 RX ADMIN — MULTIPLE VITAMINS W/ MINERALS TAB 1 TABLET: TAB ORAL at 08:21

## 2022-10-03 RX ADMIN — PREDNISOLONE SODIUM PHOSPHATE 40 MG: 15 SOLUTION ORAL at 08:22

## 2022-10-03 RX ADMIN — POTASSIUM CHLORIDE 40 MEQ: 1500 TABLET, EXTENDED RELEASE ORAL at 14:04

## 2022-10-03 RX ADMIN — PANTOPRAZOLE SODIUM 40 MG: 40 INJECTION, POWDER, FOR SOLUTION INTRAVENOUS at 21:17

## 2022-10-03 RX ADMIN — ONDANSETRON 4 MG: 4 TABLET, ORALLY DISINTEGRATING ORAL at 08:32

## 2022-10-03 RX ADMIN — SODIUM CHLORIDE 125 ML/HR: 0.9 INJECTION, SOLUTION INTRAVENOUS at 21:17

## 2022-10-03 NOTE — ASSESSMENT & PLAN NOTE
Hx: DVT (2003,2007,2017), PE, worked with [employer]  PE: Tenderness in both calves on palpation; left>right LE; Left calve circ>right  Hgb since admission: 8 0 > 7 9 > 6 7 > 9 0 (2 hrs post-transfusion 9/30/2022) > 8 7>8 5  Hgb today 10/3/2022- 8 0      - Vas lower limb venous duplex   - Antithrombin III activity, lupus anticoagulant, cardiolipin Antibody, Ristocetin VWF profile, factor 8 activity, IgM, IgA, IgG, and direct antiglobulin

## 2022-10-03 NOTE — CASE MANAGEMENT
Case Management Progress Note    Patient name Silvia Field  Location 7T U 708/7T U 708-01 MRN [id number]  : 1975 Date 10/3/2022       LOS (days): 5  Geometric Mean LOS (GMLOS) (days): 3 30  Days to GMLOS:-2        OBJECTIVE:        Current admission status: Inpatient  Preferred Pharmacy:   [address] [place] [address],[address] [place] [address]  Phone: [phone] Fax: [phone]    Primary Care Provider: Wellington Box PA-C    Primary Insurance:   Secondary Insurance:     PROGRESS NOTE:  Pt is listed as Self pay; No insurance   CM sent referral to hospital financial counselors for Rx coverage and Paths referral   CM department will continue to follow thorough pt's D/C

## 2022-10-03 NOTE — ASSESSMENT & PLAN NOTE
9/29- Gallstones without pericholecystic inflammatory changes  C/o epigastric and RUQ pain, morphy sign neg     Abdominal US pertinent for cholelithiasis in a poorly distended gallbladder      - Consult Sx

## 2022-10-03 NOTE — PROGRESS NOTES
Patient Name: Latonia Ceballos  Patient MRN: [id number]  Date: 10/03/22  Service: [facility]    Subjective   Labs, notes reviewed  Patient denies fever/chills  Has had some abdominal pain reported over the weekend  Eating approx 25-50% meals  +BMs, no gross GI bleeding reported, no incontinence  This morning reports feeling weak  No other gi complaints  Vitals  Blood pressure 105/71, pulse 82, temperature 97 8 °F (36 6 °C), temperature source Temporal, resp  rate 18, height 5' 7" (1 702 m), weight 125 kg (275 lb 9 2 oz), SpO2 99 %, not currently breastfeeding  Physical Exam:     General Appearance:    Awake, alert, oriented x3, no distress, well developed, well    nourished   Head:    Normocephalic without obvious abnormality   Eyes:    EOM's intact +icterus   Neck:   Supple, no adenopathy   Throat:   Mucous membranes moist   Lungs:     Clear to auscultation bilaterally, no wheezing or rhonchi   Heart:    Regular rate and rhythm, S1 and S2 normal, no murmur   Abdomen:     Soft, obese, +RUQ/epigastric ttp, non-distended  bowel sounds active  +palpable liver edge  No masses, rebound or guarding  Extremities:   Extremities with pitting edema   Psych  Derm:   Normal affect    Warm and dry   Neurologic:   CNII-XII grossly intact  Speech intact  No asterixis         Laboratory Studies  Results from last 7 days   Lab Units 10/03/22  0525 10/02/22  0418 10/01/22  0433 09/30/22  2214 09/30/22  0534 09/29/22  1341 09/29/22  0530 09/28/22  0357 09/28/22  0317   WBC Thousand/uL 9 17 10 38* 9 08  --  8 86 7 77  --   --  12 08*   HEMOGLOBIN g/dL 8 0* 8 5* 8 7* 9 0* 6 7* 7 9*  --   --  8 0*   HEMATOCRIT % 24 8* 25 5* 25 9* 27 2* 19 6* 22 7*  --   --  23 4*   PLATELETS Thousands/uL 227 252 273  --  269  269 282  264  --   --  257   INR  1 68* 1 75* 1 62* 1 66* 1 89* 1 78* 1 95*   < >  --     < > = values in this interval not displayed       Results from last 7 days   Lab Units 10/03/22  0525 10/02/22  8415 10/01/22  0433 09/30/22  0534 09/29/22  0530   SODIUM mmol/L 137 138 138 135 133*   POTASSIUM mmol/L 3 7 3 4* 3 5 3 8 3 6   CHLORIDE mmol/L 107 106 104 102 101   CO2 mmol/L 26 27 28 26 26   BUN mg/dL 13 12 9 8 6   CREATININE mg/dL 1 19 1 59* 1 77* 1 14 0 87   CALCIUM mg/dL 7 8* 7 5* 7 6* 7 3* 7 5*   ALBUMIN g/dL 2 1* 2 0* 2 2* 2 2* 2 1*   TOTAL BILIRUBIN mg/dL 9 74* 10 26* 10 54* 10 63* 10 59*   ALK PHOS U/L 136* 135* 155* 137* 149*   ALT U/L 57* 57* 58* 52* 57*   AST U/L 107* 118* 115* 122* 138*     ASMA 26 (weak positive)   AMA WNL  BIJAN WNL  Ceruloplasmin 20 2  Iron 128, 67% sat, ferritin 100, TIBC 190  Hepatitis A/B/C negative  A1AT pending    Imaging and Other Studies      Inhouse Medications     Current Facility-Administered Medications:     acetaminophen (TYLENOL) tablet 650 mg, 650 mg, Oral, Q8H PRN, 650 mg at 72/67/04 5460    folic acid (FOLVITE) tablet 1 mg, 1 mg, Oral, Daily, 1 mg at 10/02/22 0926    lactulose oral solution 20 g, 20 g, Oral, BID, 20 g at 10/02/22 1716    LORazepam (ATIVAN) injection 2 mg, 2 mg, Intravenous, Q6H PRN    metroNIDAZOLE (FLAGYL) tablet 500 mg, 500 mg, Oral, Q12H LUNA, 500 mg at 10/02/22 2135    multivitamin-minerals (CENTRUM) tablet 1 tablet, 1 tablet, Oral, Daily, 1 tablet at 10/02/22 0925    nicotine (NICODERM CQ) 14 mg/24hr TD 24 hr patch 14 mg, 14 mg, Transdermal, Daily, 14 mg at 10/02/22 0922    ondansetron (ZOFRAN-ODT) dispersible tablet 4 mg, 4 mg, Oral, Q6H PRN    pantoprazole (PROTONIX) injection 40 mg, 40 mg, Intravenous, Q12H LUNA, 40 mg at 10/02/22 2135    prednisoLONE (ORAPRED) oral solution 40 mg, 40 mg, Oral, Daily, 40 mg at 10/02/22 8647    sodium chloride 0 9 % infusion, 125 mL/hr, Intravenous, Continuous, 125 mL/hr at 10/03/22 0545    thiamine tablet 100 mg, 100 mg, Oral, Daily, 100 mg at 10/02/22 0926    traZODone (DESYREL) tablet 150 mg, 150 mg, Oral, HS, 150 mg at 10/02/22 2139      Assessment/Plan:    1   Elevated liver enzymes (AST>ALT) c/w alcoholic hepatitis - trending down  Steroids initiated 9/29 (prednisolone 40mg/day x 4 weeks), check Lille score on Day 7  No evidence of gi bleeding/worsening infection  Liver serologic testing ongoing  Weak positive ASMA with normal AMA, BIJAN makes AIH less likely  Strict alcohol abstinence/rehab  Low sodium diet  2  AUD on CIWA protocol  Plan to transfer to Detox unit once medically stable  Mgmt per primary service  3  Acute on chronic macrocytic anemia in the setting of AUD, gastric bypass, low normal folate, abnormal thyroid fxn, and tobacco use  Prophylactic PPI  No active gi bleeding reported, FOBT negative  No plans for endoscopy currently  Status post 2u pRBCs  Continue to monitor cbc, transfuse as needed  4  Morbid obesity s/p bariatric surgery 2018 - recommend weight loss, lifestyle modification  Follow up with Bariatrics  5  History of DVT/PE 2012, noncompliant with [facility]  Lovenox on hold per primary service        Juan Perez PA-C

## 2022-10-03 NOTE — CONSULTS
Medical Oncology/Hematology Consult Note  Ranjit Au, female, 55 y o , 1975,  7T /7T -01, [id number]     Assessment and Plan  1  Hypercoagulable state:  Patient has had 3 separate episodes of DVT/PE  Not the greatest historian but appears that she has had 3 separate episodes around 2003, 2016 after bariatric surgery and noted bilateral large PE in 2018  Patient states that she was on Lovenox injections at 1 point but stopped them about a year ago as she can no longer afford them  Would recommend patient continue anticoagulation indefinitely due to her significant history of her with close monitoring of her anemia  Repeat venous duplex of the lower extremities is in process ordered by primary team since she did report some new calf pain this morning awaiting result  Hypercoagulable workup in process; will order/additional studies however would not change recommendation of continuing anticoagulation indefinite  Anticoagulation options would be to resume Lovenox injections, transition to Coumadin or even trial of DOAC Eliquis would not be unreasonable  (Noemi-en-Y gastric bypass surgery is unlikely to significantly affect bioavailability of Eliquis since jejunum is preserved  There has been suggestion of Eliquis decrease by up to 30% in patients with morbid obesity greater than 120 kg- however retrospective data suggests no dosage adjustment is necessary in patients with BMI greater than 40/weigh 100-300 kg when compared to warfarin as there were similar recurrent thrombolic/bleeding events in both arms )    2  Macrocytic anemia:  Acute on chronic  Likely multifactorial including recent heavy alcohol use, ETOH hepatitis/cirrhosis and nutritional deficiencies  She was already started on folic acid supplements  Would repeat iron panel to rule out iron deficiency    Her initial iron panel is more consistent with overload however specimen was hemolyzed and was checked shortly after recent heavy alcohol use  She does seem to have symptoms suggestive of iron deficiency including PICA for ice, leg cramps/restless legs with history of bariatric surgery/menorrhagia  She is found to have iron deficiency could treat with a few doses of IV iron cautiously since she has liver cirrhosis  Will order additional workup to rule out other etiologies of her chronic anemia including monoclonal gammopathy and hemolysis  3  Transaminitis:  GI is following  Likely due to alcoholic hepatitis  Was started on prednisone 40 mg daily  Will likely be transferred to detox unit when she is medically stable  We will continue to monitor patient and her laboratory studies closely while she is in the hospital   Can arrange for outpatient follow-up to monitor her anemia and review hypercoagulable workup/finalize plan for anticoagulation after she is discharged from the hospital     Reason for consultation:  History of multiple DVT/PE, macrocytic anemia, liver cirrhosis    History of present illness:  Patient is a 63-year-old female with past medical history of alcohol abuse, tobacco abuse, sleep apnea gastric bypass surgery and 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  Patient states that her 1st episode of left lower extremity DVT with PE was around 2003 unprovoked she was on warfarin for some time which was then stopped  Second episode happened after her bariatric surgery again left lower extremity with PE 2016  Most recent episode was large bilateral PE around 2018 she was using injections I suspect Lovenox on a regular basis until about a year ago  She states that she stopped taking her anticoagulation as she can no longer afford the injections  Presented to the emergency department 09/08/2022 with reports of abdominal pain and constipation  She was found to have transaminitis and elevated total bilirubin and admitted to rule out biliary cirrhosis    She is being followed by GI who believes her symptoms may be due to alcohol-induced hepatitis  She was started on prednisone around 09/29/2022  No plans for endoscopy at this time  Her hemoglobin did drop down to 6 7 on 09/30/2022 she was supported thus far with 2 units of packed red blood cells  Stool for occult blood came back negative  She does report fatigue  But states that she is feeling much better at this point in comparison to when she was originally admitted  Her appetite is fair and has been so since her bariatric surgery  She does admit to occasional headaches  Admits to numbness and tingling in her toes  Admits to PICA for ice  Admits to restless legs and leg cramps  She denies any obvious bleeding from any site other than her menses which she states has been rather heavy for some time  Did report some calf/leg pain this morning and bilateral lower extremity duplex been ordered by medical team and is pending  Her most recent laboratory studies from this morning showed normal white cells and platelets, she has macrocytic anemia H&H 8 0/24 8,   Creatinine 1 19, GFR 54, , ALT 57, alk-phos 136, total protein and albumin are decreased, total bili elevated 9 74  PT is prolonged 20 2 INR 1 68  Reviewed prior laboratory studies  Her PTT also seems to be prolonged with decreased fibrinogen but has been maintaining above 150  Folic acid was low 4 8 is already started oral folic acid supplementation  Her vitamin B12 is appropriate 1586  Hepatitis panel was nonreactive  BIJAN negative  Her iron panel 09/29/2022 showed elevated iron saturation 67%, TIBC 190, serum iron 128 with ferritin 100; question if inaccurate as it was noted to be hemolyzed, also with prior heavy alcohol consumption may be inaccurate  She had an ultrasound of the right upper quadrant 10/03/2022 which showed:  IMPRESSION:  Cholelithiasis    Presence of borderline wall thickening may relate to 3rd spacing from liver disease, but recommend correlation for clinical or laboratory features of cholecystitis  Hepatomegaly with diffuse steatosis  Contour nodularity suggests concomitant cirrhosis  Mild ascites        Review of Systems:   Review of Systems   Constitutional: Positive for activity change and fatigue  Negative for appetite change and fever  HENT: Negative for mouth sores and nosebleeds  Respiratory: Negative for shortness of breath  Cardiovascular: Negative for chest pain  Gastrointestinal: Positive for abdominal pain  Negative for anal bleeding and blood in stool  Genitourinary: Positive for menstrual problem  Negative for difficulty urinating and hematuria  Skin: Negative for rash  Neurological: Positive for weakness, numbness and headaches  Negative for dizziness  Psychiatric/Behavioral: Positive for dysphoric mood         Past Medical History:   Diagnosis Date    DVT (deep venous thrombosis) (HCC)     Pulmonary embolism (HCC)        Past Surgical History:   Procedure Laterality Date    ANKLE SURGERY Left     GASTRIC BYPASS         Family History   Problem Relation Age of Onset    Hypertension Mother        Social History     Socioeconomic History    Marital status: Single     Spouse name: Not on file    Number of children: Not on file    Years of education: Not on file    Highest education level: Not on file   Occupational History    Not on file   Tobacco Use    Smoking status: Current Every Day Smoker     Packs/day: 0 50     Years: 25 00     Pack years: 12 50     Last attempt to quit: 2018     Years since quittin 1    Smokeless tobacco: Never Used    Tobacco comment: 5-10 cigarettes / daily  N[employer] FORMER SMOKER QUIT DATE 2017   Vaping Use    Vaping Use: Never used   Substance and Sexual Activity    Alcohol use: Yes     Comment: 5 cups vodka/day    Drug use: No    Sexual activity: Not on file   Other Topics Concern    Not on file   Social History Narrative    Not on file     Social Determinants of Health     Financial Resource Strain: High Risk    Difficulty of Paying Living Expenses: Hard   Food Insecurity: No Food Insecurity    Worried About Running Out of Food in the Last Year: Never true    Ran Out of Food in the Last Year: Never true   Transportation Needs: No Transportation Needs    Lack of Transportation (Medical): No    Lack of Transportation (Non-Medical):  No   Physical Activity: Not on file   Stress: Not on file   Social Connections: Not on file   Intimate Partner Violence: Not on file   Housing Stability: Unknown    Unable to Pay for Housing in the Last Year: No    Number of Places Lived in the Last Year: 1    Unstable Housing in the Last Year: Not on file         Current Facility-Administered Medications:     acetaminophen (TYLENOL) tablet 650 mg, 650 mg, Oral, Q8H PRN, Tomas Olivas MD, 650 mg at 71/26/35 5032    folic acid (FOLVITE) tablet 1 mg, 1 mg, Oral, Daily, Ruth Post MD, 1 mg at 10/03/22 0821    lactulose oral solution 20 g, 20 g, Oral, BID, Kimmie Barnhart PA-C, 20 g at 10/03/22 0820    LORazepam (ATIVAN) injection 2 mg, 2 mg, Intravenous, Q6H PRN, Alfornia Cushing, MD    metroNIDAZOLE (FLAGYL) tablet 500 mg, 500 mg, Oral, Q12H [facility], Brad Gates MD, 500 mg at 10/03/22 0820    multivitamin-minerals (CENTRUM) tablet 1 tablet, 1 tablet, Oral, Daily, Ruth Post MD, 1 tablet at 10/03/22 0821    nicotine (NICODERM CQ) 14 mg/24hr TD 24 hr patch 14 mg, 14 mg, Transdermal, Daily, Brad Gates MD, 14 mg at 10/03/22 6273    ondansetron (ZOFRAN-ODT) dispersible tablet 4 mg, 4 mg, Oral, Q6H PRN, Katarina Fontanez MD, 4 mg at 10/03/22 0832    pantoprazole (PROTONIX) injection 40 mg, 40 mg, Intravenous, Q12H [facility], Shaun Guzman IV, MD, 40 mg at 10/03/22 0821    prednisoLONE (ORAPRED) oral solution 40 mg, 40 mg, Oral, Daily, Kimmie Barnhart PA-C, 40 mg at 10/03/22 1222    sodium chloride 0 9 % infusion, 125 mL/hr, Intravenous, Continuous, Sarah Watt MD, Last Rate: 125 mL/hr at 10/03/22 1403, 125 mL/hr at 10/03/22 1403    thiamine tablet 100 mg, 100 mg, Oral, Daily, Patti Bryant MD, 100 mg at 10/03/22 5038    traZODone (DESYREL) tablet 150 mg, 150 mg, Oral, HS, Tonya Dumont MD, 150 mg at 10/02/22 1979    Medications Prior to Admission   Medication    ergocalciferol (VITAMIN D2) 50,000 units    erythromycin (ILOTYCIN) ophthalmic ointment    folic acid (FOLVITE) 1 mg tablet    gabapentin (NEURONTIN) 800 mg tablet    ketorolac (TORADOL) 10 mg tablet    nitrofurantoin (MACROBID) 100 mg capsule    phenazopyridine (PYRIDIUM) 200 mg tablet    Thiamine HCl (VITAMIN B-1) 100 MG TABS    warfarin (COUMADIN) 2 mg tablet       Allergies   Allergen Reactions    Gabapentin Rash     Pt is currently on gabapentin  Denies allergy         Physical Exam:    /85 (BP Location: Left arm)   Pulse 80   Temp 97 9 °F (36 6 °C) (Axillary)   Resp 20   Ht 5' 7" (1 702 m)   Wt 125 kg (275 lb 9 2 oz)   SpO2 96%   BMI 43 16 kg/m²     Physical Exam  Constitutional:       General: She is not in acute distress  Appearance: She is well-developed  She is obese  She is not diaphoretic  HENT:      Head: Normocephalic and atraumatic  Nose: Nose normal    Eyes:      General: Scleral icterus present  Conjunctiva/sclera: Conjunctivae normal    Pulmonary:      Effort: Pulmonary effort is normal  No respiratory distress  Musculoskeletal:         General: No deformity  Normal range of motion  Cervical back: Normal range of motion and neck supple  Skin:     Coloration: Skin is not pale  Findings: No rash  Neurological:      Mental Status: She is alert and oriented to person, place, and time  Psychiatric:         Mood and Affect: Mood is depressed  Affect is blunt  Behavior: Behavior normal          Thought Content:  Thought content normal  Judgment: Judgment normal          Recent Results (from the past 48 hour(s))   CBC and differential    Collection Time: 10/02/22  4:18 AM   Result Value Ref Range    WBC 10 38 (H) 4 31 - 10 16 Thousand/uL    RBC 2 44 (L) 3 81 - 5 12 Million/uL    Hemoglobin 8 5 (L) 11 5 - 15 4 g/dL    Hematocrit 25 5 (L) 34 8 - 46 1 %     (H) 82 - 98 fL    MCH 34 8 (H) 26 8 - 34 3 pg    MCHC 33 3 31 4 - 37 4 g/dL    MPV 9 2 8 9 - 12 7 fL    Platelets 473 030 - 097 Thousands/uL    nRBC 0 /100 WBCs    Neutrophils Relative 72 43 - 75 %    Immat GRANS % 2 0 - 2 %    Lymphocytes Relative 18 14 - 44 %    Monocytes Relative 8 4 - 12 %    Eosinophils Relative 0 0 - 6 %    Basophils Relative 0 0 - 1 %    Neutrophils Absolute 7 50 1 85 - 7 62 Thousands/µL    Immature Grans Absolute 0 16 0 00 - 0 20 Thousand/uL    Lymphocytes Absolute 1 84 0 60 - 4 47 Thousands/µL    Monocytes Absolute 0 86 0 17 - 1 22 Thousand/µL    Eosinophils Absolute 0 01 0 00 - 0 61 Thousand/µL    Basophils Absolute 0 01 0 00 - 0 10 Thousands/µL   Comprehensive metabolic panel    Collection Time: 10/02/22  4:18 AM   Result Value Ref Range    Sodium 138 135 - 147 mmol/L    Potassium 3 4 (L) 3 5 - 5 3 mmol/L    Chloride 106 96 - 108 mmol/L    CO2 27 21 - 32 mmol/L    ANION GAP 5 5 - 14 mmol/L    BUN 12 5 - 25 mg/dL    Creatinine 1 59 (H) 0 60 - 1 20 mg/dL    Glucose 84 70 - 99 mg/dL    Calcium 7 5 (L) 8 4 - 10 2 mg/dL    Corrected Calcium 9 1 8 3 - 10 1 mg/dL     (H) 14 - 36 U/L    ALT 57 (H) <35 U/L    Alkaline Phosphatase 135 (H) 43 - 122 U/L    Total Protein 6 0 (L) 6 4 - 8 4 g/dL    Albumin 2 0 (L) 3 5 - 5 0 g/dL    Total Bilirubin 10 26 (H) 0 20 - 1 00 mg/dL    eGFR 38 ml/min/1 73sq m   Protime-INR    Collection Time: 10/02/22  4:18 AM   Result Value Ref Range    Protime 20 9 (H) 11 6 - 14 5 seconds    INR 1 75 (H) 0 84 - 1 19   Magnesium    Collection Time: 10/02/22  4:18 AM   Result Value Ref Range    Magnesium 1 5 (L) 1 6 - 2 3 mg/dL   Phosphorus Collection Time: 10/02/22  4:18 AM   Result Value Ref Range    Phosphorus 4 8 2 5 - 4 8 mg/dL   Occult blood 1-3, stool    Collection Time: 10/02/22  2:23 PM   Result Value Ref Range    Fecal Occult Blood Diagnostic Negative Negative    Fecal Occult Blood Diagnostic 2 Test not performed Negative    Fecal Occult Blood Diagnostic 3 Test not performed Negative   CBC and differential    Collection Time: 10/03/22  5:25 AM   Result Value Ref Range    WBC 9 17 4 31 - 10 16 Thousand/uL    RBC 2 30 (L) 3 81 - 5 12 Million/uL    Hemoglobin 8 0 (L) 11 5 - 15 4 g/dL    Hematocrit 24 8 (L) 34 8 - 46 1 %     (H) 82 - 98 fL    MCH 34 8 (H) 26 8 - 34 3 pg    MCHC 32 3 31 4 - 37 4 g/dL    MPV 9 2 8 9 - 12 7 fL    Platelets 455 113 - 422 Thousands/uL    nRBC 0 /100 WBCs    Neutrophils Relative 65 43 - 75 %    Immat GRANS % 1 0 - 2 %    Lymphocytes Relative 19 14 - 44 %    Monocytes Relative 15 (H) 4 - 12 %    Eosinophils Relative 0 0 - 6 %    Basophils Relative 0 0 - 1 %    Neutrophils Absolute 6 03 1 85 - 7 62 Thousands/µL    Immature Grans Absolute 0 10 0 00 - 0 20 Thousand/uL    Lymphocytes Absolute 1 71 0 60 - 4 47 Thousands/µL    Monocytes Absolute 1 33 (H) 0 17 - 1 22 Thousand/µL    Eosinophils Absolute 0 00 0 00 - 0 61 Thousand/µL    Basophils Absolute 0 00 0 00 - 0 10 Thousands/µL   Comprehensive metabolic panel    Collection Time: 10/03/22  5:25 AM   Result Value Ref Range    Sodium 137 135 - 147 mmol/L    Potassium 3 7 3 5 - 5 3 mmol/L    Chloride 107 96 - 108 mmol/L    CO2 26 21 - 32 mmol/L    ANION GAP 4 (L) 5 - 14 mmol/L    BUN 13 5 - 25 mg/dL    Creatinine 1 19 0 60 - 1 20 mg/dL    Glucose 99 70 - 99 mg/dL    Calcium 7 8 (L) 8 4 - 10 2 mg/dL    Corrected Calcium 9 3 8 3 - 10 1 mg/dL     (H) 14 - 36 U/L    ALT 57 (H) <35 U/L    Alkaline Phosphatase 136 (H) 43 - 122 U/L    Total Protein 6 1 (L) 6 4 - 8 4 g/dL    Albumin 2 1 (L) 3 5 - 5 0 g/dL    Total Bilirubin 9 74 (H) 0 20 - 1 00 mg/dL    eGFR 54 ml/min/1 73sq m   Protime-INR    Collection Time: 10/03/22  5:25 AM   Result Value Ref Range    Protime 20 2 (H) 11 6 - 14 5 seconds    INR 1 68 (H) 0 84 - 1 19   Magnesium    Collection Time: 10/03/22  5:25 AM   Result Value Ref Range    Magnesium 2 2 1 6 - 2 3 mg/dL   T4    Collection Time: 10/03/22  5:25 AM   Result Value Ref Range    T4 TOTAL 4 2 (L) 4 7 - 13 3 ug/dL   Phosphorus    Collection Time: 10/03/22  5:25 AM   Result Value Ref Range    Phosphorus 3 3 2 5 - 4 8 mg/dL       XR chest pa & lateral    Result Date: 9/28/2022  Narrative: CHEST INDICATION:   abdominal pain  COMPARISON:  1/15/2018 EXAM PERFORMED/VIEWS:  XR CHEST PA & LATERAL Images: 2 FINDINGS: Cardiomediastinal silhouette appears unremarkable  The lungs are clear  No pneumothorax or pleural effusion  Osseous structures appear within normal limits for patient age  Impression: No acute cardiopulmonary disease  Findings are stable Workstation performed: [id number]     US right upper quadrant    Result Date: 10/3/2022  Narrative: RIGHT UPPER QUADRANT ULTRASOUND INDICATION:     R10 11: Right upper quadrant pain  COMPARISON:  CT of the abdomen/pelvis dated 9/28/2022 in 12/3/2020  TECHNIQUE:   Real-time ultrasound of the right upper quadrant was performed with a curvilinear transducer with both volumetric sweeps and still imaging techniques  FINDINGS: PANCREAS:  Visualized portions of the pancreas are within normal limits  AORTA AND IVC:  Visualized portions are normal for patient age  LIVER: Size:  Liver is enlarged measuring 27 2 cm in the midclavicular line  Contour:  Surface contour is nodular  Parenchyma: Markedly increased echogenicity of the liver parenchyma diffusely with loss of periportal fat differentiation  No focal masses demonstrated  Vasculature: Portal venous flow is antegrade with normal undulating waveform  Main portal vein is ectatic at 17 mm there is no evidence for thrombosis    BILIARY: Shadowing gallstones in the poorly distended gallbladder  Wall thickness is top normal at 4 mm  No hydrops or disproportionate pericholecystic fluid  Sonographic [place] sign is reported as negative  No intrahepatic biliary dilatation  CBD measures 6 0 mm  No choledocholithiasis  KIDNEY: Right kidney measures 12 8 x 5 3 x 7 2 cm  Volume 256 1 mL Kidney within normal limits  ASCITES:   Mild ascites  Impression: Cholelithiasis  Presence of borderline wall thickening may relate to 3rd spacing from liver disease, but recommend correlation for clinical or laboratory features of cholecystitis  Hepatomegaly with diffuse steatosis  Contour nodularity suggests concomitant cirrhosis  Mild ascites  The study was marked in EPIC for significant notification  Workstation performed: [id number]     US pelvis complete w transvaginal    Result Date: 10/3/2022  Narrative: PELVIC ULTRASOUND, COMPLETE INDICATION:  55years old  N92 1: Excessive and frequent menstruation with irregular cycle  COMPARISON: CT of the abdomen and pelvis from September 28, 2022  TECHNIQUE:   Transabdominal pelvic ultrasound was performed in sagittal and transverse planes with a curvilinear transducer  Additional transvaginal imaging was performed to better evaluate the endometrium and ovaries  Imaging included volumetric sweeps as well as traditional still imaging technique  FINDINGS: UTERUS: The uterus is anteverted in position, measuring 8 0 x 3 5 x 4 8 cm  Myometrium: Normal contour and echogenicity  No masses  Cervix: Normal in appearance  ENDOMETRIUM:  Thickness: Normal in thickness, measuring 12 mm  in maximal thickness  Echotexture: Homogeneous increased echogenicity, typical of secretory phase endometrium  No fluid in the endometrial cavity  OVARIES/ADNEXA: Right ovary:  1 8 x 2 5 x 1 3 cm  2 9 mL No suspicious right ovarian abnormality  Doppler flow within normal limits  Left ovary:  2 3 x 2 1 x 1 5 cm  3 9 mL No suspicious left ovarian abnormality   Doppler flow within normal limits  No suspicious adnexal mass  FREE FLUID: Moderate amount of pelvic ascites  Impression:  1  Moderate amount of free fluid in the pelvis  2   Otherwise normal pelvic sonogram   Workstation performed: [id number]     CT abdomen pelvis w contrast    Result Date: 9/28/2022  Narrative: CT ABDOMEN AND PELVIS WITH IV CONTRAST INDICATION:   Abdominal Pain  COMPARISON:  12/3/2020 TECHNIQUE:  CT examination of the abdomen and pelvis was performed  Axial, sagittal, and coronal 2D reformatted images were created from the source data and submitted for interpretation  Radiation dose length product (DLP) for this visit:  1652 mGy-cm   This examination, like all CT scans performed in the [facility], was performed utilizing techniques to minimize radiation dose exposure, including the use of iterative reconstruction and automated exposure control  IV Contrast:  100 mL of iohexol (OMNIPAQUE) Enteric Contrast:  Enteric contrast was not administered  FINDINGS: ABDOMEN LOWER CHEST:  No clinically significant abnormality identified in the visualized lower chest  LIVER/BILIARY TREE:  Hepatomegaly with severe fatty infiltration, similar to the prior study  No CT evidence of suspicious hepatic mass  Normal hepatic contours  No biliary dilatation  GALLBLADDER:  There are gallstone(s) within the gallbladder, without pericholecystic inflammatory changes  SPLEEN:  Unremarkable  PANCREAS:  Unremarkable  ADRENAL GLANDS:  Unremarkable  KIDNEYS/URETERS:  Unremarkable  No hydronephrosis  STOMACH AND BOWEL:  Status post Noemi-en-Y gastric bypass  No obstruction or acute inflammatory changes  APPENDIX:  No findings to suggest appendicitis  ABDOMINOPELVIC CAVITY:  Small ascites  No pneumoperitoneum  No lymphadenopathy  VESSELS:  Unremarkable for patient's age  PELVIS REPRODUCTIVE ORGANS:  Unremarkable for patient's age  URINARY BLADDER:  Unremarkable  ABDOMINAL WALL/INGUINAL REGIONS:  Unremarkable   OSSEOUS STRUCTURES:  No acute fracture or destructive osseous lesion  Impression: No evidence of acute pathology  Small ascites  Hepatomegaly with severe steatosis  Workstation performed: [id number]     Echo complete w/ contrast if indicated    Result Date: 9/29/2022  Narrative: Technically adequate transthoracic echocardiogram study  1  Mildly increased left ventricular wall thickness, normal left ventricular systolic function, early grade 1 diastolic dysfunction  Ejection fraction is estimated as around 64%  2  Normal right ventricular size and systolic function  3  Top normal left atrial cavity size, borderline right atrial cavity enlargement  4  Normal aortic valve, no aortic valve stenosis or regurgitation  5  Normal mitral valve, trace mitral valve regurgitation  6  Trace tricuspid valve regurgitation  7  No obvious pulmonary hypertension  8  No pericardial effusion  Compared to report of previous echocardiogram from August 20, 2018 there is overall no significant change  Labs and pertinent reports reviewed  This note has been generated by voice recognition software system  Therefore, there may be spelling, grammar, and or syntax errors  Please contact if questions arise

## 2022-10-03 NOTE — PROGRESS NOTES
Progress Note    Krishna Farooq 55 y o  female MRN: [id number]  Unit/Bed#: 7T [facility] 708-01 Encounter: [id number]  Admitting Physician: Radha Jiang  PCP: Daryl Mcleod PA-C  Date of Admission:  9/28/2022  2:31 AM    Assessment and Plan    * Severe Alcoholic hepatitis  Assessment & Plan  No nausea, vomiting, and constipation  Hx: PE, DVT, gastric bypass, smoker, alcohol abuse  Labs: Transminitis, anemia, hyperbilirubinemia  CT Abd/Pelvis (9/28/22): hepatomegaly; severe steatosis, gallstones with no pericholecystic inflammation  X-ray chest PA (9/28/22): No acute cardiopulmonary disease  GI on board; feedback appreciated  Steroids initiated on 9/29/22     - Continue Lactulose 20 g BID    - Continue Prednisolone 40 mg Daily as per GI (Day 4/28) with no plan for taper  - If bleeding, discontinue Prednisolone, start PPI IV and get upper endoscopy  - Continue Pantoprazole 40 mg BID   - AM CMP and PT-INR    - Discharge to acute rehab when medically stable  VINI (acute kidney injury) ([facility] [address] )  Assessment & Plan  Creatinine since admission: 0 76 (9/28) > 0 87 (9/29/) >1 14 (9/30) >1 77 (10/1)>1 59 (10/2)  10/3: 1 19  Baseline 0 6-0 7      - IV maintenance fluids 125 ml/h    - Daily weights and I/Os  - As per GI, low sodium diet  - AM CMP       Other specified anemias  Assessment & Plan  Hgb since admission: 8 0 > 7 9 > 6 7 > 9 0 (2 hrs post-transfusion 9/30/2022) > 8 7>8 5  Hgb today 10/3/2022- 8 0  Patient is hemodynamically stable and asymptomatic  No previous history of blood transfusion  No hematemesis, melena or hematochezia  Patient currently menstruating; cycle started on 10/1/2022  Hx: Menometrorrhagia, DVT, and PE  Per GI- Likely an anastomic ulcer given her gastric bypass history (2018) and tobacco use      - Per GI- If evidence of GI bleed, start IV PPI and upper endoscopy  - Stop steroids if bleeding or worsening infection  - Transvaginal US to evaluate possible uterine masses as cause of bleeding in the setting of menometrorrhagia  - AM CBC    Hypomagnesemia  Assessment & Plan  Mg=2 2  - At goal      - AM CMP    Hypokalemia  Assessment & Plan  10/3/2022- K=3 7   Goal=4    - Replete potassium 40 meq po x once  - AM CMP        Trichomonas vaginalis (TV) infection  Assessment & Plan  Urinalysis 9/30/22: Trichomonas Vaginalis noted  Last sexual encounter was a month and a half ago and has one sexual partner  Reports yellow discharge, which occurs a few days prior to menstruating  Patient currently menstruation with 1st day on 10/1/2022    - Flagyl 500 mg BID for 7 days (day 2/7)   - Discussed with patient the importance of partner treatment as well  - Urine GC/Chlamydia ordered and follow up  UTI (urinary tract infection)  Assessment & Plan  UA 09/30/22: positive for opiates,bacteria, WBCs, negative nitirite  Urine culture 9/30/22:  >100 000 Gram negative kenroy   Denies dysuria, urgency or frequency  - S/p Rocephin 1000 mg IV once  Cholelithiasis  Assessment & Plan  9/29- Gallstones without pericholecystic inflammatory changes  C/o epigastric and RUQ pain  Denies flank pain or dysuria    - RUQ Abdominal US      Other constipation  Assessment & Plan  Patient had one soft stool yesterday  Scleral icterus  Appreciate GI recommendations  - Lactulose 20 g PO BID  History of DVT of lower extremity  Assessment & Plan  Hx: DVT (2003,2007,2017), PE, worked with [facility] [id number]  PE: Tenderness in both calves on palpation; left>right LE; Left calve circ>right  Hgb since admission: 8 0 > 7 9 > 6 7 > 9 0 (2 hrs post-transfusion 9/30/2022) > 8 7>8 5  Hgb today 10/3/2022- 8 0      - Vas lower limb venous duplex   - Antithrombin III activity, lupus anticoagulant, cardiolipin Antibody, Ristocetin VWF profile, factor 8 activity, IgM, IgA, IgG, and direct antiglobulin        Financial difficulties  Assessment & Plan  Endorses she stopped taking all her medications because she was unable to afford them  -CM referral in place  Tobacco abuse  Assessment & Plan  Smokes 1/2 a PPD  Not decided on quitting  Pregnancy test negative (9/28/22)  - Nicotine replacement patch order placed  - Smoking cessation counseling  Alcohol abuse  Assessment & Plan  No nausea, vomiting or constipation  C/o epigastric pain, improved  Hx: alcohol abuse x6 years; 4 cups of vodka daily  AST: 191 > 138 > 122 > 115>118>107  ALT: 75 > 57 > 52 > 58 > 57 >57  ALP: 193 > 149 > 137 > 155 > 135> 136  CT Abd/Pelvis (9/28/22): hepatomegaly; severe steatosis, gallstones with no pericholecystic inflammation  Appreciate GI recommendations  - Ativan 1 mg PO Q6hr PRN for withdrawal symptoms   - Folic acid 1 mg/Thaimine 100 mg/MVit PO daily   - Pantoprazole 40 mg BID  - Transfer to acute rehab when medically stable  - AM CMP and PT-INR    - Alcohol cessation counseling  Hyponatremia-resolved as of 10/2/2022  Assessment & Plan  Present on admission  Bilateral +3 edema in LE   9/29/22 Echo: Early grade 1 diastolic dysfunction  EF is estimated as around 64%  Trace tricuspid valve regurgitation  No obvious pulmonary hypertension  No pericardial effusion     - Continue to monitor  VTE Pharmacologic Prophylaxis: None  Pharmacologic: N/A  Mechanical VTE Prophylaxis in Place: Yes    Patient Centered Rounds: I have performed bedside rounds with nursing staff today  Discussions with Specialists or Other Care Team Provider: Gastroenterology,  and Nursing staff  Education and Discussions with Family / Patient: Discussed plan of care with patient in detail along with lab results and recommendations from GI consult  She was also informed that nutrition consult has been sought and will likely be in effect tomorrow morning  Reported her concerns to nursing and will be reassesed for any improvements  All her questions and concerns were addressed     Patient Information Sharing: Phyllis Kuhn does not wish inpatient team to notify their loved ones of their condition and current plan as it stands today  She is comfortable with the information provided and will be happy to pass it along to family as she sees fit  Time Spent for Care: 1 hour  More than 50% of total time spent on counseling and coordination of care as described above  Current Length of Stay: 5 day(s)    Current Patient Status: Inpatient   Certification Statement: The patient will continue to require additional inpatient hospital stay due to Alcoholic hepatitis and the management of her anemia    Discharge Plan: Plan to discharge to acute rehab when clinically stable and then admitted into detox afterwards  Code Status: Level 1 - Full Code      Subjective:   Day #5- Patient was seen during rounds this morning with the attending and senior resident  She was sleeping and resting comfortably  Her abdominal pain persists but controlled with medication  She reported one moment of discomfort but she called for pain medication and felt better  She became teary stating she "put my body through so much"  She reports that she started her menses on Saturday 10/1/22  She endorses very irregular period; her last period was a year ago; menarche at the age of 15 and has only one daughter (61-year-old)  She denies SOB, wheezing, chest pain/tightness, nausea or vomiting  We will continue to monitor  Objective:     Vitals:   Temp (24hrs), Av 9 °F (36 6 °C), Min:97 8 °F (36 6 °C), Max:97 9 °F (36 6 °C)    Temp:  [97 8 °F (36 6 °C)-97 9 °F (36 6 °C)] 97 9 °F (36 6 °C)  HR:  [80-84] 80  Resp:  [18-20] 20  BP: (105-120)/(61-85) 120/85  SpO2:  [96 %-99 %] 96 %  Body mass index is 43 16 kg/m²  Input and Output Summary (last 24 hours):        Intake/Output Summary (Last 24 hours) at 10/3/2022 1702  Last data filed at 10/3/2022 1403  Gross per 24 hour   Intake 3409 17 ml   Output 300 ml   Net 3109 17 ml       Physical Exam: Physical Exam  Vitals and nursing note reviewed  Constitutional:       General: She is not in acute distress  Appearance: She is obese  She is not ill-appearing, toxic-appearing or diaphoretic  HENT:      Head: Normocephalic  Right Ear: External ear normal       Left Ear: External ear normal       Nose: Nose normal  No congestion or rhinorrhea  Mouth/Throat:      Mouth: Mucous membranes are dry  Eyes:      General: Scleral icterus present  Extraocular Movements: Extraocular movements intact  Cardiovascular:      Rate and Rhythm: Normal rate and regular rhythm  Pulses: Normal pulses  Heart sounds: Normal heart sounds  No murmur heard  No friction rub  No gallop  Pulmonary:      Effort: Pulmonary effort is normal       Breath sounds: Rhonchi present  No wheezing or rales  Comments: Coarse rhonchi in all lung fields bilaterally  Chest:      Chest wall: No tenderness  Abdominal:      Tenderness: There is abdominal tenderness  Musculoskeletal:      Right lower leg: Edema present  Left lower leg: Edema present  Comments: +2 pitting edema LLE>RLE  Calf tenderness with palpation   Skin:     General: Skin is warm and dry  Capillary Refill: Capillary refill takes 2 to 3 seconds  Neurological:      General: No focal deficit present  Mental Status: She is alert and oriented to person, place, and time  Psychiatric:         Thought Content:  Thought content normal        Additional Data:     Labs:    Results from last 7 days   Lab Units 10/03/22  0525   WBC Thousand/uL 9 17   HEMOGLOBIN g/dL 8 0*   HEMATOCRIT % 24 8*   PLATELETS Thousands/uL 227   NEUTROS PCT % 65   LYMPHS PCT % 19   MONOS PCT % 15*   EOS PCT % 0     Results from last 7 days   Lab Units 10/03/22  0525   POTASSIUM mmol/L 3 7   CHLORIDE mmol/L 107   CO2 mmol/L 26   BUN mg/dL 13   CREATININE mg/dL 1 19   CALCIUM mg/dL 7 8*   ALK PHOS U/L 136*   ALT U/L 57*   AST U/L 107*     Results from last 7 days   Lab Units 10/03/22  0525   INR  1 68*     Results from last 7 days   Lab Units 10/01/22  1118 09/30/22  1347 09/29/22  1112   POC GLUCOSE mg/dl 90 130 65             * I Have Reviewed All Lab Data Listed Above  * Additional Pertinent Lab Tests Reviewed: Charli Chaves Admission Reviewed    Imaging:    Imaging Reports Reviewed Today Include: None  Imaging Personally Reviewed by Myself Includes:  None    Recent Cultures (last 7 days):     Results from last 7 days   Lab Units 09/30/22  1113   URINE CULTURE  >100,000 cfu/ml Escherichia coli*  10,000-19,000 cfu/ml        Last 24 Hours Medication List:   Current Facility-Administered Medications   Medication Dose Route Frequency Provider Last Rate    acetaminophen  650 mg Oral Q8H PRN Michel Jara MD      folic acid  1 mg Oral Daily Patti Juarez MD      lactulose  20 g Oral BID Hilton Ledesma PA-C      LORazepam  2 mg Intravenous Q6H PRN Nathan Unger MD      metroNIDAZOLE  500 mg Oral Q12H [facility] Suraj Christine MD      multivitamin-minerals  1 tablet Oral Daily Patti Juarez MD      nicotine  14 mg Transdermal Daily Suraj Christine MD      ondansetron  4 mg Oral Q6H PRN Jamir Mora MD      pantoprazole  40 mg Intravenous Q12H [facility] Andrés Guzman IV, MD      prednisoLONE  40 mg Oral Daily Hilton Ledesma PA-C      sodium chloride  125 mL/hr Intravenous Continuous Jamir Mora  mL/hr (10/03/22 1403)    thiamine  100 mg Oral Daily Traci Enrique MD      traZODone  150 mg Oral HS Nathan Unger MD          ** Please Note: Dictation voice to text software may have been used in the creation of this document   **    Patti Tran  10/03/22  5:02 PM

## 2022-10-04 ENCOUNTER — APPOINTMENT (INPATIENT)
Dept: NON INVASIVE DIAGNOSTICS | Facility: HOSPITAL | Age: 47
DRG: 280 | End: 2022-10-04
Payer: COMMERCIAL

## 2022-10-04 LAB
ALBUMIN SERPL BCP-MCNC: 2.1 G/DL (ref 3.5–5)
ALP SERPL-CCNC: 156 U/L (ref 43–122)
ALT SERPL W P-5'-P-CCNC: 64 U/L
ANION GAP SERPL CALCULATED.3IONS-SCNC: 6 MMOL/L (ref 5–14)
APTT PPP: 46 SECONDS (ref 23–37)
AST SERPL W P-5'-P-CCNC: 175 U/L (ref 14–36)
BASOPHILS # BLD AUTO: 0.01 THOUSANDS/ΜL (ref 0–0.1)
BASOPHILS NFR BLD AUTO: 0 % (ref 0–1)
BILIRUB SERPL-MCNC: 8.86 MG/DL (ref 0.2–1)
BLD SMEAR INTERP: NORMAL
BUN SERPL-MCNC: 13 MG/DL (ref 5–25)
C TRACH DNA SPEC QL NAA+PROBE: NEGATIVE
CALCIUM ALBUM COR SERPL-MCNC: 9.5 MG/DL (ref 8.3–10.1)
CALCIUM SERPL-MCNC: 8 MG/DL (ref 8.4–10.2)
CARDIOLIPIN IGA SER IA-ACNC: 10
CARDIOLIPIN IGG SER IA-ACNC: 2.9
CARDIOLIPIN IGM SER IA-ACNC: 4
CHLORIDE SERPL-SCNC: 109 MMOL/L (ref 96–108)
CO2 SERPL-SCNC: 24 MMOL/L (ref 21–32)
CREAT SERPL-MCNC: 1.04 MG/DL (ref 0.6–1.2)
CRP SERPL QL: 50 MG/L
DEPRECATED AT III PPP: 28 % OF NORMAL (ref 92–136)
DEPRECATED FTI SERPL-MCNC: 1.6 UG/DL (ref 1.2–4.9)
EOSINOPHIL # BLD AUTO: 0.02 THOUSAND/ΜL (ref 0–0.61)
EOSINOPHIL NFR BLD AUTO: 0 % (ref 0–6)
ERYTHROCYTE [SEDIMENTATION RATE] IN BLOOD: 39 MM/HOUR (ref 0–19)
FACT XIIIA PPP-ACNC: 358 % (ref 56–140)
FERRITIN SERPL-MCNC: 80 NG/ML (ref 8–388)
GFR SERPL CREATININE-BSD FRML MDRD: 64 ML/MIN/1.73SQ M
GLUCOSE SERPL-MCNC: 71 MG/DL (ref 70–99)
HCT VFR BLD AUTO: 25.9 % (ref 34.8–46.1)
HGB BLD-MCNC: 8.3 G/DL (ref 11.5–15.4)
IMM GRANULOCYTES # BLD AUTO: 0.09 THOUSAND/UL (ref 0–0.2)
IMM GRANULOCYTES NFR BLD AUTO: 1 % (ref 0–2)
IRON SATN MFR SERPL: 39 % (ref 15–50)
IRON SERPL-MCNC: 54 UG/DL (ref 50–170)
LDH SERPL-CCNC: 600 U/L (ref 313–618)
LYMPHOCYTES # BLD AUTO: 1.77 THOUSANDS/ΜL (ref 0.6–4.47)
LYMPHOCYTES NFR BLD AUTO: 21 % (ref 14–44)
MCH RBC QN AUTO: 35 PG (ref 26.8–34.3)
MCHC RBC AUTO-ENTMCNC: 32 G/DL (ref 31.4–37.4)
MCV RBC AUTO: 109 FL (ref 82–98)
MONOCYTES # BLD AUTO: 1.14 THOUSAND/ΜL (ref 0.17–1.22)
MONOCYTES NFR BLD AUTO: 13 % (ref 4–12)
N GONORRHOEA DNA SPEC QL NAA+PROBE: NEGATIVE
NEUTROPHILS # BLD AUTO: 5.52 THOUSANDS/ΜL (ref 1.85–7.62)
NEUTS SEG NFR BLD AUTO: 65 % (ref 43–75)
NRBC BLD AUTO-RTO: 0 /100 WBCS
PLATELET # BLD AUTO: 227 THOUSANDS/UL (ref 149–390)
PMV BLD AUTO: 9.5 FL (ref 8.9–12.7)
POTASSIUM SERPL-SCNC: 3.9 MMOL/L (ref 3.5–5.3)
PROT SERPL-MCNC: 6.2 G/DL (ref 6.4–8.4)
RBC # BLD AUTO: 2.37 MILLION/UL (ref 3.81–5.12)
RETICS # AUTO: ABNORMAL 10*3/UL (ref 14097–95744)
RETICS # CALC: 3.3 % (ref 0.37–1.87)
SODIUM SERPL-SCNC: 139 MMOL/L (ref 135–147)
T3RU NFR SERPL: 42 % (ref 24–39)
T4 SERPL-MCNC: 3.8 UG/DL (ref 4.5–12)
TIBC SERPL-MCNC: 140 UG/DL (ref 250–450)
VWF:RCO ACT/NOR PPP PL AGG: 444 % (ref 50–200)
WBC # BLD AUTO: 8.55 THOUSAND/UL (ref 4.31–10.16)

## 2022-10-04 PROCEDURE — 85730 THROMBOPLASTIN TIME PARTIAL: CPT | Performed by: NURSE PRACTITIONER

## 2022-10-04 PROCEDURE — 85652 RBC SED RATE AUTOMATED: CPT | Performed by: NURSE PRACTITIONER

## 2022-10-04 PROCEDURE — 82668 ASSAY OF ERYTHROPOIETIN: CPT | Performed by: NURSE PRACTITIONER

## 2022-10-04 PROCEDURE — 84165 PROTEIN E-PHORESIS SERUM: CPT | Performed by: NURSE PRACTITIONER

## 2022-10-04 PROCEDURE — 86334 IMMUNOFIX E-PHORESIS SERUM: CPT | Performed by: NURSE PRACTITIONER

## 2022-10-04 PROCEDURE — 99231 SBSQ HOSP IP/OBS SF/LOW 25: CPT | Performed by: PHYSICIAN ASSISTANT

## 2022-10-04 PROCEDURE — 81240 F2 GENE: CPT | Performed by: NURSE PRACTITIONER

## 2022-10-04 PROCEDURE — 83540 ASSAY OF IRON: CPT | Performed by: NURSE PRACTITIONER

## 2022-10-04 PROCEDURE — 86146 BETA-2 GLYCOPROTEIN ANTIBODY: CPT | Performed by: NURSE PRACTITIONER

## 2022-10-04 PROCEDURE — 93970 EXTREMITY STUDY: CPT

## 2022-10-04 PROCEDURE — 85045 AUTOMATED RETICULOCYTE COUNT: CPT | Performed by: NURSE PRACTITIONER

## 2022-10-04 PROCEDURE — 86140 C-REACTIVE PROTEIN: CPT | Performed by: NURSE PRACTITIONER

## 2022-10-04 PROCEDURE — 83615 LACTATE (LD) (LDH) ENZYME: CPT | Performed by: NURSE PRACTITIONER

## 2022-10-04 PROCEDURE — 83010 ASSAY OF HAPTOGLOBIN QUANT: CPT | Performed by: NURSE PRACTITIONER

## 2022-10-04 PROCEDURE — 93970 EXTREMITY STUDY: CPT | Performed by: SURGERY

## 2022-10-04 PROCEDURE — 99232 SBSQ HOSP IP/OBS MODERATE 35: CPT | Performed by: FAMILY MEDICINE

## 2022-10-04 PROCEDURE — 81241 F5 GENE: CPT | Performed by: NURSE PRACTITIONER

## 2022-10-04 PROCEDURE — 82728 ASSAY OF FERRITIN: CPT | Performed by: NURSE PRACTITIONER

## 2022-10-04 PROCEDURE — 83521 IG LIGHT CHAINS FREE EACH: CPT | Performed by: NURSE PRACTITIONER

## 2022-10-04 PROCEDURE — 83550 IRON BINDING TEST: CPT | Performed by: NURSE PRACTITIONER

## 2022-10-04 PROCEDURE — 85025 COMPLETE CBC W/AUTO DIFF WBC: CPT | Performed by: STUDENT IN AN ORGANIZED HEALTH CARE EDUCATION/TRAINING PROGRAM

## 2022-10-04 PROCEDURE — 80053 COMPREHEN METABOLIC PANEL: CPT

## 2022-10-04 PROCEDURE — 97116 GAIT TRAINING THERAPY: CPT

## 2022-10-04 RX ORDER — POLYETHYLENE GLYCOL 3350 17 G/17G
17 POWDER, FOR SOLUTION ORAL DAILY PRN
Status: DISCONTINUED | OUTPATIENT
Start: 2022-10-04 | End: 2022-10-06

## 2022-10-04 RX ORDER — PANTOPRAZOLE SODIUM 40 MG/1
40 TABLET, DELAYED RELEASE ORAL
Status: DISCONTINUED | OUTPATIENT
Start: 2022-10-04 | End: 2022-10-06 | Stop reason: HOSPADM

## 2022-10-04 RX ORDER — OXYCODONE HYDROCHLORIDE 5 MG/1
5 TABLET ORAL ONCE AS NEEDED
Status: DISCONTINUED | OUTPATIENT
Start: 2022-10-04 | End: 2022-10-05

## 2022-10-04 RX ADMIN — PREDNISOLONE SODIUM PHOSPHATE 40 MG: 15 SOLUTION ORAL at 09:05

## 2022-10-04 RX ADMIN — METRONIDAZOLE 500 MG: 500 TABLET ORAL at 09:04

## 2022-10-04 RX ADMIN — PANTOPRAZOLE SODIUM 40 MG: 40 TABLET, DELAYED RELEASE ORAL at 16:09

## 2022-10-04 RX ADMIN — NICOTINE 14 MG: 14 PATCH, EXTENDED RELEASE TRANSDERMAL at 09:05

## 2022-10-04 RX ADMIN — FOLIC ACID 1 MG: 1 TABLET ORAL at 09:04

## 2022-10-04 RX ADMIN — TRAZODONE HYDROCHLORIDE 150 MG: 100 TABLET ORAL at 21:26

## 2022-10-04 RX ADMIN — PANTOPRAZOLE SODIUM 40 MG: 40 TABLET, DELAYED RELEASE ORAL at 09:04

## 2022-10-04 RX ADMIN — SODIUM CHLORIDE 125 ML/HR: 0.9 INJECTION, SOLUTION INTRAVENOUS at 05:15

## 2022-10-04 RX ADMIN — THIAMINE HCL TAB 100 MG 100 MG: 100 TAB at 09:04

## 2022-10-04 RX ADMIN — MULTIPLE VITAMINS W/ MINERALS TAB 1 TABLET: TAB ORAL at 09:04

## 2022-10-04 RX ADMIN — SODIUM CHLORIDE 125 ML/HR: 0.9 INJECTION, SOLUTION INTRAVENOUS at 13:25

## 2022-10-04 RX ADMIN — METRONIDAZOLE 500 MG: 500 TABLET ORAL at 21:26

## 2022-10-04 NOTE — PROGRESS NOTES
Progress Note    Diana Linn 55 y o  female MRN: [id number]  Unit/Bed#: 7T [facility] 708-01 Encounter: [id number]  Admitting Physician: Gerry Gresham  PCP: Kenneth Squires PA-C  Date of Admission:  10/05/22    Assessment and Plan  Biliary cirrhosis ([facility] [address] )  C/o Nausea, bloating, constipation, and epigastric pain  Hx: PE, DVT, gastric bypass, smoker, alcohol abuse  Labs: Transminitis, anemia, hyperbilirubinemia  Imaging: CT: hepatomegaly; severe steatosis, gallstones with no pericholecystic inflammation  PE: Icteric, abd distention, +4 pitting edema    - Low sodium diet  - Continue CIWA protocol  - Lactulose 20 g bid  - GI consult; appreciate recommendations  - Detox when medically stable  Once liver issue is better, would suggest elective egd/colon for anemia and for screening since pt is over 39 yr old  Alcohol abuse  No nausea, vomiting or constipation  C/o epigastric pain, improved  Hx: alcohol abuse x6 years; 4 cups of vodka daily  AST: 191 > 138 > 122 > 115>118>107  ALT: 75 > 57 > 52 > 58 > 57 >57  ALP: 193 > 149 > 137 > 155 > 135> 136  CT Abd/Pelvis (9/28/22): hepatomegaly; severe steatosis, gallstones with no pericholecystic inflammation  Appreciate GI recommendations  - Ativan 1 mg PO Q6hr PRN for withdrawal symptoms   - Folic acid 1 mg/Thaimine 100 mg/MVit PO daily   - Pantoprazole 40 mg BID  - Transfer to acute rehab when medically stable  - AM CMP and PT-INR    - Alcohol cessation counseling  Other constipation  Patient had one soft stool yesterday  Scleral icterus  Appreciate GI recommendations  - Lactulose 20 g PO BID  Tobacco abuse  Smokes 1/2 a PPD  Not decided on quitting  Pregnancy test negative (9/28/22)  - Nicotine replacement patch order placed  - Smoking cessation counseling  Financial difficulties  Endorses she stopped taking all her medications because she was unable to afford them  -CM referral in place       Hypokalemia  10/3/2022- K=3 7   Goal=4    - Replete potassium 40 meq po x once  - AM CMP        Hyponatremia  Present on admission  Bilateral +3 edema in LE   9/29/22 Echo: Early grade 1 diastolic dysfunction  EF is estimated as around 64%  Trace tricuspid valve regurgitation  No obvious pulmonary hypertension  No pericardial effusion     - Continue to monitor  Severe Alcoholic hepatitis  No nausea, vomiting, and constipation  Hx: PE, DVT, gastric bypass, smoker, alcohol abuse  Labs: Transminitis, anemia, hyperbilirubinemia  CT Abd/Pelvis (9/28/22): hepatomegaly; severe steatosis, gallstones with no pericholecystic inflammation  X-ray chest PA (9/28/22): No acute cardiopulmonary disease  GI on board; feedback appreciated  Steroids initiated on 9/29/22     - Continue Lactulose 20 g BID    - Continue Prednisolone 40 mg Daily as per GI (Day 4/28) with no plan for taper  - If bleeding, discontinue Prednisolone, start PPI IV and get upper endoscopy  - Continue Pantoprazole 40 mg BID   - AM CMP and PT-INR    - Discharge to acute rehab when medically stable  Other specified anemias  Hgb since admission: 8 0 > 7 9 > 6 7 > 9 0 (2 hrs post-transfusion 9/30/2022) > 8 7>8 5>8 0  Hgb today 10/4/2022- 8 3    Patient is hemodynamically stable  No previous history of blood transfusion  No hematemesis, melena or hematochezia  Patient currently menstruating; cycle started on 10/1/2022  Hx: Menometrorrhagia, DVT, and PE  TVUS:  Per GI- Likely an anastomic ulcer given her gastric bypass history (2018) and tobacco use  - Per GI- If evidence of GI bleed, start IV PPI and upper endoscopy  - Stop steroids if bleeding   - AM CBC    VINI (acute kidney injury) ([facility] [address] )  Creatinine since admission: 0 76 (9/28) > 0 87 (9/29/) >1 14 (9/30) >1 77 (10/1)>1 59 (10/2)  10/3: 1 19 10/4 1 04  Baseline 0 6-0 7      - IV maintenance fluids 125 ml/h    - Daily weights and I/Os  - As per GI, low sodium diet     - AM CMP       UTI (urinary tract infection)  UA 09/30/22: positive for opiates,bacteria, WBCs, negative nitirite  Urine culture 9/30/22:  >100 000 Gram negative kenroy   Denies dysuria, urgency or frequency  - S/p Rocephin 1000 mg IV once  Trichomonas vaginalis (TV) infection  Urinalysis 9/30/22: Trichomonas Vaginalis noted  Last sexual encounter was a month and a half ago and has one sexual partner  Reports yellow discharge, which occurs a few days prior to menstruating  Patient currently menstruation with 1st day on 10/1/2022    - Flagyl 500 mg BID for 7 days (day 3/7)   - Discussed with patient the importance of partner treatment as well  - Urine GC/Chlamydia ordered and follow up  Hypomagnesemia  Mg=2 2  - At goal      - AM CMP    Cholelithiasis  9/29- Gallstones without pericholecystic inflammatory changes  C/o epigastric and RUQ pain, morphy sign neg  Abdominal US pertinent for cholelithiasis in a poorly distended gallbladder      - Consult Sx  History of DVT of lower extremity  Hx: DVT (2003,2007,2017), PE, worked with [employer] [id number]  PE: Tenderness in both calves on palpation; left>right LE; Left calve circ>right  Hgb since admission: 8 0 > 7 9 > 6 7 > 9 0 (2 hrs post-transfusion 9/30/2022) > 8 7>8 5  Hgb today 10/3/2022- 8 0      - Vas lower limb venous duplex   - Antithrombin III activity, lupus anticoagulant, cardiolipin Antibody, Ristocetin VWF profile, factor 8 activity, IgM, IgA, IgG, and direct antiglobulin  VTE Pharmacologic Prophylaxis:   Pharmacologic: heparin held  Mechanical VTE Prophylaxis in Place: Yes    Patient Centered Rounds: I have performed bedside rounds with nursing staff today  Discussions with Specialists or Other Care Team Provider:Gastroenterology, Hematology    Education and Discussions with Family / Patient: brothers, daughter     Time Spent for Care: 30 minutes  More than 50% of total time spent on counseling and coordination of care as described above      Current Length of Stay: 7 day(s)    Current Patient Status: Inpatient   Certification Statement: The patient will continue to require additional inpatient hospital stay due to anemia, unkown sourve of bleeding  Discharge Plan: once bleeding is controlled  Code Status: Level 1 - Full Code      Subjective: The patient was seen and assess with the attending  Patient was resting in bed, eating her breakfast  Overnight complained of abdominal pain that improved with oxycodone  Reports current vaginal bleeding day 4 of her period  Denies chills, dizziness, rashes, blurred vision, nausea, palpitation, chest pain, SOB  Report normal urine output and bowel movement  Objective:     Vitals:   Temp (24hrs), Av 1 °F (36 7 °C), Min:97 5 °F (36 4 °C), Max:98 8 °F (37 1 °C)    Temp:  [97 5 °F (36 4 °C)-98 8 °F (37 1 °C)] 98 8 °F (37 1 °C)  HR:  [71-79] 77  Resp:  [20] 20  BP: (102-137)/(75-92) 133/92  SpO2:  [95 %-98 %] 98 %  Body mass index is 43 16 kg/m²  Input and Output Summary (last 24 hours): Intake/Output Summary (Last 24 hours) at 10/5/2022 2250  Last data filed at 10/5/2022 1700  Gross per 24 hour   Intake 740 ml   Output --   Net 740 ml       Physical Exam:     Physical Exam  Vitals and nursing note reviewed  Constitutional:       Appearance: She is obese  Eyes:      General: Scleral icterus present  Cardiovascular:      Pulses: Normal pulses  Heart sounds: Normal heart sounds  Pulmonary:      Effort: Pulmonary effort is normal       Breath sounds: Normal breath sounds  Abdominal:      General: There is distension  Palpations: There is no mass  Tenderness: There is generalized abdominal tenderness  There is no right CVA tenderness, left CVA tenderness, guarding or rebound  Hernia: No hernia is present  Musculoskeletal:      Right lower leg: Edema present  Left lower leg: Edema present  Skin:     General: Skin is warm  Coloration: Skin is jaundiced     Neurological: General: No focal deficit present  Mental Status: She is alert and oriented to person, place, and time  Mental status is at baseline  Psychiatric:         Attention and Perception: Attention and perception normal          Mood and Affect: Mood is depressed  Speech: Speech normal          Behavior: Behavior normal  Behavior is cooperative  Thought Content: Thought content is not paranoid or delusional  Thought content does not include homicidal or suicidal ideation  Thought content does not include homicidal or suicidal plan  Additional Data:     Labs:    Results from last 7 days   Lab Units 10/05/22  0458   WBC Thousand/uL 7 23   HEMOGLOBIN g/dL 7 2*   HEMATOCRIT % 23 1*   PLATELETS Thousands/uL 200   NEUTROS PCT % 65   LYMPHS PCT % 22   MONOS PCT % 12   EOS PCT % 0     Results from last 7 days   Lab Units 10/05/22  0900   POTASSIUM mmol/L 4 6   CHLORIDE mmol/L 110*   CO2 mmol/L 24   BUN mg/dL 14   CREATININE mg/dL 0 88   CALCIUM mg/dL 8 1*   ALK PHOS U/L 187*   ALT U/L 87*   AST U/L 193*     Results from last 7 days   Lab Units 10/03/22  0525   INR  1 68*     Results from last 7 days   Lab Units 10/01/22  1118 09/30/22  1347 09/29/22  1112   POC GLUCOSE mg/dl 90 130 65             * I Have Reviewed All Lab Data Listed Above  * Additional Pertinent Lab Tests Reviewed:  Char[facility] Admission Reviewed      Recent Cultures (last 7 days):     Results from last 7 days   Lab Units 09/30/22  1113   URINE CULTURE  >100,000 cfu/ml Escherichia coli*  10,000-19,000 cfu/ml        Last 24 Hours Medication List:   Current Facility-Administered Medications   Medication Dose Route Frequency Provider Last Rate    folic acid  1 mg Oral Daily Patti Woodson MD      LORazepam  2 mg Intravenous Q6H PRN Alfornia Cushing, MD      metroNIDAZOLE  500 mg Oral Q12H [facility] Brad Gates MD      multivitamin-minerals  1 tablet Oral Daily Patti Borges Sarita Robins MD      nicotine  14 mg Transdermal Daily Remington Pinon MD      ondansetron  4 mg Oral Q6H PRN Lilly Borges MD      oxyCODONE  5 mg Oral TID PRN Brian Aiken MD      pantoprazole  40 mg Oral BID AC Sandoval Guzman IV, MD      polyethylene glycol  17 g Oral Daily PRN oCncha Frias PA-C      prednisoLONE  40 mg Oral Daily Concha Frias PA-C      thiamine  100 mg Oral Daily Brian Aiken MD      traZODone  150 mg Oral HS Jesus Clark MD          Today, Patient Was Seen By: Yaquelin Hernandez    ** Please Note: Dictation voice to text software may have been used in the creation of this document   **    Yaquelin Hernandez  10/05/22  10:50 PM

## 2022-10-04 NOTE — PLAN OF CARE
Problem: Nutrition/Hydration-ADULT  Goal: Nutrient/Hydration intake appropriate for improving, restoring or maintaining nutritional needs  Description: Monitor and assess patient's nutrition/hydration status for malnutrition  Collaborate with interdisciplinary team and initiate plan and interventions as ordered  Monitor patient's weight and dietary intake as ordered or per policy  Utilize nutrition screening tool and intervene as necessary  Determine patient's food preferences and provide high-protein, high-caloric foods as appropriate       INTERVENTIONS:  - Monitor oral intake, urinary output, labs, and treatment plans  - Assess nutrition and hydration status and recommend course of action  - Evaluate amount of meals eaten  - Assist patient with eating if necessary   - Allow adequate time for meals  - Recommend/ encourage appropriate diets, oral nutritional supplements, and vitamin/mineral supplements   Assess need for intravenous fluids  - Provide specific nutrition/hydration education as appropriate  - Include patient/family/caregiver in decisions related to nutrition  Outcome: Progressing     Problem: GASTROINTESTINAL - ADULT  Goal: Minimal or absence of nausea and/or vomiting  Description: INTERVENTIONS:  - Administer IV fluids if ordered to ensure adequate hydration  - Maintain NPO status until nausea and vomiting are resolved  - Nasogastric tube if ordered  - Administer ordered antiemetic medications as needed  - Provide nonpharmacologic comfort measures as appropriate  - Advance diet as tolerated, if ordered  - Consider nutrition services referral to assist patient with adequate nutrition and appropriate food choices  Outcome: Progressing     Problem: MOBILITY - ADULT  Goal: Maintain or return to baseline ADL function  Description: INTERVENTIONS:  -  Assess patient's ability to carry out ADLs; assess patient's baseline for ADL function and identify physical deficits which impact ability to perform ADLs (bathing, care of mouth/teeth, toileting, grooming, dressing, etc )  - Assess/evaluate cause of self-care deficits   - Assess range of motion  - Assess patient's mobility; develop plan if impaired  - Assess patient's need for assistive devices and provide as appropriate  - Encourage maximum independence but intervene and supervise when necessary  - Involve family in performance of ADLs  - Assess for home care needs following discharge   - Consider OT consult to assist with ADL evaluation and planning for discharge  - Provide patient education as appropriate  Outcome: Progressing

## 2022-10-04 NOTE — PROGRESS NOTES
Patient Name: Anum Velarde  Patient MRN: [id number]  Date: 10/04/22  Service: [facility]    Subjective   Labs, notes reviewed  AM CMP pending  Vitals stable  Some calf pain and worsening redness of L hand prompting LE duplex study CT LUE - both pending  Sleeping comfortably despite cell phone ringing upon my arrival  Complains of some RUQ pain this morning  No nausea, vomiting  Tolerating diet  D/W RN - multiple BMs on lactulose  Vitals  Blood pressure 143/98, pulse 82, temperature (!) 97 3 °F (36 3 °C), resp  rate 18, height 5' 7" (1 702 m), weight 125 kg (275 lb 9 2 oz), SpO2 100 %, not currently breastfeeding  Physical Exam:     General Appearance:    Awake, alert, oriented x3, no distress, well developed, well    nourished   Head:    Normocephalic without obvious abnormality   Eyes:    PERRL, EOM's intact +icterus   Neck:   Supple, no adenopathy   Throat:   Mucous membranes moist   Lungs:     Clear to auscultation bilaterally, no wheezing or rhonchi   Heart:    Regular rate and rhythm, S1 and S2 normal, no murmur   Abdomen:     Soft, obese, +RUQ ttp, non-distended  bowel sounds active  No masses, rebound or guarding  +Hepatomegaly  Neg Gunnison sign   Extremities:   Extremities with  edema   Psych  Derm:   Normal affect    Warm/dry   Neurologic:   CNII-XII grossly intact  Speech intact   No tremor or asterixis         Laboratory Studies  Results from last 7 days   Lab Units 10/04/22  0510 10/03/22  0525 10/02/22  0418 10/01/22  0433 09/30/22  2214 09/30/22  0534 09/29/22  1341 09/29/22  0530 09/28/22  0357 09/28/22  0317   WBC Thousand/uL 8 55 9 17 10 38* 9 08  --  8 86 7 77  --   --  12 08*   HEMOGLOBIN g/dL 8 3* 8 0* 8 5* 8 7* 9 0* 6 7* 7 9*  --   --  8 0*   HEMATOCRIT % 25 9* 24 8* 25 5* 25 9* 27 2* 19 6* 22 7*  --   --  23 4*   PLATELETS Thousands/uL 227 227 252 273  --  269  269 282  264  --   --  257   INR   --  1 68* 1 75* 1 62* 1 66* 1 89* 1 78* 1 95*   < >  --     < > = values in this interval not displayed  Results from last 7 days   Lab Units 10/03/22  0525 10/02/22  0418 10/01/22  0433 09/30/22  0534 09/29/22  0530   SODIUM mmol/L 137 138 138 135 133*   POTASSIUM mmol/L 3 7 3 4* 3 5 3 8 3 6   CHLORIDE mmol/L 107 106 104 102 101   CO2 mmol/L 26 27 28 26 26   BUN mg/dL 13 12 9 8 6   CREATININE mg/dL 1 19 1 59* 1 77* 1 14 0 87   CALCIUM mg/dL 7 8* 7 5* 7 6* 7 3* 7 5*   ALBUMIN g/dL 2 1* 2 0* 2 2* 2 2* 2 1*   TOTAL BILIRUBIN mg/dL 9 74* 10 26* 10 54* 10 63* 10 59*   ALK PHOS U/L 136* 135* 155* 137* 149*   ALT U/L 57* 57* 58* 52* 57*   AST U/L 107* 118* 115* 122* 138*         Imaging and Other Studies  [facility]  Presence of borderline wall thickening may relate to 3rd spacing from liver disease, but recommend correlation for clinical or laboratory features of cholecystitis  Hepatomegaly with diffuse steatosis  Contour nodularity suggests concomitant cirrhosis   Mild ascites        Inhouse Medications     Current Facility-Administered Medications:     acetaminophen (TYLENOL) tablet 650 mg, 650 mg, Oral, Q8H PRN, 650 mg at 28/31/41 0821    folic acid (FOLVITE) tablet 1 mg, 1 mg, Oral, Daily, 1 mg at 10/03/22 0821    lactulose oral solution 20 g, 20 g, Oral, BID, 20 g at 10/03/22 1748    LORazepam (ATIVAN) injection 2 mg, 2 mg, Intravenous, Q6H PRN    metroNIDAZOLE (FLAGYL) tablet 500 mg, 500 mg, Oral, Q12H [address], 500 mg at 10/03/22 2117    multivitamin-minerals (CENTRUM) tablet 1 tablet, 1 tablet, Oral, Daily, 1 tablet at 10/03/22 0821    nicotine (NICODERM CQ) 14 mg/24hr TD 24 hr patch 14 mg, 14 mg, Transdermal, Daily, 14 mg at 10/03/22 4077    ondansetron (ZOFRAN-ODT) dispersible tablet 4 mg, 4 mg, Oral, Q6H PRN, 4 mg at 10/03/22 0832    oxyCODONE (ROXICODONE) IR tablet 5 mg, 5 mg, Oral, Once    pantoprazole (PROTONIX) injection 40 mg, 40 mg, Intravenous, Q12H LUNA, 40 mg at 10/03/22 2117    prednisoLONE (ORAPRED) oral solution 40 mg, 40 mg, Oral, Daily, 40 mg at 10/03/22 7513    sodium chloride 0 9 % infusion, 125 mL/hr, Intravenous, Continuous, 125 mL/hr at 10/04/22 0515    thiamine tablet 100 mg, 100 mg, Oral, Daily, 100 mg at 10/03/22 0821    traZODone (DESYREL) tablet 150 mg, 150 mg, Oral, HS, 150 mg at 10/03/22 2116      Assessment/Plan:    1  Severe alcoholic hepatitis with jaundice and coagulopathy  LFTs improving  Liver US notes cirrhotic changes; not seen on CT  RUQ pain likely related to liver capsule distension  Steroids initiated 9/29 with benefit noted, therefore would continue with 28 day course  Strict alcohol abstinence/rehab  Low sodium diet  [id number] [address] for rehab if tolerating diet from GI standpoint  Outpatient follow up  2  AUD - no longer exhibits withdrawal symptoms  Plan to transfer to acute rehab once medically stable per primary service  3  Acute on chronic macrocytic anemia, multifactorial  Hematology evaluation appreciated-workup ongoing  Prophylactic PPI  No active gi bleeding reported, FOBT negative  No plans for endoscopy currently - follow up as outpatient for EGD/colon  Status post 2u pRBCs  Continue to monitor cbc, transfuse as needed  4  Morbid obesity s/p bariatric surgery 2018 - recommend weight loss, lifestyle modification  Follow up with Bariatrics  5  History of DVT/PE, noncompliant with AC  Hypercoagulable workup in progress, Hematology following  AC options being considered  6  Constipation resolved  Discontinue lactulose with multiple BMs reported  Miralax prn        Chung Grewal PA-C

## 2022-10-04 NOTE — CASE MANAGEMENT
Case Management Progress Note    Patient name Jean Jennings  Location 7T [facility] 708/7T [facility] 708-01 MRN [id number]  : 1975 Date 10/4/2022       LOS (days): 6  Geometric Mean LOS (GMLOS) (days): 3 30  Days to GMLOS:-3        OBJECTIVE:        Current admission status: Inpatient  Preferred Pharmacy:   [address] [place] [address], Critical access hospital[facility],4Th Floor [facility]  [address]  [place] [address]  Phone: [phone] Fax: [phone]    Primary Care Provider: Zachariah Mello PA-C    Primary Insurance: [employer]  Secondary Insurance:     PROGRESS NOTE:DISCHARGE DETAILS:  DISCHARGE DETAILS:  CM was notified at morning rounds that pt is medically cleared to be discharged today  CM met with pt at bedside  CM Discussed Freedom of choice  CM was informed through [facility] referral system that pt is not accepted to any SNF/STR due to Pt's substance abuse and non compliance with her health management  CM asked pt if she  wants CM to expand the search to [place] and above  Pt refused saying she will like to discharge home  Pt stated she does not want to go to any SNF/STR  CM offered pt Host for inpatient alcohol treatment  Pt refused Host program for alcohol abuse  Transportation;  Daughter  Discharge location: Home       CM reviewed Discharge planning process including the following: identifying help at home, patient preference for discharge planning needs, Discharge lounge, [facility] to bed program, availability of treatment team to discuss questions or concerns patient and family may have regarding understanding medications and recognizing signs and symptoms once discharged  CM also encouraged pt to follow up with all recommended appointments after discharge  Patient advised of importance for patient and family to participate in managing pt's medical wellbeing     CM department will continue to follow through pts D/C

## 2022-10-05 PROBLEM — E83.42 HYPOMAGNESEMIA: Status: RESOLVED | Noted: 2022-10-02 | Resolved: 2022-10-05

## 2022-10-05 PROBLEM — N39.0 UTI (URINARY TRACT INFECTION): Status: RESOLVED | Noted: 2022-10-01 | Resolved: 2022-10-05

## 2022-10-05 LAB
ALBUMIN SERPL BCP-MCNC: 2.6 G/DL (ref 3.5–5)
ALP SERPL-CCNC: 187 U/L (ref 43–122)
ALT SERPL W P-5'-P-CCNC: 87 U/L
ANION GAP SERPL CALCULATED.3IONS-SCNC: 5 MMOL/L (ref 5–14)
AST SERPL W P-5'-P-CCNC: 193 U/L (ref 14–36)
BASOPHILS # BLD AUTO: 0 THOUSANDS/ΜL (ref 0–0.1)
BASOPHILS NFR BLD AUTO: 0 % (ref 0–1)
BILIRUB SERPL-MCNC: 9.48 MG/DL (ref 0.2–1)
BUN SERPL-MCNC: 14 MG/DL (ref 5–25)
CALCIUM ALBUM COR SERPL-MCNC: 9.2 MG/DL (ref 8.3–10.1)
CALCIUM SERPL-MCNC: 8.1 MG/DL (ref 8.4–10.2)
CHLORIDE SERPL-SCNC: 110 MMOL/L (ref 96–108)
CO2 SERPL-SCNC: 24 MMOL/L (ref 21–32)
CREAT SERPL-MCNC: 0.88 MG/DL (ref 0.6–1.2)
EOSINOPHIL # BLD AUTO: 0.01 THOUSAND/ΜL (ref 0–0.61)
EOSINOPHIL NFR BLD AUTO: 0 % (ref 0–6)
EPO SERPL-ACNC: 15 MIU/ML (ref 2.6–18.5)
ERYTHROCYTE [DISTWIDTH] IN BLOOD BY AUTOMATED COUNT: 26.2 % (ref 11.6–15.1)
GFR SERPL CREATININE-BSD FRML MDRD: 79 ML/MIN/1.73SQ M
GLUCOSE SERPL-MCNC: 92 MG/DL (ref 70–99)
HAPTOGLOB SERPL-MCNC: 10 MG/DL (ref 42–296)
HCT VFR BLD AUTO: 23.1 % (ref 34.8–46.1)
HGB BLD-MCNC: 7.2 G/DL (ref 11.5–15.4)
IMM GRANULOCYTES # BLD AUTO: 0.07 THOUSAND/UL (ref 0–0.2)
IMM GRANULOCYTES NFR BLD AUTO: 1 % (ref 0–2)
KAPPA LC FREE SER-MCNC: 59.1 MG/L (ref 3.3–19.4)
KAPPA LC FREE/LAMBDA FREE SER: 1.04 {RATIO} (ref 0.26–1.65)
LAMBDA LC FREE SERPL-MCNC: 56.9 MG/L (ref 5.7–26.3)
LYMPHOCYTES # BLD AUTO: 1.61 THOUSANDS/ΜL (ref 0.6–4.47)
LYMPHOCYTES NFR BLD AUTO: 22 % (ref 14–44)
MCH RBC QN AUTO: 34.8 PG (ref 26.8–34.3)
MCHC RBC AUTO-ENTMCNC: 31.2 G/DL (ref 31.4–37.4)
MCV RBC AUTO: 112 FL (ref 82–98)
MONOCYTES # BLD AUTO: 0.83 THOUSAND/ΜL (ref 0.17–1.22)
MONOCYTES NFR BLD AUTO: 12 % (ref 4–12)
NEUTROPHILS # BLD AUTO: 4.71 THOUSANDS/ΜL (ref 1.85–7.62)
NEUTS SEG NFR BLD AUTO: 65 % (ref 43–75)
NRBC BLD AUTO-RTO: 0 /100 WBCS
PLATELET # BLD AUTO: 200 THOUSANDS/UL (ref 149–390)
PMV BLD AUTO: 10.2 FL (ref 8.9–12.7)
POTASSIUM SERPL-SCNC: 4.6 MMOL/L (ref 3.5–5.3)
PROT SERPL-MCNC: 7.5 G/DL (ref 6.4–8.4)
RBC # BLD AUTO: 2.07 MILLION/UL (ref 3.81–5.12)
SODIUM SERPL-SCNC: 139 MMOL/L (ref 135–147)
WBC # BLD AUTO: 7.23 THOUSAND/UL (ref 4.31–10.16)

## 2022-10-05 PROCEDURE — 85025 COMPLETE CBC W/AUTO DIFF WBC: CPT | Performed by: STUDENT IN AN ORGANIZED HEALTH CARE EDUCATION/TRAINING PROGRAM

## 2022-10-05 PROCEDURE — 80053 COMPREHEN METABOLIC PANEL: CPT | Performed by: STUDENT IN AN ORGANIZED HEALTH CARE EDUCATION/TRAINING PROGRAM

## 2022-10-05 PROCEDURE — 99232 SBSQ HOSP IP/OBS MODERATE 35: CPT | Performed by: PHYSICIAN ASSISTANT

## 2022-10-05 RX ORDER — OXYCODONE HYDROCHLORIDE 5 MG/1
5 TABLET ORAL 3 TIMES DAILY PRN
Status: DISCONTINUED | OUTPATIENT
Start: 2022-10-05 | End: 2022-10-06 | Stop reason: HOSPADM

## 2022-10-05 RX ADMIN — SODIUM CHLORIDE 125 ML/HR: 0.9 INJECTION, SOLUTION INTRAVENOUS at 04:58

## 2022-10-05 RX ADMIN — MULTIPLE VITAMINS W/ MINERALS TAB 1 TABLET: TAB ORAL at 09:02

## 2022-10-05 RX ADMIN — METRONIDAZOLE 500 MG: 500 TABLET ORAL at 21:32

## 2022-10-05 RX ADMIN — METRONIDAZOLE 500 MG: 500 TABLET ORAL at 09:02

## 2022-10-05 RX ADMIN — TRAZODONE HYDROCHLORIDE 150 MG: 100 TABLET ORAL at 21:32

## 2022-10-05 RX ADMIN — PREDNISOLONE SODIUM PHOSPHATE 40 MG: 15 SOLUTION ORAL at 09:02

## 2022-10-05 RX ADMIN — SODIUM CHLORIDE 125 ML/HR: 0.9 INJECTION, SOLUTION INTRAVENOUS at 15:00

## 2022-10-05 RX ADMIN — FOLIC ACID 1 MG: 1 TABLET ORAL at 09:02

## 2022-10-05 RX ADMIN — THIAMINE HCL TAB 100 MG 100 MG: 100 TAB at 09:02

## 2022-10-05 RX ADMIN — PANTOPRAZOLE SODIUM 40 MG: 40 TABLET, DELAYED RELEASE ORAL at 05:01

## 2022-10-05 RX ADMIN — OXYCODONE HYDROCHLORIDE 5 MG: 5 TABLET ORAL at 21:32

## 2022-10-05 RX ADMIN — PANTOPRAZOLE SODIUM 40 MG: 40 TABLET, DELAYED RELEASE ORAL at 15:01

## 2022-10-05 RX ADMIN — NICOTINE 14 MG: 14 PATCH, EXTENDED RELEASE TRANSDERMAL at 09:02

## 2022-10-05 NOTE — QUICK NOTE
Patient was not seen physically  Review available studies thus far with Dr Bryon Sol  Fortunately her venous duplex of the bilateral lower extremities from yesterday 10/04/2022 came back negative for DVT  Labs this morning showed normal white cells and platelets again with macrocytic anemia declining H&H 7 2/23 1,   Her total bili continues to be elevated 9 46, albumin decreased 2 6 , ALT 87, alk-phos 187  Kidney function is improving creatinine 0 88, GFR 79  Inflammation markers are elevated C-reactive protein 50, sed rate 39  Reticulocyte count is elevated 3 3%  Haptoglobin is low 10 however she has normal  and negative Karon  Erythropoietin 15  Iron panel consistent with anemia of chronic disease no obvious iron deficiency iron saturation 39%, TIBC decreased 140, serum iron 54, ferritin 80  IgG elevated 1970, IgA elevated 823 with normal IgM  SPEP/immunofixation and free light chains are still pending to evaluate for monoclonal gammopathy  PT and PTT are prolonged with decreased fibrinogen likely due to liver disease; fibrinogen has been greater than 150  Cardiolipin antibodies are in the normal range  Antithrombin 3 activity is decreased 28% could possibly be acquired due to liver disease  1  Hypercoagulable state:  Decreased antithrombin 3 activity noted which could be acquired due to recent/acute liver disease  Multiple studies still pending  Has had multiple episodes of PE/DVT in the past   Was on Lovenox injections which she discontinued on her own about a year ago  Patient will most likely need to resume some type of anticoagulation in the near future  Would hold off on starting anticoagulation for now since her venous duplex came back negative for DVT and cause of anemia is not entirely clear  Can revisit recommendations for anticoagulation before she is discharged particularly if active bleeding is entirely ruled out        2  Macrocytic anemia:  Patient was recently found to have folic acid deficiency and acute alcohol-induced hepatitis/liver cirrhosis which are likely contributing factors  Iron panel consistent with anemia of chronic disease no obvious significant iron deficiency  She seems to have abnormal thyroid studies which may or may not be contributory  Etiology for her macrocytic worsening anemia is not entirely clear  Does not seem to have any obvious hint of active bleeding  Would continue to monitor closely for this  Her haptoglobin is low with elevated total bili however this could be related to liver disease and not necessarily hemolysis; her LDH came back normal with negative Karon  No schistocytes or helmet cells noted on her hemolysis smear  Continue to monitor CBC closely and transfuse as needed for hemoglobin less than 7 g or sooner should she become symptomatic  Await pending studies to rule out monoclonal gammopathy  Will continue to monitor patient her labs remotely and follow-up if needed  Please feel free to contact us with any further questions or concerns regarding patient

## 2022-10-05 NOTE — PLAN OF CARE
Problem: Potential for Falls  Goal: Patient will remain free of falls  Description: INTERVENTIONS:  - Educate patient/family on patient safety including physical limitations  - Instruct patient to call for assistance with activity   - Consult OT/PT to assist with strengthening/mobility   - Keep Call bell within reach  - Keep bed low and locked with side rails adjusted as appropriate  - Keep care items and personal belongings within reach  - Initiate and maintain comfort rounds  - Make Fall Risk Sign visible to staff  - Apply yellow socks and bracelet for high fall risk patients  - Consider moving patient to room near nurses station  Outcome: Progressing     Problem: Nutrition/Hydration-ADULT  Goal: Nutrient/Hydration intake appropriate for improving, restoring or maintaining nutritional needs  Description: Monitor and assess patient's nutrition/hydration status for malnutrition  Collaborate with interdisciplinary team and initiate plan and interventions as ordered  Monitor patient's weight and dietary intake as ordered or per policy  Utilize nutrition screening tool and intervene as necessary  Determine patient's food preferences and provide high-protein, high-caloric foods as appropriate       INTERVENTIONS:  - Monitor oral intake, urinary output, labs, and treatment plans  - Assess nutrition and hydration status and recommend course of action  - Evaluate amount of meals eaten  - Assist patient with eating if necessary   - Allow adequate time for meals  - Recommend/ encourage appropriate diets, oral nutritional supplements, and vitamin/mineral supplements   Assess need for intravenous fluids  - Provide specific nutrition/hydration education as appropriate  - Include patient/family/caregiver in decisions related to nutrition  Outcome: Progressing     Problem: GASTROINTESTINAL - ADULT  Goal: Minimal or absence of nausea and/or vomiting  Description: INTERVENTIONS:  - Administer IV fluids if ordered to ensure adequate hydration  - Maintain NPO status until nausea and vomiting are resolved  - Nasogastric tube if ordered  - Administer ordered antiemetic medications as needed  - Provide nonpharmacologic comfort measures as appropriate  - Advance diet as tolerated, if ordered  - Consider nutrition services referral to assist patient with adequate nutrition and appropriate food choices  Outcome: Progressing

## 2022-10-05 NOTE — DISCHARGE SUMMARY
Discharge Summary - Ortiz Houser    Patient Information: Paula Jack 55 y o  female MRN: [id number]  Unit/Bed#: 7T [facility] 708-01 Encounter: [id number]    Discharging Physician / Practitioner: Brenda Cowart MD  PCP: Nkechi Lowe PA-C  Admission Date:   Admission Orders (From admission, onward)     Ordered        09/28/22 0845  Inpatient Admission  Once                      Discharge Date: 10/06/2022    Reason for Admission: Severe Alcoholic Hepatitis    Discharge Diagnoses:     Principal Problem:    Severe Alcoholic hepatitis  Active Problems:    Other specified anemias    VINI (acute kidney injury) ([facility] 75 )    Hypokalemia    Hypomagnesemia    UTI (urinary tract infection)    Trichomonas vaginalis (TV) infection    History of DVT of lower extremity    Other constipation    Cholelithiasis    Alcohol abuse    Tobacco abuse    Financial difficulties  Resolved Problems:    Hyponatremia        * Severe Alcoholic hepatitis  Assessment & Plan  No nausea, vomiting, and constipation  Hx: PE, DVT, gastric bypass, smoker, alcohol abuse  Labs: Transminitis, anemia, hyperbilirubinemia  CT Abd/Pelvis (9/28/22): hepatomegaly; severe steatosis, gallstones with no pericholecystic inflammation  X-ray chest PA (9/28/22): No acute cardiopulmonary disease  GI on board; feedback appreciated  Steroids initiated on 9/29/22     - Continue Lactulose 20 g BID    - Continue Prednisolone 40 mg Daily as per GI (Day 4/28) with no plan for taper  - If bleeding, discontinue Prednisolone, start PPI IV and get upper endoscopy  - Continue Pantoprazole 40 mg BID   - AM CMP and PT-INR    - Discharge to acute rehab when medically stable  VINI (acute kidney injury) ([facility] 75 )  Assessment & Plan  Creatinine since admission: 0 76 (9/28) > 0 87 (9/29/) >1 14 (9/30) >1 77 (10/1)>1 59 (10/2)  10/3: 1 19  Baseline 0 6-0 7      - IV maintenance fluids 125 ml/h    - Daily weights and I/Os  - As per GI, low sodium diet     - AM CMP Other specified anemias  Assessment & Plan  Hgb since admission: 8 0 > 7 9 > 6 7 > 9 0 (2 hrs post-transfusion 9/30/2022) > 8 7>8 5  Hgb today 10/3/2022- 8 0  Patient is hemodynamically stable and asymptomatic  No previous history of blood transfusion  No hematemesis, melena or hematochezia  Patient currently menstruating; cycle started on 10/1/2022  Hx: Menometrorrhagia, DVT, and PE  TVUS:  Per GI- Likely an anastomic ulcer given her gastric bypass history (2018) and tobacco use  - Per GI- If evidence of GI bleed, start IV PPI and upper endoscopy  - Stop steroids if bleeding or worsening infection  - AM CBC    Hypomagnesemia  Assessment & Plan  Mg=2 2  - At goal      - AM CMP    Hypokalemia  Assessment & Plan  10/3/2022- K=3 7   Goal=4    - Replete potassium 40 meq po x once  - AM CMP        Trichomonas vaginalis (TV) infection  Assessment & Plan  Urinalysis 9/30/22: Trichomonas Vaginalis noted  Last sexual encounter was a month and a half ago and has one sexual partner  Reports yellow discharge, which occurs a few days prior to menstruating  Patient currently menstruation with 1st day on 10/1/2022    - Flagyl 500 mg BID for 7 days (day 2/7)   - Discussed with patient the importance of partner treatment as well  - Urine GC/Chlamydia ordered and follow up  UTI (urinary tract infection)  Assessment & Plan  UA 09/30/22: positive for opiates,bacteria, WBCs, negative nitirite  Urine culture 9/30/22:  >100 000 Gram negative kenroy   Denies dysuria, urgency or frequency  - S/p Rocephin 1000 mg IV once  Cholelithiasis  Assessment & Plan  9/29- Gallstones without pericholecystic inflammatory changes  C/o epigastric and RUQ pain  Denies flank pain or dysuria    - RUQ Abdominal US      Other constipation  Assessment & Plan  Patient had one soft stool yesterday  Scleral icterus  Appreciate GI recommendations  - Lactulose 20 g PO BID         History of DVT of lower extremity  Assessment & Plan  Hx: DVT (2003,2007,2017), PE, worked with [employer] [id number]  PE: Tenderness in both calves on palpation; left>right LE; Left calve circ>right  Hgb since admission: 8 0 > 7 9 > 6 7 > 9 0 (2 hrs post-transfusion 9/30/2022) > 8 7>8 5  Hgb today 10/3/2022- 8 0      - Vas lower limb venous duplex   - Antithrombin III activity, lupus anticoagulant, cardiolipin Antibody, Ristocetin VWF profile, factor 8 activity, IgM, IgA, IgG, and direct antiglobulin  Financial difficulties  Assessment & Plan  Endorses she stopped taking all her medications because she was unable to afford them  -CM referral in place  Tobacco abuse  Assessment & Plan  Smokes 1/2 a PPD  Not decided on quitting  Pregnancy test negative (9/28/22)  - Nicotine replacement patch order placed  - Smoking cessation counseling  Alcohol abuse  Assessment & Plan  No nausea, vomiting or constipation  C/o epigastric pain, improved  Hx: alcohol abuse x6 years; 4 cups of vodka daily  AST: 191 > 138 > 122 > 115>118>107  ALT: 75 > 57 > 52 > 58 > 57 >57  ALP: 193 > 149 > 137 > 155 > 135> 136  CT Abd/Pelvis (9/28/22): hepatomegaly; severe steatosis, gallstones with no pericholecystic inflammation  Appreciate GI recommendations  - Ativan 1 mg PO Q6hr PRN for withdrawal symptoms   - Folic acid 1 mg/Thaimine 100 mg/MVit PO daily   - Pantoprazole 40 mg BID  - Transfer to acute rehab when medically stable  - AM CMP and PT-INR    - Alcohol cessation counseling  Hyponatremia-resolved as of 10/2/2022  Assessment & Plan  Present on admission  Bilateral +3 edema in LE   9/29/22 Echo: Early grade 1 diastolic dysfunction  EF is estimated as around 64%  Trace tricuspid valve regurgitation  No obvious pulmonary hypertension  No pericardial effusion     - Continue to monitor            Consultations During Hospital Stay:  · Gastroenterology  · Hematology/Oncology  · Social work  · Surgery  · Physical and occupational therapy    Procedures Performed:   · None    Significant Findings / Test Results:   · CT abdomen pelvis w contrast: Hepatomegaly with severe steatosis  · RUQ US: Cholelithiasis   Presence of borderline wall thickening may relate to 3rd spacing from liver disease  Hepatomegaly with diffuse steatosis   Contour nodularity suggests concomitant cirrhosis  Mild ascites  Incidental Findings:   · None     Test Results Pending at Discharge (will require follow up): · Haptoglobin  · PT gene mutation  · Factor 5 Leiden  · Prothrombin gene mutation  · Beta-2 glycoprotein ab  · Lupus Anticoagulant    Outpatient Tests Requested:  · Liver Function Test    Outpatient follow-up Requested:   Hepatology   PCP   Gastroenterology   Social work/Care Management   Financial Counseling Program   [facility] Family [facility]    Complications:  None    Hospital Course:     Kevin Olivas is a 55 y o  female patient with a pmhx of gastric bypass, pulmonary embolism, half pack/day smoker and alcohol abuse (5 cups of vodka daily) who presented to the ED on 9/28/2022 due to c/o constipation for 2 weeks, consequent hemorrhoid with rash, decreased void (9/27 at 6:00 p m ) and epigastric abdominal pain  She endorses cough that has been ongoing for a duration she could not remember "a long time" and swelling in the lower extremities  She denies chest tenderness/pain, SOB, or headaches  Patient was admitted to our service for possible biliary cirrhosis and transfer to detox once medically cleared  During her hospital stay, she decided against discharge to detox because of financial obligations requiring a possible return to work   She understands the need to follow up as instructed with the different specialities outpatient           Condition at Discharge: stable     Discharge Day Visit / Exam:     Vitals: Blood Pressure: 133/92 (10/05/22 1549)  Pulse: 77 (10/05/22 1549)  Temperature: 98 8 °F (37 1 °C) (10/05/22 1549)  Temp Source: Temporal (10/05/22 1549)  Respirations: 20 (10/05/22 1549)  Height: 5' 7" (170 2 cm) (09/29/22 1120)  Weight - Scale: 125 kg (275 lb 9 2 oz) (10/02/22 0550)  SpO2: 98 % (10/05/22 1549)  Exam:   Physical Exam  Constitutional:       General: She is not in acute distress  Appearance: She is obese  She is not ill-appearing, toxic-appearing or diaphoretic  HENT:      Head: Normocephalic  Right Ear: External ear normal       Left Ear: External ear normal       Nose: Nose normal       Mouth/Throat:      Mouth: Mucous membranes are moist    Eyes:      General: Scleral icterus present  Extraocular Movements: Extraocular movements intact  Cardiovascular:      Rate and Rhythm: Normal rate and regular rhythm  Pulses: Normal pulses  Heart sounds: Normal heart sounds  No murmur heard  No friction rub  No gallop  Pulmonary:      Effort: Pulmonary effort is normal       Breath sounds: Normal breath sounds  Comments: Mildly coarse sounds left lower lung  Abdominal:      General: Bowel sounds are normal  There is distension  Tenderness: There is abdominal tenderness  There is rebound  There is no right CVA tenderness, left CVA tenderness or guarding  Musculoskeletal:      Right lower leg: Edema present  Left lower leg: Edema present  Skin:     General: Skin is warm and dry  Capillary Refill: Capillary refill takes 2 to 3 seconds  Neurological:      General: No focal deficit present  Mental Status: She is alert and oriented to person, place, and time  Psychiatric:         Behavior: Behavior normal          Thought Content: Thought content normal          Judgment: Judgment normal        Discussion with Family: No  Patient Information Sharing: Jean Jennings does not wish inpatient team to notify their loved ones of their condition and current plan as it stands today      Discharge instructions/Information to patient and family:   See after visit summary for information provided to patient and family  Discharge Medications:  Current Outpatient Medications   Medication Instructions    ergocalciferol (VITAMIN D2) 50,000 Units, Oral, Weekly    erythromycin (ILOTYCIN) ophthalmic ointment Place a 1/2 inch ribbon of ointment into the lower eyelid of both eyes 6x/day x 7 days   folic acid (FOLVITE) 1 mg, Every 24 hours    gabapentin (NEURONTIN) 800 mg, 2 times daily    ketorolac (TORADOL) 10 mg, Oral, Every 6 hours PRN    metroNIDAZOLE (FLAGYL) 500 mg, Oral, Every 12 hours scheduled    nitrofurantoin (MACROBID) 100 mg, Oral, 2 times daily    pantoprazole (PROTONIX) 40 mg, Oral, Daily    phenazopyridine (PYRIDIUM) 200 mg, Oral, 3 times daily    [START ON 10/7/2022] prednisoLONE (ORAPRED) 40 mg, Oral, Daily    vitamin B-1 100 mg, Oral, Daily    warfarin (COUMADIN) 2 mg tablet Take Monday, Wednesday, Friday and Saturday along with 10mg warfarin     Provisions for Follow-Up Care:  See after visit summary for information related to follow-up care and any pertinent home health orders  Disposition:     Home    For Discharges to [facility] SNF:   · Not Applicable to this Patient - Not Applicable to this Patient    Planned Readmission: No     Discharge Statement:  I spent 60 minutes discharging the patient  This time was spent on the day of discharge  I had direct contact with the patient on the day of discharge  Greater than 50% of the total time was spent examining patient, answering all patient questions, arranging and discussing plan of care with patient as well as directly providing post-discharge instructions  Additional time then spent on discharge activities      ** Please Note: This note has been constructed using a voice recognition system **    Yazan Reynoso  10/05/22  4:31 PM

## 2022-10-05 NOTE — PHYSICAL THERAPY NOTE
Physical TherapyTreatment Note    Patient's Name: Alisha Sagastume    Admitting Diagnosis  Liver cirrhosis ([facility] 75 ) [K74 60]  Liver failure ([facility] ) [K72 90]  Financial difficulties [Z59 9]    Problem List  Patient Active Problem List   Diagnosis    Obesity    Pulmonary embolism ([facility] )    Peripheral neurogenic pain    Comedonal acne    Hypokalemia    Elevated lactic acid level    Smoking    Elevated troponin I level    Acute cystitis    Personal history of gastric bypass    Swelling of lower extremity    Obstructive sleep apnea    Paresthesia of lower extremity    Tobacco dependence syndrome    Long term (current) use of anticoagulants [Z79 01]    [facility] discharge follow-up    Papular rash    Dental abscess    History of DVT of lower extremity    History of pulmonary embolus (PE)    Alcohol abuse    Other constipation    Tobacco abuse    Financial difficulties    Severe Alcoholic hepatitis    Other specified anemias    VINI (acute kidney injury) ([facility] )    UTI (urinary tract infection)    Trichomonas vaginalis (TV) infection    Hypomagnesemia    Cholelithiasis       Past Medical History  Past Medical History:   Diagnosis Date    DVT (deep venous thrombosis) ([place] [address] )     Pulmonary embolism ([place] [address] )        Past Surgical History  Past Surgical History:   Procedure Laterality Date    ANKLE SURGERY Left 1995    GASTRIC BYPASS         Recent Imaging  VAS lower limb venous duplex study, complete bilateral   Final Result by Hyun Underwood MD (10/04 1601)      US pelvis complete w transvaginal   Final Result by Charissa Gallagher MD (10/03 1507)       1  Moderate amount of free fluid in the pelvis  2   Otherwise normal pelvic sonogram                       Workstation performed: [id number]         US right upper quadrant   Final Result by Mackenzie Esat MD (10/03 1540)      Cholelithiasis    Presence of borderline wall thickening may relate to 3rd spacing from liver disease, but recommend correlation for clinical or laboratory features of cholecystitis  Hepatomegaly with diffuse steatosis  Contour nodularity suggests concomitant cirrhosis  Mild ascites  The study was marked in EPIC for significant notification  Workstation performed: [id number]         CT abdomen pelvis w contrast   Final Result by Marleny Rueda MD (09/28 1793)      No evidence of acute pathology  Small ascites  Hepatomegaly with severe steatosis  Workstation performed: [id number]         XR chest pa & lateral   ED Interpretation by Brett Bryant PA-C (09/28 0530)   No obvious fluid overload      Final Result by Baljeet Mendoza MD (09/28 1809)      No acute cardiopulmonary disease  Findings are stable            Workstation performed: [id number]         IR other    (Results Pending)       Recent Vital Signs  Vitals:    10/03/22 2258 10/04/22 0748 10/04/22 1515 10/04/22 2343   BP: 143/98 104/74 128/93 137/91   BP Location: Left arm Left arm Left arm Right arm   Pulse: 82 85 91 79   Resp: 18 20 20 20   Temp: (!) 97 3 °F (36 3 °C) (!) 96 7 °F (35 9 °C) 98 1 °F (36 7 °C) 98 °F (36 7 °C)   TempSrc:  Temporal Temporal Temporal   SpO2: 100% 97% 98% 98%   Weight:       Height:            10/04/22 1555   PT Last Visit   PT Visit Date 10/04/22   Note Type   Note Type Treatment   Pain Assessment   Pain Assessment Tool 0-10   Pain Score No Pain   Restrictions/Precautions   Weight Bearing Precautions Per Order No   Other Precautions Fall Risk   General   Chart Reviewed Yes   Response to Previous Treatment Patient with no complaints from previous session     Family/Caregiver Present Yes   Cognition   Overall Cognitive Status WFL   Bed Mobility   Supine to Sit 6  Modified independent   Additional items Increased time required   Sit to Supine 6  Modified independent   Additional items Increased time required   Transfers   Sit to Stand 6  Modified independent   Additional items Increased time required   Stand to Sit 6 Modified independent   Additional items Increased time required   Additional Comments with RW   Ambulation/Elevation   Gait pattern Step through pattern;Decreased toe off;Decreased heel strike; Excessively slow;Decreased foot clearance; Improper Weight shift   Gait Assistance 5  Supervision   Additional items Verbal cues   Assistive Device Rolling walker   Distance 200ft   Stair Management Assistance 5  Supervision   Additional items Verbal cues; Increased time required   Stair Management Technique Two rails; Step to pattern; Foreward   Number of Stairs 12   Balance   Static Sitting Fair +   Dynamic Sitting Fair +   Static Standing Fair   Dynamic Standing Fair -   Ambulatory Fair -   Endurance Deficit   Endurance Deficit Yes   Endurance Deficit Description continues to report lightheadedness with activity   Activity Tolerance   Activity Tolerance Patient limited by fatigue   Medical Staff Made Aware spoke to CM   Nurse Made Aware spoke to RN   Assessment   Prognosis Fair   Problem List Decreased strength;Decreased endurance; Impaired balance;Decreased mobility; Decreased coordination; Impaired sensation;Decreased safety awareness; Obesity; Decreased skin integrity;Pain   Assessment Pt is tolerating therapy better today, able to ambulate household distances without seated rest as well as ambulate up FF of stairs  Pt does need to tolerate 3 FF of stairs to enter home  Reports some lightheadedness when first standing and when completing stairs  Barriers to Discharge Inaccessible home environment   Goals   Patient Goals to get home by friday   STG Expiration Date 10/10/22   Short Term Goal #1 see eval note   PT Treatment Day 1   Plan   Treatment/Interventions ADL retraining;Functional transfer training;LE strengthening/ROM; Therapeutic exercise;Elevations; Endurance training;Patient/family training;Equipment eval/education; Bed mobility;Gait training;Spoke to case management;Spoke to nursing;OT   Progress Progressing toward goals PT Frequency 2-3x/wk   Recommendation   PT Discharge Recommendation Home with home health rehabilitation   Equipment Recommended [place] walker   AM-PAC Basic Mobility Inpatient   Turning in Bed Without Bedrails 4   Lying on Back to Sitting on Edge of Flat Bed 4   Moving Bed to Chair 4   Standing Up From Chair 3   Walk in Room 3   Climb 3-5 Stairs 3   Basic Mobility Inpatient Raw Score 21   Basic Mobility Standardized Score 45 55   Highest Level Of Mobility   JH-HLM Goal 6: Walk 10 steps or more   JH-HLM Achieved 7: Walk 25 feet or more   Education   Education Provided Mobility training;Assistive device   Patient Explanation/teachback used;Demonstrates verbal understanding   End of Consult   Patient Position at End of Consult Seated edge of bed; All needs within reach       [facility] PT, DPT

## 2022-10-05 NOTE — PROGRESS NOTES
Patient Name: Steven Jordan  Patient MRN: [id number]  Date: 10/05/22  Service: [facility]    Subjective   AM labs pending  No events overnight  Vitals stable  She was sleeping comfortably upon my arrival  Upon waking states she hasn't been sleeping well and complains of diffuse abdominal pain  No nausea reported  Breakfast tray came while at bedside, patient states she is hungry and willing to try to eat  Vitals  Blood pressure 137/91, pulse 79, temperature 98 °F (36 7 °C), temperature source Temporal, resp  rate 20, height 5' 7" (1 702 m), weight 125 kg (275 lb 9 2 oz), SpO2 98 %, not currently breastfeeding  Physical Exam:     General Appearance:    Awake, alert, oriented x3, well developed, well    nourished   Head:    Normocephalic without obvious abnormality   Eyes:    PERRL, EOM's intact +icterus   Neck:   Supple, no adenopathy   Throat:   Mucous membranes moist   Lungs:     Clear to auscultation bilaterally, no wheezing or rhonchi   Heart:    Regular rate and rhythm, S1 and S2 normal, no murmur   Abdomen:     Soft, obese, diffusely tender, non-distended  bowel sounds active  No masses, rebound or guarding  Extremities:   Extremities with edema   Psych  Derm:   Normal affect    Warm/dry  Difficult to determine jaundice   Neurologic:   CNII-XII grossly intact   Speech intact         Laboratory Studies  Results from last 7 days   Lab Units 10/04/22  0510 10/03/22  0525 10/02/22  0418 10/01/22  0433 09/30/22  2214 09/30/22  0534 09/29/22  1341 09/29/22  0530   WBC Thousand/uL 8 55 9 17 10 38* 9 08  --  8 86 7 77  --    HEMOGLOBIN g/dL 8 3* 8 0* 8 5* 8 7* 9 0* 6 7* 7 9*  --    HEMATOCRIT % 25 9* 24 8* 25 5* 25 9* 27 2* 19 6* 22 7*  --    PLATELETS Thousands/uL 227 227 252 273  --  269  269 282  264  --    INR   --  1 68* 1 75* 1 62* 1 66* 1 89* 1 78* 1 95*     Results from last 7 days   Lab Units 10/04/22  0510 10/03/22  0525 10/02/22  0418 10/01/22  0433 09/30/22  0534   SODIUM mmol/L 139 137 138 138 135   POTASSIUM mmol/L 3 9 3 7 3 4* 3 5 3 8   CHLORIDE mmol/L 109* 107 106 104 102   CO2 mmol/L 24 26 27 28 26   BUN mg/dL 13 13 12 9 8   CREATININE mg/dL 1 04 1 19 1 59* 1 77* 1 14   CALCIUM mg/dL 8 0* 7 8* 7 5* 7 6* 7 3*   ALBUMIN g/dL 2 1* 2 1* 2 0* 2 2* 2 2*   TOTAL BILIRUBIN mg/dL 8 86* 9 74* 10 26* 10 54* 10 63*   ALK PHOS U/L 156* 136* 135* 155* 137*   ALT U/L 64* 57* 57* 58* 52*   AST U/L 175* 107* 118* 115* 122*         Imaging and Other Studies      Inhouse Medications     Current Facility-Administered Medications:     folic acid (FOLVITE) tablet 1 mg, 1 mg, Oral, Daily, 1 mg at 10/04/22 0904    LORazepam (ATIVAN) injection 2 mg, 2 mg, Intravenous, Q6H PRN    metroNIDAZOLE (FLAGYL) tablet 500 mg, 500 mg, Oral, Q12H LUNA, 500 mg at 10/04/22 2126    multivitamin-minerals (CENTRUM) tablet 1 tablet, 1 tablet, Oral, Daily, 1 tablet at 10/04/22 0904    nicotine (NICODERM CQ) 14 mg/24hr TD 24 hr patch 14 mg, 14 mg, Transdermal, Daily, 14 mg at 10/04/22 0905    ondansetron (ZOFRAN-ODT) dispersible tablet 4 mg, 4 mg, Oral, Q6H PRN, 4 mg at 10/03/22 0832    oxyCODONE (ROXICODONE) IR tablet 5 mg, 5 mg, Oral, Once PRN    pantoprazole (PROTONIX) EC tablet 40 mg, 40 mg, Oral, BID AC, 40 mg at 10/05/22 0501    polyethylene glycol (MIRALAX) packet 17 g, 17 g, Oral, Daily PRN    prednisoLONE (ORAPRED) oral solution 40 mg, 40 mg, Oral, Daily, 40 mg at 10/04/22 0905    sodium chloride 0 9 % infusion, 125 mL/hr, Intravenous, Continuous, 125 mL/hr at 10/05/22 0458    thiamine tablet 100 mg, 100 mg, Oral, Daily, 100 mg at 10/04/22 0904    traZODone (DESYREL) tablet 150 mg, 150 mg, Oral, HS, 150 mg at 10/04/22 2126      Assessment/Plan:    1  Severe alcoholic hepatitis with jaundice and coagulopathy  LFTs improving  Liver US notes cirrhotic changes; not seen on CT  RUQ pain likely related to liver capsule distension   Steroids initiated 9/29 with benefit noted, therefore would continue with 28 day course  Strict alcohol abstinence/rehab  Low sodium diet  Theador Hun for rehab if tolerating diet from GI standpoint  Outpatient follow up  2  AUD - no longer exhibits withdrawal symptoms  Plan to transfer to acute rehab once medically stable per primary service  3  Acute on chronic macrocytic anemia, multifactorial  Hematology workup ongoing  Prophylactic PPI  No active gi bleeding reported, FOBT negative  No plans for endoscopy currently - follow up as outpatient for EGD/colon  Status post 2u pRBCs  Continue to monitor cbc, transfuse as needed  4  Morbid obesity s/p bariatric surgery 2018 - recommend weight loss, lifestyle modification  Follow up with Bariatrics  5  History of DVT/PE, noncompliant with AC  Hypercoagulable workup in progress, Hematology following  AC options being considered - with elevated INR would be careful with AC/only add if absolutely needed            Seferino Denney PA-C

## 2022-10-06 ENCOUNTER — HOME HEALTH ADMISSION (OUTPATIENT)
Dept: HOME HEALTH SERVICES | Facility: HOME HEALTHCARE | Age: 47
End: 2022-10-06

## 2022-10-06 VITALS
DIASTOLIC BLOOD PRESSURE: 67 MMHG | WEIGHT: 275.57 LBS | TEMPERATURE: 98.2 F | HEIGHT: 67 IN | RESPIRATION RATE: 17 BRPM | SYSTOLIC BLOOD PRESSURE: 101 MMHG | OXYGEN SATURATION: 97 % | HEART RATE: 77 BPM | BODY MASS INDEX: 43.25 KG/M2

## 2022-10-06 PROBLEM — A59.01 TRICHOMONAS VAGINALIS (TV) INFECTION: Status: RESOLVED | Noted: 2022-10-01 | Resolved: 2022-10-06

## 2022-10-06 PROBLEM — N17.9 AKI (ACUTE KIDNEY INJURY) (HCC): Status: RESOLVED | Noted: 2022-10-01 | Resolved: 2022-10-06

## 2022-10-06 PROBLEM — E87.6 HYPOKALEMIA: Status: RESOLVED | Noted: 2018-08-19 | Resolved: 2022-10-06

## 2022-10-06 LAB
ALBUMIN SERPL BCP-MCNC: 2.1 G/DL (ref 3.5–5)
ALBUMIN SERPL ELPH-MCNC: 2.11 G/DL (ref 3.5–5)
ALBUMIN SERPL ELPH-MCNC: 37.6 % (ref 52–65)
ALP SERPL-CCNC: 180 U/L (ref 43–122)
ALPHA1 GLOB SERPL ELPH-MCNC: 0.37 G/DL (ref 0.1–0.4)
ALPHA1 GLOB SERPL ELPH-MCNC: 6.6 % (ref 2.5–5)
ALPHA2 GLOB SERPL ELPH-MCNC: 0.38 G/DL (ref 0.4–1.2)
ALPHA2 GLOB SERPL ELPH-MCNC: 6.8 % (ref 7–13)
ALT SERPL W P-5'-P-CCNC: 85 U/L
ANION GAP SERPL CALCULATED.3IONS-SCNC: 3 MMOL/L (ref 5–14)
AST SERPL W P-5'-P-CCNC: 167 U/L (ref 14–36)
BETA GLOB ABNORMAL SERPL ELPH-MCNC: 0.27 G/DL (ref 0.4–0.8)
BETA1 GLOB SERPL ELPH-MCNC: 4.9 % (ref 5–13)
BETA2 GLOB SERPL ELPH-MCNC: 10.8 % (ref 2–8)
BETA2+GAMMA GLOB SERPL ELPH-MCNC: 0.6 G/DL (ref 0.2–0.5)
BILIRUB SERPL-MCNC: 8.69 MG/DL (ref 0.2–1)
BUN SERPL-MCNC: 15 MG/DL (ref 5–25)
CALCIUM ALBUM COR SERPL-MCNC: 9.6 MG/DL (ref 8.3–10.1)
CALCIUM SERPL-MCNC: 8.1 MG/DL (ref 8.4–10.2)
CHLORIDE SERPL-SCNC: 110 MMOL/L (ref 96–108)
CO2 SERPL-SCNC: 25 MMOL/L (ref 21–32)
CREAT SERPL-MCNC: 0.89 MG/DL (ref 0.6–1.2)
ERYTHROCYTE [DISTWIDTH] IN BLOOD BY AUTOMATED COUNT: 24.6 % (ref 11.6–15.1)
GAMMA GLOB ABNORMAL SERPL ELPH-MCNC: 1.86 G/DL (ref 0.5–1.6)
GAMMA GLOB SERPL ELPH-MCNC: 33.3 % (ref 12–22)
GFR SERPL CREATININE-BSD FRML MDRD: 77 ML/MIN/1.73SQ M
GLUCOSE SERPL-MCNC: 82 MG/DL (ref 70–99)
HCT VFR BLD AUTO: 27.5 % (ref 34.8–46.1)
HGB BLD-MCNC: 8.8 G/DL (ref 11.5–15.4)
IGG/ALB SER: 0.6 {RATIO} (ref 1.1–1.8)
INTERPRETATION UR IFE-IMP: NORMAL
LDH SERPL-CCNC: 586 U/L (ref 313–618)
MAGNESIUM SERPL-MCNC: 1.7 MG/DL (ref 1.6–2.3)
MCH RBC QN AUTO: 34.9 PG (ref 26.8–34.3)
MCHC RBC AUTO-ENTMCNC: 32 G/DL (ref 31.4–37.4)
MCV RBC AUTO: 109 FL (ref 82–98)
PHOSPHATE SERPL-MCNC: 2.9 MG/DL (ref 2.5–4.8)
PLATELET # BLD AUTO: 244 THOUSANDS/UL (ref 149–390)
PMV BLD AUTO: 9.4 FL (ref 8.9–12.7)
POTASSIUM SERPL-SCNC: 4 MMOL/L (ref 3.5–5.3)
PROT PATTERN SERPL ELPH-IMP: ABNORMAL
PROT SERPL-MCNC: 5.6 G/DL (ref 6.4–8.2)
PROT SERPL-MCNC: 6.3 G/DL (ref 6.4–8.4)
RBC # BLD AUTO: 2.52 MILLION/UL (ref 3.81–5.12)
SODIUM SERPL-SCNC: 138 MMOL/L (ref 135–147)
WBC # BLD AUTO: 8.76 THOUSAND/UL (ref 4.31–10.16)

## 2022-10-06 PROCEDURE — 85027 COMPLETE CBC AUTOMATED: CPT

## 2022-10-06 PROCEDURE — 99231 SBSQ HOSP IP/OBS SF/LOW 25: CPT | Performed by: PHYSICIAN ASSISTANT

## 2022-10-06 PROCEDURE — 83010 ASSAY OF HAPTOGLOBIN QUANT: CPT | Performed by: NURSE PRACTITIONER

## 2022-10-06 PROCEDURE — 84165 PROTEIN E-PHORESIS SERUM: CPT | Performed by: PATHOLOGY

## 2022-10-06 PROCEDURE — 84100 ASSAY OF PHOSPHORUS: CPT

## 2022-10-06 PROCEDURE — 80053 COMPREHEN METABOLIC PANEL: CPT

## 2022-10-06 PROCEDURE — 99238 HOSP IP/OBS DSCHRG MGMT 30/<: CPT | Performed by: FAMILY MEDICINE

## 2022-10-06 PROCEDURE — 83615 LACTATE (LD) (LDH) ENZYME: CPT | Performed by: NURSE PRACTITIONER

## 2022-10-06 PROCEDURE — 99254 IP/OBS CNSLTJ NEW/EST MOD 60: CPT | Performed by: SURGERY

## 2022-10-06 PROCEDURE — 83735 ASSAY OF MAGNESIUM: CPT

## 2022-10-06 RX ORDER — METRONIDAZOLE 500 MG/1
500 TABLET ORAL EVERY 12 HOURS SCHEDULED
Qty: 2 TABLET | Refills: 0 | Status: SHIPPED | OUTPATIENT
Start: 2022-10-06 | End: 2022-10-07

## 2022-10-06 RX ORDER — PANTOPRAZOLE SODIUM 40 MG/1
40 TABLET, DELAYED RELEASE ORAL DAILY
Qty: 30 TABLET | Refills: 0 | Status: SHIPPED | OUTPATIENT
Start: 2022-10-06 | End: 2022-10-10 | Stop reason: SDUPTHER

## 2022-10-06 RX ORDER — POLYETHYLENE GLYCOL 3350 17 G/17G
17 POWDER, FOR SOLUTION ORAL DAILY
Status: DISCONTINUED | OUTPATIENT
Start: 2022-10-06 | End: 2022-10-06 | Stop reason: HOSPADM

## 2022-10-06 RX ORDER — PREDNISOLONE SODIUM PHOSPHATE 15 MG/5ML
40 SOLUTION ORAL DAILY
Qty: 266 ML | Refills: 0 | Status: SHIPPED | OUTPATIENT
Start: 2022-10-07 | End: 2022-10-27

## 2022-10-06 RX ADMIN — PANTOPRAZOLE SODIUM 40 MG: 40 TABLET, DELAYED RELEASE ORAL at 06:04

## 2022-10-06 RX ADMIN — MULTIPLE VITAMINS W/ MINERALS TAB 1 TABLET: TAB ORAL at 09:14

## 2022-10-06 RX ADMIN — METRONIDAZOLE 500 MG: 500 TABLET ORAL at 09:14

## 2022-10-06 RX ADMIN — FOLIC ACID 1 MG: 1 TABLET ORAL at 09:14

## 2022-10-06 RX ADMIN — THIAMINE HCL TAB 100 MG 100 MG: 100 TAB at 09:14

## 2022-10-06 RX ADMIN — NICOTINE 14 MG: 14 PATCH, EXTENDED RELEASE TRANSDERMAL at 09:14

## 2022-10-06 RX ADMIN — PREDNISOLONE SODIUM PHOSPHATE 40 MG: 15 SOLUTION ORAL at 09:18

## 2022-10-06 NOTE — CONSULTS
Consultation - General Surgery   Mckenna Paniagua 55 y o  female MRN: [id number]  Unit/Bed#: 7T [facility] 708-01 Encounter: [id number]    Assessment/Plan     Assessment:  Cirrhosis  Alcoholic hepatitis  Cholelithiasis  Hypercoagulable state with history of DVT/PE  Plan:  She has cholelithiasis without evidence of cholecystitis, postprandial pain not consistent and is worse with liquids  Advised her to follow a low-fat diet to avoid issues in the future with symptomatic cholelithiasis  Given her diagnosis of cirrhosis with alcoholic hepatitis, meld was calculated at 21  Discussed the use of meld score as far as transplant list and severity scoring for hepatitis and overall mortality and explained that meld score fluctuates with lab values so this should improve as her hepatitis improves  Emphasized abstinence from alcohol  She should have a referral placed for transplant center and hepatology  No indication for surgical intervention at this time    History of Present Illness     HPI:  Nick Moscoso is a 55 y o  female who presented with 2 weeks of constipation local and a epigastric abdominal pain on 09/29  She has history of alcohol abuse, the DVT/PE non compliant with anticoagulation, gastric bypass  In the ED she was found to have significant hyperbilirubinemia of 13 68 with mildly increased LFTs and mildly increased alkaline phosphatase  Leukocytosis was 12 with mild anemia hemoglobin of 8, INR was 1 6  CT scan showed severe hepatic steatosis, hepatomegaly, cholelithiasis without inflammatory changes, normal appearing gastric bypass anatomy without obstruction or inflammation  Follow-up right upper quadrant ultrasound showed hepatomegaly with diffuse steatosis and likely concomitant cirrhosis with mild ascites, there is cholelithiasis without gallbladder wall thickening pericholecystic fluid or Obrien sign  She reports abdominal pain in the right upper quadrant epigastric region, says it is getting better    She says the pain is worse after drinking even water, denies any pain when eating bland foods like crackers  She was started on lactulose and began having bowel movements  She denies ever seeing a hepatologist or liver transplant center, meld score calculated at 20 today  Consults    Review of Systems   Constitutional: Positive for appetite change and fatigue  Gastrointestinal: Positive for abdominal pain and constipation  All other systems reviewed and are negative  Historical Information   Past Medical History:   Diagnosis Date    DVT (deep venous thrombosis) (HCC)     Pulmonary embolism (HCC)      Past Surgical History:   Procedure Laterality Date    ANKLE SURGERY Left     GASTRIC BYPASS       Social History   Social History     Substance and Sexual Activity   Alcohol Use Yes    Comment: 5 cups vodka/day     Social History     Substance and Sexual Activity   Drug Use No     E-Cigarette/Vaping    E-Cigarette Use Never User      E-Cigarette/Vaping Substances     Social History     Tobacco Use   Smoking Status Current Every Day Smoker    Packs/day: 0 50    Years: 25 00    Pack years: 12 50    Last attempt to quit: 2018    Years since quittin 1   Smokeless Tobacco Never Used   Tobacco Comment    5-10 cigarettes / daily  Keonabisai Abraham SAYS FORMER SMOKER QUIT DATE 2017     Family History: non-contributory    Meds/Allergies   all current active meds have been reviewed  Allergies   Allergen Reactions    Gabapentin Rash     Pt is currently on gabapentin   Denies allergy       Objective   First Vitals:   Blood Pressure: 135/65 (22)  Pulse: (!) 110 (22)  Temperature: 98 2 °F (36 8 °C) (22)  Temp Source: Oral (22)  Respirations: 18 (22)  Height: 5' 7" (170 2 cm) (22 1432)  Weight - Scale: 126 kg (277 lb 4 8 oz) (22)  SpO2: 100 % (22)    Current Vitals:   Blood Pressure: 101/67 (10/06/22 0757)  Pulse: 77 (10/06/22 0757)  Temperature: 98 2 °F (36 8 °C) (10/06/22 0757)  Temp Source: Temporal (10/06/22 0757)  Respirations: 17 (10/06/22 0757)  Height: 5' 7" (170 2 cm) (09/29/22 1120)  Weight - Scale: 125 kg (275 lb 9 2 oz) (10/02/22 0550)  SpO2: 97 % (10/06/22 0757)      Intake/Output Summary (Last 24 hours) at 10/6/2022 1218  Last data filed at 10/6/2022 0900  Gross per 24 hour   Intake 600 ml   Output --   Net 600 ml       Invasive Devices  Report    Peripheral Intravenous Line  Duration           Long-Dwell Peripheral IV (Midline) 09/29/22 6 days                Physical Exam  Vitals reviewed  Constitutional:       General: She is not in acute distress  Appearance: Normal appearance  She is obese  HENT:      Head: Normocephalic and atraumatic  Nose: Nose normal       Mouth/Throat:      Mouth: Mucous membranes are moist       Pharynx: Oropharynx is clear  Eyes:      General: Scleral icterus present  Extraocular Movements: Extraocular movements intact  Pupils: Pupils are equal, round, and reactive to light  Cardiovascular:      Rate and Rhythm: Normal rate and regular rhythm  Heart sounds: Normal heart sounds  Pulmonary:      Effort: Pulmonary effort is normal  No respiratory distress  Breath sounds: Normal breath sounds  Abdominal:      General: Abdomen is flat  There is no distension  Palpations: Abdomen is soft  Tenderness: There is abdominal tenderness  There is no guarding or rebound  Musculoskeletal:         General: Swelling present  No tenderness  Normal range of motion  Cervical back: Normal range of motion and neck supple  Skin:     General: Skin is warm and dry  Neurological:      General: No focal deficit present  Mental Status: She is alert and oriented to person, place, and time  Lab Results:   I have personally reviewed pertinent lab results    , CBC:   Lab Results   Component Value Date    WBC 8 76 10/06/2022    HGB 8 8 (L) 10/06/2022    HCT 27 5 (L) 10/06/2022     (H) 10/06/2022     10/06/2022    MCH 34 9 (H) 10/06/2022    MCHC 32 0 10/06/2022    RDW 24 6 (H) 10/06/2022    MPV 9 4 10/06/2022   , CMP:   Lab Results   Component Value Date    SODIUM 138 10/06/2022    K 4 0 10/06/2022     (H) 10/06/2022    CO2 25 10/06/2022    BUN 15 10/06/2022    CREATININE 0 89 10/06/2022    CALCIUM 8 1 (L) 10/06/2022     (H) 10/06/2022    ALT 85 (H) 10/06/2022    ALKPHOS 180 (H) 10/06/2022    EGFR 77 10/06/2022     Imaging: I have personally reviewed pertinent reports  and I have personally reviewed pertinent films in PACS  EKG, Pathology, and Other Studies: I have personally reviewed pertinent reports  Counseling / Coordination of Care  Total floor / unit time spent today 20 minutes  Greater than 50% of total time was spent with the patient and / or family counseling and / or coordination of care    A description of the counseling / coordination of care:  Discussion of diagnosis and referral for transplant process

## 2022-10-06 NOTE — PLAN OF CARE
Problem: Nutrition/Hydration-ADULT  Goal: Nutrient/Hydration intake appropriate for improving, restoring or maintaining nutritional needs  Description: Monitor and assess patient's nutrition/hydration status for malnutrition  Collaborate with interdisciplinary team and initiate plan and interventions as ordered  Monitor patient's weight and dietary intake as ordered or per policy  Utilize nutrition screening tool and intervene as necessary  Determine patient's food preferences and provide high-protein, high-caloric foods as appropriate  INTERVENTIONS:  - Monitor oral intake, urinary output, labs, and treatment plans  - Assess nutrition and hydration status and recommend course of action  - Evaluate amount of meals eaten  - Assist patient with eating if necessary   - Allow adequate time for meals  - Recommend/ encourage appropriate diets, oral nutritional supplements, and vitamin/mineral supplements   Assess need for intravenous fluids  - Provide specific nutrition/hydration education as appropriate  - Include patient/family/caregiver in decisions related to nutrition  Outcome: Progressing     Problem: Knowledge Deficit  Goal: Patient/family/caregiver demonstrates understanding of disease process, treatment plan, medications, and discharge instructions  Description: Complete learning assessment and assess knowledge base    Interventions:  - Provide teaching at level of understanding  - Provide teaching via preferred learning methods  Outcome: Progressing     Problem: GASTROINTESTINAL - ADULT  Goal: Minimal or absence of nausea and/or vomiting  Description: INTERVENTIONS:  - Administer IV fluids if ordered to ensure adequate hydration  - Maintain NPO status until nausea and vomiting are resolved  - Nasogastric tube if ordered  - Administer ordered antiemetic medications as needed  - Provide nonpharmacologic comfort measures as appropriate  - Advance diet as tolerated, if ordered  - Consider nutrition services referral to assist patient with adequate nutrition and appropriate food choices  Outcome: Progressing

## 2022-10-06 NOTE — CASE MANAGEMENT
Case Management Discharge Planning Note    Patient name Kevin Olivas  Location 7T [facility] 708/7T [facility] 708-01 MRN [id number]  : 1975 Date 10/6/2022       Current Admission Date: 2022  Current Admission Diagnosis:Severe Alcoholic hepatitis   Patient Active Problem List    Diagnosis Date Noted    Cholelithiasis 10/03/2022    Other specified anemias 2022    Severe Alcoholic hepatitis     Alcohol abuse 2022    Other constipation 2022    Tobacco abuse 2022    Financial difficulties 2022    History of DVT of lower extremity 2022    History of pulmonary embolus (PE) 2022    Dental abscess 2020    Papular rash 2020   [facility] discharge follow-up 2018    Long term (current) use of anticoagulants [Z79 01] 2018    Thrush 2018    Elevated lactic acid level 2018    Smoking 2018    Elevated troponin I level 2018    Acute cystitis 2018    Personal history of gastric bypass 2018    Swelling of lower extremity 2018    Peripheral neurogenic pain 2018    Comedonal acne 2018    Paresthesia of lower extremity 2018    Obesity 2017    Pulmonary embolism ([address] ) 2017    Obstructive sleep apnea 2017    Tobacco dependence syndrome 2015      LOS (days): 8  Geometric Mean LOS (GMLOS) (days): 3 30  Days to GMLOS:-5     OBJECTIVE:  Risk of Unplanned Readmission Score: 11 4         Current admission status: Inpatient   Preferred Pharmacy:   [address] [place] [address], [address] [facility]  [address]   [address] [place] [address]  Phone: [phone] Fax: [phone]    [facility],[address], [address] [place] [address]  Phone: [phone] Fax: [phone]    Primary Care Provider: Marilee Orozco PA-C    Primary Insurance: [employer]  Secondary Insurance:     DISCHARGE DETAILS: CM was notified at morning rounds that pt is medically cleared to be discharged today  Pt will be returning back to her previous environment  Pt refused rehabs  Pt stated she is employed and will like to return to work soon  Pt will be discharge with   :   [address]         Is the patient interested in [facility] at discharge?: Yes  [address] requested[de-identified] Physical Therapy, [address] Name[de-identified] [facility] Provider[de-identified] PCP  Home Health Services Needed[de-identified] Gait/ADL Training, Strengthening/Theraputic Exercises to Improve Function, Other (comment) (Health management and medicine education)  Homebound Criteria Met[de-identified] Requires the Assistance of Another Person for Safe Ambulation or to Leave the Home  Supporting Clincal Findings[de-identified] Fatigues Eas[employer]y in [employer], [employer]  Transportation: Family   CM reviewed Discharge planning process including the following: identifying help at home, patient preference for discharge planning needs, Discharge lounge, [facility] to bed program, availability of treatment team to discuss questions or concerns patient and family may have regarding understanding medications and recognizing signs and symptoms once discharged  CM also encouraged pt to follow up with all recommended appointments after discharge  Patient advised of importance for patient and family to participate in managing pt's medical wellbeing       CM department will continue to follow through pts D/C

## 2022-10-06 NOTE — NURSING NOTE
Went over discharge instructions with the patient and her family  Pt was involved in discharge and asked questions along the way  Patient will  her medication

## 2022-10-06 NOTE — CASE MANAGEMENT
Case Management Discharge Planning Note    Patient name Ciara Martinez  Location 7T [facility] 708/7T [facility] 708-01 MRN [id number]  : 1975 Date 10/6/2022       Current Admission Date: 2022  Current Admission Diagnosis:Severe Alcoholic hepatitis   Patient Active Problem List    Diagnosis Date Noted    Cholelithiasis 10/03/2022    Other specified anemias 2022    Severe Alcoholic hepatitis 1090    Alcohol abuse 2022    Other constipation 2022    Tobacco abuse 2022    Financial difficulties 2022    History of DVT of lower extremity 2022    History of pulmonary embolus (PE) 2022    Dental abscess 2020    Papular rash 2020   [facility] discharge follow-up 2018    Long term (current) use of anticoagulants [Z79 01] 2018    Thrush 2018    Elevated lactic acid level 2018    Smoking 2018    Elevated troponin I level 2018    Acute cystitis 2018    Personal history of gastric bypass 2018    Swelling of lower extremity 2018    Peripheral neurogenic pain 2018    Comedonal acne 2018    Paresthesia of lower extremity 2018    Obesity 2017    Pulmonary embolism ([address] ) 2017    Obstructive sleep apnea 2017    Tobacco dependence syndrome 2015      LOS (days): 8  Geometric Mean LOS (GMLOS) (days): 3 30  Days to GMLOS:-5     OBJECTIVE:  Risk of Unplanned Readmission Score: 11 4         Current admission status: Inpatient   Preferred Pharmacy:   [address] [place] [address], [address] [facility]  [address]   [address] [place] [address]  Phone: [phone] Fax: [phone] [facility] [facility],[address], [address] [address]  Phone: [phone] Fax: [phone]    Primary Care Provider: Karine Emerson PA-C    Primary Insurance: [employer]  Secondary Insurance:     DISCHARGE DETAILS:     According to [facility] E counselors  Pt is eligible for 100% discount and pt was referred to to paths for insurance assistance     CM department will continue to follow through pt's D/C

## 2022-10-06 NOTE — PROGRESS NOTES
Patient Name: Clarissa Carlos  Patient MRN: [id number]  Date: 10/06/22  Service: [facility]    Subjective   Labs, notes reviewed  Hypertensive this morning  Last BM yesterday described as brown/formed  Patient denies constipation but feels bloated this morning  Miralax dosing changed prn-->daily consider holding 2/2 bloating  Patient still with RUQ abdominal pain, unchanged  No nausea, vomiting  Tolerating small amounts at mealtime  Vitals  Blood pressure (!) 147/102, pulse 88, temperature (!) 97 3 °F (36 3 °C), temperature source Temporal, resp  rate 20, height 5' 7" (1 702 m), weight 125 kg (275 lb 9 2 oz), SpO2 97 %, not currently breastfeeding  Physical Exam:     General Appearance:    Awake, alert, oriented x3, no distress, well developed, well    nourished   Head:    Normocephalic without obvious abnormality   Eyes:    PERRL, conjunctiva/corneas clear, EOM's intact        Neck:   Supple, no adenopathy   Throat:   Mucous membranes moist   Lungs:     Clear to auscultation bilaterally, no wheezing or rhonchi   Heart:    Regular rate and rhythm, S1 and S2 normal, no murmur   Abdomen:     Soft, obese, +RUQ ttp, mildly distended  bowel sounds active  No masses, rebound or guarding  Extremities:   +LE edema   Psych  Derm:   Normal affect    No jaundice   Neurologic:   CNII-XII grossly intact   Speech intact         Laboratory Studies  Results from last 7 days   Lab Units 10/05/22  0458 10/04/22  0510 10/03/22  0525 10/02/22  0418 10/01/22  0433 09/30/22  2214 09/30/22  0534 09/29/22  1341   WBC Thousand/uL 7 23 8 55 9 17 10 38* 9 08  --  8 86 7 77   HEMOGLOBIN g/dL 7 2* 8 3* 8 0* 8 5* 8 7* 9 0* 6 7* 7 9*   HEMATOCRIT % 23 1* 25 9* 24 8* 25 5* 25 9* 27 2* 19 6* 22 7*   PLATELETS Thousands/uL 200 227 227 252 273  --  269  269 282  264   INR   --   --  1 68* 1 75* 1 62* 1 66* 1 89* 1 78*     Results from last 7 days   Lab Units 10/05/22  0900 10/04/22  0510 10/03/22  0525 10/02/22  0418 10/01/22  0433   SODIUM mmol/L 139 139 137 138 138   POTASSIUM mmol/L 4 6 3 9 3 7 3 4* 3 5   CHLORIDE mmol/L 110* 109* 107 106 104   CO2 mmol/L 24 24 26 27 28   BUN mg/dL 14 13 13 12 9   CREATININE mg/dL 0 88 1 04 1 19 1 59* 1 77*   CALCIUM mg/dL 8 1* 8 0* 7 8* 7 5* 7 6*   ALBUMIN g/dL 2 6* 2 1* 2 1* 2 0* 2 2*   TOTAL BILIRUBIN mg/dL 9 48* 8 86* 9 74* 10 26* 10 54*   ALK PHOS U/L 187* 156* 136* 135* 155*   ALT U/L 87* 64* 57* 57* 58*   AST U/L 193* 175* 107* 118* 115*         Imaging and Other Studies      Inhouse Medications     Current Facility-Administered Medications:     folic acid (FOLVITE) tablet 1 mg, 1 mg, Oral, Daily, 1 mg at 10/05/22 0902    LORazepam (ATIVAN) injection 2 mg, 2 mg, Intravenous, Q6H PRN    metroNIDAZOLE (FLAGYL) tablet 500 mg, 500 mg, Oral, Q12H LUNA, 500 mg at 10/05/22 2132    multivitamin-minerals (CENTRUM) tablet 1 tablet, 1 tablet, Oral, Daily, 1 tablet at 10/05/22 0902    nicotine (NICODERM CQ) 14 mg/24hr TD 24 hr patch 14 mg, 14 mg, Transdermal, Daily, 14 mg at 10/05/22 0902    ondansetron (ZOFRAN-ODT) dispersible tablet 4 mg, 4 mg, Oral, Q6H PRN, 4 mg at 10/03/22 1826    oxyCODONE (ROXICODONE) IR tablet 5 mg, 5 mg, Oral, TID PRN, 5 mg at 10/05/22 2132    pantoprazole (PROTONIX) EC tablet 40 mg, 40 mg, Oral, BID AC, 40 mg at 10/06/22 0604    polyethylene glycol (MIRALAX) packet 17 g, 17 g, Oral, Daily PRN    prednisoLONE (ORAPRED) oral solution 40 mg, 40 mg, Oral, Daily, 40 mg at 10/05/22 0902    thiamine tablet 100 mg, 100 mg, Oral, Daily, 100 mg at 10/05/22 0902    traZODone (DESYREL) tablet 150 mg, 150 mg, Oral, HS, 150 mg at 10/05/22 2132      Assessment/Plan:    1  Severe alcoholic hepatitis with jaundice and coagulopathy   LFTs improving  Liver US notes cirrhotic changes; not seen on CT  RUQ pain likely related to liver capsule distension  Steroids initiated 9/29 with benefit noted, therefore would continue with 28 day course  Strict alcohol abstinence/rehab  Low sodium diet  ?diuretics  Ok for discharge with outpatient follow up  Will need LFTs in one week to trend  2  AUD - no longer exhibits withdrawal symptoms  Plan to transfer to acute rehab once medically stable per primary service  3  Acute on chronic macrocytic anemia, multifactorial  Hematology workup ongoing  Prophylactic PPI  No active gi bleeding reported, FOBT negative  No plans for endoscopy currently - follow up as outpatient for EGD/colon  Status post 2u pRBCs  Continue to monitor cbc, transfuse as needed for hgb less than 7g or sooner if symptomatic  4  Morbid obesity s/p bariatric surgery 2018 - recommend weight loss, lifestyle modification  Follow up with Bariatrics  5  History of DVT/PE, noncompliant with AC  Hypercoagulable workup in progress, Hematology following  AC options being considered - with elevated INR would be careful with AC/only add if absolutely needed      Martin Bob PA-C Expected Date Of Service: 07/06/2020 Billing Type: Third-Party Bill Bill For Surgical Tray: no

## 2022-10-07 ENCOUNTER — TELEPHONE (OUTPATIENT)
Dept: HEMATOLOGY ONCOLOGY | Facility: CLINIC | Age: 47
End: 2022-10-07

## 2022-10-07 LAB
A1AT PHENOTYP SERPL IFE: NORMAL
A1AT SERPL-MCNC: 156 MG/DL (ref 101–187)
B2 GLYCOPROT1 IGA SERPL IA-ACNC: 5.1
B2 GLYCOPROT1 IGG SERPL IA-ACNC: 1.4
B2 GLYCOPROT1 IGM SERPL IA-ACNC: <2.9
HAPTOGLOB SERPL-MCNC: 12 MG/DL (ref 42–296)

## 2022-10-07 NOTE — TELEPHONE ENCOUNTER
Made attempt to schedule a new patient appointment with Hematology oncology and/or Surgical oncology, While speaking with the patient the call dropped   Attempt call back busy signal

## 2022-10-07 NOTE — TELEPHONE ENCOUNTER
I spoke with the patient in regards to scheduling her for a hospital follow up appointment  Patient states she was called this morning and they gave her an appointment for Monday  Informed patient that I only see a Consult appt with Dr Roverto Meza on 11/7/22  Informed patient that David Davenport would like for her to be seen in about 2-4 weeks with Dr Gideon Singh  Patient states she will walk in on Monday to be seen  I advised the patient that we do not take walk ins and I will be scheduling her for an appointment on 10/17/22 at 11:20am for the soonest appt  Patient states understanding and states she will be coming on Monday  Informed patient that she is now scheduled to be seen on 10/17/22 at 11:20am with Dr Gideon Singh at [facility]  Provided patient with office location

## 2022-10-10 ENCOUNTER — OFFICE VISIT (OUTPATIENT)
Dept: FAMILY MEDICINE CLINIC | Facility: CLINIC | Age: 47
End: 2022-10-10

## 2022-10-10 ENCOUNTER — HOME CARE VISIT (OUTPATIENT)
Dept: HOME HEALTH SERVICES | Facility: HOME HEALTHCARE | Age: 47
End: 2022-10-10

## 2022-10-10 VITALS
DIASTOLIC BLOOD PRESSURE: 80 MMHG | TEMPERATURE: 98.7 F | OXYGEN SATURATION: 99 % | RESPIRATION RATE: 16 BRPM | HEART RATE: 94 BPM | SYSTOLIC BLOOD PRESSURE: 140 MMHG | BODY MASS INDEX: 45.99 KG/M2 | HEIGHT: 67 IN | WEIGHT: 293 LBS

## 2022-10-10 DIAGNOSIS — F17.200 TOBACCO DEPENDENCE SYNDROME: ICD-10-CM

## 2022-10-10 DIAGNOSIS — F10.10 ALCOHOL ABUSE: ICD-10-CM

## 2022-10-10 DIAGNOSIS — F10.982 ALCOHOL-INDUCED INSOMNIA (HCC): ICD-10-CM

## 2022-10-10 DIAGNOSIS — E51.9 THIAMINE DEFICIENCY: ICD-10-CM

## 2022-10-10 DIAGNOSIS — E55.9 VITAMIN D DEFICIENCY: ICD-10-CM

## 2022-10-10 DIAGNOSIS — K74.60 LIVER CIRRHOSIS (HCC): ICD-10-CM

## 2022-10-10 DIAGNOSIS — K70.11 ALCOHOLIC HEPATITIS WITH ASCITES: ICD-10-CM

## 2022-10-10 DIAGNOSIS — Z86.711 HISTORY OF PULMONARY EMBOLUS (PE): ICD-10-CM

## 2022-10-10 DIAGNOSIS — Z59.89 DOES NOT HAVE HEALTH INSURANCE: ICD-10-CM

## 2022-10-10 DIAGNOSIS — Z59.9 FINANCIAL DIFFICULTIES: ICD-10-CM

## 2022-10-10 DIAGNOSIS — D63.8 ANEMIA OF CHRONIC DISEASE: ICD-10-CM

## 2022-10-10 DIAGNOSIS — M79.89 SWELLING OF LOWER EXTREMITY: ICD-10-CM

## 2022-10-10 DIAGNOSIS — Z76.89 ENCOUNTER FOR SUPPORT AND COORDINATION OF TRANSITION OF CARE: Primary | ICD-10-CM

## 2022-10-10 LAB
APTT HEX PL PPP: 26 SEC (ref 0–11)
APTT SCREEN TO CONFIRM RATIO: 0.95 RATIO (ref 0–1.34)
APTT-LA 1H NP PPP: 50.9 SEC (ref 0–48.9)
APTT-LA IMM 4:1 NP PPP: 46 SEC (ref 0–48.9)
CONFIRM APTT/NORMAL: 54.1 SEC (ref 0–47.6)
DRVVT IMM 1:2 NP PPP: 46.8 SEC (ref 0–40.4)
DRVVT SCREEN TO CONFIRM RATIO: 1.1 RATIO (ref 0.8–1.2)
F2 GENE MUT ANL BLD/T: NORMAL
F5 GENE MUT ANL BLD/T: NORMAL
LA PPP-IMP: ABNORMAL
SCREEN APTT: 54.8 SEC (ref 0–51.9)
SCREEN DRVVT: 59 SEC (ref 0–47)
THROMBIN TIME: 31 SEC (ref 0–23)
TT IMM NP PPP: 25.9 SEC (ref 0–23)
TT P HPASE PPP: 30.1 SEC (ref 0–23)

## 2022-10-10 PROCEDURE — 99496 TRANSJ CARE MGMT HIGH F2F 7D: CPT | Performed by: FAMILY MEDICINE

## 2022-10-10 RX ORDER — PANTOPRAZOLE SODIUM 40 MG/1
40 TABLET, DELAYED RELEASE ORAL 2 TIMES DAILY
Qty: 30 TABLET | Refills: 0 | Status: SHIPPED | OUTPATIENT
Start: 2022-10-10 | End: 2022-10-21 | Stop reason: SDUPTHER

## 2022-10-10 RX ORDER — METHION/INOS/CHOL BT/B COM/LIV 110MG-86MG
100 CAPSULE ORAL DAILY
Qty: 90 TABLET | Refills: 3 | Status: SHIPPED | OUTPATIENT
Start: 2022-10-10 | End: 2023-01-08

## 2022-10-10 RX ORDER — SPIRONOLACTONE 50 MG/1
50 TABLET, FILM COATED ORAL DAILY
Qty: 30 TABLET | Refills: 1 | Status: SHIPPED | OUTPATIENT
Start: 2022-10-10 | End: 2022-11-09

## 2022-10-10 RX ORDER — FUROSEMIDE 20 MG/1
20 TABLET ORAL DAILY
Qty: 30 TABLET | Refills: 1 | Status: SHIPPED | OUTPATIENT
Start: 2022-10-10 | End: 2022-11-09

## 2022-10-10 RX ORDER — FOLIC ACID 1 MG/1
1 TABLET ORAL DAILY
Qty: 90 TABLET | Refills: 3 | Status: SHIPPED | OUTPATIENT
Start: 2022-10-10 | End: 2023-01-08

## 2022-10-10 SDOH — ECONOMIC STABILITY - INCOME SECURITY: PROBLEM RELATED TO HOUSING AND ECONOMIC CIRCUMSTANCES, UNSPECIFIED: Z59.9

## 2022-10-10 SDOH — ECONOMIC STABILITY - INCOME SECURITY: OTHER PROBLEMS RELATED TO HOUSING AND ECONOMIC CIRCUMSTANCES: Z59.89

## 2022-10-10 NOTE — CASE COMMUNICATION
spoke with patient today concerning home care services  pt will not be opened to home health she is currently working and not homebound   pt stated she understood and has no concerns or questions at this time

## 2022-10-10 NOTE — PROGRESS NOTES
Assessment & Plan     1  Encounter for support and coordination of transition of care    2  Liver cirrhosis (HCC)  Assessment & Plan:  Likely secondary to alcoholism however Anti smooth muscle antibody positive (mild)  To have liver biopsy Oct 31 and elastography completed from IR/GI follow ups  Orders:  -     pantoprazole (PROTONIX) 40 mg tablet; Take 1 tablet (40 mg total) by mouth 2 (two) times a day    3  Alcoholic hepatitis with ascites  Assessment & Plan:  Following with gastroenterology and hepatology appt in Jan 2023 - on prednisone until October 28 and then will start slow taper for patient  Pantoprazole to continue during course of steroid treatment  Will monitor MELD score with cmp, cbc and INR  To continue on thiamine and folic acid supplementation  Alcohol use ceased since prior to recent admission  Orders:  -     folic acid (FOLVITE) 1 mg tablet; Take 1 tablet (1 mg total) by mouth daily  -     furosemide (LASIX) 20 mg tablet; Take 1 tablet (20 mg total) by mouth daily  -     spironolactone (ALDACTONE) 50 mg tablet; Take 1 tablet (50 mg total) by mouth daily  -     Comprehensive metabolic panel; Future; Expected date: 10/17/2022    4  Anemia of chronic disease  Assessment & Plan:  S/p blood transfusion during hospital admission in 9/2022  Work up reveals likely disease of chronic disease given normal iron studies, protein electrophoresis unrevealing of MM  Documented history of heavy menstrual periods and EGD/Colonoscopy to be completed by GI  IF Hb remains stable and EGD colonoscopy unrevealing of concerning cause for bleed then will resume [facility] therapy for hypercoagulable state with guidance from hematology  Patient to schedule with follow up from hematology  Orders:  -     CBC and differential; Future; Expected date: 10/17/2022    5  Swelling of lower extremity  Assessment & Plan:  Bilateral and worsening - on prednisone and history of liver cirrhosis with hypoalbuminemia   History of DVT/PE however venous duplex done during hospitalization 9/2022 negative for DVT at this time  Increased weight of 37lbs  Will start lasix 20mg daily with spironolactone 50mg and monitor renal function with cmp in 1 week  Advised daily weight at home and will monitor until achieves dry weight  6  History of pulmonary embolus (PE)  Assessment & Plan:  Currently not on anticoagulants with background of severe hepatitis/cirrhosis and anemia  Stopped lovenox on her own a year ago  Recommendation to restart [facility] by hematology however needs complete assessment of cause of severe anemia noted during hospitalization requiring blood transfusion with [place] levels of 6 9  INR abnormal due to severe liver disease  Antithrombin 3 deficiency noted on recent labs, all others negative for assessment of hypercoag state  7  Alcohol abuse  Assessment & Plan:  Has quit cold turkey and refuses detox/rehabiliation  Some withdrawal effects such as insomnia based on use to aid sleep previously  Does not wish for medical treatment to assist transition out of habit  Will continue to encourage in this vein and monitor progress  Will restart thiamine and folic acid supplementation now as not received at time of hospital discharge  Orders:  -     folic acid (FOLVITE) 1 mg tablet; Take 1 tablet (1 mg total) by mouth daily    8  Financial difficulties  Assessment & Plan:  Restarted work however is self pay  Previously non compliant with medications due to lack of affordability - connected with financial counsellor to obtain insurance and CM to assess and assist with any needs  9  Does not have health insurance  Assessment & Plan:  Following with financial counselor  10  Vitamin D deficiency  -     Vitamin D 25 hydroxy; Future    11  Alcohol-induced insomnia (HCC)  Assessment & Plan:  Previously used alcohol as sleep aid and now difficulty in falling asleep   We discussed sleep hygiene changes necessary and will trial melatonin as sleep aid  Orders:  -     melatonin 3 mg; Take 1 tablet (3 mg total) by mouth daily at bedtime    12  Tobacco dependence syndrome  Assessment & Plan:  Previously smoking 5 cigarettes per day and has quit cold turkey since discharge from hospital  Does not desire any assistance to continue cessation  Congratulated and encouraged to continue on this difficult feat  13  Thiamine deficiency  -     Thiamine HCl (vitamin B-1) 100 MG TABS; Take 1 tablet (100 mg total) by mouth daily       Subjective     Transitional Care Management Review:   Naomie Go is a 55 y o  female here for TCM follow up       During the TCM phone call patient stated:  TCM Call     None      TCM Call     None        Follow up ordered:    Specialists:   Ambulatory referral to Gastroenterology - 10/12/22   Ambulatory Referral to Hematology / Oncology   Ambulatory Referral to Hepatology - Jan 2023   Ambulatory Referral to Social Work Care Management Program   Ambulatory Referral to Financial Counseling Program   Ambulatory Referral to [facility]  No future appt yet set however telephone call with Heme onc noted apt set for 10/17/22    Current Outpatient Medications:   •  folic acid (FOLVITE) 1 mg tablet, Take 1 tablet (1 mg total) by mouth daily, Disp: 90 tablet, Rfl: 3  •  furosemide (LASIX) 20 mg tablet, Take 1 tablet (20 mg total) by mouth daily, Disp: 30 tablet, Rfl: 1  •  melatonin 3 mg, Take 1 tablet (3 mg total) by mouth daily at bedtime, Disp: 30 tablet, Rfl: 1  •  pantoprazole (PROTONIX) 40 mg tablet, Take 1 tablet (40 mg total) by mouth 2 (two) times a day, Disp: 30 tablet, Rfl: 0  •  prednisoLONE (ORAPRED) 15 mg/5 mL oral solution, Take 13 3 mL (40 mg total) by mouth daily for 20 doses Do not start before October 7, 2022 , Disp: 266 mL, Rfl: 0  •  spironolactone (ALDACTONE) 50 mg tablet, Take 1 tablet (50 mg total) by mouth daily, Disp: 30 tablet, Rfl: 1  •  Thiamine HCl (vitamin B-1) 100 MG TABS, Take 1 tablet (100 mg total) by mouth daily, Disp: 90 tablet, Rfl: 3  •  ergocalciferol (VITAMIN D2) 50,000 units, Take 1 capsule (50,000 Units total) by mouth once a week (Patient not taking: No sig reported), Disp: 8 capsule, Rfl: 0  •  prednisoLONE (ORAPRED) 15 mg/5 mL oral solution, Take 10 mL (30 mg total) by mouth daily for 7 days, THEN 6 7 mL (20 mg total) daily for 7 days, THEN 3 3 mL (10 mg total) daily for 7 days, THEN 1 67 mL (5 mg total) daily for 7 days  , Disp: 151 69 mL, Rfl: 0    Maureen Mcleod is a very pleasant 55 y o  female who presents today with PMH of alcoholic liver cirrhosis, obesity, hypertension, tobacco and alcohol abuse, recurrent DVT  PE who presents today for TCM visit after hospitalization from 9/28 to 10/6  During this encounter she was admitted for constipation and noted to have liver function derangement manifesting from severe alcholic hepatitis  Gastroenterology, Hem/Onc and General Surgery were consulted during this hospitalization with discharge reocmmendations as below:  Hem/Onc - hold anticoagulation given unsure cause of anemia but once established may consider lifelong anticoagulation with eliquis or lovenox bridge to warfarin  General Surgery - cholelithiasis without evidence of cholecystsitis; no surgical intervention  Refer to hepatology for investigation into transplant procedure  Gastroenterology - Liver failure with high MELD score requiring steroid therapy - to continue on prednisone 40mg daily until Oct 27, 2022  No need for taper as verbally recommended from gastroenterologist on call on 10/1  However, will opt to taper patient off steroid therapy given timeframe of treatment  Since discharge, she reports feeling not well given significant weight gain of over 35lbs  She is able to maintain her saturation on room air, but does report shortness of breath without chest pain   She complains of difficulty sleeping given sudden cessation of alcohol and previous use of this as sleep aid  She has returned to work as  and therefore reports able to manage her duties in her current state  She became tearful discussing changes since admission with telling partner about having hepatitis C and trichomoniasis and that relationship ending  I clarified with her that viral Hepatitis studies were negative and therefore hepatitis in her case thought most likely related to inflammation caused by excessive drinking history  She was happy to hear this news and better understood the condition information shared with her today  Review of Systems   Constitutional: Positive for appetite change, fatigue and unexpected weight change  Negative for chills and fever  Respiratory: Positive for chest tightness and shortness of breath  Negative for cough and wheezing  Cardiovascular: Positive for leg swelling  Negative for chest pain and palpitations  Gastrointestinal: Positive for abdominal distention  Negative for abdominal pain and vomiting  Genitourinary: Negative for difficulty urinating, dysuria, frequency, hematuria and urgency  Skin: Negative for rash  Neurological: Positive for dizziness and light-headedness  Negative for syncope, weakness and headaches  Psychiatric/Behavioral: Positive for dysphoric mood and sleep disturbance  Negative for suicidal ideas  All other systems reviewed and are negative  Objective     /80 (BP Location: Left arm, Patient Position: Sitting, Cuff Size: Large)   Pulse 94   Temp 98 7 °F (37 1 °C) (Temporal)   Resp 16   Ht 5' 7" (1 702 m)   Wt (!) 143 kg (316 lb)   LMP 10/01/2022 (Approximate)   SpO2 99%   Breastfeeding No   BMI 49 49 kg/m²      Physical Exam  Vitals and nursing note reviewed  Constitutional:       General: She is not in acute distress  Appearance: She is well-developed  She is obese  HENT:      Head: Normocephalic and atraumatic  Eyes:      General: Scleral icterus present     Cardiovascular: Rate and Rhythm: Normal rate and regular rhythm  Pulses: Normal pulses  Heart sounds: Normal heart sounds  No murmur heard  No friction rub  No gallop  Pulmonary:      Effort: Pulmonary effort is normal  No respiratory distress  Breath sounds: Normal breath sounds  No wheezing, rhonchi or rales  Abdominal:      General: There is distension  Palpations: Abdomen is soft  Tenderness: There is no abdominal tenderness  Musculoskeletal:      Cervical back: Neck supple  Right lower leg: Edema present  Left lower leg: Edema present  Skin:     General: Skin is warm and dry  Findings: No rash  Neurological:      Mental Status: She is alert  Mental status is at baseline     Psychiatric:         Behavior: Behavior normal        Emil Griffin MD     Lab Results   Component Value Date    WBC 8 76 10/06/2022    HGB 8 8 (L) 10/06/2022    HCT 27 5 (L) 10/06/2022     (H) 10/06/2022     10/06/2022       Lab Results   Component Value Date    SODIUM 138 10/06/2022    K 4 0 10/06/2022     (H) 10/06/2022    CO2 25 10/06/2022    AGAP 3 (L) 10/06/2022    BUN 15 10/06/2022    CREATININE 0 89 10/06/2022    GLUC 82 10/06/2022    CALCIUM 8 1 (L) 10/06/2022     (H) 10/06/2022    ALT 85 (H) 10/06/2022    ALKPHOS 180 (H) 10/06/2022    TP 6 3 (L) 10/06/2022    TBILI 8 69 (H) 10/06/2022    EGFR 77 10/06/2022

## 2022-10-10 NOTE — PATIENT INSTRUCTIONS
Cirrhosis   WHAT YOU NEED TO KNOW:   Cirrhosis is long-term scarring of the liver  The liver makes enzymes and bile that help digest food and gives your body energy  It also removes harmful material from your body, such as alcohol and other chemicals  Cirrhosis is caused by repeated damage to your liver over time  Scar tissue starts to replace healthy liver tissue  The scar tissue prevents the liver from working properly  DISCHARGE INSTRUCTIONS:   Return to the emergency department if:   You have pain during a bowel movement and it is black or contains blood  You have a fast heart rate and fast breathing  You are dizzy or confused  You have severe pain in your abdomen  You have trouble breathing  Your vomit looks like it has coffee grinds or blood in it  Contact your healthcare provider if:   You have a fever  You have red or itchy skin  You are in pain and feel weak  You have questions or concerns about your condition or care  Medicines: You may need medicine to treat the cause of your cirrhosis  You may also need medicine to treat any health problems caused by cirrhosis  Antiviral medicine  may be needed if your cirrhosis is caused by hepatitis  Antiviral medicine may prevent or decrease swelling and damage to your liver  Blood pressure medicine  is used to treat high blood pressure in the portal vein (the vein that goes to your liver)  Diuretics  decrease extra fluid that collects in a part of your body, such as your legs and abdomen  Diuretics can also decrease your blood pressure  You will urinate more often when you take this medicine  Antibiotics  help prevent or treat a bacterial infection  Take your medicine as directed  Contact your healthcare provider if you think your medicine is not helping or if you have side effects  Tell him or her if you are allergic to any medicine  Keep a list of the medicines, vitamins, and herbs you take   Include the amounts, and when and why you take them  Bring the list or the pill bottles to follow-up visits  Carry your medicine list with you in case of an emergency  Do not drink alcohol:  Alcohol will cause more damage to your liver  Manage cirrhosis:   Do not smoke  Nicotine and other chemicals in cigarettes and cigars can cause blood vessel and lung damage  Ask your healthcare provider for information if you currently smoke and need help to quit  E-cigarettes or smokeless tobacco still contain nicotine  Talk to your healthcare provider before you use these products  Eat a variety of healthy foods  Healthy foods include fruits, vegetables, whole-grain breads, low-fat dairy products, beans, lean meat, and fish  Ask if you need to be on a special diet  Reach or maintain a healthy weight  You may develop fatty liver disease if you are overweight  Ask your healthcare provider for a healthy weight for you  He can help you create a safe weight loss plan if you are overweight  Limit sodium (salt)  You may need to decrease the amount of sodium you eat if you have swelling caused by fluid buildup  Sodium is found in table salt and salty foods such as canned foods, frozen foods, and potato chips  Drink liquids as directed  Ask how much liquid to drink each day and which liquids are best for you  For most people, good liquids to drink are water, juice, and milk  Liquids can help your liver work better  Ask about vaccines  You may have a hard time fighting infection because of cirrhosis  Vaccines help protect you against viruses that can cause diseases such as the flu or hepatitis  Viral hepatitis is caused by a virus that leads to inflammation of the liver  You may need a hepatitis A or B vaccine  You may also need a pneumonia vaccine  Always get a flu vaccine each year as soon as it becomes available  Ask about medicines  Some medicines can harm your liver  Acetaminophen is an example   Talk to your healthcare provider about all your medicines  Do not take any over-the-counter medicine or herbal supplements until your healthcare provider says it is okay  Follow up with your doctor as directed:  Write down your questions so you remember to ask them during your visits  © Copyright [address] 2022 Information is for End User's use only and may not be sold, redistributed or otherwise used for commercial purposes  All illustrations and images included in CareNotes® are the copyrighted property of [employer]  or [facility]   The above information is an  only  It is not intended as medical advice for individual conditions or treatments  Talk to your doctor, nurse or pharmacist before following any medical regimen to see if it is safe and effective for you  Ascites   WHAT YOU NEED TO KNOW:   Ascites is excess fluid in the lower abdomen  The fluid causes swelling in the abdomen  DISCHARGE INSTRUCTIONS:   Call your local emergency number (911 in the [address] [facility]) if:   You have trouble breathing  You feel confused, faint, or lose consciousness  You vomit blood or see blood in your bowel movement  Call your doctor if:   You feel pain in your abdomen  You have a fever  You are losing more or less weight than expected  You are urinating less than usual      You feel dizzy or lightheaded  You develop tiredness, dry mouth, nausea, or vomiting  You have muscle cramps or twitches  You have questions or concerns about your condition or care  Medicines:   Medicines  help decrease the fluid in your abdomen, prevent or fight an infection, or prevent more damage to your liver  Take your medicine as directed  Contact your healthcare provider if you think your medicine is not helping or if you have side effects  Tell him or her if you are allergic to any medicine  Keep a list of the medicines, vitamins, and herbs you take  Include the amounts, and when and why you take them  Bring the list or the pill bottles to follow-up visits  Carry your medicine list with you in case of an emergency  Self-care:   Do not drink alcohol or take medicines that contain alcohol  Alcohol worsens the damage to your liver  Your symptoms may improve after you stop drinking  Ask your healthcare provider for information if you need help to quit drinking alcohol  Follow your low-sodium plan  A dietitian can help you create a low-sodium diet  He or she may suggest lemon juice or herbs to flavor your food  Avoid salted butter or margarine, milk, cheese, and canned or frozen foods  Ask about salt substitutes  Weigh yourself each day in the morning  Keep a record of your weights  Take this record to your follow-up visits  Limit activity as directed  You may need to limit your usual daily activities until your symptoms have resolved  Ask your provider if you have any limits to your activity  Ask about NSAIDs, such as aspirin and ibuprofen  NSAIDs may not be safe if you have trouble urinating  Ask your healthcare provider if NSAIDs are safe for you  These medicines can cause stomach bleeding or kidney problems if they are not taken correctly  Follow up with your healthcare provider as directed: You may need to return in 1 to 2 weeks for blood tests and to check your weight  Write down your questions so you remember to ask them during your visits  © Copyright [employer] 2022 Information is for End User's use only and may not be sold, redistributed or otherwise used for commercial purposes  All illustrations and images included in CareNotes® are the copyrighted property of [employer]  or [employer]  The above information is an  only  It is not intended as medical advice for individual conditions or treatments  Talk to your doctor, nurse or pharmacist before following any medical regimen to see if it is safe and effective for you

## 2022-10-10 NOTE — LETTER
October 10, 2022     Patient: Christina Flor  YOB: 1975  Date of Visit: 10/10/2022      To Whom it May Concern:    Christina Flor is under my professional care  Mckenna was seen in my office on 10/10/2022  Mckenna may return to work on Tuesday, October 11, 2022  If you have any questions or concerns, please don't hesitate to call           Sincerely,      Андрей Camacho MD  Family Medicine

## 2022-10-11 LAB — FACT VIII AG ACT/NOR PPP IA: 526 %

## 2022-10-12 ENCOUNTER — PATIENT OUTREACH (OUTPATIENT)
Dept: FAMILY MEDICINE CLINIC | Facility: CLINIC | Age: 47
End: 2022-10-12

## 2022-10-12 ENCOUNTER — CONSULT (OUTPATIENT)
Dept: GASTROENTEROLOGY | Facility: MEDICAL CENTER | Age: 47
End: 2022-10-12
Payer: COMMERCIAL

## 2022-10-12 ENCOUNTER — PREP FOR PROCEDURE (OUTPATIENT)
Dept: INTERVENTIONAL RADIOLOGY/VASCULAR | Facility: CLINIC | Age: 47
End: 2022-10-12

## 2022-10-12 VITALS
WEIGHT: 293 LBS | BODY MASS INDEX: 48.93 KG/M2 | TEMPERATURE: 98.6 F | HEART RATE: 90 BPM | DIASTOLIC BLOOD PRESSURE: 71 MMHG | SYSTOLIC BLOOD PRESSURE: 128 MMHG

## 2022-10-12 DIAGNOSIS — K72.90 LIVER FAILURE (HCC): ICD-10-CM

## 2022-10-12 DIAGNOSIS — Z12.11 COLON CANCER SCREENING: ICD-10-CM

## 2022-10-12 DIAGNOSIS — K70.11 ALCOHOLIC HEPATITIS WITH ASCITES: ICD-10-CM

## 2022-10-12 DIAGNOSIS — K74.60 HEPATIC CIRRHOSIS, UNSPECIFIED HEPATIC CIRRHOSIS TYPE, UNSPECIFIED WHETHER ASCITES PRESENT (HCC): Primary | ICD-10-CM

## 2022-10-12 DIAGNOSIS — K70.31 ALCOHOLIC CIRRHOSIS OF LIVER WITH ASCITES (HCC): Primary | ICD-10-CM

## 2022-10-12 DIAGNOSIS — D64.9 ANEMIA, UNSPECIFIED TYPE: ICD-10-CM

## 2022-10-12 PROCEDURE — 99244 OFF/OP CNSLTJ NEW/EST MOD 40: CPT | Performed by: PHYSICIAN ASSISTANT

## 2022-10-12 RX ORDER — PREDNISOLONE SODIUM PHOSPHATE 15 MG/5ML
SOLUTION ORAL
Qty: 151.69 ML | Refills: 0 | Status: SHIPPED | OUTPATIENT
Start: 2022-10-12 | End: 2022-11-09

## 2022-10-12 RX ORDER — PREDNISONE 10 MG/1
TABLET ORAL
Qty: 15 TABLET | Refills: 0 | Status: CANCELLED | OUTPATIENT
Start: 2022-10-28 | End: 2022-11-04

## 2022-10-12 NOTE — PROGRESS NOTES
[facility] - Outpatient Consultation  Michelle Hatfield 55 y o  female MRN: [id number]  Encounter: [id number]          ASSESSMENT AND PLAN:      1  Alcoholic cirrhosis of liver with ascites ([facility] )  2  Alcoholic hepatitis with ascites  3  Liver failure ([facility] ): found to have alcoholic hepatitis with underlying cirrhosis  Work up also revealed a positive anti-smooth muscle antibody  She was started on steroids and LFTs started to improve  RUQ u/s did reveal cirrhotic changes however her platelets are reserved  She did also have mild ascites and she continues to have significant le edema b/l  She was started on diuretics by her pcp earlier this week but she does not think they are helping, will increase does to 40 lasix and 100mg aldactone daily and monitor bmp  She has been sober, last drink prior to her admission 9/28  Will plan for elastography as well as EGD to screen for phg and varices  Will plan to repeat antismooth  Muscle antibody at her next office visit  Will repeat cbc, cmp and INR to calculate meld as well as monitor kidney function on diuretics  She will also start tapering her steroids 10/28 and a new script for her taper was sent to pharmacy   - EGD; Future  - prednisoLONE (ORAPRED) 15 mg/5 mL oral solution; Take 10 mL (30 mg total) by mouth daily for 7 days, THEN 6 7 mL (20 mg total) daily for 7 days, THEN 3 3 mL (10 mg total) daily for 7 days, THEN 1 67 mL (5 mg total) daily for 7 days  Dispense: 151 69 mL; Refill: 0  - Ambulatory referral to Gastroenterology  - US elastography; Future  - Protime-INR  -continued abstinence from alcohol     4  Anemia, unspecified type:acute on chronic macrocytic anemia  She has ongoing hematology work up  No signs of active bleeding  Iron wnl with mildly low tibc  She did require 2 units of PRBCs as inpatient  Plan for EGD and colonoscopy for further evaluation   - Colonoscopy; Future  - EGD; Future    5   Colon cancer screening: no colonoscopy in the past, no family hx of colorectal cancers  - Colonoscopy; Future  -miralax/dulcolax     6  Diarrhea: admits to 5-7 episodes of diarrhea on a daily basis since discharge from hospital  She was on abx during hospitalization   - Clostridium difficile toxin by PCR with EIA  - Ova and parasite examination; Future  - Stool Enteric Bacterial Panel by PCR  - Pancreatic elastase, fecal    Risks of EGD, colonoscopy and liver biopsy were discussed including but not limited to bleeding, infection, perforation, inconclusive findings for liver biopsy  She understands and agrees to proceed with procedures  ______________________________________________________________________    HPI:  Silvia Field is a 59-year-old female with past medical history DVT, pulmonary embolism who is here as a new patient after hospitalization for new diagnosis of cirrhosis and alcoholic hepatitis  She was seen on the inpatient basis by [facility]  Admission from 09/28 until 10/5  During that time she was found have alcoholic hepatitis with significantly elevated LFTs and ultrasound showing underlying findings of cirrhosis and mild ascites  This is thought to be secondary to alcohol abuse  She admits to drinking 5-7 tumbler is full of vodka on a daily basis and since age 23  Complete liver workup was performed which did reveal a positive anti smooth muscle antibody  She was started on steroids for alcoholic hepatitis and her liver enzymes started to improve  She saw her family doctor earlier this week and was started on 20 mg of Lasix daily and 50 mg of Aldactone  She does not think that this has had any affect and still experiences severe lower extremity edema bilaterally  This was a new diagnosis of cirrhosis for her  She is currently sober  Her last alcoholic drink was prior to her admission 9/28  She does admit to family history of cirrhosis in her father due to alcoholism    She has never had EGD or colonoscopy in the past  She has also had hematology workup pending for acute on chronic macrocytic anemia  There is no active GI bleeding seen inpatient or at this time  She did have a negative fecal occult blood test   She did have to receive 2 units of PRBCs  No GI workup yet completed for this  She does admit that since her discharge she started having diarrhea  She is having 5-7 episodes of watery stools on a daily basis  She does admit to taking antibiotics while hospitalized  REVIEW OF SYSTEMS:    CONSTITUTIONAL: Denies any fever, chills, rigors, and weight loss  HEENT: No earache or tinnitus  Denies hearing loss or visual disturbances  CARDIOVASCULAR: No chest pain or palpitations  RESPIRATORY: Denies any cough, hemoptysis, shortness of breath or dyspnea on exertion  GASTROINTESTINAL: As noted in the History of Present Illness  GENITOURINARY: No problems with urination  Denies any hematuria or dysuria  NEUROLOGIC: No dizziness or vertigo, denies headaches  MUSCULOSKELETAL: Denies any muscle or joint pain  SKIN: Denies skin rashes or itching  ENDOCRINE: Denies excessive thirst  Denies intolerance to heat or cold  PSYCHOSOCIAL: Denies depression or anxiety  Denies any recent memory loss         Historical Information   Past Medical History:   Diagnosis Date   • DVT (deep venous thrombosis) (HCC)    • Pulmonary embolism (HCC)      Past Surgical History:   Procedure Laterality Date   • ANKLE SURGERY Left    • GASTRIC BYPASS       Social History   Social History     Substance and Sexual Activity   Alcohol Use Yes    Comment: 5 cups vodka/day     Social History     Substance and Sexual Activity   Drug Use No     Social History     Tobacco Use   Smoking Status Current Every Day Smoker   • Packs/day: 0 50   • Years: 25 00   • Pack years: 12 50   • Last attempt to quit: 2018   • Years since quittin 1   Smokeless Tobacco Never Used   Tobacco Comment    5-10 cigarettes / daily  Renee Srivastava SAYS FORMER SMOKER QUIT DATE 1/1/2017     Family History   Problem Relation Age of Onset   • Hypertension Mother        Meds/Allergies       Current Outpatient Medications:   •  folic acid (FOLVITE) 1 mg tablet  •  furosemide (LASIX) 20 mg tablet  •  pantoprazole (PROTONIX) 40 mg tablet  •  prednisoLONE (ORAPRED) 15 mg/5 mL oral solution  •  prednisoLONE (ORAPRED) 15 mg/5 mL oral solution  •  spironolactone (ALDACTONE) 50 mg tablet  •  Thiamine HCl (vitamin B-1) 100 MG TABS  •  ergocalciferol (VITAMIN D2) 50,000 units    Allergies   Allergen Reactions   • Gabapentin Rash     Pt is currently on gabapentin  Denies allergy           Objective     Blood pressure 128/71, pulse 90, temperature 98 6 °F (37 °C), weight (!) 142 kg (312 lb 6 4 oz), last menstrual period 10/01/2022, not currently breastfeeding  Body mass index is 48 93 kg/m²  PHYSICAL EXAM:      General Appearance:   Alert, cooperative, no distress   HEENT:   Normocephalic, atraumatic, anicteric      Neck:  Supple, symmetrical, trachea midline   Lungs:   Clear to auscultation bilaterally; no rales, rhonchi or wheezing; respirations unlabored    Heart[de-identified]   Regular rate and rhythm; no murmur, rub, or gallop  Abdomen:   Soft, non-tender, non-distended; normal bowel sounds; no masses, no organomegaly    Genitalia:   Deferred    Rectal:   Deferred    Extremities:  No cyanosis, clubbing or edema        Skin:  No jaundice, rashes, or lesions          Lab Results:   No visits with results within 1 Day(s) from this visit  Latest known visit with results is:   No results displayed because visit has over 200 results  Radiology Results:   XR chest pa & lateral    Result Date: 9/28/2022  Narrative: CHEST INDICATION:   abdominal pain  COMPARISON:  1/15/2018 EXAM PERFORMED/VIEWS:  XR CHEST PA & LATERAL Images: 2 FINDINGS: Cardiomediastinal silhouette appears unremarkable  The lungs are clear  No pneumothorax or pleural effusion   Osseous structures appear within normal limits for patient age  Impression: No acute cardiopulmonary disease  Findings are stable     US right upper quadrant    Result Date: 10/3/2022  Narrative: RIGHT UPPER QUADRANT ULTRASOUND INDICATION:     R10 11: Right upper quadrant pain  COMPARISON:  CT of the abdomen/pelvis dated 9/28/2022 in 12/3/2020  TECHNIQUE:   Real-time ultrasound of the right upper quadrant was performed with a curvilinear transducer with both volumetric sweeps and still imaging techniques  FINDINGS: PANCREAS:  Visualized portions of the pancreas are within normal limits  AORTA AND IVC:  Visualized portions are normal for patient age  LIVER: Size:  Liver is enlarged measuring 27 2 cm in the midclavicular line  Contour:  Surface contour is nodular  Parenchyma: Markedly increased echogenicity of the liver parenchyma diffusely with loss of periportal fat differentiation  No focal masses demonstrated  Vasculature: Portal venous flow is antegrade with normal undulating waveform  Main portal vein is ectatic at 17 mm there is no evidence for thrombosis  BILIARY: Shadowing gallstones in the poorly distended gallbladder  Wall thickness is top normal at 4 mm  No hydrops or disproportionate pericholecystic fluid  Sonographic Xiomara Points sign is reported as negative  No intrahepatic biliary dilatation  CBD measures 6 0 mm  No choledocholithiasis  KIDNEY: Right kidney measures 12 8 x 5 3 x 7 2 cm  Volume 256 1 mL Kidney within normal limits  ASCITES:   Mild ascites  Impression: Cholelithiasis  Presence of borderline wall thickening may relate to 3rd spacing from liver disease, but recommend correlation for clinical or laboratory features of cholecystitis  Hepatomegaly with diffuse steatosis  Contour nodularity suggests concomitant cirrhosis  Mild ascites  The study was marked in EPIC for significant notification      US pelvis complete w transvaginal    Result Date: 10/3/2022  Narrative: PELVIC ULTRASOUND, COMPLETE INDICATION:  46 years old   N92 1: Excessive and frequent menstruation with irregular cycle  COMPARISON: CT of the abdomen and pelvis from September 28, 2022  TECHNIQUE:   Transabdominal pelvic ultrasound was performed in sagittal and transverse planes with a curvilinear transducer  Additional transvaginal imaging was performed to better evaluate the endometrium and ovaries  Imaging included volumetric sweeps as well as traditional still imaging technique  FINDINGS: UTERUS: The uterus is anteverted in position, measuring 8 0 x 3 5 x 4 8 cm  Myometrium: Normal contour and echogenicity  No masses  Cervix: Normal in appearance  ENDOMETRIUM:  Thickness: Normal in thickness, measuring 12 mm  in maximal thickness  Echotexture: Homogeneous increased echogenicity, typical of secretory phase endometrium  No fluid in the endometrial cavity  OVARIES/ADNEXA: Right ovary:  1 8 x 2 5 x 1 3 cm  2 9 mL No suspicious right ovarian abnormality  Doppler flow within normal limits  Left ovary:  2 3 x 2 1 x 1 5 cm  3 9 mL No suspicious left ovarian abnormality  Doppler flow within normal limits  No suspicious adnexal mass  FREE FLUID: Moderate amount of pelvic ascites  Impression:  1  Moderate amount of free fluid in the pelvis  2   Otherwise normal pelvic sonogram      VAS lower limb venous duplex study, complete bilateral    Result Date: 10/4/2022  Narrative:  [facility] REPORT CLINICAL: Indications: Patient presents with bilateral lower extremity edema  CONCLUSION: Impression: RIGHT LOWER LIMB: No evidence of acute or chronic deep vein thrombosis  No evidence of superficial thrombophlebitis noted  Doppler evaluation shows a normal response to augmentation maneuvers  Popliteal, posterior tibial and anterior tibial arterial Doppler waveforms are triphasic  LEFT LOWER LIMB: No evidence of acute or chronic deep vein thrombosis  No evidence of superficial thrombophlebitis noted   Doppler evaluation shows a normal response to augmentation maneuvers  Popliteal, posterior tibial and anterior tibial arterial Doppler waveforms are triphasic  CT abdomen pelvis w contrast    Result Date: 9/28/2022  Narrative: CT ABDOMEN AND PELVIS WITH IV CONTRAST INDICATION:   Abdominal Pain  COMPARISON:  12/3/2020 TECHNIQUE:  CT examination of the abdomen and pelvis was performed  Axial, sagittal, and coronal 2D reformatted images were created from the source data and submitted for interpretation  Radiation dose length product (DLP) for this visit:  8719 mGy-cm   This examination, like all CT scans performed in the [place], was performed utilizing techniques to minimize radiation dose exposure, including the use of iterative reconstruction and automated exposure control  IV Contrast:  100 mL of iohexol (OMNIPAQUE) Enteric Contrast:  Enteric contrast was not administered  FINDINGS: ABDOMEN LOWER CHEST:  No clinically significant abnormality identified in the visualized lower chest  LIVER/BILIARY TREE:  Hepatomegaly with severe fatty infiltration, similar to the prior study  No CT evidence of suspicious hepatic mass  Normal hepatic contours  No biliary dilatation  GALLBLADDER:  There are gallstone(s) within the gallbladder, without pericholecystic inflammatory changes  SPLEEN:  Unremarkable  PANCREAS:  Unremarkable  ADRENAL GLANDS:  Unremarkable  KIDNEYS/URETERS:  Unremarkable  No hydronephrosis  STOMACH AND BOWEL:  Status post Noemi-en-Y gastric bypass  No obstruction or acute inflammatory changes  APPENDIX:  No findings to suggest appendicitis  ABDOMINOPELVIC CAVITY:  Small ascites  No pneumoperitoneum  No lymphadenopathy  VESSELS:  Unremarkable for patient's age  PELVIS REPRODUCTIVE ORGANS:  Unremarkable for patient's age  URINARY BLADDER:  Unremarkable  ABDOMINAL WALL/INGUINAL REGIONS:  Unremarkable  OSSEOUS STRUCTURES:  No acute fracture or destructive osseous lesion  Impression: No evidence of acute pathology  Small ascites  Hepatomegaly with severe steatosis  Echo complete w/ contrast if indicated    Result Date: 9/29/2022  Narrative: Technically adequate transthoracic echocardiogram study  1  Mildly increased left ventricular wall thickness, normal left ventricular systolic function, early grade 1 diastolic dysfunction  Ejection fraction is estimated as around 64%  2  Normal right ventricular size and systolic function  3  Top normal left atrial cavity size, borderline right atrial cavity enlargement  4  Normal aortic valve, no aortic valve stenosis or regurgitation  5  Normal mitral valve, trace mitral valve regurgitation  6  Trace tricuspid valve regurgitation  7  No obvious pulmonary hypertension  8  No pericardial effusion  Compared to report of previous echocardiogram from August 20, 2018 there is overall no significant change

## 2022-10-12 NOTE — PATIENT INSTRUCTIONS
Scheduled date of EGD/colonoscopy (as of today): 10/28/22  Physician performing EGD/colonoscopy: Dr Wright Cap  Location of EGD/colonoscopy: [place]  Desired bowel prep reviewed with patient: miralax/mag citrate  Instructions reviewed with patient by: MA  Clearances:

## 2022-10-12 NOTE — PROGRESS NOTES
CHUN WALTERS received referral message from Jacquie Castañeda,  regarding referral placed by IP CM for financial difficulties and drug abuse  CHUN WALTERS did note in chart that the patient had GI consult today 10/12 and office visit at [facility] on 10/10  [facility] did do assessment with patent and case will not be opened as patient is working and not homebound therefore not eligible for their services  CHUN WALTERS noted that patient was in hospital for alcoholic hepatitis  Patient also reported smoking half pack of cigarettes per day  She had declined rehabs at time of discharge  CHUN WALTERS noted that patient does not have insurance  An IP CM note reflected that the patient is eligible for 100% discount and was referred to the [facility] program for insurance assistance  CHUN WALTERS did place call to the patient Rosey who indicated things have been going okay since the discharge from the hospital  She was told not to drink or smoke by her provider and is trying not drink  She has been working again as a hotel   CHUN WALTERS and Mckenna did discuss insurance needs  CHUN WALTERS inquired if she has followed up with the [facility] since discharge  She reported having dropped off PW on Monday  CHUN WALTERS noted in Guarantor Account that she is not yet applied for sliding fee scale (sfs)  CHUN WALTERS did attempt to discuss difference between [address] sfs for primary care treatment and [facility] program for hospital bill  SW ROMEO did explain neither are insurance programs  Mckenna expressed that she did not fully understand CHUN MCCOLLUM CM will contact [facility] program and obtain further clarification  She agreed  Mckenna has tried several times in the past to apply for medical assistance but is always denied  She earns $15/hr and sometimes picks up extra shifts  CHUN WALTERS and Mckenna discussed the [employer] as a paid insurance option  She has never heard of it  CHUN WALTERS will work on getting Mckenna further information       CHUN WALTERS and [address], [address] discussed financial difficulties  She has to either pay out of pocket or has co-pays  She needs assistance in affording medications  She was just at pharmacy and paid $180 for 6 medications which will only last her half the month  She is approximately $150 behind in electric bill after paying some of previous balance  She will be applying for [facility] on her day off  She reported not being at risk of service being shutoff  She is behind 1 month in rent  She is in touch with her landlord who is working with her  Not at risk of eviction  CHUN WALTERS and Mckenna discussed drug & alcohol outpatient treatment  She is interested but does not have insurance  CHUN WALTERS advised will follow up with [facility] as they have had funding in past for those without insurance  She would also be interested in a Certified   She did identify her family as a good support but does not want them to cater to her  Mckenna drives self to appointments  She stated she has a lot of doctor appointments and will try to prioritize treatment  CHUN WALTERS did contact [facility] and spoke with Ernie Marie who confirmed still providing funding for those without insurance if they go to contracted location  Ernie Marie needs to bring her ID to assessment to show proof of [facility] residency  CHUN WALTERS contacted Mckenna again and advised of same and confirmed email on chart  CHUN WALTERS advised will start by sending drug and alcohol information and will send follow up information regarding Chitra and the [facility]  Mckenna expressed understanding  CHUN WALTERS did send email to patient with the list of contracted locations (excluding [facility] which is closing in December) with [facility]  CHUN WALTERS advised her to bring her ID  CHUN WALTERS also sent information sheets on [facility] and information sheet on [facility]

## 2022-10-14 ENCOUNTER — TELEPHONE (OUTPATIENT)
Dept: OTHER | Facility: OTHER | Age: 47
End: 2022-10-14

## 2022-10-14 ENCOUNTER — PATIENT OUTREACH (OUTPATIENT)
Dept: FAMILY MEDICINE CLINIC | Facility: CLINIC | Age: 47
End: 2022-10-14

## 2022-10-14 PROBLEM — B37.0 THRUSH: Status: RESOLVED | Noted: 2018-08-22 | Resolved: 2022-10-14

## 2022-10-14 PROBLEM — N30.00 ACUTE CYSTITIS: Status: RESOLVED | Noted: 2018-08-19 | Resolved: 2022-10-14

## 2022-10-14 PROBLEM — F17.200 SMOKING: Status: RESOLVED | Noted: 2018-08-19 | Resolved: 2022-10-14

## 2022-10-14 PROBLEM — R77.8 ELEVATED TROPONIN I LEVEL: Status: RESOLVED | Noted: 2018-08-19 | Resolved: 2022-10-14

## 2022-10-14 PROBLEM — K04.7 DENTAL ABSCESS: Status: RESOLVED | Noted: 2020-05-04 | Resolved: 2022-10-14

## 2022-10-14 PROBLEM — R79.89 ELEVATED LACTIC ACID LEVEL: Status: RESOLVED | Noted: 2018-08-19 | Resolved: 2022-10-14

## 2022-10-14 PROBLEM — IMO0001 SMOKING: Status: RESOLVED | Noted: 2018-08-19 | Resolved: 2022-10-14

## 2022-10-14 PROBLEM — R21 PAPULAR RASH: Status: RESOLVED | Noted: 2020-04-29 | Resolved: 2022-10-14

## 2022-10-14 PROBLEM — R79.89 ELEVATED TROPONIN I LEVEL: Status: RESOLVED | Noted: 2018-08-19 | Resolved: 2022-10-14

## 2022-10-14 RX ORDER — LANOLIN ALCOHOL/MO/W.PET/CERES
3 CREAM (GRAM) TOPICAL
Qty: 30 TABLET | Refills: 1 | Status: SHIPPED | OUTPATIENT
Start: 2022-10-14 | End: 2022-11-13

## 2022-10-14 NOTE — ASSESSMENT & PLAN NOTE
Bilateral and worsening - on prednisone and history of liver cirrhosis with hypoalbuminemia  History of DVT/PE however venous duplex done during hospitalization 9/2022 negative for DVT at this time  Increased weight of 37lbs  Will start lasix 20mg daily with spironolactone 50mg and monitor renal function with cmp in 1 week  Advised daily weight at home and will monitor until achieves dry weight

## 2022-10-14 NOTE — ASSESSMENT & PLAN NOTE
Following with gastroenterology and hepatology appt in Jan 2023 - on prednisone until October 28 and then will start slow taper for patient  Pantoprazole to continue during course of steroid treatment  Will monitor MELD score with cmp, cbc and INR  To continue on thiamine and folic acid supplementation  Alcohol use ceased since prior to recent admission

## 2022-10-14 NOTE — PROGRESS NOTES
CHUN WALTERS placed call to [facility] and spoke with Marialuisa Salcido who advised that the patient worked with [employer] and had submitted proof of wages  Comments only show Sept wages dropped off  Patient will also need Oct proof of income and proof of residence (it was noted that the comments could have omitted the entirety of what patient submitted)  The file had been submitted to the main office  CHUN WALTERS discussed insurance application  Accounts will not show as MA pending until everything received  It does show that patient has excess income however if lost wages while in hospital it could affect eligibility  Marialuisa Salcido will prioritize reviewing files from [address] [place] [address] to have determination  CHUN WALTERS will contact Marialuisa Salcido next week prior to looking into [employer]  CHUN WALTERS will continue to remain available for psychosocial support as needed

## 2022-10-14 NOTE — TELEPHONE ENCOUNTER
Patient is calling regarding cancelling an appointment  Date/Time:10/14/22 @ 3:00pm    Patient was rescheduled: YES [] NO [x]    Patient requesting call back to reschedule: YES [] NO [x]    Patient states she already seen a GI doctor on 10/12/22   Please call her back if she still needs this appointment

## 2022-10-14 NOTE — ASSESSMENT & PLAN NOTE
History of recurrent DVT and PE previously medicated on warfarin   Currently not taking any AC and will complete evaluation for severe anemia in 9/22 and then plan for follow up with hematology and possible restart AC given stable Hb

## 2022-10-14 NOTE — ASSESSMENT & PLAN NOTE
Previously smoking 5 cigarettes per day and has quit cold turkey since discharge from hospital  Does not desire any assistance to continue cessation  Congratulated and encouraged to continue on this difficult feat

## 2022-10-15 PROBLEM — Z59.71 DOES NOT HAVE HEALTH INSURANCE: Status: ACTIVE | Noted: 2022-10-15

## 2022-10-15 PROBLEM — K74.60 LIVER CIRRHOSIS (HCC): Status: ACTIVE | Noted: 2022-10-15

## 2022-10-15 PROBLEM — D63.8 ANEMIA OF CHRONIC DISEASE: Status: ACTIVE | Noted: 2022-09-30

## 2022-10-15 PROBLEM — F10.982 ALCOHOL-INDUCED INSOMNIA (HCC): Status: ACTIVE | Noted: 2022-10-15

## 2022-10-15 PROBLEM — I26.99 PULMONARY EMBOLISM (HCC): Status: RESOLVED | Noted: 2017-06-20 | Resolved: 2022-10-15

## 2022-10-15 PROBLEM — Z72.0 TOBACCO ABUSE: Status: RESOLVED | Noted: 2022-09-28 | Resolved: 2022-10-15

## 2022-10-15 PROBLEM — Z59.89 DOES NOT HAVE HEALTH INSURANCE: Status: ACTIVE | Noted: 2022-10-15

## 2022-10-15 NOTE — ASSESSMENT & PLAN NOTE
Currently not on anticoagulants with background of severe hepatitis/cirrhosis and anemia  Stopped lovenox on her own a year ago  Recommendation to restart 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

## 2022-10-15 NOTE — ASSESSMENT & PLAN NOTE
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

## 2022-10-16 NOTE — ASSESSMENT & PLAN NOTE
Has quit cold turkey and refuses detox/rehabiliation  Some withdrawal effects such as insomnia based on use to aid sleep previously  Does not wish for medical treatment to assist transition out of habit  Will continue to encourage in this vein and monitor progress  Will restart thiamine and folic acid supplementation now as not received at time of hospital discharge

## 2022-10-16 NOTE — ASSESSMENT & PLAN NOTE
Restarted work however is self pay  Previously non compliant with medications due to lack of affordability - connected with financial counsellor to obtain insurance and CM to assess and assist with any needs

## 2022-10-16 NOTE — ASSESSMENT & PLAN NOTE
Previously used alcohol as sleep aid and now difficulty in falling asleep  We discussed sleep hygiene changes necessary and will trial melatonin as sleep aid

## 2022-10-16 NOTE — ASSESSMENT & PLAN NOTE
Likely secondary to alcoholism however Anti smooth muscle antibody positive (mild)  To have liver biopsy Oct 31 and elastography completed from IR/GI follow ups

## 2022-10-16 NOTE — ASSESSMENT & PLAN NOTE
S/p blood transfusion during hospital admission in 9/2022  Work up reveals likely disease of chronic disease given normal iron studies, protein electrophoresis unrevealing of MM  Documented history of heavy menstrual periods and EGD/Colonoscopy to be completed by GI  IF Hb remains stable and EGD colonoscopy unrevealing of concerning cause for bleed then will resume [facility] therapy for hypercoagulable state with guidance from hematology  Patient to schedule with follow up from hematology

## 2022-10-17 ENCOUNTER — OFFICE VISIT (OUTPATIENT)
Dept: HEMATOLOGY ONCOLOGY | Facility: CLINIC | Age: 47
End: 2022-10-17
Payer: COMMERCIAL

## 2022-10-17 VITALS
HEART RATE: 89 BPM | DIASTOLIC BLOOD PRESSURE: 76 MMHG | OXYGEN SATURATION: 99 % | HEIGHT: 67 IN | TEMPERATURE: 97 F | BODY MASS INDEX: 45.11 KG/M2 | SYSTOLIC BLOOD PRESSURE: 122 MMHG | RESPIRATION RATE: 18 BRPM | WEIGHT: 287.4 LBS

## 2022-10-17 DIAGNOSIS — Z79.01 LONG TERM (CURRENT) USE OF ANTICOAGULANTS: ICD-10-CM

## 2022-10-17 DIAGNOSIS — K70.31 ALCOHOLIC CIRRHOSIS OF LIVER WITH ASCITES (HCC): Primary | ICD-10-CM

## 2022-10-17 DIAGNOSIS — D64.89 ANEMIA DUE TO OTHER CAUSE, NOT CLASSIFIED: ICD-10-CM

## 2022-10-17 DIAGNOSIS — Z86.718 HISTORY OF DVT (DEEP VEIN THROMBOSIS): ICD-10-CM

## 2022-10-17 PROCEDURE — 99215 OFFICE O/P EST HI 40 MIN: CPT | Performed by: INTERNAL MEDICINE

## 2022-10-17 NOTE — PROGRESS NOTES
Hematology/Oncology Outpatient Follow-up  Ranjit Au 55 y o  female 1975 [id number]    Date:  10/17/2022    Assessment and Plan:  1  History of DVT (deep vein thrombosis)  She had multiple episodes of clotting events in the past involving the left lower extremity and multiple pulmonary emboli the last 1 was around 2018  The patient definitely is a candidate for indefinite anticoagulation  However, she seems to have significant liver dysfunction which is affecting the production of the clotting factors  Her INR recently was 1 6 with low fibrinogen level  Is also a positive test for lupus anticoagulant test which may be an indication for antiphospholipid antibody syndrome  This test may be also false positive  The patient was told that once the GI evaluation is complete including the liver biopsy and upper lower endoscopy, the patient should go back on anticoagulation with Coumadin for INR between 2 and 3 unless there is absolute contraindication like active bleeding through the GI tract  - Ambulatory Referral to Hematology / Oncology  - APTT; Future  - Protime-INR; Future  - Fibrinogen; Future    2  Long term (current) use of anticoagulants [Z79 01]  As above  - Ambulatory Referral to Hematology / Oncology    3  Anemia due to other cause, not classified  The patient had extensive workup when she was in the hospital for her macrocytic anemia  She was told that her anemia is multifactorial in etiology including anemia of chronic disease, liver cirrhosis, folate deficiency  There is no obvious hint of hemolysis even though the haptoglobin is low which is most likely related to her liver disease with decreased production of multiple liver produced proteins     He was told that we will check her CBC on a weekly basis and make a decision if blood transfusion would be necessary once her hemoglobin level drops below 7 g    We will then pursue further workup prior to her next visit after she completes the GI evaluation   - CBC and differential; Future  - Comprehensive metabolic panel; Future  - Magnesium; Future  - LD,Blood; Future  - C-reactive protein; Future  - Sedimentation rate, automated; Future  - Haptoglobin; Future  - Hemolysis Smear; Future  - Direct antiglobulin test; Future  - Reticulocytes; Future  - Folate; Future  - Vitamin B12; Future  - Iron Panel (Includes Ferritin, Iron Sat%, Iron, and TIBC); Future  - Ferritin; Future  - Erythropoietin; Future  - CBC and differential; Standing  - Comprehensive metabolic panel; Standing  - CBC and differential  - Comprehensive metabolic panel    4  Alcoholic cirrhosis of liver with ascites ([facility] [address] )  She is in the process of getting a liver biopsy  She was also started on steroids by the GI team since she was found to have anti smooth muscle antibody   - CBC and differential; Standing  - Comprehensive metabolic panel; Standing  - CBC and differential  - Comprehensive metabolic panel      HPI:  Patient is a 45-year-old female with past medical history of alcohol abuse, tobacco abuse, sleep apnea gastric bypass surgery and 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  Patient states that her 1st episode of left lower extremity DVT with PE was around 2003 unprovoked she was on warfarin for some time which was then stopped  Second episode happened after her bariatric surgery again left lower extremity with PE 2016  Most recent episode was large bilateral PE around 2018 she was using injections I suspect Lovenox on a regular basis until about a year ago  he stopped the indefinite anticoagulation due to financial reasons  She presented to the emergency department on 09/08/2022 with reports of abdominal pain and constipation  She was found to have transaminitis and elevated total bilirubin and admitted to rule out biliary cirrhosis  She she is under the GI team who believes her symptoms may be due to alcohol-induced hepatitis    She was started on prednisone around 09/29/2022  Her hemoglobin did drop down to 6 7 on 09/30/2022 she was supported with 2 units of packed red blood cells  Stool for occult blood came back negative  During the hospital stay she had hypercoagulable workup which showed prolonged PT and PTT, low fibrinogen of 150, prolonged TT, positive lupus anticoagulant test, low antithrombin level  Anemia workup revealed low folate of 4 8 vitamin B12 level  Mild hypothyroidism, normal iron panel with ferritin of 100 and saturation of 67%  Monoclonal gammopathy workup revealed elevated IgA and IgG levels with mildly elevated kappa and lambda serum concentration and normal ratio  SPEP showed a possible faint band, Serum immunofixation was negative for M protein  Hemolysis workup showed low haptoglobin of 10 with negative direct Karon test   Doppler study of the lower extremities was negative for deep vein thrombosis on 10/04/2022  CT scan of the abdomen with contrast on 09/28/2022 showed small ascites, hepatomegaly with severe steatosis  Interval history:  The patient denied obvious bleeding from any site at this point  She is currently on steroids which was started by the GI team since she was found to have anti- smooth muscle antibody  She is also in the process of getting upper and lower endoscopy with capsule endoscopy  A liver biopsy is also planned  ROS: Review of Systems   Constitutional: Positive for appetite change  Negative for chills and fever  HENT: Positive for hearing loss  Negative for ear pain and sore throat  Eyes: Negative for pain and visual disturbance  Respiratory: Positive for shortness of breath  Negative for cough  Cardiovascular: Negative for chest pain and palpitations  Gastrointestinal: Positive for abdominal pain, diarrhea and nausea  Negative for vomiting  Genitourinary: Negative for dysuria and hematuria  Musculoskeletal: Negative for arthralgias and back pain     Skin: Negative for color change and rash  Neurological: Positive for headaches  Negative for seizures and syncope  Psychiatric/Behavioral: Positive for sleep disturbance  All other systems reviewed and are negative  Past Medical History:   Diagnosis Date   • DVT (deep venous thrombosis) (HCC)    • Pulmonary embolism (HCC)    • Pulmonary embolism ([facility] [address] 2017    CTA 2018: Large bilateral pulmonary emboli    The calculated ratio of right ventricular to left ventricular diameter (RV/LV ratio) is 1 6  This is greater than 0 9, which is abnormal and indicates right heart strain  An abnormal RV/LV ratio has been shown to be associated with an increased risk of  30 day mortality in the setting of acute pulmonary embolism    Fatty liver    Echo 18 [facility]       Past Surgical History:   Procedure Laterality Date   • ANKLE SURGERY Left    • GASTRIC BYPASS         Social History     Socioeconomic History   • Marital status: Single     Spouse name: None   • Number of children: None   • Years of education: None   • Highest education level: None   Occupational History   • None   Tobacco Use   • Smoking status: Current Every Day Smoker     Packs/day: 0 50     Years: 25 00     Pack years: 12 50     Last attempt to quit: 2018     Years since quittin 1   • Smokeless tobacco: Never Used   • Tobacco comment: 5-10 cigarettes / daily  NEXGEN SAYS FORMER SMOKER QUIT DATE 2017   Vaping Use   • Vaping Use: Never used   Substance and Sexual Activity   • Alcohol use: Yes     Comment: 5 cups vodka/day   • Drug use: No   • Sexual activity: None   Other Topics Concern   • None   Social History Narrative   • None     Social Determinants of Health     Financial Resource Strain: High Risk   • Difficulty of Paying Living Expenses: Very hard   Food Insecurity: No Food Insecurity   • Worried About Running Out of Food in the Last Year: Never true   • Ran Out of Food in the Last Year: Never true   Transportation Needs: Unmet Transportation Needs   • Lack of Transportation (Medical): Yes   • Lack of Transportation (Non-Medical): Yes   Physical Activity: Not on file   Stress: Stress Concern Present   • Feeling of Stress : To some extent   Social Connections: Not on file   Intimate Partner Violence: Not on file   Housing Stability: High Risk   • Unable to Pay for Housing in the Last Year: Yes   • Number of Places Lived in the Last Year: 1   • Unstable Housing in the Last Year: Not on file       Family History   Problem Relation Age of Onset   • Hypertension Mother        Allergies   Allergen Reactions   • Gabapentin Rash     Pt is currently on gabapentin  Denies allergy         Current Outpatient Medications:   •  ergocalciferol (VITAMIN D2) 50,000 units, Take 1 capsule (50,000 Units total) by mouth once a week, Disp: 8 capsule, Rfl: 0  •  folic acid (FOLVITE) 1 mg tablet, Take 1 tablet (1 mg total) by mouth daily, Disp: 90 tablet, Rfl: 3  •  furosemide (LASIX) 20 mg tablet, Take 1 tablet (20 mg total) by mouth daily, Disp: 30 tablet, Rfl: 1  •  melatonin 3 mg, Take 1 tablet (3 mg total) by mouth daily at bedtime, Disp: 30 tablet, Rfl: 1  •  pantoprazole (PROTONIX) 40 mg tablet, Take 1 tablet (40 mg total) by mouth 2 (two) times a day, Disp: 30 tablet, Rfl: 0  •  prednisoLONE (ORAPRED) 15 mg/5 mL oral solution, Take 13 3 mL (40 mg total) by mouth daily for 20 doses Do not start before October 7, 2022 , Disp: 266 mL, Rfl: 0  •  prednisoLONE (ORAPRED) 15 mg/5 mL oral solution, Take 10 mL (30 mg total) by mouth daily for 7 days, THEN 6 7 mL (20 mg total) daily for 7 days, THEN 3 3 mL (10 mg total) daily for 7 days, THEN 1 67 mL (5 mg total) daily for 7 days  , Disp: 151 69 mL, Rfl: 0  •  Thiamine HCl (vitamin B-1) 100 MG TABS, Take 1 tablet (100 mg total) by mouth daily, Disp: 90 tablet, Rfl: 3  •  spironolactone (ALDACTONE) 50 mg tablet, Take 1 tablet (50 mg total) by mouth daily (Patient not taking: No sig reported), Disp: 30 tablet, Rfl: 1      Physical Exam:  /76 (BP Location: Left arm, Patient Position: Sitting, Cuff Size: Large)   Pulse 89   Temp (!) 97 °F (36 1 °C) (Tympanic)   Resp 18   Ht 5' 7" (1 702 m)   Wt 130 kg (287 lb 6 4 oz)   LMP 10/01/2022 (Approximate)   SpO2 99%   BMI 45 01 kg/m²     Physical Exam  Constitutional:       General: She is not in acute distress  Appearance: She is well-developed  She is obese  She is not diaphoretic  HENT:      Head: Normocephalic and atraumatic  Eyes:      General: No scleral icterus  Right eye: No discharge  Left eye: No discharge  Conjunctiva/sclera: Conjunctivae normal       Pupils: Pupils are equal, round, and reactive to light  Neck:      Thyroid: No thyromegaly  Vascular: No JVD  Trachea: No tracheal deviation  Cardiovascular:      Rate and Rhythm: Normal rate and regular rhythm  Heart sounds: Normal heart sounds  No murmur heard  No friction rub  Pulmonary:      Effort: Pulmonary effort is normal  No respiratory distress  Breath sounds: Normal breath sounds  No stridor  No wheezing or rales  Chest:      Chest wall: No tenderness  Abdominal:      General: There is no distension  Palpations: Abdomen is soft  There is no hepatomegaly or splenomegaly  Tenderness: There is abdominal tenderness (On mild palpation in the right upper quadrant area)  There is no guarding or rebound  Comments: Morbidly obese abdomen   Musculoskeletal:         General: No tenderness or deformity  Normal range of motion  Cervical back: Normal range of motion and neck supple  Right lower leg: Edema ( 2+ pitting edema bilaterally) present  Left lower leg: Edema present  Lymphadenopathy:      Cervical: No cervical adenopathy  Skin:     General: Skin is warm and dry  Coloration: Skin is not pale  Findings: No erythema or rash  Neurological:      Mental Status: She is alert and oriented to person, place, and time  Cranial Nerves: No cranial nerve deficit  Coordination: Coordination normal       Deep Tendon Reflexes: Reflexes are normal and symmetric  Psychiatric:         Behavior: Behavior normal          Thought Content: Thought content normal          Judgment: Judgment normal            Labs:  Lab Results   Component Value Date    WBC 8 76 10/06/2022    HGB 8 8 (L) 10/06/2022    HCT 27 5 (L) 10/06/2022     (H) 10/06/2022     10/06/2022     Lab Results   Component Value Date    K 4 0 10/06/2022     (H) 10/06/2022    CO2 25 10/06/2022    BUN 15 10/06/2022    CREATININE 0 89 10/06/2022    CALCIUM 8 1 (L) 10/06/2022    CORRECTEDCA 9 6 10/06/2022     (H) 10/06/2022    ALT 85 (H) 10/06/2022    ALKPHOS 180 (H) 10/06/2022    EGFR 77 10/06/2022       Patient voiced understanding and agreement in the above discussion  Aware to contact our office with questions/symptoms in the interim

## 2022-10-19 ENCOUNTER — APPOINTMENT (OUTPATIENT)
Dept: LAB | Facility: HOSPITAL | Age: 47
End: 2022-10-19
Payer: COMMERCIAL

## 2022-10-19 DIAGNOSIS — D64.89 ANEMIA DUE TO OTHER CAUSE, NOT CLASSIFIED: ICD-10-CM

## 2022-10-19 DIAGNOSIS — D63.8 ANEMIA OF CHRONIC DISEASE: ICD-10-CM

## 2022-10-19 DIAGNOSIS — Z86.718 HISTORY OF DVT (DEEP VEIN THROMBOSIS): ICD-10-CM

## 2022-10-19 DIAGNOSIS — E55.9 VITAMIN D DEFICIENCY: ICD-10-CM

## 2022-10-19 DIAGNOSIS — K70.11 ALCOHOLIC HEPATITIS WITH ASCITES: ICD-10-CM

## 2022-10-19 LAB
25(OH)D3 SERPL-MCNC: 8.5 NG/ML (ref 30–100)
ALBUMIN SERPL BCP-MCNC: 3.2 G/DL (ref 3.5–5)
ALP SERPL-CCNC: 120 U/L (ref 43–122)
ALT SERPL W P-5'-P-CCNC: 129 U/L
ANION GAP SERPL CALCULATED.3IONS-SCNC: 4 MMOL/L (ref 5–14)
APTT PPP: 37 SECONDS (ref 23–37)
AST SERPL W P-5'-P-CCNC: 132 U/L (ref 14–36)
BASOPHILS # BLD AUTO: 0 THOUSANDS/ÂΜL (ref 0–0.1)
BASOPHILS NFR BLD AUTO: 0 % (ref 0–1)
BILIRUB SERPL-MCNC: 6.76 MG/DL (ref 0.2–1)
BLD SMEAR INTERP: NORMAL
BUN SERPL-MCNC: 16 MG/DL (ref 5–25)
CALCIUM ALBUM COR SERPL-MCNC: 9.5 MG/DL (ref 8.3–10.1)
CALCIUM SERPL-MCNC: 8.9 MG/DL (ref 8.4–10.2)
CHLORIDE SERPL-SCNC: 103 MMOL/L (ref 96–108)
CO2 SERPL-SCNC: 32 MMOL/L (ref 21–32)
CREAT SERPL-MCNC: 0.78 MG/DL (ref 0.6–1.2)
CRP SERPL QL: 10.6 MG/L
DAT POLY-SP REAG RBC QL: NEGATIVE
EOSINOPHIL # BLD AUTO: 0.01 THOUSAND/ÂΜL (ref 0–0.61)
EOSINOPHIL NFR BLD AUTO: 0 % (ref 0–6)
ERYTHROCYTE [DISTWIDTH] IN BLOOD BY AUTOMATED COUNT: 17.2 % (ref 11.6–15.1)
ERYTHROCYTE [SEDIMENTATION RATE] IN BLOOD: 93 MM/HOUR (ref 0–19)
FERRITIN SERPL-MCNC: 60 NG/ML (ref 8–388)
FIBRINOGEN PPP-MCNC: 322 MG/DL (ref 227–495)
FOLATE SERPL-MCNC: 15.6 NG/ML (ref 3.1–17.5)
GFR SERPL CREATININE-BSD FRML MDRD: 91 ML/MIN/1.73SQ M
GLUCOSE P FAST SERPL-MCNC: 78 MG/DL (ref 70–99)
HCT VFR BLD AUTO: 31.7 % (ref 34.8–46.1)
HGB BLD-MCNC: 10.1 G/DL (ref 11.5–15.4)
IMM GRANULOCYTES # BLD AUTO: 0.02 THOUSAND/UL (ref 0–0.2)
IMM GRANULOCYTES NFR BLD AUTO: 0 % (ref 0–2)
INR PPP: 1.1 (ref 0.84–1.19)
IRON SATN MFR SERPL: 9 % (ref 15–50)
IRON SERPL-MCNC: 25 UG/DL (ref 50–170)
LDH SERPL-CCNC: 563 U/L (ref 313–618)
LYMPHOCYTES # BLD AUTO: 1.21 THOUSANDS/ÂΜL (ref 0.6–4.47)
LYMPHOCYTES NFR BLD AUTO: 15 % (ref 14–44)
MAGNESIUM SERPL-MCNC: 1.8 MG/DL (ref 1.6–2.3)
MCH RBC QN AUTO: 34.7 PG (ref 26.8–34.3)
MCHC RBC AUTO-ENTMCNC: 31.9 G/DL (ref 31.4–37.4)
MCV RBC AUTO: 109 FL (ref 82–98)
MONOCYTES # BLD AUTO: 0.51 THOUSAND/ÂΜL (ref 0.17–1.22)
MONOCYTES NFR BLD AUTO: 6 % (ref 4–12)
NEUTROPHILS # BLD AUTO: 6.22 THOUSANDS/ÂΜL (ref 1.85–7.62)
NEUTS SEG NFR BLD AUTO: 79 % (ref 43–75)
NRBC BLD AUTO-RTO: 0 /100 WBCS
PLATELET # BLD AUTO: 281 THOUSANDS/UL (ref 149–390)
PMV BLD AUTO: 10.2 FL (ref 8.9–12.7)
POTASSIUM SERPL-SCNC: 4 MMOL/L (ref 3.5–5.3)
PROT SERPL-MCNC: 7.9 G/DL (ref 6.4–8.4)
PROTHROMBIN TIME: 14.5 SECONDS (ref 11.6–14.5)
RBC # BLD AUTO: 2.91 MILLION/UL (ref 3.81–5.12)
RETICS # AUTO: ABNORMAL 10*3/UL (ref 14097–95744)
RETICS # CALC: 2.13 % (ref 0.37–1.87)
SODIUM SERPL-SCNC: 139 MMOL/L (ref 135–147)
TIBC SERPL-MCNC: 279 UG/DL (ref 250–450)
VIT B12 SERPL-MCNC: 632 PG/ML (ref 100–900)
WBC # BLD AUTO: 7.97 THOUSAND/UL (ref 4.31–10.16)

## 2022-10-19 PROCEDURE — 85610 PROTHROMBIN TIME: CPT | Performed by: PHYSICIAN ASSISTANT

## 2022-10-19 PROCEDURE — 85652 RBC SED RATE AUTOMATED: CPT

## 2022-10-19 PROCEDURE — 83540 ASSAY OF IRON: CPT

## 2022-10-19 PROCEDURE — 80053 COMPREHEN METABOLIC PANEL: CPT

## 2022-10-19 PROCEDURE — 83010 ASSAY OF HAPTOGLOBIN QUANT: CPT

## 2022-10-19 PROCEDURE — 82668 ASSAY OF ERYTHROPOIETIN: CPT

## 2022-10-19 PROCEDURE — 85384 FIBRINOGEN ACTIVITY: CPT

## 2022-10-19 PROCEDURE — 83550 IRON BINDING TEST: CPT

## 2022-10-19 PROCEDURE — 83615 LACTATE (LD) (LDH) ENZYME: CPT

## 2022-10-19 PROCEDURE — 82728 ASSAY OF FERRITIN: CPT

## 2022-10-19 PROCEDURE — 85730 THROMBOPLASTIN TIME PARTIAL: CPT

## 2022-10-19 PROCEDURE — 36415 COLL VENOUS BLD VENIPUNCTURE: CPT

## 2022-10-19 PROCEDURE — 82746 ASSAY OF FOLIC ACID SERUM: CPT

## 2022-10-19 PROCEDURE — 82306 VITAMIN D 25 HYDROXY: CPT

## 2022-10-19 PROCEDURE — 86880 COOMBS TEST DIRECT: CPT

## 2022-10-19 PROCEDURE — 85025 COMPLETE CBC W/AUTO DIFF WBC: CPT

## 2022-10-19 PROCEDURE — 86140 C-REACTIVE PROTEIN: CPT

## 2022-10-19 PROCEDURE — 82607 VITAMIN B-12: CPT

## 2022-10-19 PROCEDURE — 85045 AUTOMATED RETICULOCYTE COUNT: CPT

## 2022-10-19 PROCEDURE — 83735 ASSAY OF MAGNESIUM: CPT

## 2022-10-20 ENCOUNTER — PATIENT OUTREACH (OUTPATIENT)
Dept: FAMILY MEDICINE CLINIC | Facility: CLINIC | Age: 47
End: 2022-10-20

## 2022-10-20 LAB
EPO SERPL-ACNC: 15.9 MIU/ML (ref 2.6–18.5)
HAPTOGLOB SERPL-MCNC: 41 MG/DL (ref 42–296)

## 2022-10-20 NOTE — PROGRESS NOTES
CHUN WALTERS placed follow up call to the [employer] Program regarding status of application and was transferred to Feliz Dodd  The patient's file did arrive 10/19  They still need her income  They did send direct message to employer but it will be quicker if she responds  They would need her pay stubs from August to present  Jose Manuel Cespedes did speak with patient yesterday  Patient indicated wanted to apply for MA on own  Patient makes $2400/mo and [place] [id number] for her 1 person household  Unless the wages she lost being in the hospital brings her under the limit she will be denied  Once patient is closed/denied MA she can apply for Cindy Borjas [address] discussed [place] [facility] [employer] as an option  Feliz Dodd also recommended MAWD Program as a potential option  CHUN WALTERS will continue to remain available for psychosocial support as needed

## 2022-10-21 ENCOUNTER — OFFICE VISIT (OUTPATIENT)
Dept: FAMILY MEDICINE CLINIC | Facility: CLINIC | Age: 47
End: 2022-10-21

## 2022-10-21 ENCOUNTER — APPOINTMENT (OUTPATIENT)
Dept: LAB | Facility: HOSPITAL | Age: 47
End: 2022-10-21
Attending: PHYSICIAN ASSISTANT

## 2022-10-21 VITALS
RESPIRATION RATE: 15 BRPM | HEIGHT: 67 IN | SYSTOLIC BLOOD PRESSURE: 130 MMHG | BODY MASS INDEX: 42.53 KG/M2 | OXYGEN SATURATION: 99 % | DIASTOLIC BLOOD PRESSURE: 80 MMHG | TEMPERATURE: 98.7 F | WEIGHT: 271 LBS | HEART RATE: 76 BPM

## 2022-10-21 DIAGNOSIS — Z86.718 HISTORY OF DVT (DEEP VEIN THROMBOSIS): ICD-10-CM

## 2022-10-21 DIAGNOSIS — D64.89 ANEMIA DUE TO OTHER CAUSE, NOT CLASSIFIED: ICD-10-CM

## 2022-10-21 DIAGNOSIS — M79.89 SWELLING OF LOWER EXTREMITY: ICD-10-CM

## 2022-10-21 DIAGNOSIS — F17.200 TOBACCO DEPENDENCE SYNDROME: ICD-10-CM

## 2022-10-21 DIAGNOSIS — K70.10 ALCOHOLIC HEPATITIS WITHOUT ASCITES: Primary | ICD-10-CM

## 2022-10-21 DIAGNOSIS — F10.10 ALCOHOL ABUSE: ICD-10-CM

## 2022-10-21 DIAGNOSIS — F10.982 ALCOHOL-INDUCED INSOMNIA (HCC): ICD-10-CM

## 2022-10-21 DIAGNOSIS — K74.60 LIVER CIRRHOSIS (HCC): ICD-10-CM

## 2022-10-21 DIAGNOSIS — Z86.711 HISTORY OF PULMONARY EMBOLUS (PE): ICD-10-CM

## 2022-10-21 DIAGNOSIS — K72.90 LIVER FAILURE (HCC): ICD-10-CM

## 2022-10-21 PROBLEM — Z79.01 LONG TERM (CURRENT) USE OF ANTICOAGULANTS: Status: RESOLVED | Noted: 2018-08-22 | Resolved: 2022-10-21

## 2022-10-21 PROBLEM — Z59.9 FINANCIAL DIFFICULTIES: Status: RESOLVED | Noted: 2022-09-28 | Resolved: 2022-10-21

## 2022-10-21 PROBLEM — Z09 HOSPITAL DISCHARGE FOLLOW-UP: Status: RESOLVED | Noted: 2018-08-30 | Resolved: 2022-10-21

## 2022-10-21 PROCEDURE — 82653 EL-1 FECAL QUANTITATIVE: CPT | Performed by: PHYSICIAN ASSISTANT

## 2022-10-21 PROCEDURE — 87505 NFCT AGENT DETECTION GI: CPT | Performed by: PHYSICIAN ASSISTANT

## 2022-10-21 PROCEDURE — 87209 SMEAR COMPLEX STAIN: CPT

## 2022-10-21 PROCEDURE — 87177 OVA AND PARASITES SMEARS: CPT

## 2022-10-21 PROCEDURE — 87493 C DIFF AMPLIFIED PROBE: CPT | Performed by: PHYSICIAN ASSISTANT

## 2022-10-21 PROCEDURE — 99213 OFFICE O/P EST LOW 20 MIN: CPT | Performed by: FAMILY MEDICINE

## 2022-10-21 RX ORDER — PANTOPRAZOLE SODIUM 40 MG/1
40 TABLET, DELAYED RELEASE ORAL DAILY
Qty: 30 TABLET | Refills: 0 | Status: SHIPPED | OUTPATIENT
Start: 2022-10-21

## 2022-10-21 NOTE — PATIENT INSTRUCTIONS
Please start your prednisone taper on October 29, 2022  10mg dosage is equivalent to 3 3ml   5mg dosage is equivalent to 1 6ml

## 2022-10-21 NOTE — ASSESSMENT & PLAN NOTE
Weight loss of 41lb since last office visit with increase since of lasix and spironolactone to 40mg and 100mg daily respectively, by GI  Patient symptoms significantly improved  No clinical findings of fluid overload or anasarca today  Continue to take daily weights at home with intention to decide dry weight  Recommend continuation of this current therapy with plan as per severe alcoholic hepatitis

## 2022-10-21 NOTE — PROGRESS NOTES
Name: Pia Leach      : 1975      MRN: [id number]  Encounter Provider: Jomar Green MD  Encounter Date: 10/21/2022   Encounter department: [address] [place] [address]     1  Alcoholic hepatitis without ascites  Assessment & Plan:  Improving while on prednisone therapy 40mg daily until oct 28 then to do 1 month taper as per GI  Instructions given on oral volumes to take on weekly basis over this time  Planned for endoscopy, colonoscopy and liver biopsy in the next month  To follow up with hepatologist but seen by GI already  2  Liver cirrhosis ([facility] [address] )  Assessment & Plan:  Weight loss of 41lb since last office visit with increase since of lasix and spironolactone to 40mg and 100mg daily respectively, by GI  Patient symptoms significantly improved  No clinical findings of fluid overload or anasarca today  Continue to take daily weights at home with intention to decide dry weight  Recommend continuation of this current therapy with plan as per severe alcoholic hepatitis  Orders:  -     pantoprazole (PROTONIX) 40 mg tablet; Take 1 tablet (40 mg total) by mouth daily    3  Swelling of lower extremity  Assessment & Plan:  History of DVT/PE x 3 and cirrhosis  Mildly improved with diuretic therapy  recommedned limb elevations and continued management as per A&P liver cirrhosis  4  Tobacco dependence syndrome  Assessment & Plan:  Abstinence persists without need for NRT or other therapy  5  History of pulmonary embolus (PE)  Assessment & Plan:  See A&P for DVT      6  History of DVT (deep vein thrombosis)  Assessment & Plan:  History of DVT/PE x 3, not currently on AC however anemia work up suggestive of anemia of chronic disease thus far   Awaiting colonoscopy and endoscopy on 10/28 and if no obvious sign of GI Bleed potential then would recommend restarting AC - will confer with hematology to ensure no further work up planned that may be disrupted by [facility] therapy and would opt for warfarin given normalized INR on background of liver disease  7  Alcohol abuse  Assessment & Plan:  Abstinence persists  8  Alcohol-induced insomnia (HCC)  Assessment & Plan:  Improved with melatonin  Will continue this therapy and continue to monitor  9  Anemia due to other cause, not classified  Assessment & Plan:  Likely anemia of chronic disease - liver cirrhosis  Hemoglobin 10 up from 8 on hospital discharge  Macrocytic indices however B12 and Folate normal with no signs of hemolysis  Awaiting colonoscopy and endoscopy on oct 28 to rule out GI bleed  Subjective      Phyllis Kuhn is a very pleasant 55 y o  female who presents today for follow up of generalized edema and liver disease  History of liver cirrhosis due to chronic alcohol hepatitis, recurrent DVT/PE, history of alcohol and tobacco dependence  Today, she reports feeling much better with less SOB and disturbance to sleep due to difficulty breathing  She feels far less difficulty in her functioning due to excess water willard decline  Patient compliant on her medications and experiencing no significant side effects at this time  Following with hematology and GI  Review of Systems   Constitutional: Negative for chills and fever  Eyes: Negative for pain and visual disturbance  Respiratory: Negative for cough, shortness of breath and wheezing  Cardiovascular: Positive for leg swelling  Negative for chest pain and palpitations  Gastrointestinal: Negative for abdominal pain, constipation, diarrhea, nausea and vomiting  Musculoskeletal: Positive for back pain  Negative for arthralgias  Skin: Negative for rash  Neurological: Positive for weakness and numbness  Negative for dizziness, syncope and headaches  All other systems reviewed and are negative        Current Outpatient Medications on File Prior to Visit   Medication Sig   • ergocalciferol (VITAMIN D2) 50,000 units Take 1 capsule (50,000 Units total) by mouth once a week   • folic acid (FOLVITE) 1 mg tablet Take 1 tablet (1 mg total) by mouth daily   • furosemide (LASIX) 20 mg tablet Take 1 tablet (20 mg total) by mouth daily   • melatonin 3 mg Take 1 tablet (3 mg total) by mouth daily at bedtime   • prednisoLONE (ORAPRED) 15 mg/5 mL oral solution Take 13 3 mL (40 mg total) by mouth daily for 20 doses Do not start before October 7, 2022  • prednisoLONE (ORAPRED) 15 mg/5 mL oral solution Take 10 mL (30 mg total) by mouth daily for 7 days, THEN 6 7 mL (20 mg total) daily for 7 days, THEN 3 3 mL (10 mg total) daily for 7 days, THEN 1 67 mL (5 mg total) daily for 7 days  • spironolactone (ALDACTONE) 50 mg tablet Take 1 tablet (50 mg total) by mouth daily (Patient not taking: No sig reported)   • Thiamine HCl (vitamin B-1) 100 MG TABS Take 1 tablet (100 mg total) by mouth daily   • [DISCONTINUED] pantoprazole (PROTONIX) 40 mg tablet Take 1 tablet (40 mg total) by mouth 2 (two) times a day       Objective     /80 (BP Location: Left arm, Patient Position: Sitting, Cuff Size: Large)   Pulse 76   Temp 98 7 °F (37 1 °C) (Temporal)   Resp 15   Ht 5' 7" (1 702 m)   Wt 123 kg (271 lb)   LMP 10/01/2022 (Approximate)   SpO2 99%   Breastfeeding No   BMI 42 44 kg/m²     Physical Exam  Vitals reviewed  Constitutional:       General: She is not in acute distress  Appearance: She is obese  HENT:      Head: Atraumatic  Eyes:      Conjunctiva/sclera: Conjunctivae normal    Cardiovascular:      Rate and Rhythm: Normal rate and regular rhythm  Pulses: Normal pulses  Heart sounds: Normal heart sounds  No murmur heard  Pulmonary:      Effort: Pulmonary effort is normal       Breath sounds: Normal breath sounds  Abdominal:      General: Abdomen is flat  Bowel sounds are normal  There is no distension  Palpations: Abdomen is soft  Tenderness: There is no abdominal tenderness     Musculoskeletal:         General: Normal range of motion  Cervical back: Normal range of motion  Right lower leg: Edema (2-3+ up to knee) present  Left lower leg: Edema (2-3+ up to knee) present  Skin:     General: Skin is warm and dry  Findings: No rash  Neurological:      General: No focal deficit present  Mental Status: She is alert     Psychiatric:         Mood and Affect: Mood normal          Behavior: Behavior normal        Cydney Peck MD

## 2022-10-21 NOTE — ASSESSMENT & PLAN NOTE
Likely anemia of chronic disease - liver cirrhosis  Hemoglobin 10 up from 8 on hospital discharge  Macrocytic indices however B12 and Folate normal with no signs of hemolysis  Awaiting colonoscopy and endoscopy on oct 28 to rule out GI bleed

## 2022-10-21 NOTE — ASSESSMENT & PLAN NOTE
Improving while on prednisone therapy 40mg daily until oct 28 then to do 1 month taper as per GI  Instructions given on oral volumes to take on weekly basis over this time  Planned for endoscopy, colonoscopy and liver biopsy in the next month  To follow up with hepatologist but seen by GI already

## 2022-10-21 NOTE — ASSESSMENT & PLAN NOTE
History of DVT/PE x 3, not currently on AC however anemia work up suggestive of anemia of chronic disease thus far  Awaiting colonoscopy and endoscopy on 10/28 and if no obvious sign of GI Bleed potential then would recommend restarting AC - will confer with hematology to ensure no further work up planned that may be disrupted by [facility] therapy and would opt for warfarin given normalized INR on background of liver disease

## 2022-10-21 NOTE — ASSESSMENT & PLAN NOTE
History of DVT/PE x 3 and cirrhosis  Mildly improved with diuretic therapy  recommedned limb elevations and continued management as per A&P liver cirrhosis

## 2022-10-21 NOTE — ASSESSMENT & PLAN NOTE
History of DVT/PE x 3, not currently on AC however anemia work up suggestive of anemia of chronic disease thus far  Awaiting colonoscopy and endoscopy on 10/28 and if no obvious sign of GI Bleed potential then would recommend restarting AC - will confer with hematology to ensure no further work up planned that may be disrupted by [facility] therapy and would opt for warfarin given normalized INR on background of liver disease

## 2022-10-21 NOTE — Clinical Note
Dominic Garza, Just wanted to confer with you regarding this patient with significant history of VTE previously on long term AC of warfarin  I saw your work up might have demonstrated an abnormal antithrombin  but I am unsure if you have any frther investigations to complete and if not, if you think this patient would be suitable for [facility] at this time (warfarin?) or perhaps after completing endoscopy/colonoscopy on Oct 28  Thank you in advance for your recommendations    Granville Meckel

## 2022-10-22 LAB
C DIFF TOX A+B STL QL IA: NEGATIVE
C DIFF TOX GENS STL QL NAA+PROBE: POSITIVE
CAMPYLOBACTER DNA SPEC NAA+PROBE: NORMAL
SALMONELLA DNA SPEC QL NAA+PROBE: NORMAL
SHIGA TOXIN STX GENE SPEC NAA+PROBE: NORMAL
SHIGELLA DNA SPEC QL NAA+PROBE: NORMAL

## 2022-10-23 ENCOUNTER — TELEPHONE (OUTPATIENT)
Dept: FAMILY MEDICINE CLINIC | Facility: CLINIC | Age: 47
End: 2022-10-23

## 2022-10-23 DIAGNOSIS — E55.9 VITAMIN D DEFICIENCY: ICD-10-CM

## 2022-10-23 RX ORDER — ERGOCALCIFEROL 1.25 MG/1
50000 CAPSULE ORAL WEEKLY
Qty: 8 CAPSULE | Refills: 0 | Status: SHIPPED | OUTPATIENT
Start: 2022-10-23

## 2022-10-23 NOTE — TELEPHONE ENCOUNTER
Patient called and informed of discussion with hematology NP regarding restart of AC  No further work up necessary from hematological stand point on hypercoagulability and low antithrombin could be secondary to liver disease vs antithrombin deficiency and therefore recommendation for lifetime AC still stands in either situation  Agreed to  restart [facility] after completion of upcoming endoscopy in Nov 2022 - warfarin therapy most ideal choice with lovenox bridging outpatient

## 2022-10-24 RX ORDER — SODIUM CHLORIDE 9 MG/ML
75 INJECTION, SOLUTION INTRAVENOUS CONTINUOUS
Status: CANCELLED | OUTPATIENT
Start: 2022-10-24

## 2022-10-25 LAB — ELASTASE PANC STL-MCNT: 364 UG ELAST./G

## 2022-10-26 ENCOUNTER — TELEPHONE (OUTPATIENT)
Dept: GASTROENTEROLOGY | Facility: CLINIC | Age: 47
End: 2022-10-26

## 2022-10-26 NOTE — TELEPHONE ENCOUNTER
Patients GI provider:  Dr Annika Benitez    Number to return call: ([phone])    Reason for call: Pt calling to reschedule her colonoscopy and EGD to the [facility] facility  She was scheduled at [place], too far, no ride home  Thank you!     Scheduled procedure/appointment date if applicable: Apt/procedure was 10/28/22 with Dr Annika Benitez

## 2022-10-27 ENCOUNTER — TELEPHONE (OUTPATIENT)
Dept: GASTROENTEROLOGY | Facility: MEDICAL CENTER | Age: 47
End: 2022-10-27

## 2022-10-27 DIAGNOSIS — A04.72 C. DIFFICILE DIARRHEA: Primary | ICD-10-CM

## 2022-10-27 RX ORDER — VANCOMYCIN HYDROCHLORIDE 125 MG/1
125 CAPSULE ORAL 4 TIMES DAILY
Qty: 40 CAPSULE | Refills: 0 | Status: SHIPPED | OUTPATIENT
Start: 2022-10-27 | End: 2022-11-06

## 2022-10-28 DIAGNOSIS — G47.33 OBSTRUCTIVE SLEEP APNEA: ICD-10-CM

## 2022-10-28 DIAGNOSIS — K70.31 ALCOHOLIC CIRRHOSIS OF LIVER WITH ASCITES (HCC): Primary | ICD-10-CM

## 2022-10-31 ENCOUNTER — HOSPITAL ENCOUNTER (OUTPATIENT)
Dept: RADIOLOGY | Facility: HOSPITAL | Age: 47
Discharge: HOME | End: 2022-10-31
Attending: RADIOLOGY

## 2022-10-31 ENCOUNTER — TELEPHONE (OUTPATIENT)
Dept: FAMILY MEDICINE CLINIC | Facility: CLINIC | Age: 47
End: 2022-10-31

## 2022-10-31 VITALS
SYSTOLIC BLOOD PRESSURE: 107 MMHG | OXYGEN SATURATION: 99 % | DIASTOLIC BLOOD PRESSURE: 62 MMHG | RESPIRATION RATE: 16 BRPM | HEIGHT: 67 IN | BODY MASS INDEX: 42.44 KG/M2 | TEMPERATURE: 97.8 F | HEART RATE: 66 BPM

## 2022-10-31 DIAGNOSIS — K74.60 HEPATIC CIRRHOSIS, UNSPECIFIED HEPATIC CIRRHOSIS TYPE, UNSPECIFIED WHETHER ASCITES PRESENT (HCC): ICD-10-CM

## 2022-10-31 LAB
ERYTHROCYTE [DISTWIDTH] IN BLOOD BY AUTOMATED COUNT: 15 % (ref 11.6–15.1)
HCT VFR BLD AUTO: 30.4 % (ref 34.8–46.1)
HGB BLD-MCNC: 9.9 G/DL (ref 11.5–15.4)
INR PPP: 1.35 (ref 0.84–1.19)
MCH RBC QN AUTO: 33.8 PG (ref 26.8–34.3)
MCHC RBC AUTO-ENTMCNC: 32.6 G/DL (ref 31.4–37.4)
MCV RBC AUTO: 104 FL (ref 82–98)
PLATELET # BLD AUTO: 228 THOUSANDS/UL (ref 149–390)
PMV BLD AUTO: 9.5 FL (ref 8.9–12.7)
PROTHROMBIN TIME: 16.6 SECONDS (ref 11.6–14.5)
RBC # BLD AUTO: 2.93 MILLION/UL (ref 3.81–5.12)
WBC # BLD AUTO: 4.98 THOUSAND/UL (ref 4.31–10.16)

## 2022-10-31 RX ORDER — MIDAZOLAM HYDROCHLORIDE 2 MG/2ML
INJECTION, SOLUTION INTRAMUSCULAR; INTRAVENOUS CODE/TRAUMA/SEDATION MEDICATION
Status: COMPLETED | OUTPATIENT
Start: 2022-10-31 | End: 2022-10-31

## 2022-10-31 RX ORDER — OXYCODONE HYDROCHLORIDE 5 MG/1
5 TABLET ORAL EVERY 4 HOURS PRN
Status: DISCONTINUED | OUTPATIENT
Start: 2022-10-31 | End: 2022-11-01 | Stop reason: HOSPADM

## 2022-10-31 RX ORDER — LIDOCAINE HYDROCHLORIDE 10 MG/ML
INJECTION, SOLUTION EPIDURAL; INFILTRATION; INTRACAUDAL; PERINEURAL CODE/TRAUMA/SEDATION MEDICATION
Status: COMPLETED | OUTPATIENT
Start: 2022-10-31 | End: 2022-10-31

## 2022-10-31 RX ORDER — FENTANYL CITRATE 50 UG/ML
INJECTION, SOLUTION INTRAMUSCULAR; INTRAVENOUS CODE/TRAUMA/SEDATION MEDICATION
Status: COMPLETED | OUTPATIENT
Start: 2022-10-31 | End: 2022-10-31

## 2022-10-31 RX ORDER — SODIUM CHLORIDE 9 MG/ML
75 INJECTION, SOLUTION INTRAVENOUS CONTINUOUS
Status: DISCONTINUED | OUTPATIENT
Start: 2022-10-31 | End: 2022-11-01 | Stop reason: HOSPADM

## 2022-10-31 RX ADMIN — MIDAZOLAM 1 MG: 1 INJECTION INTRAMUSCULAR; INTRAVENOUS at 10:52

## 2022-10-31 RX ADMIN — FENTANYL CITRATE 50 MCG: 50 INJECTION INTRAMUSCULAR; INTRAVENOUS at 11:00

## 2022-10-31 RX ADMIN — FENTANYL CITRATE 50 MCG: 50 INJECTION INTRAMUSCULAR; INTRAVENOUS at 10:52

## 2022-10-31 RX ADMIN — SODIUM CHLORIDE 75 ML/HR: 0.9 INJECTION, SOLUTION INTRAVENOUS at 10:15

## 2022-10-31 RX ADMIN — LIDOCAINE HYDROCHLORIDE 5 ML: 10 INJECTION, SOLUTION EPIDURAL; INFILTRATION; INTRACAUDAL at 11:54

## 2022-10-31 RX ADMIN — MIDAZOLAM 1 MG: 1 INJECTION INTRAMUSCULAR; INTRAVENOUS at 10:56

## 2022-10-31 NOTE — H&P
Interventional Radiology  History and Physical 10/31/2022     Josephine Gunter   1975   [id number]    Assessment/Plan:    55year old female with history of alcoholic cirrhosis, elevated smooth muscle antibody  Plan for US guided non targeted liver biopsy  Procedure, risks, benefits and alternatives were discussed with the patient  Consent obtained at bedside  /60   Pulse 66   Temp 98 °F (36 7 °C) (Temporal)   Resp 16   Ht 5' 7" (1 702 m)   LMP 10/07/2022 (Exact Date)   SpO2 98%   BMI 42 44 kg/m²         Problem List Items Addressed This Visit        Digestive    Liver cirrhosis ([facility] [address] )    Relevant Orders    IR biopsy liver random    Tissue Exam             Subjective: Normal state of health, no acute complaints  Patient ID: Josephine Gunter is a 55 y o  female  History of Present Illness  See A/P above  Review of Systems   Respiratory: Negative  Cardiovascular: Negative  Past Medical History:   Diagnosis Date   • DVT (deep venous thrombosis) (HCC)    • Pulmonary embolism (HCC)    • Pulmonary embolism ([facility] [address] ) 2017    CTA 2018: Large bilateral pulmonary emboli    The calculated ratio of right ventricular to left ventricular diameter (RV/LV ratio) is 1 6  This is greater than 0 9, which is abnormal and indicates right heart strain  An abnormal RV/LV ratio has been shown to be associated with an increased risk of  30 day mortality in the setting of acute pulmonary embolism    Fatty liver    Echo 18 SUMMMARY        Past Surgical History:   Procedure Laterality Date   • ANKLE SURGERY Left    • GASTRIC BYPASS          Social History     Tobacco Use   Smoking Status Current Every Day Smoker   • Packs/day: 0 25   • Years: 25 00   • Pack years: 6 25   • Types: Cigarettes   • Last attempt to quit: 2018   • Years since quittin 1   Smokeless Tobacco Never Used   Tobacco Comment    2-3 cigarettes / daily  GIANFRANCO [employer] FORMER SMOKER QUIT DATE 2017 Social History     Substance and Sexual Activity   Alcohol Use Not Currently    Comment: 1 month since quit        Social History     Substance and Sexual Activity   Drug Use No        Allergies   Allergen Reactions   • Gabapentin Rash       Current Outpatient Medications   Medication Sig Dispense Refill   • folic acid (FOLVITE) 1 mg tablet Take 1 tablet (1 mg total) by mouth daily 90 tablet 3   • furosemide (LASIX) 20 mg tablet Take 1 tablet (20 mg total) by mouth daily 30 tablet 1   • pantoprazole (PROTONIX) 40 mg tablet Take 1 tablet (40 mg total) by mouth daily 30 tablet 0   • prednisoLONE (ORAPRED) 15 mg/5 mL oral solution Take 10 mL (30 mg total) by mouth daily for 7 days, THEN 6 7 mL (20 mg total) daily for 7 days, THEN 3 3 mL (10 mg total) daily for 7 days, THEN 1 67 mL (5 mg total) daily for 7 days  151 69 mL 0   • spironolactone (ALDACTONE) 50 mg tablet Take 1 tablet (50 mg total) by mouth daily 30 tablet 1   • Thiamine HCl (vitamin B-1) 100 MG TABS Take 1 tablet (100 mg total) by mouth daily 90 tablet 3   • vancomycin (VANCOCIN) 125 MG capsule Take 1 capsule (125 mg total) by mouth 4 (four) times a day for 10 days 40 capsule 0   • ergocalciferol (VITAMIN D2) 50,000 units Take 1 capsule (50,000 Units total) by mouth once a week 8 capsule 0   • melatonin 3 mg Take 1 tablet (3 mg total) by mouth daily at bedtime 30 tablet 1     Current Facility-Administered Medications   Medication Dose Route Frequency Provider Last Rate Last Admin   • sodium chloride 0 9 % infusion  75 mL/hr Intravenous Continuous Joseph Brasher MD 75 mL/hr at 10/31/22 1015 75 mL/hr at 10/31/22 1015          Objective:    Vitals:    10/31/22 0933 10/31/22 1008   BP: 115/60    Pulse: 66    Resp: 16    Temp: 98 °F (36 7 °C)    TempSrc: Temporal    SpO2: 98%    Height:  5' 7" (1 702 m)        Physical Exam  Cardiovascular:      Rate and Rhythm: Normal rate and regular rhythm  Pulses: Normal pulses             No results found for: BNP   Lab Results   Component Value Date    WBC 4 98 10/31/2022    HGB 9 9 (L) 10/31/2022    HCT 30 4 (L) 10/31/2022     (H) 10/31/2022     10/31/2022     Lab Results   Component Value Date    INR 1 10 10/19/2022    INR 1 68 (H) 10/03/2022    INR 1 75 (H) 10/02/2022    PROTIME 14 5 10/19/2022    PROTIME 20 2 (H) 10/03/2022    PROTIME 20 9 (H) 10/02/2022     Lab Results   Component Value Date    PTT 37 10/19/2022         I have personally reviewed pertinent imaging and laboratory results  Code Status: Prior  Advance Directive and Living Will:      Power of :    POLST:      This text is generated with voice recognition software  There may be translation, syntax,  or grammatical errors  If you have any questions, please contact the dictating provider

## 2022-10-31 NOTE — DISCHARGE INSTRUCTIONS
Percutaneous Liver Biopsy   WHAT YOU NEED TO KNOW:   A PLB is a procedure to remove a sample of tissue from your liver  The sample can be sent to a lab and tested for liver disease, cancer, or infection  After the procedure you may have pain and bruising at the biopsy site  You may also have pain in your right shoulder  These symptoms should get better in 48 to 72 hours  DISCHARGE INSTRUCTIONS:     [id number] patients,  Contact Interventional Radiology at [phone] PATIENTS: Contact Interventional Radiology at [phone]   [facility] PATIENTS: Contact Interventional Radiology at [phone] if:    Fever greater than 101 or chills  You have severe pain in your abdomen  Your abdomen is larger than usual and feels hard  Your neck is more swollen and you have trouble swallowing  You feel weak or dizzy  Your heart is beating faster than usual    Your pain does not get better after you take pain medicine  Your wound is red, swollen, or draining pus  You have nausea or are vomiting  Your skin is itchy, swollen, or you have a rash  You have questions or concerns about your condition or care  Medicines:   Acetaminophen decreases pain and fever  It is available without a doctor's order  Acetaminophen can cause liver damage if not taken correctly  Take your home medicine as directed  Resume your normal diet  Small sips of flat soda will help with mild nausea  Self-care:   Rest as directed  Do not play sports, exercise, or lift anything heavier than 5 pounds for up to 1 week  Apply firm, steady pressure if bleeding occurs  A small amount of bleeding from your wound is possible  Apply pressure with a clean gauze or towel for 5 to 10 minutes  Call 911 if bleeding becomes heavy or does not stop  Ask your healthcare provider when to take your blood thinner or antiplatelet medicine  You may need to wait 24 to 72 hours to take your medicine   This will prevent bleeding  Follow up with your healthcare provider as directed: Write down your questions so you remember to ask them during your visits  Procedural Sedation   WHAT YOU NEED TO KNOW:   Procedural sedation is medicine used during procedures to help you feel relaxed and calm  You will remember little to none of the procedure  After sedation you may feel tired, weak, or unsteady on your feet  You may also have trouble concentrating or short-term memory loss  These symptoms should go away in 24 hours or less  DISCHARGE INSTRUCTIONS:     Call 911 or have someone else call for any of the following: You have sudden trouble breathing  You cannot be woken  Contact Interventional Radiology at [phone]   [facility] PATIENTS: Contact Interventional Radiology at [phone]) Gabbi Azul PATIENTS: Contact Interventional Radiology at [phone]) if any of the following occur: You have a severe headache or dizziness  Your heart is beating faster than usual     You have a fever or chills  Your skin is itchy, swollen, or you have a rash  You have nausea or are vomiting for more than 8 hours after the procedure  You have questions or concerns about your condition or care  Self-care:   Have someone stay with you for 24 hours  This person can drive you to errands and help you do things around the house  This person can also watch for problems  Rest and do quiet activities for 24 hours  Do not exercise, ride a bike, or play sports  Stand up slowly to prevent dizziness and falls  Take short walks around the house with another person  Slowly return to your usual activities the next day  Do not drive or use dangerous machines or tools for 24 hours  You may injure yourself or others  Examples include a lawnmower, saw, or drill  Do not return to work for 24 hours if you use dangerous machines or tools for work  Do not make important decisions for 24 hours   For example, do not sign important papers or invest money  Drink liquids as directed  Liquids help flush the sedation medicine out of your body  Ask how much liquid to drink each day and which liquids are best for you  Eat small, frequent meals to prevent nausea and vomiting  Start with clear liquids such as juice or broth  If you do not vomit after clear liquids, you can eat your usual foods  Do not drink alcohol or take medicines that make you drowsy  This includes medicines that help you sleep and anxiety medicines  Ask your healthcare provider if it is safe for you to take pain medicine  Follow up with your healthcare provider as directed: Write down your questions so you remember to ask them during your visits

## 2022-10-31 NOTE — TELEPHONE ENCOUNTER
Called patient regarding referral for Hepatology  Ask patient if she needs assistance scheduling   Patient states she came out of a procedure and cannot recall that she needed to attend a hepatologist  I told patient if it is ok I will give her a call tomorrow  Per patient it is ok to give her a call tomorrow

## 2022-10-31 NOTE — BRIEF OP NOTE (RAD/CATH)
INTERVENTIONAL RADIOLOGY PROCEDURE NOTE    Date: 10/31/2022    Procedure: IR BIOPSY LIVER RANDOM    Preoperative diagnosis:   1  Hepatic cirrhosis, unspecified hepatic cirrhosis type, unspecified whether ascites present (HCC)         Postoperative diagnosis: Same  Surgeon: Joseph Brasher     Assistant: None  No qualified resident was available  Blood loss: Minimal    Specimens: Four 1 3cm 18g cores  Findings:     US guided non targeted liver liver lobe biopsy  Complications: None immediate      Anesthesia: conscious sedation

## 2022-11-01 NOTE — TELEPHONE ENCOUNTER
Scheduled date of colon/egd (as of today) 11/23/22  Physician performing: Dr Andrea Lott  Location of procedure:  [facility]  Bowel prep reviewed with patient: miralax/dulcolax  Instructions reviewed with patient by: MA  Clearances: N/A    Emailed prep to patient

## 2022-11-01 NOTE — TELEPHONE ENCOUNTER
Called patient today to schedule hepatologist appointment per pcp wanted patient to follow up  Offer patient a hepatologist doctor that attends [facility] made patient aware the soonest is December but if she would like other locations I could call  Patient wanted [facility] it is closes to her  Appointment schedule in Dec 5th,2022  Will be mailing information by mail and also ask patient if she would like the financial counselor phone number to apply for medical insurance  Patient agreed will be sending it through mail to patient home address

## 2022-11-14 DIAGNOSIS — K70.11 ALCOHOLIC HEPATITIS WITH ASCITES: ICD-10-CM

## 2022-11-14 DIAGNOSIS — K74.60 LIVER CIRRHOSIS (HCC): ICD-10-CM

## 2022-11-14 DIAGNOSIS — F10.982 ALCOHOL-INDUCED INSOMNIA (HCC): ICD-10-CM

## 2022-11-14 DIAGNOSIS — E55.9 VITAMIN D DEFICIENCY: ICD-10-CM

## 2022-11-14 RX ORDER — PANTOPRAZOLE SODIUM 40 MG/1
40 TABLET, DELAYED RELEASE ORAL DAILY
Qty: 30 TABLET | Refills: 0 | Status: SHIPPED | OUTPATIENT
Start: 2022-11-14

## 2022-11-14 RX ORDER — FUROSEMIDE 20 MG/1
20 TABLET ORAL DAILY
Qty: 30 TABLET | Refills: 1 | Status: SHIPPED | OUTPATIENT
Start: 2022-11-14 | End: 2022-12-14

## 2022-11-14 RX ORDER — LANOLIN ALCOHOL/MO/W.PET/CERES
3 CREAM (GRAM) TOPICAL
Qty: 30 TABLET | Refills: 1 | Status: SHIPPED | OUTPATIENT
Start: 2022-11-14 | End: 2022-12-14

## 2022-11-14 RX ORDER — SPIRONOLACTONE 50 MG/1
50 TABLET, FILM COATED ORAL DAILY
Qty: 30 TABLET | Refills: 1 | Status: SHIPPED | OUTPATIENT
Start: 2022-11-14 | End: 2022-12-14

## 2022-11-14 RX ORDER — ERGOCALCIFEROL 1.25 MG/1
50000 CAPSULE ORAL WEEKLY
Qty: 8 CAPSULE | Refills: 0 | Status: SHIPPED | OUTPATIENT
Start: 2022-11-14

## 2022-11-17 ENCOUNTER — APPOINTMENT (OUTPATIENT)
Dept: LAB | Facility: HOSPITAL | Age: 47
End: 2022-11-17
Attending: INTERNAL MEDICINE

## 2022-11-17 DIAGNOSIS — D64.89 ANEMIA DUE TO OTHER CAUSE, NOT CLASSIFIED: ICD-10-CM

## 2022-11-17 DIAGNOSIS — K70.31 ALCOHOLIC CIRRHOSIS OF LIVER WITH ASCITES (HCC): ICD-10-CM

## 2022-11-17 LAB
ALBUMIN SERPL BCP-MCNC: 3.4 G/DL (ref 3.5–5)
ALP SERPL-CCNC: 72 U/L (ref 43–122)
ALT SERPL W P-5'-P-CCNC: 38 U/L
ANION GAP SERPL CALCULATED.3IONS-SCNC: 6 MMOL/L (ref 5–14)
AST SERPL W P-5'-P-CCNC: 39 U/L (ref 14–36)
BASOPHILS # BLD AUTO: 0.01 THOUSANDS/ÂΜL (ref 0–0.1)
BASOPHILS NFR BLD AUTO: 0 % (ref 0–1)
BILIRUB SERPL-MCNC: 1.7 MG/DL (ref 0.2–1)
BUN SERPL-MCNC: 15 MG/DL (ref 5–25)
CALCIUM ALBUM COR SERPL-MCNC: 9.5 MG/DL (ref 8.3–10.1)
CALCIUM SERPL-MCNC: 9 MG/DL (ref 8.4–10.2)
CHLORIDE SERPL-SCNC: 105 MMOL/L (ref 96–108)
CO2 SERPL-SCNC: 29 MMOL/L (ref 21–32)
CREAT SERPL-MCNC: 0.79 MG/DL (ref 0.6–1.2)
EOSINOPHIL # BLD AUTO: 0 THOUSAND/ÂΜL (ref 0–0.61)
EOSINOPHIL NFR BLD AUTO: 0 % (ref 0–6)
ERYTHROCYTE [DISTWIDTH] IN BLOOD BY AUTOMATED COUNT: 16.6 % (ref 11.6–15.1)
GFR SERPL CREATININE-BSD FRML MDRD: 90 ML/MIN/1.73SQ M
GLUCOSE P FAST SERPL-MCNC: 84 MG/DL (ref 70–99)
HCT VFR BLD AUTO: 28.8 % (ref 34.8–46.1)
HGB BLD-MCNC: 9.1 G/DL (ref 11.5–15.4)
IMM GRANULOCYTES # BLD AUTO: 0.01 THOUSAND/UL (ref 0–0.2)
IMM GRANULOCYTES NFR BLD AUTO: 0 % (ref 0–2)
LYMPHOCYTES # BLD AUTO: 2.76 THOUSANDS/ÂΜL (ref 0.6–4.47)
LYMPHOCYTES NFR BLD AUTO: 47 % (ref 14–44)
MCH RBC QN AUTO: 32.3 PG (ref 26.8–34.3)
MCHC RBC AUTO-ENTMCNC: 31.6 G/DL (ref 31.4–37.4)
MCV RBC AUTO: 102 FL (ref 82–98)
MONOCYTES # BLD AUTO: 0.6 THOUSAND/ÂΜL (ref 0.17–1.22)
MONOCYTES NFR BLD AUTO: 10 % (ref 4–12)
NEUTROPHILS # BLD AUTO: 2.57 THOUSANDS/ÂΜL (ref 1.85–7.62)
NEUTS SEG NFR BLD AUTO: 43 % (ref 43–75)
NRBC BLD AUTO-RTO: 0 /100 WBCS
PLATELET # BLD AUTO: 345 THOUSANDS/UL (ref 149–390)
PMV BLD AUTO: 9.8 FL (ref 8.9–12.7)
POTASSIUM SERPL-SCNC: 4.3 MMOL/L (ref 3.5–5.3)
PROT SERPL-MCNC: 7.4 G/DL (ref 6.4–8.4)
RBC # BLD AUTO: 2.82 MILLION/UL (ref 3.81–5.12)
SODIUM SERPL-SCNC: 140 MMOL/L (ref 135–147)
WBC # BLD AUTO: 5.95 THOUSAND/UL (ref 4.31–10.16)

## 2022-11-21 ENCOUNTER — OFFICE VISIT (OUTPATIENT)
Dept: HEMATOLOGY ONCOLOGY | Facility: CLINIC | Age: 47
End: 2022-11-21

## 2022-11-21 ENCOUNTER — TELEPHONE (OUTPATIENT)
Dept: HEMATOLOGY ONCOLOGY | Facility: CLINIC | Age: 47
End: 2022-11-21

## 2022-11-21 VITALS
WEIGHT: 257.1 LBS | BODY MASS INDEX: 40.35 KG/M2 | TEMPERATURE: 97.7 F | DIASTOLIC BLOOD PRESSURE: 72 MMHG | SYSTOLIC BLOOD PRESSURE: 112 MMHG | OXYGEN SATURATION: 99 % | HEART RATE: 80 BPM | HEIGHT: 67 IN | RESPIRATION RATE: 18 BRPM

## 2022-11-21 DIAGNOSIS — Z12.31 SCREENING MAMMOGRAM, ENCOUNTER FOR: ICD-10-CM

## 2022-11-21 DIAGNOSIS — K70.31 ALCOHOLIC CIRRHOSIS OF LIVER WITH ASCITES (HCC): ICD-10-CM

## 2022-11-21 DIAGNOSIS — D50.8 OTHER IRON DEFICIENCY ANEMIAS: ICD-10-CM

## 2022-11-21 DIAGNOSIS — Z86.718 HISTORY OF DVT (DEEP VEIN THROMBOSIS): ICD-10-CM

## 2022-11-21 DIAGNOSIS — E53.8 FOLIC ACID DEFICIENCY: ICD-10-CM

## 2022-11-21 DIAGNOSIS — D53.9 MACROCYTIC ANEMIA: Primary | ICD-10-CM

## 2022-11-21 RX ORDER — SODIUM CHLORIDE 9 MG/ML
20 INJECTION, SOLUTION INTRAVENOUS ONCE
OUTPATIENT
Start: 2022-11-29

## 2022-11-21 NOTE — PROGRESS NOTES
Hematology/Oncology Outpatient Follow-up  Ortiz Mejia 55 y o  female 1975 [id number]    Date:  11/21/2022      Assessment and Plan:  1  Macrocytic anemia  Patient continues to have macrocytic anemia hemoglobin 9 1 which has improved since her recent hospital admission  Her anemia is likely multifactorial including liver cirrhosis, anemia of chronic disease and nutritional deficiencies  She was found to have low haptoglobin in the past which may have been low due to liver disease and not necessarily hemolysis  Her haptoglobin at this point is just barely below average with normal LDH and negative Karon making hemolysis less likely; will repeat her hemolysis markers before her next office visit for completeness sake  She will continue her folic acid and Q90 supplement which is maintaining her levels appropriately  She does now seem to be somewhat iron deficient with iron saturation low 9% which was not obvious when she was having hepatitis  Her iron-deficiency could be due to malabsorption from her prior bariatric surgery  She was offered IV iron replacement cautiously since she has liver cirrhosis Venofer 200 mg weekly x2 treatments  Was educated about the iron product, administration and possible side effects particularly anaphylaxis/allergic reaction  Request she follow up again with repeat labs for close monitoring in about 2 months or sooner should the need arise  - C-reactive protein; Future  - Sedimentation rate, automated; Future  - Iron Panel (Includes Ferritin, Iron Sat%, Iron, and TIBC); Future  - Ferritin; Future  - Lupus anticoagulant; Future  - Beta-2 glycoprotein antibodies; Future  - Cardiolipin antibody; Future  - Antithrombin III Activity; Future  - Vitamin B12; Future  - Folate; Future  - CBC and differential; Future  - Comprehensive metabolic panel; Future  - Direct antiglobulin test; Future  - LD,Blood; Future  - Reticulocytes;  Future  - Hemolysis Smear; Future  - Haptoglobin; Future    2  Other iron deficiency anemias  As above  - C-reactive protein; Future  - Sedimentation rate, automated; Future  - Iron Panel (Includes Ferritin, Iron Sat%, Iron, and TIBC); Future  - Ferritin; Future    3  Folic acid deficiency  Patient was advised to continue her folic acid and T14 supplements which are maintaining her levels appropriately  - Folate; Future    4  History of DVT (deep vein thrombosis)  Patient has a history of multiple episodes of DVT/PE dating back as far as 2003  Was on Lovenox injections up until about a year ago which she discontinued due to financial issues  Fortunately has not had any recurrence her recent venous duplex of the lower extremities during her admission was negative for DVT  Plan is to resume anticoagulation with Coumadin once she completes her liver/GI evaluation; her primary care team will be managing the anticoagulation  Her initial hypercoagulable workup in the hospital while off anticoagulation showed positive lupus anticoagulant and low antithrombin 3  For completeness sake we will repeat the antithrombin 3 and antiphospholipid syndrome workup before her next office visit  If she is back on anticoagulation at that time the lupus anticoagulant test may not be accurate  Regardless of results would not change recommendation for anticoagulation     - Lupus anticoagulant; Future  - Beta-2 glycoprotein antibodies; Future  - Cardiolipin antibody; Future  - Antithrombin III Activity; Future    5  Alcoholic cirrhosis of liver with ascites ([address] )  Is being monitored by her GI/hepatology team; has follow-up with them beginning of December 2022  She did have liver biopsy done recently  Scheduled for upper and lower endoscopy later this week  6  Screening mammogram, encounter for  Patient is overdue for her screening mammography she is interested in resuming her annual mammogram screenings  Will order and schedule her next 1 today      - Mammo screening bilateral w 3d & cad; Future      HPI:  Patient is a 59-year-old female with past medical history of alcohol abuse, tobacco abuse, sleep apnea gastric bypass surgery and 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   Patient states that her 1st episode of left lower extremity DVT with PE was around 2003 unprovoked she was on warfarin for some time which was then stopped   Second episode happened after her bariatric surgery again left lower extremity with PE 2016   Most recent episode was large bilateral PE around 2018 she was using "injections" likely Lovenox on a regular basis until about a year ago/2021  Stopped the indefinite anticoagulation due to financial reasons  She presented to the emergency department on 09/08/2022 with reports of abdominal pain and constipation   She was found to have transaminitis and elevated total bilirubin and admitted to rule out biliary cirrhosis   She she is under the GI team who felt her symptoms may be due to alcohol-induced hepatitis  She was started on prednisone around 09/29/2022  Jaciel Plume hemoglobin did drop down to 6 7 on 09/30/2022 she was supported with 2 units of packed red blood cells   Stool for occult blood came back negative      During the hospital stay she had hypercoagulable workup which showed prolonged PT and PTT, low fibrinogen of 150, prolonged TT, positive lupus anticoagulant test, low antithrombin level  Anemia workup revealed low folate of 4 8 vitamin B12 level  Mild hypothyroidism,  ferritin of 100 and saturation of 67%  Monoclonal gammopathy workup revealed elevated IgA and IgG levels with mildly elevated kappa and lambda serum concentration and normal ratio  SPEP showed a possible faint band, Serum immunofixation was negative for M protein  Hemolysis workup showed low haptoglobin of 10 with negative direct Karon test   Doppler study of the lower extremities was negative for deep vein thrombosis on 10/04/2022    CT scan of the abdomen with contrast on 09/28/2022 showed small ascites, hepatomegaly with severe steatosis  Interval history:  Patient presents today for a follow-up visit  She reports that she is no longer consuming alcohol  Her energy is improving each day  She denies bleeding from any site other than her menstrual cycles which are somewhat heavier than average  She does admit to mild headache and dizziness at times  Admits to [facility] for ice  She did recently undergo liver biopsy 10/31/2022 which showed cirrhosis with minimal activity and steatohepatitis which GI believes this alcohol-induced  She is scheduled for upper and lower endoscopy later this week  Her most recent laboratory studies from 11/17/2022 showed normal white cells and platelets, she continues to have macrocytic anemia which is improved since her recent admission H&H 9 1/28 8,   Liver enzymes improved now mildly elevated AST 39, ALT 38, total bili 1 7, albumin 3 4 remaining metabolic panel is normal   She had some additional studies done for us 10/19/2022 which showed normal PT, PTT and fibrinogen  Erythropoietin was normal   Reticulocyte count elevated 2 13%  Her folic acid and vitamin B12 are appropriate on supplementation  She now seems to be somewhat iron deficient iron saturation 9%, ferritin 60  Her inflammatory markers are elevated C-reactive protein 10 6, sed rate 93  Haptoglobin is barely below average 41 decreased may be due to her liver disease; unlikely to have hemolysis since her LDH is normal, Karon was also negative  ROS: Review of Systems   Constitutional: Negative for activity change, appetite change, chills, fatigue, fever and unexpected weight change  HENT: Negative for congestion, mouth sores, nosebleeds, sore throat and trouble swallowing  Eyes: Negative  Respiratory: Negative for cough, chest tightness and shortness of breath      Cardiovascular: Positive for leg swelling (States she has had swelling in her legs since liver disease diagnosis-has improved significantly)  Negative for chest pain and palpitations  Gastrointestinal: Positive for abdominal pain  Negative for abdominal distention, blood in stool, constipation, diarrhea, nausea and vomiting  Genitourinary: Negative for difficulty urinating, dysuria, frequency, hematuria and urgency  Musculoskeletal: Positive for arthralgias and myalgias  Negative for back pain, gait problem and joint swelling  Skin: Negative for color change, pallor and rash  Neurological: Positive for dizziness and headaches  Negative for weakness, light-headedness and numbness  Hematological: Negative for adenopathy  Does not bruise/bleed easily  Psychiatric/Behavioral: Positive for dysphoric mood and sleep disturbance  The patient is not nervous/anxious  Past Medical History:   Diagnosis Date   • DVT (deep venous thrombosis) (HCC)    • Pulmonary embolism (HCC)    • Pulmonary embolism ([facility] [address] ) 2017    CTA 2018: Large bilateral pulmonary emboli    The calculated ratio of right ventricular to left ventricular diameter (RV/LV ratio) is 1 6  This is greater than 0 9, which is abnormal and indicates right heart strain  An abnormal RV/LV ratio has been shown to be associated with an increased risk of  30 day mortality in the setting of acute pulmonary embolism    Fatty liver    Echo 18 [facility]       Past Surgical History:   Procedure Laterality Date   • ANKLE SURGERY Left    • GASTRIC BYPASS     • IR BIOPSY LIVER RANDOM  10/31/2022       Social History     Socioeconomic History   • Marital status: Single     Spouse name: None   • Number of children: None   • Years of education: None   • Highest education level: None   Occupational History   • None   Tobacco Use   • Smoking status: Every Day     Packs/day: 0 25     Years: 25 00     Pack years: 6 25     Types: Cigarettes     Last attempt to quit: 2018     Years since quittin 2   • Smokeless tobacco: Never • Tobacco comments:     2-3 cigarettes / daily  [employer]MER [employer] QUIT DATE 1/1/2017   Vaping Use   • Vaping Use: Never used   Substance and Sexual Activity   • Alcohol use: Not Currently     Comment: 1 month since quit   • Drug use: No   • Sexual activity: None   Other Topics Concern   • None   Social History Narrative   • None     Social Determinants of Health     Financial Resource Strain: High Risk   • Difficulty of Paying Living Expenses: Very hard   Food Insecurity: No Food Insecurity   • Worried About Running Out of Food in the Last Year: Never true   • Ran Out of Food in the Last Year: Never true   Transportation Needs: Unmet Transportation Needs   • Lack of Transportation (Medical): Yes   • Lack of Transportation (Non-Medical): Yes   Physical Activity: Not on file   Stress: Stress Concern Present   • Feeling of Stress :  To some extent   Social Connections: Not on file   Intimate Partner Violence: Not on file   Housing Stability: High Risk   • Unable to Pay for Housing in the Last Year: Yes   • Number of [place] in the Last Year: 1   • Unstable Housing in the Last Year: Not on file       Family History   Problem Relation Age of Onset   • Hypertension Mother        Allergies   Allergen Reactions   • Gabapentin Rash         Current Outpatient Medications:   •  ergocalciferol (VITAMIN D2) 50,000 units, Take 1 capsule (50,000 Units total) by mouth once a week, Disp: 8 capsule, Rfl: 0  •  folic acid (FOLVITE) 1 mg tablet, Take 1 tablet (1 mg total) by mouth daily, Disp: 90 tablet, Rfl: 3  •  furosemide (LASIX) 20 mg tablet, Take 1 tablet (20 mg total) by mouth daily, Disp: 30 tablet, Rfl: 1  •  melatonin 3 mg, Take 1 tablet (3 mg total) by mouth daily at bedtime, Disp: 30 tablet, Rfl: 1  •  pantoprazole (PROTONIX) 40 mg tablet, Take 1 tablet (40 mg total) by mouth daily, Disp: 30 tablet, Rfl: 0  •  spironolactone (ALDACTONE) 50 mg tablet, Take 1 tablet (50 mg total) by mouth daily, Disp: 30 tablet, Rfl: 1  •  Thiamine HCl (vitamin B-1) 100 MG TABS, Take 1 tablet (100 mg total) by mouth daily, Disp: 90 tablet, Rfl: 3      Physical Exam:  /72 (BP Location: Left arm, Patient Position: Sitting, Cuff Size: Large)   Pulse 80   Temp 97 7 °F (36 5 °C) (Oral)   Resp 18   Ht 5' 7" (1 702 m)   Wt 117 kg (257 lb 1 6 oz)   LMP 11/07/2022   SpO2 99%   BMI 40 27 kg/m²     Physical Exam  Vitals reviewed  Constitutional:       General: She is not in acute distress  Appearance: She is well-developed  She is obese  She is not diaphoretic  HENT:      Head: Normocephalic and atraumatic  Eyes:      General: No scleral icterus  Conjunctiva/sclera: Conjunctivae normal       Pupils: Pupils are equal, round, and reactive to light  Neck:      Thyroid: No thyromegaly  Cardiovascular:      Rate and Rhythm: Normal rate and regular rhythm  Heart sounds: Normal heart sounds  No murmur heard  Pulmonary:      Effort: Pulmonary effort is normal  No respiratory distress  Breath sounds: Normal breath sounds  Abdominal:      General: There is no distension  Palpations: Abdomen is soft  There is no hepatomegaly or splenomegaly  Tenderness: There is no abdominal tenderness  Musculoskeletal:      Cervical back: Normal range of motion and neck supple  Right lower leg: Edema present  Left lower leg: Edema present  Lymphadenopathy:      Cervical: No cervical adenopathy  Upper Body:      Right upper body: No axillary adenopathy  Left upper body: No axillary adenopathy  Skin:     General: Skin is warm and dry  Findings: No erythema or rash  Neurological:      General: No focal deficit present  Mental Status: She is alert and oriented to person, place, and time  Psychiatric:         Mood and Affect: Mood is depressed  Affect is blunt  Behavior: Behavior normal  Behavior is cooperative  Thought Content:  Thought content normal          Judgment: Judgment normal            Labs:  Lab Results   Component Value Date    WBC 5 95 11/17/2022    HGB 9 1 (L) 11/17/2022    HCT 28 8 (L) 11/17/2022     (H) 11/17/2022     11/17/2022     Lab Results   Component Value Date    K 4 3 11/17/2022     11/17/2022    CO2 29 11/17/2022    BUN 15 11/17/2022    CREATININE 0 79 11/17/2022    GLUF 84 11/17/2022    CALCIUM 9 0 11/17/2022    CORRECTEDCA 9 5 11/17/2022    AST 39 (H) 11/17/2022    ALT 38 (H) 11/17/2022    ALKPHOS 72 11/17/2022    EGFR 90 11/17/2022       Patient voiced understanding and agreement in the above discussion  Aware to contact our office with questions/symptoms in the interim  This note has been generated by voice recognition software system  Therefore, there may be spelling, grammar, and or syntax errors  Please contact if questions arise

## 2022-11-21 NOTE — TELEPHONE ENCOUNTER
Please schedule iron infusions on any day in the morning between 8-11  Patient works second shift  Thanks!

## 2022-11-22 ENCOUNTER — NURSE TRIAGE (OUTPATIENT)
Dept: OTHER | Facility: OTHER | Age: 47
End: 2022-11-22

## 2022-11-22 NOTE — TELEPHONE ENCOUNTER
Reason for Disposition  • Bowel prep for colonoscopy, questions about    Answer Assessment - Initial Assessment Questions  1  DATE/TIME: "When did you have your colonoscopy?"       Tomorrow 11/23 0700  2  MAIN CONCERN: "What is your main concern right now?" "What questions do you have?"     Patient stated she had instructions for Miralax/Mag Citrate  Sent patient miralax/dulcolax prep though [facility]  Asked patient to call back with any further questions or concerns      Protocols used: COLONOSCOPY SYMPTOMS AND QUESTIONS-ADULT-

## 2022-11-23 ENCOUNTER — TELEPHONE (OUTPATIENT)
Dept: GASTROENTEROLOGY | Facility: CLINIC | Age: 47
End: 2022-11-23

## 2022-12-05 ENCOUNTER — OFFICE VISIT (OUTPATIENT)
Dept: FAMILY MEDICINE CLINIC | Facility: CLINIC | Age: 47
End: 2022-12-05

## 2022-12-05 VITALS
HEART RATE: 62 BPM | RESPIRATION RATE: 18 BRPM | OXYGEN SATURATION: 96 % | BODY MASS INDEX: 42.46 KG/M2 | WEIGHT: 270.5 LBS | SYSTOLIC BLOOD PRESSURE: 118 MMHG | TEMPERATURE: 97.8 F | DIASTOLIC BLOOD PRESSURE: 76 MMHG | HEIGHT: 67 IN

## 2022-12-05 DIAGNOSIS — D64.89 ANEMIA DUE TO OTHER CAUSE, NOT CLASSIFIED: ICD-10-CM

## 2022-12-05 DIAGNOSIS — M79.89 SWELLING OF LOWER EXTREMITY: ICD-10-CM

## 2022-12-05 DIAGNOSIS — K70.11 ALCOHOLIC HEPATITIS WITH ASCITES: Primary | ICD-10-CM

## 2022-12-05 DIAGNOSIS — D50.8 OTHER IRON DEFICIENCY ANEMIAS: ICD-10-CM

## 2022-12-05 DIAGNOSIS — Z86.711 HISTORY OF PULMONARY EMBOLUS (PE): ICD-10-CM

## 2022-12-05 DIAGNOSIS — K74.60 LIVER CIRRHOSIS (HCC): ICD-10-CM

## 2022-12-05 DIAGNOSIS — E51.9 THIAMINE DEFICIENCY: ICD-10-CM

## 2022-12-05 DIAGNOSIS — F10.982 ALCOHOL-INDUCED INSOMNIA (HCC): ICD-10-CM

## 2022-12-05 DIAGNOSIS — Z86.718 HISTORY OF DVT (DEEP VEIN THROMBOSIS): Chronic | ICD-10-CM

## 2022-12-05 DIAGNOSIS — F10.10 ALCOHOL ABUSE: ICD-10-CM

## 2022-12-05 DIAGNOSIS — E55.9 VITAMIN D DEFICIENCY: ICD-10-CM

## 2022-12-05 RX ORDER — METHION/INOS/CHOL BT/B COM/LIV 110MG-86MG
100 CAPSULE ORAL DAILY
Qty: 90 TABLET | Refills: 3 | Status: SHIPPED | OUTPATIENT
Start: 2022-12-05 | End: 2023-03-05

## 2022-12-05 RX ORDER — FUROSEMIDE 20 MG/1
20 TABLET ORAL DAILY
Qty: 30 TABLET | Refills: 2 | Status: SHIPPED | OUTPATIENT
Start: 2022-12-05

## 2022-12-05 RX ORDER — PANTOPRAZOLE SODIUM 40 MG/1
40 TABLET, DELAYED RELEASE ORAL DAILY
Qty: 30 TABLET | Refills: 2 | Status: SHIPPED | OUTPATIENT
Start: 2022-12-05

## 2022-12-05 RX ORDER — FOLIC ACID 1 MG/1
1 TABLET ORAL DAILY
Qty: 90 TABLET | Refills: 3 | Status: SHIPPED | OUTPATIENT
Start: 2022-12-05 | End: 2023-03-05

## 2022-12-05 RX ORDER — ERGOCALCIFEROL 1.25 MG/1
50000 CAPSULE ORAL WEEKLY
Qty: 8 CAPSULE | Refills: 1 | Status: SHIPPED | OUTPATIENT
Start: 2022-12-05

## 2022-12-05 RX ORDER — LANOLIN ALCOHOL/MO/W.PET/CERES
3 CREAM (GRAM) TOPICAL
Qty: 30 TABLET | Refills: 2 | Status: SHIPPED | OUTPATIENT
Start: 2022-12-05 | End: 2023-01-04

## 2022-12-05 RX ORDER — SPIRONOLACTONE 50 MG/1
50 TABLET, FILM COATED ORAL DAILY
Qty: 30 TABLET | Refills: 2 | Status: SHIPPED | OUTPATIENT
Start: 2022-12-05 | End: 2023-01-04

## 2022-12-05 NOTE — PROGRESS NOTES
Assessment/Plan:  - I have recommended that this patient have a flu shot but she declines at this time  I have discussed the risks and benefits of this examination with her  The patient verbalizes understanding  Other specified anemias  - Patient scheduled for Venofer 200 mg weekly x2 treatment  - Continue follow-up with hematology/call to schedule  - Continue daily folic acid and Q03 supplements  History of DVT (deep vein thrombosis)  - Patient has history of multiple episodes of DVT/PE dating back as far as 2003  - Patient was previously on Lovenox injections until about 1 year ago which she discontinued due to financial issues  - Reviewed recent venous duplex of bilateral lower extremities during admission in October  Results were negative for DVT  - Reviewed hematology/oncology note from 11/21/2022  Plan is to resume anticoagulation with Coumadin once patient completes her liver/GI evaluation   - Patient's initial hypercoagulable work-up in the hospital while off anticoagulation showed positive lupus anticoagulant and low Antithrombin III  Patient will have these repeated before her next hematology/oncology appointment  Per hematology/oncology, regardless of results, would not change recommendation for anticoagulation  Alcohol abuse  - Abstinence persists  Liver cirrhosis ([facility] [address] )  - Continue regular follow-up with gastroenterology/hepatology team     - Unfortunately, patient did miss her gastroenterology appointment earlier today because he was aware of the appointment  Advised patient to call to reschedule missed appointment  - Patient did have liver biopsy done recently which confirmed cirrhosis of the liver  - Continue Lasix 20 mg daily and spironolactone 50 mg daily   - Patient was scheduled for recent upper and lower endoscopy, however patient has now rescheduled these to January 2023  Alcohol-induced insomnia (HCC)  - Stable  Continue melatonin 3 mg daily bedtime      History of pulmonary embolus (PE)  - See plan for "History of DVT"    Swelling of lower extremity  - History of DVT/PE x3 and cirrhosis  Mildly improved with diuretic therapy  Diagnoses and all orders for this visit:    Alcoholic hepatitis with ascites  -     folic acid (FOLVITE) 1 mg tablet; Take 1 tablet (1 mg total) by mouth daily  -     furosemide (LASIX) 20 mg tablet; Take 1 tablet (20 mg total) by mouth daily  -     spironolactone (ALDACTONE) 50 mg tablet; Take 1 tablet (50 mg total) by mouth daily    Alcohol abuse  -     folic acid (FOLVITE) 1 mg tablet; Take 1 tablet (1 mg total) by mouth daily    Alcohol-induced insomnia (HCC)  -     melatonin 3 mg; Take 1 tablet (3 mg total) by mouth daily at bedtime    Liver cirrhosis (HCC)  -     pantoprazole (PROTONIX) 40 mg tablet; Take 1 tablet (40 mg total) by mouth daily    Thiamine deficiency  -     Thiamine HCl (vitamin B-1) 100 MG TABS; Take 1 tablet (100 mg total) by mouth daily    Vitamin D deficiency  -     ergocalciferol (VITAMIN D2) 50,000 units; Take 1 capsule (50,000 Units total) by mouth once a week    Other iron deficiency anemias    Anemia due to other cause, not classified    History of DVT (deep vein thrombosis)    History of pulmonary embolus (PE)    Swelling of lower extremity        All of patients questions were answered  Patient understands and agrees with the above plan  Return in about 6 weeks (around 1/16/2023) for Next scheduled follow up cirrhosis  Louise Sebastian PA-C  12/05/22  [facility] [place]          Subjective:     Patient ID: Maureen Mcleod  is a 55 y o  female with known PMH of liver cirrhosis due to chronic alcohol hepatitis, recurrent DVT/PE, history of alcohol and tobacco dependence who presents today in office for cirrhosis follow-up  - Patient is a 55 y o  female who presents today for cirrhosis follow-up  Overall, patient notes she has been doing okay    Patient notes occasionally she will experience of nausea, but denies any abdominal pain, fevers, chills, vomiting, diarrhea, constipation  Patient notes she continues with intermittent swelling of bilateral lower extremities  Patient notes if she is sitting for too long then the swelling is worse  Patient notes that she is moving around, that it helps  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, but she does try to get up every hour to move around  Patient notes she continues to weigh herself every day  Patient notes on her swollen days, she weighs around 260-265 pounds  Patient notes on her normal days she is weighing about 247-250 pounds  The following portions of the patient's history were reviewed and updated as appropriate: allergies, current medications, past family history, past medical history, past social history, past surgical history and problem list         Review of Systems   Constitutional: Negative for chills and fever  Eyes: Negative for pain and visual disturbance  Respiratory: Negative for cough, shortness of breath and wheezing  Cardiovascular: Positive for leg swelling  Negative for chest pain and palpitations  Gastrointestinal: Negative for abdominal pain, constipation, diarrhea, nausea and vomiting  Musculoskeletal: Positive for back pain  Negative for arthralgias  Skin: Negative for rash  Neurological: Negative for dizziness, syncope, weakness, numbness and headaches  All other systems reviewed and are negative  Objective:   Vitals:    12/05/22 1112   BP: 118/76   BP Location: Left arm   Patient Position: Sitting   Cuff Size: Adult   Pulse: 62   Resp: 18   Temp: 97 8 °F (36 6 °C)   TempSrc: Temporal   SpO2: 96%   Weight: 123 kg (270 lb 8 oz)   Height: 5' 7" (1 702 m)         Physical Exam  Vitals reviewed  Constitutional:       General: She is not in acute distress  Appearance: She is obese  HENT:      Head: Atraumatic     Eyes:      Conjunctiva/sclera: Conjunctivae normal    Cardiovascular:      Rate and Rhythm: Normal rate and regular rhythm  Pulses: Normal pulses  Heart sounds: Normal heart sounds  No murmur heard  Pulmonary:      Effort: Pulmonary effort is normal       Breath sounds: Normal breath sounds  Abdominal:      General: Abdomen is flat  Bowel sounds are normal  There is no distension  Palpations: Abdomen is soft  Tenderness: There is no abdominal tenderness  Musculoskeletal:         General: Normal range of motion  Cervical back: Normal range of motion  Right lower leg: Edema (2-3+ up to knee) present  Left lower leg: Edema (2-3+ up to knee) present  Skin:     General: Skin is warm and dry  Findings: No rash  Neurological:      General: No focal deficit present  Mental Status: She is alert     Psychiatric:         Mood and Affect: Mood normal          Behavior: Behavior normal

## 2022-12-06 ENCOUNTER — HOSPITAL ENCOUNTER (OUTPATIENT)
Dept: INFUSION CENTER | Facility: HOSPITAL | Age: 47
Discharge: HOME | End: 2022-12-06
Attending: INTERNAL MEDICINE

## 2022-12-06 VITALS
HEART RATE: 64 BPM | RESPIRATION RATE: 18 BRPM | SYSTOLIC BLOOD PRESSURE: 117 MMHG | TEMPERATURE: 97.6 F | DIASTOLIC BLOOD PRESSURE: 75 MMHG

## 2022-12-06 DIAGNOSIS — D50.8 OTHER IRON DEFICIENCY ANEMIAS: Primary | ICD-10-CM

## 2022-12-06 RX ORDER — SODIUM CHLORIDE 9 MG/ML
20 INJECTION, SOLUTION INTRAVENOUS ONCE
Status: COMPLETED | OUTPATIENT
Start: 2022-12-06 | End: 2022-12-06

## 2022-12-06 RX ORDER — SODIUM CHLORIDE 9 MG/ML
20 INJECTION, SOLUTION INTRAVENOUS ONCE
Status: CANCELLED | OUTPATIENT
Start: 2022-12-13

## 2022-12-06 RX ADMIN — SODIUM CHLORIDE 20 ML/HR: 9 INJECTION, SOLUTION INTRAVENOUS at 12:49

## 2022-12-06 RX ADMIN — IRON SUCROSE 200 MG: 20 INJECTION, SOLUTION INTRAVENOUS at 12:49

## 2022-12-08 PROBLEM — D50.8 OTHER IRON DEFICIENCY ANEMIAS: Chronic | Status: ACTIVE | Noted: 2022-11-21

## 2022-12-08 PROBLEM — D64.89 OTHER SPECIFIED ANEMIAS: Chronic | Status: ACTIVE | Noted: 2022-09-30

## 2022-12-08 PROBLEM — Z86.718 HISTORY OF DVT (DEEP VEIN THROMBOSIS): Chronic | Status: ACTIVE | Noted: 2022-05-12

## 2022-12-08 PROBLEM — K74.60 LIVER CIRRHOSIS (HCC): Chronic | Status: ACTIVE | Noted: 2022-10-15

## 2022-12-09 NOTE — ASSESSMENT & PLAN NOTE
- Patient has history of multiple episodes of DVT/PE dating back as far as 2003  - Patient was previously on Lovenox injections until about 1 year ago which she discontinued due to financial issues  - Reviewed recent venous duplex of bilateral lower extremities during admission in October  Results were negative for DVT  - Reviewed hematology/oncology note from 11/21/2022  Plan is to resume anticoagulation with Coumadin once patient completes her liver/GI evaluation   - Patient's initial hypercoagulable work-up in the hospital while off anticoagulation showed positive lupus anticoagulant and low Antithrombin III  Patient will have these repeated before her next hematology/oncology appointment  Per hematology/oncology, regardless of results, would not change recommendation for anticoagulation

## 2022-12-09 NOTE — ASSESSMENT & PLAN NOTE
- Patient scheduled for Venofer 200 mg weekly x2 treatment  - Continue follow-up with hematology/call to schedule  - Continue daily folic acid and W84 supplements

## 2022-12-09 NOTE — ASSESSMENT & PLAN NOTE
- Continue regular follow-up with gastroenterology/hepatology team     - Unfortunately, patient did miss her gastroenterology appointment earlier today because he was aware of the appointment  Advised patient to call to reschedule missed appointment  - Patient did have liver biopsy done recently which confirmed cirrhosis of the liver  - Continue Lasix 20 mg daily and spironolactone 50 mg daily   - Patient was scheduled for recent upper and lower endoscopy, however patient has now rescheduled these to January 2023

## 2022-12-13 ENCOUNTER — HOSPITAL ENCOUNTER (OUTPATIENT)
Dept: INFUSION CENTER | Facility: HOSPITAL | Age: 47
Discharge: HOME | End: 2022-12-13
Attending: INTERNAL MEDICINE

## 2022-12-13 VITALS
SYSTOLIC BLOOD PRESSURE: 111 MMHG | TEMPERATURE: 97.8 F | OXYGEN SATURATION: 100 % | HEART RATE: 67 BPM | RESPIRATION RATE: 18 BRPM | DIASTOLIC BLOOD PRESSURE: 75 MMHG

## 2022-12-13 DIAGNOSIS — D50.8 OTHER IRON DEFICIENCY ANEMIAS: Primary | ICD-10-CM

## 2022-12-13 RX ORDER — SODIUM CHLORIDE 9 MG/ML
20 INJECTION, SOLUTION INTRAVENOUS ONCE
Status: CANCELLED | OUTPATIENT
Start: 2022-12-13

## 2022-12-13 RX ORDER — SODIUM CHLORIDE 9 MG/ML
20 INJECTION, SOLUTION INTRAVENOUS ONCE
Status: COMPLETED | OUTPATIENT
Start: 2022-12-13 | End: 2022-12-13

## 2022-12-13 RX ADMIN — IRON SUCROSE 200 MG: 20 INJECTION, SOLUTION INTRAVENOUS at 11:03

## 2022-12-13 RX ADMIN — SODIUM CHLORIDE 20 ML/HR: 9 INJECTION, SOLUTION INTRAVENOUS at 11:02

## 2022-12-13 NOTE — PLAN OF CARE
Problem: Knowledge Deficit  Goal: Patient/family/caregiver demonstrates understanding of disease process, treatment plan, medications, and discharge instructions  Description: Complete learning assessment and assess knowledge base    Interventions:  - Provide teaching at level of understanding  - Provide teaching via preferred learning methods  12/13/2022 1109 by Dave Ortiz, RN  Outcome: Progressing  12/13/2022 1109 by Dave Ortiz, RN  Outcome: Progressing

## 2022-12-16 ENCOUNTER — TELEPHONE (OUTPATIENT)
Dept: FAMILY MEDICINE CLINIC | Facility: CLINIC | Age: 47
End: 2022-12-16

## 2022-12-16 NOTE — TELEPHONE ENCOUNTER
prednisoLONE (ORAPRED) 15 mg/5 mL oral      Pt called in and is asking if she should still be taking this medication  And if so asking if a refill can be sent   Thank you

## 2022-12-19 NOTE — TELEPHONE ENCOUNTER
PT LVM STATING THE MEDICATION SHE REQUESTED IS NOT AT THE PHARMACY I CALLED BACK THE PT AND VOICEMAIL IS NOT SETUP

## 2023-01-12 ENCOUNTER — PATIENT OUTREACH (OUTPATIENT)
Dept: FAMILY MEDICINE CLINIC | Facility: CLINIC | Age: 48
End: 2023-01-12

## 2023-01-12 NOTE — PROGRESS NOTES
CHUN WALTERS noted in chart that the patient has been making her GI and other specialist appointments  CHUN WALTERS noted in guarantor account that the patient was approved for sliding fee scale for care at [place] [id number]  CHUN WALTERS noted secondary insurance reflected [facility] MA pending  CHUN WALTERS placed 2 consecutive calls to the patient, Maryse Finch however unable to leave message as voice box not set up at this time  CHUN WALTERS then placed call to [facility] and was transferred to Erin who is assigned  CHUN WALTERS left message and will await return call

## 2023-01-12 NOTE — PROGRESS NOTES
Denied MA - begininng of last month  OOP for medications     Evicted - no court date yet but it is filed   Do not want [employer] and MAWD   [email or web address]    Did re-apply for MA today because work hours cut     OOP   Good Rx

## 2023-01-17 ENCOUNTER — TELEPHONE (OUTPATIENT)
Dept: HEMATOLOGY ONCOLOGY | Facility: MEDICAL CENTER | Age: 48
End: 2023-01-17

## 2023-01-17 NOTE — TELEPHONE ENCOUNTER
I spoke with the patient in regards to her lab work that needs to be completed prior to her appt on 1/23/23 at 1:40pm  Patient states she will have it done

## 2023-01-20 ENCOUNTER — LAB (OUTPATIENT)
Dept: LAB | Facility: HOSPITAL | Age: 48
End: 2023-01-20

## 2023-01-20 DIAGNOSIS — D53.9 MACROCYTIC ANEMIA: ICD-10-CM

## 2023-01-20 DIAGNOSIS — D50.8 OTHER IRON DEFICIENCY ANEMIAS: ICD-10-CM

## 2023-01-20 DIAGNOSIS — E53.8 FOLIC ACID DEFICIENCY: ICD-10-CM

## 2023-01-20 DIAGNOSIS — Z86.718 HISTORY OF DVT (DEEP VEIN THROMBOSIS): ICD-10-CM

## 2023-01-20 LAB
ALBUMIN SERPL BCP-MCNC: 3.5 G/DL (ref 3.5–5)
ALP SERPL-CCNC: 62 U/L (ref 43–122)
ALT SERPL W P-5'-P-CCNC: 16 U/L
ANION GAP SERPL CALCULATED.3IONS-SCNC: 6 MMOL/L (ref 5–14)
AST SERPL W P-5'-P-CCNC: 39 U/L (ref 14–36)
BASOPHILS # BLD AUTO: 0 THOUSANDS/ÂΜL (ref 0–0.1)
BASOPHILS NFR BLD AUTO: 0 % (ref 0–1)
BILIRUB SERPL-MCNC: 0.55 MG/DL (ref 0.2–1)
BLD SMEAR INTERP: NORMAL
BUN SERPL-MCNC: 11 MG/DL (ref 5–25)
CALCIUM SERPL-MCNC: 8.9 MG/DL (ref 8.4–10.2)
CHLORIDE SERPL-SCNC: 107 MMOL/L (ref 96–108)
CO2 SERPL-SCNC: 27 MMOL/L (ref 21–32)
CREAT SERPL-MCNC: 0.84 MG/DL (ref 0.6–1.2)
CRP SERPL QL: <5 MG/L
DAT POLY-SP REAG RBC QL: NEGATIVE
EOSINOPHIL # BLD AUTO: 0.04 THOUSAND/ÂΜL (ref 0–0.61)
EOSINOPHIL NFR BLD AUTO: 1 % (ref 0–6)
ERYTHROCYTE [DISTWIDTH] IN BLOOD BY AUTOMATED COUNT: 19.6 % (ref 11.6–15.1)
ERYTHROCYTE [SEDIMENTATION RATE] IN BLOOD: 35 MM/HOUR (ref 0–19)
FERRITIN SERPL-MCNC: 19 NG/ML (ref 8–388)
FOLATE SERPL-MCNC: >20 NG/ML (ref 3.1–17.5)
GFR SERPL CREATININE-BSD FRML MDRD: 83 ML/MIN/1.73SQ M
GLUCOSE SERPL-MCNC: 101 MG/DL (ref 70–99)
HCT VFR BLD AUTO: 29.9 % (ref 34.8–46.1)
HGB BLD-MCNC: 9.6 G/DL (ref 11.5–15.4)
IMM GRANULOCYTES # BLD AUTO: 0 THOUSAND/UL (ref 0–0.2)
IMM GRANULOCYTES NFR BLD AUTO: 0 % (ref 0–2)
IRON SATN MFR SERPL: 5 % (ref 15–50)
IRON SERPL-MCNC: 24 UG/DL (ref 50–170)
LDH SERPL-CCNC: 320 U/L (ref 313–618)
LYMPHOCYTES # BLD AUTO: 1.45 THOUSANDS/ÂΜL (ref 0.6–4.47)
LYMPHOCYTES NFR BLD AUTO: 47 % (ref 14–44)
MCH RBC QN AUTO: 27.4 PG (ref 26.8–34.3)
MCHC RBC AUTO-ENTMCNC: 32.1 G/DL (ref 31.4–37.4)
MCV RBC AUTO: 85 FL (ref 82–98)
MONOCYTES # BLD AUTO: 0.41 THOUSAND/ÂΜL (ref 0.17–1.22)
MONOCYTES NFR BLD AUTO: 13 % (ref 4–12)
NEUTROPHILS # BLD AUTO: 1.21 THOUSANDS/ÂΜL (ref 1.85–7.62)
NEUTS SEG NFR BLD AUTO: 39 % (ref 43–75)
NRBC BLD AUTO-RTO: 0 /100 WBCS
PLATELET # BLD AUTO: 261 THOUSANDS/UL (ref 149–390)
PMV BLD AUTO: 9.6 FL (ref 8.9–12.7)
POTASSIUM SERPL-SCNC: 3.3 MMOL/L (ref 3.5–5.3)
PROT SERPL-MCNC: 7.3 G/DL (ref 6.4–8.4)
RBC # BLD AUTO: 3.5 MILLION/UL (ref 3.81–5.12)
RETICS # AUTO: NORMAL 10*3/UL (ref 14097–95744)
RETICS # CALC: 0.83 % (ref 0.37–1.87)
SODIUM SERPL-SCNC: 140 MMOL/L (ref 135–147)
TIBC SERPL-MCNC: 450 UG/DL (ref 250–450)
VIT B12 SERPL-MCNC: 257 PG/ML (ref 100–900)
WBC # BLD AUTO: 3.11 THOUSAND/UL (ref 4.31–10.16)

## 2023-01-21 LAB
DEPRECATED AT III PPP: 89 % OF NORMAL (ref 92–136)
HAPTOGLOB SERPL-MCNC: 96 MG/DL (ref 42–296)

## 2023-01-23 ENCOUNTER — ANESTHESIA (OUTPATIENT)
Dept: ANESTHESIOLOGY | Facility: HOSPITAL | Age: 48
End: 2023-01-23

## 2023-01-23 ENCOUNTER — ANESTHESIA EVENT (OUTPATIENT)
Dept: ANESTHESIOLOGY | Facility: HOSPITAL | Age: 48
End: 2023-01-23

## 2023-01-23 ENCOUNTER — OFFICE VISIT (OUTPATIENT)
Dept: HEMATOLOGY ONCOLOGY | Facility: CLINIC | Age: 48
End: 2023-01-23

## 2023-01-23 VITALS
OXYGEN SATURATION: 98 % | TEMPERATURE: 97 F | WEIGHT: 250 LBS | SYSTOLIC BLOOD PRESSURE: 102 MMHG | HEIGHT: 67 IN | BODY MASS INDEX: 39.24 KG/M2 | DIASTOLIC BLOOD PRESSURE: 70 MMHG | HEART RATE: 64 BPM | RESPIRATION RATE: 17 BRPM

## 2023-01-23 DIAGNOSIS — Z86.718 HISTORY OF DVT (DEEP VEIN THROMBOSIS): ICD-10-CM

## 2023-01-23 DIAGNOSIS — D53.9 MACROCYTIC ANEMIA: Primary | ICD-10-CM

## 2023-01-23 DIAGNOSIS — K70.31 ALCOHOLIC CIRRHOSIS OF LIVER WITH ASCITES (HCC): ICD-10-CM

## 2023-01-23 DIAGNOSIS — E53.8 VITAMIN B 12 DEFICIENCY: ICD-10-CM

## 2023-01-23 DIAGNOSIS — D50.8 OTHER IRON DEFICIENCY ANEMIAS: ICD-10-CM

## 2023-01-23 LAB
APTT SCREEN TO CONFIRM RATIO: 1.02 RATIO (ref 0–1.34)
CONFIRM APTT/NORMAL: 43.9 SEC (ref 0–47.6)
DRVVT IMM 1:2 NP PPP: 44.2 SEC (ref 0–40.4)
DRVVT SCREEN TO CONFIRM RATIO: 1.3 RATIO (ref 0.8–1.2)
LA PPP-IMP: ABNORMAL
SCREEN APTT: 49.2 SEC (ref 0–51.9)
SCREEN DRVVT: 49.1 SEC (ref 0–47)
THROMBIN TIME: 17 SEC (ref 0–23)

## 2023-01-23 RX ORDER — SODIUM CHLORIDE, SODIUM LACTATE, POTASSIUM CHLORIDE, CALCIUM CHLORIDE 600; 310; 30; 20 MG/100ML; MG/100ML; MG/100ML; MG/100ML
50 INJECTION, SOLUTION INTRAVENOUS CONTINUOUS
Status: CANCELLED | OUTPATIENT
Start: 2023-01-23

## 2023-01-23 RX ORDER — CYANOCOBALAMIN 1000 UG/ML
1000 INJECTION, SOLUTION INTRAMUSCULAR; SUBCUTANEOUS ONCE
OUTPATIENT
Start: 2023-01-30 | End: 2023-01-30

## 2023-01-23 NOTE — ANESTHESIA PREPROCEDURE EVALUATION
Procedure:  PRE-OP ONLY    Relevant Problems   GI/HEPATIC   (+) Liver cirrhosis (HCC)   (+) Severe Alcoholic hepatitis      HEMATOLOGY   (+) Other iron deficiency anemias   (+) Other specified anemias      NEURO/PSYCH   (+) History of DVT (deep vein thrombosis)   (+) History of pulmonary embolus (PE)   (+) Paresthesia of lower extremity      PULMONARY   (+) Obstructive sleep apnea      Other   (+) Alcohol abuse   (+) Alcohol-induced insomnia (HCC)   (+) Obesity   (+) Personal history of gastric bypass   (+) Tobacco dependence syndrome             Anesthesia Plan  ASA Score- 3     Anesthesia Type- IV sedation with anesthesia with ASA Monitors  Additional Monitors:   Airway Plan:           Plan Factors-    Chart reviewed  EKG reviewed  Existing labs reviewed  Patient summary reviewed  Patient is a current smoker  Patient instructed to abstain from smoking on day of procedure  Induction-     Postoperative Plan-     Informed Consent- Anesthetic plan and risks discussed with patient  I personally reviewed this patient with the CRNA  Discussed and agreed on the Anesthesia Plan with the CRNA               Lab Results   Component Value Date    HGBA1C 5 4 06/22/2018       Lab Results   Component Value Date    K 3 3 (L) 01/20/2023     01/20/2023    CO2 27 01/20/2023    BUN 11 01/20/2023    CREATININE 0 84 01/20/2023    GLUF 84 11/17/2022    CALCIUM 8 9 01/20/2023    CORRECTEDCA 9 5 11/17/2022    AST 39 (H) 01/20/2023    ALT 16 01/20/2023    ALKPHOS 62 01/20/2023    EGFR 83 01/20/2023       Lab Results   Component Value Date    WBC 3 11 (L) 01/20/2023    HGB 9 6 (L) 01/20/2023    HCT 29 9 (L) 01/20/2023    MCV 85 01/20/2023     01/20/2023   sept 2022 echo   Gastric bypass, pulmonary embolism, smoker, alcohol abuse, DVT       Interpretation Summary       Technically adequate transthoracic echocardiogram study      1  Mildly increased left ventricular wall thickness, normal left ventricular systolic function, early grade 1 diastolic dysfunction  Ejection fraction is estimated as around 64%  2  Normal right ventricular size and systolic function  3  Top normal left atrial cavity size, borderline right atrial cavity enlargement  4  Normal aortic valve, no aortic valve stenosis or regurgitation  5  Normal mitral valve, trace mitral valve regurgitation  6  Trace tricuspid valve regurgitation  7  No obvious pulmonary hypertension    8  No pericardial effusion      Compared to report of previous echocardiogram from August 20, 2018 there is overall no significant change

## 2023-01-23 NOTE — PROGRESS NOTES
Hematology/Oncology Outpatient Follow-up  Lauren Mauricio 52 y o  female 1975 [id number]    Date:  1/23/2023    Assessment and Plan:  1  Macrocytic anemia  Multifactorial in etiology  These some improvement of her hemoglobin level up to 9 7 g  The peripheral smear continues to show some schistocytes and helmet cells  However the hemolysis markers are within normal range with normal LDH haptoglobin and total bilirubin level  Patient was told that she is iron deficient which will need to be corrected cautiously with 2 doses of Venofer IV since she has liver cirrhosis  We will also had 2 doses of vitamin B12 and monitor her closely  - CBC and differential; Future  - Magnesium; Future  - LD,Blood; Future  - C-reactive protein; Future  - Comprehensive metabolic panel; Future  - Sedimentation rate, automated; Future  - Iron Panel (Includes Ferritin, Iron Sat%, Iron, and TIBC); Future  - Ferritin; Future  - Folate; Future  - Vitamin B12; Future  - Direct antiglobulin test; Future  - Haptoglobin; Future  - Hemolysis Smear; Future    2  Alcoholic cirrhosis of liver with ascites ([facility] [address] )    Liver biopsy on 10/31/2022 confirmed the cirrhosis  - Comprehensive metabolic panel; Future    3  History of DVT (deep vein thrombosis)  She did mention that her left leg is getting more swollen and compared sent to the right 1  She is currently off of anticoagulation  The lupus anticoagulant test came back positive again  The Antithrombin levels went back to the normal range  The other tests are still pending including beta-2 glycoprotein and anticardiolipin antibody  There is a very good chance that she has antiphospholipid antibody syndrome  She was told that she most likely need to go back on the Coumadin since the GI evaluation of her liver is complete  A Doppler study of the lower extremities will be ordered to be done immediately  - VAS lower limb venous duplex study, complete bilateral; Future    4   Vitamin B 12 deficiency  She will be started on vitamin B12 supplements on a daily basis  We will add 2 doses of vitamin B12 with the planned IV Venofer treatment  - CBC and differential; Future  - Vitamin B12; Future  - cyanocobalamin (VITAMIN B-12) 1000 MCG tablet; Take 1 tablet (1,000 mcg total) by mouth daily  Dispense: 30 tablet; Refill: 5    5  Other iron deficiency anemias  2 doses of Venofer IV  She is about to get colonoscopy and upper endoscopy which is scheduled for tomorrow  HPI:  Patient is a 49-year-old female with past medical history of alcohol abuse, tobacco abuse, sleep apnea gastric bypass surgery and 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   Patient states that her 1st episode of left lower extremity DVT with PE was around 2003 unprovoked she was on warfarin for some time which was then stopped   Second episode happened after her bariatric surgery again left lower extremity with PE 2016   Most recent episode was large bilateral PE around 2018 she was using "injections" likely Lovenox on a regular basis until about a year ago/2021  Stopped the indefinite anticoagulation due to financial reasons  She presented to the emergency department on 09/08/2022 with reports of abdominal pain and constipation   She was found to have transaminitis and elevated total bilirubin and admitted to rule out biliary cirrhosis   She she is under the GI team who felt her symptoms may be due to alcohol-induced hepatitis   She was started on prednisone around 09/29/2022  Juan Maldonado hemoglobin did drop down to 6 7 on 09/30/2022 she was supported with 2 units of packed red blood cells   Stool for occult blood came back negative      During the hospital stay she had hypercoagulable workup which showed prolonged PT and PTT, low fibrinogen of 150, prolonged TT, positive lupus anticoagulant test, low antithrombin level     Anemia workup revealed low folate of 4 8 vitamin B12 level   Mild hypothyroidism,  ferritin of 100 and saturation of 67%   Monoclonal gammopathy workup revealed elevated IgA and IgG levels with mildly elevated kappa and lambda serum concentration and normal ratio   SPEP showed a possible faint band, Serum immunofixation was negative for M protein  Hemolysis workup showed low haptoglobin of 10 with negative direct Karon test   Doppler study of the lower extremities was negative for deep vein thrombosis on 10/04/2022  CT scan of the abdomen with contrast on 09/28/2022 showed small ascites, hepatomegaly with severe steatosis              Interval history:  The patient came today for follow-up visit accompanied by a family member  She continues to smoke on a daily basis  She did complain today about significant tenderness in the right upper quadrant area  Recent blood work on 1/20/2023 showed a white cell count of 3 1 with ANC of 1 2  Hemoglobin was stable around 9 6 with MCV of 85  Platelet count 204  C-reactive protein was normal with sed rate of 35  The lupus anticoagulant test came back positive again  Vitamin B12 257  Folate more than 20  Creatinine 0 8 with normal calcium  AST 39 ALT was normal   Total bili 0 5  Direct Karon test was negative with normal LDH  Hemolysis smear showed some helmet cells and some schistocytes  Haptoglobin is however in the normal range of 96  Iron panel showed saturation of 5 and ferritin of 19  She continues to have her menstrual cycle  She denied bleeding from any other sites  ROS: Review of Systems   Constitutional: Positive for fatigue  Negative for chills and fever  HENT: Negative for ear pain and sore throat  Eyes: Negative for pain and visual disturbance  Respiratory: Positive for cough  Negative for shortness of breath  Cardiovascular: Negative for chest pain and palpitations  Gastrointestinal: Positive for nausea  Negative for abdominal pain and vomiting  Genitourinary: Negative for dysuria and hematuria     Musculoskeletal: Negative for arthralgias and back pain  Skin: Negative for color change and rash  Neurological: Positive for headaches  Negative for seizures and syncope  All other systems reviewed and are negative  Past Medical History:   Diagnosis Date   • DVT (deep venous thrombosis) (HCC)    • Pulmonary embolism (HCC)    • Pulmonary embolism ([facility] [address] ) 2017    CTA 2018: Large bilateral pulmonary emboli    The calculated ratio of right ventricular to left ventricular diameter (RV/LV ratio) is 1 6  This is greater than 0 9, which is abnormal and indicates right heart strain  An abnormal RV/LV ratio has been shown to be associated with an increased risk of  30 day mortality in the setting of acute pulmonary embolism    Fatty liver    Echo 18 [facility]       Past Surgical History:   Procedure Laterality Date   • ANKLE SURGERY Left    • GASTRIC BYPASS     • IR BIOPSY LIVER RANDOM  10/31/2022   • IR OTHER  2022       Social History     Socioeconomic History   • Marital status: Single     Spouse name: None   • Number of children: None   • Years of education: None   • Highest education level: None   Occupational History   • None   Tobacco Use   • Smoking status: Every Day     Packs/day: 0 25     Years: 25 00     Pack years: 6 25     Types: Cigarettes     Last attempt to quit: 2018     Years since quittin 4   • Smokeless tobacco: Never   • Tobacco comments:     2-3 cigarettes / daily  NEXGEN SAYS FORMER SMOKER QUIT DATE 2017   Vaping Use   • Vaping Use: Never used   Substance and Sexual Activity   • Alcohol use: Not Currently     Comment: 1 month since quit   • Drug use: No   • Sexual activity: None   Other Topics Concern   • None   Social History Narrative   • None     Social Determinants of Health     Financial Resource Strain: High Risk   • Difficulty of Paying Living Expenses: Very hard   Food Insecurity: No Food Insecurity   • Worried About Running Out of Food in the Last Year: Never true   • Ran Out of Food in the Last Year: Never true   Transportation Needs: Unmet Transportation Needs   • Lack of Transportation (Medical): Yes   • Lack of Transportation (Non-Medical): Yes   Physical Activity: Not on file   Stress: Stress Concern Present   • Feeling of Stress : To some extent   Social Connections: Not on file   Intimate Partner Violence: Not on file   Housing Stability: High Risk   • Unable to Pay for Housing in the Last Year: Yes   • Number of [place] in the Last Year: 1   • Unstable Housing in the Last Year: Not on file       Family History   Problem Relation Age of Onset   • Hypertension Mother        Allergies   Allergen Reactions   • Gabapentin Rash         Current Outpatient Medications:   •  ergocalciferol (VITAMIN D2) 50,000 units, Take 1 capsule (50,000 Units total) by mouth once a week, Disp: 8 capsule, Rfl: 1  •  folic acid (FOLVITE) 1 mg tablet, Take 1 tablet (1 mg total) by mouth daily, Disp: 90 tablet, Rfl: 3  •  furosemide (LASIX) 20 mg tablet, Take 1 tablet (20 mg total) by mouth daily, Disp: 30 tablet, Rfl: 2  •  pantoprazole (PROTONIX) 40 mg tablet, Take 1 tablet (40 mg total) by mouth daily, Disp: 30 tablet, Rfl: 2  •  Thiamine HCl (vitamin B-1) 100 MG TABS, Take 1 tablet (100 mg total) by mouth daily, Disp: 90 tablet, Rfl: 3  •  spironolactone (ALDACTONE) 50 mg tablet, Take 1 tablet (50 mg total) by mouth daily, Disp: 30 tablet, Rfl: 2      Physical Exam:  /70 (BP Location: Right arm, Patient Position: Sitting, Cuff Size: Adult)   Pulse 64   Temp (!) 97 °F (36 1 °C)   Resp 17   Ht 5' 7" (1 702 m)   Wt 113 kg (250 lb)   SpO2 98%   BMI 39 16 kg/m²     Physical Exam  Constitutional:       General: She is not in acute distress  Appearance: She is well-developed  She is not diaphoretic  HENT:      Head: Normocephalic and atraumatic  Eyes:      General: No scleral icterus  Right eye: No discharge  Left eye: No discharge        Conjunctiva/sclera: Conjunctivae normal       Pupils: Pupils are equal, round, and reactive to light  Neck:      Thyroid: No thyromegaly  Vascular: No JVD  Trachea: No tracheal deviation  Cardiovascular:      Rate and Rhythm: Normal rate and regular rhythm  Heart sounds: Normal heart sounds  No murmur heard  No friction rub  Pulmonary:      Effort: Pulmonary effort is normal  No respiratory distress  Breath sounds: Normal breath sounds  No stridor  No wheezing or rales  Chest:      Chest wall: No tenderness  Abdominal:      General: There is no distension  Palpations: Abdomen is soft  There is no hepatomegaly or splenomegaly  Tenderness: There is no abdominal tenderness  There is no guarding or rebound  Musculoskeletal:         General: No tenderness or deformity  Normal range of motion  Cervical back: Normal range of motion and neck supple  Right lower leg: Edema present  Left lower leg: Edema (Left leg swelling more than right) present  Lymphadenopathy:      Cervical: No cervical adenopathy  Skin:     General: Skin is warm and dry  Coloration: Skin is not pale  Findings: No erythema or rash  Neurological:      Mental Status: She is alert and oriented to person, place, and time  Cranial Nerves: No cranial nerve deficit  Coordination: Coordination normal       Deep Tendon Reflexes: Reflexes are normal and symmetric  Psychiatric:         Behavior: Behavior normal          Thought Content:  Thought content normal          Judgment: Judgment normal            Labs:  Lab Results   Component Value Date    WBC 3 11 (L) 01/20/2023    HGB 9 6 (L) 01/20/2023    HCT 29 9 (L) 01/20/2023    MCV 85 01/20/2023     01/20/2023     Lab Results   Component Value Date    K 3 3 (L) 01/20/2023     01/20/2023    CO2 27 01/20/2023    BUN 11 01/20/2023    CREATININE 0 84 01/20/2023    GLUF 84 11/17/2022    CALCIUM 8 9 01/20/2023    CORRECTEDCA 9 5 11/17/2022    AST 39 (H) 01/20/2023    ALT 16 01/20/2023    ALKPHOS 62 01/20/2023    EGFR 83 01/20/2023       Patient voiced understanding and agreement in the above discussion  Aware to contact our office with questions/symptoms in the interim

## 2023-01-24 LAB
B2 GLYCOPROT1 IGA SERPL IA-ACNC: 3.6
B2 GLYCOPROT1 IGG SERPL IA-ACNC: 1.2
B2 GLYCOPROT1 IGM SERPL IA-ACNC: 3.8
CARDIOLIPIN IGA SER IA-ACNC: 5.5
CARDIOLIPIN IGG SER IA-ACNC: 1
CARDIOLIPIN IGM SER IA-ACNC: 2.4

## 2023-01-30 ENCOUNTER — HOSPITAL ENCOUNTER (OUTPATIENT)
Dept: NON INVASIVE DIAGNOSTICS | Facility: HOSPITAL | Age: 48
Discharge: HOME | End: 2023-01-30
Attending: INTERNAL MEDICINE

## 2023-01-30 DIAGNOSIS — Z86.718 HISTORY OF DVT (DEEP VEIN THROMBOSIS): ICD-10-CM

## 2023-03-03 ENCOUNTER — PREP FOR PROCEDURE (OUTPATIENT)
Dept: GASTROENTEROLOGY | Facility: MEDICAL CENTER | Age: 48
End: 2023-03-03

## 2023-03-03 ENCOUNTER — CONSULT (OUTPATIENT)
Dept: GASTROENTEROLOGY | Facility: MEDICAL CENTER | Age: 48
End: 2023-03-03

## 2023-03-03 ENCOUNTER — OFFICE VISIT (OUTPATIENT)
Dept: FAMILY MEDICINE CLINIC | Facility: CLINIC | Age: 48
End: 2023-03-03

## 2023-03-03 VITALS
HEIGHT: 67 IN | SYSTOLIC BLOOD PRESSURE: 110 MMHG | TEMPERATURE: 97.9 F | BODY MASS INDEX: 38.71 KG/M2 | OXYGEN SATURATION: 99 % | HEART RATE: 69 BPM | DIASTOLIC BLOOD PRESSURE: 60 MMHG | RESPIRATION RATE: 18 BRPM | WEIGHT: 246.6 LBS

## 2023-03-03 VITALS
WEIGHT: 246.6 LBS | BODY MASS INDEX: 38.62 KG/M2 | HEART RATE: 70 BPM | TEMPERATURE: 97.2 F | SYSTOLIC BLOOD PRESSURE: 100 MMHG | DIASTOLIC BLOOD PRESSURE: 69 MMHG

## 2023-03-03 DIAGNOSIS — Z12.11 COLON CANCER SCREENING: ICD-10-CM

## 2023-03-03 DIAGNOSIS — K74.60 LIVER CIRRHOSIS (HCC): Primary | ICD-10-CM

## 2023-03-03 DIAGNOSIS — K70.31 ALCOHOLIC CIRRHOSIS OF LIVER WITH ASCITES (HCC): Primary | Chronic | ICD-10-CM

## 2023-03-03 DIAGNOSIS — D64.89 ANEMIA DUE TO OTHER CAUSE, NOT CLASSIFIED: Chronic | ICD-10-CM

## 2023-03-03 DIAGNOSIS — Z86.718 HISTORY OF DVT (DEEP VEIN THROMBOSIS): Chronic | ICD-10-CM

## 2023-03-03 DIAGNOSIS — F17.200 TOBACCO DEPENDENCE SYNDROME: ICD-10-CM

## 2023-03-03 DIAGNOSIS — E55.9 VITAMIN D DEFICIENCY: ICD-10-CM

## 2023-03-03 DIAGNOSIS — D50.8 OTHER IRON DEFICIENCY ANEMIAS: ICD-10-CM

## 2023-03-03 DIAGNOSIS — Z13.31 DEPRESSION SCREEN: ICD-10-CM

## 2023-03-03 DIAGNOSIS — F10.10 ALCOHOL ABUSE: ICD-10-CM

## 2023-03-03 DIAGNOSIS — E66.09 CLASS 2 OBESITY DUE TO EXCESS CALORIES WITHOUT SERIOUS COMORBIDITY WITH BODY MASS INDEX (BMI) OF 38.0 TO 38.9 IN ADULT: ICD-10-CM

## 2023-03-03 DIAGNOSIS — R10.11 RIGHT UPPER QUADRANT ABDOMINAL PAIN: ICD-10-CM

## 2023-03-03 DIAGNOSIS — K70.31 ALCOHOLIC CIRRHOSIS OF LIVER WITH ASCITES (HCC): Primary | ICD-10-CM

## 2023-03-03 DIAGNOSIS — K70.11 ALCOHOLIC HEPATITIS WITH ASCITES: ICD-10-CM

## 2023-03-03 RX ORDER — DICYCLOMINE HCL 20 MG
20 TABLET ORAL EVERY 6 HOURS
Qty: 30 TABLET | Refills: 1 | Status: SHIPPED | OUTPATIENT
Start: 2023-03-03

## 2023-03-03 RX ORDER — ERGOCALCIFEROL 1.25 MG/1
50000 CAPSULE ORAL WEEKLY
Qty: 8 CAPSULE | Refills: 1 | Status: SHIPPED | OUTPATIENT
Start: 2023-03-03

## 2023-03-03 RX ORDER — SPIRONOLACTONE 50 MG/1
50 TABLET, FILM COATED ORAL DAILY
Qty: 90 TABLET | Refills: 1 | Status: SHIPPED | OUTPATIENT
Start: 2023-03-03 | End: 2023-04-02

## 2023-03-03 RX ORDER — PANTOPRAZOLE SODIUM 40 MG/1
40 TABLET, DELAYED RELEASE ORAL DAILY
Qty: 90 TABLET | Refills: 1 | Status: SHIPPED | OUTPATIENT
Start: 2023-03-03

## 2023-03-03 RX ORDER — POLYETHYLENE GLYCOL 3350, SODIUM SULFATE ANHYDROUS, SODIUM BICARBONATE, SODIUM CHLORIDE, POTASSIUM CHLORIDE 236; 22.74; 6.74; 5.86; 2.97 G/4L; G/4L; G/4L; G/4L; G/4L
4000 POWDER, FOR SOLUTION ORAL ONCE
Qty: 4000 ML | Refills: 0 | Status: SHIPPED | OUTPATIENT
Start: 2023-03-03 | End: 2023-03-03

## 2023-03-03 RX ORDER — FUROSEMIDE 20 MG/1
20 TABLET ORAL DAILY
Qty: 90 TABLET | Refills: 1 | Status: SHIPPED | OUTPATIENT
Start: 2023-03-03

## 2023-03-03 NOTE — PATIENT INSTRUCTIONS
1 ) You can call [phone] to reschedule mammogram    2 ) You can reschedule the EGD/colonoscopy at your gastroenterology appointment

## 2023-03-03 NOTE — PROGRESS NOTES
Assessment/Plan:    History of DVT (deep vein thrombosis)  - Patient has history of multiple episodes of DVT/PE dating back as far as 2003  - Patient was previously on Lovenox injections until about 1 year ago which she discontinued due to financial issues  -Reviewed hematology/oncology note from 1/23/2023  Patient is currently off Coumadin, will most likely have to restart since her GI evaluation of liver is complete   -Recent bilateral lower extremity duplex was negative for DVT  - Continue follow-up with hematology/oncology as scheduled  Other specified anemias  -Recently received 2 doses of Venofer IV and 2 doses of vitamin B12  Iron deficiency has to be corrected cautiously due to liver cirrhosis  - Continue follow-up with hematology/oncology scheduled   -Continue daily folic acid and Z74 supplement  Liver cirrhosis (HCC)  - Continue regular follow-up with gastroenterology/hepatology team     - Patient did have liver biopsy done recently which confirmed cirrhosis of the liver  - Continue Lasix 20 mg daily and spironolactone 50 mg daily   - Patient was scheduled for recent upper and lower endoscopy in January 2023, however they were not completed at that time and patient is now planning to reschedule these  Alcohol abuse  - Abstinence persists  Diagnoses and all orders for this visit:    Liver cirrhosis ([facility] [address] )  -     pantoprazole (PROTONIX) 40 mg tablet; Take 1 tablet (40 mg total) by mouth daily    Vitamin D deficiency  -     ergocalciferol (VITAMIN D2) 50,000 units; Take 1 capsule (50,000 Units total) by mouth once a week    Alcoholic hepatitis with ascites  -     spironolactone (ALDACTONE) 50 mg tablet; Take 1 tablet (50 mg total) by mouth daily  -     furosemide (LASIX) 20 mg tablet;  Take 1 tablet (20 mg total) by mouth daily    Class 2 obesity due to excess calories without serious comorbidity with body mass index (BMI) of 38 0 to 38 9 in adult    History of DVT (deep vein thrombosis)    Anemia due to other cause, not classified    Alcohol abuse    Depression screen        All of patients questions were answered  Patient understands and agrees with the above plan  Return in about 3 months (around 6/3/2023) for Annual physical     Barrerapaulette Aguila  03/03/23  [address] [facility] [place]          Subjective:     Patient ID: Lenka Garay  is a 52 y o  female with known PMH of liver cirrhosis due to chronic alcohol hepatitis, recurrent DVT/PE, history of alcohol and tobacco dependence  who presents today in office for cirrhosis follow-up  - Patient is a 52 y o  female who presents today for cirrhosis follow-up  Patient notes she has been compliant with taking her medications  Patient notes she did not go to the EGD/colonoscopy, but is planning to schedule it in the near future  Patient notes she did receive iron infusions B12 injections  Patient notes she still continues to feel sluggish and fatigued  The following portions of the patient's history were reviewed and updated as appropriate: allergies, current medications, past family history, past medical history, past social history, past surgical history and problem list         Review of Systems   Constitutional: Positive for fatigue  Negative for chills and fever  Eyes: Negative for pain and visual disturbance  Respiratory: Negative for cough, shortness of breath and wheezing  Cardiovascular: Negative for chest pain and palpitations  Gastrointestinal: Negative for abdominal pain, constipation, diarrhea, nausea and vomiting  Musculoskeletal: Positive for back pain  Negative for arthralgias  Skin: Negative for rash  Neurological: Negative for dizziness, syncope, weakness, numbness and headaches  All other systems reviewed and are negative  BMI Counseling: Body mass index is 38 62 kg/m²   The BMI is above normal  Nutrition recommendations include decreasing portion sizes, encouraging healthy choices of fruits and vegetables, consuming healthier snacks, limiting drinks that contain sugar, moderation in carbohydrate intake, increasing intake of lean protein and reducing intake of cholesterol  Exercise recommendations include moderate physical activity 150 minutes/week  No pharmacotherapy was ordered  Rationale for BMI follow-up plan is due to patient being overweight or obese  Depression Screening and Follow-up Plan: Patient was screened for depression during today's encounter  They screened negative with a PHQ-2 score of 0  Tobacco Cessation Counseling: Tobacco cessation counseling was provided  The patient is sincerely urged to quit consumption of tobacco  She is not ready to quit tobacco  Medication options and side effects of medication discussed  Patient refused medication  Objective:   Vitals:    03/03/23 0824   BP: 110/60   BP Location: Left arm   Patient Position: Sitting   Cuff Size: Standard   Pulse: 69   Resp: 18   Temp: 97 9 °F (36 6 °C)   TempSrc: Temporal   SpO2: 99%   Weight: 112 kg (246 lb 9 6 oz)   Height: 5' 7" (1 702 m)         Physical Exam  Vitals reviewed  Constitutional:       General: She is not in acute distress  Appearance: She is obese  HENT:      Head: Atraumatic  Eyes:      Conjunctiva/sclera: Conjunctivae normal    Cardiovascular:      Rate and Rhythm: Normal rate and regular rhythm  Pulses: Normal pulses  Heart sounds: Normal heart sounds  No murmur heard  Pulmonary:      Effort: Pulmonary effort is normal       Breath sounds: Normal breath sounds  Abdominal:      General: Abdomen is flat  Bowel sounds are normal  There is no distension  Palpations: Abdomen is soft  Tenderness: There is no abdominal tenderness  Musculoskeletal:      Cervical back: Normal range of motion  Skin:     General: Skin is warm and dry  Findings: No rash  Neurological:      General: No focal deficit present  Mental Status: She is alert  Psychiatric:         Mood and Affect: Mood normal          Behavior: Behavior normal            PHQ-2/9 Depression Screening    Little interest or pleasure in doing things: 0 - not at all  Feeling down, depressed, or hopeless: 0 - not at all  PHQ-2 Score: 0  PHQ-2 Interpretation: Negative depression screen

## 2023-03-03 NOTE — PATIENT INSTRUCTIONS
Scheduled date of colon/egd (as of today) 5/18/23  Physician performing: Dr Andrew Frazier  Location of procedure:  [facility]  Instructions given to patient:  jose  Clearances: na

## 2023-03-03 NOTE — PROGRESS NOTES
Consultation - [facility]  Jose Guadalupe De Paz 52 y o  female MRN: [id number]  @ Encounter: [id number]    ASSESSMENT AND PLAN:      71-year-old female with past medical history of alcohol cirrhosis meld of 15 decompensated by history of ascites, LARRY, DVT/PE, status post gastric bypass, tobacco use, iron deficiency anemia who is attending an office visit for management of cirrhosis  1  Alcohol cirrhosis  2  History of alcoholic hepatitis  3  History of alcohol abuse  Meld currently 15, Nazia Mondragon A   Decompensated by history of ascites  Congratulated patient on her sobriety  Last drink was in October 2022  · CMP/INR every 6 months  · Continue alcohol abstinence  Ascites: Has history of ascites but none on exam today  Does have lower extremity edema which may be dependent or secondary to venous stasis  · Continue diuretics at current dose which includes Lasix 20 mg daily, spironolactone 50 mg daily  · 2 g sodium diet  · Monitor volume status  Portal hypertension: No prior EGD  No evidence of bleeding currently  Hemoglobin is currently nine 9 6 in the setting of iron deficiency anemia  · Plan for EGD for variceal screening  · Monitor for bleeding  Encephalopathy: No prior history and none on exam today  · Status  HCC screening: Right upper quadrant ultrasound in October showed no liver masses  · Triple phase CT ordered for the next 2 months  Transplant candidacy: Though her MELD score is technically 15 she appears to be regaining her liver function back and she has been abstaining from alcohol  · Patient will follow-up with hepatology in 6 months  4  Right upper/lower abdominal pain  Unclear etiology  May be IBS related though her bowel movements she states are normal   Does have a history of diarrhea however  Imaging from last year unremarkable for source  · Bentyl as needed for pain  · Follow-up at next visit in 6 months  5  Iron deficiency anemia  6   Vitamin B12 deficiency  7  Folate deficiency  8  Colon cancer screening  Hemoglobin of 9 6  Was mainly macrocytic until most recent lab work  Iron saturation of 5%, ferritin of 19  Vitamin B12 in 200s  No signs of overt bleeding  Average risk for screening  • Plan for EGD/colonoscopy to eventually get MILES as well as colon cancer screening  • Continue IV Venofer infusions  • Follow-up with hematology  Follow up in 6 months  Thank you for this consultation  ______________________________________________________________________    HPI:  66-year-old female with past medical history of alcohol cirrhosis meld of 15 decompensated by history of ascites, LARRY, DVT/PE, status post gastric bypass, tobacco use, iron deficiency anemia who is attending an office visit for management of cirrhosis  She generally feels well  Has chronic right upper and lower quadrant abdominal pain worse with exertion  Not related to eating or bowel movements  She states that her diarrhea is currently resolved  Denies any nausea, vomiting, blood in stool, hematemesis  Last drink was in October 2022  No prior endoscopies  Denies family history of liver or colon cancer  Continues to smoke cigarettes  HEALTHCARE MAINTENANCE:   Hepatitis C screening negative in 2022  Last EGD: None prior  Last Colonoscopy: None prior    REVIEW OF SYSTEMS:    Review of Systems   Constitutional: Negative for chills and fever  HENT: Negative for congestion and sinus pressure  Respiratory: Negative for cough and shortness of breath  Cardiovascular: Negative for chest pain, palpitations and leg swelling  Gastrointestinal: Positive for abdominal pain  Negative for diarrhea, nausea and vomiting  Genitourinary: Negative for dysuria and hematuria  Musculoskeletal: Negative for arthralgias and back pain  Skin: Negative for color change and rash  Neurological: Negative for dizziness and headaches     Psychiatric/Behavioral: Negative for agitation and confusion  All other systems reviewed and are negative  Historical Information   Past Medical History:   Diagnosis Date   • DVT (deep venous thrombosis) (HCC)    • Pulmonary embolism (HCC)    • Pulmonary embolism ([address] ) 2017    CTA 2018: Large bilateral pulmonary emboli    The calculated ratio of right ventricular to left ventricular diameter (RV/LV ratio) is 1 6  This is greater than 0 9, which is abnormal and indicates right heart strain  An abnormal RV/LV ratio has been shown to be associated with an increased risk of  30 day mortality in the setting of acute pulmonary embolism    Fatty liver  Echo 18 [facility]     Past Surgical History:   Procedure Laterality Date   • ANKLE SURGERY Left    • GASTRIC BYPASS     • IR BIOPSY LIVER RANDOM  10/31/2022   • IR OTHER  2022     Social History   Social History     Substance and Sexual Activity   Alcohol Use Not Currently    Comment: 1 month since quit     Social History     Substance and Sexual Activity   Drug Use No     Social History     Tobacco Use   Smoking Status Every Day   • Packs/day: 0 25   • Years: 25 00   • Pack years: 6 25   • Types: Cigarettes   • Last attempt to quit: 2018   • Years since quittin 5   Smokeless Tobacco Never   Tobacco Comments    2-3 cigarettes / daily  Rahelrayne Echeverriawilma SAYS FORMER SMOKER QUIT DATE 2017     Family History   Problem Relation Age of Onset   • Hypertension Mother        Meds/Allergies     (Not in a hospital admission)    No current facility-administered medications for this visit  Allergies   Allergen Reactions   • Gabapentin Rash       Objective     Blood pressure 100/69, pulse 70, temperature (!) 97 2 °F (36 2 °C), temperature source Tympanic, weight 112 kg (246 lb 9 6 oz), last menstrual period 2023, not currently breastfeeding  Body mass index is 38 62 kg/m²  [unfilled]      PHYSICAL EXAM:      Physical Exam  Vitals and nursing note reviewed     Constitutional:       General: She is not in acute distress  Appearance: Normal appearance  She is obese  She is not ill-appearing  HENT:      Head: Normocephalic and atraumatic  Mouth/Throat:      Mouth: Mucous membranes are moist    Eyes:      Extraocular Movements: Extraocular movements intact  Conjunctiva/sclera: Conjunctivae normal    Cardiovascular:      Pulses: Normal pulses  Pulmonary:      Effort: Pulmonary effort is normal    Abdominal:      General: Abdomen is flat  Bowel sounds are normal  There is no distension  Palpations: Abdomen is soft  Tenderness: There is no abdominal tenderness  There is no guarding  Musculoskeletal:      Right lower leg: Edema present  Left lower leg: Edema present  Skin:     General: Skin is warm and dry  Neurological:      General: No focal deficit present  Mental Status: She is alert and oriented to person, place, and time  Psychiatric:         Mood and Affect: Mood normal          Behavior: Behavior normal          Lab Results:   No visits with results within 1 Day(s) from this visit     Latest known visit with results is:   Lab on 01/20/2023   Component Date Value   • CRP 01/20/2023 <5 0    • Sed Rate 01/20/2023 35 (H)    • PTT Lupus Anticoagulant 01/20/2023 49 2    • Dilute Viper Venom Time 01/20/2023 49 1 (H)    • DILUTE PROTHROMBIN TIME(* 01/20/2023 43 9    • THROMBIN TIME (DRVW) 01/20/2023 17 0    • DPT CONFIRM RATIO 01/20/2023 1 02    • LUPUS REFLEX INTERPRETAT* 01/20/2023 Comment:    • BETA 2 GLYCO 1 IGG 01/20/2023 1 2    • BETA 2 GLYCO 1 IGA 01/20/2023 3 6    • BETA 2 GLYCO 1 IGM 01/20/2023 3 8    • ANTICARDIOLIPIN IGG ANTI* 01/20/2023 1 0    • ANTICARDIOLIPIN IGA ANTI* 01/20/2023 5 5    • ANTICARDIOLIPIN IGM ANTI* 01/20/2023 2 4    • AntiThrombIN III Activity 01/20/2023 89 (L)    • Vitamin B-12 01/20/2023 257    • Folate 01/20/2023 >20 0 (H)    • WBC 01/20/2023 3 11 (L)    • RBC 01/20/2023 3 50 (L)    • Hemoglobin 01/20/2023 9 6 (L)    • Hematocrit 01/20/2023 29 9 (L)    • MCV 01/20/2023 85    • MCH 01/20/2023 27 4    • MCHC 01/20/2023 32 1    • RDW 01/20/2023 19 6 (H)    • MPV 01/20/2023 9 6    • Platelets 83/67/5232 261    • nRBC 01/20/2023 0    • Neutrophils Relative 01/20/2023 39 (L)    • Immat GRANS % 01/20/2023 0    • Lymphocytes Relative 01/20/2023 47 (H)    • Monocytes Relative 01/20/2023 13 (H)    • Eosinophils Relative 01/20/2023 1    • Basophils Relative 01/20/2023 0    • Neutrophils Absolute 01/20/2023 1 21 (L)    • Immature Grans Absolute 01/20/2023 0 00    • Lymphocytes Absolute 01/20/2023 1 45    • Monocytes Absolute 01/20/2023 0 41    • Eosinophils Absolute 01/20/2023 0 04    • Basophils Absolute 01/20/2023 0 00    • Sodium 01/20/2023 140    • Potassium 01/20/2023 3 3 (L)    • Chloride 01/20/2023 107    • CO2 01/20/2023 27    • ANION GAP 01/20/2023 6    • BUN 01/20/2023 11    • Creatinine 01/20/2023 0 84    • Glucose 01/20/2023 101 (H)    • Calcium 01/20/2023 8 9    • AST 01/20/2023 39 (H)    • ALT 01/20/2023 16    • Alkaline Phosphatase 01/20/2023 62    • Total Protein 01/20/2023 7 3    • Albumin 01/20/2023 3 5    • Total Bilirubin 01/20/2023 0 55    • eGFR 01/20/2023 83    • DIRECT KALEY 01/20/2023 Negative    • LD 01/20/2023 320    • Retic Ct Abs 01/20/2023 29,100    • Retic Ct Pct 01/20/2023 0 83    • Hemolysis Smear 01/20/2023 Helmet Cells noted  Schistocytes noted    • Haptoglobin 01/20/2023 96    • Iron Saturation 01/20/2023 5 (L)    • TIBC 01/20/2023 450    • Iron 01/20/2023 24 (L)    • Ferritin 01/20/2023 19    • dRVVT Mix Interp  01/20/2023 44 2 (H)    • DRVVT/Confirm Ratio 01/20/2023 1 3 (H)        Imaging Studies: I have personally reviewed pertinent imaging studies  [address]    Gastroenterology Fellow  PGY-4  Available via [employer]  3/3/2023 11:11 AM

## 2023-03-06 NOTE — ASSESSMENT & PLAN NOTE
- Patient has history of multiple episodes of DVT/PE dating back as far as 2003  - Patient was previously on Lovenox injections until about 1 year ago which she discontinued due to financial issues  -Reviewed hematology/oncology note from 1/23/2023  Patient is currently off Coumadin, will most likely have to restart since her GI evaluation of liver is complete   -Recent bilateral lower extremity duplex was negative for DVT  - Continue follow-up with hematology/oncology as scheduled

## 2023-03-06 NOTE — ASSESSMENT & PLAN NOTE
-Recently received 2 doses of Venofer IV and 2 doses of vitamin B12  Iron deficiency has to be corrected cautiously due to liver cirrhosis  - Continue follow-up with hematology/oncology scheduled   -Continue daily folic acid and I60 supplement

## 2023-03-06 NOTE — ASSESSMENT & PLAN NOTE
- Continue regular follow-up with gastroenterology/hepatology team     - Patient did have liver biopsy done recently which confirmed cirrhosis of the liver  - Continue Lasix 20 mg daily and spironolactone 50 mg daily   - Patient was scheduled for recent upper and lower endoscopy in January 2023, however they were not completed at that time and patient is now planning to reschedule these

## 2023-04-28 ENCOUNTER — TELEPHONE (OUTPATIENT)
Dept: HEMATOLOGY ONCOLOGY | Facility: CLINIC | Age: 48
End: 2023-04-28

## 2023-04-28 DIAGNOSIS — E53.8 FOLIC ACID DEFICIENCY: ICD-10-CM

## 2023-04-28 DIAGNOSIS — D53.9 MACROCYTIC ANEMIA: Primary | ICD-10-CM

## 2023-04-28 DIAGNOSIS — Z79.01 LONG TERM (CURRENT) USE OF ANTICOAGULANTS: ICD-10-CM

## 2023-04-28 DIAGNOSIS — Z86.718 HISTORY OF DVT (DEEP VEIN THROMBOSIS): ICD-10-CM

## 2023-04-28 DIAGNOSIS — D64.89 ANEMIA DUE TO OTHER CAUSE, NOT CLASSIFIED: ICD-10-CM

## 2023-04-28 DIAGNOSIS — D50.8 OTHER IRON DEFICIENCY ANEMIAS: ICD-10-CM

## 2023-04-28 DIAGNOSIS — E53.8 VITAMIN B 12 DEFICIENCY: ICD-10-CM

## 2023-05-05 ENCOUNTER — TELEPHONE (OUTPATIENT)
Dept: GASTROENTEROLOGY | Facility: MEDICAL CENTER | Age: 48
End: 2023-05-05

## 2023-05-24 ENCOUNTER — OFFICE VISIT (OUTPATIENT)
Dept: FAMILY MEDICINE CLINIC | Facility: CLINIC | Age: 48
End: 2023-05-24

## 2023-05-24 ENCOUNTER — APPOINTMENT (EMERGENCY)
Dept: CT IMAGING | Facility: HOSPITAL | Age: 48
End: 2023-05-24

## 2023-05-24 ENCOUNTER — HOSPITAL ENCOUNTER (EMERGENCY)
Facility: HOSPITAL | Age: 48
Discharge: HOME | End: 2023-05-24
Attending: INTERNAL MEDICINE

## 2023-05-24 ENCOUNTER — APPOINTMENT (EMERGENCY)
Dept: NON INVASIVE DIAGNOSTICS | Facility: HOSPITAL | Age: 48
End: 2023-05-24

## 2023-05-24 VITALS
OXYGEN SATURATION: 96 % | DIASTOLIC BLOOD PRESSURE: 92 MMHG | SYSTOLIC BLOOD PRESSURE: 152 MMHG | HEART RATE: 69 BPM | RESPIRATION RATE: 16 BRPM | TEMPERATURE: 98.1 F

## 2023-05-24 VITALS
RESPIRATION RATE: 18 BRPM | OXYGEN SATURATION: 98 % | DIASTOLIC BLOOD PRESSURE: 80 MMHG | SYSTOLIC BLOOD PRESSURE: 128 MMHG | HEIGHT: 67 IN | TEMPERATURE: 97.8 F | WEIGHT: 251.5 LBS | BODY MASS INDEX: 39.47 KG/M2 | HEART RATE: 82 BPM

## 2023-05-24 DIAGNOSIS — M79.89 SWELLING OF LOWER EXTREMITY: Primary | ICD-10-CM

## 2023-05-24 DIAGNOSIS — R60.0 LEG EDEMA, LEFT: ICD-10-CM

## 2023-05-24 DIAGNOSIS — I82.409 DVT (DEEP VENOUS THROMBOSIS) (HCC): Primary | ICD-10-CM

## 2023-05-24 LAB
ALBUMIN SERPL BCP-MCNC: 4.3 G/DL (ref 3.5–5)
ALP SERPL-CCNC: 94 U/L (ref 34–104)
ALT SERPL W P-5'-P-CCNC: 14 U/L (ref 7–52)
ANION GAP SERPL CALCULATED.3IONS-SCNC: 8 MMOL/L (ref 4–13)
AST SERPL W P-5'-P-CCNC: 27 U/L (ref 13–39)
ATRIAL RATE: 71 BPM
BASOPHILS # BLD AUTO: 0 THOUSANDS/ÂΜL (ref 0–0.1)
BASOPHILS NFR BLD AUTO: 0 % (ref 0–1)
BILIRUB SERPL-MCNC: 0.82 MG/DL (ref 0.2–1)
BUN SERPL-MCNC: 8 MG/DL (ref 5–25)
CALCIUM SERPL-MCNC: 9.1 MG/DL (ref 8.4–10.2)
CARDIAC TROPONIN I PNL SERPL HS: 4 NG/L
CHLORIDE SERPL-SCNC: 101 MMOL/L (ref 96–108)
CO2 SERPL-SCNC: 26 MMOL/L (ref 21–32)
CREAT SERPL-MCNC: 0.86 MG/DL (ref 0.6–1.3)
D DIMER PPP FEU-MCNC: 0.7 UG/ML FEU
EOSINOPHIL # BLD AUTO: 0 THOUSAND/ÂΜL (ref 0–0.61)
EOSINOPHIL NFR BLD AUTO: 0 % (ref 0–6)
ERYTHROCYTE [DISTWIDTH] IN BLOOD BY AUTOMATED COUNT: 17.2 % (ref 11.6–15.1)
GFR SERPL CREATININE-BSD FRML MDRD: 80 ML/MIN/1.73SQ M
GLUCOSE SERPL-MCNC: 87 MG/DL (ref 65–140)
HCT VFR BLD AUTO: 27.4 % (ref 34.8–46.1)
HGB BLD-MCNC: 8.6 G/DL (ref 11.5–15.4)
IMM GRANULOCYTES # BLD AUTO: 0.01 THOUSAND/UL (ref 0–0.2)
IMM GRANULOCYTES NFR BLD AUTO: 0 % (ref 0–2)
LYMPHOCYTES # BLD AUTO: 1.02 THOUSANDS/ÂΜL (ref 0.6–4.47)
LYMPHOCYTES NFR BLD AUTO: 40 % (ref 14–44)
MCH RBC QN AUTO: 26 PG (ref 26.8–34.3)
MCHC RBC AUTO-ENTMCNC: 31.4 G/DL (ref 31.4–37.4)
MCV RBC AUTO: 83 FL (ref 82–98)
MONOCYTES # BLD AUTO: 0.41 THOUSAND/ÂΜL (ref 0.17–1.22)
MONOCYTES NFR BLD AUTO: 16 % (ref 4–12)
NEUTROPHILS # BLD AUTO: 1.13 THOUSANDS/ÂΜL (ref 1.85–7.62)
NEUTS SEG NFR BLD AUTO: 44 % (ref 43–75)
NRBC BLD AUTO-RTO: 0 /100 WBCS
P AXIS: 0 DEGREES
PLATELET # BLD AUTO: 169 THOUSANDS/UL (ref 149–390)
PMV BLD AUTO: 9 FL (ref 8.9–12.7)
POTASSIUM SERPL-SCNC: 3.3 MMOL/L (ref 3.5–5.3)
PR INTERVAL: 136 MS
PROT SERPL-MCNC: 7.8 G/DL (ref 6.4–8.4)
QRS AXIS: 23 DEGREES
QRSD INTERVAL: 80 MS
QT INTERVAL: 414 MS
QTC INTERVAL: 449 MS
RBC # BLD AUTO: 3.31 MILLION/UL (ref 3.81–5.12)
SODIUM SERPL-SCNC: 135 MMOL/L (ref 135–147)
T WAVE AXIS: 34 DEGREES
VENTRICULAR RATE: 71 BPM
WBC # BLD AUTO: 2.57 THOUSAND/UL (ref 4.31–10.16)

## 2023-05-24 RX ORDER — ENOXAPARIN SODIUM 150 MG/ML
1 INJECTION SUBCUTANEOUS ONCE
Status: COMPLETED | OUTPATIENT
Start: 2023-05-24 | End: 2023-05-24

## 2023-05-24 RX ORDER — SODIUM CHLORIDE 9 MG/ML
3 INJECTION INTRAVENOUS
Status: DISCONTINUED | OUTPATIENT
Start: 2023-05-24 | End: 2023-05-24 | Stop reason: HOSPADM

## 2023-05-24 RX ADMIN — ENOXAPARIN SODIUM 105 MG: 150 INJECTION SUBCUTANEOUS at 20:02

## 2023-05-24 RX ADMIN — IOHEXOL 100 ML: 350 INJECTION, SOLUTION INTRAVENOUS at 18:10

## 2023-05-24 NOTE — PROGRESS NOTES
Name: Jaime Hernandez      : 1975      MRN: [id number]  Encounter Provider: Radha Wesley MD  Encounter Date: 2023   Encounter department: [address] [place] [address]     1  Swelling of lower extremity  Assessment & Plan:  Hemodynamically stable without tachycardia  Given high risk history and patient is feeling current symptoms being different and worse previous, will send patient over to the ED for STAT work-up      Subjective     HPI   Jaime Hernandez is a 15-year-old female with history of liver cirrhosis, PE and DVT, currently not on anticoagulation with her Coumadin over the past year, presents today with left lower extremity swelling and pain which she reports feels worse than previous swellings and no significant change with lower extremity elevation at she would normally experience  She also reports associated SOB also started 2 days ago, worse on exertion  she reports feeling like she is suffocating and occasional palpitations  Denies Chest pain, presyncopal or syncopal episodes, dizziness  Review of Systems  As stated in HPI    Past Medical History:   Diagnosis Date   • DVT (deep venous thrombosis) (HCC)    • Pulmonary embolism (HCC)    • Pulmonary embolism ([facility] [address] ) 2017    CTA 2018: Large bilateral pulmonary emboli    The calculated ratio of right ventricular to left ventricular diameter (RV/LV ratio) is 1 6  This is greater than 0 9, which is abnormal and indicates right heart strain  An abnormal RV/LV ratio has been shown to be associated with an increased risk of  30 day mortality in the setting of acute pulmonary embolism    Fatty liver    Echo 18 [facility]     Past Surgical History:   Procedure Laterality Date   • ANKLE SURGERY Left    • GASTRIC BYPASS     • IR BIOPSY LIVER RANDOM  10/31/2022   • IR OTHER  2022     Family History   Problem Relation Age of Onset   • Hypertension Mother      Social History     Socioeconomic History • Marital status: Single     Spouse name: None   • Number of children: None   • Years of education: None   • Highest education level: None   Occupational History   • None   Tobacco Use   • Smoking status: Every Day     Packs/day: 0 25     Years: 25 00     Total pack years: 6 25     Types: Cigarettes     Last attempt to quit: 2018     Years since quittin 7   • Smokeless tobacco: Never   • Tobacco comments:     2-3 cigarettes / daily  NEXGEN SAYS FORMER SMOKER QUIT DATE 2017   Vaping Use   • Vaping Use: Never used   Substance and Sexual Activity   • Alcohol use: Not Currently     Comment: 1 month since quit   • Drug use: No   • Sexual activity: None   Other Topics Concern   • None   Social History Narrative   • None     Social Determinants of Health     Financial Resource Strain: High Risk (10/13/2022)    Overall Financial Resource Strain (CARDIA)    • Difficulty of Paying Living Expenses: Very hard   Food Insecurity: No Food Insecurity (10/13/2022)    Hunger Vital Sign    • Worried About Running Out of Food in the Last Year: Never true    • Ran Out of Food in the Last Year: Never true   Transportation Needs: Unmet Transportation Needs (10/13/2022)    PRAPARE - Transportation    • Lack of Transportation (Medical): Yes    • Lack of Transportation (Non-Medical): Yes   Physical Activity: Not on file   Stress: Stress Concern Present (10/13/2022)    Tracie7 Apolinar Lira    • Feeling of Stress :  To some extent   Social Connections: Not on file   Intimate Partner Violence: Not on file   Housing Stability: High Risk (10/13/2022)    Housing Stability Vital Sign    • Unable to Pay for Housing in the Last Year: Yes    • Number of Places Lived in the Last Year: 1    • Unstable Housing in the Last Year: Not on file     Current Outpatient Medications on File Prior to Visit   Medication Sig   • cyanocobalamin (VITAMIN B-12) 1000 MCG tablet Take 1 tablet (1,000 "mcg total) by mouth daily   • dicyclomine (BENTYL) 20 mg tablet Take 1 tablet (20 mg total) by mouth every 6 (six) hours   • ergocalciferol (VITAMIN D2) 50,000 units Take 1 capsule (50,000 Units total) by mouth once a week   • folic acid (FOLVITE) 1 mg tablet Take 1 tablet (1 mg total) by mouth daily   • furosemide (LASIX) 20 mg tablet Take 1 tablet (20 mg total) by mouth daily   • pantoprazole (PROTONIX) 40 mg tablet Take 1 tablet (40 mg total) by mouth daily   • polyethylene glycol (Golytely) 4000 mL solution Take 4,000 mL by mouth once for 1 dose Take 4000 mL by mouth once for 1 dose  Use as directed   • spironolactone (ALDACTONE) 50 mg tablet Take 1 tablet (50 mg total) by mouth daily   • Thiamine HCl (vitamin B-1) 100 MG TABS Take 1 tablet (100 mg total) by mouth daily     Allergies   Allergen Reactions   • Gabapentin Rash     There is no immunization history for the selected administration types on file for this patient  Objective     /80 (BP Location: Left arm, Patient Position: Sitting, Cuff Size: Standard)   Pulse 82   Temp 97 8 °F (36 6 °C) (Temporal)   Resp 18   Ht 5' 7\" (1 702 m)   Wt 114 kg (251 lb 8 oz)   LMP  (LMP Unknown)   SpO2 98%   BMI 39 39 kg/m²     Physical Exam  Vitals reviewed  Constitutional:       General: She is not in acute distress  Appearance: She is obese  HENT:      Head: Atraumatic  Eyes:      Conjunctiva/sclera: Conjunctivae normal    Cardiovascular:      Rate and Rhythm: Normal rate and regular rhythm  Pulses: Normal pulses  Heart sounds: Normal heart sounds  No murmur heard  Pulmonary:      Effort: Pulmonary effort is normal       Breath sounds: Normal breath sounds  Abdominal:      General: Abdomen is flat  Bowel sounds are normal  There is no distension  Palpations: Abdomen is soft  Tenderness: There is no abdominal tenderness  Musculoskeletal:         General: Swelling and tenderness present        Cervical back: Normal range " of motion  Right lower leg: Edema present  Left lower leg: Edema (marked swelling of the LLE about 2x> RLE) present  Skin:     General: Skin is warm and dry  Findings: No rash  Neurological:      General: No focal deficit present  Mental Status: She is alert  Motor: No weakness     Psychiatric:      Comments: tearful       Theodore Porras MD

## 2023-05-24 NOTE — Clinical Note
Carlos A Timmons was seen and treated in our emergency department on 5/24/2023  Diagnosis:     Mckenna    She may return on this date: If you have any questions or concerns, please don't hesitate to call        Yenifer Moses MD    ______________________________           _______________          _______________  [facility] Representative                              Date                                Time

## 2023-05-24 NOTE — ED PROVIDER NOTES
History  Chief Complaint   Patient presents with   • Leg Swelling     Patient states she has had L leg swelling and SOB for 2 days  Has a hx of blood clots, stopped taking blood thinner due to getting a colonoscopy  HPI  27-year-old woman with a history of pulmonary embolism and DVT who is no longer anticoagulated presents to the ED for dyspnea and left leg edema  She reports symptoms for 2 days  She reports dyspnea on exertion  She reports left leg discomfort along with significant swelling  She denies any alleviating or aggravating factors  She has not tried any medications or therapies  She states she held her blood thinner because she is getting colonoscopy  She denies any other complaints or concerns  Prior to Admission Medications   Prescriptions Last Dose Informant Patient Reported? Taking? Thiamine HCl (vitamin B-1) 100 MG TABS   No No   Sig: Take 1 tablet (100 mg total) by mouth daily   cyanocobalamin (VITAMIN B-12) 1000 MCG tablet   No No   Sig: Take 1 tablet (1,000 mcg total) by mouth daily   dicyclomine (BENTYL) 20 mg tablet   No No   Sig: Take 1 tablet (20 mg total) by mouth every 6 (six) hours   ergocalciferol (VITAMIN D2) 50,000 units   No No   Sig: Take 1 capsule (50,000 Units total) by mouth once a week   folic acid (FOLVITE) 1 mg tablet   No No   Sig: Take 1 tablet (1 mg total) by mouth daily   furosemide (LASIX) 20 mg tablet   No No   Sig: Take 1 tablet (20 mg total) by mouth daily   pantoprazole (PROTONIX) 40 mg tablet   No No   Sig: Take 1 tablet (40 mg total) by mouth daily   polyethylene glycol (Golytely) 4000 mL solution   No No   Sig: Take 4,000 mL by mouth once for 1 dose Take 4000 mL by mouth once for 1 dose   Use as directed   spironolactone (ALDACTONE) 50 mg tablet   No No   Sig: Take 1 tablet (50 mg total) by mouth daily      Facility-Administered Medications: None       Past Medical History:   Diagnosis Date   • DVT (deep venous thrombosis) (HCC)    • Pulmonary embolism [facility])    • Pulmonary embolism ([address] ) 2017    CTA 2018: Large bilateral pulmonary emboli    The calculated ratio of right ventricular to left ventricular diameter (RV/LV ratio) is 1 6  This is greater than 0 9, which is abnormal and indicates right heart strain  An abnormal RV/LV ratio has been shown to be associated with an increased risk of  30 day mortality in the setting of acute pulmonary embolism    Fatty liver  Echo 18 [facility]       Past Surgical History:   Procedure Laterality Date   • ANKLE SURGERY Left    • GASTRIC BYPASS     • IR BIOPSY LIVER RANDOM  10/31/2022   • IR OTHER  2022       Family History   Problem Relation Age of Onset   • Hypertension Mother      I have reviewed and agree with the history as documented  E-Cigarette/Vaping   • E-Cigarette Use Never User      E-Cigarette/Vaping Substances   • Nicotine No    • THC No    • CBD No    • Flavoring No      Social History     Tobacco Use   • Smoking status: Every Day     Packs/day: 0 25     Years: 25 00     Total pack years: 6 25     Types: Cigarettes     Last attempt to quit: 2018     Years since quittin 7   • Smokeless tobacco: Never   • Tobacco comments:     2-3 cigarettes / daily  Yang Modi SAYS FORMER SMOKER QUIT DATE 2017   Vaping Use   • Vaping Use: Never used   Substance Use Topics   • Alcohol use: Not Currently     Comment: 1 month since quit   • Drug use: No       Review of Systems   All other systems reviewed and are negative  Physical Exam  Physical Exam   Constitutional:  Well developed, well nourished, no acute distress, non-toxic appearance    HEENT:  Conjunctiva normal  Oropharynx moist  Respiratory:  No respiratory distress, normal breath sounds  Cardiovascular:  Normal rate, normal rhythm, no murmurs  GI:  Soft, nondistended, normal bowel sounds, nontender  :  No costovertebral angle tenderness   Musculoskeletal:  No edema, no tenderness, no deformities     Integument:  Well hydrated, no rash Lymphatic: Left lower extremity edema up to the knee, tenderness to palpation of her calf  Neurologic:  Alert & oriented, normal motor function, normal sensory function, no focal deficits noted   Psychiatric:  Speech and behavior appropriate       Vital Signs  ED Triage Vitals [05/24/23 1629]   Temperature Pulse Respirations Blood Pressure SpO2   98 1 °F (36 7 °C) 68 16 149/91 98 %      Temp Source Heart Rate Source Patient Position - Orthostatic VS BP Location FiO2 (%)   Oral Monitor Sitting Left arm --      Pain Score       --           Vitals:    05/24/23 1629 05/24/23 1828   BP: 149/91 152/92   Pulse: 68 69   Patient Position - Orthostatic VS: Sitting Lying         Visual Acuity      ED Medications  Medications   sodium chloride (PF) 0 9 % injection 3 mL (has no administration in time range)   enoxaparin (LOVENOX) subcutaneous injection 105 mg (has no administration in time range)   iohexol (OMNIPAQUE) 350 MG/ML injection (SINGLE-DOSE) 100 mL (100 mL Intravenous Given 5/24/23 1810)       Diagnostic Studies  Results Reviewed     Procedure Component Value Units Date/Time    HS Troponin I 4hr [[id number]]     Lab Status: No result Specimen: Blood     CBC and differential [[id number]]  (Abnormal) Collected: 05/24/23 1758    Lab Status: Final result Specimen: Blood from Arm, Left Updated: 05/24/23 1803     WBC 2 57 Thousand/uL      RBC 3 31 Million/uL      Hemoglobin 8 6 g/dL      Hematocrit 27 4 %      MCV 83 fL      MCH 26 0 pg      MCHC 31 4 g/dL      RDW 17 2 %      MPV 9 0 fL      Platelets 804 Thousands/uL      nRBC 0 /100 WBCs      Neutrophils Relative 44 %      Immat GRANS % 0 %      Lymphocytes Relative 40 %      Monocytes Relative 16 %      Eosinophils Relative 0 %      Basophils Relative 0 %      Neutrophils Absolute 1 13 Thousands/µL      Immature Grans Absolute 0 01 Thousand/uL      Lymphocytes Absolute 1 02 Thousands/µL      Monocytes Absolute 0 41 Thousand/µL      Eosinophils Absolute 0 00 Thousand/µL Basophils Absolute 0 00 Thousands/µL     HS Troponin I 2hr [[id number]]     Lab Status: No result Specimen: Blood     HS Troponin 0hr (reflex protocol) [[id number]]  (Normal) Collected: 05/24/23 1709    Lab Status: Final result Specimen: Blood from Arm, Left Updated: 05/24/23 1738     hs TnI 0hr 4 ng/L     D-dimer, quantitative [[id number]]  (Abnormal) Collected: 05/24/23 1709    Lab Status: Final result Specimen: Blood from Arm, Left Updated: 05/24/23 1732     D-Dimer, Quant 0 70 ug/ml FEU     Comprehensive metabolic panel [[id number]]  (Abnormal) Collected: 05/24/23 1709    Lab Status: Final result Specimen: Blood from Arm, Left Updated: 05/24/23 1731     Sodium 135 mmol/L      Potassium 3 3 mmol/L      Chloride 101 mmol/L      CO2 26 mmol/L      ANION GAP 8 mmol/L      BUN 8 mg/dL      Creatinine 0 86 mg/dL      Glucose 87 mg/dL      Calcium 9 1 mg/dL      AST 27 U/L      ALT 14 U/L      Alkaline Phosphatase 94 U/L      Total Protein 7 8 g/dL      Albumin 4 3 g/dL      Total Bilirubin 0 82 mg/dL      eGFR 80 ml/min/1 73sq m     Narrative:      Kenisha guidelines for Chronic Kidney Disease (CKD):   •  Stage 1 with normal or high GFR (GFR > 90 mL/min/1 73 square meters)  •  Stage 2 Mild CKD (GFR = 60-89 mL/min/1 73 square meters)  •  Stage 3A Moderate CKD (GFR = 45-59 mL/min/1 73 square meters)  •  Stage 3B Moderate CKD (GFR = 30-44 mL/min/1 73 square meters)  •  Stage 4 Severe CKD (GFR = 15-29 mL/min/1 73 square meters)  •  Stage 5 End Stage CKD (GFR <15 mL/min/1 73 square meters)  Note: GFR calculation is accurate only with a steady state creatinine                 CTA ED chest PE Study   Final Result by Raimro Oshea MD (05/24 1907)      No pulmonary embolus  No acute pulmonary disease  Mild cardiomegaly              Workstation performed: [id number]         VAS lower limb venous duplex study, unilateral/limited    (Results Pending)   VAS lower limb venous duplex study, unilateral/limited    (Results Pending)              Procedures  Procedures         ED Course  ED Course as of 05/24/23 1951   Wed May 24, 2023   1624 EKG Interpretation    Rate: 71 BPM  Rhythm: NSR  Axis: normal  Intervals: Normal, no blocks, QTc 449 ms  Q waves: normal  T waves: normal  ST segments: normal    Impression: abnormal EKG  EKG for comparison: significant changes noted, criteria for inferior infarct are no longer, nonspecific T wave abnormality has replaced inverted T waves in Inferior leads, QT has shortened  EKG interpreted by me  1757 D-Dimer, Quant(!): 0 70   1757 hs TnI 0hr: 4   1910 CTA PE:  No pulmonary embolus      No acute pulmonary disease      Mild cardiomegaly  Medical Decision Making  Differential diagnosis includes PE, ACS, cardiac arrhythmia, DVT, lymphedema, metabolic disturbance, VINI  CTA was negative for PE  Duplex study of the left lower extremity was significantly limited secondary to swelling  In a patient with significant PE and DVT history, signs of DVT on physical exam and recent anticoagulation cessation, I have strong suspicion for DVT and will prophylactically treat the patient  I offered patient admission so we can continue to monitor her symptoms  She declined admission at this time  I strongly encouraged the patient to get a duplex ultrasound in a few days when the swelling has decreased,  she is agreeable  Strict return precautions were discussed and patient voiced understanding  DVT (deep venous thrombosis) ([facility] [address] ): acute illness or injury  Leg edema, left: acute illness or injury  Amount and/or Complexity of Data Reviewed  External Data Reviewed: labs, radiology and notes  Labs: ordered  Decision-making details documented in ED Course  Radiology: ordered  Risk  Prescription drug management            Disposition  Final diagnoses:   DVT (deep venous thrombosis) (HCC)   Leg edema, left     Time reflects when diagnosis was documented in both MDM as applicable and the Disposition within this note     Time User Action Codes Description Comment    5/24/2023  7:40 PM Иван Space Add [I82 409] DVT (deep venous thrombosis) ([facility] [address] )     5/24/2023  7:40 PM Иван Space Add [R60 0] Leg edema, left       ED Disposition     ED Disposition   Discharge    Condition   Stable    Date/Time   Wed May 24, 2023  7:40 PM    Comment   Blaise Level discharge to home/self care  Follow-up Information     Follow up With Specialties Details Why Contact Info    [facility] Schedule an appointment as soon as possible for a visit on 5/29/2023  [address]  [address]             Patient's Medications   Discharge Prescriptions    APIXABAN (ELIQUIS) 5 MG    Take 2 tablets (10 mg total) by mouth 2 (two) times a day for 7 days, THEN 1 tablet (5 mg total) 2 (two) times a day for 23 days  Start Date: 5/24/2023 End Date: 6/23/2023       Order Dose: --       Quantity: 74 tablet    Refills: 0       Outpatient Discharge Orders   VAS lower limb venous duplex study, unilateral/limited   Standing Status: Future Standing Exp   Date: 05/24/27       PDMP Review     None          ED Provider  Electronically Signed by           Chanda Steel MD  05/24/23 6277

## 2023-05-24 NOTE — Clinical Note
Ana Mcintyre was seen and treated in our emergency department on 5/24/2023  Diagnosis:     Mckenna    She may return on this date: If you have any questions or concerns, please don't hesitate to call        Eloise Pena MD    ______________________________           _______________          _______________  [facility] Representative                              Date                                Time

## 2023-05-25 PROBLEM — F10.91 ALCOHOL USE DISORDER IN REMISSION: Status: ACTIVE | Noted: 2022-09-28

## 2023-05-25 NOTE — ASSESSMENT & PLAN NOTE
Hemodynamically stable without tachycardia  Given high risk history and patient is feeling current symptoms being different and worse previous, will send patient over to the ED for STAT work-up

## 2023-05-26 DIAGNOSIS — Z78.9 NEED FOR FOLLOW-UP BY SOCIAL WORKER: Primary | ICD-10-CM

## 2023-05-30 ENCOUNTER — PATIENT OUTREACH (OUTPATIENT)
Dept: FAMILY MEDICINE CLINIC | Facility: CLINIC | Age: 48
End: 2023-05-30

## 2023-05-30 NOTE — PROGRESS NOTES
CHUN WALTERS received referral requesting assistance with cost of medications  CHUN WATLERS called pt and she reported that her Eliquis was $800  Pt stated that without the Eliquis her monthly cost of medications is around $250 and she is having a hard time affording her medications  CHUN CM inquired about pt's fiances for housing and transport  Pt stated that everything is tight and she is behind on her electric and gas bills  CHUN CM inquired if pt applied for on track and CAP and pt stated that she does not qualify for those programs  CHUN CM inquired if pt attempted [employer] or [employer] and pt stated that she attempted [employer] and was told that she did not meet criteria as she does not have any mental health needs  CHUN CM inquired about family or friends supports and pt stated that she does not have any supports  Pt states that she drives for her transportation  Per chart review pt was not eligible for MA as her income was too high  CHUN CM inquired if pt's income was still the same and pt confirmed that her income was still too high to qualify for MA  CHUN WALTERS inquired if pt had applied for health insurance via [employer]  Pt stated that she did not apply  CHUN WALTERS encouraged pt to apply and select a plan that would meet her needs  CHUN WALTERS provided her with the website to apply on      CHUN WALTERS discussed updating pt's provider and seeing if she is able to provide her with a cheaper alternative for her Eliquis  CHUN WALTERS inquired if pt will be open to switching her pharmacy to [facility] and pt was in agreement  CHUN WALTERS e-mailed pt medication assistance resources to [email or web address]  CHUN WALTERS routed note to pt's provider  CHUN WALTERS will continue to be available for any additional needs as requested

## 2023-06-09 DIAGNOSIS — Z86.718 HISTORY OF DVT (DEEP VEIN THROMBOSIS): Primary | Chronic | ICD-10-CM

## 2023-06-12 ENCOUNTER — PATIENT OUTREACH (OUTPATIENT)
Dept: FAMILY MEDICINE CLINIC | Facility: CLINIC | Age: 48
End: 2023-06-12

## 2023-06-12 NOTE — PROGRESS NOTES
[facility] (PCP) NURSING HOME PROGRESS NOTE  [facility]  [address] [place], [address]    DATE OF SERVICE: 11/05/21  LOCATION:  [place], [place]    Patient ID:  Aniya Henry   10/11/1921  89 year old (female)      Chief Complaint   Patient presents with   • Follow-up     HPI    Patient is seen and examined,sitting up in chair, Alert and verbally responsive, forgetful, NAD. Comfortable. Reports no pain at this time. The nurse reports resident is doing well. no new complaints. Fair appetite. Afebrile. Denies chills  Denies CP/SOB/Dizziness  Denies abdominal pain, nausea, vomiting, diarrhea    Patient Active Problem List   Diagnosis   • Dementia (CMS/HCC)   • Essential hypertension   • Osteoarthritis   • Dyspepsia   • Mild major depression (CMS/HCC)   • Muscle weakness   • Wound of buttock, left, initial encounter       HTN (hypertension)                                          History reviewed. No pertinent surgical history.  History reviewed. No pertinent family history.  Current Outpatient Medications   Medication Sig Dispense Refill   • docusate sodium (COLACE) 100 MG capsule Take 100 mg by mouth daily.     • escitalopram (LEXAPRO) 5 MG tablet Take 5 mg by mouth daily.     • SODIUM CHLORIDE PO Take 1,000 mg by mouth 2 times daily as needed.     • amLODIPine (NORVASC) 5 MG tablet Take 5 mg by mouth daily.     • cloNIDine (CATAPRES-TTS 3) 0.3 MG/24HR Place 1 patch onto the skin 1 day a week. Box includes patch and non-medicated adhesive cover. Apply adhesive cover if patch begins to lift.     • omeprazole 20 MG tablet Take 20 mg by mouth daily.     • polyethylene glycol (MIRALAX) 17 g packet Take 17 g by mouth daily. Stir and dissolve powder in any 4 to 8 ounces of beverage, then drink.       No current facility-administered medications for this visit.     ALLERGIES:   Allergen Reactions   • Aspirin Other (See Comments)   • Penicillins Other (See Comments)       Review of  CHUN WALTERS received message from provider stating that pt missed her appointment, so she was not able to discuss alternative medication options with her  CHUN WALTERS was made aware by provider that pt would need to be on coumadin and she would need to get labs completed and rescheduled appointments  CHUN WALTERS called pt and informed her about the need to get her labs completed and reschedule her appointments and pt expressed understanding  Pt stated that she will get her labs completed and reschedule her appointments  CHUN WALTERS will continue to be available for any additional needs as requested  Systems:  Review of Systems   Constitutional: Negative.    HENT: Negative.    Eyes: Negative.    Respiratory: Negative.    Cardiovascular: Negative.    Gastrointestinal: Negative.    Musculoskeletal: Negative.    Skin: Negative.    Neurological: Negative.    Psychiatric/Behavioral: Negative.        Physical:  Physical Exam  Vitals and nursing note reviewed.   Constitutional:       Appearance: She is well-developed.   HENT:      Head: Normocephalic and atraumatic.      Right Ear: External ear normal.      Left Ear: External ear normal.      Nose: Nose normal.   Eyes:      Conjunctiva/sclera: Conjunctivae normal.      Pupils: Pupils are equal, round, and reactive to light.   Cardiovascular:      Rate and Rhythm: Normal rate and regular rhythm.      Heart sounds: Normal heart sounds.   Pulmonary:      Effort: Pulmonary effort is normal.      Breath sounds: Normal breath sounds.   Abdominal:      General: Bowel sounds are normal.      Palpations: Abdomen is soft.   Musculoskeletal:         General: Normal range of motion.      Cervical back: Normal range of motion and neck supple.   Skin:     General: Skin is warm and dry.   Neurological:      General: No focal deficit present.      Mental Status: She is alert.   Psychiatric:         Mood and Affect: Mood normal.         Behavior: Behavior normal.         Problem List Items Addressed This Visit        Cardiac and Vasculature    Essential hypertension       Gastrointestinal and Abdominal    Dyspepsia       Mental Health    Mild major depression (CMS/HCC)       Neuro    Dementia (CMS/HCC) - Primary          PLAN  -Vital signs, medications and lab work reviewed.  Dementia: fall precaution, supportive care  Fall precaution  HTN: amlodipine 5mg po every day, clonidine 1 patch change once weekly  Constipation: docusate 100mg 1 cab po every day  Depression: lexapro 5mg po every day, mood is stable  GERD: omeprazole 20mg po every day.  -Will continue to watch the patient  closely.   -Plan discussed with the patient and nursing staff.     Eliceo Peres, CNP

## 2023-06-13 ENCOUNTER — APPOINTMENT (OUTPATIENT)
Dept: LAB | Facility: HOSPITAL | Age: 48
End: 2023-06-13

## 2023-06-13 DIAGNOSIS — Z86.718 HISTORY OF DVT (DEEP VEIN THROMBOSIS): ICD-10-CM

## 2023-06-13 DIAGNOSIS — E53.8 VITAMIN B 12 DEFICIENCY: ICD-10-CM

## 2023-06-13 DIAGNOSIS — E53.8 FOLIC ACID DEFICIENCY: ICD-10-CM

## 2023-06-13 DIAGNOSIS — D64.89 ANEMIA DUE TO OTHER CAUSE, NOT CLASSIFIED: ICD-10-CM

## 2023-06-13 DIAGNOSIS — Z79.01 LONG TERM (CURRENT) USE OF ANTICOAGULANTS: ICD-10-CM

## 2023-06-13 DIAGNOSIS — D53.9 MACROCYTIC ANEMIA: ICD-10-CM

## 2023-06-13 DIAGNOSIS — D50.8 OTHER IRON DEFICIENCY ANEMIAS: ICD-10-CM

## 2023-06-13 LAB
ALBUMIN SERPL BCP-MCNC: 4.2 G/DL (ref 3.5–5)
ALP SERPL-CCNC: 96 U/L (ref 34–104)
ALT SERPL W P-5'-P-CCNC: 24 U/L (ref 7–52)
ANION GAP SERPL CALCULATED.3IONS-SCNC: 9 MMOL/L (ref 4–13)
AST SERPL W P-5'-P-CCNC: 47 U/L (ref 13–39)
BASOPHILS # BLD AUTO: 0.01 THOUSANDS/ÂΜL (ref 0–0.1)
BASOPHILS NFR BLD AUTO: 0 % (ref 0–1)
BILIRUB SERPL-MCNC: 1.46 MG/DL (ref 0.2–1)
BLD SMEAR INTERP: NORMAL
BUN SERPL-MCNC: 7 MG/DL (ref 5–25)
CALCIUM SERPL-MCNC: 9.3 MG/DL (ref 8.4–10.2)
CHLORIDE SERPL-SCNC: 104 MMOL/L (ref 96–108)
CO2 SERPL-SCNC: 26 MMOL/L (ref 21–32)
CREAT SERPL-MCNC: 0.8 MG/DL (ref 0.6–1.3)
CRP SERPL QL: <1 MG/L
EOSINOPHIL # BLD AUTO: 0.02 THOUSAND/ÂΜL (ref 0–0.61)
EOSINOPHIL NFR BLD AUTO: 1 % (ref 0–6)
ERYTHROCYTE [DISTWIDTH] IN BLOOD BY AUTOMATED COUNT: 17.2 % (ref 11.6–15.1)
ERYTHROCYTE [SEDIMENTATION RATE] IN BLOOD: 40 MM/HOUR (ref 0–19)
FERRITIN SERPL-MCNC: 7 NG/ML (ref 11–307)
FOLATE SERPL-MCNC: 9.5 NG/ML
GFR SERPL CREATININE-BSD FRML MDRD: 88 ML/MIN/1.73SQ M
GLUCOSE P FAST SERPL-MCNC: 95 MG/DL (ref 65–99)
HCT VFR BLD AUTO: 29.4 % (ref 34.8–46.1)
HGB BLD-MCNC: 9.2 G/DL (ref 11.5–15.4)
IMM GRANULOCYTES # BLD AUTO: 0 THOUSAND/UL (ref 0–0.2)
IMM GRANULOCYTES NFR BLD AUTO: 0 % (ref 0–2)
INR PPP: 1.01 (ref 0.84–1.19)
IRON SATN MFR SERPL: 21 % (ref 15–50)
IRON SERPL-MCNC: 126 UG/DL (ref 50–170)
LDH SERPL-CCNC: 151 U/L (ref 140–271)
LYMPHOCYTES # BLD AUTO: 1.63 THOUSANDS/ÂΜL (ref 0.6–4.47)
LYMPHOCYTES NFR BLD AUTO: 40 % (ref 14–44)
MAGNESIUM SERPL-MCNC: 1.9 MG/DL (ref 1.9–2.7)
MCH RBC QN AUTO: 25.7 PG (ref 26.8–34.3)
MCHC RBC AUTO-ENTMCNC: 31.3 G/DL (ref 31.4–37.4)
MCV RBC AUTO: 82 FL (ref 82–98)
MONOCYTES # BLD AUTO: 0.4 THOUSAND/ÂΜL (ref 0.17–1.22)
MONOCYTES NFR BLD AUTO: 10 % (ref 4–12)
NEUTROPHILS # BLD AUTO: 2.04 THOUSANDS/ÂΜL (ref 1.85–7.62)
NEUTS SEG NFR BLD AUTO: 49 % (ref 43–75)
NRBC BLD AUTO-RTO: 0 /100 WBCS
PLATELET # BLD AUTO: 311 THOUSANDS/UL (ref 149–390)
PMV BLD AUTO: 9.2 FL (ref 8.9–12.7)
POTASSIUM SERPL-SCNC: 3.8 MMOL/L (ref 3.5–5.3)
PROT SERPL-MCNC: 7.7 G/DL (ref 6.4–8.4)
PROTHROMBIN TIME: 13.6 SECONDS (ref 11.6–14.5)
RBC # BLD AUTO: 3.58 MILLION/UL (ref 3.81–5.12)
SODIUM SERPL-SCNC: 139 MMOL/L (ref 135–147)
TIBC SERPL-MCNC: 599 UG/DL (ref 250–450)
VIT B12 SERPL-MCNC: 232 PG/ML (ref 180–914)
WBC # BLD AUTO: 4.1 THOUSAND/UL (ref 4.31–10.16)

## 2023-06-13 PROCEDURE — 83735 ASSAY OF MAGNESIUM: CPT

## 2023-06-13 PROCEDURE — 85652 RBC SED RATE AUTOMATED: CPT

## 2023-06-13 PROCEDURE — 86140 C-REACTIVE PROTEIN: CPT

## 2023-06-13 PROCEDURE — 83550 IRON BINDING TEST: CPT

## 2023-06-13 PROCEDURE — 82607 VITAMIN B-12: CPT

## 2023-06-13 PROCEDURE — 83615 LACTATE (LD) (LDH) ENZYME: CPT

## 2023-06-13 PROCEDURE — 83010 ASSAY OF HAPTOGLOBIN QUANT: CPT

## 2023-06-13 PROCEDURE — 85025 COMPLETE CBC W/AUTO DIFF WBC: CPT

## 2023-06-13 PROCEDURE — 85610 PROTHROMBIN TIME: CPT

## 2023-06-13 PROCEDURE — 82746 ASSAY OF FOLIC ACID SERUM: CPT

## 2023-06-13 PROCEDURE — 82728 ASSAY OF FERRITIN: CPT

## 2023-06-13 PROCEDURE — 83540 ASSAY OF IRON: CPT

## 2023-06-13 PROCEDURE — 36415 COLL VENOUS BLD VENIPUNCTURE: CPT

## 2023-06-13 PROCEDURE — 80053 COMPREHEN METABOLIC PANEL: CPT

## 2023-06-14 DIAGNOSIS — Z86.718 HISTORY OF DVT (DEEP VEIN THROMBOSIS): Primary | Chronic | ICD-10-CM

## 2023-06-14 LAB — HAPTOGLOB SERPL-MCNC: 76 MG/DL (ref 42–296)

## 2023-06-14 RX ORDER — WARFARIN SODIUM 5 MG/1
5 TABLET ORAL
Qty: 30 TABLET | Refills: 0 | Status: SHIPPED | OUTPATIENT
Start: 2023-06-14

## 2023-06-15 ENCOUNTER — TELEPHONE (OUTPATIENT)
Dept: HEMATOLOGY ONCOLOGY | Facility: CLINIC | Age: 48
End: 2023-06-15

## 2023-06-15 DIAGNOSIS — E53.8 VITAMIN B 12 DEFICIENCY: Primary | ICD-10-CM

## 2023-06-15 DIAGNOSIS — D50.8 OTHER IRON DEFICIENCY ANEMIAS: ICD-10-CM

## 2023-06-15 RX ORDER — CYANOCOBALAMIN 1000 UG/ML
1000 INJECTION, SOLUTION INTRAMUSCULAR; SUBCUTANEOUS ONCE
Status: CANCELLED | OUTPATIENT
Start: 2023-06-20 | End: 2023-06-22

## 2023-06-15 RX ORDER — SODIUM CHLORIDE 9 MG/ML
20 INJECTION, SOLUTION INTRAVENOUS ONCE
Status: CANCELLED | OUTPATIENT
Start: 2023-06-20

## 2023-06-15 NOTE — TELEPHONE ENCOUNTER
Sent My chart message to pt as voice mail box not set up, pt needs weekly Venofer and Vit b 12 once a week X 4 at [facility] inf

## 2023-06-16 ENCOUNTER — PATIENT MESSAGE (OUTPATIENT)
Dept: GASTROENTEROLOGY | Facility: CLINIC | Age: 48
End: 2023-06-16

## 2023-06-20 ENCOUNTER — HOSPITAL ENCOUNTER (OUTPATIENT)
Dept: INFUSION CENTER | Facility: HOSPITAL | Age: 48
Discharge: HOME | End: 2023-06-20
Attending: INTERNAL MEDICINE

## 2023-06-20 ENCOUNTER — DOCUMENTATION (OUTPATIENT)
Dept: HEMATOLOGY ONCOLOGY | Facility: CLINIC | Age: 48
End: 2023-06-20

## 2023-06-20 VITALS
RESPIRATION RATE: 18 BRPM | DIASTOLIC BLOOD PRESSURE: 90 MMHG | HEART RATE: 85 BPM | TEMPERATURE: 97.8 F | SYSTOLIC BLOOD PRESSURE: 141 MMHG | OXYGEN SATURATION: 98 %

## 2023-06-20 DIAGNOSIS — D50.8 OTHER IRON DEFICIENCY ANEMIAS: ICD-10-CM

## 2023-06-20 DIAGNOSIS — E53.8 VITAMIN B 12 DEFICIENCY: Primary | ICD-10-CM

## 2023-06-20 PROCEDURE — 96365 THER/PROPH/DIAG IV INF INIT: CPT

## 2023-06-20 PROCEDURE — 96372 THER/PROPH/DIAG INJ SC/IM: CPT

## 2023-06-20 RX ORDER — CYANOCOBALAMIN 1000 UG/ML
1000 INJECTION, SOLUTION INTRAMUSCULAR; SUBCUTANEOUS ONCE
Status: COMPLETED | OUTPATIENT
Start: 2023-06-20 | End: 2023-06-20

## 2023-06-20 RX ORDER — CYANOCOBALAMIN 1000 UG/ML
1000 INJECTION, SOLUTION INTRAMUSCULAR; SUBCUTANEOUS ONCE
Status: CANCELLED | OUTPATIENT
Start: 2023-06-27 | End: 2023-06-27

## 2023-06-20 RX ORDER — SODIUM CHLORIDE 9 MG/ML
20 INJECTION, SOLUTION INTRAVENOUS ONCE
Status: COMPLETED | OUTPATIENT
Start: 2023-06-20 | End: 2023-06-20

## 2023-06-20 RX ORDER — SODIUM CHLORIDE 9 MG/ML
20 INJECTION, SOLUTION INTRAVENOUS ONCE
Status: CANCELLED | OUTPATIENT
Start: 2023-06-27

## 2023-06-20 RX ADMIN — CYANOCOBALAMIN 1000 MCG: 1000 INJECTION, SOLUTION INTRAMUSCULAR; SUBCUTANEOUS at 13:13

## 2023-06-20 RX ADMIN — SODIUM CHLORIDE 20 ML/HR: 0.9 INJECTION, SOLUTION INTRAVENOUS at 13:07

## 2023-06-20 RX ADMIN — IRON SUCROSE 200 MG: 20 INJECTION, SOLUTION INTRAVENOUS at 13:13

## 2023-06-20 NOTE — PROGRESS NOTES
Pt tolerated treatment today with no adverse reactions  Pt uses My Chart for apt reminders  Left unit ambulatory with a steady gait

## 2023-06-20 NOTE — PROGRESS NOTES
Oncology Finance Advocacy Intake and Intervention  Oncology Finance Counselor/Advocate placed call to patient. This writer informed patient that this writer is here to assist patient with billing questions, financial assistance, payment/payment plans, quotes, copayment assistance, insurance optimization, and insurance navigation. This writer conducted a thorough benefit review of copayment, deductible, and out of pocket cost. This information is documented below and has been reviewed with patient. Copayment: N/A  Deductible: N/A  Out of Pocket Cost: N/A  Insurance optimization (Limited benefit vs self-pay):  Patient assistance status:  Free Drug Applications:  Interventions:  Pt is listed as self pay  Checked promise inactive per notes on acct pt was denied ma due to excess income  Called pt to find out about insurance coverage. She was very hard to hear. She sd that she doesn't have insurance & she doesn't know if she's going to be getting any. Asked her about  & she sd something but I couldn't hear her & she didn't repeat what she told me. She sd something about being tired & she sd that she had to go  Added to careteam        Information above was review thoroughly with patient and patient was advise of possible assistance programs/interventions. If any question arise patient can contact this writer at below information. This information was given to patient at time of contact. Merlin Pontes  Phone:[phone]  Email: [email or web address]

## 2023-06-26 ENCOUNTER — PATIENT OUTREACH (OUTPATIENT)
Dept: FAMILY MEDICINE CLINIC | Facility: CLINIC | Age: 48
End: 2023-06-26

## 2023-06-26 NOTE — PROGRESS NOTES
CHUN WALTERS called pt to follow up on if she had contacted the office to schedule her appointment with her PCP  Pt stated that she did not but she has been going to her infusions  CHUN WALTERS was made aware by pt that she had applied for MA two weeks ago as he can no longer afford her medications and does not think she will be able to afford coumadin  CHUN WALTERS inquired about her income and pt stated that she has been working less hours so she thinks she should be able to qualify for MA  CHUN WALTERS reviewed alternatives if pt does not qualify for MA  CHUN WALTERS reminded pt about Chitra and applying for a PAP for the Eliquis  Pt expressed understanding  CHUN WALTERS will continue to be available for any additional needs as requested

## 2023-06-27 ENCOUNTER — HOSPITAL ENCOUNTER (OUTPATIENT)
Dept: INFUSION CENTER | Facility: HOSPITAL | Age: 48
Discharge: HOME | End: 2023-06-27
Attending: INTERNAL MEDICINE

## 2023-06-27 VITALS
TEMPERATURE: 97.6 F | OXYGEN SATURATION: 99 % | RESPIRATION RATE: 18 BRPM | HEART RATE: 82 BPM | DIASTOLIC BLOOD PRESSURE: 83 MMHG | SYSTOLIC BLOOD PRESSURE: 123 MMHG

## 2023-06-27 DIAGNOSIS — E53.8 VITAMIN B 12 DEFICIENCY: Primary | ICD-10-CM

## 2023-06-27 DIAGNOSIS — D50.8 OTHER IRON DEFICIENCY ANEMIAS: ICD-10-CM

## 2023-06-27 RX ORDER — SODIUM CHLORIDE 9 MG/ML
20 INJECTION, SOLUTION INTRAVENOUS ONCE
Status: CANCELLED | OUTPATIENT
Start: 2023-07-04

## 2023-06-27 RX ORDER — CYANOCOBALAMIN 1000 UG/ML
1000 INJECTION, SOLUTION INTRAMUSCULAR; SUBCUTANEOUS ONCE
Status: CANCELLED | OUTPATIENT
Start: 2023-07-04 | End: 2023-07-04

## 2023-06-27 RX ORDER — CYANOCOBALAMIN 1000 UG/ML
1000 INJECTION, SOLUTION INTRAMUSCULAR; SUBCUTANEOUS ONCE
Status: COMPLETED | OUTPATIENT
Start: 2023-06-27 | End: 2023-06-27

## 2023-06-27 RX ORDER — SODIUM CHLORIDE 9 MG/ML
20 INJECTION, SOLUTION INTRAVENOUS ONCE
Status: COMPLETED | OUTPATIENT
Start: 2023-06-27 | End: 2023-06-27

## 2023-06-27 RX ADMIN — SODIUM CHLORIDE 20 ML/HR: 0.9 INJECTION, SOLUTION INTRAVENOUS at 11:59

## 2023-06-27 RX ADMIN — IRON SUCROSE 200 MG: 20 INJECTION, SOLUTION INTRAVENOUS at 12:01

## 2023-06-27 RX ADMIN — CYANOCOBALAMIN 1000 MCG: 1000 INJECTION, SOLUTION INTRAMUSCULAR; SUBCUTANEOUS at 12:03

## 2023-07-07 DIAGNOSIS — E53.8 VITAMIN B 12 DEFICIENCY: Primary | ICD-10-CM

## 2023-07-07 DIAGNOSIS — D50.8 OTHER IRON DEFICIENCY ANEMIAS: ICD-10-CM

## 2023-07-07 RX ORDER — CYANOCOBALAMIN 1000 UG/ML
1000 INJECTION, SOLUTION INTRAMUSCULAR; SUBCUTANEOUS ONCE
Status: CANCELLED | OUTPATIENT
Start: 2023-07-11 | End: 2023-07-11

## 2023-07-07 RX ORDER — SODIUM CHLORIDE 9 MG/ML
20 INJECTION, SOLUTION INTRAVENOUS ONCE
Status: CANCELLED | OUTPATIENT
Start: 2023-07-11

## 2023-07-11 ENCOUNTER — HOSPITAL ENCOUNTER (OUTPATIENT)
Dept: INFUSION CENTER | Facility: HOSPITAL | Age: 48
Discharge: HOME | End: 2023-07-11
Attending: INTERNAL MEDICINE

## 2023-07-11 VITALS
SYSTOLIC BLOOD PRESSURE: 125 MMHG | DIASTOLIC BLOOD PRESSURE: 80 MMHG | HEART RATE: 86 BPM | TEMPERATURE: 97.9 F | RESPIRATION RATE: 20 BRPM

## 2023-07-11 DIAGNOSIS — D50.8 OTHER IRON DEFICIENCY ANEMIAS: ICD-10-CM

## 2023-07-11 DIAGNOSIS — E53.8 VITAMIN B 12 DEFICIENCY: Primary | ICD-10-CM

## 2023-07-11 PROCEDURE — 96372 THER/PROPH/DIAG INJ SC/IM: CPT

## 2023-07-11 PROCEDURE — 96365 THER/PROPH/DIAG IV INF INIT: CPT

## 2023-07-11 RX ORDER — SODIUM CHLORIDE 9 MG/ML
20 INJECTION, SOLUTION INTRAVENOUS ONCE
Status: COMPLETED | OUTPATIENT
Start: 2023-07-11 | End: 2023-07-11

## 2023-07-11 RX ORDER — CYANOCOBALAMIN 1000 UG/ML
1000 INJECTION, SOLUTION INTRAMUSCULAR; SUBCUTANEOUS ONCE
Status: CANCELLED | OUTPATIENT
Start: 2023-07-18 | End: 2023-07-18

## 2023-07-11 RX ORDER — SODIUM CHLORIDE 9 MG/ML
20 INJECTION, SOLUTION INTRAVENOUS ONCE
Status: CANCELLED | OUTPATIENT
Start: 2023-07-18

## 2023-07-11 RX ORDER — CYANOCOBALAMIN 1000 UG/ML
1000 INJECTION, SOLUTION INTRAMUSCULAR; SUBCUTANEOUS ONCE
Status: COMPLETED | OUTPATIENT
Start: 2023-07-11 | End: 2023-07-11

## 2023-07-11 RX ADMIN — IRON SUCROSE 200 MG: 20 INJECTION, SOLUTION INTRAVENOUS at 11:29

## 2023-07-11 RX ADMIN — CYANOCOBALAMIN 1000 MCG: 1000 INJECTION INTRAMUSCULAR; SUBCUTANEOUS at 11:30

## 2023-07-11 RX ADMIN — SODIUM CHLORIDE 20 ML/HR: 9 INJECTION, SOLUTION INTRAVENOUS at 11:26

## 2023-07-18 ENCOUNTER — HOSPITAL ENCOUNTER (OUTPATIENT)
Dept: INFUSION CENTER | Facility: HOSPITAL | Age: 48
Discharge: HOME | End: 2023-07-18
Attending: INTERNAL MEDICINE

## 2023-07-18 VITALS
HEART RATE: 71 BPM | RESPIRATION RATE: 18 BRPM | DIASTOLIC BLOOD PRESSURE: 65 MMHG | SYSTOLIC BLOOD PRESSURE: 98 MMHG | OXYGEN SATURATION: 98 % | TEMPERATURE: 97.9 F

## 2023-07-18 DIAGNOSIS — E53.8 VITAMIN B 12 DEFICIENCY: Primary | ICD-10-CM

## 2023-07-18 DIAGNOSIS — D50.8 OTHER IRON DEFICIENCY ANEMIAS: ICD-10-CM

## 2023-07-18 RX ORDER — CYANOCOBALAMIN 1000 UG/ML
1000 INJECTION, SOLUTION INTRAMUSCULAR; SUBCUTANEOUS ONCE
Status: COMPLETED | OUTPATIENT
Start: 2023-07-18 | End: 2023-07-18

## 2023-07-18 RX ORDER — SODIUM CHLORIDE 9 MG/ML
20 INJECTION, SOLUTION INTRAVENOUS ONCE
Status: CANCELLED | OUTPATIENT
Start: 2023-07-18

## 2023-07-18 RX ORDER — CYANOCOBALAMIN 1000 UG/ML
1000 INJECTION, SOLUTION INTRAMUSCULAR; SUBCUTANEOUS ONCE
Status: CANCELLED | OUTPATIENT
Start: 2023-07-18 | End: 2023-07-18

## 2023-07-18 RX ORDER — SODIUM CHLORIDE 9 MG/ML
20 INJECTION, SOLUTION INTRAVENOUS ONCE
Status: COMPLETED | OUTPATIENT
Start: 2023-07-18 | End: 2023-07-18

## 2023-07-18 RX ADMIN — IRON SUCROSE 200 MG: 20 INJECTION, SOLUTION INTRAVENOUS at 13:24

## 2023-07-18 RX ADMIN — CYANOCOBALAMIN 1000 MCG: 1000 INJECTION INTRAMUSCULAR; SUBCUTANEOUS at 13:26

## 2023-07-18 RX ADMIN — SODIUM CHLORIDE 20 ML/HR: 0.9 INJECTION, SOLUTION INTRAVENOUS at 13:23

## 2023-07-18 NOTE — PROGRESS NOTES
Pt tolerated GQOSDCK/O10 without complications. Advised patient there is an order for labwork for iron panel to be done 9-3-23, pt aware and advised to call Habib's office for a follow up appt.

## 2023-08-28 ENCOUNTER — PATIENT OUTREACH (OUTPATIENT)
Dept: FAMILY MEDICINE CLINIC | Facility: CLINIC | Age: 48
End: 2023-08-28

## 2023-08-28 NOTE — PROGRESS NOTES
CHUN WALTERS called pt to follow up with her status with getting insurance and her medications, however, the call went to voicemail and pt's mailbox was not set up so CHUN WALTERS was unable to leave a message. CHUN WALTERS will continue to be available for any additional needs as requested.
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
## 2023-10-02 ENCOUNTER — PATIENT OUTREACH (OUTPATIENT)
Dept: FAMILY MEDICINE CLINIC | Facility: CLINIC | Age: 48
End: 2023-10-02

## 2023-10-02 NOTE — LETTER
[address], [address] [facility]  [address]    Re: Social Work   10/2/2023       Dear Harpreet Fernandez,    We tried to reach you by phone on 10/02/2023 and was unfortunately unable to reach you. It is important that you contact the [address] [place] [address] as soon as possible at: [phone].      Sincerely,         Rodney Meredith

## 2023-10-02 NOTE — PROGRESS NOTES
CHUN WALTERS called pt to follow up with her status with getting insurance and her medications, however, the call went to voicemail and pt's mailbox was not set up so CHUN WALTERS was unable to leave a message. CHUN WALTERS will close referral at this time due to not being able to get in contact with pt. CHUN WALTERS sent unable to reach letter to address on file as well as via [employer]. CHUN WALTERS also mailed information about Chitra, a good rx card and information about various low cost medication opions. CHUN WALTERS will be available for any additional needs as requested.

## 2023-10-02 NOTE — LETTER
[address], [address] [facility]  [address]    Re: Social Work   10/2/2023       Dear Gabrielle Montiel,    We tried to reach you by phone on 10/02/2023 and was unfortunately unable to reach you. It is important that you contact the [address] [place] [address] as soon as possible at: [phone].      Sincerely,         Chadwick Bishop

## 2023-10-11 ENCOUNTER — TELEPHONE (OUTPATIENT)
Dept: FAMILY MEDICINE CLINIC | Facility: CLINIC | Age: 48
End: 2023-10-11

## 2023-10-11 NOTE — TELEPHONE ENCOUNTER
Hosp: [address]  Admitted: 10/8/23  D/C'd: 10/11/23 to: HOME  Verify how patient is feeling and if they are in need of anything before their visit. Please send appointment date and patient questions/concerns to [facility] to complete TCM.

## 2023-10-12 ENCOUNTER — TRANSITIONAL CARE MANAGEMENT (OUTPATIENT)
Dept: FAMILY MEDICINE CLINIC | Facility: CLINIC | Age: 48
End: 2023-10-12

## 2023-10-16 ENCOUNTER — OFFICE VISIT (OUTPATIENT)
Dept: FAMILY MEDICINE CLINIC | Facility: CLINIC | Age: 48
End: 2023-10-16

## 2023-10-16 VITALS
TEMPERATURE: 97.8 F | DIASTOLIC BLOOD PRESSURE: 80 MMHG | BODY MASS INDEX: 35 KG/M2 | RESPIRATION RATE: 18 BRPM | SYSTOLIC BLOOD PRESSURE: 122 MMHG | HEIGHT: 67 IN | WEIGHT: 223 LBS

## 2023-10-16 DIAGNOSIS — E55.9 VITAMIN D DEFICIENCY: ICD-10-CM

## 2023-10-16 DIAGNOSIS — K70.11 ALCOHOLIC HEPATITIS WITH ASCITES: ICD-10-CM

## 2023-10-16 DIAGNOSIS — K56.609 SBO (SMALL BOWEL OBSTRUCTION) (HCC): Primary | ICD-10-CM

## 2023-10-16 DIAGNOSIS — D50.8 OTHER IRON DEFICIENCY ANEMIAS: ICD-10-CM

## 2023-10-16 DIAGNOSIS — K74.60 LIVER CIRRHOSIS (HCC): ICD-10-CM

## 2023-10-16 DIAGNOSIS — M79.2 PERIPHERAL NEUROGENIC PAIN: ICD-10-CM

## 2023-10-16 DIAGNOSIS — E53.8 VITAMIN B 12 DEFICIENCY: ICD-10-CM

## 2023-10-16 DIAGNOSIS — S31.139D GUNSHOT WOUND OF ABDOMEN, SUBSEQUENT ENCOUNTER: ICD-10-CM

## 2023-10-16 PROCEDURE — 99496 TRANSJ CARE MGMT HIGH F2F 7D: CPT | Performed by: FAMILY MEDICINE

## 2023-10-16 RX ORDER — SPIRONOLACTONE 50 MG/1
50 TABLET, FILM COATED ORAL DAILY
Qty: 90 TABLET | Refills: 1 | Status: SHIPPED | OUTPATIENT
Start: 2023-10-16 | End: 2023-11-15

## 2023-10-16 RX ORDER — METHOCARBAMOL 500 MG/1
500 TABLET, FILM COATED ORAL EVERY 6 HOURS PRN
COMMUNITY
Start: 2023-10-05

## 2023-10-16 RX ORDER — OXYCODONE HYDROCHLORIDE 10 MG/1
10 TABLET ORAL EVERY 6 HOURS PRN
COMMUNITY
Start: 2023-10-05 | End: 2023-10-16 | Stop reason: SDUPTHER

## 2023-10-16 RX ORDER — PANTOPRAZOLE SODIUM 40 MG/1
40 TABLET, DELAYED RELEASE ORAL DAILY
Qty: 90 TABLET | Refills: 1 | Status: SHIPPED | OUTPATIENT
Start: 2023-10-16

## 2023-10-16 RX ORDER — OXYCODONE HYDROCHLORIDE 10 MG/1
10 TABLET ORAL EVERY 8 HOURS PRN
Qty: 30 TABLET | Refills: 0 | Status: SHIPPED | OUTPATIENT
Start: 2023-10-16

## 2023-10-16 RX ORDER — MELATONIN
1000 DAILY
Qty: 90 TABLET | Refills: 1 | Status: SHIPPED | OUTPATIENT
Start: 2023-10-16
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
## 2023-10-17 PROBLEM — S31.139A GUNSHOT WOUND OF ABDOMEN: Status: ACTIVE | Noted: 2023-09-07

## 2023-10-17 PROBLEM — K56.609 SBO (SMALL BOWEL OBSTRUCTION) (HCC): Status: ACTIVE | Noted: 2023-10-09
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
## 2023-10-17 NOTE — ASSESSMENT & PLAN NOTE
Wound VAC in place and nephrostomy tube in place  Continue home care  Follow-up outpatient with general surgery and urology

## 2023-12-28 ENCOUNTER — APPOINTMENT (EMERGENCY)
Dept: CT IMAGING | Facility: HOSPITAL | Age: 48
DRG: 207 | End: 2023-12-28
Payer: COMMERCIAL

## 2023-12-28 ENCOUNTER — HOSPITAL ENCOUNTER (INPATIENT)
Facility: HOSPITAL | Age: 48
LOS: 3 days | DRG: 207 | End: 2023-12-31
Attending: EMERGENCY MEDICINE | Admitting: FAMILY MEDICINE
Payer: COMMERCIAL

## 2023-12-28 DIAGNOSIS — R60.0 LOWER EXTREMITY EDEMA: Primary | ICD-10-CM

## 2023-12-28 DIAGNOSIS — E88.09 HYPOALBUMINEMIA: ICD-10-CM

## 2023-12-28 DIAGNOSIS — F16.283 FLASHBACKS (HCC): ICD-10-CM

## 2023-12-28 DIAGNOSIS — D50.9 IRON DEFICIENCY ANEMIA, UNSPECIFIED IRON DEFICIENCY ANEMIA TYPE: ICD-10-CM

## 2023-12-28 DIAGNOSIS — R65.10 SIRS (SYSTEMIC INFLAMMATORY RESPONSE SYNDROME) (HCC): ICD-10-CM

## 2023-12-28 DIAGNOSIS — I82.532 CHRONIC DEEP VEIN THROMBOSIS (DVT) OF POPLITEAL VEIN OF LEFT LOWER EXTREMITY (HCC): ICD-10-CM

## 2023-12-28 DIAGNOSIS — K65.1 INTRA-ABDOMINAL ABSCESS (HCC): ICD-10-CM

## 2023-12-28 DIAGNOSIS — N94.89 ADNEXAL MASS: ICD-10-CM

## 2023-12-28 DIAGNOSIS — D68.61 ANTIPHOSPHOLIPID SYNDROME (HCC): ICD-10-CM

## 2023-12-28 PROBLEM — B33.8 RSV INFECTION: Status: ACTIVE | Noted: 2023-12-28

## 2023-12-28 PROBLEM — R10.9 NONSPECIFIC ABDOMINAL PAIN: Status: ACTIVE | Noted: 2023-12-28

## 2023-12-28 LAB
ALBUMIN SERPL BCP-MCNC: 1.9 G/DL (ref 3.5–5)
ALP SERPL-CCNC: 112 U/L (ref 34–104)
ALT SERPL W P-5'-P-CCNC: 13 U/L (ref 7–52)
ANION GAP SERPL CALCULATED.3IONS-SCNC: 7 MMOL/L
APTT PPP: 36 SECONDS (ref 23–37)
AST SERPL W P-5'-P-CCNC: 13 U/L (ref 13–39)
BACTERIA UR QL AUTO: ABNORMAL /HPF
BASOPHILS # BLD AUTO: 0.02 THOUSANDS/ÂΜL (ref 0–0.1)
BASOPHILS NFR BLD AUTO: 0 % (ref 0–1)
BILIRUB SERPL-MCNC: 0.73 MG/DL (ref 0.2–1)
BILIRUB UR QL STRIP: NEGATIVE
BNP SERPL-MCNC: 67 PG/ML (ref 0–100)
BUN SERPL-MCNC: 18 MG/DL (ref 5–25)
CALCIUM ALBUM COR SERPL-MCNC: 9 MG/DL (ref 8.3–10.1)
CALCIUM SERPL-MCNC: 7.3 MG/DL (ref 8.4–10.2)
CHLORIDE SERPL-SCNC: 104 MMOL/L (ref 96–108)
CLARITY UR: ABNORMAL
CO2 SERPL-SCNC: 26 MMOL/L (ref 21–32)
COLOR UR: ABNORMAL
CREAT SERPL-MCNC: 0.78 MG/DL (ref 0.6–1.3)
EOSINOPHIL # BLD AUTO: 0.04 THOUSAND/ÂΜL (ref 0–0.61)
EOSINOPHIL NFR BLD AUTO: 0 % (ref 0–6)
ERYTHROCYTE [DISTWIDTH] IN BLOOD BY AUTOMATED COUNT: 16.7 % (ref 11.6–15.1)
FLUAV RNA RESP QL NAA+PROBE: NEGATIVE
FLUBV RNA RESP QL NAA+PROBE: NEGATIVE
GFR SERPL CREATININE-BSD FRML MDRD: 90 ML/MIN/1.73SQ M
GLUCOSE SERPL-MCNC: 103 MG/DL (ref 65–140)
GLUCOSE SERPL-MCNC: 94 MG/DL (ref 65–140)
GLUCOSE UR STRIP-MCNC: NEGATIVE MG/DL
HCT VFR BLD AUTO: 26.3 % (ref 34.8–46.1)
HGB BLD-MCNC: 8.7 G/DL (ref 11.5–15.4)
HGB UR QL STRIP.AUTO: 25
IMM GRANULOCYTES # BLD AUTO: 0.05 THOUSAND/UL (ref 0–0.2)
IMM GRANULOCYTES NFR BLD AUTO: 1 % (ref 0–2)
INR PPP: 1.06 (ref 0.84–1.19)
KETONES UR STRIP-MCNC: NEGATIVE MG/DL
LEUKOCYTE ESTERASE UR QL STRIP: 500
LYMPHOCYTES # BLD AUTO: 3.05 THOUSANDS/ÂΜL (ref 0.6–4.47)
LYMPHOCYTES NFR BLD AUTO: 32 % (ref 14–44)
MCH RBC QN AUTO: 30.1 PG (ref 26.8–34.3)
MCHC RBC AUTO-ENTMCNC: 33.1 G/DL (ref 31.4–37.4)
MCV RBC AUTO: 91 FL (ref 82–98)
MONOCYTES # BLD AUTO: 0.72 THOUSAND/ÂΜL (ref 0.17–1.22)
MONOCYTES NFR BLD AUTO: 8 % (ref 4–12)
NEUTROPHILS # BLD AUTO: 5.74 THOUSANDS/ÂΜL (ref 1.85–7.62)
NEUTS SEG NFR BLD AUTO: 59 % (ref 43–75)
NITRITE UR QL STRIP: NEGATIVE
NON-SQ EPI CELLS URNS QL MICRO: ABNORMAL /HPF
NRBC BLD AUTO-RTO: 0 /100 WBCS
PH UR STRIP.AUTO: 7 [PH]
PLATELET # BLD AUTO: 500 THOUSANDS/UL (ref 149–390)
PMV BLD AUTO: 8.6 FL (ref 8.9–12.7)
POTASSIUM SERPL-SCNC: 3.6 MMOL/L (ref 3.5–5.3)
PROT SERPL-MCNC: 5.7 G/DL (ref 6.4–8.4)
PROT UR STRIP-MCNC: NEGATIVE MG/DL
PROTHROMBIN TIME: 14.1 SECONDS (ref 11.6–14.5)
RBC # BLD AUTO: 2.89 MILLION/UL (ref 3.81–5.12)
RBC #/AREA URNS AUTO: ABNORMAL /HPF
RSV RNA RESP QL NAA+PROBE: POSITIVE
SARS-COV-2 RNA RESP QL NAA+PROBE: NEGATIVE
SODIUM SERPL-SCNC: 137 MMOL/L (ref 135–147)
SP GR UR STRIP.AUTO: 1.01 (ref 1–1.04)
UROBILINOGEN UA: NEGATIVE MG/DL
WBC # BLD AUTO: 9.62 THOUSAND/UL (ref 4.31–10.16)
WBC #/AREA URNS AUTO: ABNORMAL /HPF

## 2023-12-28 PROCEDURE — 0T25X0Z CHANGE DRAINAGE DEVICE IN KIDNEY, EXTERNAL APPROACH: ICD-10-PCS | Performed by: STUDENT IN AN ORGANIZED HEALTH CARE EDUCATION/TRAINING PROGRAM

## 2023-12-28 PROCEDURE — 87086 URINE CULTURE/COLONY COUNT: CPT | Performed by: STUDENT IN AN ORGANIZED HEALTH CARE EDUCATION/TRAINING PROGRAM

## 2023-12-28 PROCEDURE — 87186 SC STD MICRODIL/AGAR DIL: CPT | Performed by: STUDENT IN AN ORGANIZED HEALTH CARE EDUCATION/TRAINING PROGRAM

## 2023-12-28 PROCEDURE — 87147 CULTURE TYPE IMMUNOLOGIC: CPT | Performed by: STUDENT IN AN ORGANIZED HEALTH CARE EDUCATION/TRAINING PROGRAM

## 2023-12-28 PROCEDURE — G1004 CDSM NDSC: HCPCS

## 2023-12-28 PROCEDURE — 81001 URINALYSIS AUTO W/SCOPE: CPT | Performed by: STUDENT IN AN ORGANIZED HEALTH CARE EDUCATION/TRAINING PROGRAM

## 2023-12-28 PROCEDURE — 80053 COMPREHEN METABOLIC PANEL: CPT | Performed by: PHYSICIAN ASSISTANT

## 2023-12-28 PROCEDURE — 74177 CT ABD & PELVIS W/CONTRAST: CPT

## 2023-12-28 PROCEDURE — 74176 CT ABD & PELVIS W/O CONTRAST: CPT

## 2023-12-28 PROCEDURE — 96374 THER/PROPH/DIAG INJ IV PUSH: CPT

## 2023-12-28 PROCEDURE — 83550 IRON BINDING TEST: CPT | Performed by: STUDENT IN AN ORGANIZED HEALTH CARE EDUCATION/TRAINING PROGRAM

## 2023-12-28 PROCEDURE — 99284 EMERGENCY DEPT VISIT MOD MDM: CPT

## 2023-12-28 PROCEDURE — 81003 URINALYSIS AUTO W/O SCOPE: CPT | Performed by: STUDENT IN AN ORGANIZED HEALTH CARE EDUCATION/TRAINING PROGRAM

## 2023-12-28 PROCEDURE — 83540 ASSAY OF IRON: CPT | Performed by: STUDENT IN AN ORGANIZED HEALTH CARE EDUCATION/TRAINING PROGRAM

## 2023-12-28 PROCEDURE — 0241U HB NFCT DS VIR RESP RNA 4 TRGT: CPT | Performed by: PHYSICIAN ASSISTANT

## 2023-12-28 PROCEDURE — 85730 THROMBOPLASTIN TIME PARTIAL: CPT | Performed by: PHYSICIAN ASSISTANT

## 2023-12-28 PROCEDURE — 87077 CULTURE AEROBIC IDENTIFY: CPT | Performed by: STUDENT IN AN ORGANIZED HEALTH CARE EDUCATION/TRAINING PROGRAM

## 2023-12-28 PROCEDURE — 82948 REAGENT STRIP/BLOOD GLUCOSE: CPT

## 2023-12-28 PROCEDURE — 85610 PROTHROMBIN TIME: CPT | Performed by: PHYSICIAN ASSISTANT

## 2023-12-28 PROCEDURE — 82728 ASSAY OF FERRITIN: CPT | Performed by: STUDENT IN AN ORGANIZED HEALTH CARE EDUCATION/TRAINING PROGRAM

## 2023-12-28 PROCEDURE — 36415 COLL VENOUS BLD VENIPUNCTURE: CPT | Performed by: PHYSICIAN ASSISTANT

## 2023-12-28 PROCEDURE — 99285 EMERGENCY DEPT VISIT HI MDM: CPT | Performed by: PHYSICIAN ASSISTANT

## 2023-12-28 PROCEDURE — 83880 ASSAY OF NATRIURETIC PEPTIDE: CPT | Performed by: PHYSICIAN ASSISTANT

## 2023-12-28 PROCEDURE — 85025 COMPLETE CBC W/AUTO DIFF WBC: CPT | Performed by: PHYSICIAN ASSISTANT

## 2023-12-28 RX ORDER — CEPHALEXIN 500 MG/1
500 CAPSULE ORAL EVERY 6 HOURS SCHEDULED
Status: DISCONTINUED | OUTPATIENT
Start: 2023-12-28 | End: 2023-12-31

## 2023-12-28 RX ORDER — DICYCLOMINE HCL 20 MG
20 TABLET ORAL EVERY 6 HOURS SCHEDULED
Status: DISCONTINUED | OUTPATIENT
Start: 2023-12-28 | End: 2023-12-31 | Stop reason: HOSPADM

## 2023-12-28 RX ORDER — FUROSEMIDE 20 MG/1
20 TABLET ORAL DAILY
Status: DISCONTINUED | OUTPATIENT
Start: 2023-12-28 | End: 2023-12-28

## 2023-12-28 RX ORDER — ENOXAPARIN SODIUM 100 MG/ML
40 INJECTION SUBCUTANEOUS DAILY
Status: DISCONTINUED | OUTPATIENT
Start: 2023-12-28 | End: 2023-12-30

## 2023-12-28 RX ORDER — LANOLIN ALCOHOL/MO/W.PET/CERES
100 CREAM (GRAM) TOPICAL DAILY
Status: DISCONTINUED | OUTPATIENT
Start: 2023-12-29 | End: 2023-12-31 | Stop reason: HOSPADM

## 2023-12-28 RX ORDER — OXYCODONE HYDROCHLORIDE 10 MG/1
10 TABLET ORAL EVERY 6 HOURS PRN
Status: DISCONTINUED | OUTPATIENT
Start: 2023-12-28 | End: 2023-12-31 | Stop reason: HOSPADM

## 2023-12-28 RX ORDER — POLYETHYLENE GLYCOL 3350 17 G/17G
17 POWDER, FOR SOLUTION ORAL DAILY
Status: DISCONTINUED | OUTPATIENT
Start: 2023-12-29 | End: 2023-12-28

## 2023-12-28 RX ORDER — METHOCARBAMOL 500 MG/1
500 TABLET, FILM COATED ORAL 3 TIMES DAILY
Status: DISCONTINUED | OUTPATIENT
Start: 2023-12-28 | End: 2023-12-31 | Stop reason: HOSPADM

## 2023-12-28 RX ORDER — METHOCARBAMOL 500 MG/1
500 TABLET, FILM COATED ORAL EVERY 6 HOURS PRN
Status: DISCONTINUED | OUTPATIENT
Start: 2023-12-28 | End: 2023-12-28

## 2023-12-28 RX ORDER — OMEGA-3S/DHA/EPA/FISH OIL/D3 300MG-1000
400 CAPSULE ORAL DAILY
Status: DISCONTINUED | OUTPATIENT
Start: 2023-12-29 | End: 2023-12-31 | Stop reason: HOSPADM

## 2023-12-28 RX ORDER — POLYETHYLENE GLYCOL 3350 17 G/17G
17 POWDER, FOR SOLUTION ORAL DAILY
Status: DISCONTINUED | OUTPATIENT
Start: 2023-12-28 | End: 2023-12-29

## 2023-12-28 RX ORDER — SPIRONOLACTONE 25 MG/1
50 TABLET ORAL DAILY
Status: DISCONTINUED | OUTPATIENT
Start: 2023-12-28 | End: 2023-12-29

## 2023-12-28 RX ORDER — MORPHINE SULFATE 4 MG/ML
4 INJECTION, SOLUTION INTRAMUSCULAR; INTRAVENOUS ONCE
Status: COMPLETED | OUTPATIENT
Start: 2023-12-28 | End: 2023-12-28

## 2023-12-28 RX ORDER — MORPHINE SULFATE 4 MG/ML
4 INJECTION, SOLUTION INTRAMUSCULAR; INTRAVENOUS EVERY 4 HOURS PRN
Status: DISCONTINUED | OUTPATIENT
Start: 2023-12-28 | End: 2023-12-28

## 2023-12-28 RX ORDER — LIDOCAINE HYDROCHLORIDE 20 MG/ML
1 JELLY TOPICAL ONCE
Status: COMPLETED | OUTPATIENT
Start: 2023-12-28 | End: 2023-12-28

## 2023-12-28 RX ORDER — ACETAMINOPHEN 325 MG/1
650 TABLET ORAL EVERY 8 HOURS
Status: DISCONTINUED | OUTPATIENT
Start: 2023-12-28 | End: 2023-12-31 | Stop reason: HOSPADM

## 2023-12-28 RX ORDER — FUROSEMIDE 10 MG/ML
40 INJECTION INTRAMUSCULAR; INTRAVENOUS ONCE
Status: COMPLETED | OUTPATIENT
Start: 2023-12-28 | End: 2023-12-28

## 2023-12-28 RX ORDER — LIDOCAINE HYDROCHLORIDE 20 MG/ML
1 JELLY TOPICAL ONCE
Qty: 5 ML | Refills: 0 | Status: COMPLETED | OUTPATIENT
Start: 2023-12-28 | End: 2023-12-28

## 2023-12-28 RX ORDER — PANTOPRAZOLE SODIUM 40 MG/1
40 TABLET, DELAYED RELEASE ORAL DAILY
Status: DISCONTINUED | OUTPATIENT
Start: 2023-12-28 | End: 2023-12-31 | Stop reason: HOSPADM

## 2023-12-28 RX ORDER — ONDANSETRON 2 MG/ML
4 INJECTION INTRAMUSCULAR; INTRAVENOUS EVERY 8 HOURS PRN
Status: DISCONTINUED | OUTPATIENT
Start: 2023-12-28 | End: 2023-12-31 | Stop reason: HOSPADM

## 2023-12-28 RX ADMIN — SPIRONOLACTONE 50 MG: 25 TABLET, FILM COATED ORAL at 18:10

## 2023-12-28 RX ADMIN — OXYCODONE HYDROCHLORIDE 10 MG: 10 TABLET ORAL at 20:52

## 2023-12-28 RX ADMIN — IOHEXOL 100 ML: 350 INJECTION, SOLUTION INTRAVENOUS at 03:23

## 2023-12-28 RX ADMIN — PANTOPRAZOLE SODIUM 40 MG: 40 TABLET, DELAYED RELEASE ORAL at 15:47

## 2023-12-28 RX ADMIN — CEPHALEXIN 500 MG: 500 CAPSULE ORAL at 17:48

## 2023-12-28 RX ADMIN — LIDOCAINE HYDROCHLORIDE 1 APPLICATION: 20 JELLY TOPICAL at 22:01

## 2023-12-28 RX ADMIN — METHOCARBAMOL TABLETS 500 MG: 500 TABLET, COATED ORAL at 20:38

## 2023-12-28 RX ADMIN — METHOCARBAMOL TABLETS 500 MG: 500 TABLET, COATED ORAL at 17:00

## 2023-12-28 RX ADMIN — CEPHALEXIN 500 MG: 500 CAPSULE ORAL at 23:42

## 2023-12-28 RX ADMIN — MORPHINE SULFATE 4 MG: 4 INJECTION, SOLUTION INTRAMUSCULAR; INTRAVENOUS at 07:36

## 2023-12-28 RX ADMIN — LIDOCAINE HYDROCHLORIDE 1 APPLICATION: 20 JELLY TOPICAL at 01:44

## 2023-12-28 RX ADMIN — FUROSEMIDE 40 MG: 10 INJECTION, SOLUTION INTRAVENOUS at 16:28

## 2023-12-28 RX ADMIN — DICYCLOMINE HYDROCHLORIDE 20 MG: 20 TABLET ORAL at 23:42

## 2023-12-28 RX ADMIN — ENOXAPARIN SODIUM 40 MG: 40 INJECTION SUBCUTANEOUS at 15:47

## 2023-12-28 RX ADMIN — DICYCLOMINE HYDROCHLORIDE 20 MG: 20 TABLET ORAL at 15:47

## 2023-12-28 RX ADMIN — ACETAMINOPHEN 650 MG: 325 TABLET ORAL at 16:52

## 2023-12-28 NOTE — ED NOTES
Brando Yepez RN attempted IV x 3 with US without success.  LINDSAY Kang will attempt.     Ren Shore RN  12/28/23 032
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
## 2023-12-28 NOTE — ED NOTES
Awake, alert, fatigued.  Resps easy and unlabored.  BBS = scattered loose wheezes.  BLE grossly edematous; RT +3 pitting; LT +4 pitting.  Edema extends to upper thighs.       Ren Shore RN  12/28/23 011

## 2023-12-28 NOTE — ASSESSMENT & PLAN NOTE
Bilateral calves tenderness with palpation.  Increased in intensity with rating of 7/10.    History of APL with multiple DVTs.  Was on Coumadin 5 mg but recently stopped in October after ivc stent placement.  Managed on aspirin 325 mg daily.   A Duplex study of the lower extremities will be ordered to rule out DVT.    Plan  Follow-up LE duplex to rule out DVT.

## 2023-12-28 NOTE — ED CARE HANDOFF
Emergency Department Sign Out Note        Sign out and transfer of care from JUAN ALBERTO Farley. See Separate Emergency Department note.     The patient, Mckenna Fischer, was evaluated by the previous provider for leg swelling.    ED Course / Workup Pending (followup):  Repeat CT with oral contrast read by radiology, contrast did not reach the area of concern, patient had repeat CT scan with oral contrast done afterwards.    Results reviewed and discussed with surgery, patient with no acute surgical intervention needed.  Patient tolerating oral intake, has had no vomiting, reports no change in bowel movements.    Discussed with family medicine, will admit patient for further evaluation and treatment.                                  ED Course as of 12/28/23 1411   Thu Dec 28, 2023   1107 CT scan findings reviewed and discussed with patient.  Patient has had no vomiting, reports normal bowel movements.     Procedures  Medical Decision Making  Amount and/or Complexity of Data Reviewed  Labs: ordered.  Radiology: ordered.    Risk  Prescription drug management.  Decision regarding hospitalization.            Disposition  Final diagnoses:   Lower extremity edema     Time reflects when diagnosis was documented in both MDM as applicable and the Disposition within this note       Time User Action Codes Description Comment    12/28/2023  2:09 PM Jeancarlos Hurd Add [R60.0] Lower extremity edema           ED Disposition       ED Disposition   Admit    Condition   Stable    Date/Time   u Dec 28, 2023  2:10 PM    Comment   Case was discussed with Dr. Aparicio and the patient's admission status was agreed to be Admission Status: inpatient status to the service of Dr. Mccrary .               Follow-up Information    None       Patient's Medications   Discharge Prescriptions    No medications on file     No discharge procedures on file.       ED Provider  Electronically Signed by     Jeancarlos Hurd MD  12/28/23 9979

## 2023-12-28 NOTE — ASSESSMENT & PLAN NOTE
Bilateral leg pain  Improving  Initially concerning for DVT  Duplex scan resulted in chronic non-occlusive deep vein thrombosis noted in a short segment of the popliteal vein of left lower limb.    Plan:  Oxycodone 10 mg Q6 prn pain management  Monitor I/O's  Fluid restriction  2 gm Sodium restriction

## 2023-12-28 NOTE — ED NOTES
Received call from CT; Pt needs new IV site.  Tech states IV not flushing well.       Ren Shore RN  12/28/23 0514

## 2023-12-28 NOTE — ASSESSMENT & PLAN NOTE
Nonspecific abdominal pain of unclear etiology.  Pain is controlled on current regimen    Pelvic ultrasound done on 12/29 notable for 6.3 x 6.8 x 6.7 complex cystic lesion corresponding to the CT finding (image 42.)  In the right adnexa.  The differential is still a complex right ovarian cyst versus hydrosalpinx.   CT abdomen pelvis done on 12/28 shows prior Noemi-en-Y gastric bypass. Prior partial small bowel resection with enteroenteric anastomosis in the upper pelvis.  Clumping of multiple loops of small bowel in the anterior lower abdomen and upper pelvis deep to the abdominal wall. Gas fluid levels are present in mildly distended loops of small bowel. No pneumatosis intestinalis. Colonic diverticulosis without diverticulitis.      Urine culture + E coli  Plan:  Cefepime, vancomycin, metronidazole  Bentyl 20 mg every 6 hours  Protonix 40 mg daily
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
## 2023-12-28 NOTE — ED PROVIDER NOTES
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 glycol (Golytely) 4000 mL solution Take 4,000 mL by mouth once for 1 dose Take 4000 mL by mouth once for 1 dose. 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    PDMP Review         Value Time User    PDMP Reviewed  Yes 10/17/2023  8:25 AM Ezio Brothers MD            ED Provider  Electronically Signed by             Pearl Farley PA-C  12/31/23 2025

## 2023-12-28 NOTE — ASSESSMENT & PLAN NOTE
RSV positive on presentation.  Not in acute respiratory distress.  On room air with adequate saturation.    Plan  Symptomatic management.  Droplet precautions

## 2023-12-28 NOTE — H&P
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 Loss (mL): 0 Fluoroscopy time (minutes): 0.8 CAK Dose (mGy): 8 Contrast volume (mL): 10 Contrast type: Omnipaque 300 Contrast route: Left kidney Informed consent for the procedure was obtained from the patient. A time-out was performed with all team members present and in agreement, confirming the correct patient, correct procedure, and correct side/site. The patient was placed prone and the left flank and indwelling nephroureterostomy catheter were prepped and draped, using ChloraPrep, which was allowed to dry before sterile draping applied in the usual sterile fashion. A limited nephrostogram opacified the left collecting system, and the catheter was exchanged over a superstiff Amplatz guidewire for a similar 10.2 [place] 26 cm nephroureterostomy catheter ([place], [place]), with the distal pigtail formed within the bladder and the proximal pigtail coiled in the left renal collecting system. Final catheter position was confirmed with injection of small amount of contrast. The catheter was gently flushed with sterile saline, sutured to skin externally, returned to external bag drainage and a sterile dressing applied. The patient  tolerated the procedure well.    Impression: Impression: Successful left percutaneous nephroureterostomy catheter exchange as described. Workstation:[id number]    IR NEPHROURETERAL TUBE CHANGE LEFT    Result Date: 11/30/2023  Narrative: This result has an attachment that is not available. History: Blocked left nephroureteral catheter. Procedure(s): Left nephroureteral catheter exchange. Estimated Blood Loss (mL): 0 Fluoroscopy time (minutes): 1.7 CAK Dose (mGy): 13.74 Contrast volume (mL): 5 Contrast type: OMNI 300 Contrast route: LEFT KIDNEY MEDICATIONS: None. - Interpretation: Informed consent was obtained from the patient. A timeout was performed with all members present and the correct patient, procedure, and site/side were confirmed. The patient was placed prone on the fluoroscopy table, and the left nephrostomy catheter was prepped and draped in a sterile fashion. The skin was prepped using ChloraPrep, and was allowed 3 minutes to completely dry prior to sterile draping. Maximum sterile barrier technique was used, including the use of a cap, mask, sterile gown, gloves, and full body drape, after recommended hand hygiene and aseptic techniques.   Contrast was injected through the catheter confirming satisfactory position. The catheter is partially obstructed. Over a stiff Glidewire, the catheter was exchanged for a similar, 10 Iraqi, 26 cm long nephroureteral catheter. Final catheter position was confirmed with contrast injection. The catheter was sutured to the skin. There were no complications.    Impression: IMPRESSION: Left nephroureteral catheter exchange. Workstation:[id number]      EKG, Pathology, and Other Studies Reviewed on Admission:   EKG  Result Date: 12/29/23  Impression: N/A    Entire H&P was discussed with Dr. Flor Mccrary who agreed to what is noted above    Alden Steinberg MD  12/29/23  6:58 AM

## 2023-12-29 ENCOUNTER — APPOINTMENT (INPATIENT)
Dept: ULTRASOUND IMAGING | Facility: HOSPITAL | Age: 48
DRG: 207 | End: 2023-12-29
Payer: COMMERCIAL

## 2023-12-29 ENCOUNTER — APPOINTMENT (INPATIENT)
Dept: NON INVASIVE DIAGNOSTICS | Facility: HOSPITAL | Age: 48
DRG: 207 | End: 2023-12-29
Payer: COMMERCIAL

## 2023-12-29 PROBLEM — F16.283: Status: ACTIVE | Noted: 2023-12-29

## 2023-12-29 PROBLEM — E88.09 HYPOALBUMINEMIA: Status: ACTIVE | Noted: 2023-12-29

## 2023-12-29 PROBLEM — F43.10 POSTTRAUMATIC STRESS DISORDER: Status: ACTIVE | Noted: 2023-12-29

## 2023-12-29 PROBLEM — N94.89 ADNEXAL MASS: Status: ACTIVE | Noted: 2023-12-29

## 2023-12-29 LAB
ALBUMIN SERPL BCP-MCNC: 1.7 G/DL (ref 3.5–5)
ALP SERPL-CCNC: 100 U/L (ref 34–104)
ALT SERPL W P-5'-P-CCNC: 12 U/L (ref 7–52)
ANION GAP SERPL CALCULATED.3IONS-SCNC: 6 MMOL/L
AST SERPL W P-5'-P-CCNC: 12 U/L (ref 13–39)
BASOPHILS # BLD AUTO: 0.01 THOUSANDS/ÂΜL (ref 0–0.1)
BASOPHILS NFR BLD AUTO: 0 % (ref 0–1)
BILIRUB SERPL-MCNC: 0.79 MG/DL (ref 0.2–1)
BUN SERPL-MCNC: 16 MG/DL (ref 5–25)
CALCIUM ALBUM COR SERPL-MCNC: 8.9 MG/DL (ref 8.3–10.1)
CALCIUM SERPL-MCNC: 7.1 MG/DL (ref 8.4–10.2)
CHLORIDE SERPL-SCNC: 105 MMOL/L (ref 96–108)
CO2 SERPL-SCNC: 27 MMOL/L (ref 21–32)
CREAT SERPL-MCNC: 0.65 MG/DL (ref 0.6–1.3)
EOSINOPHIL # BLD AUTO: 0.03 THOUSAND/ÂΜL (ref 0–0.61)
EOSINOPHIL NFR BLD AUTO: 0 % (ref 0–6)
ERYTHROCYTE [DISTWIDTH] IN BLOOD BY AUTOMATED COUNT: 17.2 % (ref 11.6–15.1)
FERRITIN SERPL-MCNC: 129 NG/ML (ref 11–307)
FOLATE SERPL-MCNC: 5.7 NG/ML
GFR SERPL CREATININE-BSD FRML MDRD: 105 ML/MIN/1.73SQ M
GLUCOSE SERPL-MCNC: 103 MG/DL (ref 65–140)
GLUCOSE SERPL-MCNC: 71 MG/DL (ref 65–140)
HCT VFR BLD AUTO: 23.9 % (ref 34.8–46.1)
HGB BLD-MCNC: 7.9 G/DL (ref 11.5–15.4)
IMM GRANULOCYTES # BLD AUTO: 0.04 THOUSAND/UL (ref 0–0.2)
IMM GRANULOCYTES NFR BLD AUTO: 0 % (ref 0–2)
IRON SERPL-MCNC: 101 UG/DL (ref 50–212)
LYMPHOCYTES # BLD AUTO: 3.14 THOUSANDS/ÂΜL (ref 0.6–4.47)
LYMPHOCYTES NFR BLD AUTO: 32 % (ref 14–44)
MAGNESIUM SERPL-MCNC: 1.6 MG/DL (ref 1.9–2.7)
MCH RBC QN AUTO: 30.5 PG (ref 26.8–34.3)
MCHC RBC AUTO-ENTMCNC: 33.1 G/DL (ref 31.4–37.4)
MCV RBC AUTO: 92 FL (ref 82–98)
MONOCYTES # BLD AUTO: 0.83 THOUSAND/ÂΜL (ref 0.17–1.22)
MONOCYTES NFR BLD AUTO: 9 % (ref 4–12)
NEUTROPHILS # BLD AUTO: 5.77 THOUSANDS/ÂΜL (ref 1.85–7.62)
NEUTS SEG NFR BLD AUTO: 59 % (ref 43–75)
NRBC BLD AUTO-RTO: 0 /100 WBCS
PHOSPHATE SERPL-MCNC: 2.8 MG/DL (ref 2.7–4.5)
PLATELET # BLD AUTO: 432 THOUSANDS/UL (ref 149–390)
PMV BLD AUTO: 9 FL (ref 8.9–12.7)
POTASSIUM SERPL-SCNC: 3.6 MMOL/L (ref 3.5–5.3)
PROT SERPL-MCNC: 4.8 G/DL (ref 6.4–8.4)
RBC # BLD AUTO: 2.59 MILLION/UL (ref 3.81–5.12)
SODIUM SERPL-SCNC: 138 MMOL/L (ref 135–147)
TIBC SERPL-MCNC: <156 UG/DL (ref 250–450)
UIBC SERPL-MCNC: <55 UG/DL (ref 155–355)
VIT B12 SERPL-MCNC: 561 PG/ML (ref 180–914)
WBC # BLD AUTO: 9.82 THOUSAND/UL (ref 4.31–10.16)

## 2023-12-29 PROCEDURE — 82746 ASSAY OF FOLIC ACID SERUM: CPT | Performed by: STUDENT IN AN ORGANIZED HEALTH CARE EDUCATION/TRAINING PROGRAM

## 2023-12-29 PROCEDURE — 76856 US EXAM PELVIC COMPLETE: CPT

## 2023-12-29 PROCEDURE — NC001 PR NO CHARGE: Performed by: STUDENT IN AN ORGANIZED HEALTH CARE EDUCATION/TRAINING PROGRAM

## 2023-12-29 PROCEDURE — 93970 EXTREMITY STUDY: CPT | Performed by: SURGERY

## 2023-12-29 PROCEDURE — 82607 VITAMIN B-12: CPT | Performed by: STUDENT IN AN ORGANIZED HEALTH CARE EDUCATION/TRAINING PROGRAM

## 2023-12-29 PROCEDURE — 84100 ASSAY OF PHOSPHORUS: CPT | Performed by: STUDENT IN AN ORGANIZED HEALTH CARE EDUCATION/TRAINING PROGRAM

## 2023-12-29 PROCEDURE — 83735 ASSAY OF MAGNESIUM: CPT | Performed by: STUDENT IN AN ORGANIZED HEALTH CARE EDUCATION/TRAINING PROGRAM

## 2023-12-29 PROCEDURE — 85025 COMPLETE CBC W/AUTO DIFF WBC: CPT | Performed by: STUDENT IN AN ORGANIZED HEALTH CARE EDUCATION/TRAINING PROGRAM

## 2023-12-29 PROCEDURE — 99223 1ST HOSP IP/OBS HIGH 75: CPT | Performed by: STUDENT IN AN ORGANIZED HEALTH CARE EDUCATION/TRAINING PROGRAM

## 2023-12-29 PROCEDURE — 76830 TRANSVAGINAL US NON-OB: CPT

## 2023-12-29 PROCEDURE — 82948 REAGENT STRIP/BLOOD GLUCOSE: CPT

## 2023-12-29 PROCEDURE — 93970 EXTREMITY STUDY: CPT

## 2023-12-29 PROCEDURE — 80053 COMPREHEN METABOLIC PANEL: CPT | Performed by: STUDENT IN AN ORGANIZED HEALTH CARE EDUCATION/TRAINING PROGRAM

## 2023-12-29 PROCEDURE — 99222 1ST HOSP IP/OBS MODERATE 55: CPT | Performed by: PHYSICIAN ASSISTANT

## 2023-12-29 RX ORDER — MAGNESIUM SULFATE HEPTAHYDRATE 40 MG/ML
2 INJECTION, SOLUTION INTRAVENOUS ONCE
Status: DISCONTINUED | OUTPATIENT
Start: 2023-12-29 | End: 2023-12-29

## 2023-12-29 RX ORDER — TRAZODONE HYDROCHLORIDE 50 MG/1
50 TABLET ORAL
Status: DISCONTINUED | OUTPATIENT
Start: 2023-12-29 | End: 2023-12-31 | Stop reason: HOSPADM

## 2023-12-29 RX ORDER — AMOXICILLIN 250 MG
1 CAPSULE ORAL
Status: DISCONTINUED | OUTPATIENT
Start: 2023-12-29 | End: 2023-12-31 | Stop reason: HOSPADM

## 2023-12-29 RX ORDER — FUROSEMIDE 10 MG/ML
40 INJECTION INTRAMUSCULAR; INTRAVENOUS ONCE
Status: DISCONTINUED | OUTPATIENT
Start: 2023-12-29 | End: 2023-12-29

## 2023-12-29 RX ORDER — POLYETHYLENE GLYCOL 3350 17 G/17G
17 POWDER, FOR SOLUTION ORAL DAILY
Status: DISCONTINUED | OUTPATIENT
Start: 2023-12-29 | End: 2023-12-29

## 2023-12-29 RX ORDER — FLUOXETINE HYDROCHLORIDE 20 MG/1
20 CAPSULE ORAL DAILY
Status: DISCONTINUED | OUTPATIENT
Start: 2023-12-29 | End: 2023-12-31 | Stop reason: HOSPADM

## 2023-12-29 RX ORDER — LANOLIN ALCOHOL/MO/W.PET/CERES
3 CREAM (GRAM) TOPICAL
Status: DISCONTINUED | OUTPATIENT
Start: 2023-12-29 | End: 2023-12-31 | Stop reason: HOSPADM

## 2023-12-29 RX ORDER — SPIRONOLACTONE 25 MG/1
50 TABLET ORAL DAILY
Status: DISCONTINUED | OUTPATIENT
Start: 2023-12-29 | End: 2023-12-31

## 2023-12-29 RX ORDER — FUROSEMIDE 10 MG/ML
40 INJECTION INTRAMUSCULAR; INTRAVENOUS ONCE
Status: COMPLETED | OUTPATIENT
Start: 2023-12-29 | End: 2023-12-29

## 2023-12-29 RX ORDER — MAGNESIUM SULFATE HEPTAHYDRATE 40 MG/ML
4 INJECTION, SOLUTION INTRAVENOUS ONCE
Status: COMPLETED | OUTPATIENT
Start: 2023-12-29 | End: 2023-12-29

## 2023-12-29 RX ADMIN — CEPHALEXIN 500 MG: 500 CAPSULE ORAL at 11:14

## 2023-12-29 RX ADMIN — DICYCLOMINE HYDROCHLORIDE 20 MG: 20 TABLET ORAL at 05:55

## 2023-12-29 RX ADMIN — CYANOCOBALAMIN TAB 1000 MCG 1000 MCG: 1000 TAB at 09:44

## 2023-12-29 RX ADMIN — CEPHALEXIN 500 MG: 500 CAPSULE ORAL at 17:29

## 2023-12-29 RX ADMIN — SENNOSIDES AND DOCUSATE SODIUM 1 TABLET: 8.6; 5 TABLET ORAL at 21:36

## 2023-12-29 RX ADMIN — CEPHALEXIN 500 MG: 500 CAPSULE ORAL at 05:55

## 2023-12-29 RX ADMIN — FUROSEMIDE 40 MG: 10 INJECTION, SOLUTION INTRAVENOUS at 09:45

## 2023-12-29 RX ADMIN — ACETAMINOPHEN 650 MG: 325 TABLET ORAL at 01:22

## 2023-12-29 RX ADMIN — ENOXAPARIN SODIUM 40 MG: 40 INJECTION SUBCUTANEOUS at 09:45

## 2023-12-29 RX ADMIN — Medication 400 UNITS: at 09:44

## 2023-12-29 RX ADMIN — METHOCARBAMOL TABLETS 500 MG: 500 TABLET, COATED ORAL at 09:44

## 2023-12-29 RX ADMIN — OXYCODONE HYDROCHLORIDE 10 MG: 10 TABLET ORAL at 13:44

## 2023-12-29 RX ADMIN — ACETAMINOPHEN 650 MG: 325 TABLET ORAL at 09:42

## 2023-12-29 RX ADMIN — ONDANSETRON 4 MG: 2 INJECTION INTRAMUSCULAR; INTRAVENOUS at 20:25

## 2023-12-29 RX ADMIN — MAGNESIUM SULFATE HEPTAHYDRATE 4 G: 40 INJECTION, SOLUTION INTRAVENOUS at 20:08

## 2023-12-29 RX ADMIN — TRAZODONE HYDROCHLORIDE 50 MG: 50 TABLET ORAL at 21:36

## 2023-12-29 RX ADMIN — MAGNESIUM HYDROXIDE 30 ML: 400 SUSPENSION ORAL at 11:14

## 2023-12-29 RX ADMIN — MELATONIN TAB 3 MG 3 MG: 3 TAB at 21:36

## 2023-12-29 RX ADMIN — METHOCARBAMOL TABLETS 500 MG: 500 TABLET, COATED ORAL at 21:36

## 2023-12-29 RX ADMIN — THIAMINE HCL TAB 100 MG 100 MG: 100 TAB at 09:44

## 2023-12-29 RX ADMIN — METHOCARBAMOL TABLETS 500 MG: 500 TABLET, COATED ORAL at 17:29

## 2023-12-29 RX ADMIN — DICYCLOMINE HYDROCHLORIDE 20 MG: 20 TABLET ORAL at 11:14

## 2023-12-29 RX ADMIN — PANTOPRAZOLE SODIUM 40 MG: 40 TABLET, DELAYED RELEASE ORAL at 05:55

## 2023-12-29 RX ADMIN — ACETAMINOPHEN 650 MG: 325 TABLET ORAL at 17:29

## 2023-12-29 RX ADMIN — OXYCODONE HYDROCHLORIDE 10 MG: 10 TABLET ORAL at 04:51

## 2023-12-29 RX ADMIN — SPIRONOLACTONE 50 MG: 25 TABLET, FILM COATED ORAL at 09:43

## 2023-12-29 RX ADMIN — FLUOXETINE 20 MG: 20 CAPSULE ORAL at 18:37

## 2023-12-29 RX ADMIN — DICYCLOMINE HYDROCHLORIDE 20 MG: 20 TABLET ORAL at 17:29

## 2023-12-29 RX ADMIN — POLYETHYLENE GLYCOL 3350 17 G: 17 POWDER, FOR SOLUTION ORAL at 09:42
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
## 2023-12-29 NOTE — PLAN OF CARE
Problem: PAIN - ADULT  Goal: Verbalizes/displays adequate comfort level or baseline comfort level  Description: Interventions:  - Encourage patient to monitor pain and request assistance  - Assess pain using appropriate pain scale  - Administer analgesics based on type and severity of pain and evaluate response  - Implement non-pharmacological measures as appropriate and evaluate response  - Consider cultural and social influences on pain and pain management  - Notify physician/advanced practitioner if interventions unsuccessful or patient reports new pain  Outcome: Progressing     Problem: INFECTION - ADULT  Goal: Absence or prevention of progression during hospitalization  Description: INTERVENTIONS:  - Assess and monitor for signs and symptoms of infection  - Monitor lab/diagnostic results  - Monitor all insertion sites, i.e. indwelling lines, tubes, and drains  - Monitor endotracheal if appropriate and nasal secretions for changes in amount and color  - [place] appropriate cooling/warming therapies per order  - Administer medications as ordered  - Instruct and encourage patient and family to use good hand hygiene technique  - Identify and instruct in appropriate isolation precautions for identified infection/condition  Outcome: Progressing  Goal: Absence of fever/infection during neutropenic period  Description: INTERVENTIONS:  - Monitor WBC    Outcome: Progressing

## 2023-12-29 NOTE — ASSESSMENT & PLAN NOTE
Multiple history of DVTs.  Was on Coumadin 50 mg for management but was discontinued when IVC filter (infrarenal) was placed on 9/2023.  Will start 5-day bridge of warfarin with Lovenox starting today.  Warfarin day 1 of 5. Previously on warfarin 5 mg    Plan:  Warfarin 5 mg 1 dose last night and discontinued  Lovenox 1 mg/kg twice daily  Hold foot-pounds
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
## 2023-12-29 NOTE — ASSESSMENT & PLAN NOTE
Blood Pressure: 96/52   Likely secondary to alcoholism however had a weak positive anti-smooth muscle antibody test.  Spironolactone 50 mg daily for management held due to soft blood pressure  Follows [facility] gastroenterologist specialist as an outpatient.    Plan:  Continue current management with spironolactone.  Monitor BP closely.  Re-start spirinolactone once bp stable   Orthopedics

## 2023-12-29 NOTE — ASSESSMENT & PLAN NOTE
UA: positive leukocytes, negative nitrites  Urine microscopy: positive leukocytes and moderate bacteria  Possibly 2/2 increased risk of UTI d/t indwelling percutaneous nephrostomy tube.  UA culture positive for E. coli.  Will continue current regimen with Keflex.    Plan:  Continue Keflex
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
## 2023-12-29 NOTE — PLAN OF CARE
Problem: PAIN - ADULT  Goal: Verbalizes/displays adequate comfort level or baseline comfort level  Description: Interventions:  - Encourage patient to monitor pain and request assistance  - Assess pain using appropriate pain scale  - Administer analgesics based on type and severity of pain and evaluate response  - Implement non-pharmacological measures as appropriate and evaluate response  - Consider cultural and social influences on pain and pain management  - Notify physician/advanced practitioner if interventions unsuccessful or patient reports new pain  Outcome: Progressing     Problem: INFECTION - ADULT  Goal: Absence or prevention of progression during hospitalization  Description: INTERVENTIONS:  - Assess and monitor for signs and symptoms of infection  - Monitor lab/diagnostic results  - Monitor all insertion sites, i.e. indwelling lines, tubes, and drains  - Monitor endotracheal if appropriate and nasal secretions for changes in amount and color  - [place] appropriate cooling/warming therapies per order  - Administer medications as ordered  - Instruct and encourage patient and family to use good hand hygiene technique  - Identify and instruct in appropriate isolation precautions for identified infection/condition  Outcome: Progressing  Goal: Absence of fever/infection during neutropenic period  Description: INTERVENTIONS:  - Monitor WBC    Outcome: Progressing     Problem: SAFETY ADULT  Goal: Patient will remain free of falls  Description: INTERVENTIONS:  - Educate patient/family on patient safety including physical limitations  - Instruct patient to call for assistance with activity   - Consult OT/PT to assist with strengthening/mobility   - Keep Call bell within reach  - Keep bed low and locked with side rails adjusted as appropriate  - Keep care items and personal belongings within reach  - Initiate and maintain comfort rounds  - Make Fall Risk Sign visible to staff  - Apply yellow socks and bracelet  for high fall risk patients  - Consider moving patient to room near nurses station  Outcome: Progressing  Goal: Maintain or return to baseline ADL function  Description: INTERVENTIONS:  -  Assess patient's ability to carry out ADLs; assess patient's baseline for ADL function and identify physical deficits which impact ability to perform ADLs (bathing, care of mouth/teeth, toileting, grooming, dressing, etc.)  - Assess/evaluate cause of self-care deficits   - Assess range of motion  - Assess patient's mobility; develop plan if impaired  - Assess patient's need for assistive devices and provide as appropriate  - Encourage maximum independence but intervene and supervise when necessary  - Involve family in performance of ADLs  - Assess for home care needs following discharge   - Consider OT consult to assist with ADL evaluation and planning for discharge  - Provide patient education as appropriate  Outcome: Progressing  Goal: Maintains/Returns to pre admission functional level  Description: INTERVENTIONS:  - Perform AM-PAC 6 Click Basic Mobility/ Daily Activity assessment daily.  - Set and communicate daily mobility goal to care team and patient/family/caregiver.   - Collaborate with rehabilitation services on mobility goals if consulted  - Perform Range of Motion 2 times a day.  - Reposition patient every 2 hours.  - Dangle patient 2 times a day  - Stand patient 2 times a day  - Ambulate patient 2 times a day  - Out of bed to chair 2 times a day   - Out of bed for meals 2 times a day  - Out of bed for toileting  - Record patient progress and toleration of activity level   Outcome: Progressing     Problem: DISCHARGE PLANNING  Goal: Discharge to home or other facility with appropriate resources  Description: INTERVENTIONS:  - Identify barriers to discharge w/patient and caregiver  - Arrange for needed discharge resources and transportation as appropriate  - Identify discharge learning needs (meds, wound care, etc.)  -  Arrange for interpretive services to assist at discharge as needed  - Refer to Case Management Department for coordinating discharge planning if the patient needs post-hospital services based on physician/advanced practitioner order or complex needs related to functional status, cognitive ability, or social support system  Outcome: Progressing     Problem: Knowledge Deficit  Goal: Patient/family/caregiver demonstrates understanding of disease process, treatment plan, medications, and discharge instructions  Description: Complete learning assessment and assess knowledge base.  Interventions:  - Provide teaching at level of understanding  - Provide teaching via preferred learning methods  Outcome: Progressing     Problem: Prexisting or High Potential for Compromised Skin Integrity  Goal: Skin integrity is maintained or improved  Description: INTERVENTIONS:  - Identify patients at risk for skin breakdown  - Assess and monitor skin integrity  - Assess and monitor nutrition and hydration status  - Monitor labs   - Assess for incontinence   - Turn and reposition patient  - Assist with mobility/ambulation  - Relieve pressure over bony prominences  - Avoid friction and shearing  - Provide appropriate hygiene as needed including keeping skin clean and dry  - Evaluate need for skin moisturizer/barrier cream  - Collaborate with interdisciplinary team   - Patient/family teaching  - Consider wound care consult   Outcome: Progressing

## 2023-12-30 LAB
ALBUMIN SERPL BCP-MCNC: 1.8 G/DL (ref 3.5–5)
ALP SERPL-CCNC: 112 U/L (ref 34–104)
ALT SERPL W P-5'-P-CCNC: 13 U/L (ref 7–52)
ANION GAP SERPL CALCULATED.3IONS-SCNC: 8 MMOL/L
AST SERPL W P-5'-P-CCNC: 16 U/L (ref 13–39)
BASOPHILS # BLD AUTO: 0.02 THOUSANDS/ÂΜL (ref 0–0.1)
BASOPHILS NFR BLD AUTO: 0 % (ref 0–1)
BILIRUB SERPL-MCNC: 0.96 MG/DL (ref 0.2–1)
BUN SERPL-MCNC: 15 MG/DL (ref 5–25)
CALCIUM ALBUM COR SERPL-MCNC: 9.2 MG/DL (ref 8.3–10.1)
CALCIUM SERPL-MCNC: 7.4 MG/DL (ref 8.4–10.2)
CHLORIDE SERPL-SCNC: 102 MMOL/L (ref 96–108)
CO2 SERPL-SCNC: 26 MMOL/L (ref 21–32)
CREAT SERPL-MCNC: 0.62 MG/DL (ref 0.6–1.3)
EOSINOPHIL # BLD AUTO: 0.01 THOUSAND/ÂΜL (ref 0–0.61)
EOSINOPHIL NFR BLD AUTO: 0 % (ref 0–6)
ERYTHROCYTE [DISTWIDTH] IN BLOOD BY AUTOMATED COUNT: 16.9 % (ref 11.6–15.1)
GFR SERPL CREATININE-BSD FRML MDRD: 106 ML/MIN/1.73SQ M
GLUCOSE SERPL-MCNC: 71 MG/DL (ref 65–140)
HCT VFR BLD AUTO: 24 % (ref 34.8–46.1)
HGB BLD-MCNC: 8.2 G/DL (ref 11.5–15.4)
IMM GRANULOCYTES # BLD AUTO: 0.05 THOUSAND/UL (ref 0–0.2)
IMM GRANULOCYTES NFR BLD AUTO: 0 % (ref 0–2)
LYMPHOCYTES # BLD AUTO: 3.1 THOUSANDS/ÂΜL (ref 0.6–4.47)
LYMPHOCYTES NFR BLD AUTO: 27 % (ref 14–44)
MAGNESIUM SERPL-MCNC: 2.4 MG/DL (ref 1.9–2.7)
MCH RBC QN AUTO: 31.1 PG (ref 26.8–34.3)
MCHC RBC AUTO-ENTMCNC: 34.2 G/DL (ref 31.4–37.4)
MCV RBC AUTO: 91 FL (ref 82–98)
MONOCYTES # BLD AUTO: 0.76 THOUSAND/ÂΜL (ref 0.17–1.22)
MONOCYTES NFR BLD AUTO: 7 % (ref 4–12)
NEUTROPHILS # BLD AUTO: 7.5 THOUSANDS/ÂΜL (ref 1.85–7.62)
NEUTS SEG NFR BLD AUTO: 66 % (ref 43–75)
NRBC BLD AUTO-RTO: 0 /100 WBCS
PHOSPHATE SERPL-MCNC: 2.8 MG/DL (ref 2.7–4.5)
PLATELET # BLD AUTO: 348 THOUSANDS/UL (ref 149–390)
PMV BLD AUTO: 10.3 FL (ref 8.9–12.7)
POTASSIUM SERPL-SCNC: 3.8 MMOL/L (ref 3.5–5.3)
PROT SERPL-MCNC: 5.2 G/DL (ref 6.4–8.4)
RBC # BLD AUTO: 2.64 MILLION/UL (ref 3.81–5.12)
SODIUM SERPL-SCNC: 136 MMOL/L (ref 135–147)
WBC # BLD AUTO: 11.44 THOUSAND/UL (ref 4.31–10.16)

## 2023-12-30 PROCEDURE — 85025 COMPLETE CBC W/AUTO DIFF WBC: CPT | Performed by: STUDENT IN AN ORGANIZED HEALTH CARE EDUCATION/TRAINING PROGRAM

## 2023-12-30 PROCEDURE — 99232 SBSQ HOSP IP/OBS MODERATE 35: CPT | Performed by: STUDENT IN AN ORGANIZED HEALTH CARE EDUCATION/TRAINING PROGRAM

## 2023-12-30 PROCEDURE — 84100 ASSAY OF PHOSPHORUS: CPT | Performed by: STUDENT IN AN ORGANIZED HEALTH CARE EDUCATION/TRAINING PROGRAM

## 2023-12-30 PROCEDURE — 80053 COMPREHEN METABOLIC PANEL: CPT | Performed by: STUDENT IN AN ORGANIZED HEALTH CARE EDUCATION/TRAINING PROGRAM

## 2023-12-30 PROCEDURE — 83735 ASSAY OF MAGNESIUM: CPT | Performed by: STUDENT IN AN ORGANIZED HEALTH CARE EDUCATION/TRAINING PROGRAM

## 2023-12-30 RX ORDER — ENOXAPARIN SODIUM 100 MG/ML
1 INJECTION SUBCUTANEOUS EVERY 12 HOURS SCHEDULED
Status: DISCONTINUED | OUTPATIENT
Start: 2023-12-30 | End: 2023-12-31 | Stop reason: HOSPADM

## 2023-12-30 RX ORDER — WARFARIN SODIUM 5 MG/1
5 TABLET ORAL
Status: DISCONTINUED | OUTPATIENT
Start: 2023-12-30 | End: 2023-12-31

## 2023-12-30 RX ORDER — ENOXAPARIN SODIUM 100 MG/ML
1 INJECTION SUBCUTANEOUS EVERY 12 HOURS SCHEDULED
Status: DISCONTINUED | OUTPATIENT
Start: 2023-12-30 | End: 2023-12-30

## 2023-12-30 RX ORDER — LACTULOSE 10 G/15ML
30 SOLUTION ORAL ONCE
Status: COMPLETED | OUTPATIENT
Start: 2023-12-30 | End: 2023-12-30

## 2023-12-30 RX ORDER — ENOXAPARIN SODIUM 100 MG/ML
50 INJECTION SUBCUTANEOUS ONCE
Status: COMPLETED | OUTPATIENT
Start: 2023-12-30 | End: 2023-12-30

## 2023-12-30 RX ADMIN — MAGNESIUM HYDROXIDE 30 ML: 400 SUSPENSION ORAL at 08:35

## 2023-12-30 RX ADMIN — ACETAMINOPHEN 650 MG: 325 TABLET ORAL at 16:36

## 2023-12-30 RX ADMIN — TRAZODONE HYDROCHLORIDE 50 MG: 50 TABLET ORAL at 21:44

## 2023-12-30 RX ADMIN — MELATONIN TAB 3 MG 3 MG: 3 TAB at 21:44

## 2023-12-30 RX ADMIN — ENOXAPARIN SODIUM 40 MG: 40 INJECTION SUBCUTANEOUS at 08:35

## 2023-12-30 RX ADMIN — OXYCODONE HYDROCHLORIDE 10 MG: 10 TABLET ORAL at 08:36

## 2023-12-30 RX ADMIN — CEPHALEXIN 500 MG: 500 CAPSULE ORAL at 06:02

## 2023-12-30 RX ADMIN — DICYCLOMINE HYDROCHLORIDE 20 MG: 20 TABLET ORAL at 17:16

## 2023-12-30 RX ADMIN — ACETAMINOPHEN 650 MG: 325 TABLET ORAL at 08:36

## 2023-12-30 RX ADMIN — DICYCLOMINE HYDROCHLORIDE 20 MG: 20 TABLET ORAL at 23:30

## 2023-12-30 RX ADMIN — DICYCLOMINE HYDROCHLORIDE 20 MG: 20 TABLET ORAL at 00:43

## 2023-12-30 RX ADMIN — CYANOCOBALAMIN TAB 1000 MCG 1000 MCG: 1000 TAB at 08:35

## 2023-12-30 RX ADMIN — CEPHALEXIN 500 MG: 500 CAPSULE ORAL at 00:42

## 2023-12-30 RX ADMIN — ENOXAPARIN SODIUM 90 MG: 100 INJECTION SUBCUTANEOUS at 21:44

## 2023-12-30 RX ADMIN — Medication 400 UNITS: at 08:35

## 2023-12-30 RX ADMIN — ENOXAPARIN SODIUM 50 MG: 60 INJECTION SUBCUTANEOUS at 12:54

## 2023-12-30 RX ADMIN — WARFARIN SODIUM 5 MG: 5 TABLET ORAL at 22:24

## 2023-12-30 RX ADMIN — METHOCARBAMOL TABLETS 500 MG: 500 TABLET, COATED ORAL at 08:35

## 2023-12-30 RX ADMIN — OXYCODONE HYDROCHLORIDE 10 MG: 10 TABLET ORAL at 00:54

## 2023-12-30 RX ADMIN — DICYCLOMINE HYDROCHLORIDE 20 MG: 20 TABLET ORAL at 06:02

## 2023-12-30 RX ADMIN — METHOCARBAMOL TABLETS 500 MG: 500 TABLET, COATED ORAL at 16:27

## 2023-12-30 RX ADMIN — CEPHALEXIN 500 MG: 500 CAPSULE ORAL at 12:17

## 2023-12-30 RX ADMIN — FOLIC ACID 1 MG: 5 INJECTION, SOLUTION INTRAMUSCULAR; INTRAVENOUS; SUBCUTANEOUS at 08:53

## 2023-12-30 RX ADMIN — METHOCARBAMOL TABLETS 500 MG: 500 TABLET, COATED ORAL at 21:44

## 2023-12-30 RX ADMIN — THIAMINE HCL TAB 100 MG 100 MG: 100 TAB at 08:35

## 2023-12-30 RX ADMIN — SPIRONOLACTONE 50 MG: 25 TABLET, FILM COATED ORAL at 08:35

## 2023-12-30 RX ADMIN — PANTOPRAZOLE SODIUM 40 MG: 40 TABLET, DELAYED RELEASE ORAL at 06:02

## 2023-12-30 RX ADMIN — SENNOSIDES AND DOCUSATE SODIUM 1 TABLET: 8.6; 5 TABLET ORAL at 21:44

## 2023-12-30 RX ADMIN — OXYCODONE HYDROCHLORIDE 10 MG: 10 TABLET ORAL at 19:52

## 2023-12-30 RX ADMIN — CEPHALEXIN 500 MG: 500 CAPSULE ORAL at 23:30

## 2023-12-30 RX ADMIN — DICYCLOMINE HYDROCHLORIDE 20 MG: 20 TABLET ORAL at 12:17

## 2023-12-30 RX ADMIN — ACETAMINOPHEN 650 MG: 325 TABLET ORAL at 00:52

## 2023-12-30 RX ADMIN — LACTULOSE 30 G: 20 SOLUTION ORAL at 23:30

## 2023-12-30 RX ADMIN — FLUOXETINE 20 MG: 20 CAPSULE ORAL at 08:35

## 2023-12-30 RX ADMIN — CEPHALEXIN 500 MG: 500 CAPSULE ORAL at 17:16

## 2023-12-30 NOTE — QUICK NOTE
1)Chronic DVT/antiphospholipid syndrome  -Reached out to hematology, and agreements that patient should be on full anticoagulation and eventually transition to warfarin.  -Anticoagulation decision will be made tomorrow after IR discussion had regarding if she needs a procedure    2) Adnexal mass  -Redemonstrated 6.6 cm cystic structure with thickened septation in the right adnexa   -Currently getting ultrasound for follow-up regarding her adnexal max  -Will reach out to IR after ultrasound is read  -Will need to be transferred if IR needs to have intervention.    3) 3.5 x 3.0 cm fluid density structure in the midline omentum   -IR requested previous imaging to compare  -Will likely need drainage.  -Imaging obtained currently in PACS    4) Depression/PTSD  -Will start patient on Prozac 20 mg daily for her depression  -Will give patient trazodone for sleep
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
## 2023-12-30 NOTE — ASSESSMENT & PLAN NOTE
Pelvic ultrasound done on 12/29 notable for 6.3 x 6.8 x 6.7 complex cystic lesion corresponding to the CT finding (image 42.)  In the right adnexa.  The differential is still a complex right ovarian cyst versus hydrosalpinx.   CT without contrast Stable loculated fluid collection in the ventral midline pelvic omentum partially protruding into pelvic diastases recti. No enteric contrast in this structure. Differential includes seroma or abscess.   Nonobstructive small bowel pattern may represent ileus.   Stable 6.5 cm right adnexal cystic structure. This may represent a complex ovarian cyst or hydrosalpinx. Nonemergent pelvic ultrasound follow-up is recommended.  CT with IV Contrast done 12/31  Redemonstrated rim-enhancing collection within the anterior pelvis extending into the pelvic wall concerning for possible abscess versus less likely seroma.  2.  Complex multiloculated right adnexal cystic lesion as described. Differential considerations include pyosalpinx versus tubo-ovarian abscess versus less likely hematosalpinx or complex ovarian cyst.  Plan  -Will consult IR for further recommendations

## 2023-12-30 NOTE — PLAN OF CARE
Problem: PAIN - ADULT  Goal: Verbalizes/displays adequate comfort level or baseline comfort level  Description: Interventions:  - Encourage patient to monitor pain and request assistance  - Assess pain using appropriate pain scale  - Administer analgesics based on type and severity of pain and evaluate response  - Implement non-pharmacological measures as appropriate and evaluate response  - Consider cultural and social influences on pain and pain management  - Notify physician/advanced practitioner if interventions unsuccessful or patient reports new pain  Outcome: Progressing     Problem: INFECTION - ADULT  Goal: Absence or prevention of progression during hospitalization  Description: INTERVENTIONS:  - Assess and monitor for signs and symptoms of infection  - Monitor lab/diagnostic results  - Monitor all insertion sites, i.e. indwelling lines, tubes, and drains  - Monitor endotracheal if appropriate and nasal secretions for changes in amount and color  - [place] appropriate cooling/warming therapies per order  - Administer medications as ordered  - Instruct and encourage patient and family to use good hand hygiene technique  - Identify and instruct in appropriate isolation precautions for identified infection/condition  Outcome: Progressing

## 2023-12-30 NOTE — PLAN OF CARE
Problem: PAIN - ADULT  Goal: Verbalizes/displays adequate comfort level or baseline comfort level  Description: Interventions:  - Encourage patient to monitor pain and request assistance  - Assess pain using appropriate pain scale  - Administer analgesics based on type and severity of pain and evaluate response  - Implement non-pharmacological measures as appropriate and evaluate response  - Consider cultural and social influences on pain and pain management  - Notify physician/advanced practitioner if interventions unsuccessful or patient reports new pain  Outcome: Progressing     Problem: INFECTION - ADULT  Goal: Absence or prevention of progression during hospitalization  Description: INTERVENTIONS:  - Assess and monitor for signs and symptoms of infection  - Monitor lab/diagnostic results  - Monitor all insertion sites, i.e. indwelling lines, tubes, and drains  - Monitor endotracheal if appropriate and nasal secretions for changes in amount and color  - Cyrus appropriate cooling/warming therapies per order  - Administer medications as ordered  - Instruct and encourage patient and family to use good hand hygiene technique  - Identify and instruct in appropriate isolation precautions for identified infection/condition  Outcome: Progressing  Goal: Absence of fever/infection during neutropenic period  Description: INTERVENTIONS:  - Monitor WBC    Outcome: Progressing     Problem: SAFETY ADULT  Goal: Patient will remain free of falls  Description: INTERVENTIONS:  - Educate patient/family on patient safety including physical limitations  - Instruct patient to call for assistance with activity   - Consult OT/PT to assist with strengthening/mobility   - Keep Call bell within reach  - Keep bed low and locked with side rails adjusted as appropriate  - Keep care items and personal belongings within reach  - Initiate and maintain comfort rounds  - Make Fall Risk Sign visible to staff  - Offer Toileting every 2 Hours,  in advance of need  - Apply yellow socks and bracelet for high fall risk patients  - Consider moving patient to room near nurses station  Outcome: Progressing  Goal: Maintain or return to baseline ADL function  Description: INTERVENTIONS:  -  Assess patient's ability to carry out ADLs; assess patient's baseline for ADL function and identify physical deficits which impact ability to perform ADLs (bathing, care of mouth/teeth, toileting, grooming, dressing, etc.)  - Assess/evaluate cause of self-care deficits   - Assess range of motion  - Assess patient's mobility; develop plan if impaired  - Assess patient's need for assistive devices and provide as appropriate  - Encourage maximum independence but intervene and supervise when necessary  - Involve family in performance of ADLs  - Assess for home care needs following discharge   - Consider OT consult to assist with ADL evaluation and planning for discharge  - Provide patient education as appropriate  Outcome: Progressing  Goal: Maintains/Returns to pre admission functional level  Description: INTERVENTIONS:  - Perform AM-PAC 6 Click Basic Mobility/ Daily Activity assessment daily.  - Set and communicate daily mobility goal to care team and patient/family/caregiver.   - Collaborate with rehabilitation services on mobility goals if consulted  - Perform Range of Motion 2 times a day.  - Reposition patient every 2 hours.  - Dangle patient 2 times a day  - Stand patient 2 times a day  - Ambulate patient 2 times a day  - Out of bed to chair 2 times a day   - Out of bed for meals 2 times a day  - Out of bed for toileting  - Record patient progress and toleration of activity level   Outcome: Progressing     Problem: DISCHARGE PLANNING  Goal: Discharge to home or other facility with appropriate resources  Description: INTERVENTIONS:  - Identify barriers to discharge w/patient and caregiver  - Arrange for needed discharge resources and transportation as appropriate  - Identify  discharge learning needs (meds, wound care, etc.)  - Arrange for interpretive services to assist at discharge as needed  - Refer to Case Management Department for coordinating discharge planning if the patient needs post-hospital services based on physician/advanced practitioner order or complex needs related to functional status, cognitive ability, or social support system  Outcome: Progressing     Problem: Knowledge Deficit  Goal: Patient/family/caregiver demonstrates understanding of disease process, treatment plan, medications, and discharge instructions  Description: Complete learning assessment and assess knowledge base.  Interventions:  - Provide teaching at level of understanding  - Provide teaching via preferred learning methods  Outcome: Progressing     Problem: Prexisting or High Potential for Compromised Skin Integrity  Goal: Skin integrity is maintained or improved  Description: INTERVENTIONS:  - Identify patients at risk for skin breakdown  - Assess and monitor skin integrity  - Assess and monitor nutrition and hydration status  - Monitor labs   - Assess for incontinence   - Turn and reposition patient  - Assist with mobility/ambulation  - Relieve pressure over bony prominences  - Avoid friction and shearing  - Provide appropriate hygiene as needed including keeping skin clean and dry  - Evaluate need for skin moisturizer/barrier cream  - Collaborate with interdisciplinary team   - Patient/family teaching  - Consider wound care consult   Outcome: Progressing     Problem: Nutrition/Hydration-ADULT  Goal: Nutrient/Hydration intake appropriate for improving, restoring or maintaining nutritional needs  Description: Monitor and assess patient's nutrition/hydration status for malnutrition. Collaborate with interdisciplinary team and initiate plan and interventions as ordered.  Monitor patient's weight and dietary intake as ordered or per policy. Utilize nutrition screening tool and intervene as necessary.  Determine patient's food preferences and provide high-protein, high-caloric foods as appropriate.     INTERVENTIONS:  - Monitor oral intake, urinary output, labs, and treatment plans  - Assess nutrition and hydration status and recommend course of action  - Evaluate amount of meals eaten  - Assist patient with eating if necessary   - Allow adequate time for meals  - Recommend/ encourage appropriate diets, oral nutritional supplements, and vitamin/mineral supplements  - Order, calculate, and assess calorie counts as needed  - Recommend, monitor, and adjust tube feedings and TPN/PPN based on assessed needs  - Assess need for intravenous fluids  - Provide specific nutrition/hydration education as appropriate  - Include patient/family/caregiver in decisions related to nutrition  Outcome: Progressing

## 2023-12-31 ENCOUNTER — APPOINTMENT (INPATIENT)
Dept: CT IMAGING | Facility: HOSPITAL | Age: 48
DRG: 207 | End: 2023-12-31
Payer: COMMERCIAL

## 2023-12-31 ENCOUNTER — HOSPITAL ENCOUNTER (INPATIENT)
Facility: HOSPITAL | Age: 48
LOS: 11 days | Discharge: ANOTHER HOSPITAL | DRG: 721 | End: 2024-01-11
Attending: STUDENT IN AN ORGANIZED HEALTH CARE EDUCATION/TRAINING PROGRAM | Admitting: STUDENT IN AN ORGANIZED HEALTH CARE EDUCATION/TRAINING PROGRAM
Payer: COMMERCIAL

## 2023-12-31 VITALS
HEIGHT: 66 IN | DIASTOLIC BLOOD PRESSURE: 60 MMHG | BODY MASS INDEX: 32.92 KG/M2 | OXYGEN SATURATION: 97 % | RESPIRATION RATE: 18 BRPM | HEART RATE: 97 BPM | SYSTOLIC BLOOD PRESSURE: 120 MMHG | TEMPERATURE: 98.5 F | WEIGHT: 204.81 LBS

## 2023-12-31 DIAGNOSIS — K65.1 INTRA-ABDOMINAL ABSCESS (HCC): Primary | ICD-10-CM

## 2023-12-31 DIAGNOSIS — R10.9 NONSPECIFIC ABDOMINAL PAIN: ICD-10-CM

## 2023-12-31 DIAGNOSIS — I82.402 ACUTE THROMBOEMBOLISM OF DEEP VEINS OF LEFT LOWER EXTREMITY (HCC): ICD-10-CM

## 2023-12-31 DIAGNOSIS — S31.139S GUNSHOT WOUND OF ABDOMEN, SEQUELA: ICD-10-CM

## 2023-12-31 DIAGNOSIS — F43.10 POSTTRAUMATIC STRESS DISORDER: ICD-10-CM

## 2023-12-31 DIAGNOSIS — D68.61 ANTIPHOSPHOLIPID SYNDROME (HCC): ICD-10-CM

## 2023-12-31 DIAGNOSIS — N94.89 ADNEXAL MASS: ICD-10-CM

## 2023-12-31 PROBLEM — R65.10 SIRS (SYSTEMIC INFLAMMATORY RESPONSE SYNDROME) (HCC): Status: RESOLVED | Noted: 2023-12-31 | Resolved: 2023-12-31

## 2023-12-31 PROBLEM — R65.10 SIRS (SYSTEMIC INFLAMMATORY RESPONSE SYNDROME) (HCC): Status: ACTIVE | Noted: 2023-12-31

## 2023-12-31 LAB
ALBUMIN SERPL BCP-MCNC: 1.7 G/DL (ref 3.5–5)
ALP SERPL-CCNC: 113 U/L (ref 34–104)
ALT SERPL W P-5'-P-CCNC: 36 U/L (ref 7–52)
ANION GAP SERPL CALCULATED.3IONS-SCNC: 7 MMOL/L
AST SERPL W P-5'-P-CCNC: 79 U/L (ref 13–39)
BACTERIA UR CULT: ABNORMAL
BASOPHILS # BLD AUTO: 0.01 THOUSANDS/ÂΜL (ref 0–0.1)
BASOPHILS NFR BLD AUTO: 0 % (ref 0–1)
BILIRUB SERPL-MCNC: 1.18 MG/DL (ref 0.2–1)
BUN SERPL-MCNC: 14 MG/DL (ref 5–25)
CALCIUM ALBUM COR SERPL-MCNC: 9.3 MG/DL (ref 8.3–10.1)
CALCIUM SERPL-MCNC: 7.5 MG/DL (ref 8.4–10.2)
CHLORIDE SERPL-SCNC: 99 MMOL/L (ref 96–108)
CO2 SERPL-SCNC: 25 MMOL/L (ref 21–32)
CREAT SERPL-MCNC: 0.69 MG/DL (ref 0.6–1.3)
EOSINOPHIL # BLD AUTO: 0 THOUSAND/ÂΜL (ref 0–0.61)
EOSINOPHIL NFR BLD AUTO: 0 % (ref 0–6)
ERYTHROCYTE [DISTWIDTH] IN BLOOD BY AUTOMATED COUNT: 16.6 % (ref 11.6–15.1)
GFR SERPL CREATININE-BSD FRML MDRD: 103 ML/MIN/1.73SQ M
GLUCOSE SERPL-MCNC: 105 MG/DL (ref 65–140)
HCT VFR BLD AUTO: 23.1 % (ref 34.8–46.1)
HGB BLD-MCNC: 7.6 G/DL (ref 11.5–15.4)
IMM GRANULOCYTES # BLD AUTO: 0.18 THOUSAND/UL (ref 0–0.2)
IMM GRANULOCYTES NFR BLD AUTO: 1 % (ref 0–2)
LACTATE SERPL-SCNC: 2.4 MMOL/L (ref 0.5–2)
LACTATE SERPL-SCNC: 2.6 MMOL/L (ref 0.5–2)
LACTATE SERPL-SCNC: 2.8 MMOL/L (ref 0.5–2)
LACTATE SERPL-SCNC: 3 MMOL/L (ref 0.5–2)
LYMPHOCYTES # BLD AUTO: 3.67 THOUSANDS/ÂΜL (ref 0.6–4.47)
LYMPHOCYTES NFR BLD AUTO: 16 % (ref 14–44)
MCH RBC QN AUTO: 30.5 PG (ref 26.8–34.3)
MCHC RBC AUTO-ENTMCNC: 32.9 G/DL (ref 31.4–37.4)
MCV RBC AUTO: 93 FL (ref 82–98)
MONOCYTES # BLD AUTO: 1.42 THOUSAND/ÂΜL (ref 0.17–1.22)
MONOCYTES NFR BLD AUTO: 6 % (ref 4–12)
NEUTROPHILS # BLD AUTO: 17.1 THOUSANDS/ÂΜL (ref 1.85–7.62)
NEUTS SEG NFR BLD AUTO: 77 % (ref 43–75)
NRBC BLD AUTO-RTO: 0 /100 WBCS
PLATELET # BLD AUTO: 502 THOUSANDS/UL (ref 149–390)
PMV BLD AUTO: 9.4 FL (ref 8.9–12.7)
POTASSIUM SERPL-SCNC: 4.3 MMOL/L (ref 3.5–5.3)
PROCALCITONIN SERPL-MCNC: 1.61 NG/ML
PROT SERPL-MCNC: 5.3 G/DL (ref 6.4–8.4)
RBC # BLD AUTO: 2.49 MILLION/UL (ref 3.81–5.12)
SODIUM SERPL-SCNC: 131 MMOL/L (ref 135–147)
WBC # BLD AUTO: 22.38 THOUSAND/UL (ref 4.31–10.16)

## 2023-12-31 PROCEDURE — 74177 CT ABD & PELVIS W/CONTRAST: CPT

## 2023-12-31 PROCEDURE — 87040 BLOOD CULTURE FOR BACTERIA: CPT

## 2023-12-31 PROCEDURE — G1004 CDSM NDSC: HCPCS

## 2023-12-31 PROCEDURE — 83605 ASSAY OF LACTIC ACID: CPT

## 2023-12-31 PROCEDURE — 99223 1ST HOSP IP/OBS HIGH 75: CPT

## 2023-12-31 PROCEDURE — 99238 HOSP IP/OBS DSCHRG MGMT 30/<: CPT | Performed by: STUDENT IN AN ORGANIZED HEALTH CARE EDUCATION/TRAINING PROGRAM

## 2023-12-31 PROCEDURE — 80053 COMPREHEN METABOLIC PANEL: CPT

## 2023-12-31 PROCEDURE — 84145 PROCALCITONIN (PCT): CPT

## 2023-12-31 PROCEDURE — 85025 COMPLETE CBC W/AUTO DIFF WBC: CPT

## 2023-12-31 PROCEDURE — NC001 PR NO CHARGE: Performed by: RADIOLOGY

## 2023-12-31 RX ORDER — VANCOMYCIN HYDROCHLORIDE 1 G/20ML
INJECTION, POWDER, LYOPHILIZED, FOR SOLUTION INTRAVENOUS
Status: COMPLETED
Start: 2023-12-31 | End: 2023-12-31

## 2023-12-31 RX ORDER — LANOLIN ALCOHOL/MO/W.PET/CERES
3 CREAM (GRAM) TOPICAL
Status: CANCELLED | OUTPATIENT
Start: 2023-12-31

## 2023-12-31 RX ORDER — CEFEPIME HYDROCHLORIDE 2 G/50ML
INJECTION, SOLUTION INTRAVENOUS
Status: DISPENSED
Start: 2023-12-31 | End: 2023-12-31

## 2023-12-31 RX ORDER — METHOCARBAMOL 500 MG/1
500 TABLET, FILM COATED ORAL 3 TIMES DAILY
Status: CANCELLED | OUTPATIENT
Start: 2023-12-31

## 2023-12-31 RX ORDER — FLUOXETINE HYDROCHLORIDE 20 MG/1
20 CAPSULE ORAL DAILY
Status: CANCELLED | OUTPATIENT
Start: 2023-12-31

## 2023-12-31 RX ORDER — ONDANSETRON 2 MG/ML
4 INJECTION INTRAMUSCULAR; INTRAVENOUS EVERY 8 HOURS PRN
Status: DISCONTINUED | OUTPATIENT
Start: 2023-12-31 | End: 2024-01-11 | Stop reason: HOSPADM

## 2023-12-31 RX ORDER — CEFEPIME HYDROCHLORIDE 2 G/50ML
2000 INJECTION, SOLUTION INTRAVENOUS EVERY 12 HOURS
Status: DISCONTINUED | OUTPATIENT
Start: 2023-12-31 | End: 2023-12-31 | Stop reason: HOSPADM

## 2023-12-31 RX ORDER — OMEGA-3S/DHA/EPA/FISH OIL/D3 300MG-1000
400 CAPSULE ORAL DAILY
Status: CANCELLED | OUTPATIENT
Start: 2023-12-31

## 2023-12-31 RX ORDER — ONDANSETRON 2 MG/ML
4 INJECTION INTRAMUSCULAR; INTRAVENOUS EVERY 8 HOURS PRN
Status: CANCELLED | OUTPATIENT
Start: 2023-12-31

## 2023-12-31 RX ORDER — ACETAMINOPHEN 325 MG/1
650 TABLET ORAL EVERY 8 HOURS
Status: DISCONTINUED | OUTPATIENT
Start: 2024-01-01 | End: 2024-01-11 | Stop reason: HOSPADM

## 2023-12-31 RX ORDER — LANOLIN ALCOHOL/MO/W.PET/CERES
100 CREAM (GRAM) TOPICAL DAILY
Status: CANCELLED | OUTPATIENT
Start: 2023-12-31

## 2023-12-31 RX ORDER — LANOLIN ALCOHOL/MO/W.PET/CERES
3 CREAM (GRAM) TOPICAL
Status: DISCONTINUED | OUTPATIENT
Start: 2023-12-31 | End: 2024-01-11 | Stop reason: HOSPADM

## 2023-12-31 RX ORDER — TRAZODONE HYDROCHLORIDE 50 MG/1
50 TABLET ORAL
Status: DISCONTINUED | OUTPATIENT
Start: 2024-01-01 | End: 2023-12-31

## 2023-12-31 RX ORDER — SODIUM CHLORIDE 9 MG/ML
125 INJECTION, SOLUTION INTRAVENOUS CONTINUOUS
Status: DISCONTINUED | OUTPATIENT
Start: 2023-12-31 | End: 2023-12-31 | Stop reason: HOSPADM

## 2023-12-31 RX ORDER — SPIRONOLACTONE 25 MG/1
50 TABLET ORAL DAILY
Status: CANCELLED | OUTPATIENT
Start: 2023-12-31

## 2023-12-31 RX ORDER — FLUOXETINE HYDROCHLORIDE 20 MG/1
20 CAPSULE ORAL DAILY
Status: DISCONTINUED | OUTPATIENT
Start: 2024-01-01 | End: 2024-01-11 | Stop reason: HOSPADM

## 2023-12-31 RX ORDER — ENOXAPARIN SODIUM 100 MG/ML
1 INJECTION SUBCUTANEOUS EVERY 12 HOURS SCHEDULED
Status: DISCONTINUED | OUTPATIENT
Start: 2024-01-01 | End: 2024-01-05

## 2023-12-31 RX ORDER — OXYCODONE HYDROCHLORIDE 10 MG/1
10 TABLET ORAL EVERY 6 HOURS PRN
Status: CANCELLED | OUTPATIENT
Start: 2023-12-31

## 2023-12-31 RX ORDER — SODIUM CHLORIDE, SODIUM GLUCONATE, SODIUM ACETATE, POTASSIUM CHLORIDE, MAGNESIUM CHLORIDE, SODIUM PHOSPHATE, DIBASIC, AND POTASSIUM PHOSPHATE .53; .5; .37; .037; .03; .012; .00082 G/100ML; G/100ML; G/100ML; G/100ML; G/100ML; G/100ML; G/100ML
125 INJECTION, SOLUTION INTRAVENOUS CONTINUOUS
Status: DISCONTINUED | OUTPATIENT
Start: 2023-12-31 | End: 2024-01-04

## 2023-12-31 RX ORDER — CEPHALEXIN 500 MG/1
500 CAPSULE ORAL EVERY 6 HOURS SCHEDULED
Status: CANCELLED | OUTPATIENT
Start: 2023-12-31

## 2023-12-31 RX ORDER — ACETAMINOPHEN 325 MG/1
650 TABLET ORAL EVERY 8 HOURS
Status: CANCELLED | OUTPATIENT
Start: 2023-12-31

## 2023-12-31 RX ORDER — AMOXICILLIN 250 MG
1 CAPSULE ORAL
Status: CANCELLED | OUTPATIENT
Start: 2023-12-31

## 2023-12-31 RX ORDER — OXYCODONE HYDROCHLORIDE 10 MG/1
10 TABLET ORAL EVERY 6 HOURS PRN
Status: DISCONTINUED | OUTPATIENT
Start: 2023-12-31 | End: 2024-01-11 | Stop reason: HOSPADM

## 2023-12-31 RX ORDER — METRONIDAZOLE 500 MG/100ML
500 INJECTION, SOLUTION INTRAVENOUS EVERY 8 HOURS
Status: DISCONTINUED | OUTPATIENT
Start: 2023-12-31 | End: 2023-12-31 | Stop reason: HOSPADM

## 2023-12-31 RX ORDER — CEFEPIME HYDROCHLORIDE 2 G/50ML
2000 INJECTION, SOLUTION INTRAVENOUS EVERY 12 HOURS
Status: DISCONTINUED | OUTPATIENT
Start: 2024-01-01 | End: 2024-01-04

## 2023-12-31 RX ORDER — PANTOPRAZOLE SODIUM 40 MG/1
40 TABLET, DELAYED RELEASE ORAL DAILY
Status: CANCELLED | OUTPATIENT
Start: 2023-12-31

## 2023-12-31 RX ORDER — PANTOPRAZOLE SODIUM 40 MG/1
40 TABLET, DELAYED RELEASE ORAL DAILY
Status: DISCONTINUED | OUTPATIENT
Start: 2024-01-01 | End: 2024-01-11 | Stop reason: HOSPADM

## 2023-12-31 RX ORDER — TRAZODONE HYDROCHLORIDE 50 MG/1
50 TABLET ORAL
Status: DISCONTINUED | OUTPATIENT
Start: 2023-12-31 | End: 2024-01-11 | Stop reason: HOSPADM

## 2023-12-31 RX ORDER — METHOCARBAMOL 500 MG/1
500 TABLET, FILM COATED ORAL 3 TIMES DAILY
Status: DISCONTINUED | OUTPATIENT
Start: 2024-01-01 | End: 2024-01-06

## 2023-12-31 RX ORDER — DICYCLOMINE HCL 20 MG
20 TABLET ORAL
Status: DISCONTINUED | OUTPATIENT
Start: 2023-12-31 | End: 2024-01-02

## 2023-12-31 RX ORDER — LANOLIN ALCOHOL/MO/W.PET/CERES
3 CREAM (GRAM) TOPICAL
Status: DISCONTINUED | OUTPATIENT
Start: 2024-01-01 | End: 2024-01-11 | Stop reason: HOSPADM

## 2023-12-31 RX ORDER — METRONIDAZOLE 500 MG/100ML
500 INJECTION, SOLUTION INTRAVENOUS EVERY 8 HOURS
Status: DISCONTINUED | OUTPATIENT
Start: 2024-01-01 | End: 2024-01-03

## 2023-12-31 RX ORDER — OMEGA-3S/DHA/EPA/FISH OIL/D3 300MG-1000
400 CAPSULE ORAL DAILY
Status: DISCONTINUED | OUTPATIENT
Start: 2024-01-01 | End: 2024-01-11 | Stop reason: HOSPADM

## 2023-12-31 RX ORDER — LANOLIN ALCOHOL/MO/W.PET/CERES
100 CREAM (GRAM) TOPICAL DAILY
Status: DISCONTINUED | OUTPATIENT
Start: 2024-01-01 | End: 2024-01-11 | Stop reason: HOSPADM

## 2023-12-31 RX ORDER — TRAZODONE HYDROCHLORIDE 50 MG/1
50 TABLET ORAL
Status: CANCELLED | OUTPATIENT
Start: 2023-12-31

## 2023-12-31 RX ORDER — AMOXICILLIN 250 MG
1 CAPSULE ORAL
Status: DISCONTINUED | OUTPATIENT
Start: 2024-01-01 | End: 2024-01-11 | Stop reason: HOSPADM

## 2023-12-31 RX ORDER — LIDOCAINE HYDROCHLORIDE 20 MG/ML
1 JELLY TOPICAL ONCE
Status: COMPLETED | OUTPATIENT
Start: 2023-12-31 | End: 2023-12-31

## 2023-12-31 RX ADMIN — METRONIDAZOLE 500 MG: 500 INJECTION, SOLUTION INTRAVENOUS at 15:36

## 2023-12-31 RX ADMIN — SODIUM CHLORIDE 1000 ML: 0.9 INJECTION, SOLUTION INTRAVENOUS at 05:24

## 2023-12-31 RX ADMIN — PANTOPRAZOLE SODIUM 40 MG: 40 TABLET, DELAYED RELEASE ORAL at 05:07

## 2023-12-31 RX ADMIN — METHOCARBAMOL TABLETS 500 MG: 500 TABLET, COATED ORAL at 21:07

## 2023-12-31 RX ADMIN — IOHEXOL 100 ML: 350 INJECTION, SOLUTION INTRAVENOUS at 10:33

## 2023-12-31 RX ADMIN — ACETAMINOPHEN 650 MG: 325 TABLET ORAL at 00:31

## 2023-12-31 RX ADMIN — SODIUM CHLORIDE, SODIUM GLUCONATE, SODIUM ACETATE, POTASSIUM CHLORIDE, MAGNESIUM CHLORIDE, SODIUM PHOSPHATE, DIBASIC, AND POTASSIUM PHOSPHATE 75 ML/HR: .53; .5; .37; .037; .03; .012; .00082 INJECTION, SOLUTION INTRAVENOUS at 23:43

## 2023-12-31 RX ADMIN — Medication 400 UNITS: at 08:24

## 2023-12-31 RX ADMIN — ENOXAPARIN SODIUM 90 MG: 100 INJECTION SUBCUTANEOUS at 21:07

## 2023-12-31 RX ADMIN — FLUOXETINE 20 MG: 20 CAPSULE ORAL at 08:23

## 2023-12-31 RX ADMIN — DICYCLOMINE HYDROCHLORIDE 20 MG: 20 TABLET ORAL at 11:51

## 2023-12-31 RX ADMIN — ACETAMINOPHEN 650 MG: 325 TABLET ORAL at 08:25

## 2023-12-31 RX ADMIN — ENOXAPARIN SODIUM 90 MG: 100 INJECTION SUBCUTANEOUS at 08:23

## 2023-12-31 RX ADMIN — METRONIDAZOLE 500 MG: 500 INJECTION, SOLUTION INTRAVENOUS at 10:26

## 2023-12-31 RX ADMIN — THIAMINE HCL TAB 100 MG 100 MG: 100 TAB at 08:23

## 2023-12-31 RX ADMIN — SODIUM CHLORIDE 125 ML/HR: 0.9 INJECTION, SOLUTION INTRAVENOUS at 10:41

## 2023-12-31 RX ADMIN — MAGNESIUM HYDROXIDE 30 ML: 400 SUSPENSION ORAL at 08:26

## 2023-12-31 RX ADMIN — DICYCLOMINE HYDROCHLORIDE 20 MG: 20 TABLET ORAL at 23:41

## 2023-12-31 RX ADMIN — DICYCLOMINE HYDROCHLORIDE 20 MG: 20 TABLET ORAL at 17:11

## 2023-12-31 RX ADMIN — CYANOCOBALAMIN TAB 1000 MCG 1000 MCG: 1000 TAB at 08:24

## 2023-12-31 RX ADMIN — CEFEPIME HYDROCHLORIDE 2000 MG: 2 INJECTION, SOLUTION INTRAVENOUS at 08:29

## 2023-12-31 RX ADMIN — OXYCODONE HYDROCHLORIDE 10 MG: 10 TABLET ORAL at 14:14

## 2023-12-31 RX ADMIN — OXYCODONE HYDROCHLORIDE 10 MG: 10 TABLET ORAL at 23:41

## 2023-12-31 RX ADMIN — SPIRONOLACTONE 50 MG: 25 TABLET, FILM COATED ORAL at 08:24

## 2023-12-31 RX ADMIN — OXYCODONE HYDROCHLORIDE 10 MG: 10 TABLET ORAL at 04:07

## 2023-12-31 RX ADMIN — DICYCLOMINE HYDROCHLORIDE 20 MG: 20 TABLET ORAL at 05:07

## 2023-12-31 RX ADMIN — ACETAMINOPHEN 650 MG: 325 TABLET ORAL at 17:08

## 2023-12-31 RX ADMIN — SODIUM CHLORIDE 1000 ML: 0.9 INJECTION, SOLUTION INTRAVENOUS at 02:44

## 2023-12-31 RX ADMIN — LIDOCAINE HYDROCHLORIDE 1 APPLICATION: 20 JELLY TOPICAL at 05:08

## 2023-12-31 RX ADMIN — VANCOMYCIN HYDROCHLORIDE 1250 MG: 1 INJECTION, POWDER, LYOPHILIZED, FOR SOLUTION INTRAVENOUS at 05:13

## 2023-12-31 RX ADMIN — FOLIC ACID 1 MG: 5 INJECTION, SOLUTION INTRAMUSCULAR; INTRAVENOUS; SUBCUTANEOUS at 08:29

## 2023-12-31 RX ADMIN — METHOCARBAMOL TABLETS 500 MG: 500 TABLET, COATED ORAL at 08:23

## 2023-12-31 RX ADMIN — METHOCARBAMOL TABLETS 500 MG: 500 TABLET, COATED ORAL at 15:36

## 2023-12-31 RX ADMIN — METRONIDAZOLE 500 MG: 500 INJECTION, SOLUTION INTRAVENOUS at 23:51

## 2023-12-31 RX ADMIN — TRAZODONE HYDROCHLORIDE 50 MG: 50 TABLET ORAL at 23:41

## 2023-12-31 RX ADMIN — VANCOMYCIN HYDROCHLORIDE 1250 MG: 1 INJECTION, POWDER, LYOPHILIZED, FOR SOLUTION INTRAVENOUS at 17:09

## 2023-12-31 RX ADMIN — Medication 3 MG: at 23:41

## 2023-12-31 RX ADMIN — CEFEPIME HYDROCHLORIDE 2000 MG: 2 INJECTION, SOLUTION INTRAVENOUS at 17:48
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
## 2023-12-31 NOTE — PLAN OF CARE
Problem: PAIN - ADULT  Goal: Verbalizes/displays adequate comfort level or baseline comfort level  Description: Interventions:  - Encourage patient to monitor pain and request assistance  - Assess pain using appropriate pain scale  - Administer analgesics based on type and severity of pain and evaluate response  - Implement non-pharmacological measures as appropriate and evaluate response  - Consider cultural and social influences on pain and pain management  - Notify physician/advanced practitioner if interventions unsuccessful or patient reports new pain  Outcome: Progressing     Problem: INFECTION - ADULT  Goal: Absence or prevention of progression during hospitalization  Description: INTERVENTIONS:  - Assess and monitor for signs and symptoms of infection  - Monitor lab/diagnostic results  - Monitor all insertion sites, i.e. indwelling lines, tubes, and drains  - Monitor endotracheal if appropriate and nasal secretions for changes in amount and color  - [place] appropriate cooling/warming therapies per order  - Administer medications as ordered  - Instruct and encourage patient and family to use good hand hygiene technique  - Identify and instruct in appropriate isolation precautions for identified infection/condition  Outcome: Progressing  Goal: Absence of fever/infection during neutropenic period  Description: INTERVENTIONS:  - Monitor WBC    Outcome: Progressing     Problem: SAFETY ADULT  Goal: Patient will remain free of falls  Description: INTERVENTIONS:  - Educate patient/family on patient safety including physical limitations  - Instruct patient to call for assistance with activity   - Consult OT/PT to assist with strengthening/mobility   - Keep Call bell within reach  - Keep bed low and locked with side rails adjusted as appropriate  - Keep care items and personal belongings within reach  - Initiate and maintain comfort rounds  - Make Fall Risk Sign visible to staff  - Offer Toileting every 2 Hours,  in advance of need  - Apply yellow socks and bracelet for high fall risk patients  - Consider moving patient to room near nurses station  Outcome: Progressing  Goal: Maintain or return to baseline ADL function  Description: INTERVENTIONS:  -  Assess patient's ability to carry out ADLs; assess patient's baseline for ADL function and identify physical deficits which impact ability to perform ADLs (bathing, care of mouth/teeth, toileting, grooming, dressing, etc.)  - Assess/evaluate cause of self-care deficits   - Assess range of motion  - Assess patient's mobility; develop plan if impaired  - Assess patient's need for assistive devices and provide as appropriate  - Encourage maximum independence but intervene and supervise when necessary  - Involve family in performance of ADLs  - Assess for home care needs following discharge   - Consider OT consult to assist with ADL evaluation and planning for discharge  - Provide patient education as appropriate  Outcome: Progressing  Goal: Maintains/Returns to pre admission functional level  Description: INTERVENTIONS:  - Perform AM-PAC 6 Click Basic Mobility/ Daily Activity assessment daily.  - Set and communicate daily mobility goal to care team and patient/family/caregiver.   - Collaborate with rehabilitation services on mobility goals if consulted  - Perform Range of Motion 2 times a day.  - Reposition patient every 2 hours.  - Dangle patient 2 times a day  - Stand patient 2 times a day  - Ambulate patient 2 times a day  - Out of bed to chair 2 times a day   - Out of bed for meals 2 times a day  - Out of bed for toileting  - Record patient progress and toleration of activity level   Outcome: Progressing     Problem: DISCHARGE PLANNING  Goal: Discharge to home or other facility with appropriate resources  Description: INTERVENTIONS:  - Identify barriers to discharge w/patient and caregiver  - Arrange for needed discharge resources and transportation as appropriate  - Identify  discharge learning needs (meds, wound care, etc.)  - Arrange for interpretive services to assist at discharge as needed  - Refer to Case Management Department for coordinating discharge planning if the patient needs post-hospital services based on physician/advanced practitioner order or complex needs related to functional status, cognitive ability, or social support system  Outcome: Progressing     Problem: Knowledge Deficit  Goal: Patient/family/caregiver demonstrates understanding of disease process, treatment plan, medications, and discharge instructions  Description: Complete learning assessment and assess knowledge base.  Interventions:  - Provide teaching at level of understanding  - Provide teaching via preferred learning methods  Outcome: Progressing     Problem: Prexisting or High Potential for Compromised Skin Integrity  Goal: Skin integrity is maintained or improved  Description: INTERVENTIONS:  - Identify patients at risk for skin breakdown  - Assess and monitor skin integrity  - Assess and monitor nutrition and hydration status  - Monitor labs   - Assess for incontinence   - Turn and reposition patient  - Assist with mobility/ambulation  - Relieve pressure over bony prominences  - Avoid friction and shearing  - Provide appropriate hygiene as needed including keeping skin clean and dry  - Evaluate need for skin moisturizer/barrier cream  - Collaborate with interdisciplinary team   - Patient/family teaching  - Consider wound care consult   Outcome: Progressing     Problem: Nutrition/Hydration-ADULT  Goal: Nutrient/Hydration intake appropriate for improving, restoring or maintaining nutritional needs  Description: Monitor and assess patient's nutrition/hydration status for malnutrition. Collaborate with interdisciplinary team and initiate plan and interventions as ordered.  Monitor patient's weight and dietary intake as ordered or per policy. Utilize nutrition screening tool and intervene as necessary.  Determine patient's food preferences and provide high-protein, high-caloric foods as appropriate.     INTERVENTIONS:  - Monitor oral intake, urinary output, labs, and treatment plans  - Assess nutrition and hydration status and recommend course of action  - Evaluate amount of meals eaten  - Assist patient with eating if necessary   - Allow adequate time for meals  - Recommend/ encourage appropriate diets, oral nutritional supplements, and vitamin/mineral supplements  - Order, calculate, and assess calorie counts as needed  - Recommend, monitor, and adjust tube feedings and TPN/PPN based on assessed needs  - Assess need for intravenous fluids  - Provide specific nutrition/hydration education as appropriate  - Include patient/family/caregiver in decisions related to nutrition  Outcome: Progressing

## 2023-12-31 NOTE — PLAN OF CARE
Problem: PAIN - ADULT  Goal: Verbalizes/displays adequate comfort level or baseline comfort level  Description: Interventions:  - Encourage patient to monitor pain and request assistance  - Assess pain using appropriate pain scale  - Administer analgesics based on type and severity of pain and evaluate response  - Implement non-pharmacological measures as appropriate and evaluate response  - Consider cultural and social influences on pain and pain management  - Notify physician/advanced practitioner if interventions unsuccessful or patient reports new pain  Outcome: Progressing     Problem: INFECTION - ADULT  Goal: Absence or prevention of progression during hospitalization  Description: INTERVENTIONS:  - Assess and monitor for signs and symptoms of infection  - Monitor lab/diagnostic results  - Monitor all insertion sites, i.e. indwelling lines, tubes, and drains  - Monitor endotracheal if appropriate and nasal secretions for changes in amount and color  - [place] appropriate cooling/warming therapies per order  - Administer medications as ordered  - Instruct and encourage patient and family to use good hand hygiene technique  - Identify and instruct in appropriate isolation precautions for identified infection/condition  Outcome: Progressing     Problem: INFECTION - ADULT  Goal: Absence of fever/infection during neutropenic period  Description: INTERVENTIONS:  - Monitor WBC    Outcome: Progressing     Problem: SAFETY ADULT  Goal: Maintains/Returns to pre admission functional level  Description: INTERVENTIONS:  - Perform AM-PAC 6 Click Basic Mobility/ Daily Activity assessment daily.  - Set and communicate daily mobility goal to care team and patient/family/caregiver.   - Collaborate with rehabilitation services on mobility goals if consulted  - Perform Range of Motion  times a day.  - Reposition patient every  hours.  - Dangle patient  times a day  - Stand patient  times a day  - Ambulate patient  times a  day  - Out of bed to chair  times a day   - Out of bed for meals  times a day  - Out of bed for toileting  - Record patient progress and toleration of activity level   Outcome: Progressing

## 2023-12-31 NOTE — PLAN OF CARE
Problem: PAIN - ADULT  Goal: Verbalizes/displays adequate comfort level or baseline comfort level  Description: Interventions:  - Encourage patient to monitor pain and request assistance  - Assess pain using appropriate pain scale  - Administer analgesics based on type and severity of pain and evaluate response  - Implement non-pharmacological measures as appropriate and evaluate response  - Consider cultural and social influences on pain and pain management  - Notify physician/advanced practitioner if interventions unsuccessful or patient reports new pain  12/31/2023 1408 by Willa Nath RN  Outcome: Adequate for Discharge  12/31/2023 1405 by Willa Nath RN  Outcome: Progressing     Problem: INFECTION - ADULT  Goal: Absence or prevention of progression during hospitalization  Description: INTERVENTIONS:  - Assess and monitor for signs and symptoms of infection  - Monitor lab/diagnostic results  - Monitor all insertion sites, i.e. indwelling lines, tubes, and drains  - Monitor endotracheal if appropriate and nasal secretions for changes in amount and color  - [place] appropriate cooling/warming therapies per order  - Administer medications as ordered  - Instruct and encourage patient and family to use good hand hygiene technique  - Identify and instruct in appropriate isolation precautions for identified infection/condition  12/31/2023 1408 by Willa Nath RN  Outcome: Adequate for Discharge  12/31/2023 1405 by Willa Nath RN  Outcome: Progressing  Goal: Absence of fever/infection during neutropenic period  Description: INTERVENTIONS:  - Monitor WBC    12/31/2023 1408 by Willa Nath RN  Outcome: Adequate for Discharge  12/31/2023 1405 by Willa Nath RN  Outcome: Progressing     Problem: SAFETY ADULT  Goal: Patient will remain free of falls  Description: INTERVENTIONS:  - Educate patient/family on patient safety including physical limitations  - Instruct patient to call for assistance  with activity   - Consult OT/PT to assist with strengthening/mobility   - Keep Call bell within reach  - Keep bed low and locked with side rails adjusted as appropriate  - Keep care items and personal belongings within reach  - Initiate and maintain comfort rounds  - Make Fall Risk Sign visible to staff  - Offer Toileting every 2 Hours, in advance of need  - Apply yellow socks and bracelet for high fall risk patients  - Consider moving patient to room near nurses station  12/31/2023 1408 by Willa Nath RN  Outcome: Adequate for Discharge  12/31/2023 1405 by Willa Nath RN  Outcome: Progressing  Goal: Maintain or return to baseline ADL function  Description: INTERVENTIONS:  -  Assess patient's ability to carry out ADLs; assess patient's baseline for ADL function and identify physical deficits which impact ability to perform ADLs (bathing, care of mouth/teeth, toileting, grooming, dressing, etc.)  - Assess/evaluate cause of self-care deficits   - Assess range of motion  - Assess patient's mobility; develop plan if impaired  - Assess patient's need for assistive devices and provide as appropriate  - Encourage maximum independence but intervene and supervise when necessary  - Involve family in performance of ADLs  - Assess for home care needs following discharge   - Consider OT consult to assist with ADL evaluation and planning for discharge  - Provide patient education as appropriate  12/31/2023 1408 by Willa Nath RN  Outcome: Adequate for Discharge  12/31/2023 1405 by Willa Nath RN  Outcome: Progressing  Goal: Maintains/Returns to pre admission functional level  Description: INTERVENTIONS:  - Perform AM-PAC 6 Click Basic Mobility/ Daily Activity assessment daily.  - Set and communicate daily mobility goal to care team and patient/family/caregiver.   - Collaborate with rehabilitation services on mobility goals if consulted  - Perform Range of Motion 2 times a day.  - Reposition patient every 2  hours.  - Dangle patient 2 times a day  - Stand patient 2 times a day  - Ambulate patient 2 times a day  - Out of bed to chair 2 times a day   - Out of bed for meals 2 times a day  - Out of bed for toileting  - Record patient progress and toleration of activity level   12/31/2023 1408 by Willa Nath RN  Outcome: Adequate for Discharge  12/31/2023 1405 by Willa Nath RN  Outcome: Progressing     Problem: DISCHARGE PLANNING  Goal: Discharge to home or other facility with appropriate resources  Description: INTERVENTIONS:  - Identify barriers to discharge w/patient and caregiver  - Arrange for needed discharge resources and transportation as appropriate  - Identify discharge learning needs (meds, wound care, etc.)  - Arrange for interpretive services to assist at discharge as needed  - Refer to Case Management Department for coordinating discharge planning if the patient needs post-hospital services based on physician/advanced practitioner order or complex needs related to functional status, cognitive ability, or social support system  12/31/2023 1408 by Willa Nath RN  Outcome: Adequate for Discharge  12/31/2023 1405 by Willa Nath RN  Outcome: Progressing     Problem: Knowledge Deficit  Goal: Patient/family/caregiver demonstrates understanding of disease process, treatment plan, medications, and discharge instructions  Description: Complete learning assessment and assess knowledge base.  Interventions:  - Provide teaching at level of understanding  - Provide teaching via preferred learning methods  12/31/2023 1408 by Willa Nath RN  Outcome: Adequate for Discharge  12/31/2023 1405 by Willa Nath RN  Outcome: Progressing     Problem: Prexisting or High Potential for Compromised Skin Integrity  Goal: Skin integrity is maintained or improved  Description: INTERVENTIONS:  - Identify patients at risk for skin breakdown  - Assess and monitor skin integrity  - Assess and monitor nutrition and  hydration status  - Monitor labs   - Assess for incontinence   - Turn and reposition patient  - Assist with mobility/ambulation  - Relieve pressure over bony prominences  - Avoid friction and shearing  - Provide appropriate hygiene as needed including keeping skin clean and dry  - Evaluate need for skin moisturizer/barrier cream  - Collaborate with interdisciplinary team   - Patient/family teaching  - Consider wound care consult   12/31/2023 1408 by Willa Nath RN  Outcome: Adequate for Discharge  12/31/2023 1405 by Willa Nath RN  Outcome: Progressing     Problem: Nutrition/Hydration-ADULT  Goal: Nutrient/Hydration intake appropriate for improving, restoring or maintaining nutritional needs  Description: Monitor and assess patient's nutrition/hydration status for malnutrition. Collaborate with interdisciplinary team and initiate plan and interventions as ordered.  Monitor patient's weight and dietary intake as ordered or per policy. Utilize nutrition screening tool and intervene as necessary. Determine patient's food preferences and provide high-protein, high-caloric foods as appropriate.     INTERVENTIONS:  - Monitor oral intake, urinary output, labs, and treatment plans  - Assess nutrition and hydration status and recommend course of action  - Evaluate amount of meals eaten  - Assist patient with eating if necessary   - Allow adequate time for meals  - Recommend/ encourage appropriate diets, oral nutritional supplements, and vitamin/mineral supplements  - Order, calculate, and assess calorie counts as needed  - Recommend, monitor, and adjust tube feedings and TPN/PPN based on assessed needs  - Assess need for intravenous fluids  - Provide specific nutrition/hydration education as appropriate  - Include patient/family/caregiver in decisions related to nutrition  12/31/2023 1408 by Willa Nath RN  Outcome: Adequate for Discharge  12/31/2023 1405 by Willa Nath RN  Outcome: Progressing

## 2023-12-31 NOTE — PROGRESS NOTES
Mckenna Fischer is a 48 y.o. female who is currently ordered Vancomycin IV with management by the Pharmacy Consult service.  Relevant clinical data and objective / subjective history reviewed.  Vancomycin Assessment:  Indication and Goal AUC/Trough: Bacteremia (goal -600, trough >10); Soft tissue (goal -600, trough >10)  Clinical Status: stable  Micro:     Renal Function:  SCr: 0.69 mg/dL  CrCl: 114.4 mL/min  Renal replacement: Not on dialysis  Days of Therapy: 0  Current Dose: 1250mg IV every 12 hours  Vancomycin Plan:  Continue Dose: 1250mg IV every 12 hours  Estimated AUC: 469 mcg*hr/mL  Estimated Trough: 14.2 mcg/mL  Next Level: 1/1/24 with am labs  Renal Function Monitoring: Daily BMP and UOP  Pharmacy will continue to follow closely for s/sx of nephrotoxicity, infusion reactions and appropriateness of therapy.  BMP and CBC will be ordered per protocol. We will continue to follow the patient’s culture results and clinical progress daily.    Kathleen Sharma, Pharmacist

## 2023-12-31 NOTE — ASSESSMENT & PLAN NOTE
Patient met SIRS criteria last night with temp of 101, heart rate 118 and leukocytosis 22  Most likely abscess in etiology given CT of abdomen showing fluid density structure in the ventral midline pelvic omentum protruding between rectus abdominis diastases  Lactic of 3.0 , trending down >2.6>2.4  She got 2L bolus and is on maintenance fluids.  CT Abdomen with contrast Redemonstrated rim-enhancing collection within the anterior pelvis extending into the pelvic wall concerning for possible abscess versus less likely seroma.  2.  Complex multiloculated right adnexal cystic lesion as described. Differential considerations include pyosalpinx versus tubo-ovarian abscess versus less likely hematosalpinx or complex ovarian cyst.  Plan:  Monitor vitals signs  Maintenance IV fluids  Cefepime vancomycin and metronidazole  Trend lactic   Follow-up blood culturesX2  Transfer to IR facility

## 2023-12-31 NOTE — INCIDENTAL FINDINGS
"The following findings require follow up:  Radiographic finding   Finding: \"Stable loculated fluid collection in the ventral midline pelvic omentum partially protruding into pelvic diastases recti...Stable 6.5 cm right adnexal cystic structure. This may represent a complex ovarian cyst or hydrosalpinx.\"    Follow up required: yes   Follow up should be done within IR and OB/GYN 1week(s)      "
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
## 2024-01-01 PROBLEM — R65.20 SEVERE SEPSIS (HCC): Status: ACTIVE | Noted: 2023-12-31

## 2024-01-01 PROBLEM — A41.9 SEVERE SEPSIS (HCC): Status: ACTIVE | Noted: 2023-12-31

## 2024-01-01 LAB
ALBUMIN SERPL BCP-MCNC: <1.5 G/DL (ref 3.5–5)
ALP SERPL-CCNC: 107 U/L (ref 34–104)
ALT SERPL W P-5'-P-CCNC: 34 U/L (ref 7–52)
ANION GAP SERPL CALCULATED.3IONS-SCNC: 3 MMOL/L
AST SERPL W P-5'-P-CCNC: 42 U/L (ref 13–39)
BASOPHILS # BLD AUTO: 0.02 THOUSANDS/ÂΜL (ref 0–0.1)
BASOPHILS NFR BLD AUTO: 0 % (ref 0–1)
BILIRUB SERPL-MCNC: 0.7 MG/DL (ref 0.2–1)
BUN SERPL-MCNC: 11 MG/DL (ref 5–25)
CALCIUM ALBUM COR SERPL-MCNC: >8.8 MG/DL (ref 8.3–10.1)
CALCIUM SERPL-MCNC: 6.8 MG/DL (ref 8.4–10.2)
CHLORIDE SERPL-SCNC: 103 MMOL/L (ref 96–108)
CO2 SERPL-SCNC: 26 MMOL/L (ref 21–32)
CREAT SERPL-MCNC: 0.57 MG/DL (ref 0.6–1.3)
EOSINOPHIL # BLD AUTO: 0.01 THOUSAND/ÂΜL (ref 0–0.61)
EOSINOPHIL NFR BLD AUTO: 0 % (ref 0–6)
ERYTHROCYTE [DISTWIDTH] IN BLOOD BY AUTOMATED COUNT: 17.3 % (ref 11.6–15.1)
GFR SERPL CREATININE-BSD FRML MDRD: 109 ML/MIN/1.73SQ M
GLUCOSE SERPL-MCNC: 80 MG/DL (ref 65–140)
HCT VFR BLD AUTO: 15 % (ref 34.8–46.1)
HCT VFR BLD AUTO: 24.6 % (ref 34.8–46.1)
HGB BLD-MCNC: 4.9 G/DL (ref 11.5–15.4)
HGB BLD-MCNC: 8.1 G/DL (ref 11.5–15.4)
IMM GRANULOCYTES # BLD AUTO: 0.19 THOUSAND/UL (ref 0–0.2)
IMM GRANULOCYTES NFR BLD AUTO: 1 % (ref 0–2)
LACTATE SERPL-SCNC: 2 MMOL/L (ref 0.5–2)
LYMPHOCYTES # BLD AUTO: 2.87 THOUSANDS/ÂΜL (ref 0.6–4.47)
LYMPHOCYTES NFR BLD AUTO: 17 % (ref 14–44)
MCH RBC QN AUTO: 31.4 PG (ref 26.8–34.3)
MCHC RBC AUTO-ENTMCNC: 32.7 G/DL (ref 31.4–37.4)
MCV RBC AUTO: 96 FL (ref 82–98)
MONOCYTES # BLD AUTO: 1.28 THOUSAND/ÂΜL (ref 0.17–1.22)
MONOCYTES NFR BLD AUTO: 8 % (ref 4–12)
NEUTROPHILS # BLD AUTO: 12.52 THOUSANDS/ÂΜL (ref 1.85–7.62)
NEUTS SEG NFR BLD AUTO: 74 % (ref 43–75)
NRBC BLD AUTO-RTO: 0 /100 WBCS
PLATELET # BLD AUTO: 186 THOUSANDS/UL (ref 149–390)
PMV BLD AUTO: 10.1 FL (ref 8.9–12.7)
POTASSIUM SERPL-SCNC: 4.2 MMOL/L (ref 3.5–5.3)
PROCALCITONIN SERPL-MCNC: 3.86 NG/ML
PROT SERPL-MCNC: 4.5 G/DL (ref 6.4–8.4)
RBC # BLD AUTO: 1.56 MILLION/UL (ref 3.81–5.12)
SODIUM SERPL-SCNC: 132 MMOL/L (ref 135–147)
VANCOMYCIN SERPL-MCNC: 8.2 UG/ML (ref 10–20)
WBC # BLD AUTO: 16.89 THOUSAND/UL (ref 4.31–10.16)

## 2024-01-01 PROCEDURE — 84145 PROCALCITONIN (PCT): CPT | Performed by: INTERNAL MEDICINE

## 2024-01-01 PROCEDURE — 99232 SBSQ HOSP IP/OBS MODERATE 35: CPT | Performed by: INTERNAL MEDICINE

## 2024-01-01 PROCEDURE — 85025 COMPLETE CBC W/AUTO DIFF WBC: CPT | Performed by: INTERNAL MEDICINE

## 2024-01-01 PROCEDURE — 85018 HEMOGLOBIN: CPT

## 2024-01-01 PROCEDURE — 85014 HEMATOCRIT: CPT

## 2024-01-01 PROCEDURE — 80202 ASSAY OF VANCOMYCIN: CPT | Performed by: INTERNAL MEDICINE

## 2024-01-01 PROCEDURE — 80053 COMPREHEN METABOLIC PANEL: CPT | Performed by: INTERNAL MEDICINE

## 2024-01-01 PROCEDURE — 83605 ASSAY OF LACTIC ACID: CPT | Performed by: INTERNAL MEDICINE

## 2024-01-01 RX ORDER — CEFEPIME HYDROCHLORIDE 2 G/50ML
2000 INJECTION, SOLUTION INTRAVENOUS EVERY 12 HOURS
Status: DISCONTINUED | OUTPATIENT
Start: 2024-01-01 | End: 2024-01-01 | Stop reason: SDUPTHER

## 2024-01-01 RX ORDER — METRONIDAZOLE 500 MG/100ML
500 INJECTION, SOLUTION INTRAVENOUS EVERY 8 HOURS
Status: DISCONTINUED | OUTPATIENT
Start: 2024-01-01 | End: 2024-01-01 | Stop reason: SDUPTHER

## 2024-01-01 RX ORDER — CEPHALEXIN 500 MG/1
500 CAPSULE ORAL EVERY 6 HOURS SCHEDULED
Status: DISCONTINUED | OUTPATIENT
Start: 2024-01-01 | End: 2024-01-01

## 2024-01-01 RX ORDER — SPIRONOLACTONE 50 MG/1
50 TABLET, FILM COATED ORAL DAILY
Status: DISCONTINUED | OUTPATIENT
Start: 2024-01-01 | End: 2024-01-02

## 2024-01-01 RX ORDER — SODIUM CHLORIDE 9 MG/ML
125 INJECTION, SOLUTION INTRAVENOUS CONTINUOUS
Status: DISCONTINUED | OUTPATIENT
Start: 2024-01-01 | End: 2024-01-02

## 2024-01-01 RX ORDER — ALBUMIN, HUMAN INJ 5% 5 %
25 SOLUTION INTRAVENOUS ONCE
Status: COMPLETED | OUTPATIENT
Start: 2024-01-01 | End: 2024-01-02

## 2024-01-01 RX ADMIN — METRONIDAZOLE 500 MG: 500 INJECTION, SOLUTION INTRAVENOUS at 08:33

## 2024-01-01 RX ADMIN — Medication 3 MG: at 22:42

## 2024-01-01 RX ADMIN — FOLIC ACID 1 MG: 5 INJECTION, SOLUTION INTRAMUSCULAR; INTRAVENOUS; SUBCUTANEOUS at 08:34

## 2024-01-01 RX ADMIN — ONDANSETRON 4 MG: 2 INJECTION INTRAMUSCULAR; INTRAVENOUS at 15:05

## 2024-01-01 RX ADMIN — CEPHALEXIN 500 MG: 500 CAPSULE ORAL at 14:45

## 2024-01-01 RX ADMIN — CHOLECALCIFEROL TAB 10 MCG (400 UNIT) 400 UNITS: 10 TAB at 08:46

## 2024-01-01 RX ADMIN — MAGNESIUM HYDROXIDE 30 ML: 400 SUSPENSION ORAL at 08:33

## 2024-01-01 RX ADMIN — CEFEPIME HYDROCHLORIDE 2000 MG: 2 INJECTION, SOLUTION INTRAVENOUS at 05:40

## 2024-01-01 RX ADMIN — VANCOMYCIN HYDROCHLORIDE 1250 MG: 5 INJECTION, POWDER, LYOPHILIZED, FOR SOLUTION INTRAVENOUS at 07:22

## 2024-01-01 RX ADMIN — CYANOCOBALAMIN TAB 500 MCG 1000 MCG: 500 TAB at 08:33

## 2024-01-01 RX ADMIN — METHOCARBAMOL 500 MG: 500 TABLET ORAL at 15:04

## 2024-01-01 RX ADMIN — ALBUMIN (HUMAN) 25 G: 12.5 INJECTION, SOLUTION INTRAVENOUS at 23:13

## 2024-01-01 RX ADMIN — SPIRONOLACTONE 50 MG: 50 TABLET ORAL at 15:20

## 2024-01-01 RX ADMIN — OXYCODONE HYDROCHLORIDE 10 MG: 10 TABLET ORAL at 09:04

## 2024-01-01 RX ADMIN — FLUOXETINE 20 MG: 20 CAPSULE ORAL at 08:33

## 2024-01-01 RX ADMIN — THIAMINE HCL TAB 100 MG 100 MG: 100 TAB at 08:33

## 2024-01-01 RX ADMIN — METHOCARBAMOL 500 MG: 500 TABLET ORAL at 08:33

## 2024-01-01 RX ADMIN — DICYCLOMINE HYDROCHLORIDE 20 MG: 20 TABLET ORAL at 11:21

## 2024-01-01 RX ADMIN — OXYCODONE HYDROCHLORIDE 10 MG: 10 TABLET ORAL at 21:21

## 2024-01-01 RX ADMIN — CEFEPIME HYDROCHLORIDE 2000 MG: 2 INJECTION, SOLUTION INTRAVENOUS at 18:17

## 2024-01-01 RX ADMIN — DICYCLOMINE HYDROCHLORIDE 20 MG: 20 TABLET ORAL at 05:41

## 2024-01-01 RX ADMIN — METRONIDAZOLE 500 MG: 500 INJECTION, SOLUTION INTRAVENOUS at 15:04

## 2024-01-01 RX ADMIN — ENOXAPARIN SODIUM 90 MG: 100 INJECTION SUBCUTANEOUS at 20:23

## 2024-01-01 RX ADMIN — ACETAMINOPHEN 325MG 650 MG: 325 TABLET ORAL at 01:08

## 2024-01-01 RX ADMIN — METHOCARBAMOL 500 MG: 500 TABLET ORAL at 20:23

## 2024-01-01 RX ADMIN — SENNOSIDES AND DOCUSATE SODIUM 1 TABLET: 8.6; 5 TABLET ORAL at 22:42

## 2024-01-01 RX ADMIN — PANTOPRAZOLE SODIUM 40 MG: 40 TABLET, DELAYED RELEASE ORAL at 05:41

## 2024-01-01 RX ADMIN — SODIUM CHLORIDE 125 ML/HR: 0.9 INJECTION, SOLUTION INTRAVENOUS at 15:18

## 2024-01-01 RX ADMIN — ACETAMINOPHEN 325MG 650 MG: 325 TABLET ORAL at 18:17

## 2024-01-01 RX ADMIN — CEPHALEXIN 500 MG: 500 CAPSULE ORAL at 18:17

## 2024-01-01 RX ADMIN — ACETAMINOPHEN 325MG 650 MG: 325 TABLET ORAL at 08:33

## 2024-01-01 RX ADMIN — DICYCLOMINE HYDROCHLORIDE 20 MG: 20 TABLET ORAL at 15:04

## 2024-01-01 RX ADMIN — VANCOMYCIN HYDROCHLORIDE 1500 MG: 5 INJECTION, POWDER, LYOPHILIZED, FOR SOLUTION INTRAVENOUS at 20:23

## 2024-01-01 NOTE — PROGRESS NOTES
Mckenna Fischer is a 48 y.o. female who is currently ordered Vancomycin IV with management by the Pharmacy Consult service.  Relevant clinical data and objective / subjective history reviewed.  Vancomycin Assessment:  Indication and Goal AUC/Trough: Soft tissue (goal -600, trough >10)  Clinical Status: stable  Micro:     Renal Function:  SCr: 0.57 mg/dL  CrCl: 138.5 mL/min  Renal replacement: Not on dialysis  Days of Therapy: 1  Current Dose: 1250mg IV every 12 hours  Vancomycin Plan:  New Dosinmg IV every 12 hours  Estimated AUC: 529 mcg*hr/mL  Estimated Trough: 15.6 mcg/mL  Next Level: 24  Renal Function Monitoring: Daily BMP and UOP  Pharmacy will continue to follow closely for s/sx of nephrotoxicity, infusion reactions and appropriateness of therapy.  BMP and CBC will be ordered per protocol. We will continue to follow the patient’s culture results and clinical progress daily.    [place], Pharmacist
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
## 2024-01-01 NOTE — PLAN OF CARE
Problem: Prexisting or High Potential for Compromised Skin Integrity  Goal: Skin integrity is maintained or improved  Description: INTERVENTIONS:  - Identify patients at risk for skin breakdown  - Assess and monitor skin integrity  - Assess and monitor nutrition and hydration status  - Monitor labs   - Assess for incontinence   - Turn and reposition patient  - Assist with mobility/ambulation  - Relieve pressure over bony prominences  - Avoid friction and shearing  - Provide appropriate hygiene as needed including keeping skin clean and dry  - Evaluate need for skin moisturizer/barrier cream  - Collaborate with interdisciplinary team   - Patient/family teaching  - Consider wound care consult   Outcome: Progressing     Problem: PAIN - ADULT  Goal: Verbalizes/displays adequate comfort level or baseline comfort level  Description: Interventions:  - Encourage patient to monitor pain and request assistance  - Assess pain using appropriate pain scale  - Administer analgesics based on type and severity of pain and evaluate response  - Implement non-pharmacological measures as appropriate and evaluate response  - Consider cultural and social influences on pain and pain management  - Notify physician/advanced practitioner if interventions unsuccessful or patient reports new pain  Outcome: Progressing     Problem: INFECTION - ADULT  Goal: Absence or prevention of progression during hospitalization  Description: INTERVENTIONS:  - Assess and monitor for signs and symptoms of infection  - Monitor lab/diagnostic results  - Monitor all insertion sites, i.e. indwelling lines, tubes, and drains  - Monitor endotracheal if appropriate and nasal secretions for changes in amount and color  - [place] appropriate cooling/warming therapies per order  - Administer medications as ordered  - Instruct and encourage patient and family to use good hand hygiene technique  - Identify and instruct in appropriate isolation precautions for  identified infection/condition  Outcome: Progressing     Problem: SAFETY ADULT  Goal: Patient will remain free of falls  Description: INTERVENTIONS:  - Educate patient/family on patient safety including physical limitations  - Instruct patient to call for assistance with activity   - Consult OT/PT to assist with strengthening/mobility   - Keep Call bell within reach  - Keep bed low and locked with side rails adjusted as appropriate  - Keep care items and personal belongings within reach  - Initiate and maintain comfort rounds  - Make Fall Risk Sign visible to staff  - Offer Toileting  - Apply yellow socks and bracelet for high fall risk patients  - Consider moving patient to room near nurses station  Outcome: Progressing  Goal: Maintain or return to baseline ADL function  Description: INTERVENTIONS:  -  Assess patient's ability to carry out ADLs; assess patient's baseline for ADL function and identify physical deficits which impact ability to perform ADLs (bathing, care of mouth/teeth, toileting, grooming, dressing, etc.)  - Assess/evaluate cause of self-care deficits   - Assess range of motion  - Assess patient's mobility; develop plan if impaired  - Assess patient's need for assistive devices and provide as appropriate  - Encourage maximum independence but intervene and supervise when necessary  - Involve family in performance of ADLs  - Assess for home care needs following discharge   - Consider OT consult to assist with ADL evaluation and planning for discharge  - Provide patient education as appropriate  Outcome: Progressing  Goal: Maintains/Returns to pre admission functional level  Description: INTERVENTIONS:  - Perform AM-PAC 6 Click Basic Mobility/ Daily Activity assessment daily.  - Set and communicate daily mobility goal to care team and patient/family/caregiver.   - Collaborate with rehabilitation services on mobility goals if consulted  - Perform Range of Motion   - Out of bed for toileting  - Record  patient progress and toleration of activity level   Outcome: Progressing     Problem: DISCHARGE PLANNING  Goal: Discharge to home or other facility with appropriate resources  Description: INTERVENTIONS:  - Identify barriers to discharge w/patient and caregiver  - Arrange for needed discharge resources and transportation as appropriate  - Identify discharge learning needs (meds, wound care, etc.)  - Arrange for interpretive services to assist at discharge as needed  - Refer to Case Management Department for coordinating discharge planning if the patient needs post-hospital services based on physician/advanced practitioner order or complex needs related to functional status, cognitive ability, or social support system  Outcome: Progressing     Problem: Knowledge Deficit  Goal: Patient/family/caregiver demonstrates understanding of disease process, treatment plan, medications, and discharge instructions  Description: Complete learning assessment and assess knowledge base.  Interventions:  - Provide teaching at level of understanding  - Provide teaching via preferred learning methods  Outcome: Progressing
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
## 2024-01-01 NOTE — ASSESSMENT & PLAN NOTE
"Noted, continue to trend CMP  GI consult reporting current etiology \"may be related to her present illness with recent surgery, poor absorptive abliity with rny bypass in the setting of chronic liver disease. Would ask for nutrition consult.\"  Previously etiology thought to be secondary to liver cirrhosis with active alcoholism and elevated LFTs  Nutrition consult pending  "

## 2024-01-01 NOTE — ASSESSMENT & PLAN NOTE
"Patient met SIRS criteria overnight 12/31 with temp of 101, heart rate 118 and leukocytosis 22  Lactic of 3.0 , Continue to trend >2.6>2.4>2.8  Procalc 1.61  Source likely multi-factorial given CT of abdomen showing fluid density structure in the ventral midline pelvic omentum protruding between rectus abdominis diastases, urinary tract infection, RSV  Repeat CT 12/31 \"Redemonstrated rim-enhancing collection within the anterior pelvis extending into the pelvic wall concerning for possible abscess versus less likely seroma.\"  Urine culture with susceptibilities reviewed, patient on ABX appropriate therapy  Blood cultures pending   Pt initially received 2L bolus and is now on maintenance fluids  Pt initiated on cefepime, vanc, and flagyl, continue  "

## 2024-01-01 NOTE — PROGRESS NOTES
Mckenna Fischer is a 48 y.o. female who is currently ordered Vancomycin IV with management by the Pharmacy Consult service.  Relevant clinical data and objective / subjective history reviewed.  Vancomycin Assessment:  Indication and Goal AUC/Trough: Soft tissue (goal -600, trough >10)  Clinical Status: stable  Micro:   susceptible  Renal Function:  SCr: 0.69 mg/dL  CrCl: 114.4 mL/min  Renal replacement: Not on dialysis  Days of Therapy: 1  Current Dose: 1250mg IV every 12 hours  Vancomycin Plan:  New Dosinmg IV every 12 hours  Estimated AUC: 471 mcg*hr/mL  Estimated Trough: 14.2 mcg/mL  Next Level: 24  Renal Function Monitoring: Daily BMP and UOP  Pharmacy will continue to follow closely for s/sx of nephrotoxicity, infusion reactions and appropriateness of therapy.  BMP and CBC will be ordered per protocol. We will continue to follow the patient’s culture results and clinical progress daily.    Samy Schilling, Pharmacist

## 2024-01-01 NOTE — ASSESSMENT & PLAN NOTE
Possibly 2/2 increased risk of UTI d/t indwelling percutaneous nephrostomy tube  UA: positive leukocytes, negative nitrites  Urine microscopy: positive leukocytes and moderate bacteria  Urine culture growing e.coli, group B beta hemolytic strep, and staph haemolyticus   Susceptibilities reviewed, patient on appropriate coverage with cefepime and vanc, continue
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
## 2024-01-01 NOTE — ASSESSMENT & PLAN NOTE
Pt only reporting rhinorrhea. Denies any congestion, sore throat, cough, or shortness of breath.  Continue supportive care

## 2024-01-01 NOTE — ASSESSMENT & PLAN NOTE
The patient has a history of iron deficiency which appears to have been corrected.  She has a history of B12 deficiency but is B12 replete at present.  Folic acid deficiency has been noted and is being corrected.

## 2024-01-01 NOTE — NURSING NOTE
Pt is AAOX4. D/C to [facility] via stretcher, alone with all of her belongings. Report given to nurse.

## 2024-01-01 NOTE — ASSESSMENT & PLAN NOTE
Multiple history of DVTs.  Was on Coumadin 5 mg for management but was discontinued when IVC filter (infrarenal) was placed on 9/2023.  Was started on 5-day bridge of warfarin with Lovenox started, 12/30, while admitted to .  Warfarin day 1 of 5 on 12/30.   Hold warfarin 5 mg for now pending IR procedure  INR ordered for AM  Pt on Lovenox 1 mg/kg twice daily, will hold AM dose   Hold foot-pounds

## 2024-01-01 NOTE — PLAN OF CARE
Problem: Prexisting or High Potential for Compromised Skin Integrity  Goal: Skin integrity is maintained or improved  Description: INTERVENTIONS:  - Identify patients at risk for skin breakdown  - Assess and monitor skin integrity  - Assess and monitor nutrition and hydration status  - Monitor labs   - Assess for incontinence   - Turn and reposition patient  - Assist with mobility/ambulation  - Relieve pressure over bony prominences  - Avoid friction and shearing  - Provide appropriate hygiene as needed including keeping skin clean and dry  - Evaluate need for skin moisturizer/barrier cream  - Collaborate with interdisciplinary team   - Patient/family teaching  - Consider wound care consult   Outcome: Progressing     Problem: PAIN - ADULT  Goal: Verbalizes/displays adequate comfort level or baseline comfort level  Description: Interventions:  - Encourage patient to monitor pain and request assistance  - Assess pain using appropriate pain scale  - Administer analgesics based on type and severity of pain and evaluate response  - Implement non-pharmacological measures as appropriate and evaluate response  - Consider cultural and social influences on pain and pain management  - Notify physician/advanced practitioner if interventions unsuccessful or patient reports new pain  Outcome: Progressing     Problem: INFECTION - ADULT  Goal: Absence or prevention of progression during hospitalization  Description: INTERVENTIONS:  - Assess and monitor for signs and symptoms of infection  - Monitor lab/diagnostic results  - Monitor all insertion sites, i.e. indwelling lines, tubes, and drains  - Monitor endotracheal if appropriate and nasal secretions for changes in amount and color  - May appropriate cooling/warming therapies per order  - Administer medications as ordered  - Instruct and encourage patient and family to use good hand hygiene technique  - Identify and instruct in appropriate isolation precautions for  identified infection/condition  Outcome: Progressing     Problem: SAFETY ADULT  Goal: Patient will remain free of falls  Description: INTERVENTIONS:  - Educate patient/family on patient safety including physical limitations  - Instruct patient to call for assistance with activity   - Consult OT/PT to assist with strengthening/mobility   - Keep Call bell within reach  - Keep bed low and locked with side rails adjusted as appropriate  - Keep care items and personal belongings within reach  - Initiate and maintain comfort rounds  - Make Fall Risk Sign visible to staff  - Offer Toileting every  Hours, in advance of need  - Initiate/Maintain alarm  - Obtain necessary fall risk management equipment:   - Apply yellow socks and bracelet for high fall risk patients  - Consider moving patient to room near nurses station  Outcome: Progressing  Goal: Maintain or return to baseline ADL function  Description: INTERVENTIONS:  -  Assess patient's ability to carry out ADLs; assess patient's baseline for ADL function and identify physical deficits which impact ability to perform ADLs (bathing, care of mouth/teeth, toileting, grooming, dressing, etc.)  - Assess/evaluate cause of self-care deficits   - Assess range of motion  - Assess patient's mobility; develop plan if impaired  - Assess patient's need for assistive devices and provide as appropriate  - Encourage maximum independence but intervene and supervise when necessary  - Involve family in performance of ADLs  - Assess for home care needs following discharge   - Consider OT consult to assist with ADL evaluation and planning for discharge  - Provide patient education as appropriate  Outcome: Progressing  Goal: Maintains/Returns to pre admission functional level  Description: INTERVENTIONS:  - Perform AM-PAC 6 Click Basic Mobility/ Daily Activity assessment daily.  - Set and communicate daily mobility goal to care team and patient/family/caregiver.   - Collaborate with  rehabilitation services on mobility goals if consulted  - Perform Range of Motion  times a day.  - Reposition patient every hours.  - Dangle patient  times a day  - Stand patient  times a day  - Ambulate patient  times a day  - Out of bed to chair  times a day   - Out of bed for meals times a day  - Out of bed for toileting  - Record patient progress and toleration of activity level   Outcome: Progressing     Problem: DISCHARGE PLANNING  Goal: Discharge to home or other facility with appropriate resources  Description: INTERVENTIONS:  - Identify barriers to discharge w/patient and caregiver  - Arrange for needed discharge resources and transportation as appropriate  - Identify discharge learning needs (meds, wound care, etc.)  - Arrange for interpretive services to assist at discharge as needed  - Refer to Case Management Department for coordinating discharge planning if the patient needs post-hospital services based on physician/advanced practitioner order or complex needs related to functional status, cognitive ability, or social support system  Outcome: Progressing     Problem: Knowledge Deficit  Goal: Patient/family/caregiver demonstrates understanding of disease process, treatment plan, medications, and discharge instructions  Description: Complete learning assessment and assess knowledge base.  Interventions:  - Provide teaching at level of understanding  - Provide teaching via preferred learning methods  Outcome: Progressing
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
## 2024-01-01 NOTE — ASSESSMENT & PLAN NOTE
Multiple history of DVTs.  Was on Coumadin 5 mg for management but was discontinued when IVC filter (infrarenal) was placed on 9/2023.  Was started on 5-day bridge of warfarin with Lovenox started, 12/30, while admitted to .  Warfarin day 1 of 5 on 12/30.   Hold warfarin 5 mg for now pending IR procedure  INR ordered for AM  Pt on Lovenox 1 mg/kg twice daily, will hold AM dose

## 2024-01-01 NOTE — ASSESSMENT & PLAN NOTE
"Patient with bilateral leg pain and swelling in extremities. RLE 3+, LLE 4+  VAS duplex on 12/29 with \"Chronic non-occlusive deep vein thrombosis noted in a short segment of the popliteal vein\" in left lower extremity  Pt with antiphospholipid syndrome w/ IVC filter in place, was getting bridge of coumadin while at , holding prior to IR procedure   Pt is s/p 2L IV fluids with maintenance fluids secondary to SIRS with lactic acidosis  Reduced rate of fluids slightly secondary to swelling   "

## 2024-01-01 NOTE — ASSESSMENT & PLAN NOTE
"Multiple imaging modalities obtained   Vaginal US obtained following initial CT a/p  CT a/p 12/28 demonstrating \"Redemonstrated 6.6 cm cystic structure with thickened septation in the right adnexa. Nonemergent dedicated pelvic ultrasound is advised in the outpatient setting. There is no correlate on pelvic ultrasound dated 10/3/2022\"  Vaginal US demonstrating \"In the right adnexa, there is a 6.3 x 6.8 x 6.7 complex cystic lesion corresponding to the CT finding (image 42.) The differential is still a complex right ovarian cyst versus hydrosalpinx.\"  CT a/p 12/31 demonstrating \"Complex multiloculated right adnexal cystic lesion as described. Differential considerations include pyosalpinx versus tubo-ovarian abscess versus less likely hematosalpinx or complex ovarian cyst. If clinically indicated, MRI of the pelvis could be performed for further characterization.\"  IR recommending ob/gyn consult, pending   "

## 2024-01-01 NOTE — CONSULTS
[facility] Record Consult  IR has been consulted to evaluate the patient, determine the appropriate procedure, and whether or not a procedure can and should be performed regarding the care of Mckenna Fischer.    We were consulted by family medicine service concerning Mckenna Fischer, and to possibly perform a percutaneous drainage if medically appropriate for the patient. The patient is aware that a specialty consultation is taking place, and agrees to the IR Consult on their behalf.     Assessment/Plan:   48 female with a possible seroma/abscess in anterior abdominal omentum which extends into ventral abdominal wall.  Aspiration/drainage of this collection is desired and can be coordinated by the IR team once patient is transferred.  Coagulation labs are appropriate for procedure. We anticipate transfer to [place] sometime over the evening.  Also patient has a cystic structure involving the right adnexa.  This was further characterized by ultrasound as complex cyst vs hydrosalpinx.  Gynecology consult is recommended once patient is transferred to best address right adnexa.    I spent 45 minutes in medical consultative time evaluating the medical record and imaging of Mckenna Fischer.    Thank you for allowing [facility] to participate in the care of Mckenna Fischer. Please don't hesitate to call or TigerText us with any questions.     Alvaro Dickens, DO
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
## 2024-01-02 ENCOUNTER — LAB REQUISITION (OUTPATIENT)
Dept: LAB | Facility: HOSPITAL | Age: 49
End: 2024-01-02

## 2024-01-02 ENCOUNTER — TRANSITIONAL CARE MANAGEMENT (OUTPATIENT)
Dept: FAMILY MEDICINE CLINIC | Facility: CLINIC | Age: 49
End: 2024-01-02

## 2024-01-02 ENCOUNTER — APPOINTMENT (INPATIENT)
Dept: RADIOLOGY | Facility: HOSPITAL | Age: 49
DRG: 721 | End: 2024-01-02
Payer: COMMERCIAL

## 2024-01-02 DIAGNOSIS — K65.1 PERITONEAL ABSCESS (HCC): ICD-10-CM

## 2024-01-02 PROBLEM — E43 SEVERE PROTEIN-CALORIE MALNUTRITION (HCC): Status: ACTIVE | Noted: 2024-01-02

## 2024-01-02 LAB
ABO GROUP BLD: NORMAL
ABO GROUP BLD: NORMAL
ANION GAP SERPL CALCULATED.3IONS-SCNC: 2 MMOL/L
ANTIBODY ELUTION #1: NEGATIVE
BASOPHILS # BLD AUTO: 0.04 THOUSANDS/ÂΜL (ref 0–0.1)
BASOPHILS NFR BLD AUTO: 0 % (ref 0–1)
BLD GP AB SCN SERPL QL: POSITIVE
BLD GP AB SCN SERPL QL: POSITIVE
BLOOD GROUP ANTIBODIES SERPL: NORMAL
BLOOD GROUP ANTIBODIES SERPL: NORMAL
BUN SERPL-MCNC: 10 MG/DL (ref 5–25)
C AG RBC QL: NEGATIVE
CALCIUM SERPL-MCNC: 7.1 MG/DL (ref 8.4–10.2)
CHLORIDE SERPL-SCNC: 106 MMOL/L (ref 96–108)
CO2 SERPL-SCNC: 26 MMOL/L (ref 21–32)
CREAT SERPL-MCNC: 0.53 MG/DL (ref 0.6–1.3)
DAT C3-SP REAG RBC QL: NEGATIVE
DAT IGG-SP REAG RBCCO QL: NORMAL
DAT POLY-SP REAG RBC QL: NORMAL
E AG RBC QL: NEGATIVE
EOSINOPHIL # BLD AUTO: 0.05 THOUSAND/ÂΜL (ref 0–0.61)
EOSINOPHIL NFR BLD AUTO: 0 % (ref 0–6)
ERYTHROCYTE [DISTWIDTH] IN BLOOD BY AUTOMATED COUNT: 17.2 % (ref 11.6–15.1)
GFR SERPL CREATININE-BSD FRML MDRD: 112 ML/MIN/1.73SQ M
GLUCOSE SERPL-MCNC: 82 MG/DL (ref 65–140)
HCT VFR BLD AUTO: 18.6 % (ref 34.8–46.1)
HCT VFR BLD AUTO: 18.9 % (ref 34.8–46.1)
HCT VFR BLD AUTO: 20.2 % (ref 34.8–46.1)
HGB BLD-MCNC: 6.3 G/DL (ref 11.5–15.4)
HGB BLD-MCNC: 6.3 G/DL (ref 11.5–15.4)
HGB BLD-MCNC: 6.8 G/DL (ref 11.5–15.4)
HIV 1+2 AB+HIV1 P24 AG SERPL QL IA: NORMAL
HIV 2 AB SERPL QL IA: NORMAL
HIV1 AB SERPL QL IA: NORMAL
HIV1 P24 AG SERPL QL IA: NORMAL
IMM GRANULOCYTES # BLD AUTO: 0.11 THOUSAND/UL (ref 0–0.2)
IMM GRANULOCYTES NFR BLD AUTO: 1 % (ref 0–2)
LYMPHOCYTES # BLD AUTO: 2.59 THOUSANDS/ÂΜL (ref 0.6–4.47)
LYMPHOCYTES NFR BLD AUTO: 15 % (ref 14–44)
MCH RBC QN AUTO: 31.7 PG (ref 26.8–34.3)
MCHC RBC AUTO-ENTMCNC: 33.9 G/DL (ref 31.4–37.4)
MCV RBC AUTO: 94 FL (ref 82–98)
MONOCYTES # BLD AUTO: 1.33 THOUSAND/ÂΜL (ref 0.17–1.22)
MONOCYTES NFR BLD AUTO: 8 % (ref 4–12)
NEUTROPHILS # BLD AUTO: 13.45 THOUSANDS/ÂΜL (ref 1.85–7.62)
NEUTS SEG NFR BLD AUTO: 76 % (ref 43–75)
NRBC BLD AUTO-RTO: 0 /100 WBCS
PLATELET # BLD AUTO: 381 THOUSANDS/UL (ref 149–390)
PMV BLD AUTO: 9 FL (ref 8.9–12.7)
POTASSIUM SERPL-SCNC: 3.9 MMOL/L (ref 3.5–5.3)
RBC # BLD AUTO: 1.99 MILLION/UL (ref 3.81–5.12)
RH BLD: POSITIVE
RH BLD: POSITIVE
SODIUM SERPL-SCNC: 134 MMOL/L (ref 135–147)
SPECIMEN EXPIRATION DATE: NORMAL
SPECIMEN EXPIRATION DATE: NORMAL
VANCOMYCIN SERPL-MCNC: 14.4 UG/ML (ref 10–20)
WBC # BLD AUTO: 17.57 THOUSAND/UL (ref 4.31–10.16)

## 2024-01-02 PROCEDURE — P9016 RBC LEUKOCYTES REDUCED: HCPCS

## 2024-01-02 PROCEDURE — 86921 COMPATIBILITY TEST INCUBATE: CPT

## 2024-01-02 PROCEDURE — 86922 COMPATIBILITY TEST ANTIGLOB: CPT

## 2024-01-02 PROCEDURE — 86900 BLOOD TYPING SEROLOGIC ABO: CPT | Performed by: PHYSICIAN ASSISTANT

## 2024-01-02 PROCEDURE — 86860 RBC ANTIBODY ELUTION: CPT | Performed by: PHYSICIAN ASSISTANT

## 2024-01-02 PROCEDURE — 86905 BLOOD TYPING RBC ANTIGENS: CPT

## 2024-01-02 PROCEDURE — 86850 RBC ANTIBODY SCREEN: CPT | Performed by: PHYSICIAN ASSISTANT

## 2024-01-02 PROCEDURE — 86901 BLOOD TYPING SEROLOGIC RH(D): CPT | Performed by: PHYSICIAN ASSISTANT

## 2024-01-02 PROCEDURE — 87389 HIV-1 AG W/HIV-1&-2 AB AG IA: CPT | Performed by: INTERNAL MEDICINE

## 2024-01-02 PROCEDURE — 10030 IMG GID FLU COLL DRG SFT TIS: CPT

## 2024-01-02 PROCEDURE — C1729 CATH, DRAINAGE: HCPCS

## 2024-01-02 PROCEDURE — 87205 SMEAR GRAM STAIN: CPT | Performed by: INTERNAL MEDICINE

## 2024-01-02 PROCEDURE — 99232 SBSQ HOSP IP/OBS MODERATE 35: CPT | Performed by: INTERNAL MEDICINE

## 2024-01-02 PROCEDURE — 87076 CULTURE ANAEROBE IDENT EACH: CPT | Performed by: INTERNAL MEDICINE

## 2024-01-02 PROCEDURE — 87185 SC STD ENZYME DETCJ PER NZM: CPT | Performed by: INTERNAL MEDICINE

## 2024-01-02 PROCEDURE — 10030 IMG GID FLU COLL DRG SFT TIS: CPT | Performed by: RADIOLOGY

## 2024-01-02 PROCEDURE — 86880 COOMBS TEST DIRECT: CPT | Performed by: INTERNAL MEDICINE

## 2024-01-02 PROCEDURE — 0W9G3ZZ DRAINAGE OF PERITONEAL CAVITY, PERCUTANEOUS APPROACH: ICD-10-PCS | Performed by: RADIOLOGY

## 2024-01-02 PROCEDURE — 87147 CULTURE TYPE IMMUNOLOGIC: CPT | Performed by: INTERNAL MEDICINE

## 2024-01-02 PROCEDURE — 86902 BLOOD TYPE ANTIGEN DONOR EA: CPT

## 2024-01-02 PROCEDURE — 86870 RBC ANTIBODY IDENTIFICATION: CPT | Performed by: INTERNAL MEDICINE

## 2024-01-02 PROCEDURE — NC001 PR NO CHARGE: Performed by: NURSE PRACTITIONER

## 2024-01-02 PROCEDURE — 80202 ASSAY OF VANCOMYCIN: CPT | Performed by: INTERNAL MEDICINE

## 2024-01-02 PROCEDURE — NC001 PR NO CHARGE: Performed by: SURGERY

## 2024-01-02 PROCEDURE — 87186 SC STD MICRODIL/AGAR DIL: CPT | Performed by: INTERNAL MEDICINE

## 2024-01-02 PROCEDURE — 85014 HEMATOCRIT: CPT | Performed by: PHYSICIAN ASSISTANT

## 2024-01-02 PROCEDURE — 87075 CULTR BACTERIA EXCEPT BLOOD: CPT | Performed by: INTERNAL MEDICINE

## 2024-01-02 PROCEDURE — 85018 HEMOGLOBIN: CPT | Performed by: PHYSICIAN ASSISTANT

## 2024-01-02 PROCEDURE — C1769 GUIDE WIRE: HCPCS

## 2024-01-02 PROCEDURE — 85025 COMPLETE CBC W/AUTO DIFF WBC: CPT | Performed by: INTERNAL MEDICINE

## 2024-01-02 PROCEDURE — 87070 CULTURE OTHR SPECIMN AEROBIC: CPT | Performed by: INTERNAL MEDICINE

## 2024-01-02 PROCEDURE — 80048 BASIC METABOLIC PNL TOTAL CA: CPT | Performed by: INTERNAL MEDICINE

## 2024-01-02 RX ORDER — DICYCLOMINE HYDROCHLORIDE 10 MG/1
10 CAPSULE ORAL
Status: DISCONTINUED | OUTPATIENT
Start: 2024-01-02 | End: 2024-01-04

## 2024-01-02 RX ORDER — SODIUM CHLORIDE 9 MG/ML
5 INJECTION, SOLUTION INTRAMUSCULAR; INTRAVENOUS; SUBCUTANEOUS DAILY
Qty: 300 ML | Refills: 0 | Status: SHIPPED | OUTPATIENT
Start: 2024-01-02 | End: 2024-05-01

## 2024-01-02 RX ORDER — MIDODRINE HYDROCHLORIDE 5 MG/1
5 TABLET ORAL ONCE
Status: COMPLETED | OUTPATIENT
Start: 2024-01-02 | End: 2024-01-02

## 2024-01-02 RX ORDER — LIDOCAINE WITH 8.4% SOD BICARB 0.9%(10ML)
SYRINGE (ML) INJECTION AS NEEDED
Status: COMPLETED | OUTPATIENT
Start: 2024-01-02 | End: 2024-01-02

## 2024-01-02 RX ADMIN — FLUOXETINE 20 MG: 20 CAPSULE ORAL at 08:34

## 2024-01-02 RX ADMIN — METRONIDAZOLE 500 MG: 500 INJECTION, SOLUTION INTRAVENOUS at 08:22

## 2024-01-02 RX ADMIN — CHOLECALCIFEROL TAB 10 MCG (400 UNIT) 400 UNITS: 10 TAB at 08:33

## 2024-01-02 RX ADMIN — ENOXAPARIN SODIUM 90 MG: 100 INJECTION SUBCUTANEOUS at 21:52

## 2024-01-02 RX ADMIN — METRONIDAZOLE 500 MG: 500 INJECTION, SOLUTION INTRAVENOUS at 17:14

## 2024-01-02 RX ADMIN — MAGNESIUM HYDROXIDE 30 ML: 400 SUSPENSION ORAL at 08:35

## 2024-01-02 RX ADMIN — ACETAMINOPHEN 325MG 650 MG: 325 TABLET ORAL at 17:12

## 2024-01-02 RX ADMIN — THIAMINE HCL TAB 100 MG 100 MG: 100 TAB at 08:36

## 2024-01-02 RX ADMIN — TRAZODONE HYDROCHLORIDE 50 MG: 50 TABLET ORAL at 21:52

## 2024-01-02 RX ADMIN — CYANOCOBALAMIN TAB 500 MCG 1000 MCG: 500 TAB at 08:34

## 2024-01-02 RX ADMIN — DICYCLOMINE HYDROCHLORIDE 10 MG: 10 CAPSULE ORAL at 21:52

## 2024-01-02 RX ADMIN — METHOCARBAMOL 500 MG: 500 TABLET ORAL at 17:13

## 2024-01-02 RX ADMIN — METRONIDAZOLE 500 MG: 500 INJECTION, SOLUTION INTRAVENOUS at 23:18

## 2024-01-02 RX ADMIN — CEFEPIME HYDROCHLORIDE 2000 MG: 2 INJECTION, SOLUTION INTRAVENOUS at 17:19

## 2024-01-02 RX ADMIN — Medication 10 ML: at 12:50

## 2024-01-02 RX ADMIN — SENNOSIDES AND DOCUSATE SODIUM 1 TABLET: 8.6; 5 TABLET ORAL at 21:52

## 2024-01-02 RX ADMIN — Medication 3 MG: at 21:52

## 2024-01-02 RX ADMIN — METHOCARBAMOL 500 MG: 500 TABLET ORAL at 08:35

## 2024-01-02 RX ADMIN — SODIUM CHLORIDE 125 ML/HR: 0.9 INJECTION, SOLUTION INTRAVENOUS at 00:35

## 2024-01-02 RX ADMIN — ACETAMINOPHEN 325MG 650 MG: 325 TABLET ORAL at 09:42

## 2024-01-02 RX ADMIN — OXYCODONE HYDROCHLORIDE 10 MG: 10 TABLET ORAL at 14:22

## 2024-01-02 RX ADMIN — VANCOMYCIN HYDROCHLORIDE 1500 MG: 5 INJECTION, POWDER, LYOPHILIZED, FOR SOLUTION INTRAVENOUS at 10:01

## 2024-01-02 RX ADMIN — VANCOMYCIN HYDROCHLORIDE 1500 MG: 5 INJECTION, POWDER, LYOPHILIZED, FOR SOLUTION INTRAVENOUS at 21:30

## 2024-01-02 RX ADMIN — SODIUM CHLORIDE 500 ML: 0.9 INJECTION, SOLUTION INTRAVENOUS at 16:02

## 2024-01-02 RX ADMIN — METHOCARBAMOL 500 MG: 500 TABLET ORAL at 21:52

## 2024-01-02 RX ADMIN — ACETAMINOPHEN 325MG 650 MG: 325 TABLET ORAL at 01:58

## 2024-01-02 RX ADMIN — MIDODRINE HYDROCHLORIDE 5 MG: 5 TABLET ORAL at 00:52

## 2024-01-02 RX ADMIN — DICYCLOMINE HYDROCHLORIDE 10 MG: 10 CAPSULE ORAL at 17:12

## 2024-01-02 RX ADMIN — CEFEPIME HYDROCHLORIDE 2000 MG: 2 INJECTION, SOLUTION INTRAVENOUS at 05:06

## 2024-01-02 RX ADMIN — METRONIDAZOLE 500 MG: 500 INJECTION, SOLUTION INTRAVENOUS at 00:37

## 2024-01-02 RX ADMIN — OXYCODONE HYDROCHLORIDE 10 MG: 10 TABLET ORAL at 21:06

## 2024-01-02 RX ADMIN — PANTOPRAZOLE SODIUM 40 MG: 40 TABLET, DELAYED RELEASE ORAL at 05:06
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
## 2024-01-02 NOTE — BRIEF OP NOTE (RAD/CATH)
INTERVENTIONAL RADIOLOGY PROCEDURE NOTE    Date: 1/2/2024    Procedure: IR DRAIN PLACEMENT  Procedure Summary       Date:  Room / Location:     Anesthesia Start:  Anesthesia Stop:     Procedure:  Diagnosis:     Scheduled Providers:  Responsible Provider:     Anesthesia Type: Not recorded ASA Status: Not recorded            Preoperative diagnosis:   1. Intra-abdominal abscess (HCC)    2. Adnexal mass         Postoperative diagnosis: Same.    Surgeon: Cassie Angel MD     Assistant: None. No qualified resident was available.    Blood loss: 0    Specimens: Aerobic, anaerobic cultures    Findings:     10 Nepalese all-purpose drain placed into anterior abdominal wall collection    Under ultrasound I can easily visualize deeper collection and attempted to orient the drain in this direction but it may be discrete.  I could not find a definite safe window to this deeper possible pyosalpinx but it is tender    40 cc of foul-smelling green pus removed    Complications: None immediate.    Anesthesia: local

## 2024-01-02 NOTE — ASSESSMENT & PLAN NOTE
The patient has a history of iron deficiency which appears to have been corrected.  She has a history of B12 deficiency but is B12 replete at present.  Folic acid deficiency has been noted and is being corrected.  The patient's hemoglobin is lower today.  She will receive 1 unit of blood.

## 2024-01-02 NOTE — PROGRESS NOTES
Mckenna Fischer is a 48 y.o. female who is currently ordered Vancomycin IV with management by the Pharmacy Consult service.  Relevant clinical data and objective / subjective history reviewed.  Vancomycin Assessment:  Indication and Goal AUC/Trough: Soft tissue (goal -600, trough >10)  Clinical Status: stable  Micro:     Renal Function:  SCr: 0.53 mg/dL  CrCl: 149.0 mL/min  Renal replacement: Not on dialysis  Days of Therapy: 2  Current Dose: 1250mg IV every 12 hours  Vancomycin Plan:  New Dosinmg IV every 12 hours  Estimated AUC: 515 mcg*hr/mL  Estimated Trough: 15.0 mcg/mL  Next Level: 1/3/24  Renal Function Monitoring: Daily BMP and UOP  Pharmacy will continue to follow closely for s/sx of nephrotoxicity, infusion reactions and appropriateness of therapy.  BMP and CBC will be ordered per protocol. We will continue to follow the patient’s culture results and clinical progress daily.    Sterling Vale, Pharmacist

## 2024-01-02 NOTE — PLAN OF CARE
Problem: Prexisting or High Potential for Compromised Skin Integrity  Goal: Skin integrity is maintained or improved  Description: INTERVENTIONS:  - Identify patients at risk for skin breakdown  - Assess and monitor skin integrity  - Assess and monitor nutrition and hydration status  - Monitor labs   - Assess for incontinence   - Turn and reposition patient  - Assist with mobility/ambulation  - Relieve pressure over bony prominences  - Avoid friction and shearing  - Provide appropriate hygiene as needed including keeping skin clean and dry  - Evaluate need for skin moisturizer/barrier cream  - Collaborate with interdisciplinary team   - Patient/family teaching  - Consider wound care consult   Outcome: Progressing     Problem: PAIN - ADULT  Goal: Verbalizes/displays adequate comfort level or baseline comfort level  Description: Interventions:  - Encourage patient to monitor pain and request assistance  - Assess pain using appropriate pain scale  - Administer analgesics based on type and severity of pain and evaluate response  - Implement non-pharmacological measures as appropriate and evaluate response  - Consider cultural and social influences on pain and pain management  - Notify physician/advanced practitioner if interventions unsuccessful or patient reports new pain  Outcome: Progressing     Problem: INFECTION - ADULT  Goal: Absence or prevention of progression during hospitalization  Description: INTERVENTIONS:  - Assess and monitor for signs and symptoms of infection  - Monitor lab/diagnostic results  - Monitor all insertion sites, i.e. indwelling lines, tubes, and drains  - Monitor endotracheal if appropriate and nasal secretions for changes in amount and color  - [place] appropriate cooling/warming therapies per order  - Administer medications as ordered  - Instruct and encourage patient and family to use good hand hygiene technique  - Identify and instruct in appropriate isolation precautions for  identified infection/condition  Outcome: Progressing     Problem: SAFETY ADULT  Goal: Patient will remain free of falls  Description: INTERVENTIONS:  - Educate patient/family on patient safety including physical limitations  - Instruct patient to call for assistance with activity   - Consult OT/PT to assist with strengthening/mobility   - Keep Call bell within reach  - Keep bed low and locked with side rails adjusted as appropriate  - Keep care items and personal belongings within reach  - Initiate and maintain comfort rounds  - Make Fall Risk Sign visible to staff  - Offer Toileting every 2 Hours, in advance of need  - Initiate/Maintain bed alarm  - Obtain necessary fall risk management equipment: alarm   - Apply yellow socks and bracelet for high fall risk patients  - Consider moving patient to room near nurses station  Outcome: Progressing  Goal: Maintain or return to baseline ADL function  Description: INTERVENTIONS:  -  Assess patient's ability to carry out ADLs; assess patient's baseline for ADL function and identify physical deficits which impact ability to perform ADLs (bathing, care of mouth/teeth, toileting, grooming, dressing, etc.)  - Assess/evaluate cause of self-care deficits   - Assess range of motion  - Assess patient's mobility; develop plan if impaired  - Assess patient's need for assistive devices and provide as appropriate  - Encourage maximum independence but intervene and supervise when necessary  - Involve family in performance of ADLs  - Assess for home care needs following discharge   - Consider OT consult to assist with ADL evaluation and planning for discharge  - Provide patient education as appropriate  Outcome: Progressing  Goal: Maintains/Returns to pre admission functional level  Description: INTERVENTIONS:  - Perform AM-PAC 6 Click Basic Mobility/ Daily Activity assessment daily.  - Set and communicate daily mobility goal to care team and patient/family/caregiver.   - Collaborate with  rehabilitation services on mobility goals if consulted  - Perform Range of Motion 3 times a day.  - Reposition patient every 2 hours.  - Dangle patient 3 times a day  - Stand patient 3 times a day  - Ambulate patient 3 times a day  - Out of bed to chair 3 times a day   - Out of bed for meals 3 times a day  - Out of bed for toileting  - Record patient progress and toleration of activity level   Outcome: Progressing     Problem: DISCHARGE PLANNING  Goal: Discharge to home or other facility with appropriate resources  Description: INTERVENTIONS:  - Identify barriers to discharge w/patient and caregiver  - Arrange for needed discharge resources and transportation as appropriate  - Identify discharge learning needs (meds, wound care, etc.)  - Arrange for interpretive services to assist at discharge as needed  - Refer to Case Management Department for coordinating discharge planning if the patient needs post-hospital services based on physician/advanced practitioner order or complex needs related to functional status, cognitive ability, or social support system  Outcome: Progressing     Problem: Knowledge Deficit  Goal: Patient/family/caregiver demonstrates understanding of disease process, treatment plan, medications, and discharge instructions  Description: Complete learning assessment and assess knowledge base.  Interventions:  - Provide teaching at level of understanding  - Provide teaching via preferred learning methods  Outcome: Progressing     Problem: Nutrition/Hydration-ADULT  Goal: Nutrient/Hydration intake appropriate for improving, restoring or maintaining nutritional needs  Description: Monitor and assess patient's nutrition/hydration status for malnutrition. Collaborate with interdisciplinary team and initiate plan and interventions as ordered.  Monitor patient's weight and dietary intake as ordered or per policy. Utilize nutrition screening tool and intervene as necessary. Determine patient's food  preferences and provide high-protein, high-caloric foods as appropriate.     INTERVENTIONS:  - Monitor oral intake, urinary output, labs, and treatment plans  - Assess nutrition and hydration status and recommend course of action  - Evaluate amount of meals eaten  - Assist patient with eating if necessary   - Allow adequate time for meals  - Recommend/ encourage appropriate diets, oral nutritional supplements, and vitamin/mineral supplements  - Order, calculate, and assess calorie counts as needed  - Recommend, monitor, and adjust tube feedings and TPN/PPN based on assessed needs  - Assess need for intravenous fluids  - Provide specific nutrition/hydration education as appropriate  - Include patient/family/caregiver in decisions related to nutrition  Outcome: Progressing

## 2024-01-02 NOTE — ASSESSMENT & PLAN NOTE
Malnutrition Findings:   Adult Malnutrition type: Chronic illness  Adult Degree of Malnutrition: Other severe protein calorie malnutrition  Malnutrition Characteristics: Inadequate energy, Weight loss              Oral nutritional support will be continued.    360 Statement: Severe protein/calorie malnutrition r/t inadequate intake due to GI distress as evidenced by 8% unplanned wt loss since 10/16/23, consuming < 75% energy intake compared to estimated energy needs > 1 month. treated with Ensure max bid when po diet resumes    BMI Findings:           There is no height or weight on file to calculate BMI.
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
## 2024-01-02 NOTE — DISCHARGE INSTRUCTIONS
"     TUBE CARE INSTRUCTIONS    Care after your procedure:    Resume your normal diet. Small sips of flat soda will help with nausea.    1. The properly functioning catheter should be forward flushed once (1x) daily with 10ml of normal saline using clean technique. You will be given a prescription for flushes.   To flush the tube, clean both connections with alcohol swab.Twist off the drainage bag/ bulb  tubing and twist the saline syringe into the drainage tube and flush. Remove the syringe and twist the drainage bag / bulb tubing tubing back on.    2. The drainage bag/bulb may be emptied as necessary. Keep a record of the amount of fluid you drain from your tube. This should be done with clean technique as well.     3. A fresh dressing should be applied daily over the tube insertion site.     4. As the tube is secured to the skin with only a suture,try not to pull on your tube. Tub baths are not permitted. Showers are permitted if the patient's skin entry site is prevented from getting wet. Similarly, washcloth \"baths\" are acceptable.     Contact Interventional Radiology at [phone] ([place] PATIENTS: Contact Interventional Radiology at [phone]) ([place] PATIENTS: Contact Interventional Radiology at [phone]) if:    1. Leakage or large amounts of liquid around the catheter.    2. Fever of 101 degrees lasting several hours without other obvious cause (such as sore throat, flu, etc).    3. Persistent nausea or vomiting.    4. Diminished drainage, which may be associated with pressure or pain. Or when the     drainage from your tube is less than 10mls for 48 hours.    5. Catheter pulled back or falls out.      The following pharmacies carry the flush syringes.       [facility]                     [facility] [address]                     [address] [place] [address]                         [phone]  Alondra Ross " PA  Phone [phone]            Phone [phone]                                                                                                       Reina Wren   [facility]             [facility]                                [phone]  [address] [place] [address] KIT PANTOJA  Phone [phone]            Phone [phone]                      [facility]                                                                                                          [phone]  [facility]  [address]  Alondra PANTOJA                                                                               [facility]  Phone [phone]

## 2024-01-02 NOTE — PLAN OF CARE
Problem: Prexisting or High Potential for Compromised Skin Integrity  Goal: Skin integrity is maintained or improved  Description: INTERVENTIONS:  - Identify patients at risk for skin breakdown  - Assess and monitor skin integrity  - Assess and monitor nutrition and hydration status  - Monitor labs   - Assess for incontinence   - Turn and reposition patient  - Assist with mobility/ambulation  - Relieve pressure over bony prominences  - Avoid friction and shearing  - Provide appropriate hygiene as needed including keeping skin clean and dry  - Evaluate need for skin moisturizer/barrier cream  - Collaborate with interdisciplinary team   - Patient/family teaching  - Consider wound care consult   Outcome: Progressing     Problem: PAIN - ADULT  Goal: Verbalizes/displays adequate comfort level or baseline comfort level  Description: Interventions:  - Encourage patient to monitor pain and request assistance  - Assess pain using appropriate pain scale  - Administer analgesics based on type and severity of pain and evaluate response  - Implement non-pharmacological measures as appropriate and evaluate response  - Consider cultural and social influences on pain and pain management  - Notify physician/advanced practitioner if interventions unsuccessful or patient reports new pain  Outcome: Progressing     Problem: INFECTION - ADULT  Goal: Absence or prevention of progression during hospitalization  Description: INTERVENTIONS:  - Assess and monitor for signs and symptoms of infection  - Monitor lab/diagnostic results  - Monitor all insertion sites, i.e. indwelling lines, tubes, and drains  - Monitor endotracheal if appropriate and nasal secretions for changes in amount and color  - [place] appropriate cooling/warming therapies per order  - Administer medications as ordered  - Instruct and encourage patient and family to use good hand hygiene technique  - Identify and instruct in appropriate isolation precautions for  identified infection/condition  Outcome: Progressing     Problem: SAFETY ADULT  Goal: Patient will remain free of falls  Description: INTERVENTIONS:  - Educate patient/family on patient safety including physical limitations  - Instruct patient to call for assistance with activity   - Consult OT/PT to assist with strengthening/mobility   - Keep Call bell within reach  - Keep bed low and locked with side rails adjusted as appropriate  - Keep care items and personal belongings within reach  - Initiate and maintain comfort rounds  - Make Fall Risk Sign visible to staff  - Offer Toileting every 2 Hours, in advance of need  - Initiate/Maintain bed alarm  - Obtain necessary fall risk management equipment: bed alarm   - Apply yellow socks and bracelet for high fall risk patients  - Consider moving patient to room near nurses station  Outcome: Progressing  Goal: Maintain or return to baseline ADL function  Description: INTERVENTIONS:  -  Assess patient's ability to carry out ADLs; assess patient's baseline for ADL function and identify physical deficits which impact ability to perform ADLs (bathing, care of mouth/teeth, toileting, grooming, dressing, etc.)  - Assess/evaluate cause of self-care deficits   - Assess range of motion  - Assess patient's mobility; develop plan if impaired  - Assess patient's need for assistive devices and provide as appropriate  - Encourage maximum independence but intervene and supervise when necessary  - Involve family in performance of ADLs  - Assess for home care needs following discharge   - Consider OT consult to assist with ADL evaluation and planning for discharge  - Provide patient education as appropriate  Outcome: Progressing  Goal: Maintains/Returns to pre admission functional level  Description: INTERVENTIONS:  - Perform AM-PAC 6 Click Basic Mobility/ Daily Activity assessment daily.  - Set and communicate daily mobility goal to care team and patient/family/caregiver.   - Collaborate  with rehabilitation services on mobility goals if consulted  - Perform Range of Motion 4 times a day.  - Reposition patient every 2 hours.  - Dangle patient 4 times a day  - Stand patient 4 times a day  - Ambulate patient 4 times a day  - Out of bed to chair 4 times a day   - Out of bed for meals 4 times a day  - Out of bed for toileting  - Record patient progress and toleration of activity level   Outcome: Progressing     Problem: DISCHARGE PLANNING  Goal: Discharge to home or other facility with appropriate resources  Description: INTERVENTIONS:  - Identify barriers to discharge w/patient and caregiver  - Arrange for needed discharge resources and transportation as appropriate  - Identify discharge learning needs (meds, wound care, etc.)  - Arrange for interpretive services to assist at discharge as needed  - Refer to Case Management Department for coordinating discharge planning if the patient needs post-hospital services based on physician/advanced practitioner order or complex needs related to functional status, cognitive ability, or social support system  Outcome: Progressing     Problem: Knowledge Deficit  Goal: Patient/family/caregiver demonstrates understanding of disease process, treatment plan, medications, and discharge instructions  Description: Complete learning assessment and assess knowledge base.  Interventions:  - Provide teaching at level of understanding  - Provide teaching via preferred learning methods  Outcome: Progressing     Problem: Nutrition/Hydration-ADULT  Goal: Nutrient/Hydration intake appropriate for improving, restoring or maintaining nutritional needs  Description: Monitor and assess patient's nutrition/hydration status for malnutrition. Collaborate with interdisciplinary team and initiate plan and interventions as ordered.  Monitor patient's weight and dietary intake as ordered or per policy. Utilize nutrition screening tool and intervene as necessary. Determine patient's food  preferences and provide high-protein, high-caloric foods as appropriate.     INTERVENTIONS:  - Monitor oral intake, urinary output, labs, and treatment plans  - Assess nutrition and hydration status and recommend course of action  - Evaluate amount of meals eaten  - Assist patient with eating if necessary   - Allow adequate time for meals  - Recommend/ encourage appropriate diets, oral nutritional supplements, and vitamin/mineral supplements  - Order, calculate, and assess calorie counts as needed  - Recommend, monitor, and adjust tube feedings and TPN/PPN based on assessed needs  - Assess need for intravenous fluids  - Provide specific nutrition/hydration education as appropriate  - Include patient/family/caregiver in decisions related to nutrition  Outcome: Progressing

## 2024-01-03 ENCOUNTER — TELEPHONE (OUTPATIENT)
Dept: FAMILY MEDICINE CLINIC | Facility: CLINIC | Age: 49
End: 2024-01-03

## 2024-01-03 ENCOUNTER — APPOINTMENT (INPATIENT)
Dept: CT IMAGING | Facility: HOSPITAL | Age: 49
DRG: 721 | End: 2024-01-03
Payer: COMMERCIAL

## 2024-01-03 LAB
ABO GROUP BLD BPU: NORMAL
ANION GAP SERPL CALCULATED.3IONS-SCNC: 2 MMOL/L
ANION GAP SERPL CALCULATED.3IONS-SCNC: 2 MMOL/L
BASOPHILS # BLD AUTO: 0.02 THOUSANDS/ÂΜL (ref 0–0.1)
BASOPHILS # BLD AUTO: 0.03 THOUSANDS/ÂΜL (ref 0–0.1)
BASOPHILS # BLD AUTO: 0.03 THOUSANDS/ÂΜL (ref 0–0.1)
BASOPHILS NFR BLD AUTO: 0 % (ref 0–1)
BPU ID: NORMAL
BUN SERPL-MCNC: 10 MG/DL (ref 5–25)
BUN SERPL-MCNC: 9 MG/DL (ref 5–25)
CALCIUM SERPL-MCNC: 7.5 MG/DL (ref 8.4–10.2)
CALCIUM SERPL-MCNC: 7.7 MG/DL (ref 8.4–10.2)
CHLORIDE SERPL-SCNC: 102 MMOL/L (ref 96–108)
CHLORIDE SERPL-SCNC: 105 MMOL/L (ref 96–108)
CO2 SERPL-SCNC: 26 MMOL/L (ref 21–32)
CO2 SERPL-SCNC: 27 MMOL/L (ref 21–32)
CREAT SERPL-MCNC: 0.61 MG/DL (ref 0.6–1.3)
CREAT SERPL-MCNC: 0.64 MG/DL (ref 0.6–1.3)
CROSSMATCH: NORMAL
EOSINOPHIL # BLD AUTO: 0.01 THOUSAND/ÂΜL (ref 0–0.61)
EOSINOPHIL # BLD AUTO: 0.01 THOUSAND/ÂΜL (ref 0–0.61)
EOSINOPHIL # BLD AUTO: 0.02 THOUSAND/ÂΜL (ref 0–0.61)
EOSINOPHIL NFR BLD AUTO: 0 % (ref 0–6)
ERYTHROCYTE [DISTWIDTH] IN BLOOD BY AUTOMATED COUNT: 15.9 % (ref 11.6–15.1)
GFR SERPL CREATININE-BSD FRML MDRD: 105 ML/MIN/1.73SQ M
GFR SERPL CREATININE-BSD FRML MDRD: 107 ML/MIN/1.73SQ M
GLUCOSE SERPL-MCNC: 112 MG/DL (ref 65–140)
GLUCOSE SERPL-MCNC: 97 MG/DL (ref 65–140)
HCT VFR BLD AUTO: 19.3 % (ref 34.8–46.1)
HCT VFR BLD AUTO: 25.3 % (ref 34.8–46.1)
HCT VFR BLD AUTO: 28.5 % (ref 34.8–46.1)
HGB BLD-MCNC: 6.6 G/DL (ref 11.5–15.4)
HGB BLD-MCNC: 8.6 G/DL (ref 11.5–15.4)
HGB BLD-MCNC: 9.9 G/DL (ref 11.5–15.4)
IMM GRANULOCYTES # BLD AUTO: 0.04 THOUSAND/UL (ref 0–0.2)
IMM GRANULOCYTES # BLD AUTO: 0.06 THOUSAND/UL (ref 0–0.2)
IMM GRANULOCYTES # BLD AUTO: 0.07 THOUSAND/UL (ref 0–0.2)
IMM GRANULOCYTES NFR BLD AUTO: 0 % (ref 0–2)
IMM GRANULOCYTES NFR BLD AUTO: 1 % (ref 0–2)
IMM GRANULOCYTES NFR BLD AUTO: 1 % (ref 0–2)
LACTATE SERPL-SCNC: 1.5 MMOL/L (ref 0.5–2)
LYMPHOCYTES # BLD AUTO: 2.18 THOUSANDS/ÂΜL (ref 0.6–4.47)
LYMPHOCYTES # BLD AUTO: 2.3 THOUSANDS/ÂΜL (ref 0.6–4.47)
LYMPHOCYTES # BLD AUTO: 2.4 THOUSANDS/ÂΜL (ref 0.6–4.47)
LYMPHOCYTES NFR BLD AUTO: 19 % (ref 14–44)
LYMPHOCYTES NFR BLD AUTO: 20 % (ref 14–44)
LYMPHOCYTES NFR BLD AUTO: 25 % (ref 14–44)
MCH RBC QN AUTO: 31.2 PG (ref 26.8–34.3)
MCH RBC QN AUTO: 31.6 PG (ref 26.8–34.3)
MCH RBC QN AUTO: 31.7 PG (ref 26.8–34.3)
MCHC RBC AUTO-ENTMCNC: 34 G/DL (ref 31.4–37.4)
MCHC RBC AUTO-ENTMCNC: 34.2 G/DL (ref 31.4–37.4)
MCHC RBC AUTO-ENTMCNC: 34.7 G/DL (ref 31.4–37.4)
MCV RBC AUTO: 91 FL (ref 82–98)
MCV RBC AUTO: 92 FL (ref 82–98)
MCV RBC AUTO: 92 FL (ref 82–98)
MONOCYTES # BLD AUTO: 0.58 THOUSAND/ÂΜL (ref 0.17–1.22)
MONOCYTES # BLD AUTO: 0.65 THOUSAND/ÂΜL (ref 0.17–1.22)
MONOCYTES # BLD AUTO: 0.81 THOUSAND/ÂΜL (ref 0.17–1.22)
MONOCYTES NFR BLD AUTO: 5 % (ref 4–12)
MONOCYTES NFR BLD AUTO: 7 % (ref 4–12)
MONOCYTES NFR BLD AUTO: 7 % (ref 4–12)
NEUTROPHILS # BLD AUTO: 5.88 THOUSANDS/ÂΜL (ref 1.85–7.62)
NEUTROPHILS # BLD AUTO: 8.89 THOUSANDS/ÂΜL (ref 1.85–7.62)
NEUTROPHILS # BLD AUTO: 8.94 THOUSANDS/ÂΜL (ref 1.85–7.62)
NEUTS SEG NFR BLD AUTO: 67 % (ref 43–75)
NEUTS SEG NFR BLD AUTO: 73 % (ref 43–75)
NEUTS SEG NFR BLD AUTO: 75 % (ref 43–75)
NRBC BLD AUTO-RTO: 0 /100 WBCS
PLATELET # BLD AUTO: 350 THOUSANDS/UL (ref 149–390)
PLATELET # BLD AUTO: 439 THOUSANDS/UL (ref 149–390)
PLATELET # BLD AUTO: 471 THOUSANDS/UL (ref 149–390)
PMV BLD AUTO: 9 FL (ref 8.9–12.7)
PMV BLD AUTO: 9 FL (ref 8.9–12.7)
PMV BLD AUTO: 9.2 FL (ref 8.9–12.7)
POTASSIUM SERPL-SCNC: 3.7 MMOL/L (ref 3.5–5.3)
POTASSIUM SERPL-SCNC: 3.8 MMOL/L (ref 3.5–5.3)
RBC # BLD AUTO: 2.09 MILLION/UL (ref 3.81–5.12)
RBC # BLD AUTO: 2.76 MILLION/UL (ref 3.81–5.12)
RBC # BLD AUTO: 3.12 MILLION/UL (ref 3.81–5.12)
SODIUM SERPL-SCNC: 130 MMOL/L (ref 135–147)
SODIUM SERPL-SCNC: 134 MMOL/L (ref 135–147)
UNIT DISPENSE STATUS: NORMAL
UNIT PRODUCT CODE: NORMAL
UNIT PRODUCT VOLUME: 350 ML
UNIT RH: NORMAL
VANCOMYCIN SERPL-MCNC: 14.7 UG/ML (ref 10–20)
WBC # BLD AUTO: 12.02 THOUSAND/UL (ref 4.31–10.16)
WBC # BLD AUTO: 12.17 THOUSAND/UL (ref 4.31–10.16)
WBC # BLD AUTO: 8.73 THOUSAND/UL (ref 4.31–10.16)

## 2024-01-03 PROCEDURE — 83605 ASSAY OF LACTIC ACID: CPT

## 2024-01-03 PROCEDURE — 99254 IP/OBS CNSLTJ NEW/EST MOD 60: CPT | Performed by: OBSTETRICS & GYNECOLOGY

## 2024-01-03 PROCEDURE — 99232 SBSQ HOSP IP/OBS MODERATE 35: CPT | Performed by: INTERNAL MEDICINE

## 2024-01-03 PROCEDURE — 99232 SBSQ HOSP IP/OBS MODERATE 35: CPT | Performed by: SURGERY

## 2024-01-03 PROCEDURE — 94762 N-INVAS EAR/PLS OXIMTRY CONT: CPT

## 2024-01-03 PROCEDURE — 86902 BLOOD TYPE ANTIGEN DONOR EA: CPT

## 2024-01-03 PROCEDURE — 80202 ASSAY OF VANCOMYCIN: CPT | Performed by: INTERNAL MEDICINE

## 2024-01-03 PROCEDURE — 85025 COMPLETE CBC W/AUTO DIFF WBC: CPT

## 2024-01-03 PROCEDURE — 93005 ELECTROCARDIOGRAM TRACING: CPT

## 2024-01-03 PROCEDURE — 80048 BASIC METABOLIC PNL TOTAL CA: CPT

## 2024-01-03 PROCEDURE — 05HA33Z INSERTION OF INFUSION DEVICE INTO LEFT BRACHIAL VEIN, PERCUTANEOUS APPROACH: ICD-10-PCS | Performed by: INTERNAL MEDICINE

## 2024-01-03 PROCEDURE — P9016 RBC LEUKOCYTES REDUCED: HCPCS

## 2024-01-03 PROCEDURE — 80048 BASIC METABOLIC PNL TOTAL CA: CPT | Performed by: INTERNAL MEDICINE

## 2024-01-03 PROCEDURE — 74174 CTA ABD&PLVS W/CONTRAST: CPT

## 2024-01-03 PROCEDURE — 85025 COMPLETE CBC W/AUTO DIFF WBC: CPT | Performed by: INTERNAL MEDICINE

## 2024-01-03 RX ORDER — ALBUMIN (HUMAN) 12.5 G/50ML
25 SOLUTION INTRAVENOUS ONCE
Status: COMPLETED | OUTPATIENT
Start: 2024-01-03 | End: 2024-01-03

## 2024-01-03 RX ORDER — SODIUM CHLORIDE, SODIUM GLUCONATE, SODIUM ACETATE, POTASSIUM CHLORIDE, MAGNESIUM CHLORIDE, SODIUM PHOSPHATE, DIBASIC, AND POTASSIUM PHOSPHATE .53; .5; .37; .037; .03; .012; .00082 G/100ML; G/100ML; G/100ML; G/100ML; G/100ML; G/100ML; G/100ML
1000 INJECTION, SOLUTION INTRAVENOUS ONCE
Status: COMPLETED | OUTPATIENT
Start: 2024-01-03 | End: 2024-01-03

## 2024-01-03 RX ORDER — ALBUMIN, HUMAN INJ 5% 5 %
25 SOLUTION INTRAVENOUS ONCE
Status: COMPLETED | OUTPATIENT
Start: 2024-01-03 | End: 2024-01-03

## 2024-01-03 RX ORDER — METRONIDAZOLE 500 MG/100ML
500 INJECTION, SOLUTION INTRAVENOUS EVERY 8 HOURS
Status: DISCONTINUED | OUTPATIENT
Start: 2024-01-03 | End: 2024-01-04

## 2024-01-03 RX ORDER — BISACODYL 10 MG
10 SUPPOSITORY, RECTAL RECTAL DAILY PRN
Status: DISCONTINUED | OUTPATIENT
Start: 2024-01-03 | End: 2024-01-11 | Stop reason: HOSPADM

## 2024-01-03 RX ORDER — MIDODRINE HYDROCHLORIDE 5 MG/1
5 TABLET ORAL ONCE
Status: COMPLETED | OUTPATIENT
Start: 2024-01-03 | End: 2024-01-03

## 2024-01-03 RX ORDER — FLUCONAZOLE 2 MG/ML
400 INJECTION, SOLUTION INTRAVENOUS EVERY 24 HOURS
Status: DISCONTINUED | OUTPATIENT
Start: 2024-01-03 | End: 2024-01-04

## 2024-01-03 RX ADMIN — METRONIDAZOLE 500 MG: 500 INJECTION, SOLUTION INTRAVENOUS at 23:19

## 2024-01-03 RX ADMIN — FOLIC ACID 1 MG: 5 INJECTION, SOLUTION INTRAMUSCULAR; INTRAVENOUS; SUBCUTANEOUS at 14:57

## 2024-01-03 RX ADMIN — CEFEPIME HYDROCHLORIDE 2000 MG: 2 INJECTION, SOLUTION INTRAVENOUS at 05:26

## 2024-01-03 RX ADMIN — ENOXAPARIN SODIUM 90 MG: 100 INJECTION SUBCUTANEOUS at 09:29

## 2024-01-03 RX ADMIN — ACETAMINOPHEN 325MG 650 MG: 325 TABLET ORAL at 00:59

## 2024-01-03 RX ADMIN — ENOXAPARIN SODIUM 90 MG: 100 INJECTION SUBCUTANEOUS at 21:29

## 2024-01-03 RX ADMIN — VANCOMYCIN HYDROCHLORIDE 1500 MG: 5 INJECTION, POWDER, LYOPHILIZED, FOR SOLUTION INTRAVENOUS at 09:32

## 2024-01-03 RX ADMIN — METRONIDAZOLE 500 MG: 500 INJECTION, SOLUTION INTRAVENOUS at 14:24

## 2024-01-03 RX ADMIN — PANTOPRAZOLE SODIUM 40 MG: 40 TABLET, DELAYED RELEASE ORAL at 05:28

## 2024-01-03 RX ADMIN — CHOLECALCIFEROL TAB 10 MCG (400 UNIT) 400 UNITS: 10 TAB at 09:27

## 2024-01-03 RX ADMIN — ACETAMINOPHEN 325MG 650 MG: 325 TABLET ORAL at 16:17

## 2024-01-03 RX ADMIN — METHOCARBAMOL 500 MG: 500 TABLET ORAL at 16:17

## 2024-01-03 RX ADMIN — CYANOCOBALAMIN TAB 500 MCG 1000 MCG: 500 TAB at 09:27

## 2024-01-03 RX ADMIN — MAGNESIUM HYDROXIDE 30 ML: 400 SUSPENSION ORAL at 09:29

## 2024-01-03 RX ADMIN — SODIUM CHLORIDE, SODIUM GLUCONATE, SODIUM ACETATE, POTASSIUM CHLORIDE, MAGNESIUM CHLORIDE, SODIUM PHOSPHATE, DIBASIC, AND POTASSIUM PHOSPHATE 1000 ML: .53; .5; .37; .037; .03; .012; .00082 INJECTION, SOLUTION INTRAVENOUS at 22:07

## 2024-01-03 RX ADMIN — ALBUMIN (HUMAN) 25 G: 12.5 INJECTION, SOLUTION INTRAVENOUS at 15:58

## 2024-01-03 RX ADMIN — BISACODYL 10 MG: 10 SUPPOSITORY RECTAL at 16:17

## 2024-01-03 RX ADMIN — OXYCODONE HYDROCHLORIDE 10 MG: 10 TABLET ORAL at 04:13

## 2024-01-03 RX ADMIN — ALBUMIN (HUMAN) 25 G: 0.25 INJECTION, SOLUTION INTRAVENOUS at 20:13

## 2024-01-03 RX ADMIN — IOHEXOL 100 ML: 350 INJECTION, SOLUTION INTRAVENOUS at 23:07

## 2024-01-03 RX ADMIN — DICYCLOMINE HYDROCHLORIDE 10 MG: 10 CAPSULE ORAL at 21:29

## 2024-01-03 RX ADMIN — METHOCARBAMOL 500 MG: 500 TABLET ORAL at 09:28

## 2024-01-03 RX ADMIN — FLUCONAZOLE 400 MG: 2 INJECTION, SOLUTION INTRAVENOUS at 15:31

## 2024-01-03 RX ADMIN — CEFEPIME HYDROCHLORIDE 2000 MG: 2 INJECTION, SOLUTION INTRAVENOUS at 21:21

## 2024-01-03 RX ADMIN — SODIUM CHLORIDE, SODIUM GLUCONATE, SODIUM ACETATE, POTASSIUM CHLORIDE, MAGNESIUM CHLORIDE, SODIUM PHOSPHATE, DIBASIC, AND POTASSIUM PHOSPHATE 1000 ML: .53; .5; .37; .037; .03; .012; .00082 INJECTION, SOLUTION INTRAVENOUS at 13:18

## 2024-01-03 RX ADMIN — THIAMINE HCL TAB 100 MG 100 MG: 100 TAB at 09:27

## 2024-01-03 RX ADMIN — ACETAMINOPHEN 325MG 650 MG: 325 TABLET ORAL at 09:27

## 2024-01-03 RX ADMIN — Medication 3 MG: at 21:29

## 2024-01-03 RX ADMIN — DICYCLOMINE HYDROCHLORIDE 10 MG: 10 CAPSULE ORAL at 16:17

## 2024-01-03 RX ADMIN — VANCOMYCIN HYDROCHLORIDE 1500 MG: 5 INJECTION, POWDER, LYOPHILIZED, FOR SOLUTION INTRAVENOUS at 20:00

## 2024-01-03 RX ADMIN — OXYCODONE HYDROCHLORIDE 10 MG: 10 TABLET ORAL at 17:38

## 2024-01-03 RX ADMIN — MIDODRINE HYDROCHLORIDE 5 MG: 5 TABLET ORAL at 22:07

## 2024-01-03 RX ADMIN — FLUOXETINE 20 MG: 20 CAPSULE ORAL at 09:28

## 2024-01-03 RX ADMIN — DICYCLOMINE HYDROCHLORIDE 10 MG: 10 CAPSULE ORAL at 05:28

## 2024-01-03 RX ADMIN — SENNOSIDES AND DOCUSATE SODIUM 1 TABLET: 8.6; 5 TABLET ORAL at 21:29

## 2024-01-03 NOTE — PROGRESS NOTES
Mckenna Fischer is a 48 y.o. female who is currently ordered Vancomycin IV with management by the Pharmacy Consult service.  Relevant clinical data and objective / subjective history reviewed.  Vancomycin Assessment:  Indication and Goal AUC/Trough: Soft tissue (goal -600, trough >10)  Clinical Status: stable  Micro:     Renal Function:  SCr: 0.61 mg/dL  CrCl: 129.4 mL/min  Renal replacement: Not on dialysis  Days of Therapy: 4  Current Dose: 1250mg IV every 12 hours  Vancomycin Plan:  New Dosinmg IV every 12 hours  Estimated AUC: 455 mcg*hr/mL  Estimated Trough: 14.3 mcg/mL  Next Level: 1/10/24  Renal Function Monitoring: Daily BMP and UOP  Pharmacy will continue to follow closely for s/sx of nephrotoxicity, infusion reactions and appropriateness of therapy.  BMP and CBC will be ordered per protocol. We will continue to follow the patient’s culture results and clinical progress daily.    Sterling [place], Pharmacist
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
## 2024-01-03 NOTE — PLAN OF CARE
Problem: INFECTION - ADULT  Goal: Absence or prevention of progression during hospitalization  Description: INTERVENTIONS:  Midline procedure and maintenance  - Assess and monitor for signs and symptoms of infection  - Monitor lab/diagnostic results  - Monitor all insertion sites, i.e. indwelling lines, tubes, and drains  - Monitor endotracheal if appropriate and nasal secretions for changes in amount and color  - [place] appropriate cooling/warming therapies per order  - Administer medications as ordered  - Instruct and encourage patient and family to use good hand hygiene technique  - Identify and instruct in appropriate isolation precautions for identified infection/condition  Outcome: Progressing
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
## 2024-01-03 NOTE — ASSESSMENT & PLAN NOTE
12/29/23 US: A 6.3 x 6.8 x 6.7 complex cystic lesion corresponding to the CT finding.   12/31/23 CT: Redemonstrated right adnexal multicystic complex lesion measuring 6.8 x 6.3 x 7.7 cm (series 2, image 157; series 4, image 99) with rim thickening and enhancement.    Hemoglobin   Date Value Ref Range Status   01/06/2024 7.7 (L) 11.5 - 15.4 g/dL Final   01/05/2024 8.1 (L) 11.5 - 15.4 g/dL Final   01/04/2024 8.4 (L) 11.5 - 15.4 g/dL Final   01/03/2024 6.6 (LL) 11.5 - 15.4 g/dL Final   - No vaginal bleeding   - GCCT negative    - Gyn Onc does not recommend transvaginal drainage at this time  - Recommend IR to attempt transgluteal drain

## 2024-01-03 NOTE — PLAN OF CARE
Problem: Prexisting or High Potential for Compromised Skin Integrity  Goal: Skin integrity is maintained or improved  Description: INTERVENTIONS:  - Identify patients at risk for skin breakdown  - Assess and monitor skin integrity  - Assess and monitor nutrition and hydration status  - Monitor labs   - Assess for incontinence   - Turn and reposition patient  - Assist with mobility/ambulation  - Relieve pressure over bony prominences  - Avoid friction and shearing  - Provide appropriate hygiene as needed including keeping skin clean and dry  - Evaluate need for skin moisturizer/barrier cream  - Collaborate with interdisciplinary team   - Patient/family teaching  - Consider wound care consult   Outcome: Progressing     Problem: PAIN - ADULT  Goal: Verbalizes/displays adequate comfort level or baseline comfort level  Description: Interventions:  - Encourage patient to monitor pain and request assistance  - Assess pain using appropriate pain scale  - Administer analgesics based on type and severity of pain and evaluate response  - Implement non-pharmacological measures as appropriate and evaluate response  - Consider cultural and social influences on pain and pain management  - Notify physician/advanced practitioner if interventions unsuccessful or patient reports new pain  Outcome: Progressing     Problem: INFECTION - ADULT  Goal: Absence or prevention of progression during hospitalization  Description: INTERVENTIONS:  - Assess and monitor for signs and symptoms of infection  - Monitor lab/diagnostic results  - Monitor all insertion sites, i.e. indwelling lines, tubes, and drains  - Monitor endotracheal if appropriate and nasal secretions for changes in amount and color  - [place] appropriate cooling/warming therapies per order  - Administer medications as ordered  - Instruct and encourage patient and family to use good hand hygiene technique  - Identify and instruct in appropriate isolation precautions for  identified infection/condition  Outcome: Progressing     Problem: SAFETY ADULT  Goal: Patient will remain free of falls  Description: INTERVENTIONS:  - Educate patient/family on patient safety including physical limitations  - Instruct patient to call for assistance with activity   - Consult OT/PT to assist with strengthening/mobility   - Keep Call bell within reach  - Keep bed low and locked with side rails adjusted as appropriate  - Keep care items and personal belongings within reach  - Initiate and maintain comfort rounds  - Make Fall Risk Sign visible to staff  - Offer Toileting every 2 Hours, in advance of need  - Initiate/Maintain bed alarm  - Obtain necessary fall risk management equipment: bed alarm   - Apply yellow socks and bracelet for high fall risk patients  - Consider moving patient to room near nurses station  Outcome: Progressing  Goal: Maintain or return to baseline ADL function  Description: INTERVENTIONS:  -  Assess patient's ability to carry out ADLs; assess patient's baseline for ADL function and identify physical deficits which impact ability to perform ADLs (bathing, care of mouth/teeth, toileting, grooming, dressing, etc.)  - Assess/evaluate cause of self-care deficits   - Assess range of motion  - Assess patient's mobility; develop plan if impaired  - Assess patient's need for assistive devices and provide as appropriate  - Encourage maximum independence but intervene and supervise when necessary  - Involve family in performance of ADLs  - Assess for home care needs following discharge   - Consider OT consult to assist with ADL evaluation and planning for discharge  - Provide patient education as appropriate  Outcome: Progressing  Goal: Maintains/Returns to pre admission functional level  Description: INTERVENTIONS:  - Perform AM-PAC 6 Click Basic Mobility/ Daily Activity assessment daily.  - Set and communicate daily mobility goal to care team and patient/family/caregiver.   - Collaborate  with rehabilitation services on mobility goals if consulted  - Perform Range of Motion 4 times a day.  - Reposition patient every 2 hours.  - Dangle patient 4 times a day  - Stand patient 4 times a day  - Ambulate patient 4 times a day  - Out of bed to chair 4 times a day   - Out of bed for meals 4 times a day  - Out of bed for toileting  - Record patient progress and toleration of activity level   Outcome: Progressing     Problem: DISCHARGE PLANNING  Goal: Discharge to home or other facility with appropriate resources  Description: INTERVENTIONS:  - Identify barriers to discharge w/patient and caregiver  - Arrange for needed discharge resources and transportation as appropriate  - Identify discharge learning needs (meds, wound care, etc.)  - Arrange for interpretive services to assist at discharge as needed  - Refer to Case Management Department for coordinating discharge planning if the patient needs post-hospital services based on physician/advanced practitioner order or complex needs related to functional status, cognitive ability, or social support system  Outcome: Progressing     Problem: Knowledge Deficit  Goal: Patient/family/caregiver demonstrates understanding of disease process, treatment plan, medications, and discharge instructions  Description: Complete learning assessment and assess knowledge base.  Interventions:  - Provide teaching at level of understanding  - Provide teaching via preferred learning methods  Outcome: Progressing     Problem: Nutrition/Hydration-ADULT  Goal: Nutrient/Hydration intake appropriate for improving, restoring or maintaining nutritional needs  Description: Monitor and assess patient's nutrition/hydration status for malnutrition. Collaborate with interdisciplinary team and initiate plan and interventions as ordered.  Monitor patient's weight and dietary intake as ordered or per policy. Utilize nutrition screening tool and intervene as necessary. Determine patient's food  preferences and provide high-protein, high-caloric foods as appropriate.     INTERVENTIONS:  - Monitor oral intake, urinary output, labs, and treatment plans  - Assess nutrition and hydration status and recommend course of action  - Evaluate amount of meals eaten  - Assist patient with eating if necessary   - Allow adequate time for meals  - Recommend/ encourage appropriate diets, oral nutritional supplements, and vitamin/mineral supplements  - Order, calculate, and assess calorie counts as needed  - Recommend, monitor, and adjust tube feedings and TPN/PPN based on assessed needs  - Assess need for intravenous fluids  - Provide specific nutrition/hydration education as appropriate  - Include patient/family/caregiver in decisions related to nutrition  Outcome: Progressing
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
## 2024-01-03 NOTE — PROCEDURES
Midline Insertion    Date/Time: 1/3/2024 11:11 AM    Performed by: aRma Sidhu RN  Authorized by: Sonu Cote MD    Patient location:  Bedside  Consent:     Consent obtained: per protocol no consent required.  Universal protocol:     Procedure explained and questions answered to patient or proxy's satisfaction: yes      Relevant documents present and verified: yes      Site/side marked: yes      Immediately prior to procedure, a time out was called: yes      Patient identity confirmed:  Verbally with patient, arm band, provided demographic data and hospital-assigned identification number  Pre-procedure details:     Hand hygiene: Hand hygiene performed prior to insertion      Sterile barrier technique: All elements of maximal sterile technique followed      Skin preparation:  ChloraPrep    Skin preparation agent: Skin preparation agent completely dried prior to procedure    Indications:     Midline indications: no peripheral vascular access    Anesthesia (see MAR for exact dosages):     Anesthesia method:  None  Procedure details:     Location:  Brachial    Laterality:  Left    Catheter size:  20 gauge    Landmarks identified: yes      Ultrasound guidance: yes      Ultrasound image availability:  Not saved    Sterile ultrasound techniques: Sterile gel and sterile probe covers were used      Number of attempts:  1    Successful placement: yes      Cath access vessel: midline.    Catheter length (cm):  10    Exposed catheter length (cm):  0    Arm circumference (cm):  39    Lot number:  [id number]  Post-procedure details:     Post-procedure:  Dressing applied and securement device placed    Assessment:  Blood return through all ports and free fluid flow    Post-procedure complications: none      Patient tolerance of procedure:  Tolerated well, no immediate complications

## 2024-01-03 NOTE — PLAN OF CARE
Problem: Prexisting or High Potential for Compromised Skin Integrity  Goal: Skin integrity is maintained or improved  Description: INTERVENTIONS:  - Identify patients at risk for skin breakdown  - Assess and monitor skin integrity  - Assess and monitor nutrition and hydration status  - Monitor labs   - Assess for incontinence   - Turn and reposition patient  - Assist with mobility/ambulation  - Relieve pressure over bony prominences  - Avoid friction and shearing  - Provide appropriate hygiene as needed including keeping skin clean and dry  - Evaluate need for skin moisturizer/barrier cream  - Collaborate with interdisciplinary team   - Patient/family teaching  - Consider wound care consult   Outcome: Progressing     Problem: PAIN - ADULT  Goal: Verbalizes/displays adequate comfort level or baseline comfort level  Description: Interventions:  - Encourage patient to monitor pain and request assistance  - Assess pain using appropriate pain scale  - Administer analgesics based on type and severity of pain and evaluate response  - Implement non-pharmacological measures as appropriate and evaluate response  - Consider cultural and social influences on pain and pain management  - Notify physician/advanced practitioner if interventions unsuccessful or patient reports new pain  Outcome: Progressing     Problem: INFECTION - ADULT  Goal: Absence or prevention of progression during hospitalization  Description: INTERVENTIONS:  - Assess and monitor for signs and symptoms of infection  - Monitor lab/diagnostic results  - Monitor all insertion sites, i.e. indwelling lines, tubes, and drains  - Monitor endotracheal if appropriate and nasal secretions for changes in amount and color  - [place] appropriate cooling/warming therapies per order  - Administer medications as ordered  - Instruct and encourage patient and family to use good hand hygiene technique  - Identify and instruct in appropriate isolation precautions for  identified infection/condition  Outcome: Progressing     Problem: SAFETY ADULT  Goal: Patient will remain free of falls  Description: INTERVENTIONS:  - Educate patient/family on patient safety including physical limitations  - Instruct patient to call for assistance with activity   - Consult OT/PT to assist with strengthening/mobility   - Keep Call bell within reach  - Keep bed low and locked with side rails adjusted as appropriate  - Keep care items and personal belongings within reach  - Initiate and maintain comfort rounds  - Make Fall Risk Sign visible to staff  - Offer Toileting every 2 Hours, in advance of need  - Initiate/Maintain bed alarm  - Obtain necessary fall risk management equipment: alarm   - Apply yellow socks and bracelet for high fall risk patients  - Consider moving patient to room near nurses station  Outcome: Progressing  Goal: Maintain or return to baseline ADL function  Description: INTERVENTIONS:  -  Assess patient's ability to carry out ADLs; assess patient's baseline for ADL function and identify physical deficits which impact ability to perform ADLs (bathing, care of mouth/teeth, toileting, grooming, dressing, etc.)  - Assess/evaluate cause of self-care deficits   - Assess range of motion  - Assess patient's mobility; develop plan if impaired  - Assess patient's need for assistive devices and provide as appropriate  - Encourage maximum independence but intervene and supervise when necessary  - Involve family in performance of ADLs  - Assess for home care needs following discharge   - Consider OT consult to assist with ADL evaluation and planning for discharge  - Provide patient education as appropriate  Outcome: Progressing  Goal: Maintains/Returns to pre admission functional level  Description: INTERVENTIONS:  - Perform AM-PAC 6 Click Basic Mobility/ Daily Activity assessment daily.  - Set and communicate daily mobility goal to care team and patient/family/caregiver.   - Collaborate with  rehabilitation services on mobility goals if consulted  - Perform Range of Motion 3 times a day.  - Reposition patient every 2 hours.  - Dangle patient 3 times a day  - Stand patient 3 times a day  - Ambulate patient 3 times a day  - Out of bed to chair 3 times a day   - Out of bed for meals 3 times a day  - Out of bed for toileting  - Record patient progress and toleration of activity level   Outcome: Progressing     Problem: DISCHARGE PLANNING  Goal: Discharge to home or other facility with appropriate resources  Description: INTERVENTIONS:  - Identify barriers to discharge w/patient and caregiver  - Arrange for needed discharge resources and transportation as appropriate  - Identify discharge learning needs (meds, wound care, etc.)  - Arrange for interpretive services to assist at discharge as needed  - Refer to Case Management Department for coordinating discharge planning if the patient needs post-hospital services based on physician/advanced practitioner order or complex needs related to functional status, cognitive ability, or social support system  Outcome: Progressing     Problem: Knowledge Deficit  Goal: Patient/family/caregiver demonstrates understanding of disease process, treatment plan, medications, and discharge instructions  Description: Complete learning assessment and assess knowledge base.  Interventions:  - Provide teaching at level of understanding  - Provide teaching via preferred learning methods  Outcome: Progressing     Problem: Nutrition/Hydration-ADULT  Goal: Nutrient/Hydration intake appropriate for improving, restoring or maintaining nutritional needs  Description: Monitor and assess patient's nutrition/hydration status for malnutrition. Collaborate with interdisciplinary team and initiate plan and interventions as ordered.  Monitor patient's weight and dietary intake as ordered or per policy. Utilize nutrition screening tool and intervene as necessary. Determine patient's food  preferences and provide high-protein, high-caloric foods as appropriate.     INTERVENTIONS:  - Monitor oral intake, urinary output, labs, and treatment plans  - Assess nutrition and hydration status and recommend course of action  - Evaluate amount of meals eaten  - Assist patient with eating if necessary   - Allow adequate time for meals  - Recommend/ encourage appropriate diets, oral nutritional supplements, and vitamin/mineral supplements  - Order, calculate, and assess calorie counts as needed  - Recommend, monitor, and adjust tube feedings and TPN/PPN based on assessed needs  - Assess need for intravenous fluids  - Provide specific nutrition/hydration education as appropriate  - Include patient/family/caregiver in decisions related to nutrition  Outcome: Progressing
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
## 2024-01-04 PROBLEM — S37.60XA: Status: ACTIVE | Noted: 2023-09-07

## 2024-01-04 PROBLEM — T81.49XA WOUND INFECTION AFTER SURGERY: Status: ACTIVE | Noted: 2023-09-18

## 2024-01-04 PROBLEM — Z90.49 S/P SMALL BOWEL RESECTION: Status: ACTIVE | Noted: 2023-09-07

## 2024-01-04 PROBLEM — I82.409 ACUTE THROMBOEMBOLISM OF DEEP VEINS OF LOWER EXTREMITY (HCC): Status: ACTIVE | Noted: 2024-01-04

## 2024-01-04 LAB
ABO GROUP BLD BPU: NORMAL
ATRIAL RATE: 86 BPM
BACTERIA SPEC ANAEROBE CULT: ABNORMAL
BACTERIA SPEC BFLD CULT: ABNORMAL
BPU ID: NORMAL
CROSSMATCH: NORMAL
ERYTHROCYTE [DISTWIDTH] IN BLOOD BY AUTOMATED COUNT: 17.2 % (ref 11.6–15.1)
GRAM STN SPEC: ABNORMAL
HCT VFR BLD AUTO: 24 % (ref 34.8–46.1)
HGB BLD-MCNC: 8.4 G/DL (ref 11.5–15.4)
MCH RBC QN AUTO: 31.1 PG (ref 26.8–34.3)
MCHC RBC AUTO-ENTMCNC: 35 G/DL (ref 31.4–37.4)
MCV RBC AUTO: 89 FL (ref 82–98)
P AXIS: 57 DEGREES
PLATELET # BLD AUTO: 366 THOUSANDS/UL (ref 149–390)
PMV BLD AUTO: 8.9 FL (ref 8.9–12.7)
PR INTERVAL: 142 MS
QRS AXIS: 56 DEGREES
QRSD INTERVAL: 78 MS
QT INTERVAL: 362 MS
QTC INTERVAL: 433 MS
RBC # BLD AUTO: 2.7 MILLION/UL (ref 3.81–5.12)
T WAVE AXIS: 64 DEGREES
UNIT DISPENSE STATUS: NORMAL
UNIT PRODUCT CODE: NORMAL
UNIT PRODUCT VOLUME: 350 ML
UNIT RH: NORMAL
VENTRICULAR RATE: 86 BPM
WBC # BLD AUTO: 9.44 THOUSAND/UL (ref 4.31–10.16)

## 2024-01-04 PROCEDURE — 85027 COMPLETE CBC AUTOMATED: CPT | Performed by: NURSE PRACTITIONER

## 2024-01-04 PROCEDURE — 99233 SBSQ HOSP IP/OBS HIGH 50: CPT | Performed by: INTERNAL MEDICINE

## 2024-01-04 PROCEDURE — 99255 IP/OBS CONSLTJ NEW/EST HI 80: CPT | Performed by: OBSTETRICS & GYNECOLOGY

## 2024-01-04 PROCEDURE — 87591 N.GONORRHOEAE DNA AMP PROB: CPT | Performed by: OBSTETRICS & GYNECOLOGY

## 2024-01-04 PROCEDURE — 99232 SBSQ HOSP IP/OBS MODERATE 35: CPT | Performed by: SURGERY

## 2024-01-04 PROCEDURE — 99232 SBSQ HOSP IP/OBS MODERATE 35: CPT | Performed by: OBSTETRICS & GYNECOLOGY

## 2024-01-04 PROCEDURE — 99232 SBSQ HOSP IP/OBS MODERATE 35: CPT | Performed by: INTERNAL MEDICINE

## 2024-01-04 PROCEDURE — 05HA33Z INSERTION OF INFUSION DEVICE INTO LEFT BRACHIAL VEIN, PERCUTANEOUS APPROACH: ICD-10-PCS | Performed by: INTERNAL MEDICINE

## 2024-01-04 PROCEDURE — 87491 CHLMYD TRACH DNA AMP PROBE: CPT | Performed by: OBSTETRICS & GYNECOLOGY

## 2024-01-04 RX ORDER — METRONIDAZOLE 500 MG/1
500 TABLET ORAL EVERY 8 HOURS SCHEDULED
Status: DISCONTINUED | OUTPATIENT
Start: 2024-01-04 | End: 2024-01-04

## 2024-01-04 RX ORDER — LANOLIN ALCOHOL/MO/W.PET/CERES
3 CREAM (GRAM) TOPICAL
COMMUNITY
Start: 2023-10-09

## 2024-01-04 RX ORDER — MIDODRINE HYDROCHLORIDE 5 MG/1
10 TABLET ORAL
Status: DISCONTINUED | OUTPATIENT
Start: 2024-01-05 | End: 2024-01-06

## 2024-01-04 RX ORDER — FLUCONAZOLE 100 MG/1
400 TABLET ORAL DAILY
Status: DISCONTINUED | OUTPATIENT
Start: 2024-01-04 | End: 2024-01-05

## 2024-01-04 RX ORDER — ASPIRIN 325 MG
325 TABLET, DELAYED RELEASE (ENTERIC COATED) ORAL DAILY
COMMUNITY
Start: 2023-11-16 | End: 2024-01-11

## 2024-01-04 RX ORDER — ALBUMIN (HUMAN) 12.5 G/50ML
50 SOLUTION INTRAVENOUS ONCE
Status: COMPLETED | OUTPATIENT
Start: 2024-01-04 | End: 2024-01-04

## 2024-01-04 RX ORDER — MIDODRINE HYDROCHLORIDE 5 MG/1
5 TABLET ORAL
Status: DISCONTINUED | OUTPATIENT
Start: 2024-01-04 | End: 2024-01-04

## 2024-01-04 RX ADMIN — ENOXAPARIN SODIUM 90 MG: 100 INJECTION SUBCUTANEOUS at 20:28

## 2024-01-04 RX ADMIN — METHOCARBAMOL 500 MG: 500 TABLET ORAL at 16:32

## 2024-01-04 RX ADMIN — METHOCARBAMOL 500 MG: 500 TABLET ORAL at 08:57

## 2024-01-04 RX ADMIN — FLUCONAZOLE 400 MG: 100 TABLET ORAL at 14:09

## 2024-01-04 RX ADMIN — ACETAMINOPHEN 325MG 650 MG: 325 TABLET ORAL at 11:01

## 2024-01-04 RX ADMIN — SENNOSIDES AND DOCUSATE SODIUM 1 TABLET: 8.6; 5 TABLET ORAL at 21:22

## 2024-01-04 RX ADMIN — METHOCARBAMOL 500 MG: 500 TABLET ORAL at 20:29

## 2024-01-04 RX ADMIN — THIAMINE HCL TAB 100 MG 100 MG: 100 TAB at 08:56

## 2024-01-04 RX ADMIN — VANCOMYCIN HYDROCHLORIDE 1500 MG: 5 INJECTION, POWDER, LYOPHILIZED, FOR SOLUTION INTRAVENOUS at 21:22

## 2024-01-04 RX ADMIN — CYANOCOBALAMIN TAB 500 MCG 1000 MCG: 500 TAB at 08:56

## 2024-01-04 RX ADMIN — CEFEPIME HYDROCHLORIDE 2000 MG: 2 INJECTION, SOLUTION INTRAVENOUS at 06:18

## 2024-01-04 RX ADMIN — SODIUM CHLORIDE 3 G: 9 INJECTION, SOLUTION INTRAVENOUS at 14:09

## 2024-01-04 RX ADMIN — MAGNESIUM HYDROXIDE 30 ML: 400 SUSPENSION ORAL at 08:56

## 2024-01-04 RX ADMIN — DICYCLOMINE HYDROCHLORIDE 10 MG: 10 CAPSULE ORAL at 11:01

## 2024-01-04 RX ADMIN — PANTOPRAZOLE SODIUM 40 MG: 40 TABLET, DELAYED RELEASE ORAL at 05:39

## 2024-01-04 RX ADMIN — FLUOXETINE 20 MG: 20 CAPSULE ORAL at 08:56

## 2024-01-04 RX ADMIN — VANCOMYCIN HYDROCHLORIDE 1500 MG: 5 INJECTION, POWDER, LYOPHILIZED, FOR SOLUTION INTRAVENOUS at 08:58

## 2024-01-04 RX ADMIN — MIDODRINE HYDROCHLORIDE 5 MG: 5 TABLET ORAL at 16:32

## 2024-01-04 RX ADMIN — DICYCLOMINE HYDROCHLORIDE 10 MG: 10 CAPSULE ORAL at 05:39

## 2024-01-04 RX ADMIN — Medication 3 MG: at 21:22

## 2024-01-04 RX ADMIN — MIDODRINE HYDROCHLORIDE 5 MG: 5 TABLET ORAL at 06:51

## 2024-01-04 RX ADMIN — ALBUMIN (HUMAN) 50 G: 0.25 INJECTION, SOLUTION INTRAVENOUS at 02:41

## 2024-01-04 RX ADMIN — ACETAMINOPHEN 325MG 650 MG: 325 TABLET ORAL at 20:29

## 2024-01-04 RX ADMIN — SODIUM CHLORIDE 3 G: 9 INJECTION, SOLUTION INTRAVENOUS at 20:28

## 2024-01-04 RX ADMIN — ACETAMINOPHEN 325MG 650 MG: 325 TABLET ORAL at 02:41

## 2024-01-04 RX ADMIN — METRONIDAZOLE 500 MG: 500 INJECTION, SOLUTION INTRAVENOUS at 05:39

## 2024-01-04 RX ADMIN — MIDODRINE HYDROCHLORIDE 5 MG: 5 TABLET ORAL at 11:01

## 2024-01-04 RX ADMIN — TRAZODONE HYDROCHLORIDE 50 MG: 50 TABLET ORAL at 21:22

## 2024-01-04 RX ADMIN — ENOXAPARIN SODIUM 90 MG: 100 INJECTION SUBCUTANEOUS at 08:56

## 2024-01-04 RX ADMIN — OXYCODONE HYDROCHLORIDE 10 MG: 10 TABLET ORAL at 14:18

## 2024-01-04 RX ADMIN — CHOLECALCIFEROL TAB 10 MCG (400 UNIT) 400 UNITS: 10 TAB at 11:01

## 2024-01-04 RX ADMIN — FOLIC ACID 1 MG: 5 INJECTION, SOLUTION INTRAMUSCULAR; INTRAVENOUS; SUBCUTANEOUS at 12:41

## 2024-01-04 NOTE — PLAN OF CARE
Problem: Prexisting or High Potential for Compromised Skin Integrity  Goal: Skin integrity is maintained or improved  Description: INTERVENTIONS:  - Identify patients at risk for skin breakdown  - Assess and monitor skin integrity  - Assess and monitor nutrition and hydration status  - Monitor labs   - Assess for incontinence   - Turn and reposition patient  - Assist with mobility/ambulation  - Relieve pressure over bony prominences  - Avoid friction and shearing  - Provide appropriate hygiene as needed including keeping skin clean and dry  - Evaluate need for skin moisturizer/barrier cream  - Collaborate with interdisciplinary team   - Patient/family teaching  - Consider wound care consult   Outcome: Progressing     Problem: PAIN - ADULT  Goal: Verbalizes/displays adequate comfort level or baseline comfort level  Description: Interventions:  - Encourage patient to monitor pain and request assistance  - Assess pain using appropriate pain scale  - Administer analgesics based on type and severity of pain and evaluate response  - Implement non-pharmacological measures as appropriate and evaluate response  - Consider cultural and social influences on pain and pain management  - Notify physician/advanced practitioner if interventions unsuccessful or patient reports new pain  Outcome: Progressing     Problem: INFECTION - ADULT  Goal: Absence or prevention of progression during hospitalization  Description: INTERVENTIONS:  - Assess and monitor for signs and symptoms of infection  - Monitor lab/diagnostic results  - Monitor all insertion sites, i.e. indwelling lines, tubes, and drains  - Monitor endotracheal if appropriate and nasal secretions for changes in amount and color  - [place] appropriate cooling/warming therapies per order  - Administer medications as ordered  - Instruct and encourage patient and family to use good hand hygiene technique  - Identify and instruct in appropriate isolation precautions for  identified infection/condition  Outcome: Progressing     Problem: SAFETY ADULT  Goal: Patient will remain free of falls  Description: INTERVENTIONS:  - Educate patient/family on patient safety including physical limitations  - Instruct patient to call for assistance with activity   - Consult OT/PT to assist with strengthening/mobility   - Keep Call bell within reach  - Keep bed low and locked with side rails adjusted as appropriate  - Keep care items and personal belongings within reach  - Initiate and maintain comfort rounds  - Make Fall Risk Sign visible to staff  - Offer Toileting every 2 Hours, in advance of need  - Initiate/Maintain bed alarm  - Obtain necessary fall risk management equipment: alarm   - Apply yellow socks and bracelet for high fall risk patients  - Consider moving patient to room near nurses station  Outcome: Progressing  Goal: Maintain or return to baseline ADL function  Description: INTERVENTIONS:  -  Assess patient's ability to carry out ADLs; assess patient's baseline for ADL function and identify physical deficits which impact ability to perform ADLs (bathing, care of mouth/teeth, toileting, grooming, dressing, etc.)  - Assess/evaluate cause of self-care deficits   - Assess range of motion  - Assess patient's mobility; develop plan if impaired  - Assess patient's need for assistive devices and provide as appropriate  - Encourage maximum independence but intervene and supervise when necessary  - Involve family in performance of ADLs  - Assess for home care needs following discharge   - Consider OT consult to assist with ADL evaluation and planning for discharge  - Provide patient education as appropriate  Outcome: Progressing  Goal: Maintains/Returns to pre admission functional level  Description: INTERVENTIONS:  - Perform AM-PAC 6 Click Basic Mobility/ Daily Activity assessment daily.  - Set and communicate daily mobility goal to care team and patient/family/caregiver.   - Collaborate with  rehabilitation services on mobility goals if consulted  - Perform Range of Motion 3 times a day.  - Reposition patient every 2 hours.  - Dangle patient 3 times a day  - Stand patient 3 times a day  - Ambulate patient 3 times a day  - Out of bed to chair 3 times a day   - Out of bed for meals 3 times a day  - Out of bed for toileting  - Record patient progress and toleration of activity level   Outcome: Progressing     Problem: DISCHARGE PLANNING  Goal: Discharge to home or other facility with appropriate resources  Description: INTERVENTIONS:  - Identify barriers to discharge w/patient and caregiver  - Arrange for needed discharge resources and transportation as appropriate  - Identify discharge learning needs (meds, wound care, etc.)  - Arrange for interpretive services to assist at discharge as needed  - Refer to Case Management Department for coordinating discharge planning if the patient needs post-hospital services based on physician/advanced practitioner order or complex needs related to functional status, cognitive ability, or social support system  Outcome: Progressing     Problem: Knowledge Deficit  Goal: Patient/family/caregiver demonstrates understanding of disease process, treatment plan, medications, and discharge instructions  Description: Complete learning assessment and assess knowledge base.  Interventions:  - Provide teaching at level of understanding  - Provide teaching via preferred learning methods  Outcome: Progressing     Problem: Nutrition/Hydration-ADULT  Goal: Nutrient/Hydration intake appropriate for improving, restoring or maintaining nutritional needs  Description: Monitor and assess patient's nutrition/hydration status for malnutrition. Collaborate with interdisciplinary team and initiate plan and interventions as ordered.  Monitor patient's weight and dietary intake as ordered or per policy. Utilize nutrition screening tool and intervene as necessary. Determine patient's food  preferences and provide high-protein, high-caloric foods as appropriate.     INTERVENTIONS:  - Monitor oral intake, urinary output, labs, and treatment plans  - Assess nutrition and hydration status and recommend course of action  - Evaluate amount of meals eaten  - Assist patient with eating if necessary   - Allow adequate time for meals  - Recommend/ encourage appropriate diets, oral nutritional supplements, and vitamin/mineral supplements  - Order, calculate, and assess calorie counts as needed  - Recommend, monitor, and adjust tube feedings and TPN/PPN based on assessed needs  - Assess need for intravenous fluids  - Provide specific nutrition/hydration education as appropriate  - Include patient/family/caregiver in decisions related to nutrition  Outcome: Progressing
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
## 2024-01-04 NOTE — NURSING NOTE
notified of patient being continuously hypotensive and reporting seeing black spots. Dr. Cote recommending critical care consult at this time. Will continue to monitor BP and symptoms. Notify provider if any changes.

## 2024-01-04 NOTE — PROCEDURES
Midline Insertion    Date/Time: 1/4/2024 12:19 PM    Performed by: Neetu Mahan RN  Authorized by: Sonu Cote MD    Patient location:  Bedside  Consent:     Consent obtained:  Verbal    Consent given by:  Patient  Universal protocol:     Procedure explained and questions answered to patient or proxy's satisfaction: yes      Patient identity confirmed:  Verbally with patient and arm band  Pre-procedure details:     Hand hygiene: Hand hygiene performed prior to insertion      Sterile barrier technique: All elements of maximal sterile technique followed      Skin preparation:  2% chlorhexidine    Skin preparation agent: Skin preparation agent completely dried prior to procedure    Indications:     Midline indications: replacement      Midline indications comment:  Previous midline not flushing.  Procedure details:     Location:  Right basilic    Laterality:  Right    Catheter size:  18 gauge    Ultrasound guidance: yes      Ultrasound image availability:  Not saved    Sterile ultrasound techniques: Sterile gel and sterile probe covers were used      Number of attempts:  1    Successful placement: yes      Catheter length (cm):  10    Exposed catheter length (cm):  0    Arm circumference (cm):  30  Post-procedure details:     Post-procedure:  Dressing applied and securement device placed    Assessment:  Blood return through all ports    Patient tolerance of procedure:  Tolerated well, no immediate complications  Comments:      Unable to flush or draw back on previous midline. New line placed.
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
## 2024-01-04 NOTE — ASSESSMENT & PLAN NOTE
Patient does not appear to be septic at present.  Nevertheless, her blood pressure has been consistently low.  She has received fluid and albumin.  Midodrine has been started.  I will ask critical care medicine to review the situation.  The patient's kidney function and urine output have been stable.

## 2024-01-04 NOTE — ASSESSMENT & PLAN NOTE
The patient underwent aspiration of this by IR.  Cultures have grown methicillin resistant staph and an anaerobic bacteria.  Antibiotic therapy has been changed to vancomycin plus ampicillin/sulbactam
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
## 2024-01-04 NOTE — PLAN OF CARE
Problem: Prexisting or High Potential for Compromised Skin Integrity  Goal: Skin integrity is maintained or improved  Description: INTERVENTIONS:  - Identify patients at risk for skin breakdown  - Assess and monitor skin integrity  - Assess and monitor nutrition and hydration status  - Monitor labs   - Assess for incontinence   - Turn and reposition patient  - Assist with mobility/ambulation  - Relieve pressure over bony prominences  - Avoid friction and shearing  - Provide appropriate hygiene as needed including keeping skin clean and dry  - Evaluate need for skin moisturizer/barrier cream  - Collaborate with interdisciplinary team   - Patient/family teaching  - Consider wound care consult   Outcome: Progressing     Problem: PAIN - ADULT  Goal: Verbalizes/displays adequate comfort level or baseline comfort level  Description: Interventions:  - Encourage patient to monitor pain and request assistance  - Assess pain using appropriate pain scale  - Administer analgesics based on type and severity of pain and evaluate response  - Implement non-pharmacological measures as appropriate and evaluate response  - Consider cultural and social influences on pain and pain management  - Notify physician/advanced practitioner if interventions unsuccessful or patient reports new pain  Outcome: Progressing     Problem: INFECTION - ADULT  Goal: Absence or prevention of progression during hospitalization  Description: INTERVENTIONS:  - Assess and monitor for signs and symptoms of infection  - Monitor lab/diagnostic results  - Monitor all insertion sites, i.e. indwelling lines, tubes, and drains  - Monitor endotracheal if appropriate and nasal secretions for changes in amount and color  - Warren appropriate cooling/warming therapies per order  - Administer medications as ordered  - Instruct and encourage patient and family to use good hand hygiene technique  - Identify and instruct in appropriate isolation precautions for  identified infection/condition  Outcome: Progressing     Problem: SAFETY ADULT  Goal: Patient will remain free of falls  Description: INTERVENTIONS:  - Educate patient/family on patient safety including physical limitations  - Instruct patient to call for assistance with activity   - Consult OT/PT to assist with strengthening/mobility   - Keep Call bell within reach  - Keep bed low and locked with side rails adjusted as appropriate  - Keep care items and personal belongings within reach  - Initiate and maintain comfort rounds  - Make Fall Risk Sign visible to staff  - Offer Toileting every 2 Hours, in advance of need  - Initiate/Maintain bwed alarm  - Obtain necessary fall risk management equipment: bed alarm   - Apply yellow socks and bracelet for high fall risk patients  - Consider moving patient to room near nurses station  Outcome: Progressing  Goal: Maintain or return to baseline ADL function  Description: INTERVENTIONS:  -  Assess patient's ability to carry out ADLs; assess patient's baseline for ADL function and identify physical deficits which impact ability to perform ADLs (bathing, care of mouth/teeth, toileting, grooming, dressing, etc.)  - Assess/evaluate cause of self-care deficits   - Assess range of motion  - Assess patient's mobility; develop plan if impaired  - Assess patient's need for assistive devices and provide as appropriate  - Encourage maximum independence but intervene and supervise when necessary  - Involve family in performance of ADLs  - Assess for home care needs following discharge   - Consider OT consult to assist with ADL evaluation and planning for discharge  - Provide patient education as appropriate  Outcome: Progressing  Goal: Maintains/Returns to pre admission functional level  Description: INTERVENTIONS:  - Perform AM-PAC 6 Click Basic Mobility/ Daily Activity assessment daily.  - Set and communicate daily mobility goal to care team and patient/family/caregiver.   - Collaborate  with rehabilitation services on mobility goals if consulted  - Perform Range of Motion 4 times a day.  - Reposition patient every 2 hours.  - Dangle patient 4 times a day  - Stand patient 4 times a day  - Ambulate patient 4 times a day  - Out of bed to chair 4 times a day   - Out of bed for meals 4 times a day  - Out of bed for toileting  - Record patient progress and toleration of activity level   Outcome: Progressing     Problem: DISCHARGE PLANNING  Goal: Discharge to home or other facility with appropriate resources  Description: INTERVENTIONS:  - Identify barriers to discharge w/patient and caregiver  - Arrange for needed discharge resources and transportation as appropriate  - Identify discharge learning needs (meds, wound care, etc.)  - Arrange for interpretive services to assist at discharge as needed  - Refer to Case Management Department for coordinating discharge planning if the patient needs post-hospital services based on physician/advanced practitioner order or complex needs related to functional status, cognitive ability, or social support system  Outcome: Progressing     Problem: Knowledge Deficit  Goal: Patient/family/caregiver demonstrates understanding of disease process, treatment plan, medications, and discharge instructions  Description: Complete learning assessment and assess knowledge base.  Interventions:  - Provide teaching at level of understanding  - Provide teaching via preferred learning methods  Outcome: Progressing     Problem: Nutrition/Hydration-ADULT  Goal: Nutrient/Hydration intake appropriate for improving, restoring or maintaining nutritional needs  Description: Monitor and assess patient's nutrition/hydration status for malnutrition. Collaborate with interdisciplinary team and initiate plan and interventions as ordered.  Monitor patient's weight and dietary intake as ordered or per policy. Utilize nutrition screening tool and intervene as necessary. Determine patient's food  preferences and provide high-protein, high-caloric foods as appropriate.     INTERVENTIONS:  - Monitor oral intake, urinary output, labs, and treatment plans  - Assess nutrition and hydration status and recommend course of action  - Evaluate amount of meals eaten  - Assist patient with eating if necessary   - Allow adequate time for meals  - Recommend/ encourage appropriate diets, oral nutritional supplements, and vitamin/mineral supplements  - Order, calculate, and assess calorie counts as needed  - Recommend, monitor, and adjust tube feedings and TPN/PPN based on assessed needs  - Assess need for intravenous fluids  - Provide specific nutrition/hydration education as appropriate  - Include patient/family/caregiver in decisions related to nutrition  Outcome: Progressing
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
## 2024-01-04 NOTE — PROGRESS NOTES
The fluconazole and metronidazole has / have been converted to Oral per [facility] IV-to-PO Auto-Conversion Protocol for Adults as approved by the Pharmacy and Therapeutics Committee. The patient met all eligible criteria:  1) Age = 18 years old   2) Received at least one dose of the IV form   3) Receiving at least one other scheduled oral/enteral medication   4) Tolerating an oral/enteral diet   and did not have any exclusions:   1) Critical care patient   2) Active GI bleed (IF assessing H2RAs or PPIs)   3) Continuous tube feeding (IF assessing cipro, doxycycline, levofloxacin, minocycline, rifampin, or voriconazole)   4) Receiving PO vancomycin (IF assessing metronidazole)   5) Persistent nausea and/or vomiting   6) Ileus or gastrointestinal obstruction   7) Melissa/nasogastric tube set for continuous suction   8) Specific order not to automatically convert to PO (in the order's comments or if discussed in the most recent Infectious Disease or primary team's progress notes).

## 2024-01-04 NOTE — PROGRESS NOTES
Mckenna Fischer is a 48 y.o. female who is currently ordered Vancomycin IV with management by the Pharmacy Consult service.  Relevant clinical data and objective / subjective history reviewed.  Vancomycin Assessment:  Indication and Goal AUC/Trough: Soft tissue (goal -600, trough >10)  Clinical Status: stable  Micro:     Renal Function:  SCr: 0.64 mg/dL  CrCl: 123.4 mL/min  Renal replacement: Not on dialysis  Days of Therapy: 5  Current Dose: 1250mg IV every 12 hours  Vancomycin Plan:  New Dosinmg q12h  Estimated AUC: 455 mcg*hr/mL  Estimated Trough: 14.3 mcg/mL  Next Level: 1/10 with am labs  Renal Function Monitoring: Daily BMP and UOP  Pharmacy will continue to follow closely for s/sx of nephrotoxicity, infusion reactions and appropriateness of therapy.  BMP and CBC will be ordered per protocol. We will continue to follow the patient’s culture results and clinical progress daily.    Ren Samson, Pharmacist

## 2024-01-04 NOTE — ASSESSMENT & PLAN NOTE
Right adnexal multicystic complex lesion measuring 6.8 x 6.3 x 7.7 cm with rim thickening and enhancement. The lesion is increased in size from CT 11/10/2023 and new from CT 9/28/2022. Note is made of complex material within the structure on prior ultrasound. Differential considerations include pyosalpinx versus tubo-ovarian abscess versus less likely hematosalpinx or complex ovarian cyst.  Poor surgical candidate at this time, secondary to abscess and recent surgical history.   Transcutaneous biopsy of mass also would be difficult at this time.   Plan for observation and outpatient follow-up when a better surgical candidate.   Gyn Onc will follow peripherally
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
## 2024-01-05 LAB
ANION GAP SERPL CALCULATED.3IONS-SCNC: 2 MMOL/L
BACTERIA BLD CULT: NORMAL
BACTERIA BLD CULT: NORMAL
BUN SERPL-MCNC: 7 MG/DL (ref 5–25)
C TRACH DNA SPEC QL NAA+PROBE: NEGATIVE
CALCIUM SERPL-MCNC: 7.5 MG/DL (ref 8.4–10.2)
CHLORIDE SERPL-SCNC: 107 MMOL/L (ref 96–108)
CO2 SERPL-SCNC: 26 MMOL/L (ref 21–32)
CORTIS SERPL-MCNC: 8.5 UG/DL
CREAT SERPL-MCNC: 0.47 MG/DL (ref 0.6–1.3)
ERYTHROCYTE [DISTWIDTH] IN BLOOD BY AUTOMATED COUNT: 17.2 % (ref 11.6–15.1)
GFR SERPL CREATININE-BSD FRML MDRD: 117 ML/MIN/1.73SQ M
GLUCOSE SERPL-MCNC: 84 MG/DL (ref 65–140)
HCT VFR BLD AUTO: 23.8 % (ref 34.8–46.1)
HGB BLD-MCNC: 8.1 G/DL (ref 11.5–15.4)
INR PPP: 1.61 (ref 0.84–1.19)
MCH RBC QN AUTO: 30.8 PG (ref 26.8–34.3)
MCHC RBC AUTO-ENTMCNC: 34 G/DL (ref 31.4–37.4)
MCV RBC AUTO: 91 FL (ref 82–98)
N GONORRHOEA DNA SPEC QL NAA+PROBE: NEGATIVE
PLATELET # BLD AUTO: 371 THOUSANDS/UL (ref 149–390)
PMV BLD AUTO: 9 FL (ref 8.9–12.7)
POTASSIUM SERPL-SCNC: 3.6 MMOL/L (ref 3.5–5.3)
PROTHROMBIN TIME: 19.3 SECONDS (ref 11.6–14.5)
RBC # BLD AUTO: 2.63 MILLION/UL (ref 3.81–5.12)
SODIUM SERPL-SCNC: 135 MMOL/L (ref 135–147)
WBC # BLD AUTO: 7.83 THOUSAND/UL (ref 4.31–10.16)

## 2024-01-05 PROCEDURE — 85027 COMPLETE CBC AUTOMATED: CPT

## 2024-01-05 PROCEDURE — 99232 SBSQ HOSP IP/OBS MODERATE 35: CPT | Performed by: INTERNAL MEDICINE

## 2024-01-05 PROCEDURE — 99233 SBSQ HOSP IP/OBS HIGH 50: CPT | Performed by: INTERNAL MEDICINE

## 2024-01-05 PROCEDURE — 82533 TOTAL CORTISOL: CPT | Performed by: INTERNAL MEDICINE

## 2024-01-05 PROCEDURE — 85610 PROTHROMBIN TIME: CPT | Performed by: INTERNAL MEDICINE

## 2024-01-05 PROCEDURE — 80048 BASIC METABOLIC PNL TOTAL CA: CPT | Performed by: INTERNAL MEDICINE

## 2024-01-05 PROCEDURE — 99232 SBSQ HOSP IP/OBS MODERATE 35: CPT | Performed by: SURGERY

## 2024-01-05 RX ORDER — FUROSEMIDE 10 MG/ML
20 INJECTION INTRAMUSCULAR; INTRAVENOUS
Status: DISCONTINUED | OUTPATIENT
Start: 2024-01-06 | End: 2024-01-07

## 2024-01-05 RX ORDER — POLYETHYLENE GLYCOL 3350 17 G/17G
17 POWDER, FOR SOLUTION ORAL DAILY
Status: DISCONTINUED | OUTPATIENT
Start: 2024-01-05 | End: 2024-01-11 | Stop reason: HOSPADM

## 2024-01-05 RX ORDER — WARFARIN SODIUM 5 MG/1
5 TABLET ORAL
Status: COMPLETED | OUTPATIENT
Start: 2024-01-05 | End: 2024-01-05

## 2024-01-05 RX ORDER — SPIRONOLACTONE 50 MG/1
50 TABLET, FILM COATED ORAL DAILY
Status: DISCONTINUED | OUTPATIENT
Start: 2024-01-06 | End: 2024-01-11 | Stop reason: HOSPADM

## 2024-01-05 RX ORDER — ENOXAPARIN SODIUM 100 MG/ML
80 INJECTION SUBCUTANEOUS EVERY 12 HOURS SCHEDULED
Status: DISCONTINUED | OUTPATIENT
Start: 2024-01-05 | End: 2024-01-08

## 2024-01-05 RX ORDER — FOLIC ACID 1 MG/1
1 TABLET ORAL DAILY
Status: DISCONTINUED | OUTPATIENT
Start: 2024-01-06 | End: 2024-01-11 | Stop reason: HOSPADM

## 2024-01-05 RX ADMIN — VANCOMYCIN HYDROCHLORIDE 1500 MG: 5 INJECTION, POWDER, LYOPHILIZED, FOR SOLUTION INTRAVENOUS at 09:28

## 2024-01-05 RX ADMIN — METHOCARBAMOL 500 MG: 500 TABLET ORAL at 08:05

## 2024-01-05 RX ADMIN — SODIUM CHLORIDE 3 G: 9 INJECTION, SOLUTION INTRAVENOUS at 19:45

## 2024-01-05 RX ADMIN — POLYETHYLENE GLYCOL 3350 17 G: 17 POWDER, FOR SOLUTION ORAL at 22:25

## 2024-01-05 RX ADMIN — ACETAMINOPHEN 325MG 650 MG: 325 TABLET ORAL at 08:04

## 2024-01-05 RX ADMIN — THIAMINE HCL TAB 100 MG 100 MG: 100 TAB at 08:05

## 2024-01-05 RX ADMIN — ENOXAPARIN SODIUM 90 MG: 100 INJECTION SUBCUTANEOUS at 08:06

## 2024-01-05 RX ADMIN — CHOLECALCIFEROL TAB 10 MCG (400 UNIT) 400 UNITS: 10 TAB at 08:04

## 2024-01-05 RX ADMIN — SODIUM CHLORIDE 3 G: 9 INJECTION, SOLUTION INTRAVENOUS at 08:15

## 2024-01-05 RX ADMIN — OXYCODONE HYDROCHLORIDE 10 MG: 10 TABLET ORAL at 14:55

## 2024-01-05 RX ADMIN — MIDODRINE HYDROCHLORIDE 10 MG: 5 TABLET ORAL at 15:44

## 2024-01-05 RX ADMIN — ACETAMINOPHEN 325MG 650 MG: 325 TABLET ORAL at 15:47

## 2024-01-05 RX ADMIN — METHOCARBAMOL 500 MG: 500 TABLET ORAL at 15:44

## 2024-01-05 RX ADMIN — SENNOSIDES AND DOCUSATE SODIUM 1 TABLET: 8.6; 5 TABLET ORAL at 21:01

## 2024-01-05 RX ADMIN — MIDODRINE HYDROCHLORIDE 10 MG: 5 TABLET ORAL at 11:31

## 2024-01-05 RX ADMIN — VANCOMYCIN HYDROCHLORIDE 1500 MG: 5 INJECTION, POWDER, LYOPHILIZED, FOR SOLUTION INTRAVENOUS at 20:43

## 2024-01-05 RX ADMIN — WARFARIN SODIUM 5 MG: 5 TABLET ORAL at 18:01

## 2024-01-05 RX ADMIN — ACETAMINOPHEN 325MG 650 MG: 325 TABLET ORAL at 02:45

## 2024-01-05 RX ADMIN — PANTOPRAZOLE SODIUM 40 MG: 40 TABLET, DELAYED RELEASE ORAL at 06:24

## 2024-01-05 RX ADMIN — METHOCARBAMOL 500 MG: 500 TABLET ORAL at 20:44

## 2024-01-05 RX ADMIN — SODIUM CHLORIDE 3 G: 9 INJECTION, SOLUTION INTRAVENOUS at 14:45

## 2024-01-05 RX ADMIN — FLUCONAZOLE 400 MG: 100 TABLET ORAL at 14:44

## 2024-01-05 RX ADMIN — ENOXAPARIN SODIUM 80 MG: 100 INJECTION SUBCUTANEOUS at 20:44

## 2024-01-05 RX ADMIN — Medication 3 MG: at 21:01

## 2024-01-05 RX ADMIN — FOLIC ACID 1 MG: 5 INJECTION, SOLUTION INTRAMUSCULAR; INTRAVENOUS; SUBCUTANEOUS at 11:32

## 2024-01-05 RX ADMIN — FLUOXETINE 20 MG: 20 CAPSULE ORAL at 08:04

## 2024-01-05 RX ADMIN — SODIUM CHLORIDE 3 G: 9 INJECTION, SOLUTION INTRAVENOUS at 02:45

## 2024-01-05 RX ADMIN — OXYCODONE HYDROCHLORIDE 10 MG: 10 TABLET ORAL at 02:45

## 2024-01-05 RX ADMIN — OXYCODONE HYDROCHLORIDE 10 MG: 10 TABLET ORAL at 20:44

## 2024-01-05 RX ADMIN — TRAZODONE HYDROCHLORIDE 50 MG: 50 TABLET ORAL at 21:01

## 2024-01-05 RX ADMIN — MIDODRINE HYDROCHLORIDE 10 MG: 5 TABLET ORAL at 06:24

## 2024-01-05 RX ADMIN — CYANOCOBALAMIN TAB 500 MCG 1000 MCG: 500 TAB at 08:05

## 2024-01-05 RX ADMIN — MAGNESIUM HYDROXIDE 30 ML: 400 SUSPENSION ORAL at 08:05
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
## 2024-01-05 NOTE — PROGRESS NOTES
Mckenna Fischer is a 48 y.o. female who is currently ordered Vancomycin IV with management by the Pharmacy Consult service.  Relevant clinical data and objective / subjective history reviewed.  Vancomycin Assessment:  Indication and Goal AUC/Trough: Soft tissue (goal -600, trough >10)  Clinical Status: stable  Micro:       Renal Function:  SCr: 0.47 mg/dL  CrCl: 168.0 mL/min  Renal replacement: Not on dialysis  Days of Therapy: 6  Current Dose: 1250mg IV every 12 hours  Vancomycin Plan:  New Dosinmg IV q12h  Estimated AUC: 455 mcg*hr/mL  Estimated Trough: 14.3 mcg/mL  Next Level: 1/10 with am labs  Renal Function Monitoring: Daily BMP and UOP  Pharmacy will continue to follow closely for s/sx of nephrotoxicity, infusion reactions and appropriateness of therapy.  BMP and CBC will be ordered per protocol. We will continue to follow the patient’s culture results and clinical progress daily.    [place], Pharmacist

## 2024-01-05 NOTE — ASSESSMENT & PLAN NOTE
"Patient with bilateral leg pain and swelling in extremities. RLE 3+, LLE 4+  VAS duplex on 12/29 with \"Chronic non-occlusive deep vein thrombosis noted in a short segment of the popliteal vein\" in left lower extremity  Pt with antiphospholipid syndrome w/ IVC filter in place, was getting bridge of coumadin while at , holding prior to IR procedure   The patient's blood pressure is on the low side but has been stable.  Gentle diuresis will be attempted.  "

## 2024-01-05 NOTE — PLAN OF CARE
Problem: Prexisting or High Potential for Compromised Skin Integrity  Goal: Skin integrity is maintained or improved  Description: INTERVENTIONS:  - Identify patients at risk for skin breakdown  - Assess and monitor skin integrity  - Assess and monitor nutrition and hydration status  - Monitor labs   - Assess for incontinence   - Turn and reposition patient  - Assist with mobility/ambulation  - Relieve pressure over bony prominences  - Avoid friction and shearing  - Provide appropriate hygiene as needed including keeping skin clean and dry  - Evaluate need for skin moisturizer/barrier cream  - Collaborate with interdisciplinary team   - Patient/family teaching  - Consider wound care consult   Outcome: Progressing     Problem: PAIN - ADULT  Goal: Verbalizes/displays adequate comfort level or baseline comfort level  Description: Interventions:  - Encourage patient to monitor pain and request assistance  - Assess pain using appropriate pain scale  - Administer analgesics based on type and severity of pain and evaluate response  - Implement non-pharmacological measures as appropriate and evaluate response  - Consider cultural and social influences on pain and pain management  - Notify physician/advanced practitioner if interventions unsuccessful or patient reports new pain  Outcome: Progressing     Problem: INFECTION - ADULT  Goal: Absence or prevention of progression during hospitalization  Description: INTERVENTIONS:  - Assess and monitor for signs and symptoms of infection  - Monitor lab/diagnostic results  - Monitor all insertion sites, i.e. indwelling lines, tubes, and drains  - Monitor endotracheal if appropriate and nasal secretions for changes in amount and color  - [place] appropriate cooling/warming therapies per order  - Administer medications as ordered  - Instruct and encourage patient and family to use good hand hygiene technique  - Identify and instruct in appropriate isolation precautions for  identified infection/condition  Outcome: Progressing     Problem: SAFETY ADULT  Goal: Patient will remain free of falls  Description: INTERVENTIONS:  - Educate patient/family on patient safety including physical limitations  - Instruct patient to call for assistance with activity   - Consult OT/PT to assist with strengthening/mobility   - Keep Call bell within reach  - Keep bed low and locked with side rails adjusted as appropriate  - Keep care items and personal belongings within reach  - Initiate and maintain comfort rounds  - Make Fall Risk Sign visible to staff  - Offer Toileting every 3 Hours, in advance of need  - Initiate/Maintain bed alarm  - Obtain necessary fall risk management equipment:   - Apply yellow socks and bracelet for high fall risk patients  - Consider moving patient to room near nurses station  Outcome: Progressing  Goal: Maintain or return to baseline ADL function  Description: INTERVENTIONS:  -  Assess patient's ability to carry out ADLs; assess patient's baseline for ADL function and identify physical deficits which impact ability to perform ADLs (bathing, care of mouth/teeth, toileting, grooming, dressing, etc.)  - Assess/evaluate cause of self-care deficits   - Assess range of motion  - Assess patient's mobility; develop plan if impaired  - Assess patient's need for assistive devices and provide as appropriate  - Encourage maximum independence but intervene and supervise when necessary  - Involve family in performance of ADLs  - Assess for home care needs following discharge   - Consider OT consult to assist with ADL evaluation and planning for discharge  - Provide patient education as appropriate  Outcome: Progressing  Goal: Maintains/Returns to pre admission functional level  Description: INTERVENTIONS:  - Perform AM-PAC 6 Click Basic Mobility/ Daily Activity assessment daily.  - Set and communicate daily mobility goal to care team and patient/family/caregiver.   - Collaborate with  rehabilitation services on mobility goals if consulted  - Perform Range of Motion 3 times a day.  - Reposition patient every 2 hours.  - Dangle patient 3 times a day  - Stand patient 3 times a day  - Ambulate patient 3 times a day  - Out of bed to chair 3 times a day   - Out of bed for meals 3 times a day  - Out of bed for toileting  - Record patient progress and toleration of activity level   Outcome: Progressing     Problem: DISCHARGE PLANNING  Goal: Discharge to home or other facility with appropriate resources  Description: INTERVENTIONS:  - Identify barriers to discharge w/patient and caregiver  - Arrange for needed discharge resources and transportation as appropriate  - Identify discharge learning needs (meds, wound care, etc.)  - Arrange for interpretive services to assist at discharge as needed  - Refer to Case Management Department for coordinating discharge planning if the patient needs post-hospital services based on physician/advanced practitioner order or complex needs related to functional status, cognitive ability, or social support system  Outcome: Progressing     Problem: Knowledge Deficit  Goal: Patient/family/caregiver demonstrates understanding of disease process, treatment plan, medications, and discharge instructions  Description: Complete learning assessment and assess knowledge base.  Interventions:  - Provide teaching at level of understanding  - Provide teaching via preferred learning methods  Outcome: Progressing     Problem: Nutrition/Hydration-ADULT  Goal: Nutrient/Hydration intake appropriate for improving, restoring or maintaining nutritional needs  Description: Monitor and assess patient's nutrition/hydration status for malnutrition. Collaborate with interdisciplinary team and initiate plan and interventions as ordered.  Monitor patient's weight and dietary intake as ordered or per policy. Utilize nutrition screening tool and intervene as necessary. Determine patient's food  preferences and provide high-protein, high-caloric foods as appropriate.     INTERVENTIONS:  - Monitor oral intake, urinary output, labs, and treatment plans  - Assess nutrition and hydration status and recommend course of action  - Evaluate amount of meals eaten  - Assist patient with eating if necessary   - Allow adequate time for meals  - Recommend/ encourage appropriate diets, oral nutritional supplements, and vitamin/mineral supplements  - Order, calculate, and assess calorie counts as needed  - Recommend, monitor, and adjust tube feedings and TPN/PPN based on assessed needs  - Assess need for intravenous fluids  - Provide specific nutrition/hydration education as appropriate  - Include patient/family/caregiver in decisions related to nutrition  Outcome: Progressing

## 2024-01-05 NOTE — PLAN OF CARE
Problem: Prexisting or High Potential for Compromised Skin Integrity  Goal: Skin integrity is maintained or improved  Description: INTERVENTIONS:  - Identify patients at risk for skin breakdown  - Assess and monitor skin integrity  - Assess and monitor nutrition and hydration status  - Monitor labs   - Assess for incontinence   - Turn and reposition patient  - Assist with mobility/ambulation  - Relieve pressure over bony prominences  - Avoid friction and shearing  - Provide appropriate hygiene as needed including keeping skin clean and dry  - Evaluate need for skin moisturizer/barrier cream  - Collaborate with interdisciplinary team   - Patient/family teaching  - Consider wound care consult   Outcome: Progressing     Problem: PAIN - ADULT  Goal: Verbalizes/displays adequate comfort level or baseline comfort level  Description: Interventions:  - Encourage patient to monitor pain and request assistance  - Assess pain using appropriate pain scale  - Administer analgesics based on type and severity of pain and evaluate response  - Implement non-pharmacological measures as appropriate and evaluate response  - Consider cultural and social influences on pain and pain management  - Notify physician/advanced practitioner if interventions unsuccessful or patient reports new pain  Outcome: Progressing     Problem: INFECTION - ADULT  Goal: Absence or prevention of progression during hospitalization  Description: INTERVENTIONS:  - Assess and monitor for signs and symptoms of infection  - Monitor lab/diagnostic results  - Monitor all insertion sites, i.e. indwelling lines, tubes, and drains  - Monitor endotracheal if appropriate and nasal secretions for changes in amount and color  - [place] appropriate cooling/warming therapies per order  - Administer medications as ordered  - Instruct and encourage patient and family to use good hand hygiene technique  - Identify and instruct in appropriate isolation precautions for  identified infection/condition  Outcome: Progressing     Problem: SAFETY ADULT  Goal: Patient will remain free of falls  Description: INTERVENTIONS:  - Educate patient/family on patient safety including physical limitations  - Instruct patient to call for assistance with activity   - Consult OT/PT to assist with strengthening/mobility   - Keep Call bell within reach  - Keep bed low and locked with side rails adjusted as appropriate  - Keep care items and personal belongings within reach  - Initiate and maintain comfort rounds  - Make Fall Risk Sign visible to staff  - Offer Toileting every 2 Hours, in advance of need  - Initiate/Maintain bed alarm  - Obtain necessary fall risk management equipment: bed alarm   - Apply yellow socks and bracelet for high fall risk patients  - Consider moving patient to room near nurses station  Outcome: Progressing  Goal: Maintain or return to baseline ADL function  Description: INTERVENTIONS:  -  Assess patient's ability to carry out ADLs; assess patient's baseline for ADL function and identify physical deficits which impact ability to perform ADLs (bathing, care of mouth/teeth, toileting, grooming, dressing, etc.)  - Assess/evaluate cause of self-care deficits   - Assess range of motion  - Assess patient's mobility; develop plan if impaired  - Assess patient's need for assistive devices and provide as appropriate  - Encourage maximum independence but intervene and supervise when necessary  - Involve family in performance of ADLs  - Assess for home care needs following discharge   - Consider OT consult to assist with ADL evaluation and planning for discharge  - Provide patient education as appropriate  Outcome: Progressing  Goal: Maintains/Returns to pre admission functional level  Description: INTERVENTIONS:  - Perform AM-PAC 6 Click Basic Mobility/ Daily Activity assessment daily.  - Set and communicate daily mobility goal to care team and patient/family/caregiver.   - Collaborate  with rehabilitation services on mobility goals if consulted  - Perform Range of Motion 4 times a day.  - Reposition patient every 2 hours.  - Dangle patient 4 times a day  - Stand patient 4 times a day  - Ambulate patient 4 times a day  - Out of bed to chair 4 times a day   - Out of bed for meals 4 times a day  - Out of bed for toileting  - Record patient progress and toleration of activity level   Outcome: Progressing     Problem: DISCHARGE PLANNING  Goal: Discharge to home or other facility with appropriate resources  Description: INTERVENTIONS:  - Identify barriers to discharge w/patient and caregiver  - Arrange for needed discharge resources and transportation as appropriate  - Identify discharge learning needs (meds, wound care, etc.)  - Arrange for interpretive services to assist at discharge as needed  - Refer to Case Management Department for coordinating discharge planning if the patient needs post-hospital services based on physician/advanced practitioner order or complex needs related to functional status, cognitive ability, or social support system  Outcome: Progressing     Problem: Knowledge Deficit  Goal: Patient/family/caregiver demonstrates understanding of disease process, treatment plan, medications, and discharge instructions  Description: Complete learning assessment and assess knowledge base.  Interventions:  - Provide teaching at level of understanding  - Provide teaching via preferred learning methods  Outcome: Progressing     Problem: Nutrition/Hydration-ADULT  Goal: Nutrient/Hydration intake appropriate for improving, restoring or maintaining nutritional needs  Description: Monitor and assess patient's nutrition/hydration status for malnutrition. Collaborate with interdisciplinary team and initiate plan and interventions as ordered.  Monitor patient's weight and dietary intake as ordered or per policy. Utilize nutrition screening tool and intervene as necessary. Determine patient's food  preferences and provide high-protein, high-caloric foods as appropriate.     INTERVENTIONS:  - Monitor oral intake, urinary output, labs, and treatment plans  - Assess nutrition and hydration status and recommend course of action  - Evaluate amount of meals eaten  - Assist patient with eating if necessary   - Allow adequate time for meals  - Recommend/ encourage appropriate diets, oral nutritional supplements, and vitamin/mineral supplements  - Order, calculate, and assess calorie counts as needed  - Recommend, monitor, and adjust tube feedings and TPN/PPN based on assessed needs  - Assess need for intravenous fluids  - Provide specific nutrition/hydration education as appropriate  - Include patient/family/caregiver in decisions related to nutrition  Outcome: Progressing

## 2024-01-05 NOTE — ASSESSMENT & PLAN NOTE
The patient has a history of iron deficiency which appears to have been corrected.  She has a history of B12 deficiency but is B12 replete at present.  Folic acid deficiency has been noted and is being corrected.  The patient's hemoglobin improved after transfusion.

## 2024-01-06 PROBLEM — N94.89 ADNEXAL MASS: Chronic | Status: ACTIVE | Noted: 2023-12-29

## 2024-01-06 LAB
ANION GAP SERPL CALCULATED.3IONS-SCNC: 4 MMOL/L
BASOPHILS # BLD AUTO: 0.02 THOUSANDS/ÂΜL (ref 0–0.1)
BASOPHILS NFR BLD AUTO: 0 % (ref 0–1)
BUN SERPL-MCNC: 6 MG/DL (ref 5–25)
CALCIUM SERPL-MCNC: 7.7 MG/DL (ref 8.4–10.2)
CHLORIDE SERPL-SCNC: 106 MMOL/L (ref 96–108)
CO2 SERPL-SCNC: 25 MMOL/L (ref 21–32)
CREAT SERPL-MCNC: 0.46 MG/DL (ref 0.6–1.3)
EOSINOPHIL # BLD AUTO: 0.01 THOUSAND/ÂΜL (ref 0–0.61)
EOSINOPHIL NFR BLD AUTO: 0 % (ref 0–6)
ERYTHROCYTE [DISTWIDTH] IN BLOOD BY AUTOMATED COUNT: 17.8 % (ref 11.6–15.1)
GFR SERPL CREATININE-BSD FRML MDRD: 118 ML/MIN/1.73SQ M
GLUCOSE SERPL-MCNC: 77 MG/DL (ref 65–140)
HCT VFR BLD AUTO: 22.7 % (ref 34.8–46.1)
HGB BLD-MCNC: 7.7 G/DL (ref 11.5–15.4)
IMM GRANULOCYTES # BLD AUTO: 0.04 THOUSAND/UL (ref 0–0.2)
IMM GRANULOCYTES NFR BLD AUTO: 1 % (ref 0–2)
INR PPP: 1.77 (ref 0.84–1.19)
LYMPHOCYTES # BLD AUTO: 2.73 THOUSANDS/ÂΜL (ref 0.6–4.47)
LYMPHOCYTES NFR BLD AUTO: 34 % (ref 14–44)
MCH RBC QN AUTO: 30.8 PG (ref 26.8–34.3)
MCHC RBC AUTO-ENTMCNC: 33.9 G/DL (ref 31.4–37.4)
MCV RBC AUTO: 91 FL (ref 82–98)
MONOCYTES # BLD AUTO: 0.86 THOUSAND/ÂΜL (ref 0.17–1.22)
MONOCYTES NFR BLD AUTO: 11 % (ref 4–12)
NEUTROPHILS # BLD AUTO: 4.45 THOUSANDS/ÂΜL (ref 1.85–7.62)
NEUTS SEG NFR BLD AUTO: 54 % (ref 43–75)
NRBC BLD AUTO-RTO: 0 /100 WBCS
PLATELET # BLD AUTO: 369 THOUSANDS/UL (ref 149–390)
PMV BLD AUTO: 9.6 FL (ref 8.9–12.7)
POTASSIUM SERPL-SCNC: 3.5 MMOL/L (ref 3.5–5.3)
PROTHROMBIN TIME: 20.8 SECONDS (ref 11.6–14.5)
RBC # BLD AUTO: 2.5 MILLION/UL (ref 3.81–5.12)
SODIUM SERPL-SCNC: 135 MMOL/L (ref 135–147)
WBC # BLD AUTO: 8.11 THOUSAND/UL (ref 4.31–10.16)

## 2024-01-06 PROCEDURE — 99232 SBSQ HOSP IP/OBS MODERATE 35: CPT | Performed by: SURGERY

## 2024-01-06 PROCEDURE — 99232 SBSQ HOSP IP/OBS MODERATE 35: CPT | Performed by: INTERNAL MEDICINE

## 2024-01-06 PROCEDURE — 85610 PROTHROMBIN TIME: CPT | Performed by: INTERNAL MEDICINE

## 2024-01-06 PROCEDURE — 80048 BASIC METABOLIC PNL TOTAL CA: CPT | Performed by: INTERNAL MEDICINE

## 2024-01-06 PROCEDURE — 97166 OT EVAL MOD COMPLEX 45 MIN: CPT

## 2024-01-06 PROCEDURE — 85025 COMPLETE CBC W/AUTO DIFF WBC: CPT | Performed by: INTERNAL MEDICINE

## 2024-01-06 PROCEDURE — 97535 SELF CARE MNGMENT TRAINING: CPT

## 2024-01-06 RX ORDER — ALBUMIN, HUMAN INJ 5% 5 %
25 SOLUTION INTRAVENOUS ONCE
Status: COMPLETED | OUTPATIENT
Start: 2024-01-06 | End: 2024-01-06

## 2024-01-06 RX ORDER — MIDODRINE HYDROCHLORIDE 5 MG/1
15 TABLET ORAL
Status: DISCONTINUED | OUTPATIENT
Start: 2024-01-06 | End: 2024-01-11 | Stop reason: HOSPADM

## 2024-01-06 RX ORDER — WARFARIN SODIUM 5 MG/1
5 TABLET ORAL
Status: DISCONTINUED | OUTPATIENT
Start: 2024-01-06 | End: 2024-01-07

## 2024-01-06 RX ORDER — METHOCARBAMOL 500 MG/1
500 TABLET, FILM COATED ORAL EVERY 6 HOURS PRN
Status: DISCONTINUED | OUTPATIENT
Start: 2024-01-06 | End: 2024-01-11 | Stop reason: HOSPADM

## 2024-01-06 RX ADMIN — FUROSEMIDE 20 MG: 10 INJECTION, SOLUTION INTRAVENOUS at 17:20

## 2024-01-06 RX ADMIN — METHOCARBAMOL 500 MG: 500 TABLET ORAL at 22:43

## 2024-01-06 RX ADMIN — ENOXAPARIN SODIUM 80 MG: 100 INJECTION SUBCUTANEOUS at 08:18

## 2024-01-06 RX ADMIN — FUROSEMIDE 20 MG: 10 INJECTION, SOLUTION INTRAVENOUS at 08:18

## 2024-01-06 RX ADMIN — ENOXAPARIN SODIUM 80 MG: 100 INJECTION SUBCUTANEOUS at 20:09

## 2024-01-06 RX ADMIN — MAGNESIUM HYDROXIDE 30 ML: 400 SUSPENSION ORAL at 08:18

## 2024-01-06 RX ADMIN — WARFARIN SODIUM 5 MG: 5 TABLET ORAL at 20:21

## 2024-01-06 RX ADMIN — CYANOCOBALAMIN TAB 500 MCG 1000 MCG: 500 TAB at 08:15

## 2024-01-06 RX ADMIN — SPIRONOLACTONE 50 MG: 50 TABLET ORAL at 08:15

## 2024-01-06 RX ADMIN — TRAZODONE HYDROCHLORIDE 50 MG: 50 TABLET ORAL at 22:32

## 2024-01-06 RX ADMIN — ACETAMINOPHEN 325MG 650 MG: 325 TABLET ORAL at 00:47

## 2024-01-06 RX ADMIN — ALBUMIN (HUMAN) 25 G: 12.5 INJECTION, SOLUTION INTRAVENOUS at 04:22

## 2024-01-06 RX ADMIN — Medication 3 MG: at 22:32

## 2024-01-06 RX ADMIN — ACETAMINOPHEN 325MG 650 MG: 325 TABLET ORAL at 08:15

## 2024-01-06 RX ADMIN — FOLIC ACID 1 MG: 1 TABLET ORAL at 08:17

## 2024-01-06 RX ADMIN — MIDODRINE HYDROCHLORIDE 10 MG: 5 TABLET ORAL at 11:25

## 2024-01-06 RX ADMIN — PANTOPRAZOLE SODIUM 40 MG: 40 TABLET, DELAYED RELEASE ORAL at 05:30

## 2024-01-06 RX ADMIN — OXYCODONE HYDROCHLORIDE 10 MG: 10 TABLET ORAL at 11:25

## 2024-01-06 RX ADMIN — SODIUM CHLORIDE 3 G: 9 INJECTION, SOLUTION INTRAVENOUS at 13:34

## 2024-01-06 RX ADMIN — FLUOXETINE 20 MG: 20 CAPSULE ORAL at 08:15

## 2024-01-06 RX ADMIN — SENNOSIDES AND DOCUSATE SODIUM 1 TABLET: 8.6; 5 TABLET ORAL at 22:32

## 2024-01-06 RX ADMIN — MIDODRINE HYDROCHLORIDE 15 MG: 5 TABLET ORAL at 17:20

## 2024-01-06 RX ADMIN — ACETAMINOPHEN 325MG 650 MG: 325 TABLET ORAL at 17:20

## 2024-01-06 RX ADMIN — VANCOMYCIN HYDROCHLORIDE 1500 MG: 5 INJECTION, POWDER, LYOPHILIZED, FOR SOLUTION INTRAVENOUS at 20:08

## 2024-01-06 RX ADMIN — SODIUM CHLORIDE 3 G: 9 INJECTION, SOLUTION INTRAVENOUS at 06:47

## 2024-01-06 RX ADMIN — SODIUM CHLORIDE 3 G: 9 INJECTION, SOLUTION INTRAVENOUS at 01:12

## 2024-01-06 RX ADMIN — VANCOMYCIN HYDROCHLORIDE 1500 MG: 5 INJECTION, POWDER, LYOPHILIZED, FOR SOLUTION INTRAVENOUS at 09:35

## 2024-01-06 RX ADMIN — SODIUM CHLORIDE 3 G: 9 INJECTION, SOLUTION INTRAVENOUS at 18:36

## 2024-01-06 RX ADMIN — POLYETHYLENE GLYCOL 3350 17 G: 17 POWDER, FOR SOLUTION ORAL at 08:17

## 2024-01-06 RX ADMIN — THIAMINE HCL TAB 100 MG 100 MG: 100 TAB at 08:15

## 2024-01-06 RX ADMIN — METHOCARBAMOL 500 MG: 500 TABLET ORAL at 08:26

## 2024-01-06 RX ADMIN — CHOLECALCIFEROL TAB 10 MCG (400 UNIT) 400 UNITS: 10 TAB at 08:15

## 2024-01-06 RX ADMIN — MIDODRINE HYDROCHLORIDE 10 MG: 5 TABLET ORAL at 06:23

## 2024-01-06 RX ADMIN — OXYCODONE HYDROCHLORIDE 10 MG: 10 TABLET ORAL at 20:21
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
## 2024-01-06 NOTE — PLAN OF CARE
Problem: Prexisting or High Potential for Compromised Skin Integrity  Goal: Skin integrity is maintained or improved  Description: INTERVENTIONS:  - Identify patients at risk for skin breakdown  - Assess and monitor skin integrity  - Assess and monitor nutrition and hydration status  - Monitor labs   - Assess for incontinence   - Turn and reposition patient  - Assist with mobility/ambulation  - Relieve pressure over bony prominences  - Avoid friction and shearing  - Provide appropriate hygiene as needed including keeping skin clean and dry  - Evaluate need for skin moisturizer/barrier cream  - Collaborate with interdisciplinary team   - Patient/family teaching  - Consider wound care consult   Outcome: Progressing     Problem: PAIN - ADULT  Goal: Verbalizes/displays adequate comfort level or baseline comfort level  Description: Interventions:  - Encourage patient to monitor pain and request assistance  - Assess pain using appropriate pain scale  - Administer analgesics based on type and severity of pain and evaluate response  - Implement non-pharmacological measures as appropriate and evaluate response  - Consider cultural and social influences on pain and pain management  - Notify physician/advanced practitioner if interventions unsuccessful or patient reports new pain  Outcome: Progressing     Problem: INFECTION - ADULT  Goal: Absence or prevention of progression during hospitalization  Description: INTERVENTIONS:  - Assess and monitor for signs and symptoms of infection  - Monitor lab/diagnostic results  - Monitor all insertion sites, i.e. indwelling lines, tubes, and drains  - Monitor endotracheal if appropriate and nasal secretions for changes in amount and color  - [place] appropriate cooling/warming therapies per order  - Administer medications as ordered  - Instruct and encourage patient and family to use good hand hygiene technique  - Identify and instruct in appropriate isolation precautions for  identified infection/condition  Outcome: Progressing     Problem: SAFETY ADULT  Goal: Patient will remain free of falls  Description: INTERVENTIONS:  - Educate patient/family on patient safety including physical limitations  - Instruct patient to call for assistance with activity   - Consult OT/PT to assist with strengthening/mobility   - Keep Call bell within reach  - Keep bed low and locked with side rails adjusted as appropriate  - Keep care items and personal belongings within reach  - Initiate and maintain comfort rounds  - Make Fall Risk Sign visible to staff  - Offer Toileting every 2 Hours, in advance of need  - Initiate/Maintain bed alarm  - Obtain necessary fall risk management equipment: call bell  - Apply yellow socks and bracelet for high fall risk patients  - Consider moving patient to room near nurses station  Outcome: Progressing  Goal: Maintain or return to baseline ADL function  Description: INTERVENTIONS:  -  Assess patient's ability to carry out ADLs; assess patient's baseline for ADL function and identify physical deficits which impact ability to perform ADLs (bathing, care of mouth/teeth, toileting, grooming, dressing, etc.)  - Assess/evaluate cause of self-care deficits   - Assess range of motion  - Assess patient's mobility; develop plan if impaired  - Assess patient's need for assistive devices and provide as appropriate  - Encourage maximum independence but intervene and supervise when necessary  - Involve family in performance of ADLs  - Assess for home care needs following discharge   - Consider OT consult to assist with ADL evaluation and planning for discharge  - Provide patient education as appropriate  Outcome: Progressing  Goal: Maintains/Returns to pre admission functional level  Description: INTERVENTIONS:  - Perform AM-PAC 6 Click Basic Mobility/ Daily Activity assessment daily.  - Set and communicate daily mobility goal to care team and patient/family/caregiver.   - Collaborate  with rehabilitation services on mobility goals if consulted  - Perform Range of Motion 3 times a day.  - Reposition patient every 2 hours.  - Dangle patient 3 times a day  - Stand patient 3 times a day  - Ambulate patient 3 times a day  - Out of bed to chair 3 times a day   - Out of bed for meals 3  Problem: DISCHARGE PLANNING  Goal: Discharge to home or other facility with appropriate resources  Description: INTERVENTIONS:  - Identify barriers to discharge w/patient and caregiver  - Arrange for needed discharge resources and transportation as appropriate  - Identify discharge learning needs (meds, wound care, etc.)  - Arrange for interpretive services to assist at discharge as needed  - Refer to Case Management Department for coordinating discharge planning if the patient needs post-hospital services based on physician/advanced practitioner order or complex needs related to functional status, cognitive ability, or social support system  Outcome: Progressing     Problem: Knowledge Deficit  Goal: Patient/family/caregiver demonstrates understanding of disease process, treatment plan, medications, and discharge instructions  Description: Complete learning assessment and assess knowledge base.  Interventions:  - Provide teaching at level of understanding  - Provide teaching via preferred learning methods  Outcome: Progressing     Problem: Nutrition/Hydration-ADULT  Goal: Nutrient/Hydration intake appropriate for improving, restoring or maintaining nutritional needs  Description: Monitor and assess patient's nutrition/hydration status for malnutrition. Collaborate with interdisciplinary team and initiate plan and interventions as ordered.  Monitor patient's weight and dietary intake as ordered or per policy. Utilize nutrition screening tool and intervene as necessary. Determine patient's food preferences and provide high-protein, high-caloric foods as appropriate.     INTERVENTIONS:  - Monitor oral intake, urinary  output, labs, and treatment plans  - Assess nutrition and hydration status and recommend course of action  - Evaluate amount of meals eaten  - Assist patient with eating if necessary   - Allow adequate time for meals  - Recommend/ encourage appropriate diets, oral nutritional supplements, and vitamin/mineral supplements  - Order, calculate, and assess calorie counts as needed  - Recommend, monitor, and adjust tube feedings and TPN/PPN based on assessed needs  - Assess need for intravenous fluids  - Provide specific nutrition/hydration education as appropriate  - Include patient/family/caregiver in decisions related to nutrition  Outcome: Progressing    times a day  - Out of bed for toileting  - Record patient progress and toleration of activity level   Outcome: Progressing
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
## 2024-01-06 NOTE — PLAN OF CARE
Problem: Prexisting or High Potential for Compromised Skin Integrity  Goal: Skin integrity is maintained or improved  Description: INTERVENTIONS:  - Identify patients at risk for skin breakdown  - Assess and monitor skin integrity  - Assess and monitor nutrition and hydration status  - Monitor labs   - Assess for incontinence   - Turn and reposition patient  - Assist with mobility/ambulation  - Relieve pressure over bony prominences  - Avoid friction and shearing  - Provide appropriate hygiene as needed including keeping skin clean and dry  - Evaluate need for skin moisturizer/barrier cream  - Collaborate with interdisciplinary team   - Patient/family teaching  - Consider wound care consult   Outcome: Progressing     Problem: PAIN - ADULT  Goal: Verbalizes/displays adequate comfort level or baseline comfort level  Description: Interventions:  - Encourage patient to monitor pain and request assistance  - Assess pain using appropriate pain scale  - Administer analgesics based on type and severity of pain and evaluate response  - Implement non-pharmacological measures as appropriate and evaluate response  - Consider cultural and social influences on pain and pain management  - Notify physician/advanced practitioner if interventions unsuccessful or patient reports new pain  Outcome: Progressing     Problem: INFECTION - ADULT  Goal: Absence or prevention of progression during hospitalization  Description: INTERVENTIONS:  - Assess and monitor for signs and symptoms of infection  - Monitor lab/diagnostic results  - Monitor all insertion sites, i.e. indwelling lines, tubes, and drains  - Monitor endotracheal if appropriate and nasal secretions for changes in amount and color  - [place] appropriate cooling/warming therapies per order  - Administer medications as ordered  - Instruct and encourage patient and family to use good hand hygiene technique  - Identify and instruct in appropriate isolation precautions for  identified infection/condition  Outcome: Progressing     Problem: SAFETY ADULT  Goal: Patient will remain free of falls  Description: INTERVENTIONS:  - Educate patient/family on patient safety including physical limitations  - Instruct patient to call for assistance with activity   - Consult OT/PT to assist with strengthening/mobility   - Keep Call bell within reach  - Keep bed low and locked with side rails adjusted as appropriate  - Keep care items and personal belongings within reach  - Initiate and maintain comfort rounds  - Make Fall Risk Sign visible to staff  - Offer Toileting every 3 Hours, in advance of need  - Initiate/Maintain bed alarm  - Obtain necessary fall risk management equipment:   - Apply yellow socks and bracelet for high fall risk patients  - Consider moving patient to room near nurses station  Outcome: Progressing  Goal: Maintain or return to baseline ADL function  Description: INTERVENTIONS:  -  Assess patient's ability to carry out ADLs; assess patient's baseline for ADL function and identify physical deficits which impact ability to perform ADLs (bathing, care of mouth/teeth, toileting, grooming, dressing, etc.)  - Assess/evaluate cause of self-care deficits   - Assess range of motion  - Assess patient's mobility; develop plan if impaired  - Assess patient's need for assistive devices and provide as appropriate  - Encourage maximum independence but intervene and supervise when necessary  - Involve family in performance of ADLs  - Assess for home care needs following discharge   - Consider OT consult to assist with ADL evaluation and planning for discharge  - Provide patient education as appropriate  Outcome: Progressing  Goal: Maintains/Returns to pre admission functional level  Description: INTERVENTIONS:  - Perform AM-PAC 6 Click Basic Mobility/ Daily Activity assessment daily.  - Set and communicate daily mobility goal to care team and patient/family/caregiver.   - Collaborate with  rehabilitation services on mobility goals if consulted  - Perform Range of Motion 3 times a day.  - Reposition patient every 2 hours.  - Dangle patient 3 times a day  - Stand patient 3 times a day  - Ambulate patient 3 times a day  - Out of bed to chair 3 times a day   - Out of bed for meals 3 times a day  - Out of bed for toileting  - Record patient progress and toleration of activity level   Outcome: Progressing     Problem: DISCHARGE PLANNING  Goal: Discharge to home or other facility with appropriate resources  Description: INTERVENTIONS:  - Identify barriers to discharge w/patient and caregiver  - Arrange for needed discharge resources and transportation as appropriate  - Identify discharge learning needs (meds, wound care, etc.)  - Arrange for interpretive services to assist at discharge as needed  - Refer to Case Management Department for coordinating discharge planning if the patient needs post-hospital services based on physician/advanced practitioner order or complex needs related to functional status, cognitive ability, or social support system  Outcome: Progressing     Problem: Knowledge Deficit  Goal: Patient/family/caregiver demonstrates understanding of disease process, treatment plan, medications, and discharge instructions  Description: Complete learning assessment and assess knowledge base.  Interventions:  - Provide teaching at level of understanding  - Provide teaching via preferred learning methods  Outcome: Progressing     Problem: Nutrition/Hydration-ADULT  Goal: Nutrient/Hydration intake appropriate for improving, restoring or maintaining nutritional needs  Description: Monitor and assess patient's nutrition/hydration status for malnutrition. Collaborate with interdisciplinary team and initiate plan and interventions as ordered.  Monitor patient's weight and dietary intake as ordered or per policy. Utilize nutrition screening tool and intervene as necessary. Determine patient's food  preferences and provide high-protein, high-caloric foods as appropriate.     INTERVENTIONS:  - Monitor oral intake, urinary output, labs, and treatment plans  - Assess nutrition and hydration status and recommend course of action  - Evaluate amount of meals eaten  - Assist patient with eating if necessary   - Allow adequate time for meals  - Recommend/ encourage appropriate diets, oral nutritional supplements, and vitamin/mineral supplements  - Order, calculate, and assess calorie counts as needed  - Recommend, monitor, and adjust tube feedings and TPN/PPN based on assessed needs  - Assess need for intravenous fluids  - Provide specific nutrition/hydration education as appropriate  - Include patient/family/caregiver in decisions related to nutrition  Outcome: Progressing
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
## 2024-01-06 NOTE — PROGRESS NOTES
Mckenna Fischer is a 48 y.o. female who is currently ordered Vancomycin IV with management by the Pharmacy Consult service.  Relevant clinical data and objective / subjective history reviewed.  Vancomycin Assessment:  Indication and Goal AUC/Trough: Soft tissue (goal -600, trough >10)  Clinical Status: stable  Micro:       Renal Function:  SCr: 0.46 mg/dL  CrCl: 171.7 mL/min  Renal replacement: Not on dialysis  Days of Therapy: 7  Current Dose: 1250mg IV every 12 hours  Vancomycin Plan:  New Dosinmg q12h  Estimated AUC: 455 mcg*hr/mL  Estimated Trough: 14.3 mcg/mL  Next Level: 1/10 with am labs  Renal Function Monitoring: Daily BMP and UOP  Pharmacy will continue to follow closely for s/sx of nephrotoxicity, infusion reactions and appropriateness of therapy.  BMP and CBC will be ordered per protocol. We will continue to follow the patient’s culture results and clinical progress daily.    Sterling Vale, Pharmacist
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
## 2024-01-06 NOTE — ASSESSMENT & PLAN NOTE
Malnutrition Findings:   Adult Malnutrition type: Chronic illness  Adult Degree of Malnutrition: Other severe protein calorie malnutrition  Malnutrition Characteristics: Inadequate energy, Weight loss              Oral nutritional support will be continued.    360 Statement: Severe protein/calorie malnutrition r/t inadequate intake due to GI distress as evidenced by 8% unplanned wt loss since 10/16/23, consuming < 75% energy intake compared to estimated energy needs > 1 month. treated with Ensure max bid when po diet resumes    BMI Findings:           Body mass index is 38.54 kg/m².

## 2024-01-07 LAB
ANION GAP SERPL CALCULATED.3IONS-SCNC: 1 MMOL/L
BUN SERPL-MCNC: 5 MG/DL (ref 5–25)
CALCIUM SERPL-MCNC: 7.7 MG/DL (ref 8.4–10.2)
CHLORIDE SERPL-SCNC: 107 MMOL/L (ref 96–108)
CO2 SERPL-SCNC: 29 MMOL/L (ref 21–32)
CREAT SERPL-MCNC: 0.6 MG/DL (ref 0.6–1.3)
GFR SERPL CREATININE-BSD FRML MDRD: 108 ML/MIN/1.73SQ M
GLUCOSE SERPL-MCNC: 78 MG/DL (ref 65–140)
INR PPP: 2.06 (ref 0.84–1.19)
POTASSIUM SERPL-SCNC: 3.6 MMOL/L (ref 3.5–5.3)
PROTHROMBIN TIME: 23.4 SECONDS (ref 11.6–14.5)
SODIUM SERPL-SCNC: 137 MMOL/L (ref 135–147)

## 2024-01-07 PROCEDURE — 85610 PROTHROMBIN TIME: CPT | Performed by: INTERNAL MEDICINE

## 2024-01-07 PROCEDURE — 97163 PT EVAL HIGH COMPLEX 45 MIN: CPT

## 2024-01-07 PROCEDURE — 80048 BASIC METABOLIC PNL TOTAL CA: CPT | Performed by: INTERNAL MEDICINE

## 2024-01-07 PROCEDURE — 99232 SBSQ HOSP IP/OBS MODERATE 35: CPT | Performed by: INTERNAL MEDICINE

## 2024-01-07 PROCEDURE — 99232 SBSQ HOSP IP/OBS MODERATE 35: CPT | Performed by: SURGERY

## 2024-01-07 RX ORDER — FUROSEMIDE 10 MG/ML
40 INJECTION INTRAMUSCULAR; INTRAVENOUS
Status: DISCONTINUED | OUTPATIENT
Start: 2024-01-08 | End: 2024-01-11

## 2024-01-07 RX ADMIN — SODIUM CHLORIDE 3 G: 9 INJECTION, SOLUTION INTRAVENOUS at 14:16

## 2024-01-07 RX ADMIN — PANTOPRAZOLE SODIUM 40 MG: 40 TABLET, DELAYED RELEASE ORAL at 05:55

## 2024-01-07 RX ADMIN — SENNOSIDES AND DOCUSATE SODIUM 1 TABLET: 8.6; 5 TABLET ORAL at 22:22

## 2024-01-07 RX ADMIN — OXYCODONE HYDROCHLORIDE 10 MG: 10 TABLET ORAL at 15:09

## 2024-01-07 RX ADMIN — SODIUM CHLORIDE 3 G: 9 INJECTION, SOLUTION INTRAVENOUS at 22:23

## 2024-01-07 RX ADMIN — VANCOMYCIN HYDROCHLORIDE 1500 MG: 5 INJECTION, POWDER, LYOPHILIZED, FOR SOLUTION INTRAVENOUS at 08:46

## 2024-01-07 RX ADMIN — SODIUM CHLORIDE 3 G: 9 INJECTION, SOLUTION INTRAVENOUS at 00:53

## 2024-01-07 RX ADMIN — MIDODRINE HYDROCHLORIDE 15 MG: 5 TABLET ORAL at 17:31

## 2024-01-07 RX ADMIN — ACETAMINOPHEN 325MG 650 MG: 325 TABLET ORAL at 08:47

## 2024-01-07 RX ADMIN — ENOXAPARIN SODIUM 80 MG: 100 INJECTION SUBCUTANEOUS at 08:47

## 2024-01-07 RX ADMIN — MIDODRINE HYDROCHLORIDE 15 MG: 5 TABLET ORAL at 06:00

## 2024-01-07 RX ADMIN — CHOLECALCIFEROL TAB 10 MCG (400 UNIT) 400 UNITS: 10 TAB at 08:49

## 2024-01-07 RX ADMIN — ACETAMINOPHEN 325MG 650 MG: 325 TABLET ORAL at 17:31

## 2024-01-07 RX ADMIN — CYANOCOBALAMIN TAB 500 MCG 1000 MCG: 500 TAB at 08:47

## 2024-01-07 RX ADMIN — THIAMINE HCL TAB 100 MG 100 MG: 100 TAB at 08:49

## 2024-01-07 RX ADMIN — MAGNESIUM HYDROXIDE 30 ML: 400 SUSPENSION ORAL at 08:46

## 2024-01-07 RX ADMIN — ENOXAPARIN SODIUM 80 MG: 100 INJECTION SUBCUTANEOUS at 22:22

## 2024-01-07 RX ADMIN — METHOCARBAMOL 500 MG: 500 TABLET ORAL at 23:42

## 2024-01-07 RX ADMIN — OXYCODONE HYDROCHLORIDE 10 MG: 10 TABLET ORAL at 23:04

## 2024-01-07 RX ADMIN — OXYCODONE HYDROCHLORIDE 10 MG: 10 TABLET ORAL at 08:46

## 2024-01-07 RX ADMIN — TRAZODONE HYDROCHLORIDE 50 MG: 50 TABLET ORAL at 22:23

## 2024-01-07 RX ADMIN — FOLIC ACID 1 MG: 1 TABLET ORAL at 08:47

## 2024-01-07 RX ADMIN — ACETAMINOPHEN 325MG 650 MG: 325 TABLET ORAL at 00:53

## 2024-01-07 RX ADMIN — VANCOMYCIN HYDROCHLORIDE 1500 MG: 5 INJECTION, POWDER, LYOPHILIZED, FOR SOLUTION INTRAVENOUS at 22:23

## 2024-01-07 RX ADMIN — SODIUM CHLORIDE 3 G: 9 INJECTION, SOLUTION INTRAVENOUS at 06:54

## 2024-01-07 RX ADMIN — FLUOXETINE 20 MG: 20 CAPSULE ORAL at 08:50

## 2024-01-07 RX ADMIN — MIDODRINE HYDROCHLORIDE 15 MG: 5 TABLET ORAL at 10:56

## 2024-01-07 RX ADMIN — METHOCARBAMOL 500 MG: 500 TABLET ORAL at 10:56

## 2024-01-07 RX ADMIN — Medication 3 MG: at 22:23

## 2024-01-07 NOTE — PLAN OF CARE
Problem: Prexisting or High Potential for Compromised Skin Integrity  Goal: Skin integrity is maintained or improved  Description: INTERVENTIONS:  - Identify patients at risk for skin breakdown  - Assess and monitor skin integrity  - Assess and monitor nutrition and hydration status  - Monitor labs   - Assess for incontinence   - Turn and reposition patient  - Assist with mobility/ambulation  - Relieve pressure over bony prominences  - Avoid friction and shearing  - Provide appropriate hygiene as needed including keeping skin clean and dry  - Evaluate need for skin moisturizer/barrier cream  - Collaborate with interdisciplinary team   - Patient/family teaching  - Consider wound care consult   Outcome: Progressing     Problem: PAIN - ADULT  Goal: Verbalizes/displays adequate comfort level or baseline comfort level  Description: Interventions:  - Encourage patient to monitor pain and request assistance  - Assess pain using appropriate pain scale  - Administer analgesics based on type and severity of pain and evaluate response  - Implement non-pharmacological measures as appropriate and evaluate response  - Consider cultural and social influences on pain and pain management  - Notify physician/advanced practitioner if interventions unsuccessful or patient reports new pain  Outcome: Progressing     Problem: INFECTION - ADULT  Goal: Absence or prevention of progression during hospitalization  Description: INTERVENTIONS:  - Assess and monitor for signs and symptoms of infection  - Monitor lab/diagnostic results  - Monitor all insertion sites, i.e. indwelling lines, tubes, and drains  - Monitor endotracheal if appropriate and nasal secretions for changes in amount and color  - Winfred appropriate cooling/warming therapies per order  - Administer medications as ordered  - Instruct and encourage patient and family to use good hand hygiene technique  - Identify and instruct in appropriate isolation precautions for  identified infection/condition  Outcome: Progressing     Problem: SAFETY ADULT  Goal: Patient will remain free of falls  Description: INTERVENTIONS:  - Educate patient/family on patient safety including physical limitations  - Instruct patient to call for assistance with activity   - Consult OT/PT to assist with strengthening/mobility   - Keep Call bell within reach  - Keep bed low and locked with side rails adjusted as appropriate  - Keep care items and personal belongings within reach  - Initiate and maintain comfort rounds  - Make Fall Risk Sign visible to staff  - Offer Toileting every 2 Hours, in advance of need  - Initiate/Maintain bed alarm  - Obtain necessary fall risk management equipment: call bell  - Apply yellow socks and bracelet for high fall risk patients  - Consider moving patient to room near nurses station  Outcome: Progressing  Goal: Maintain or return to baseline ADL function  Description: INTERVENTIONS:  -  Assess patient's ability to carry out ADLs; assess patient's baseline for ADL function and identify physical deficits which impact ability to perform ADLs (bathing, care of mouth/teeth, toileting, grooming, dressing, etc.)  - Assess/evaluate cause of self-care deficits   - Assess range of motion  - Assess patient's mobility; develop plan if impaired  - Assess patient's need for assistive devices and provide as appropriate  - Encourage maximum independence but intervene and supervise when necessary  - Involve family in performance of ADLs  - Assess for home care needs following discharge   - Consider OT consult to assist with ADL evaluation and planning for discharge  - Provide patient education as appropriate  Outcome: Progressing  Goal: Maintains/Returns to pre admission functional level  Description: INTERVENTIONS:  - Perform AM-PAC 6 Click Basic Mobility/ Daily Activity assessment daily.  - Set and communicate daily mobility goal to care team and patient/family/caregiver.   - Collaborate  with rehabilitation services on mobility goals if consulted  - Perform Range of Motion 3 times a day.  - Reposition patient every 2 hours.  - Dangle patient 3 times a day  - Stand patient 3 times a day  - Ambulate patient 3 times a day  - Out of bed to chair 3 times a day   - Out of bed for meals 3  Problem: DISCHARGE PLANNING  Goal: Discharge to home or other facility with appropriate resources  Description: INTERVENTIONS:  - Identify barriers to discharge w/patient and caregiver  - Arrange for needed discharge resources and transportation as appropriate  - Identify discharge learning needs (meds, wound care, etc.)  - Arrange for interpretive services to assist at discharge as needed  - Refer to Case Management Department for coordinating discharge planning if the patient needs post-hospital services based on physician/advanced practitioner order or complex needs related to functional status, cognitive ability, or social support system  Outcome: Progressing     Problem: Knowledge Deficit  Goal: Patient/family/caregiver demonstrates understanding of disease process, treatment plan, medications, and discharge instructions  Description: Complete learning assessment and assess knowledge base.  Interventions:  - Provide teaching at level of understanding  - Provide teaching via preferred learning methods  Outcome: Progressing     Problem: Nutrition/Hydration-ADULT  Goal: Nutrient/Hydration intake appropriate for improving, restoring or maintaining nutritional needs  Description: Monitor and assess patient's nutrition/hydration status for malnutrition. Collaborate with interdisciplinary team and initiate plan and interventions as ordered.  Monitor patient's weight and dietary intake as ordered or per policy. Utilize nutrition screening tool and intervene as necessary. Determine patient's food preferences and provide high-protein, high-caloric foods as appropriate.     INTERVENTIONS:  - Monitor oral intake, urinary  output, labs, and treatment plans  - Assess nutrition and hydration status and recommend course of action  - Evaluate amount of meals eaten  - Assist patient with eating if necessary   - Allow adequate time for meals  - Recommend/ encourage appropriate diets, oral nutritional supplements, and vitamin/mineral supplements  - Order, calculate, and assess calorie counts as needed  - Recommend, monitor, and adjust tube feedings and TPN/PPN based on assessed needs  - Assess need for intravenous fluids  - Provide specific nutrition/hydration education as appropriate  - Include patient/family/caregiver in decisions related to nutrition  Outcome: Progressing    times a day  - Out of bed for toileting  - Record patient progress and toleration of activity level   Outcome: Progressing

## 2024-01-07 NOTE — PROGRESS NOTES
Mckenna Fischer is a 48 y.o. female who is currently ordered Vancomycin IV with management by the Pharmacy Consult service.  Relevant clinical data and objective / subjective history reviewed.  Vancomycin Assessment:  Indication and Goal AUC/Trough: Bacteremia (goal -600, trough >10)  Clinical Status: stable  Micro:     Renal Function:  SCr: 0.6 mg/dL  CrCl: 142.6 mL/min  Renal replacement: Not on dialysis  Days of Therapy: 8  Current Dose: 1500mg IV Q12H  Vancomycin Plan:  New Dosing: Continue 1500mg IV Q12H  Estimated AUC: 471 mcg*hr/mL  Estimated Trough: 15.3 mcg/mL  Next Level: 1/10 with AM labs  Renal Function Monitoring: Daily BMP and UOP  Pharmacy will continue to follow closely for s/sx of nephrotoxicity, infusion reactions and appropriateness of therapy.  BMP and CBC will be ordered per protocol. We will continue to follow the patient’s culture results and clinical progress daily.    Camille Godfrey, MartinD

## 2024-01-07 NOTE — ASSESSMENT & PLAN NOTE
Multiple history of DVTs.  Continue Lovenox  Warfarin had been resumed but will be placed on hold again in light of the possibility of additional IR intervention.

## 2024-01-07 NOTE — ASSESSMENT & PLAN NOTE
The patient underwent aspiration of this by IR.  Cultures have grown methicillin resistant staph and an anaerobic bacteria.  Antibiotic therapy has been changed to vancomycin plus ampicillin/sulbactam  The patient has a fluid collection in around her fallopian tube.  Surgery is going to contact interventional radiology to see if this could be drained through a transgluteal approach.

## 2024-01-07 NOTE — PLAN OF CARE
Problem: PHYSICAL THERAPY ADULT  Goal: Performs mobility at highest level of function for planned discharge setting.  See evaluation for individualized goals.  Description: Treatment/Interventions: Functional transfer training, LE strengthening/ROM, Elevations, Therapeutic exercise, Endurance training, Patient/family training, Bed mobility, Gait training, Spoke to nursing  Equipment Recommended: Walker       See flowsheet documentation for full assessment, interventions and recommendations.  Note: Prognosis: Fair  Problem List: Decreased strength, Decreased endurance, Impaired balance, Decreased mobility, Impaired judgement, Obesity, Pain, Decreased skin integrity  Assessment: Pt. 48 y.o.female admitted for Intra-abdominal abscess (HCC) w/ severe sepsis, adnexal mass, UTI, BLE swelling & liver cirrhosis. S/p IR drainage on 1/2/24. Pt referred to PT for mobility assessment & D/C planning w/ orders of Up and OOB as tolerated . Please see above for information re: home set-up & PLOF as well as objective findings during PT assessment.  On eval, pt functioning below baseline hence will continue skilled PT to improve function & safety. Pt require minAx1 for most functional mobility w/ RW + cues for techniques & safety. Gait deviations as above but no gross LOB noted. Limit amb 2* to symptomatic hypotension upon sitting at EOB. BP 89/59. The patient's AM-PAC Basic Mobility Inpatient Short Form Raw Score is 17. A Raw score of greater than 16 suggests the patient may benefit from discharge to home. Please also refer to the recommendation of the Physical Therapist for safe discharge planning. From PT standpoint, will recommend Level II (moderate resource intensity) rehab services at D/C, pending progress. No SOB reported t/o session. Nsg staff most recent vital signs as follows: BP 98/59 (BP Location: Left arm)   Pulse 68   Temp 98.2 °F (36.8 °C) (Temporal)   Resp 16   Wt 108 kg (238 lb 12.1 oz)   LMP 12/05/2023 (Exact  Date)   SpO2 98%   BMI 38.54 kg/m² . At end of session, pt back in bed in stable condition, call bell & phone in reach, bed alarm activated, all lines intact. Fall precautions reinforced w/ good understanding. CM to follow. Nsg staff to continue to mobilized pt (OOB in chair for all meals & ambulate in room/unit) as tolerated to prevent further decline in function. Will also recommend Restorative for daily amb &/or daily OOB in chair as appropriate. Nsg notified. Co-eval was necessary to complete this PT eval for the pt's best interest given pt's medical acuity & complexity.    Barriers to Discharge: Inaccessible home environment, Decreased caregiver support  Barriers to Discharge Comments: home alone; 14STE    Rehab Resource Intensity Level, PT: II (Moderate Resource Intensity)    See flowsheet documentation for full assessment.
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
## 2024-01-08 LAB
ANION GAP SERPL CALCULATED.3IONS-SCNC: 3 MMOL/L
BASOPHILS # BLD AUTO: 0.01 THOUSANDS/ÂΜL (ref 0–0.1)
BASOPHILS NFR BLD AUTO: 0 % (ref 0–1)
BUN SERPL-MCNC: 6 MG/DL (ref 5–25)
CALCIUM SERPL-MCNC: 7.6 MG/DL (ref 8.4–10.2)
CHLORIDE SERPL-SCNC: 107 MMOL/L (ref 96–108)
CO2 SERPL-SCNC: 28 MMOL/L (ref 21–32)
CREAT SERPL-MCNC: 0.63 MG/DL (ref 0.6–1.3)
EOSINOPHIL # BLD AUTO: 0.03 THOUSAND/ÂΜL (ref 0–0.61)
EOSINOPHIL NFR BLD AUTO: 0 % (ref 0–6)
ERYTHROCYTE [DISTWIDTH] IN BLOOD BY AUTOMATED COUNT: 18.2 % (ref 11.6–15.1)
GFR SERPL CREATININE-BSD FRML MDRD: 106 ML/MIN/1.73SQ M
GLUCOSE SERPL-MCNC: 80 MG/DL (ref 65–140)
HCT VFR BLD AUTO: 25.9 % (ref 34.8–46.1)
HGB BLD-MCNC: 8.4 G/DL (ref 11.5–15.4)
IMM GRANULOCYTES # BLD AUTO: 0.04 THOUSAND/UL (ref 0–0.2)
IMM GRANULOCYTES NFR BLD AUTO: 1 % (ref 0–2)
INR PPP: 2.14 (ref 0.84–1.19)
LYMPHOCYTES # BLD AUTO: 3.15 THOUSANDS/ÂΜL (ref 0.6–4.47)
LYMPHOCYTES NFR BLD AUTO: 40 % (ref 14–44)
MCH RBC QN AUTO: 30.4 PG (ref 26.8–34.3)
MCHC RBC AUTO-ENTMCNC: 32.4 G/DL (ref 31.4–37.4)
MCV RBC AUTO: 94 FL (ref 82–98)
MONOCYTES # BLD AUTO: 0.7 THOUSAND/ÂΜL (ref 0.17–1.22)
MONOCYTES NFR BLD AUTO: 9 % (ref 4–12)
NEUTROPHILS # BLD AUTO: 3.88 THOUSANDS/ÂΜL (ref 1.85–7.62)
NEUTS SEG NFR BLD AUTO: 50 % (ref 43–75)
NRBC BLD AUTO-RTO: 0 /100 WBCS
PLATELET # BLD AUTO: 437 THOUSANDS/UL (ref 149–390)
PMV BLD AUTO: 9.9 FL (ref 8.9–12.7)
POTASSIUM SERPL-SCNC: 3.6 MMOL/L (ref 3.5–5.3)
PROTHROMBIN TIME: 24.1 SECONDS (ref 11.6–14.5)
RBC # BLD AUTO: 2.76 MILLION/UL (ref 3.81–5.12)
SODIUM SERPL-SCNC: 138 MMOL/L (ref 135–147)
WBC # BLD AUTO: 7.81 THOUSAND/UL (ref 4.31–10.16)

## 2024-01-08 PROCEDURE — 99233 SBSQ HOSP IP/OBS HIGH 50: CPT | Performed by: INTERNAL MEDICINE

## 2024-01-08 PROCEDURE — 99232 SBSQ HOSP IP/OBS MODERATE 35: CPT | Performed by: INTERNAL MEDICINE

## 2024-01-08 PROCEDURE — 85610 PROTHROMBIN TIME: CPT | Performed by: INTERNAL MEDICINE

## 2024-01-08 PROCEDURE — 85025 COMPLETE CBC W/AUTO DIFF WBC: CPT | Performed by: INTERNAL MEDICINE

## 2024-01-08 PROCEDURE — 80048 BASIC METABOLIC PNL TOTAL CA: CPT | Performed by: INTERNAL MEDICINE

## 2024-01-08 PROCEDURE — 99232 SBSQ HOSP IP/OBS MODERATE 35: CPT | Performed by: SURGERY

## 2024-01-08 PROCEDURE — NC001 PR NO CHARGE: Performed by: NURSE PRACTITIONER

## 2024-01-08 RX ORDER — DOXYCYCLINE HYCLATE 100 MG/1
100 CAPSULE ORAL EVERY 12 HOURS SCHEDULED
Status: DISCONTINUED | OUTPATIENT
Start: 2024-01-08 | End: 2024-01-11 | Stop reason: HOSPADM

## 2024-01-08 RX ORDER — AMOXICILLIN AND CLAVULANATE POTASSIUM 875; 125 MG/1; MG/1
1 TABLET, FILM COATED ORAL EVERY 12 HOURS SCHEDULED
Status: DISCONTINUED | OUTPATIENT
Start: 2024-01-08 | End: 2024-01-11 | Stop reason: HOSPADM

## 2024-01-08 RX ORDER — NYSTATIN 100000 [USP'U]/G
POWDER TOPICAL 2 TIMES DAILY
Status: DISCONTINUED | OUTPATIENT
Start: 2024-01-08 | End: 2024-01-11 | Stop reason: HOSPADM

## 2024-01-08 RX ORDER — ENOXAPARIN SODIUM 100 MG/ML
80 INJECTION SUBCUTANEOUS EVERY 12 HOURS SCHEDULED
Status: DISCONTINUED | OUTPATIENT
Start: 2024-01-09 | End: 2024-01-09

## 2024-01-08 RX ORDER — NYSTATIN 100000 [USP'U]/G
POWDER TOPICAL 2 TIMES DAILY
Status: DISCONTINUED | OUTPATIENT
Start: 2024-01-08 | End: 2024-01-08 | Stop reason: SDUPTHER

## 2024-01-08 RX ADMIN — METHOCARBAMOL 500 MG: 500 TABLET ORAL at 10:25

## 2024-01-08 RX ADMIN — ENOXAPARIN SODIUM 80 MG: 100 INJECTION SUBCUTANEOUS at 10:14

## 2024-01-08 RX ADMIN — MIDODRINE HYDROCHLORIDE 15 MG: 5 TABLET ORAL at 10:21

## 2024-01-08 RX ADMIN — DOXYCYCLINE 100 MG: 100 CAPSULE ORAL at 13:56

## 2024-01-08 RX ADMIN — DOXYCYCLINE 100 MG: 100 CAPSULE ORAL at 22:03

## 2024-01-08 RX ADMIN — PANTOPRAZOLE SODIUM 40 MG: 40 TABLET, DELAYED RELEASE ORAL at 06:05

## 2024-01-08 RX ADMIN — ACETAMINOPHEN 325MG 650 MG: 325 TABLET ORAL at 01:44

## 2024-01-08 RX ADMIN — MIDODRINE HYDROCHLORIDE 15 MG: 5 TABLET ORAL at 17:55

## 2024-01-08 RX ADMIN — SENNOSIDES AND DOCUSATE SODIUM 1 TABLET: 8.6; 5 TABLET ORAL at 22:03

## 2024-01-08 RX ADMIN — AMOXICILLIN AND CLAVULANATE POTASSIUM 1 TABLET: 875; 125 TABLET, FILM COATED ORAL at 13:56

## 2024-01-08 RX ADMIN — FOLIC ACID 1 MG: 1 TABLET ORAL at 10:12

## 2024-01-08 RX ADMIN — MAGNESIUM HYDROXIDE 30 ML: 400 SUSPENSION ORAL at 10:21

## 2024-01-08 RX ADMIN — NYSTATIN: 100000 POWDER TOPICAL at 13:55

## 2024-01-08 RX ADMIN — CHOLECALCIFEROL TAB 10 MCG (400 UNIT) 400 UNITS: 10 TAB at 10:21

## 2024-01-08 RX ADMIN — CYANOCOBALAMIN TAB 500 MCG 1000 MCG: 500 TAB at 10:12

## 2024-01-08 RX ADMIN — SODIUM CHLORIDE 3 G: 9 INJECTION, SOLUTION INTRAVENOUS at 01:44

## 2024-01-08 RX ADMIN — OXYCODONE HYDROCHLORIDE 10 MG: 10 TABLET ORAL at 14:00

## 2024-01-08 RX ADMIN — ACETAMINOPHEN 325MG 650 MG: 325 TABLET ORAL at 17:55

## 2024-01-08 RX ADMIN — ACETAMINOPHEN 325MG 650 MG: 325 TABLET ORAL at 10:14

## 2024-01-08 RX ADMIN — TRAZODONE HYDROCHLORIDE 50 MG: 50 TABLET ORAL at 22:03

## 2024-01-08 RX ADMIN — MIDODRINE HYDROCHLORIDE 15 MG: 5 TABLET ORAL at 06:04

## 2024-01-08 RX ADMIN — POLYETHYLENE GLYCOL 3350 17 G: 17 POWDER, FOR SOLUTION ORAL at 10:13

## 2024-01-08 RX ADMIN — THIAMINE HCL TAB 100 MG 100 MG: 100 TAB at 10:21

## 2024-01-08 RX ADMIN — OXYCODONE HYDROCHLORIDE 10 MG: 10 TABLET ORAL at 22:09

## 2024-01-08 RX ADMIN — Medication 3 MG: at 22:03

## 2024-01-08 RX ADMIN — AMOXICILLIN AND CLAVULANATE POTASSIUM 1 TABLET: 875; 125 TABLET, FILM COATED ORAL at 22:03

## 2024-01-08 RX ADMIN — FLUOXETINE 20 MG: 20 CAPSULE ORAL at 10:14

## 2024-01-08 RX ADMIN — METHOCARBAMOL 500 MG: 500 TABLET ORAL at 22:09

## 2024-01-08 RX ADMIN — OXYCODONE HYDROCHLORIDE 10 MG: 10 TABLET ORAL at 06:04
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
## 2024-01-08 NOTE — ASSESSMENT & PLAN NOTE
Complex  right adnexal lesion noted.  Discussed with Dr. Angel from IR.  For drainage tomorrow by IR.  Hold Lovenox tonight.

## 2024-01-08 NOTE — PROGRESS NOTES
Vancomycin therapy has been discontinued. Pharmacy will sign off. Thank you for this consult. Please do not hesitate to call us with questions or re-consult us if the need arises.
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
## 2024-01-08 NOTE — ASSESSMENT & PLAN NOTE
Reportedly due to  alcohol abuse.  Continue Aldactone 50 mg daily  Outpatient follow-up with GI  Low-salt diet
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
## 2024-01-08 NOTE — PLAN OF CARE
Problem: Prexisting or High Potential for Compromised Skin Integrity  Goal: Skin integrity is maintained or improved  Description: INTERVENTIONS:  - Identify patients at risk for skin breakdown  - Assess and monitor skin integrity  - Assess and monitor nutrition and hydration status  - Monitor labs   - Assess for incontinence   - Turn and reposition patient  - Assist with mobility/ambulation  - Relieve pressure over bony prominences  - Avoid friction and shearing  - Provide appropriate hygiene as needed including keeping skin clean and dry  - Evaluate need for skin moisturizer/barrier cream  - Collaborate with interdisciplinary team   - Patient/family teaching  - Consider wound care consult   Outcome: Progressing     Problem: SAFETY ADULT  Goal: Maintains/Returns to pre admission functional level  Description: INTERVENTIONS:  - Perform AM-PAC 6 Click Basic Mobility/ Daily Activity assessment daily.  - Set and communicate daily mobility goal to care team and patient/family/caregiver.   - Collaborate with rehabilitation services on mobility goals if consulted    - Out of bed for toileting  - Record patient progress and toleration of activity level   Outcome: Progressing

## 2024-01-08 NOTE — ASSESSMENT & PLAN NOTE
History of DVT and antiphospholipid antibody syndrome  Status post IVC filter placement at [facility]  Hold Lovenox for procedure tomorrow  Resume when cleared by IR.
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
## 2024-01-09 LAB
ABO GROUP BLD: NORMAL
ANION GAP SERPL CALCULATED.3IONS-SCNC: 2 MMOL/L
BASOPHILS # BLD AUTO: 0.02 THOUSANDS/ÂΜL (ref 0–0.1)
BASOPHILS NFR BLD AUTO: 0 % (ref 0–1)
BLD GP AB SCN SERPL QL: POSITIVE
BUN SERPL-MCNC: 6 MG/DL (ref 5–25)
CALCIUM SERPL-MCNC: 7.6 MG/DL (ref 8.4–10.2)
CHLORIDE SERPL-SCNC: 107 MMOL/L (ref 96–108)
CO2 SERPL-SCNC: 28 MMOL/L (ref 21–32)
CREAT SERPL-MCNC: 0.66 MG/DL (ref 0.6–1.3)
EOSINOPHIL # BLD AUTO: 0.04 THOUSAND/ÂΜL (ref 0–0.61)
EOSINOPHIL NFR BLD AUTO: 1 % (ref 0–6)
ERYTHROCYTE [DISTWIDTH] IN BLOOD BY AUTOMATED COUNT: 18.2 % (ref 11.6–15.1)
GFR SERPL CREATININE-BSD FRML MDRD: 104 ML/MIN/1.73SQ M
GLUCOSE SERPL-MCNC: 76 MG/DL (ref 65–140)
HCT VFR BLD AUTO: 26.1 % (ref 34.8–46.1)
HGB BLD-MCNC: 8.6 G/DL (ref 11.5–15.4)
IMM GRANULOCYTES # BLD AUTO: 0.05 THOUSAND/UL (ref 0–0.2)
IMM GRANULOCYTES NFR BLD AUTO: 1 % (ref 0–2)
INR PPP: 1.79 (ref 0.84–1.19)
LYMPHOCYTES # BLD AUTO: 3.02 THOUSANDS/ÂΜL (ref 0.6–4.47)
LYMPHOCYTES NFR BLD AUTO: 39 % (ref 14–44)
MCH RBC QN AUTO: 31 PG (ref 26.8–34.3)
MCHC RBC AUTO-ENTMCNC: 33 G/DL (ref 31.4–37.4)
MCV RBC AUTO: 94 FL (ref 82–98)
MONOCYTES # BLD AUTO: 0.59 THOUSAND/ÂΜL (ref 0.17–1.22)
MONOCYTES NFR BLD AUTO: 8 % (ref 4–12)
NEUTROPHILS # BLD AUTO: 4.09 THOUSANDS/ÂΜL (ref 1.85–7.62)
NEUTS SEG NFR BLD AUTO: 51 % (ref 43–75)
NRBC BLD AUTO-RTO: 0 /100 WBCS
PLATELET # BLD AUTO: 473 THOUSANDS/UL (ref 149–390)
PMV BLD AUTO: 9.8 FL (ref 8.9–12.7)
POTASSIUM SERPL-SCNC: 3.6 MMOL/L (ref 3.5–5.3)
PROTHROMBIN TIME: 20.9 SECONDS (ref 11.6–14.5)
RBC # BLD AUTO: 2.77 MILLION/UL (ref 3.81–5.12)
RH BLD: POSITIVE
SODIUM SERPL-SCNC: 137 MMOL/L (ref 135–147)
SPECIMEN EXPIRATION DATE: NORMAL
WBC # BLD AUTO: 7.81 THOUSAND/UL (ref 4.31–10.16)

## 2024-01-09 PROCEDURE — 99232 SBSQ HOSP IP/OBS MODERATE 35: CPT | Performed by: INTERNAL MEDICINE

## 2024-01-09 PROCEDURE — NC001 PR NO CHARGE: Performed by: OBSTETRICS & GYNECOLOGY

## 2024-01-09 PROCEDURE — 80048 BASIC METABOLIC PNL TOTAL CA: CPT | Performed by: INTERNAL MEDICINE

## 2024-01-09 PROCEDURE — 85025 COMPLETE CBC W/AUTO DIFF WBC: CPT | Performed by: INTERNAL MEDICINE

## 2024-01-09 PROCEDURE — 86850 RBC ANTIBODY SCREEN: CPT | Performed by: INTERNAL MEDICINE

## 2024-01-09 PROCEDURE — 85610 PROTHROMBIN TIME: CPT | Performed by: INTERNAL MEDICINE

## 2024-01-09 PROCEDURE — 86901 BLOOD TYPING SEROLOGIC RH(D): CPT | Performed by: INTERNAL MEDICINE

## 2024-01-09 PROCEDURE — 97530 THERAPEUTIC ACTIVITIES: CPT

## 2024-01-09 PROCEDURE — 86900 BLOOD TYPING SEROLOGIC ABO: CPT | Performed by: INTERNAL MEDICINE

## 2024-01-09 PROCEDURE — 97110 THERAPEUTIC EXERCISES: CPT

## 2024-01-09 RX ORDER — ENOXAPARIN SODIUM 100 MG/ML
80 INJECTION SUBCUTANEOUS ONCE
Status: COMPLETED | OUTPATIENT
Start: 2024-01-09 | End: 2024-01-09

## 2024-01-09 RX ADMIN — FOLIC ACID 1 MG: 1 TABLET ORAL at 08:29

## 2024-01-09 RX ADMIN — ENOXAPARIN SODIUM 80 MG: 100 INJECTION SUBCUTANEOUS at 18:29

## 2024-01-09 RX ADMIN — AMOXICILLIN AND CLAVULANATE POTASSIUM 1 TABLET: 875; 125 TABLET, FILM COATED ORAL at 08:29

## 2024-01-09 RX ADMIN — PANTOPRAZOLE SODIUM 40 MG: 40 TABLET, DELAYED RELEASE ORAL at 05:35

## 2024-01-09 RX ADMIN — NYSTATIN: 100000 POWDER TOPICAL at 18:38

## 2024-01-09 RX ADMIN — MIDODRINE HYDROCHLORIDE 15 MG: 5 TABLET ORAL at 16:31

## 2024-01-09 RX ADMIN — OXYCODONE HYDROCHLORIDE 10 MG: 10 TABLET ORAL at 22:59

## 2024-01-09 RX ADMIN — POLYETHYLENE GLYCOL 3350 17 G: 17 POWDER, FOR SOLUTION ORAL at 11:28

## 2024-01-09 RX ADMIN — THIAMINE HCL TAB 100 MG 100 MG: 100 TAB at 08:29

## 2024-01-09 RX ADMIN — ACETAMINOPHEN 325MG 650 MG: 325 TABLET ORAL at 08:29

## 2024-01-09 RX ADMIN — DOXYCYCLINE 100 MG: 100 CAPSULE ORAL at 08:29

## 2024-01-09 RX ADMIN — TRAZODONE HYDROCHLORIDE 50 MG: 50 TABLET ORAL at 22:35

## 2024-01-09 RX ADMIN — METHOCARBAMOL 500 MG: 500 TABLET ORAL at 16:32

## 2024-01-09 RX ADMIN — ONDANSETRON 4 MG: 2 INJECTION INTRAMUSCULAR; INTRAVENOUS at 22:35

## 2024-01-09 RX ADMIN — MIDODRINE HYDROCHLORIDE 15 MG: 5 TABLET ORAL at 05:35

## 2024-01-09 RX ADMIN — AMOXICILLIN AND CLAVULANATE POTASSIUM 1 TABLET: 875; 125 TABLET, FILM COATED ORAL at 22:59

## 2024-01-09 RX ADMIN — ONDANSETRON 4 MG: 2 INJECTION INTRAMUSCULAR; INTRAVENOUS at 14:23

## 2024-01-09 RX ADMIN — NYSTATIN: 100000 POWDER TOPICAL at 11:35

## 2024-01-09 RX ADMIN — OXYCODONE HYDROCHLORIDE 10 MG: 10 TABLET ORAL at 05:38

## 2024-01-09 RX ADMIN — Medication 3 MG: at 22:35

## 2024-01-09 RX ADMIN — FUROSEMIDE 40 MG: 10 INJECTION, SOLUTION INTRAVENOUS at 18:29

## 2024-01-09 RX ADMIN — FLUOXETINE 20 MG: 20 CAPSULE ORAL at 08:29

## 2024-01-09 RX ADMIN — CHOLECALCIFEROL TAB 10 MCG (400 UNIT) 400 UNITS: 10 TAB at 11:28

## 2024-01-09 RX ADMIN — CYANOCOBALAMIN TAB 500 MCG 1000 MCG: 500 TAB at 11:28

## 2024-01-09 RX ADMIN — MIDODRINE HYDROCHLORIDE 15 MG: 5 TABLET ORAL at 11:29

## 2024-01-09 RX ADMIN — METHOCARBAMOL 500 MG: 500 TABLET ORAL at 22:58

## 2024-01-09 RX ADMIN — DOXYCYCLINE 100 MG: 100 CAPSULE ORAL at 22:35

## 2024-01-09 RX ADMIN — ACETAMINOPHEN 325MG 650 MG: 325 TABLET ORAL at 16:31

## 2024-01-09 RX ADMIN — MAGNESIUM HYDROXIDE 30 ML: 400 SUSPENSION ORAL at 11:29

## 2024-01-09 NOTE — TREATMENT PLAN
Notified by CHARLES Avalos that her procedure is cancelled today  INR needs to be < 1.5  Resume diet  She can get lovenox today and none tomorrow.  Check INR today . Plan to give FFP if still > 2 for her procedure
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
## 2024-01-09 NOTE — PLAN OF CARE
Problem: Prexisting or High Potential for Compromised Skin Integrity  Goal: Skin integrity is maintained or improved  Description: INTERVENTIONS:  - Identify patients at risk for skin breakdown  - Assess and monitor skin integrity  - Assess and monitor nutrition and hydration status  - Monitor labs   - Assess for incontinence   - Turn and reposition patient  - Assist with mobility/ambulation  - Relieve pressure over bony prominences  - Avoid friction and shearing  - Provide appropriate hygiene as needed including keeping skin clean and dry  - Evaluate need for skin moisturizer/barrier cream  - Collaborate with interdisciplinary team   - Patient/family teaching  - Consider wound care consult   Outcome: Progressing     Problem: PAIN - ADULT  Goal: Verbalizes/displays adequate comfort level or baseline comfort level  Description: Interventions:  - Encourage patient to monitor pain and request assistance  - Assess pain using appropriate pain scale  - Administer analgesics based on type and severity of pain and evaluate response  - Implement non-pharmacological measures as appropriate and evaluate response  - Consider cultural and social influences on pain and pain management  - Notify physician/advanced practitioner if interventions unsuccessful or patient reports new pain  Outcome: Progressing     Problem: INFECTION - ADULT  Goal: Absence or prevention of progression during hospitalization  Description: INTERVENTIONS:  - Assess and monitor for signs and symptoms of infection  - Monitor lab/diagnostic results  - Monitor all insertion sites, i.e. indwelling lines, tubes, and drains  - Monitor endotracheal if appropriate and nasal secretions for changes in amount and color  - [place] appropriate cooling/warming therapies per order  - Administer medications as ordered  - Instruct and encourage patient and family to use good hand hygiene technique  - Identify and instruct in appropriate isolation precautions for  identified infection/condition  Outcome: Progressing     Problem: SAFETY ADULT  Goal: Patient will remain free of falls  Description: INTERVENTIONS:  - Educate patient/family on patient safety including physical limitations  - Instruct patient to call for assistance with activity   - Consult OT/PT to assist with strengthening/mobility   - Keep Call bell within reach  - Keep bed low and locked with side rails adjusted as appropriate  - Keep care items and personal belongings within reach  - Initiate and maintain comfort rounds  - Make Fall Risk Sign visible to staff  - Offer Toileting every  Hours, in advance of need  - Initiate/Maintain alarm  - Obtain necessary fall risk management equipment:   - Apply yellow socks and bracelet for high fall risk patients  - Consider moving patient to room near nurses station  Outcome: Progressing  Goal: Maintain or return to baseline ADL function  Description: INTERVENTIONS:  -  Assess patient's ability to carry out ADLs; assess patient's baseline for ADL function and identify physical deficits which impact ability to perform ADLs (bathing, care of mouth/teeth, toileting, grooming, dressing, etc.)  - Assess/evaluate cause of self-care deficits   - Assess range of motion  - Assess patient's mobility; develop plan if impaired  - Assess patient's need for assistive devices and provide as appropriate  - Encourage maximum independence but intervene and supervise when necessary  - Involve family in performance of ADLs  - Assess for home care needs following discharge   - Consider OT consult to assist with ADL evaluation and planning for discharge  - Provide patient education as appropriate  Outcome: Progressing  Goal: Maintains/Returns to pre admission functional level  Description: INTERVENTIONS:  - Perform AM-PAC 6 Click Basic Mobility/ Daily Activity assessment daily.  - Set and communicate daily mobility goal to care team and patient/family/caregiver.   - Collaborate with  rehabilitation services on mobility goals if consulted  - Perform Range of Motion  times a day.  - Reposition patient every  hours.  - Dangle patient  times a day  - Stand patient  times a day  - Ambulate patient  times a day  - Out of bed to chair  times a day   - Out of bed for meals times a day  - Out of bed for toileting  - Record patient progress and toleration of activity level   Outcome: Progressing     Problem: DISCHARGE PLANNING  Goal: Discharge to home or other facility with appropriate resources  Description: INTERVENTIONS:  - Identify barriers to discharge w/patient and caregiver  - Arrange for needed discharge resources and transportation as appropriate  - Identify discharge learning needs (meds, wound care, etc.)  - Arrange for interpretive services to assist at discharge as needed  - Refer to Case Management Department for coordinating discharge planning if the patient needs post-hospital services based on physician/advanced practitioner order or complex needs related to functional status, cognitive ability, or social support system  Outcome: Progressing     Problem: Knowledge Deficit  Goal: Patient/family/caregiver demonstrates understanding of disease process, treatment plan, medications, and discharge instructions  Description: Complete learning assessment and assess knowledge base.  Interventions:  - Provide teaching at level of understanding  - Provide teaching via preferred learning methods  Outcome: Progressing     Problem: Nutrition/Hydration-ADULT  Goal: Nutrient/Hydration intake appropriate for improving, restoring or maintaining nutritional needs  Description: Monitor and assess patient's nutrition/hydration status for malnutrition. Collaborate with interdisciplinary team and initiate plan and interventions as ordered.  Monitor patient's weight and dietary intake as ordered or per policy. Utilize nutrition screening tool and intervene as necessary. Determine patient's food preferences and  provide high-protein, high-caloric foods as appropriate.     INTERVENTIONS:  - Monitor oral intake, urinary output, labs, and treatment plans  - Assess nutrition and hydration status and recommend course of action  - Evaluate amount of meals eaten  - Assist patient with eating if necessary   - Allow adequate time for meals  - Recommend/ encourage appropriate diets, oral nutritional supplements, and vitamin/mineral supplements  - Order, calculate, and assess calorie counts as needed  - Recommend, monitor, and adjust tube feedings and TPN/PPN based on assessed needs  - Assess need for intravenous fluids  - Provide specific nutrition/hydration education as appropriate  - Include patient/family/caregiver in decisions related to nutrition  Outcome: Progressing

## 2024-01-09 NOTE — ASSESSMENT & PLAN NOTE
Reportedly due to  alcohol abuse.  Continue Aldactone 50 mg daily  Outpatient follow-up with GI  Low-salt diet  Her elevated INR is likely due to this.
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
## 2024-01-09 NOTE — QUICK NOTE
-Chart reviewed, patient remains afebrile and with normal WBC on day 2 of PO antibiotics  -Continue Augmentin + Doxycycline  -If plans are for IR aspiration of adnexal lesion, would recommend to send for cultures including aerobic, anaerobic and fungal    Gadiel Mauricio MD

## 2024-01-09 NOTE — ASSESSMENT & PLAN NOTE
Complex  right adnexal lesion noted.  Discussed with Seema Avalos from IR.   She was supposed to have her procedure today however her INR was not acceptable  Procedure was canceled.  She may resume Lovenox today and hold for tomorrow.  I repeated her INR today and it was 1.79.  Will give her 1 unit of FFP early tomorrow and repeat an INR during morning labs.  IR wants her INR less than 1.5 for them to do the procedure safely

## 2024-01-09 NOTE — ASSESSMENT & PLAN NOTE
History of DVT and antiphospholipid antibody syndrome  Status post IVC filter placement at [facility]  She may resume her Lovenox today since her procedure was canceled  Hold Lovenox for procedure tomorrow  Resume after her procedure when cleared by IR
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
## 2024-01-09 NOTE — PLAN OF CARE
Problem: PHYSICAL THERAPY ADULT  Goal: Performs mobility at highest level of function for planned discharge setting.  See evaluation for individualized goals.  Description: Treatment/Interventions: Functional transfer training, LE strengthening/ROM, Elevations, Therapeutic exercise, Endurance training, Patient/family training, Bed mobility, Gait training, Spoke to nursing  Equipment Recommended: Walker       See flowsheet documentation for full assessment, interventions and recommendations.  Outcome: Progressing  Note: Prognosis: Fair  Problem List: Decreased strength, Decreased endurance, Impaired balance, Decreased mobility, Impaired judgement, Obesity, Pain, Decreased skin integrity  Assessment: Pt tolerated PT session with c/o inc L knee pain with activity, intermittent dizziness/lightheadedness that would improve with time but increase with positional changes, and fatigue. BP with minimal drop pre/post activity. PT session with focus on functional transfers, therapeutic exercise for LE strengthening/BP sxs management/balance training, therapeutic activity, gait training, and education on fall risk and functional strategies. Pt demonstrating progress towards functional goals. Required supervision-SBA x1 for bed mobility for LE management, supervision for transfers from bed and min Ax1 from toilet with grab bar, and supervision to amb limited household distances with RW, supervision 2/2 potential sxs, demonstrating slow, steady, step through gait pattern with gait abnormalities. Pt would benefit from cont skilled IP PT to address deficits while in hospital. Cont to recommend daily amb/OOB as appropriate with non-PT staff and restorative team. Please refer to flowsheet for additional objective data. The patient's AM-PAC Basic Mobility Inpatient Short Form Raw Score is 17. A Raw score of greater than 16 suggests the patient may benefit from discharge to home. Please also refer to the recommendation of the  Physical Therapist for safe discharge planning. Cont PT recommendations of RW and d/c with level II (mod) rehab intensity resources 2/2 current deficits requiring increased assistance/supervision, decreased caregiver support with steps to enter home. Plan to assess stair negotiation next visit as appropriate, using curb step if necessary.   \  Barriers to Discharge: Inaccessible home environment, Decreased caregiver support  Barriers to Discharge Comments: if at daughters (+) time alone, (+) 3 FERCHO with unilateral rail  Rehab Resource Intensity Level, PT: II (Moderate Resource Intensity)    See flowsheet documentation for full assessment.

## 2024-01-10 ENCOUNTER — APPOINTMENT (INPATIENT)
Dept: CT IMAGING | Facility: HOSPITAL | Age: 49
DRG: 721 | End: 2024-01-10
Payer: COMMERCIAL

## 2024-01-10 PROBLEM — S37.13XS: Status: ACTIVE | Noted: 2024-01-10

## 2024-01-10 LAB
ABO GROUP BLD BPU: NORMAL
ABO GROUP BLD BPU: NORMAL
ANION GAP SERPL CALCULATED.3IONS-SCNC: 4 MMOL/L
BASOPHILS # BLD AUTO: 0.01 THOUSANDS/ÂΜL (ref 0–0.1)
BASOPHILS NFR BLD AUTO: 0 % (ref 0–1)
BPU ID: NORMAL
BPU ID: NORMAL
BUN SERPL-MCNC: 7 MG/DL (ref 5–25)
CALCIUM SERPL-MCNC: 7.6 MG/DL (ref 8.4–10.2)
CHLORIDE SERPL-SCNC: 106 MMOL/L (ref 96–108)
CO2 SERPL-SCNC: 28 MMOL/L (ref 21–32)
CREAT SERPL-MCNC: 0.7 MG/DL (ref 0.6–1.3)
EOSINOPHIL # BLD AUTO: 0.02 THOUSAND/ÂΜL (ref 0–0.61)
EOSINOPHIL NFR BLD AUTO: 0 % (ref 0–6)
ERYTHROCYTE [DISTWIDTH] IN BLOOD BY AUTOMATED COUNT: 18.2 % (ref 11.6–15.1)
GFR SERPL CREATININE-BSD FRML MDRD: 102 ML/MIN/1.73SQ M
GLUCOSE SERPL-MCNC: 68 MG/DL (ref 65–140)
HCT VFR BLD AUTO: 24.8 % (ref 34.8–46.1)
HGB BLD-MCNC: 8.1 G/DL (ref 11.5–15.4)
IMM GRANULOCYTES # BLD AUTO: 0.03 THOUSAND/UL (ref 0–0.2)
IMM GRANULOCYTES NFR BLD AUTO: 0 % (ref 0–2)
INR PPP: 1.6 (ref 0.84–1.19)
LYMPHOCYTES # BLD AUTO: 2.95 THOUSANDS/ÂΜL (ref 0.6–4.47)
LYMPHOCYTES NFR BLD AUTO: 40 % (ref 14–44)
MCH RBC QN AUTO: 30.9 PG (ref 26.8–34.3)
MCHC RBC AUTO-ENTMCNC: 32.7 G/DL (ref 31.4–37.4)
MCV RBC AUTO: 95 FL (ref 82–98)
MONOCYTES # BLD AUTO: 0.54 THOUSAND/ÂΜL (ref 0.17–1.22)
MONOCYTES NFR BLD AUTO: 7 % (ref 4–12)
NEUTROPHILS # BLD AUTO: 3.78 THOUSANDS/ÂΜL (ref 1.85–7.62)
NEUTS SEG NFR BLD AUTO: 53 % (ref 43–75)
NRBC BLD AUTO-RTO: 0 /100 WBCS
PLATELET # BLD AUTO: 464 THOUSANDS/UL (ref 149–390)
PMV BLD AUTO: 10.3 FL (ref 8.9–12.7)
POTASSIUM SERPL-SCNC: 3.8 MMOL/L (ref 3.5–5.3)
PROTHROMBIN TIME: 19.2 SECONDS (ref 11.6–14.5)
RBC # BLD AUTO: 2.62 MILLION/UL (ref 3.81–5.12)
SODIUM SERPL-SCNC: 138 MMOL/L (ref 135–147)
UNIT DISPENSE STATUS: NORMAL
UNIT DISPENSE STATUS: NORMAL
UNIT PRODUCT CODE: NORMAL
UNIT PRODUCT CODE: NORMAL
UNIT PRODUCT VOLUME: 312 ML
UNIT PRODUCT VOLUME: 342 ML
UNIT RH: NORMAL
UNIT RH: NORMAL
WBC # BLD AUTO: 7.33 THOUSAND/UL (ref 4.31–10.16)

## 2024-01-10 PROCEDURE — 80048 BASIC METABOLIC PNL TOTAL CA: CPT | Performed by: INTERNAL MEDICINE

## 2024-01-10 PROCEDURE — 87076 CULTURE ANAEROBE IDENT EACH: CPT | Performed by: INTERNAL MEDICINE

## 2024-01-10 PROCEDURE — 87185 SC STD ENZYME DETCJ PER NZM: CPT | Performed by: INTERNAL MEDICINE

## 2024-01-10 PROCEDURE — 99232 SBSQ HOSP IP/OBS MODERATE 35: CPT | Performed by: INTERNAL MEDICINE

## 2024-01-10 PROCEDURE — P9017 PLASMA 1 DONOR FRZ W/IN 8 HR: HCPCS

## 2024-01-10 PROCEDURE — 99153 MOD SED SAME PHYS/QHP EA: CPT

## 2024-01-10 PROCEDURE — 99152 MOD SED SAME PHYS/QHP 5/>YRS: CPT

## 2024-01-10 PROCEDURE — 49406 IMAGE CATH FLUID PERI/RETRO: CPT | Performed by: RADIOLOGY

## 2024-01-10 PROCEDURE — 87070 CULTURE OTHR SPECIMN AEROBIC: CPT | Performed by: INTERNAL MEDICINE

## 2024-01-10 PROCEDURE — 87147 CULTURE TYPE IMMUNOLOGIC: CPT | Performed by: INTERNAL MEDICINE

## 2024-01-10 PROCEDURE — 85025 COMPLETE CBC W/AUTO DIFF WBC: CPT | Performed by: INTERNAL MEDICINE

## 2024-01-10 PROCEDURE — C1769 GUIDE WIRE: HCPCS

## 2024-01-10 PROCEDURE — 99152 MOD SED SAME PHYS/QHP 5/>YRS: CPT | Performed by: RADIOLOGY

## 2024-01-10 PROCEDURE — 85610 PROTHROMBIN TIME: CPT | Performed by: INTERNAL MEDICINE

## 2024-01-10 PROCEDURE — C1729 CATH, DRAINAGE: HCPCS

## 2024-01-10 PROCEDURE — NC001 PR NO CHARGE: Performed by: PHYSICIAN ASSISTANT

## 2024-01-10 PROCEDURE — 87075 CULTR BACTERIA EXCEPT BLOOD: CPT | Performed by: INTERNAL MEDICINE

## 2024-01-10 PROCEDURE — 87186 SC STD MICRODIL/AGAR DIL: CPT | Performed by: INTERNAL MEDICINE

## 2024-01-10 PROCEDURE — 87205 SMEAR GRAM STAIN: CPT | Performed by: INTERNAL MEDICINE

## 2024-01-10 PROCEDURE — 87102 FUNGUS ISOLATION CULTURE: CPT | Performed by: INTERNAL MEDICINE

## 2024-01-10 PROCEDURE — 0W9G30Z DRAINAGE OF PERITONEAL CAVITY WITH DRAINAGE DEVICE, PERCUTANEOUS APPROACH: ICD-10-PCS | Performed by: RADIOLOGY

## 2024-01-10 RX ORDER — MIDAZOLAM HYDROCHLORIDE 2 MG/2ML
INJECTION, SOLUTION INTRAMUSCULAR; INTRAVENOUS AS NEEDED
Status: COMPLETED | OUTPATIENT
Start: 2024-01-10 | End: 2024-01-10

## 2024-01-10 RX ORDER — LIDOCAINE WITH 8.4% SOD BICARB 0.9%(10ML)
SYRINGE (ML) INJECTION AS NEEDED
Status: COMPLETED | OUTPATIENT
Start: 2024-01-10 | End: 2024-01-10

## 2024-01-10 RX ORDER — FENTANYL CITRATE 50 UG/ML
INJECTION, SOLUTION INTRAMUSCULAR; INTRAVENOUS AS NEEDED
Status: COMPLETED | OUTPATIENT
Start: 2024-01-10 | End: 2024-01-10

## 2024-01-10 RX ORDER — SODIUM CHLORIDE 9 MG/ML
10 INJECTION, SOLUTION INTRAMUSCULAR; INTRAVENOUS; SUBCUTANEOUS DAILY
Qty: 300 ML | Refills: 0 | Status: SHIPPED | OUTPATIENT
Start: 2024-01-10 | End: 2024-05-09

## 2024-01-10 RX ORDER — CEFAZOLIN SODIUM 1 G/3ML
INJECTION, POWDER, FOR SOLUTION INTRAMUSCULAR; INTRAVENOUS AS NEEDED
Status: COMPLETED | OUTPATIENT
Start: 2024-01-10 | End: 2024-01-10

## 2024-01-10 RX ADMIN — TRAZODONE HYDROCHLORIDE 50 MG: 50 TABLET ORAL at 21:58

## 2024-01-10 RX ADMIN — Medication 3 MG: at 21:59

## 2024-01-10 RX ADMIN — FENTANYL CITRATE 50 MCG: 50 INJECTION INTRAMUSCULAR; INTRAVENOUS at 13:21

## 2024-01-10 RX ADMIN — ONDANSETRON 4 MG: 2 INJECTION INTRAMUSCULAR; INTRAVENOUS at 19:45

## 2024-01-10 RX ADMIN — OXYCODONE HYDROCHLORIDE 10 MG: 10 TABLET ORAL at 09:02

## 2024-01-10 RX ADMIN — CHOLECALCIFEROL TAB 10 MCG (400 UNIT) 400 UNITS: 10 TAB at 10:44

## 2024-01-10 RX ADMIN — MIDODRINE HYDROCHLORIDE 15 MG: 5 TABLET ORAL at 17:18

## 2024-01-10 RX ADMIN — PANTOPRAZOLE SODIUM 40 MG: 40 TABLET, DELAYED RELEASE ORAL at 05:23

## 2024-01-10 RX ADMIN — DOXYCYCLINE 100 MG: 100 CAPSULE ORAL at 10:44

## 2024-01-10 RX ADMIN — DOXYCYCLINE 100 MG: 100 CAPSULE ORAL at 21:59

## 2024-01-10 RX ADMIN — FOLIC ACID 1 MG: 1 TABLET ORAL at 10:44

## 2024-01-10 RX ADMIN — ONDANSETRON 4 MG: 2 INJECTION INTRAMUSCULAR; INTRAVENOUS at 09:02

## 2024-01-10 RX ADMIN — MIDODRINE HYDROCHLORIDE 15 MG: 5 TABLET ORAL at 05:23

## 2024-01-10 RX ADMIN — NYSTATIN: 100000 POWDER TOPICAL at 09:04

## 2024-01-10 RX ADMIN — AMOXICILLIN AND CLAVULANATE POTASSIUM 1 TABLET: 875; 125 TABLET, FILM COATED ORAL at 09:02

## 2024-01-10 RX ADMIN — CYANOCOBALAMIN TAB 500 MCG 1000 MCG: 500 TAB at 10:44

## 2024-01-10 RX ADMIN — IOHEXOL 80 ML: 350 INJECTION, SOLUTION INTRAVENOUS at 13:56

## 2024-01-10 RX ADMIN — CEFAZOLIN 2000 MG: 1 INJECTION, POWDER, FOR SOLUTION INTRAMUSCULAR; INTRAVENOUS at 13:19

## 2024-01-10 RX ADMIN — MIDAZOLAM 1 MG: 1 INJECTION INTRAMUSCULAR; INTRAVENOUS at 13:26

## 2024-01-10 RX ADMIN — Medication 10 ML: at 13:32

## 2024-01-10 RX ADMIN — THIAMINE HCL TAB 100 MG 100 MG: 100 TAB at 10:44

## 2024-01-10 RX ADMIN — METHOCARBAMOL 500 MG: 500 TABLET ORAL at 23:47

## 2024-01-10 RX ADMIN — AMOXICILLIN AND CLAVULANATE POTASSIUM 1 TABLET: 875; 125 TABLET, FILM COATED ORAL at 21:59

## 2024-01-10 RX ADMIN — FLUOXETINE 20 MG: 20 CAPSULE ORAL at 09:02

## 2024-01-10 RX ADMIN — MIDODRINE HYDROCHLORIDE 15 MG: 5 TABLET ORAL at 11:13

## 2024-01-10 RX ADMIN — MIDAZOLAM 1 MG: 1 INJECTION INTRAMUSCULAR; INTRAVENOUS at 13:21

## 2024-01-10 RX ADMIN — OXYCODONE HYDROCHLORIDE 10 MG: 10 TABLET ORAL at 23:45

## 2024-01-10 RX ADMIN — OXYCODONE HYDROCHLORIDE 10 MG: 10 TABLET ORAL at 17:18
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
## 2024-01-10 NOTE — ASSESSMENT & PLAN NOTE
Complex  right adnexal lesion noted.  She will have her procedure today by IR  INR 1.6 and is acceptable

## 2024-01-10 NOTE — PHYSICAL THERAPY NOTE
Physical Therapy Cancellation Note                  Pt unavailable for PT treatment interventions as pt off floor in IR for a procedure.  Will continue to follow at a later time as per PT POC.    Bonita Hull, PTA

## 2024-01-10 NOTE — ASSESSMENT & PLAN NOTE
She told me her left nephrostomy was supposed to be changed tomorrow  Per care everywhere, she has an IR appointment at [facility] on 1/15    IR consult to re-evaluated her tube.

## 2024-01-10 NOTE — ASSESSMENT & PLAN NOTE
History of DVT and antiphospholipid antibody syndrome  Status post IVC filter placement at [facility]  Hold Lovenox for procedure today  Resume after her procedure when cleared by IR
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
## 2024-01-10 NOTE — INTERVAL H&P NOTE
Update: (This section must be completed if the H&P was completed greater than 24 hrs to procedure or admission)    H&P reviewed. After examining the patient, I find no changed to the H&P since it had been written.    Arrives for possible drainage    Difficult location: 50/50 on if we can reach it    Will obtain imaging with CT US    Risks benefits alternatives discussed    On oral abx    Risks of bowel injury discussed    Unclear duration of drainage      Mp2 asa3    Patient re-evaluated. Accept as history and physical.    Cassie Angel MD/January 10, 2024/1:09 PM

## 2024-01-10 NOTE — PLAN OF CARE
Problem: Prexisting or High Potential for Compromised Skin Integrity  Goal: Skin integrity is maintained or improved  Description: INTERVENTIONS:  - Identify patients at risk for skin breakdown  - Assess and monitor skin integrity  - Assess and monitor nutrition and hydration status  - Monitor labs   - Assess for incontinence   - Turn and reposition patient  - Assist with mobility/ambulation  - Relieve pressure over bony prominences  - Avoid friction and shearing  - Provide appropriate hygiene as needed including keeping skin clean and dry  - Evaluate need for skin moisturizer/barrier cream  - Collaborate with interdisciplinary team   - Patient/family teaching  - Consider wound care consult   Outcome: Progressing     Problem: PAIN - ADULT  Goal: Verbalizes/displays adequate comfort level or baseline comfort level  Description: Interventions:  - Encourage patient to monitor pain and request assistance  - Assess pain using appropriate pain scale  - Administer analgesics based on type and severity of pain and evaluate response  - Implement non-pharmacological measures as appropriate and evaluate response  - Consider cultural and social influences on pain and pain management  - Notify physician/advanced practitioner if interventions unsuccessful or patient reports new pain  Outcome: Progressing     Problem: INFECTION - ADULT  Goal: Absence or prevention of progression during hospitalization  Description: INTERVENTIONS:  - Assess and monitor for signs and symptoms of infection  - Monitor lab/diagnostic results  - Monitor all insertion sites, i.e. indwelling lines, tubes, and drains  - Monitor endotracheal if appropriate and nasal secretions for changes in amount and color  - [place] appropriate cooling/warming therapies per order  - Administer medications as ordered  - Instruct and encourage patient and family to use good hand hygiene technique  - Identify and instruct in appropriate isolation precautions for  identified infection/condition  Outcome: Progressing     Problem: SAFETY ADULT  Goal: Patient will remain free of falls  Description: INTERVENTIONS:  - Educate patient/family on patient safety including physical limitations  - Instruct patient to call for assistance with activity   - Consult OT/PT to assist with strengthening/mobility   - Keep Call bell within reach  - Keep bed low and locked with side rails adjusted as appropriate  - Keep care items and personal belongings within reach  - Initiate and maintain comfort rounds  - Make Fall Risk Sign visible to staff  - Offer Toileting every  Hours, in advance of need  - Initiate/Maintain alarm  - Obtain necessary fall risk management equipment:   - Apply yellow socks and bracelet for high fall risk patients  - Consider moving patient to room near nurses station  Outcome: Progressing  Goal: Maintain or return to baseline ADL function  Description: INTERVENTIONS:  -  Assess patient's ability to carry out ADLs; assess patient's baseline for ADL function and identify physical deficits which impact ability to perform ADLs (bathing, care of mouth/teeth, toileting, grooming, dressing, etc.)  - Assess/evaluate cause of self-care deficits   - Assess range of motion  - Assess patient's mobility; develop plan if impaired  - Assess patient's need for assistive devices and provide as appropriate  - Encourage maximum independence but intervene and supervise when necessary  - Involve family in performance of ADLs  - Assess for home care needs following discharge   - Consider OT consult to assist with ADL evaluation and planning for discharge  - Provide patient education as appropriate  Outcome: Progressing  Goal: Maintains/Returns to pre admission functional level  Description: INTERVENTIONS:  - Perform AM-PAC 6 Click Basic Mobility/ Daily Activity assessment daily.  - Set and communicate daily mobility goal to care team and patient/family/caregiver.   - Collaborate with  rehabilitation services on mobility goals if consulted  - Perform Range of Motion  times a day.  - Reposition patient every  hours.  - Dangle patient  times a day  - Stand patient  times a day  - Ambulate patient  times a day  - Out of bed to chair  times a day   - Out of bed for meal times a day  - Out of bed for toileting  - Record patient progress and toleration of activity level   Outcome: Progressing     Problem: DISCHARGE PLANNING  Goal: Discharge to home or other facility with appropriate resources  Description: INTERVENTIONS:  - Identify barriers to discharge w/patient and caregiver  - Arrange for needed discharge resources and transportation as appropriate  - Identify discharge learning needs (meds, wound care, etc.)  - Arrange for interpretive services to assist at discharge as needed  - Refer to Case Management Department for coordinating discharge planning if the patient needs post-hospital services based on physician/advanced practitioner order or complex needs related to functional status, cognitive ability, or social support system  Outcome: Progressing     Problem: Knowledge Deficit  Goal: Patient/family/caregiver demonstrates understanding of disease process, treatment plan, medications, and discharge instructions  Description: Complete learning assessment and assess knowledge base.  Interventions:  - Provide teaching at level of understanding  - Provide teaching via preferred learning methods  Outcome: Progressing     Problem: Nutrition/Hydration-ADULT  Goal: Nutrient/Hydration intake appropriate for improving, restoring or maintaining nutritional needs  Description: Monitor and assess patient's nutrition/hydration status for malnutrition. Collaborate with interdisciplinary team and initiate plan and interventions as ordered.  Monitor patient's weight and dietary intake as ordered or per policy. Utilize nutrition screening tool and intervene as necessary. Determine patient's food preferences and  provide high-protein, high-caloric foods as appropriate.     INTERVENTIONS:  - Monitor oral intake, urinary output, labs, and treatment plans  - Assess nutrition and hydration status and recommend course of action  - Evaluate amount of meals eaten  - Assist patient with eating if necessary   - Allow adequate time for meals  - Recommend/ encourage appropriate diets, oral nutritional supplements, and vitamin/mineral supplements  - Order, calculate, and assess calorie counts as needed  - Recommend, monitor, and adjust tube feedings and TPN/PPN based on assessed needs  - Assess need for intravenous fluids  - Provide specific nutrition/hydration education as appropriate  - Include patient/family/caregiver in decisions related to nutrition  Outcome: Progressing
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
## 2024-01-10 NOTE — PLAN OF CARE
Problem: Prexisting or High Potential for Compromised Skin Integrity  Goal: Skin integrity is maintained or improved  Description: INTERVENTIONS:  - Identify patients at risk for skin breakdown  - Assess and monitor skin integrity  - Assess and monitor nutrition and hydration status  - Monitor labs   - Assess for incontinence   - Turn and reposition patient  - Assist with mobility/ambulation  - Relieve pressure over bony prominences  - Avoid friction and shearing  - Provide appropriate hygiene as needed including keeping skin clean and dry  - Evaluate need for skin moisturizer/barrier cream  - Collaborate with interdisciplinary team   - Patient/family teaching  - Consider wound care consult   Outcome: Progressing     Problem: PAIN - ADULT  Goal: Verbalizes/displays adequate comfort level or baseline comfort level  Description: Interventions:  - Encourage patient to monitor pain and request assistance  - Assess pain using appropriate pain scale  - Administer analgesics based on type and severity of pain and evaluate response  - Implement non-pharmacological measures as appropriate and evaluate response  - Consider cultural and social influences on pain and pain management  - Notify physician/advanced practitioner if interventions unsuccessful or patient reports new pain  Outcome: Progressing     Problem: INFECTION - ADULT  Goal: Absence or prevention of progression during hospitalization  Description: INTERVENTIONS:  - Assess and monitor for signs and symptoms of infection  - Monitor lab/diagnostic results  - Monitor all insertion sites, i.e. indwelling lines, tubes, and drains  - Monitor endotracheal if appropriate and nasal secretions for changes in amount and color  - [place] appropriate cooling/warming therapies per order  - Administer medications as ordered  - Instruct and encourage patient and family to use good hand hygiene technique  - Identify and instruct in appropriate isolation precautions for  identified infection/condition  Outcome: Progressing

## 2024-01-10 NOTE — OCCUPATIONAL THERAPY NOTE
Occupational Therapy         Patient Name: Mckenna Fischer  Today's Date: 1/10/2024     01/10/24 1245   OT Last Visit   OT Visit Date 01/10/24   Note Type   Note type Cancelled Session   Cancel Reasons Patient off floor/test  (Pt w/ IR, will see as schedule permits.)     Jennifer Yoon

## 2024-01-11 VITALS
HEART RATE: 48 BPM | BODY MASS INDEX: 39.18 KG/M2 | RESPIRATION RATE: 18 BRPM | DIASTOLIC BLOOD PRESSURE: 57 MMHG | WEIGHT: 242.73 LBS | SYSTOLIC BLOOD PRESSURE: 90 MMHG | TEMPERATURE: 97.2 F | OXYGEN SATURATION: 99 %

## 2024-01-11 LAB
ANION GAP SERPL CALCULATED.3IONS-SCNC: 3 MMOL/L
BASOPHILS # BLD AUTO: 0.01 THOUSANDS/ÂΜL (ref 0–0.1)
BASOPHILS NFR BLD AUTO: 0 % (ref 0–1)
BUN SERPL-MCNC: 8 MG/DL (ref 5–25)
CALCIUM SERPL-MCNC: 7.6 MG/DL (ref 8.4–10.2)
CHLORIDE SERPL-SCNC: 107 MMOL/L (ref 96–108)
CO2 SERPL-SCNC: 29 MMOL/L (ref 21–32)
CREAT SERPL-MCNC: 0.68 MG/DL (ref 0.6–1.3)
EOSINOPHIL # BLD AUTO: 0 THOUSAND/ÂΜL (ref 0–0.61)
EOSINOPHIL NFR BLD AUTO: 0 % (ref 0–6)
ERYTHROCYTE [DISTWIDTH] IN BLOOD BY AUTOMATED COUNT: 17.8 % (ref 11.6–15.1)
GFR SERPL CREATININE-BSD FRML MDRD: 103 ML/MIN/1.73SQ M
GLUCOSE SERPL-MCNC: 72 MG/DL (ref 65–140)
HCT VFR BLD AUTO: 23.2 % (ref 34.8–46.1)
HGB BLD-MCNC: 7.5 G/DL (ref 11.5–15.4)
IMM GRANULOCYTES # BLD AUTO: 0.02 THOUSAND/UL (ref 0–0.2)
IMM GRANULOCYTES NFR BLD AUTO: 0 % (ref 0–2)
LYMPHOCYTES # BLD AUTO: 2.35 THOUSANDS/ÂΜL (ref 0.6–4.47)
LYMPHOCYTES NFR BLD AUTO: 41 % (ref 14–44)
MCH RBC QN AUTO: 31.3 PG (ref 26.8–34.3)
MCHC RBC AUTO-ENTMCNC: 32.3 G/DL (ref 31.4–37.4)
MCV RBC AUTO: 97 FL (ref 82–98)
MONOCYTES # BLD AUTO: 0.46 THOUSAND/ÂΜL (ref 0.17–1.22)
MONOCYTES NFR BLD AUTO: 8 % (ref 4–12)
NEUTROPHILS # BLD AUTO: 2.91 THOUSANDS/ÂΜL (ref 1.85–7.62)
NEUTS SEG NFR BLD AUTO: 51 % (ref 43–75)
NRBC BLD AUTO-RTO: 0 /100 WBCS
PLATELET # BLD AUTO: 402 THOUSANDS/UL (ref 149–390)
PMV BLD AUTO: 10.1 FL (ref 8.9–12.7)
POTASSIUM SERPL-SCNC: 3.5 MMOL/L (ref 3.5–5.3)
RBC # BLD AUTO: 2.4 MILLION/UL (ref 3.81–5.12)
SODIUM SERPL-SCNC: 139 MMOL/L (ref 135–147)
WBC # BLD AUTO: 5.75 THOUSAND/UL (ref 4.31–10.16)

## 2024-01-11 PROCEDURE — 99232 SBSQ HOSP IP/OBS MODERATE 35: CPT | Performed by: SURGERY

## 2024-01-11 PROCEDURE — 85025 COMPLETE CBC W/AUTO DIFF WBC: CPT | Performed by: INTERNAL MEDICINE

## 2024-01-11 PROCEDURE — 99239 HOSP IP/OBS DSCHRG MGMT >30: CPT | Performed by: INTERNAL MEDICINE

## 2024-01-11 PROCEDURE — 80048 BASIC METABOLIC PNL TOTAL CA: CPT | Performed by: INTERNAL MEDICINE

## 2024-01-11 PROCEDURE — 99232 SBSQ HOSP IP/OBS MODERATE 35: CPT | Performed by: PHYSICIAN ASSISTANT

## 2024-01-11 PROCEDURE — 99233 SBSQ HOSP IP/OBS HIGH 50: CPT | Performed by: INTERNAL MEDICINE

## 2024-01-11 RX ORDER — ALBUMIN (HUMAN) 12.5 G/50ML
25 SOLUTION INTRAVENOUS ONCE
Status: COMPLETED | OUTPATIENT
Start: 2024-01-11 | End: 2024-01-11

## 2024-01-11 RX ORDER — NYSTATIN 100000 [USP'U]/G
POWDER TOPICAL 2 TIMES DAILY
Start: 2024-01-11

## 2024-01-11 RX ORDER — DOXYCYCLINE HYCLATE 100 MG/1
100 CAPSULE ORAL EVERY 12 HOURS SCHEDULED
Start: 2024-01-11 | End: 2024-01-16

## 2024-01-11 RX ORDER — ENOXAPARIN SODIUM 100 MG/ML
80 INJECTION SUBCUTANEOUS EVERY 12 HOURS SCHEDULED
Status: DISCONTINUED | OUTPATIENT
Start: 2024-01-11 | End: 2024-01-11 | Stop reason: HOSPADM

## 2024-01-11 RX ORDER — MIDODRINE HYDROCHLORIDE 5 MG/1
15 TABLET ORAL
Start: 2024-01-11

## 2024-01-11 RX ORDER — ENOXAPARIN SODIUM 100 MG/ML
80 INJECTION SUBCUTANEOUS EVERY 12 HOURS SCHEDULED
Start: 2024-01-11

## 2024-01-11 RX ORDER — FLUOXETINE HYDROCHLORIDE 20 MG/1
20 CAPSULE ORAL DAILY
Start: 2024-01-12

## 2024-01-11 RX ORDER — AMOXICILLIN AND CLAVULANATE POTASSIUM 875; 125 MG/1; MG/1
1 TABLET, FILM COATED ORAL EVERY 12 HOURS SCHEDULED
Start: 2024-01-11 | End: 2024-01-16

## 2024-01-11 RX ADMIN — PANTOPRAZOLE SODIUM 40 MG: 40 TABLET, DELAYED RELEASE ORAL at 06:05

## 2024-01-11 RX ADMIN — MIDODRINE HYDROCHLORIDE 15 MG: 5 TABLET ORAL at 06:05

## 2024-01-11 RX ADMIN — DOXYCYCLINE 100 MG: 100 CAPSULE ORAL at 09:53

## 2024-01-11 RX ADMIN — MIDODRINE HYDROCHLORIDE 15 MG: 5 TABLET ORAL at 11:59

## 2024-01-11 RX ADMIN — ONDANSETRON 4 MG: 2 INJECTION INTRAMUSCULAR; INTRAVENOUS at 06:11

## 2024-01-11 RX ADMIN — CHOLECALCIFEROL TAB 10 MCG (400 UNIT) 400 UNITS: 10 TAB at 09:52

## 2024-01-11 RX ADMIN — OXYCODONE HYDROCHLORIDE 10 MG: 10 TABLET ORAL at 09:52

## 2024-01-11 RX ADMIN — ACETAMINOPHEN 325MG 650 MG: 325 TABLET ORAL at 01:02

## 2024-01-11 RX ADMIN — MAGNESIUM HYDROXIDE 30 ML: 400 SUSPENSION ORAL at 09:54

## 2024-01-11 RX ADMIN — AMOXICILLIN AND CLAVULANATE POTASSIUM 1 TABLET: 875; 125 TABLET, FILM COATED ORAL at 09:52

## 2024-01-11 RX ADMIN — ACETAMINOPHEN 325MG 650 MG: 325 TABLET ORAL at 09:52

## 2024-01-11 RX ADMIN — THIAMINE HCL TAB 100 MG 100 MG: 100 TAB at 09:52

## 2024-01-11 RX ADMIN — FOLIC ACID 1 MG: 1 TABLET ORAL at 09:52

## 2024-01-11 RX ADMIN — NYSTATIN: 100000 POWDER TOPICAL at 09:34

## 2024-01-11 RX ADMIN — ALBUMIN (HUMAN) 25 G: 0.25 INJECTION, SOLUTION INTRAVENOUS at 04:15

## 2024-01-11 RX ADMIN — CYANOCOBALAMIN TAB 500 MCG 1000 MCG: 500 TAB at 09:53

## 2024-01-11 RX ADMIN — FLUOXETINE 20 MG: 20 CAPSULE ORAL at 09:52

## 2024-01-11 RX ADMIN — POLYETHYLENE GLYCOL 3350 17 G: 17 POWDER, FOR SOLUTION ORAL at 09:54

## 2024-01-11 NOTE — ASSESSMENT & PLAN NOTE
Follow-up with [facility] IR regarding further management of the nephrostomy tube.  See IR evaluation for this on 1/10/2024
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
## 2024-01-11 NOTE — ASSESSMENT & PLAN NOTE
Reportedly due to  alcohol abuse.  Continue Lasix and Aldactone 50 mg daily for volume control with hold parameters  She is currently on midodrine to keep her blood pressure stable.  Outpatient follow-up with GI  Low-salt diet

## 2024-01-11 NOTE — ASSESSMENT & PLAN NOTE
Complex  right adnexal lesion noted.  This was evaluated by IR  for possible drainage on 1/10/2023.  This was noted to be stable and has decreased in size.  Instead and new intra-abdominal abscess was seen and this  required treatment with additional drainage.
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
## 2024-01-11 NOTE — ASSESSMENT & PLAN NOTE
History of DVT and antiphospholipid antibody syndrome  Status post IVC filter placement at [facility]  Discussed with CHARLES Gauthier.  She is cleared to resume full dose anticoagulation tonight 1/11/2024, 24 hours after her procedure
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
## 2024-01-11 NOTE — ASSESSMENT & PLAN NOTE
History of DVT and antiphospholipid antibody syndrome  Status post IVC filter placement at [facility]  Discussed with CHARLES Gauthier.  She is cleared to resume full dose anticoagulation tonight, 24 hours after her procedure

## 2024-01-11 NOTE — ASSESSMENT & PLAN NOTE
Follow-up with [facility] IR regarding further management of the nephrostomy tube.  See IR evaluation for this on 1/10/2024

## 2024-01-11 NOTE — PROGRESS NOTES
Patient belongings sent with patient, all neccessary paperwork given to S transport team. Report called to receiving nurse. Patients family is aware.

## 2024-01-11 NOTE — PLAN OF CARE
Problem: Prexisting or High Potential for Compromised Skin Integrity  Goal: Skin integrity is maintained or improved  Description: INTERVENTIONS:  - Identify patients at risk for skin breakdown  - Assess and monitor skin integrity  - Assess and monitor nutrition and hydration status  - Monitor labs   - Assess for incontinence   - Turn and reposition patient  - Assist with mobility/ambulation  - Relieve pressure over bony prominences  - Avoid friction and shearing  - Provide appropriate hygiene as needed including keeping skin clean and dry  - Evaluate need for skin moisturizer/barrier cream  - Collaborate with interdisciplinary team   - Patient/family teaching  - Consider wound care consult   Outcome: Progressing     Problem: PAIN - ADULT  Goal: Verbalizes/displays adequate comfort level or baseline comfort level  Description: Interventions:  - Encourage patient to monitor pain and request assistance  - Assess pain using appropriate pain scale  - Administer analgesics based on type and severity of pain and evaluate response  - Implement non-pharmacological measures as appropriate and evaluate response  - Consider cultural and social influences on pain and pain management  - Notify physician/advanced practitioner if interventions unsuccessful or patient reports new pain  Outcome: Progressing     Problem: INFECTION - ADULT  Goal: Absence or prevention of progression during hospitalization  Description: INTERVENTIONS:  - Assess and monitor for signs and symptoms of infection  - Monitor lab/diagnostic results  - Monitor all insertion sites, i.e. indwelling lines, tubes, and drains  - Monitor endotracheal if appropriate and nasal secretions for changes in amount and color  - [place] appropriate cooling/warming therapies per order  - Administer medications as ordered  - Instruct and encourage patient and family to use good hand hygiene technique  - Identify and instruct in appropriate isolation precautions for  identified infection/condition  Outcome: Progressing     Problem: SAFETY ADULT  Goal: Patient will remain free of falls  Description: INTERVENTIONS:  - Educate patient/family on patient safety including physical limitations  - Instruct patient to call for assistance with activity   - Consult OT/PT to assist with strengthening/mobility   - Keep Call bell within reach  - Keep bed low and locked with side rails adjusted as appropriate  - Keep care items and personal belongings within reach  - Initiate and maintain comfort rounds  - Make Fall Risk Sign visible to staff  - Offer Toileting every 2 Hours, in advance of need  - Initiate/Maintain bed alarm  - Obtain necessary fall risk management equipment  - Apply yellow socks and bracelet for high fall risk patients  - Consider moving patient to room near nurses station  Outcome: Progressing  Goal: Maintain or return to baseline ADL function  Description: INTERVENTIONS:  -  Assess patient's ability to carry out ADLs; assess patient's baseline for ADL function and identify physical deficits which impact ability to perform ADLs (bathing, care of mouth/teeth, toileting, grooming, dressing, etc.)  - Assess/evaluate cause of self-care deficits   - Assess range of motion  - Assess patient's mobility; develop plan if impaired  - Assess patient's need for assistive devices and provide as appropriate  - Encourage maximum independence but intervene and supervise when necessary  - Involve family in performance of ADLs  - Assess for home care needs following discharge   - Consider OT consult to assist with ADL evaluation and planning for discharge  - Provide patient education as appropriate  Outcome: Progressing  Goal: Maintains/Returns to pre admission functional level  Description: INTERVENTIONS:  - Perform AM-PAC 6 Click Basic Mobility/ Daily Activity assessment daily.  - Set and communicate daily mobility goal to care team and patient/family/caregiver.   - Collaborate with  rehabilitation services on mobility goals if consulted  - Perform Range of Motion 3 times a day.  - Reposition patient every 2 hours.  - Dangle patient 3 times a day  - Stand patient 3 times a day  - Ambulate patient 3 times a day  - Out of bed to chair 3 times a day   - Out of bed for meals 3 times a day  - Out of bed for toileting  - Record patient progress and toleration of activity level   Outcome: Progressing     Problem: DISCHARGE PLANNING  Goal: Discharge to home or other facility with appropriate resources  Description: INTERVENTIONS:  - Identify barriers to discharge w/patient and caregiver  - Arrange for needed discharge resources and transportation as appropriate  - Identify discharge learning needs (meds, wound care, etc.)  - Arrange for interpretive services to assist at discharge as needed  - Refer to Case Management Department for coordinating discharge planning if the patient needs post-hospital services based on physician/advanced practitioner order or complex needs related to functional status, cognitive ability, or social support system  Outcome: Progressing     Problem: Knowledge Deficit  Goal: Patient/family/caregiver demonstrates understanding of disease process, treatment plan, medications, and discharge instructions  Description: Complete learning assessment and assess knowledge base.  Interventions:  - Provide teaching at level of understanding  - Provide teaching via preferred learning methods  Outcome: Progressing     Problem: Nutrition/Hydration-ADULT  Goal: Nutrient/Hydration intake appropriate for improving, restoring or maintaining nutritional needs  Description: Monitor and assess patient's nutrition/hydration status for malnutrition. Collaborate with interdisciplinary team and initiate plan and interventions as ordered.  Monitor patient's weight and dietary intake as ordered or per policy. Utilize nutrition screening tool and intervene as necessary. Determine patient's food  preferences and provide high-protein, high-caloric foods as appropriate.     INTERVENTIONS:  - Monitor oral intake, urinary output, labs, and treatment plans  - Assess nutrition and hydration status and recommend course of action  - Evaluate amount of meals eaten  - Assist patient with eating if necessary   - Allow adequate time for meals  - Recommend/ encourage appropriate diets, oral nutritional supplements, and vitamin/mineral supplements  - Order, calculate, and assess calorie counts as needed  - Recommend, monitor, and adjust tube feedings and TPN/PPN based on assessed needs  - Assess need for intravenous fluids  - Provide specific nutrition/hydration education as appropriate  - Include patient/family/caregiver in decisions related to nutrition  Outcome: Progressing

## 2024-01-12 ENCOUNTER — TRANSITIONAL CARE MANAGEMENT (OUTPATIENT)
Dept: FAMILY MEDICINE CLINIC | Facility: CLINIC | Age: 49
End: 2024-01-12

## 2024-01-12 LAB
BACTERIA SPEC ANAEROBE CULT: ABNORMAL
BACTERIA SPEC ANAEROBE CULT: ABNORMAL
BACTERIA SPEC BFLD CULT: ABNORMAL
GRAM STN SPEC: ABNORMAL

## 2024-01-15 LAB — FUNGUS SPEC CULT: NORMAL

## 2024-01-29 LAB — FUNGUS SPEC CULT: NORMAL

## 2024-02-05 LAB — FUNGUS SPEC CULT: NORMAL

## 2024-02-12 LAB — FUNGUS SPEC CULT: NORMAL

## 2024-02-19 ENCOUNTER — OFFICE VISIT (OUTPATIENT)
Dept: FAMILY MEDICINE CLINIC | Facility: CLINIC | Age: 49
End: 2024-02-19

## 2024-02-19 VITALS
SYSTOLIC BLOOD PRESSURE: 116 MMHG | DIASTOLIC BLOOD PRESSURE: 72 MMHG | RESPIRATION RATE: 20 BRPM | HEIGHT: 66 IN | HEART RATE: 76 BPM | TEMPERATURE: 97.8 F | WEIGHT: 205.9 LBS | OXYGEN SATURATION: 98 % | BODY MASS INDEX: 33.09 KG/M2

## 2024-02-19 DIAGNOSIS — Z59.9 FINANCIAL DIFFICULTIES: ICD-10-CM

## 2024-02-19 DIAGNOSIS — S37.13XS URETERAL LACERATION, SEQUELA: ICD-10-CM

## 2024-02-19 DIAGNOSIS — F11.90 OPIOID USE: Primary | ICD-10-CM

## 2024-02-19 DIAGNOSIS — F43.10 POSTTRAUMATIC STRESS DISORDER: ICD-10-CM

## 2024-02-19 DIAGNOSIS — K70.11 ALCOHOLIC HEPATITIS WITH ASCITES: ICD-10-CM

## 2024-02-19 PROCEDURE — 99213 OFFICE O/P EST LOW 20 MIN: CPT | Performed by: FAMILY MEDICINE

## 2024-02-19 RX ORDER — OXYCODONE HYDROCHLORIDE 10 MG/1
10 TABLET ORAL EVERY 8 HOURS PRN
Qty: 30 TABLET | Refills: 0 | Status: CANCELLED | OUTPATIENT
Start: 2024-02-19

## 2024-02-19 RX ORDER — FLUOXETINE HYDROCHLORIDE 20 MG/1
20 CAPSULE ORAL DAILY
Qty: 90 CAPSULE | Refills: 0 | Status: SHIPPED | OUTPATIENT
Start: 2024-02-19

## 2024-02-19 RX ORDER — OXYCODONE HYDROCHLORIDE 10 MG/1
10 TABLET ORAL EVERY 8 HOURS PRN
Qty: 30 TABLET | Refills: 0 | Status: SHIPPED | OUTPATIENT
Start: 2024-02-19

## 2024-02-19 RX ORDER — FUROSEMIDE 20 MG/1
20 TABLET ORAL DAILY
Qty: 90 TABLET | Refills: 1 | Status: SHIPPED | OUTPATIENT
Start: 2024-02-19

## 2024-02-19 SDOH — ECONOMIC STABILITY - INCOME SECURITY: PROBLEM RELATED TO HOUSING AND ECONOMIC CIRCUMSTANCES, UNSPECIFIED: Z59.9
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
## 2024-02-20 ENCOUNTER — TELEPHONE (OUTPATIENT)
Dept: FAMILY MEDICINE CLINIC | Facility: CLINIC | Age: 49
End: 2024-02-20

## 2024-02-20 PROBLEM — F11.90 OPIOID USE: Status: ACTIVE | Noted: 2024-02-20

## 2024-02-20 NOTE — ASSESSMENT & PLAN NOTE
Hx of Oxy5mg IR Q4-6hourly with 2x recent repeat rx as per PDMP.   Had 10mg IR prior given for >3 months with pt stating she took q6-8hrly.    Pt states pain poorly controlled at present, states 10mg IR worked well. Discussed pain etiology including PCN discomfort (which is scheduled to be removed) and LLE swelling (which has been improving and once PCN removed, pt was told she could resume lasix).    - In view of above will give one further supply of 10mg IR q8hrly Max 30mg qdaily in attempt to wean to 5mg at next visit when sources of pain have been addressed.   - Bowel regime declined. F/up in 2 weeks, encourage symx diary.

## 2024-02-20 NOTE — ASSESSMENT & PLAN NOTE
PCN has been clamped by urology.    - F/up scheduled with Urology to arrange for IR PCN removal  - Pt informed by LVN to hold Lasix until PCN removed.

## 2024-02-20 NOTE — TELEPHONE ENCOUNTER
No INR obtained as discussed with managing resident yesterday    Called to remind patient to obtain INR today    [phone] called twice, number is not active    Patient INR not checked for over a month, at this point need to call EC. [phone]     No answer.  Left detailed message explaining situation.  Requested callback at personal work number.  Requested patient obtain lab work today    Emanuel Gardner, PharmD, BCACP  Ambulatory Care Clinical Pharmacist

## 2024-02-20 NOTE — ASSESSMENT & PLAN NOTE
Known DVT 2ndry to antiphospholipid synx  Hx of IVC filter  Ongoing warfarin without monitoring since hospital dc  LLE swelling has subsided since discharge with improved mobilization.     - INR today  - Pharmacy referral to ensure closer monitoring/safe dosing  - Will hold refills pending labs as pt has home supply

## 2024-02-21 PROBLEM — S37.13XS: Status: RESOLVED | Noted: 2024-01-10 | Resolved: 2024-02-21

## 2024-02-21 PROBLEM — R65.20 SEVERE SEPSIS (HCC): Status: RESOLVED | Noted: 2023-12-31 | Resolved: 2024-02-21

## 2024-02-21 PROBLEM — A41.9 SEVERE SEPSIS (HCC): Status: RESOLVED | Noted: 2023-12-31 | Resolved: 2024-02-21

## 2024-02-22 ENCOUNTER — APPOINTMENT (OUTPATIENT)
Dept: LAB | Facility: HOSPITAL | Age: 49
End: 2024-02-22

## 2024-02-22 DIAGNOSIS — S37.13XS URETERAL LACERATION, SEQUELA: ICD-10-CM

## 2024-02-26 ENCOUNTER — TELEPHONE (OUTPATIENT)
Dept: FAMILY MEDICINE CLINIC | Facility: CLINIC | Age: 49
End: 2024-02-26

## 2024-02-26 NOTE — TELEPHONE ENCOUNTER
11:15 AM 02/26/24 reviewed if pt has gotten back to office or obtained INR. Unf, didn't see documentation of either.     Called EC Gail [phone]    Gail states she also has been unable to get in contact with Mckenna. However, she confirms she will let Mckenna know to contact [facility] when possible    INR is overdue     F/u as scheduled/routine. Letter to be sent     -------------------------    Pharmacist Tracking Tool  Reason For Outreach: Embedded Pharmacist  Demographics:  Intervention Method: Phone  Type of Intervention: Follow-Up  Topics Addressed: Anticoagulation  Pharmacologic Interventions: Prevent or Manage TROY  Non-Pharmacologic Interventions: Adherence addressed, Labs, and Medication Monitoring  Time:  Direct Patient Care:  5  mins  Care Coordination:  5  mins  Recommendation Recipient: Patient/Caregiver  Outcome: Pending/ Follow-up Required     Emanuel Gardner, PharmD, BCACP  Ambulatory Care Clinical Pharmacist

## 2024-02-28 ENCOUNTER — APPOINTMENT (OUTPATIENT)
Dept: LAB | Facility: HOSPITAL | Age: 49
End: 2024-02-28

## 2024-02-28 LAB
ALBUMIN SERPL BCP-MCNC: 2.8 G/DL (ref 3.5–5)
ALP SERPL-CCNC: 142 U/L (ref 34–104)
ALT SERPL W P-5'-P-CCNC: 17 U/L (ref 7–52)
ANION GAP SERPL CALCULATED.3IONS-SCNC: 7 MMOL/L
AST SERPL W P-5'-P-CCNC: 34 U/L (ref 13–39)
BASOPHILS # BLD AUTO: 0.02 THOUSANDS/ÂΜL (ref 0–0.1)
BASOPHILS NFR BLD AUTO: 0 % (ref 0–1)
BILIRUB SERPL-MCNC: 0.45 MG/DL (ref 0.2–1)
BUN SERPL-MCNC: 16 MG/DL (ref 5–25)
CALCIUM ALBUM COR SERPL-MCNC: 9.5 MG/DL (ref 8.3–10.1)
CALCIUM SERPL-MCNC: 8.5 MG/DL (ref 8.4–10.2)
CHLORIDE SERPL-SCNC: 103 MMOL/L (ref 96–108)
CO2 SERPL-SCNC: 29 MMOL/L (ref 21–32)
CREAT SERPL-MCNC: 0.61 MG/DL (ref 0.6–1.3)
EOSINOPHIL # BLD AUTO: 0.03 THOUSAND/ÂΜL (ref 0–0.61)
EOSINOPHIL NFR BLD AUTO: 0 % (ref 0–6)
ERYTHROCYTE [DISTWIDTH] IN BLOOD BY AUTOMATED COUNT: 15 % (ref 11.6–15.1)
EST. AVERAGE GLUCOSE BLD GHB EST-MCNC: 82 MG/DL
GFR SERPL CREATININE-BSD FRML MDRD: 107 ML/MIN/1.73SQ M
GLUCOSE SERPL-MCNC: 92 MG/DL (ref 65–140)
HBA1C MFR BLD: 4.5 %
HCT VFR BLD AUTO: 29.5 % (ref 34.8–46.1)
HGB BLD-MCNC: 9.4 G/DL (ref 11.5–15.4)
IMM GRANULOCYTES # BLD AUTO: 0.02 THOUSAND/UL (ref 0–0.2)
IMM GRANULOCYTES NFR BLD AUTO: 0 % (ref 0–2)
INR PPP: 1.06 (ref 0.84–1.19)
LYMPHOCYTES # BLD AUTO: 2.76 THOUSANDS/ÂΜL (ref 0.6–4.47)
LYMPHOCYTES NFR BLD AUTO: 40 % (ref 14–44)
MCH RBC QN AUTO: 30 PG (ref 26.8–34.3)
MCHC RBC AUTO-ENTMCNC: 31.9 G/DL (ref 31.4–37.4)
MCV RBC AUTO: 94 FL (ref 82–98)
MONOCYTES # BLD AUTO: 0.42 THOUSAND/ÂΜL (ref 0.17–1.22)
MONOCYTES NFR BLD AUTO: 6 % (ref 4–12)
NEUTROPHILS # BLD AUTO: 3.6 THOUSANDS/ÂΜL (ref 1.85–7.62)
NEUTS SEG NFR BLD AUTO: 54 % (ref 43–75)
NRBC BLD AUTO-RTO: 0 /100 WBCS
PLATELET # BLD AUTO: 454 THOUSANDS/UL (ref 149–390)
PMV BLD AUTO: 8.9 FL (ref 8.9–12.7)
POTASSIUM SERPL-SCNC: 3.9 MMOL/L (ref 3.5–5.3)
PROT SERPL-MCNC: 6.5 G/DL (ref 6.4–8.4)
PROTHROMBIN TIME: 14.1 SECONDS (ref 11.6–14.5)
RBC # BLD AUTO: 3.13 MILLION/UL (ref 3.81–5.12)
SODIUM SERPL-SCNC: 139 MMOL/L (ref 135–147)
TSH SERPL DL<=0.05 MIU/L-ACNC: 2.65 UIU/ML (ref 0.45–4.5)
WBC # BLD AUTO: 6.85 THOUSAND/UL (ref 4.31–10.16)

## 2024-02-28 PROCEDURE — 83036 HEMOGLOBIN GLYCOSYLATED A1C: CPT

## 2024-02-28 PROCEDURE — 85610 PROTHROMBIN TIME: CPT

## 2024-02-28 PROCEDURE — 85025 COMPLETE CBC W/AUTO DIFF WBC: CPT

## 2024-02-28 PROCEDURE — 80053 COMPREHEN METABOLIC PANEL: CPT

## 2024-02-28 PROCEDURE — 36415 COLL VENOUS BLD VENIPUNCTURE: CPT

## 2024-02-28 PROCEDURE — 84443 ASSAY THYROID STIM HORMONE: CPT

## 2024-02-29 ENCOUNTER — TELEPHONE (OUTPATIENT)
Dept: FAMILY MEDICINE CLINIC | Facility: CLINIC | Age: 49
End: 2024-02-29

## 2024-02-29 NOTE — TELEPHONE ENCOUNTER
INR 1.06    Warfarin no longer on profile.     Pt had never returned call or never contacted clinical pharmacist    Closing warfarin management referral     Emanuel Gardner, PharmD, BCACP  Ambulatory Care Clinical Pharmacist

## 2024-03-01 ENCOUNTER — TELEPHONE (OUTPATIENT)
Dept: FAMILY MEDICINE CLINIC | Facility: CLINIC | Age: 49
End: 2024-03-01

## 2024-03-04 ENCOUNTER — TELEPHONE (OUTPATIENT)
Dept: FAMILY MEDICINE CLINIC | Facility: CLINIC | Age: 49
End: 2024-03-04

## 2024-03-04 ENCOUNTER — OFFICE VISIT (OUTPATIENT)
Dept: FAMILY MEDICINE CLINIC | Facility: CLINIC | Age: 49
End: 2024-03-04

## 2024-03-04 VITALS
BODY MASS INDEX: 32.54 KG/M2 | WEIGHT: 201.6 LBS | TEMPERATURE: 97.5 F | DIASTOLIC BLOOD PRESSURE: 70 MMHG | HEART RATE: 80 BPM | SYSTOLIC BLOOD PRESSURE: 110 MMHG

## 2024-03-04 DIAGNOSIS — F51.05 INSOMNIA DUE TO OTHER MENTAL DISORDER: ICD-10-CM

## 2024-03-04 DIAGNOSIS — F43.10 POSTTRAUMATIC STRESS DISORDER: ICD-10-CM

## 2024-03-04 DIAGNOSIS — Z86.718 HISTORY OF DVT (DEEP VEIN THROMBOSIS): Primary | Chronic | ICD-10-CM

## 2024-03-04 DIAGNOSIS — E55.9 VITAMIN D DEFICIENCY: ICD-10-CM

## 2024-03-04 DIAGNOSIS — D50.8 OTHER IRON DEFICIENCY ANEMIAS: ICD-10-CM

## 2024-03-04 DIAGNOSIS — M79.89 SWELLING OF LOWER EXTREMITY: ICD-10-CM

## 2024-03-04 DIAGNOSIS — Z12.11 SCREEN FOR COLON CANCER: ICD-10-CM

## 2024-03-04 DIAGNOSIS — Z12.4 CERVICAL CANCER SCREENING: ICD-10-CM

## 2024-03-04 DIAGNOSIS — F99 INSOMNIA DUE TO OTHER MENTAL DISORDER: ICD-10-CM

## 2024-03-04 DIAGNOSIS — E51.9 THIAMINE DEFICIENCY: ICD-10-CM

## 2024-03-04 DIAGNOSIS — Z28.21 VACCINATION DECLINED: ICD-10-CM

## 2024-03-04 DIAGNOSIS — Z12.4 SCREENING FOR MALIGNANT NEOPLASM OF CERVIX: ICD-10-CM

## 2024-03-04 DIAGNOSIS — F17.200 TOBACCO DEPENDENCE SYNDROME: ICD-10-CM

## 2024-03-04 DIAGNOSIS — E53.8 VITAMIN B 12 DEFICIENCY: ICD-10-CM

## 2024-03-04 DIAGNOSIS — Z12.31 BREAST CANCER SCREENING BY MAMMOGRAM: ICD-10-CM

## 2024-03-04 DIAGNOSIS — K74.60 LIVER CIRRHOSIS (HCC): ICD-10-CM

## 2024-03-04 DIAGNOSIS — F11.90 OPIOID USE: ICD-10-CM

## 2024-03-04 DIAGNOSIS — S37.13XS URETERAL LACERATION, SEQUELA: ICD-10-CM

## 2024-03-04 DIAGNOSIS — Z12.39 ENCOUNTER FOR SCREENING FOR MALIGNANT NEOPLASM OF BREAST, UNSPECIFIED SCREENING MODALITY: ICD-10-CM

## 2024-03-04 DIAGNOSIS — E66.09 CLASS 1 OBESITY DUE TO EXCESS CALORIES WITH SERIOUS COMORBIDITY AND BODY MASS INDEX (BMI) OF 32.0 TO 32.9 IN ADULT: ICD-10-CM

## 2024-03-04 DIAGNOSIS — K70.10 ALCOHOLIC HEPATITIS WITHOUT ASCITES: ICD-10-CM

## 2024-03-04 PROCEDURE — 99215 OFFICE O/P EST HI 40 MIN: CPT | Performed by: PHYSICIAN ASSISTANT

## 2024-03-04 RX ORDER — METHION/INOS/CHOL BT/B COM/LIV 110MG-86MG
100 CAPSULE ORAL DAILY
Qty: 90 TABLET | Refills: 3 | Status: SHIPPED | OUTPATIENT
Start: 2024-03-04

## 2024-03-04 RX ORDER — FOLIC ACID 1 MG/1
1 TABLET ORAL DAILY
Qty: 90 TABLET | Refills: 3 | Status: CANCELLED | OUTPATIENT
Start: 2024-03-04 | End: 2024-06-02

## 2024-03-04 RX ORDER — MELATONIN
1000 DAILY
Qty: 90 TABLET | Refills: 1 | Status: SHIPPED | OUTPATIENT
Start: 2024-03-04

## 2024-03-04 RX ORDER — FLUOXETINE HYDROCHLORIDE 20 MG/1
20 CAPSULE ORAL DAILY
Qty: 90 CAPSULE | Refills: 0 | Status: SHIPPED | OUTPATIENT
Start: 2024-03-04

## 2024-03-04 RX ORDER — LANOLIN ALCOHOL/MO/W.PET/CERES
3 CREAM (GRAM) TOPICAL
Status: CANCELLED | OUTPATIENT
Start: 2024-03-04

## 2024-03-04 RX ORDER — PANTOPRAZOLE SODIUM 40 MG/1
40 TABLET, DELAYED RELEASE ORAL DAILY
Qty: 90 TABLET | Refills: 1 | Status: SHIPPED | OUTPATIENT
Start: 2024-03-04

## 2024-03-04 RX ORDER — FUROSEMIDE 20 MG/1
20 TABLET ORAL DAILY
Qty: 90 TABLET | Refills: 1 | Status: CANCELLED | OUTPATIENT
Start: 2024-03-04

## 2024-03-04 RX ORDER — FERROUS SULFATE 324(65)MG
324 TABLET, DELAYED RELEASE (ENTERIC COATED) ORAL
Qty: 30 TABLET | Refills: 2 | Status: CANCELLED | OUTPATIENT
Start: 2024-03-04

## 2024-03-04 RX ORDER — SPIRONOLACTONE 50 MG/1
50 TABLET, FILM COATED ORAL DAILY
Qty: 90 TABLET | Refills: 1 | Status: SHIPPED | OUTPATIENT
Start: 2024-03-04

## 2024-03-04 RX ORDER — OXYCODONE HYDROCHLORIDE 10 MG/1
10 TABLET ORAL 2 TIMES DAILY PRN
Qty: 20 TABLET | Refills: 0 | Status: SHIPPED | OUTPATIENT
Start: 2024-03-04

## 2024-03-04 RX ORDER — WARFARIN SODIUM 5 MG/1
TABLET ORAL
Qty: 90 TABLET | Refills: 1 | Status: SHIPPED | OUTPATIENT
Start: 2024-03-04

## 2024-03-04 NOTE — PATIENT INSTRUCTIONS
[facility]  [phone]    [facility]  Phone Number: [phone]

## 2024-03-04 NOTE — ASSESSMENT & PLAN NOTE
LLE significantly swollen on exam.   Patient states it is stable since hospital discharge.   She feels it is because she has not had her diuretics due to needing refills.
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
## 2024-03-04 NOTE — ASSESSMENT & PLAN NOTE
Currently smoking 1/2 pack per day.   Refused smoking cessation assistance.   Not interested in quitting at this time.

## 2024-03-04 NOTE — ASSESSMENT & PLAN NOTE
History of bariatric surgery Jan 2018.  Reports ongoing issues with nausea despite adequate appetite.   Encouraged to follow bariatric diet with small frequent meals.  Advised bland foods and hydration.   Does not appear dehydrated on exam.

## 2024-03-04 NOTE — ASSESSMENT & PLAN NOTE
Patient is on chronic anticoagulation due to a history of multiple episodes of VTE dating back to 2003.  After being discharged from [facility] 1/11/24, she was given therpeutic lovenox to bridge back to coumadin. At that time she was prescribed 4 mg daily dose and advised to follow up with PCP.   Has not taken the warfarin since she ran out of the medication 2 weeks ago.   Recent INR on 2/28/24 was subtherapeutic at 1.06  Discussed importance of anticoagulation and compliance with therapy and blood work.   Pharmacist to meet with patient to discuss therapy.

## 2024-03-04 NOTE — TELEPHONE ENCOUNTER
Saw pt in office today at PCP request.     Discussed the importance of being anticoagulation for antiphosplipid syndrome and hx of multiple VTE     Reviewed mechanism of action, adverse effects, benefits, risks, administration, frequency, and likely duration of therapy of warfarin.    Pt does not know her former warfarin dose but states she thinks she was taking her warfarin BID.     Again stated importance of INR f/u. Explained why warfarin needed given her specific scenario.    She is agreeable to start warfarin 5 mg daily today, sent per PCP request    She is agreeable to obtain a f/u INR on 3/13/24 - remind me sent     F/u after INR is obtained. She confirms to call her new number below.     [phone]   Pts new number     --------------------------------------------------------------------------    Pharmacist Tracking Tool  Reason For Outreach: Embedded Pharmacist  Demographics:  Intervention Method: In Person  Type of Intervention: New  Topics Addressed: Anticoagulation  Pharmacologic Interventions: Medication Initiation and Prevent or Manage TROY  Non-Pharmacologic Interventions: Adherence addressed, Care coordination, Chart update, Cost, Disease state education, Home Monitoring, Labs, and Medication Monitoring  Time:  Direct Patient Care:  20  mins  Care Coordination:  5  mins  Recommendation Recipient: Patient/Caregiver  Outcome: Accepted     Emanuel Gardner, PharmD, BCACP  Ambulatory Care Clinical Pharmacist

## 2024-03-04 NOTE — ASSESSMENT & PLAN NOTE
Mood stable on 20 mg daily prozac.   Reports ongoing PTSD symptoms and requesting referral to psychiatry.

## 2024-03-04 NOTE — ASSESSMENT & PLAN NOTE
Patient states she is needed refills of oxycodone.   PDMP reviewed.   Given #30 10 mg tablets 2/19/24 by Dr. Chen  Given #20 5 mg tablets  on 2/22/24 by trauma surgery at [facility].  She has an appointment with trauma surgeon on 3/14/24.  Will give short course until she follows up with trauma surgery.
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
## 2024-03-19 ENCOUNTER — TELEPHONE (OUTPATIENT)
Dept: FAMILY MEDICINE CLINIC | Facility: CLINIC | Age: 49
End: 2024-03-19

## 2024-03-19 NOTE — TELEPHONE ENCOUNTER
8:10 AM 03/19/24 Do not see patient obtained INR last week as requested/agreed upon    Pt has long hx of struggling with medication/lab adherance    [phone]  (New number given by patient last visit)    Left detailed message explaining the importance of anticoagulation monitoring.  Requested callback as soon as possible with any questions or problems.  Asked patient to please obtain an INR at any [facility] lab facility as soon as possible.    Check back 1 week    --------------------------------------------------    Pharmacist Tracking Tool  Reason For Outreach: Embedded Pharmacist  Demographics:  Intervention Method: Phone  Type of Intervention: Follow-Up  Topics Addressed: Anticoagulation  Pharmacologic Interventions: N/A  Non-Pharmacologic Interventions: Adherence addressed, Care coordination, Disease state education, Labs, and Medication Monitoring  Time:  Direct Patient Care:  5  mins  Care Coordination:  5  mins  Recommendation Recipient: Patient/Caregiver  Outcome: Pending/ Follow-up Required

## 2024-03-26 ENCOUNTER — TELEPHONE (OUTPATIENT)
Dept: FAMILY MEDICINE CLINIC | Facility: CLINIC | Age: 49
End: 2024-03-26

## 2024-03-26 NOTE — TELEPHONE ENCOUNTER
INR check in today to review if lab obtained    Upon review it appears pt was admitted to [facility] on 3/20 with abscess.     They did obtain INR, but was 1.1. Therefore patient has continue to be non-adherant to warfarin. She is currently also getting lovenox in the hospital.    Will need to discuss her willingness to take warfarin again at next call.     F/u one week for status check.     Pharmacist Tracking Tool  Reason For Outreach: Embedded Pharmacist  Demographics:  Intervention Method: Chart Review  Type of Intervention: Follow-Up  Topics Addressed: Anticoagulation  Pharmacologic Interventions: N/A  Non-Pharmacologic Interventions: Labs and Medication Monitoring  Time:  Direct Patient Care:  0  mins  Care Coordination:  10  mins  Recommendation Recipient: Patient/Caregiver  Outcome: Pending/ Follow-up Required    Emanuel Gardner, PharmD, BCACP  Ambulatory Care Clinical Pharmacist

## 2024-04-01 ENCOUNTER — TELEPHONE (OUTPATIENT)
Dept: FAMILY MEDICINE CLINIC | Facility: CLINIC | Age: 49
End: 2024-04-01

## 2024-04-01 NOTE — TELEPHONE ENCOUNTER
Patient recently referred to me for anticoagulation follow-up.  Patient has struggled significantly with warfarin adherence.    Patient is scheduled with new PCP on 4-4-2024.  Of note on patient's hospital discharge medication list, warfarin was not listed.    Will need to establish if patient needs to be continued on warfarin or not at next visit    -----------------------------    Pharmacist Tracking Tool  Reason For Outreach: Embedded Pharmacist  Demographics:  Intervention Method: Chart Review  Type of Intervention: Follow-Up  Topics Addressed: Anticoagulation  Pharmacologic Interventions: N/A  Non-Pharmacologic Interventions: Care coordination  Time:  Direct Patient Care:  5  mins  Care Coordination:  5  mins  Recommendation Recipient: Provider  Outcome: Pending/ Follow-up Required     Martin WoodD, BCACP  Ambulatory Care Clinical Pharmacist

## 2024-04-04 ENCOUNTER — TELEPHONE (OUTPATIENT)
Dept: FAMILY MEDICINE CLINIC | Facility: CLINIC | Age: 49
End: 2024-04-04

## 2024-04-04 ENCOUNTER — OFFICE VISIT (OUTPATIENT)
Dept: FAMILY MEDICINE CLINIC | Facility: CLINIC | Age: 49
End: 2024-04-04

## 2024-04-04 VITALS
DIASTOLIC BLOOD PRESSURE: 74 MMHG | BODY MASS INDEX: 33.43 KG/M2 | HEIGHT: 66 IN | RESPIRATION RATE: 18 BRPM | WEIGHT: 208 LBS | TEMPERATURE: 98.1 F | SYSTOLIC BLOOD PRESSURE: 112 MMHG

## 2024-04-04 DIAGNOSIS — K70.11 ALCOHOLIC HEPATITIS WITH ASCITES: ICD-10-CM

## 2024-04-04 DIAGNOSIS — K70.31 ALCOHOLIC CIRRHOSIS OF LIVER WITH ASCITES (HCC): ICD-10-CM

## 2024-04-04 DIAGNOSIS — E55.9 VITAMIN D DEFICIENCY: ICD-10-CM

## 2024-04-04 DIAGNOSIS — D68.61 ANTIPHOSPHOLIPID SYNDROME (HCC): ICD-10-CM

## 2024-04-04 DIAGNOSIS — F11.90 OPIOID USE: ICD-10-CM

## 2024-04-04 DIAGNOSIS — S37.13XS URETERAL LACERATION, SEQUELA: ICD-10-CM

## 2024-04-04 DIAGNOSIS — K65.1 INTRA-ABDOMINAL ABSCESS (HCC): ICD-10-CM

## 2024-04-04 DIAGNOSIS — D50.8 OTHER IRON DEFICIENCY ANEMIAS: Primary | ICD-10-CM

## 2024-04-04 PROCEDURE — 99215 OFFICE O/P EST HI 40 MIN: CPT | Performed by: FAMILY MEDICINE

## 2024-04-04 RX ORDER — FUROSEMIDE 20 MG/1
20 TABLET ORAL DAILY
Qty: 90 TABLET | Refills: 1 | Status: SHIPPED | OUTPATIENT
Start: 2024-04-04

## 2024-04-04 RX ORDER — OXYCODONE HYDROCHLORIDE 10 MG/1
10 TABLET ORAL 2 TIMES DAILY PRN
Qty: 14 TABLET | Refills: 0 | Status: SHIPPED | OUTPATIENT
Start: 2024-04-04

## 2024-04-04 RX ORDER — ONDANSETRON 4 MG/1
4 TABLET, FILM COATED ORAL EVERY 8 HOURS PRN
Qty: 20 TABLET | Refills: 0 | Status: SHIPPED | OUTPATIENT
Start: 2024-04-04

## 2024-04-04 NOTE — ASSESSMENT & PLAN NOTE
-Patient had issues with compliance in the past but currently she seems to be compliant.  -Continue monitoring INR(last INR 3/28/24 within therapeutic range)  -Case discussed with clinical pharmacy with recommendation to repeat her INR  -Continue lifelong anticoagulation with warfarin
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
## 2024-04-04 NOTE — TELEPHONE ENCOUNTER
11:17 AM 04/04/24     Patient is 1 hour late for her visit, but PCP was able to squeeze and patient    saw patient in clinic today with Dr. Brothers.  She confirms that since leaving the hospital she is taking warfarin 5 mg once daily    However she also notes that she is bruising more easily and she does not like this.  Explained that bruising is increased even at the therapeutic range of warfarin.      However, due to problems with adherence, INR has not been properly monitored and dose confirmed in a long time.  Recommend she obtain INR today.    Of note, her new number is [phone].  I asked the patient to please update this at checkout    She also confirms that she has now obtained Medicaid.    Will call today if INR results, if not we will follow-up Monday.  Patient aware.    Emanuel Gardner, PharmD, BCACP  Ambulatory Care Clinical Pharmacist
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
## 2024-04-04 NOTE — ASSESSMENT & PLAN NOTE
Due to history of alcohol abuse  Patient maintains abstinence from alcohol  Patient has lower extremity edema likely from being without any diuretic  Recommend that she resumes spironolactone and Lasix since her blood pressure has improved  Low-sodium diet discussed with patient  Referral to GI for continuing of care

## 2024-04-04 NOTE — ASSESSMENT & PLAN NOTE
Multifactorial  History of bariatric surgery  Recently had blood transfusion in the emergency room  Patient was previously tolerating IV iron infusion  Refer to hematology

## 2024-04-08 ENCOUNTER — TELEPHONE (OUTPATIENT)
Dept: HEMATOLOGY ONCOLOGY | Facility: CLINIC | Age: 49
End: 2024-04-08

## 2024-04-08 NOTE — TELEPHONE ENCOUNTER
I called Mckenna in response to a referral that was received for patient to establish care with Hematology.     Outreach was made to established with Dr Arevalo..    I left a voicemail explaining the reason for my call and advised patient to call [facility] at [phone].  The referral has been closed.

## 2024-04-11 ENCOUNTER — TELEPHONE (OUTPATIENT)
Dept: FAMILY MEDICINE CLINIC | Facility: CLINIC | Age: 49
End: 2024-04-11

## 2024-04-11 NOTE — TELEPHONE ENCOUNTER
Mckenna did not obtain INR as requested last week.     [phone] contacted number requested by patient. 9:56 AM 04/11/24 no answer, no voice mail available.     Patient is aware that we have requested her to obtain INR.  Long conversation already had on the importance of INR and the risks of not monitoring blood thinners.    Recommend checking back in 1 to 2 weeks    Remind me sent 4/25    -------------------------------------------    Pharmacist Tracking Tool  Reason For Outreach: Embedded Pharmacist  Demographics:  Intervention Method: Phone  Type of Intervention: Follow-Up  Topics Addressed: Diabetes  Pharmacologic Interventions: Prevent or Manage TROY  Non-Pharmacologic Interventions: Adherence addressed and Care coordination  Time:  Direct Patient Care:  5  mins  Care Coordination:  5  mins  Recommendation Recipient: Patient/Caregiver  Outcome: Pending/ Follow-up Required     Emanuel Gardner, PharmD, BCACP  Ambulatory Care Clinical Pharmacist

## 2024-04-12 ENCOUNTER — APPOINTMENT (OUTPATIENT)
Dept: LAB | Facility: HOSPITAL | Age: 49
End: 2024-04-12
Payer: COMMERCIAL

## 2024-04-12 DIAGNOSIS — D50.8 OTHER IRON DEFICIENCY ANEMIAS: ICD-10-CM

## 2024-04-12 DIAGNOSIS — M79.2 PERIPHERAL NEUROGENIC PAIN: ICD-10-CM

## 2024-04-12 DIAGNOSIS — Z86.718 HISTORY OF DVT (DEEP VEIN THROMBOSIS): Chronic | ICD-10-CM

## 2024-04-12 DIAGNOSIS — E55.9 VITAMIN D DEFICIENCY: ICD-10-CM

## 2024-04-12 LAB
25(OH)D3 SERPL-MCNC: 37.8 NG/ML (ref 30–100)
ALBUMIN SERPL BCP-MCNC: 2.7 G/DL (ref 3.5–5)
ALP SERPL-CCNC: 101 U/L (ref 34–104)
ALT SERPL W P-5'-P-CCNC: 25 U/L (ref 7–52)
ANION GAP SERPL CALCULATED.3IONS-SCNC: 8 MMOL/L (ref 4–13)
AST SERPL W P-5'-P-CCNC: 40 U/L (ref 13–39)
BILIRUB SERPL-MCNC: 0.29 MG/DL (ref 0.2–1)
BUN SERPL-MCNC: 17 MG/DL (ref 5–25)
CALCIUM ALBUM COR SERPL-MCNC: 9.9 MG/DL (ref 8.3–10.1)
CALCIUM SERPL-MCNC: 8.9 MG/DL (ref 8.4–10.2)
CHLORIDE SERPL-SCNC: 102 MMOL/L (ref 96–108)
CO2 SERPL-SCNC: 26 MMOL/L (ref 21–32)
CREAT SERPL-MCNC: 0.7 MG/DL (ref 0.6–1.3)
FERRITIN SERPL-MCNC: 61 NG/ML (ref 11–307)
GFR SERPL CREATININE-BSD FRML MDRD: 102 ML/MIN/1.73SQ M
GLUCOSE P FAST SERPL-MCNC: 65 MG/DL (ref 65–99)
INR PPP: 1.11 (ref 0.84–1.19)
IRON SATN MFR SERPL: 17 % (ref 15–50)
IRON SERPL-MCNC: 41 UG/DL (ref 50–212)
POTASSIUM SERPL-SCNC: 4.9 MMOL/L (ref 3.5–5.3)
PROT SERPL-MCNC: 6.7 G/DL (ref 6.4–8.4)
PROTHROMBIN TIME: 14.7 SECONDS (ref 11.6–14.5)
SODIUM SERPL-SCNC: 136 MMOL/L (ref 135–147)
TIBC SERPL-MCNC: 237 UG/DL (ref 250–450)
UIBC SERPL-MCNC: 196 UG/DL (ref 155–355)
VIT B12 SERPL-MCNC: 429 PG/ML (ref 180–914)

## 2024-04-12 PROCEDURE — 82607 VITAMIN B-12: CPT

## 2024-04-12 PROCEDURE — 83540 ASSAY OF IRON: CPT

## 2024-04-12 PROCEDURE — 85610 PROTHROMBIN TIME: CPT

## 2024-04-12 PROCEDURE — 82306 VITAMIN D 25 HYDROXY: CPT

## 2024-04-12 PROCEDURE — 83550 IRON BINDING TEST: CPT

## 2024-04-12 PROCEDURE — 80053 COMPREHEN METABOLIC PANEL: CPT

## 2024-04-12 PROCEDURE — 36415 COLL VENOUS BLD VENIPUNCTURE: CPT

## 2024-04-12 PROCEDURE — 82728 ASSAY OF FERRITIN: CPT

## 2024-04-15 ENCOUNTER — ANTICOAG VISIT (OUTPATIENT)
Dept: FAMILY MEDICINE CLINIC | Facility: CLINIC | Age: 49
End: 2024-04-15

## 2024-04-15 NOTE — PROGRESS NOTES
[facility]  [address] [place] [address], [address]  [facility] [id number] [phone]  Clinical Pharmacy Anticoagulation Consultation  Encounter provider: Michael Wood  Assessment/Plan  INR: 1.11. Subtherapeutic INR for goal of 2.0-3.0  Current warfarin dose: 5 mg daily   Confirms that she takes this everyday, denies any missed doses   New dose: Take 10 mg today and tomorrow, assess response asap  Recheck INR in 2 days  Call in 2-3 days - remind me sent   Counseled patient on compliance with medications, and consistency with vitamin k rich foods intake.  Pt aware that will only need frequent testing temporarily     Subjective  Indication:  Multiple DVT Hx + antiphospholipid syndrome, also hx of alcoholism and cirrhosis   Bleeding signs/symptoms: no  Thrombosis signs/symptoms: no    Missed doses: no  Medication changes: no  Dietary changes: no  Bacterial/viral infection: no  Recent alcohol consumption: no  Other concerns: no    Objective  Lab Results   Component Value Date/Time    INR 1.11 04/12/2024 07:17 AM    INR 2.1 03/28/2024 03:17 AM    INR 1.8 03/27/2024 06:07 AM    INR 1.3 03/25/2024 04:52 AM    HGB 9.4 (L) 02/28/2024 02:58 PM    HGB 7.5 (L) 01/11/2024 04:53 AM    HGB 8.1 (L) 01/10/2024 05:33 AM    HCT 29.5 (L) 02/28/2024 02:58 PM    HCT 23.2 (L) 01/11/2024 04:53 AM    HCT 24.8 (L) 01/10/2024 05:33 AM    CREATININE 0.70 04/12/2024 07:17 AM    CREATININE 0.67 03/29/2024 04:19 AM    CREATININE 0.51 03/28/2024 03:17 AM    CREATININE 0.52 03/27/2024 06:07 AM    EGFR 102 04/12/2024 07:17 AM    EGFR 108 03/29/2024 04:19 AM    EGFR 115 03/28/2024 03:17 AM    EGFR 114 03/27/2024 06:07 AM    EGFR 77 06/26/2018 05:40 PM         Emanuel Jj, Pharmacist    -----------------------------------------------------------    Pharmacist Tracking Tool  Reason For Outreach: Embedded Pharmacist  Demographics:  Intervention Method: In Person  Type of Intervention:  Follow-Up  Topics Addressed: Anticoagulation  Pharmacologic Interventions: N/A  Non-Pharmacologic Interventions: Adherence addressed, Care coordination, Disease state education, and Labs  Time:  Direct Patient Care:  10  mins  Care Coordination:  5  mins  Recommendation Recipient: Patient/Caregiver  Outcome: Accepted

## 2024-04-17 ENCOUNTER — TELEPHONE (OUTPATIENT)
Dept: FAMILY MEDICINE CLINIC | Facility: CLINIC | Age: 49
End: 2024-04-17

## 2024-04-17 ENCOUNTER — APPOINTMENT (OUTPATIENT)
Dept: LAB | Facility: CLINIC | Age: 49
End: 2024-04-17
Payer: COMMERCIAL

## 2024-04-17 DIAGNOSIS — F11.90 OPIOID USE: ICD-10-CM

## 2024-04-17 DIAGNOSIS — S37.13XS URETERAL LACERATION, SEQUELA: ICD-10-CM

## 2024-04-17 DIAGNOSIS — Z86.718 HISTORY OF DVT (DEEP VEIN THROMBOSIS): Chronic | ICD-10-CM

## 2024-04-17 DIAGNOSIS — K70.11 ALCOHOLIC HEPATITIS WITH ASCITES: ICD-10-CM

## 2024-04-17 LAB
INR PPP: 1.3 (ref 0.84–1.19)
PROTHROMBIN TIME: 16.1 SECONDS (ref 11.6–14.5)

## 2024-04-17 PROCEDURE — 36415 COLL VENOUS BLD VENIPUNCTURE: CPT

## 2024-04-17 PROCEDURE — 85610 PROTHROMBIN TIME: CPT

## 2024-04-17 NOTE — TELEPHONE ENCOUNTER
Pt came into office and is requesting refill for the following medications.       ondansetron (ZOFRAN) 4 mg tablet   oxyCODONE (ROXICODONE) 10 MG TABS     Any questions please contact patient, thank you !

## 2024-04-18 ENCOUNTER — ANTICOAG VISIT (OUTPATIENT)
Dept: FAMILY MEDICINE CLINIC | Facility: CLINIC | Age: 49
End: 2024-04-18

## 2024-04-18 ENCOUNTER — TELEPHONE (OUTPATIENT)
Dept: FAMILY MEDICINE CLINIC | Facility: CLINIC | Age: 49
End: 2024-04-18

## 2024-04-18 DIAGNOSIS — Z86.718 HISTORY OF DVT (DEEP VEIN THROMBOSIS): Primary | Chronic | ICD-10-CM

## 2024-04-18 RX ORDER — WARFARIN SODIUM 5 MG/1
TABLET ORAL
Qty: 90 TABLET | Refills: 1 | Status: SHIPPED | OUTPATIENT
Start: 2024-04-18

## 2024-04-18 RX ORDER — WARFARIN SODIUM 2.5 MG/1
TABLET ORAL
Qty: 90 TABLET | Refills: 1 | Status: SHIPPED | OUTPATIENT
Start: 2024-04-18

## 2024-04-18 RX ORDER — OXYCODONE HYDROCHLORIDE 10 MG/1
10 TABLET ORAL 2 TIMES DAILY PRN
Qty: 14 TABLET | Refills: 0 | Status: SHIPPED | OUTPATIENT
Start: 2024-04-18

## 2024-04-18 RX ORDER — ONDANSETRON 4 MG/1
4 TABLET, FILM COATED ORAL EVERY 8 HOURS PRN
Qty: 30 TABLET | Refills: 1 | Status: SHIPPED | OUTPATIENT
Start: 2024-04-18

## 2024-04-18 NOTE — TELEPHONE ENCOUNTER
Spoke to patient via phone.  Discussed anticoagulation.  Please see other encounter.    However, patient complains of continued problems that she was having at your last visit.  She says she is struggling, she requested a refill for:  Ondansetron  Oxycodone    I let her know she may need to see you again before this can be refilled, but shed requested that I please ask anyway    Emanuel Gardner, [place], [facility] Desert Medical CenterCP  Ambulatory Care Clinical Pharmacist

## 2024-04-18 NOTE — PROGRESS NOTES
[facility]  [address] [place] [address], [address]  [facility] [id number]  [phone]  Clinical Pharmacy Anticoagulation Consultation  Encounter provider: Emanuel Gardner, Pharmacist    [phone] - called pt   Assessment/Plan  INR: 1.3. Subtherapeutic INR for goal of 2.0-3.0  Still grossly subtherapeutic, even after doubling the dose for 2 days.  Patient needs maintenance dose increase, With close follow-up  Current warfarin dose: 5 mg daily   Patient correctly confirms that she took 10 mg on the 2 days previous to her scheduled INR  New dose: Increased: New maintenance dose: 7.5 mg daily  She requested not have to break tablets in half, she is almost out of warfarin, will send in both 5 mg and 2.5 mg tablets so many different doses can be calculated and given  Patient confirms understanding  Additionally, she needs a slight boost, take 10 mg tonight for 1 day only, see coag tracker  Recheck INR in 1 week -- 4/24/2024  Remind me sent for 4/25  Counseled patient on compliance with medications, and consistency with vitamin k rich foods intake.    Subjective  Indication: Multiple DVT Hx+ antiphospholipid syndrome, also hx of alcoholism and cirrhosis   Bleeding signs/symptoms: no  Thrombosis signs/symptoms: no -extensive conversation on whether or not patient is noticing any symptoms of DVT.  Her blood has been subtherapeutic for longer than would be ideal.  Recommended close monitoring for this and that patient call ASAP if any status changes    Missed doses: no  Medication changes: no  Dietary changes: no  Bacterial/viral infection: no  Recent alcohol consumption: no  Other concerns: no    Objective  Lab Results   Component Value Date/Time    INR 1.30 (H) 04/17/2024 11:18 AM    INR 1.11 04/12/2024 07:17 AM    INR 2.1 03/28/2024 03:17 AM    INR 1.8 03/27/2024 06:07 AM    INR 1.3 03/25/2024 04:52 AM    HGB 9.4 (L) 02/28/2024 02:58 PM    HGB 7.5 (L) 01/11/2024 04:53 AM    HGB 8.1  (L) 01/10/2024 05:33 AM    HCT 29.5 (L) 02/28/2024 02:58 PM    HCT 23.2 (L) 01/11/2024 04:53 AM    HCT 24.8 (L) 01/10/2024 05:33 AM    CREATININE 0.70 04/12/2024 07:17 AM    CREATININE 0.67 03/29/2024 04:19 AM    CREATININE 0.51 03/28/2024 03:17 AM    CREATININE 0.52 03/27/2024 06:07 AM    EGFR 102 04/12/2024 07:17 AM    EGFR 108 03/29/2024 04:19 AM    EGFR 115 03/28/2024 03:17 AM    EGFR 114 03/27/2024 06:07 AM    EGFR 77 06/26/2018 05:40 PM         Emanuel Gardner Pharmacist    -------------------------------------------------------    Pharmacist Tracking Tool  Reason For Outreach: Embedded Pharmacist  Demographics:  Intervention Method: Phone  Type of Intervention: Follow-Up  Topics Addressed: Anticoagulation  Pharmacologic Interventions: Prevent or Manage TORY  Non-Pharmacologic Interventions: Care coordination, Disease state education, and Medication Monitoring  Time:  Direct Patient Care:  15  mins  Care Coordination:  10  mins  Recommendation Recipient: Patient/Caregiver  Outcome: Accepted

## 2024-04-19 ENCOUNTER — TELEPHONE (OUTPATIENT)
Dept: FAMILY MEDICINE CLINIC | Facility: CLINIC | Age: 49
End: 2024-04-19

## 2024-04-19 NOTE — TELEPHONE ENCOUNTER
PT CALLED IN ASKING ABOUT MEDICATIONS SHE REQUESTED. INFORMED HER THEY WERE FILLED 04/18 AND SENT TO [facility].

## 2024-04-19 NOTE — TELEPHONE ENCOUNTER
Medications have been refilled. Patient was previously referred to Hematology/Oncology for her iron deficiency. Per chart review, they have tried to contact patient but have not had success. Recommend patient call [phone] to schedule an appointment. Thanks!

## 2024-04-22 ENCOUNTER — PATIENT OUTREACH (OUTPATIENT)
Dept: FAMILY MEDICINE CLINIC | Facility: CLINIC | Age: 49
End: 2024-04-22

## 2024-04-22 NOTE — PROGRESS NOTES
CHUN WALTERS received consult from Provider, regarding pt is having financial.     CHUN WALTERS placed call to pt to follow-up and further assess. Introduced self and role. Pt reports she was able to apply for benefits and do not need any additional support or resources at this time.   CHUN WALTERS verbalized understanding and encourage pt to reach out if she need any additional support.

## 2024-04-25 ENCOUNTER — TELEPHONE (OUTPATIENT)
Dept: FAMILY MEDICINE CLINIC | Facility: CLINIC | Age: 49
End: 2024-04-25

## 2024-04-25 ENCOUNTER — APPOINTMENT (OUTPATIENT)
Dept: LAB | Facility: CLINIC | Age: 49
End: 2024-04-25
Payer: COMMERCIAL

## 2024-04-25 DIAGNOSIS — Z86.718 HISTORY OF DVT (DEEP VEIN THROMBOSIS): Chronic | ICD-10-CM

## 2024-04-25 LAB
INR PPP: 1.78 (ref 0.84–1.19)
PROTHROMBIN TIME: 20.4 SECONDS (ref 11.6–14.5)

## 2024-04-25 PROCEDURE — 85610 PROTHROMBIN TIME: CPT

## 2024-04-25 PROCEDURE — 36415 COLL VENOUS BLD VENIPUNCTURE: CPT

## 2024-04-25 NOTE — TELEPHONE ENCOUNTER
5:23 PM 04/25/24     INR still in process    Call back asap Monday    Emanuel Gardner, PharmD, BCACP  Ambulatory Care Clinical Pharmacist

## 2024-04-29 ENCOUNTER — ANTICOAG VISIT (OUTPATIENT)
Dept: FAMILY MEDICINE CLINIC | Facility: CLINIC | Age: 49
End: 2024-04-29

## 2024-04-29 NOTE — PROGRESS NOTES
[facility]  [address] [place] [address], [address]  [facility] [id number] [phone]  Clinical Pharmacy Anticoagulation Consultation  Encounter provider: Emanuel Gardner, Pharmacist  Assessment/Plan  INR: 1.78. Subtherapeutic INR for goal of 2.0-3.0  Current warfarin dose: 7.5 mg daily - patient confirms dose is correct.  New dose: Give 10 mg today, then continue 7.5 mg daily, INR next week  Recheck INR in 1 week - 5/8 - remind me sent   Counseled patient on compliance with medications, and consistency with vitamin k rich foods intake.    Subjective  Indication: Multiple DVT Hx+ antiphospholipid syndrome, also hx of alcoholism and cirrhosis   Bleeding signs/symptoms: no  Thrombosis signs/symptoms: no -extensive conversation on whether or not patient is noticing any symptoms of DVT.  Her blood has been subtherapeutic for longer than would be ideal.  Recommended close monitoring for this and that patient call ASAP if any status changes -she confirms again, no signs of swelling, redness, pain etc.      Missed doses: no  Medication changes: no  Dietary changes: no  Bacterial/viral infection: no  Recent alcohol consumption: no  Other concerns: no    Objective  Lab Results   Component Value Date/Time    INR 1.78 (H) 04/25/2024 10:03 AM    INR 1.30 (H) 04/17/2024 11:18 AM    INR 1.11 04/12/2024 07:17 AM    INR 2.1 03/28/2024 03:17 AM    INR 1.8 03/27/2024 06:07 AM    INR 1.3 03/25/2024 04:52 AM    HGB 9.4 (L) 02/28/2024 02:58 PM    HGB 7.5 (L) 01/11/2024 04:53 AM    HGB 8.1 (L) 01/10/2024 05:33 AM    HCT 29.5 (L) 02/28/2024 02:58 PM    HCT 23.2 (L) 01/11/2024 04:53 AM    HCT 24.8 (L) 01/10/2024 05:33 AM    CREATININE 0.70 04/12/2024 07:17 AM    CREATININE 0.67 03/29/2024 04:19 AM    CREATININE 0.51 03/28/2024 03:17 AM    CREATININE 0.52 03/27/2024 06:07 AM    EGFR 102 04/12/2024 07:17 AM    EGFR 108 03/29/2024 04:19 AM    EGFR 115 03/28/2024 03:17 AM    EGFR 114 03/27/2024 06:07 AM     EGFR 77 06/26/2018 05:40 PM         Emanuel Gardner Pharmacist    ------------------------------------------    Pharmacist Tracking Tool  Reason For Outreach: Embedded Pharmacist  Demographics:  Intervention Method: Phone  Type of Intervention: Follow-Up  Topics Addressed: Anticoagulation  Pharmacologic Interventions: Dose or Frequency Adjusted  Non-Pharmacologic Interventions: Care coordination, Labs, and Medication Monitoring  Time:  Direct Patient Care:  5  mins  Care Coordination:  5  mins  Recommendation Recipient: Patient/Caregiver  Outcome: Accepted

## 2024-05-01 ENCOUNTER — OFFICE VISIT (OUTPATIENT)
Dept: FAMILY MEDICINE CLINIC | Facility: CLINIC | Age: 49
End: 2024-05-01

## 2024-05-01 VITALS
TEMPERATURE: 97.8 F | BODY MASS INDEX: 29.73 KG/M2 | OXYGEN SATURATION: 97 % | WEIGHT: 185 LBS | HEART RATE: 82 BPM | RESPIRATION RATE: 17 BRPM | SYSTOLIC BLOOD PRESSURE: 112 MMHG | HEIGHT: 66 IN | DIASTOLIC BLOOD PRESSURE: 76 MMHG

## 2024-05-01 DIAGNOSIS — K70.10 ALCOHOLIC HEPATITIS WITHOUT ASCITES: ICD-10-CM

## 2024-05-01 DIAGNOSIS — Z12.31 ENCOUNTER FOR SCREENING MAMMOGRAM FOR MALIGNANT NEOPLASM OF BREAST: ICD-10-CM

## 2024-05-01 DIAGNOSIS — Z00.00 ANNUAL PHYSICAL EXAM: Primary | ICD-10-CM

## 2024-05-01 DIAGNOSIS — F11.90 OPIOID USE: ICD-10-CM

## 2024-05-01 DIAGNOSIS — R11.2 NAUSEA AND VOMITING, UNSPECIFIED VOMITING TYPE: ICD-10-CM

## 2024-05-01 DIAGNOSIS — F43.10 POSTTRAUMATIC STRESS DISORDER: ICD-10-CM

## 2024-05-01 DIAGNOSIS — E51.9 THIAMINE DEFICIENCY: ICD-10-CM

## 2024-05-01 DIAGNOSIS — E53.8 VITAMIN B 12 DEFICIENCY: ICD-10-CM

## 2024-05-01 DIAGNOSIS — D68.61 ANTIPHOSPHOLIPID SYNDROME (HCC): Chronic | ICD-10-CM

## 2024-05-01 DIAGNOSIS — K65.1 INTRA-ABDOMINAL ABSCESS (HCC): Chronic | ICD-10-CM

## 2024-05-01 DIAGNOSIS — D50.8 OTHER IRON DEFICIENCY ANEMIAS: Chronic | ICD-10-CM

## 2024-05-01 DIAGNOSIS — K70.11 ALCOHOLIC HEPATITIS WITH ASCITES: ICD-10-CM

## 2024-05-01 DIAGNOSIS — S37.13XS URETERAL LACERATION, SEQUELA: ICD-10-CM

## 2024-05-01 DIAGNOSIS — S31.139D GUNSHOT WOUND OF ABDOMEN, SUBSEQUENT ENCOUNTER: Chronic | ICD-10-CM

## 2024-05-01 DIAGNOSIS — E55.9 VITAMIN D DEFICIENCY: ICD-10-CM

## 2024-05-01 DIAGNOSIS — K74.60 LIVER CIRRHOSIS (HCC): ICD-10-CM

## 2024-05-01 PROBLEM — Z12.4 SCREENING FOR MALIGNANT NEOPLASM OF CERVIX: Status: RESOLVED | Noted: 2024-03-04 | Resolved: 2024-05-01

## 2024-05-01 PROBLEM — Z12.11 SCREEN FOR COLON CANCER: Status: RESOLVED | Noted: 2024-03-04 | Resolved: 2024-05-01

## 2024-05-01 PROCEDURE — 3074F SYST BP LT 130 MM HG: CPT | Performed by: PHYSICIAN ASSISTANT

## 2024-05-01 PROCEDURE — 99396 PREV VISIT EST AGE 40-64: CPT | Performed by: PHYSICIAN ASSISTANT

## 2024-05-01 PROCEDURE — 99215 OFFICE O/P EST HI 40 MIN: CPT | Performed by: PHYSICIAN ASSISTANT

## 2024-05-01 PROCEDURE — 3078F DIAST BP <80 MM HG: CPT | Performed by: PHYSICIAN ASSISTANT

## 2024-05-01 RX ORDER — SPIRONOLACTONE 50 MG/1
50 TABLET, FILM COATED ORAL DAILY
Qty: 90 TABLET | Refills: 1 | Status: SHIPPED | OUTPATIENT
Start: 2024-05-01

## 2024-05-01 RX ORDER — OXYCODONE HYDROCHLORIDE 10 MG/1
10 TABLET ORAL 2 TIMES DAILY PRN
Qty: 14 TABLET | Refills: 0 | Status: SHIPPED | OUTPATIENT
Start: 2024-05-01 | End: 2024-05-15 | Stop reason: SDUPTHER

## 2024-05-01 RX ORDER — METHION/INOS/CHOL BT/B COM/LIV 110MG-86MG
100 CAPSULE ORAL DAILY
Qty: 90 TABLET | Refills: 3 | Status: SHIPPED | OUTPATIENT
Start: 2024-05-01

## 2024-05-01 RX ORDER — FLUOXETINE HYDROCHLORIDE 20 MG/1
20 CAPSULE ORAL DAILY
Qty: 90 CAPSULE | Refills: 1 | Status: SHIPPED | OUTPATIENT
Start: 2024-05-01

## 2024-05-01 RX ORDER — ONDANSETRON HYDROCHLORIDE 8 MG/1
8 TABLET, FILM COATED ORAL EVERY 8 HOURS PRN
Qty: 30 TABLET | Refills: 1 | Status: SHIPPED | OUTPATIENT
Start: 2024-05-01 | End: 2024-05-15 | Stop reason: SDUPTHER

## 2024-05-01 RX ORDER — PANTOPRAZOLE SODIUM 40 MG/1
40 TABLET, DELAYED RELEASE ORAL DAILY
Qty: 90 TABLET | Refills: 1 | Status: SHIPPED | OUTPATIENT
Start: 2024-05-01

## 2024-05-01 RX ORDER — FUROSEMIDE 20 MG/1
20 TABLET ORAL DAILY
Qty: 90 TABLET | Refills: 1 | Status: SHIPPED | OUTPATIENT
Start: 2024-05-01

## 2024-05-01 NOTE — PATIENT INSTRUCTIONS
[facility]enter[facility]  Phone Number: [phone]    Please call central scheduling at [phone] to schedule mammogram. Thanks!
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
## 2024-05-07 PROBLEM — D68.61 ANTIPHOSPHOLIPID SYNDROME (HCC): Chronic | Status: ACTIVE | Noted: 2023-12-28

## 2024-05-07 PROBLEM — E53.8 VITAMIN B 12 DEFICIENCY: Chronic | Status: ACTIVE | Noted: 2023-06-15

## 2024-05-07 PROBLEM — K65.1 INTRA-ABDOMINAL ABSCESS (HCC): Chronic | Status: ACTIVE | Noted: 2023-12-31

## 2024-05-07 PROBLEM — S31.139A GUNSHOT WOUND OF ABDOMEN: Chronic | Status: ACTIVE | Noted: 2023-09-07

## 2024-05-07 PROBLEM — F43.10 POSTTRAUMATIC STRESS DISORDER: Chronic | Status: ACTIVE | Noted: 2023-12-29

## 2024-05-07 PROBLEM — E51.9 THIAMINE DEFICIENCY: Chronic | Status: ACTIVE | Noted: 2024-03-04

## 2024-05-07 PROBLEM — E55.9 VITAMIN D DEFICIENCY: Chronic | Status: ACTIVE | Noted: 2024-03-04

## 2024-05-07 NOTE — ASSESSMENT & PLAN NOTE
-PT/INR goal 2-3  - Patient had issues with compliance in the past, but currently says he seems to be compliant.  - Continue monitoring INR with clinical pharmacist.  -Continue warfarin plan as per clinical pharmacist.  - Continue lifelong anticoagulation with warfarin.

## 2024-05-07 NOTE — ASSESSMENT & PLAN NOTE
-Due to history of alcohol abuse.  Patient maintains abstinence from alcohol.  - Continue regular follow-up with gastroenterology/hepatology team.    - Continue Lasix 20 mg daily and spironolactone 50 mg daily.  -Patient overdue for GI follow-up.  Updated referral placed today.

## 2024-05-08 ENCOUNTER — APPOINTMENT (OUTPATIENT)
Dept: LAB | Facility: CLINIC | Age: 49
End: 2024-05-08
Payer: COMMERCIAL

## 2024-05-08 ENCOUNTER — ANTICOAG VISIT (OUTPATIENT)
Dept: FAMILY MEDICINE CLINIC | Facility: CLINIC | Age: 49
End: 2024-05-08

## 2024-05-08 DIAGNOSIS — Z86.718 HISTORY OF DVT (DEEP VEIN THROMBOSIS): Chronic | ICD-10-CM

## 2024-05-08 LAB
INR PPP: 1.91 (ref 0.84–1.19)
PROTHROMBIN TIME: 21.6 SECONDS (ref 11.6–14.5)

## 2024-05-08 PROCEDURE — 85610 PROTHROMBIN TIME: CPT

## 2024-05-08 PROCEDURE — 36415 COLL VENOUS BLD VENIPUNCTURE: CPT
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
## 2024-05-08 NOTE — PROGRESS NOTES
INR still processing, call tomorrow    Emanuel Gardner, PharmD, BCACP  [facility]ulatory Care Clinical Pharmacist

## 2024-05-08 NOTE — ASSESSMENT & PLAN NOTE
- Multifactorial  - H/o bariatric surgery  - History of blood transfusions, most recently in March 2024.  - Previously tolerating IV iron infusions  - Patient referred to Hematology at last visit but has yet to establish care. Advised to call to schedule.
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
## 2024-05-09 ENCOUNTER — ANTICOAG VISIT (OUTPATIENT)
Dept: FAMILY MEDICINE CLINIC | Facility: CLINIC | Age: 49
End: 2024-05-09

## 2024-05-09 NOTE — PROGRESS NOTES
[facility]  [address] [place] [address], [address]  [facility] [id number] [phone]  Clinical Pharmacy Anticoagulation Consultation  Encounter provider: Emanuel Gardner, Pharmacist    8:10 AM 05/09/24 attempted to call. No answer, no VM. Try again later.   10:28 AM pt returned call, reviewed below info       Assessment/Plan  INR: 1.91. Subtherapeutic INR for goal of 2.0-3.0  Current warfarin dose: 1.91  New dose: dose increase  10 mg MonThur, 7.5 mg all other days   Recheck INR in 2 weeks - 3 weeks - 5/29 - remind me sent   Counseled patient on compliance with medications, and consistency with vitamin k rich foods intake.    Subjective  Indication: Multiple DVT Hx+ antiphospholipid syndrome, also hx of alcoholism and cirrhosis   Bleeding signs/symptoms: no  Thrombosis signs/symptoms: no     Missed doses: no  Medication changes: no  Dietary changes: no  Bacterial/viral infection: no  Recent alcohol consumption: no  Other concerns: no    Objective  Lab Results   Component Value Date/Time    INR 1.91 (H) 05/08/2024 01:28 PM    INR 1.78 (H) 04/25/2024 10:03 AM    INR 1.30 (H) 04/17/2024 11:18 AM    INR 2.1 03/28/2024 03:17 AM    INR 1.8 03/27/2024 06:07 AM    INR 1.3 03/25/2024 04:52 AM    HGB 9.4 (L) 02/28/2024 02:58 PM    HGB 7.5 (L) 01/11/2024 04:53 AM    HGB 8.1 (L) 01/10/2024 05:33 AM    HCT 29.5 (L) 02/28/2024 02:58 PM    HCT 23.2 (L) 01/11/2024 04:53 AM    HCT 24.8 (L) 01/10/2024 05:33 AM    CREATININE 0.70 04/12/2024 07:17 AM    CREATININE 0.67 03/29/2024 04:19 AM    CREATININE 0.51 03/28/2024 03:17 AM    CREATININE 0.52 03/27/2024 06:07 AM    EGFR 102 04/12/2024 07:17 AM    EGFR 108 03/29/2024 04:19 AM    EGFR 115 03/28/2024 03:17 AM    EGFR 114 03/27/2024 06:07 AM    EGFR 77 06/26/2018 05:40 PM         Michael Wood    ---------------------------------------------    Pharmacist Tracking Tool  Reason For Outreach: Embedded  Pharmacist  Demographics:  Intervention Method: Phone  Type of Intervention: Follow-Up  Topics Addressed: Anticoagulation  Pharmacologic Interventions: Dose or Frequency Adjusted  Non-Pharmacologic Interventions: Care coordination, Chart update, Labs, and Medication Monitoring  Time:  Direct Patient Care:  5  mins  Care Coordination:  5  mins  Recommendation Recipient: Patient/Caregiver  Outcome: Accepted

## 2024-05-10 NOTE — ASSESSMENT & PLAN NOTE
Mood currently stable  Continue current regimen : Trazodone 50 mg at bedtime, Prozac 20 mg daily   no fever and no chills.

## 2024-05-15 ENCOUNTER — APPOINTMENT (OUTPATIENT)
Dept: LAB | Facility: CLINIC | Age: 49
End: 2024-05-15
Payer: COMMERCIAL

## 2024-05-15 ENCOUNTER — TELEPHONE (OUTPATIENT)
Dept: FAMILY MEDICINE CLINIC | Facility: CLINIC | Age: 49
End: 2024-05-15

## 2024-05-15 DIAGNOSIS — R11.2 NAUSEA AND VOMITING, UNSPECIFIED VOMITING TYPE: ICD-10-CM

## 2024-05-15 DIAGNOSIS — Z86.718 HISTORY OF DVT (DEEP VEIN THROMBOSIS): Chronic | ICD-10-CM

## 2024-05-15 DIAGNOSIS — F11.90 OPIOID USE: ICD-10-CM

## 2024-05-15 DIAGNOSIS — F10.10 ALCOHOL ABUSE: ICD-10-CM

## 2024-05-15 DIAGNOSIS — G47.00 INSOMNIA, UNSPECIFIED TYPE: Primary | ICD-10-CM

## 2024-05-15 DIAGNOSIS — S37.13XS URETERAL LACERATION, SEQUELA: ICD-10-CM

## 2024-05-15 DIAGNOSIS — K70.11 ALCOHOLIC HEPATITIS WITH ASCITES: ICD-10-CM

## 2024-05-15 LAB
INR PPP: 2.05 (ref 0.84–1.19)
PROTHROMBIN TIME: 22.7 SECONDS (ref 11.6–14.5)

## 2024-05-15 PROCEDURE — 36415 COLL VENOUS BLD VENIPUNCTURE: CPT

## 2024-05-15 PROCEDURE — 85610 PROTHROMBIN TIME: CPT

## 2024-05-15 RX ORDER — OXYCODONE HYDROCHLORIDE 10 MG/1
10 TABLET ORAL 2 TIMES DAILY PRN
Qty: 14 TABLET | Refills: 0 | Status: SHIPPED | OUTPATIENT
Start: 2024-05-15

## 2024-05-15 RX ORDER — FOLIC ACID 1 MG/1
1 TABLET ORAL DAILY
Qty: 90 TABLET | Refills: 3 | Status: SHIPPED | OUTPATIENT
Start: 2024-05-15

## 2024-05-15 RX ORDER — ONDANSETRON HYDROCHLORIDE 8 MG/1
8 TABLET, FILM COATED ORAL EVERY 8 HOURS PRN
Qty: 30 TABLET | Refills: 1 | Status: SHIPPED | OUTPATIENT
Start: 2024-05-15

## 2024-05-15 RX ORDER — LANOLIN ALCOHOL/MO/W.PET/CERES
3 CREAM (GRAM) TOPICAL
Qty: 90 TABLET | Refills: 3 | Status: SHIPPED | OUTPATIENT
Start: 2024-05-15

## 2024-05-15 NOTE — TELEPHONE ENCOUNTER
Good Morning Gregory,  Patient Mckenna came into office 5/15 to request medication refill for medications:      oxyCODONE (ROXICODONE) 10 MG TABS [[id number]     folic acid (FOLVITE) 1 mg tablet [[id number]]       melatonin 3 mg [[id number]]       ondansetron (ZOFRAN) 8 mg tablet [[id number]]     Please advise?

## 2024-05-16 ENCOUNTER — TELEPHONE (OUTPATIENT)
Dept: FAMILY MEDICINE CLINIC | Facility: CLINIC | Age: 49
End: 2024-05-16

## 2024-05-16 ENCOUNTER — ANTICOAG VISIT (OUTPATIENT)
Dept: FAMILY MEDICINE CLINIC | Facility: CLINIC | Age: 49
End: 2024-05-16

## 2024-05-16 NOTE — PROGRESS NOTES
I called pt and informed her that her INR is WNL pt states she went earlier than scheduled due to the bruising she noticed on her arms and when she blows her nose she does see blood. Please advise thank you

## 2024-05-20 ENCOUNTER — TELEPHONE (OUTPATIENT)
Dept: FAMILY MEDICINE CLINIC | Facility: CLINIC | Age: 49
End: 2024-05-20

## 2024-05-20 NOTE — TELEPHONE ENCOUNTER
[phone]    Spoke to patient via phone today in regards to her INR.    INR was therapeutic and in range at last visit, however patient is concerned because she reports she has been having increased bruising and a couple nosebleeds throughout the night    Counseled patient on nasal saline spray and how to avoid nosebleeds, how to treat nosebleeds when they occur, etc.    Patient requests if she can repeat an INR this Wednesday.  That is fine, clinical pharmacist will check back at that time    Of note patient does report she has only been taking 7.5 mg daily of warfarin since her last INR check.  Therefore this next INR may be subtherapeutic.  Will need to discuss with patient this week - remind me sent     Pharmacist Tracking Tool  Reason For Outreach: Embedded Pharmacist  Demographics:  Intervention Method: Phone  Type of Intervention: Follow-Up  Topics Addressed: Anticoagulation  Pharmacologic Interventions: Dose or Frequency Adjusted  Non-Pharmacologic Interventions: Adherence addressed, Care coordination, and Labs  Time:  Direct Patient Care:  5  mins  Care Coordination:  10  mins  Recommendation Recipient: Patient/Caregiver  Outcome: Accepted     Emanuel Gardner, PharmD, BCACP  Ambulatory Care Clinical Pharmacist

## 2024-05-22 ENCOUNTER — LAB (OUTPATIENT)
Dept: LAB | Facility: CLINIC | Age: 49
End: 2024-05-22
Payer: COMMERCIAL

## 2024-05-22 DIAGNOSIS — Z86.718 HISTORY OF DVT (DEEP VEIN THROMBOSIS): Chronic | ICD-10-CM

## 2024-05-22 LAB
INR PPP: 1.74 (ref 0.84–1.19)
PROTHROMBIN TIME: 20 SECONDS (ref 11.6–14.5)

## 2024-05-22 PROCEDURE — 85610 PROTHROMBIN TIME: CPT

## 2024-05-22 PROCEDURE — 36415 COLL VENOUS BLD VENIPUNCTURE: CPT

## 2024-05-23 ENCOUNTER — ANTICOAG VISIT (OUTPATIENT)
Dept: FAMILY MEDICINE CLINIC | Facility: CLINIC | Age: 49
End: 2024-05-23

## 2024-05-23 DIAGNOSIS — E53.8 VITAMIN B 12 DEFICIENCY: ICD-10-CM

## 2024-05-23 RX ORDER — CYANOCOBALAMIN (VITAMIN B-12) 1000 MCG
1 TABLET ORAL DAILY
Qty: 90 TABLET | Refills: 2 | Status: SHIPPED | OUTPATIENT
Start: 2024-05-23 | End: 2024-05-29 | Stop reason: SDUPTHER

## 2024-05-23 NOTE — PROGRESS NOTES
[facility] [place], [address]  [facility] [id number] [phone]  Clinical Pharmacy Anticoagulation Consultation  Encounter provider: Emanuel Gardner, Pharmacist  Assessment/Plan  INR: 1.91. Subtherapeutic INR for goal of 2.0-3.0  Current warfarin dose: 7.5 mg daily   New dose: increase back to 10 mg MonThur, 7.5 mg all other days     Recheck INR in 2 weeks  6/4 - at hosp lab, call 6/4  Counseled patient on compliance with medications, and consistency with vitamin k rich foods intake.    Subjective  Indication: Multiple DVT Hx+ antiphospholipid syndrome, also hx of alcoholism and cirrhosis   Bleeding signs/symptoms: no  Confirms nose bleeds have resolved with nasal spray, bruising resolved  Thrombosis signs/symptoms: no     Missed doses: no  Medication changes: no  Dietary changes: no  Bacterial/viral infection: no  Recent alcohol consumption: no  Other concerns: no    Objective  Lab Results   Component Value Date/Time    INR 1.74 (H) 05/22/2024 09:53 AM    INR 2.05 (H) 05/15/2024 11:42 AM    INR 1.91 (H) 05/08/2024 01:28 PM    INR 2.1 03/28/2024 03:17 AM    INR 1.8 03/27/2024 06:07 AM    INR 1.3 03/25/2024 04:52 AM    HGB 9.4 (L) 02/28/2024 02:58 PM    HGB 7.5 (L) 01/11/2024 04:53 AM    HGB 8.1 (L) 01/10/2024 05:33 AM    HCT 29.5 (L) 02/28/2024 02:58 PM    HCT 23.2 (L) 01/11/2024 04:53 AM    HCT 24.8 (L) 01/10/2024 05:33 AM    CREATININE 0.70 04/12/2024 07:17 AM    CREATININE 0.67 03/29/2024 04:19 AM    CREATININE 0.51 03/28/2024 03:17 AM    CREATININE 0.52 03/27/2024 06:07 AM    EGFR 102 04/12/2024 07:17 AM    EGFR 108 03/29/2024 04:19 AM    EGFR 115 03/28/2024 03:17 AM    EGFR 114 03/27/2024 06:07 AM    EGFR 77 06/26/2018 05:40 PM         Emanuel Gardner Pharmacist    ---------------------------------------------    Pharmacist Tracking Tool  Reason For Outreach: Embedded Pharmacist  Demographics:  Intervention Method: Phone  Type of Intervention:  Follow-Up  Topics Addressed: Anticoagulation  Pharmacologic Interventions: Dose or Frequency Adjusted and Prevent or Manage TROY  Non-Pharmacologic Interventions: Care coordination, Chart update, and Labs  Time:  Direct Patient Care:  5  mins  Care Coordination:  10  mins  Recommendation Recipient: Patient/Caregiver  Outcome: Accepted

## 2024-05-29 DIAGNOSIS — K74.60 LIVER CIRRHOSIS (HCC): ICD-10-CM

## 2024-05-29 DIAGNOSIS — E51.9 THIAMINE DEFICIENCY: ICD-10-CM

## 2024-05-29 DIAGNOSIS — E55.9 VITAMIN D DEFICIENCY: ICD-10-CM

## 2024-05-29 DIAGNOSIS — S37.13XS URETERAL LACERATION, SEQUELA: ICD-10-CM

## 2024-05-29 DIAGNOSIS — K70.11 ALCOHOLIC HEPATITIS WITH ASCITES: ICD-10-CM

## 2024-05-29 DIAGNOSIS — Z86.718 HISTORY OF DVT (DEEP VEIN THROMBOSIS): Chronic | ICD-10-CM

## 2024-05-29 DIAGNOSIS — F11.90 OPIOID USE: ICD-10-CM

## 2024-05-29 DIAGNOSIS — R11.2 NAUSEA AND VOMITING, UNSPECIFIED VOMITING TYPE: ICD-10-CM

## 2024-05-29 DIAGNOSIS — E53.8 VITAMIN B 12 DEFICIENCY: ICD-10-CM

## 2024-05-29 DIAGNOSIS — G47.00 INSOMNIA, UNSPECIFIED TYPE: ICD-10-CM

## 2024-05-29 DIAGNOSIS — F43.10 POSTTRAUMATIC STRESS DISORDER: ICD-10-CM

## 2024-05-29 DIAGNOSIS — K70.10 ALCOHOLIC HEPATITIS WITHOUT ASCITES: ICD-10-CM

## 2024-05-29 RX ORDER — WARFARIN SODIUM 5 MG/1
TABLET ORAL
Qty: 90 TABLET | Refills: 0 | Status: SHIPPED | OUTPATIENT
Start: 2024-05-29

## 2024-05-29 RX ORDER — ONDANSETRON HYDROCHLORIDE 8 MG/1
8 TABLET, FILM COATED ORAL EVERY 8 HOURS PRN
Qty: 30 TABLET | Refills: 0 | Status: SHIPPED | OUTPATIENT
Start: 2024-05-29

## 2024-05-29 RX ORDER — LANOLIN ALCOHOL/MO/W.PET/CERES
3 CREAM (GRAM) TOPICAL
Qty: 90 TABLET | Refills: 0 | Status: SHIPPED | OUTPATIENT
Start: 2024-05-29

## 2024-05-29 RX ORDER — WARFARIN SODIUM 2.5 MG/1
TABLET ORAL
Qty: 90 TABLET | Refills: 0 | Status: SHIPPED | OUTPATIENT
Start: 2024-05-29

## 2024-05-29 RX ORDER — METHION/INOS/CHOL BT/B COM/LIV 110MG-86MG
100 CAPSULE ORAL DAILY
Qty: 90 TABLET | Refills: 0 | Status: SHIPPED | OUTPATIENT
Start: 2024-05-29

## 2024-05-29 RX ORDER — FLUOXETINE HYDROCHLORIDE 20 MG/1
20 CAPSULE ORAL DAILY
Qty: 90 CAPSULE | Refills: 0 | Status: SHIPPED | OUTPATIENT
Start: 2024-05-29

## 2024-05-29 RX ORDER — FUROSEMIDE 20 MG/1
20 TABLET ORAL DAILY
Qty: 90 TABLET | Refills: 0 | Status: SHIPPED | OUTPATIENT
Start: 2024-05-29

## 2024-05-29 RX ORDER — PANTOPRAZOLE SODIUM 40 MG/1
40 TABLET, DELAYED RELEASE ORAL DAILY
Qty: 90 TABLET | Refills: 0 | Status: SHIPPED | OUTPATIENT
Start: 2024-05-29

## 2024-05-29 RX ORDER — OXYCODONE HYDROCHLORIDE 10 MG/1
10 TABLET ORAL 2 TIMES DAILY PRN
Qty: 14 TABLET | Refills: 0 | Status: SHIPPED | OUTPATIENT
Start: 2024-05-29

## 2024-05-29 RX ORDER — SPIRONOLACTONE 50 MG/1
50 TABLET, FILM COATED ORAL DAILY
Qty: 90 TABLET | Refills: 0 | Status: SHIPPED | OUTPATIENT
Start: 2024-05-29

## 2024-05-29 RX ORDER — CYANOCOBALAMIN (VITAMIN B-12) 1000 MCG
1 TABLET ORAL DAILY
Qty: 90 TABLET | Refills: 0 | Status: SHIPPED | OUTPATIENT
Start: 2024-05-29

## 2024-06-03 ENCOUNTER — APPOINTMENT (OUTPATIENT)
Dept: LAB | Facility: HOSPITAL | Age: 49
End: 2024-06-03
Payer: COMMERCIAL

## 2024-06-03 DIAGNOSIS — Z86.718 HISTORY OF DVT (DEEP VEIN THROMBOSIS): Chronic | ICD-10-CM

## 2024-06-03 LAB
INR PPP: 1.9 (ref 0.84–1.19)
PROTHROMBIN TIME: 22.1 SECONDS (ref 11.6–14.5)

## 2024-06-03 PROCEDURE — 85610 PROTHROMBIN TIME: CPT

## 2024-06-03 PROCEDURE — 36415 COLL VENOUS BLD VENIPUNCTURE: CPT

## 2024-06-04 ENCOUNTER — ANTICOAG VISIT (OUTPATIENT)
Dept: FAMILY MEDICINE CLINIC | Facility: CLINIC | Age: 49
End: 2024-06-04

## 2024-06-04 NOTE — PROGRESS NOTES
[facility] [place]  [address] [place] [address], [address]  [facility] [id number] [phone]  Clinical Pharmacy Anticoagulation Consultation  Encounter provider: Emanuel Gardner, Pharmacist    Assessment/Plan  INR: 1.90. Subtherapeutic INR for goal of 2.0-3.0, but nearly at goal and improved   Current warfarin dose: 10 mg every Mon, Thu; 7.5 mg all other days   New dose: Increase slightly ~ 5% increase     10 mg every Sun, Tues, Fri; 7.5 mg all other days (3 days of week chosen by pt based on her schedule)   Recheck INR in 1 month  Check back 7/3, she will obtain first week of July     Subjective  Indication: Multiple DVT Hx+ antiphospholipid syndrome, also hx of alcoholism and cirrhosis   Bleeding signs/symptoms: no  Thrombosis signs/symptoms: no     Missed doses: no  Medication changes: no  Dietary changes: no  Bacterial/viral infection: no  Recent alcohol consumption: no  Other concerns: no    Objective  Lab Results   Component Value Date/Time    INR 1.90 (H) 06/03/2024 04:01 PM    INR 1.74 (H) 05/22/2024 09:53 AM    INR 2.05 (H) 05/15/2024 11:42 AM    INR 2.1 03/28/2024 03:17 AM    INR 1.8 03/27/2024 06:07 AM    INR 1.3 03/25/2024 04:52 AM    HGB 9.4 (L) 02/28/2024 02:58 PM    HGB 7.5 (L) 01/11/2024 04:53 AM    HGB 8.1 (L) 01/10/2024 05:33 AM    HCT 29.5 (L) 02/28/2024 02:58 PM    HCT 23.2 (L) 01/11/2024 04:53 AM    HCT 24.8 (L) 01/10/2024 05:33 AM    CREATININE 0.70 04/12/2024 07:17 AM    CREATININE 0.67 03/29/2024 04:19 AM    CREATININE 0.51 03/28/2024 03:17 AM    CREATININE 0.52 03/27/2024 06:07 AM    EGFR 102 04/12/2024 07:17 AM    EGFR 108 03/29/2024 04:19 AM    EGFR 115 03/28/2024 03:17 AM    EGFR 114 03/27/2024 06:07 AM    EGFR 77 06/26/2018 05:40 PM         Emanuel Gardner Pharmacist    -----------------------------------------------------    Pharmacist Tracking Tool  Reason For Outreach: Embedded Pharmacist  Demographics:  Intervention Method: Phone  Type of Intervention:  Follow-Up  Topics Addressed: Anticoagulation  Pharmacologic Interventions: Dose or Frequency Adjusted and Prevent or Manage TROY  Non-Pharmacologic Interventions: Care coordination, Chart update, Labs, and Medication Monitoring  Time:  Direct Patient Care:  10  mins  Care Coordination:  10  mins  Recommendation Recipient: Patient/Caregiver  Outcome: Accepted

## 2024-06-07 DIAGNOSIS — G47.00 INSOMNIA, UNSPECIFIED TYPE: ICD-10-CM

## 2024-06-07 DIAGNOSIS — S37.13XS URETERAL LACERATION, SEQUELA: ICD-10-CM

## 2024-06-07 DIAGNOSIS — F11.90 OPIOID USE: ICD-10-CM

## 2024-06-07 DIAGNOSIS — R11.2 NAUSEA AND VOMITING, UNSPECIFIED VOMITING TYPE: ICD-10-CM

## 2024-06-07 RX ORDER — ONDANSETRON HYDROCHLORIDE 8 MG/1
8 TABLET, FILM COATED ORAL EVERY 8 HOURS PRN
Qty: 30 TABLET | Refills: 0 | OUTPATIENT
Start: 2024-06-07

## 2024-06-07 RX ORDER — LANOLIN ALCOHOL/MO/W.PET/CERES
3 CREAM (GRAM) TOPICAL
Qty: 90 TABLET | Refills: 0 | OUTPATIENT
Start: 2024-06-07

## 2024-06-07 RX ORDER — OXYCODONE HYDROCHLORIDE 10 MG/1
10 TABLET ORAL 2 TIMES DAILY PRN
Qty: 14 TABLET | Refills: 0 | OUTPATIENT
Start: 2024-06-07

## 2024-06-07 NOTE — TELEPHONE ENCOUNTER
Patient came into office today requesting medication refill:    warfarin (COUMADIN) 2.5 mg tablet   melatonin 3 mg   oxyCODONE (ROXICODONE) 10 MG TABS   ondansetron (ZOFRAN) 8 mg tablet   Muscle relaxer medication    Please advise.    Patient also requesting GI testing due to her being in a lot pain.    Pt has appt schedule with pcp 6/19/24.

## 2024-06-12 ENCOUNTER — TELEPHONE (OUTPATIENT)
Dept: HEMATOLOGY ONCOLOGY | Facility: CLINIC | Age: 49
End: 2024-06-12

## 2024-06-12 ENCOUNTER — TELEPHONE (OUTPATIENT)
Dept: OBGYN CLINIC | Facility: CLINIC | Age: 49
End: 2024-06-12

## 2024-06-19 ENCOUNTER — OFFICE VISIT (OUTPATIENT)
Dept: FAMILY MEDICINE CLINIC | Facility: CLINIC | Age: 49
End: 2024-06-19

## 2024-06-19 VITALS
HEART RATE: 82 BPM | WEIGHT: 200 LBS | SYSTOLIC BLOOD PRESSURE: 105 MMHG | RESPIRATION RATE: 14 BRPM | HEIGHT: 66 IN | BODY MASS INDEX: 32.14 KG/M2 | OXYGEN SATURATION: 98 % | TEMPERATURE: 97.1 F | DIASTOLIC BLOOD PRESSURE: 69 MMHG

## 2024-06-19 DIAGNOSIS — Z23 ENCOUNTER FOR IMMUNIZATION: Primary | ICD-10-CM

## 2024-06-19 DIAGNOSIS — K70.10 ALCOHOLIC HEPATITIS WITHOUT ASCITES: ICD-10-CM

## 2024-06-19 DIAGNOSIS — K65.1 INTRA-ABDOMINAL ABSCESS (HCC): Chronic | ICD-10-CM

## 2024-06-19 DIAGNOSIS — S37.13XS URETERAL LACERATION, SEQUELA: ICD-10-CM

## 2024-06-19 DIAGNOSIS — R11.2 NAUSEA AND VOMITING, UNSPECIFIED VOMITING TYPE: ICD-10-CM

## 2024-06-19 DIAGNOSIS — K70.11 ALCOHOLIC HEPATITIS WITH ASCITES: ICD-10-CM

## 2024-06-19 DIAGNOSIS — D50.8 OTHER IRON DEFICIENCY ANEMIAS: Chronic | ICD-10-CM

## 2024-06-19 DIAGNOSIS — E51.9 THIAMINE DEFICIENCY: ICD-10-CM

## 2024-06-19 DIAGNOSIS — D68.61 ANTIPHOSPHOLIPID SYNDROME (HCC): Chronic | ICD-10-CM

## 2024-06-19 DIAGNOSIS — Z86.718 HISTORY OF DVT (DEEP VEIN THROMBOSIS): Chronic | ICD-10-CM

## 2024-06-19 DIAGNOSIS — F43.10 POSTTRAUMATIC STRESS DISORDER: ICD-10-CM

## 2024-06-19 DIAGNOSIS — R10.2 PELVIC PAIN: ICD-10-CM

## 2024-06-19 DIAGNOSIS — E53.8 VITAMIN B 12 DEFICIENCY: ICD-10-CM

## 2024-06-19 DIAGNOSIS — K74.60 LIVER CIRRHOSIS (HCC): ICD-10-CM

## 2024-06-19 DIAGNOSIS — E55.9 VITAMIN D DEFICIENCY: ICD-10-CM

## 2024-06-19 DIAGNOSIS — F11.90 OPIOID USE: ICD-10-CM

## 2024-06-19 PROCEDURE — 99214 OFFICE O/P EST MOD 30 MIN: CPT | Performed by: PHYSICIAN ASSISTANT

## 2024-06-19 PROCEDURE — 3074F SYST BP LT 130 MM HG: CPT | Performed by: PHYSICIAN ASSISTANT

## 2024-06-19 PROCEDURE — 3078F DIAST BP <80 MM HG: CPT | Performed by: PHYSICIAN ASSISTANT

## 2024-06-19 RX ORDER — FUROSEMIDE 20 MG/1
20 TABLET ORAL DAILY
Qty: 90 TABLET | Refills: 0 | Status: SHIPPED | OUTPATIENT
Start: 2024-06-19

## 2024-06-19 RX ORDER — OXYCODONE HYDROCHLORIDE 10 MG/1
10 TABLET ORAL 2 TIMES DAILY PRN
Qty: 30 TABLET | Refills: 0 | Status: SHIPPED | OUTPATIENT
Start: 2024-06-19

## 2024-06-19 RX ORDER — ONDANSETRON HYDROCHLORIDE 8 MG/1
8 TABLET, FILM COATED ORAL EVERY 8 HOURS PRN
Qty: 30 TABLET | Refills: 1 | Status: SHIPPED | OUTPATIENT
Start: 2024-06-19

## 2024-06-19 RX ORDER — FLUOXETINE HYDROCHLORIDE 20 MG/1
20 CAPSULE ORAL DAILY
Qty: 90 CAPSULE | Refills: 1 | Status: SHIPPED | OUTPATIENT
Start: 2024-06-19

## 2024-06-19 RX ORDER — CYANOCOBALAMIN (VITAMIN B-12) 1000 MCG
1 TABLET ORAL DAILY
Qty: 90 TABLET | Refills: 1 | Status: SHIPPED | OUTPATIENT
Start: 2024-06-19

## 2024-06-19 RX ORDER — METOCLOPRAMIDE 5 MG/1
5 TABLET ORAL 4 TIMES DAILY
Qty: 60 TABLET | Refills: 1 | Status: SHIPPED | OUTPATIENT
Start: 2024-06-19

## 2024-06-19 RX ORDER — WARFARIN SODIUM 2.5 MG/1
TABLET ORAL
Qty: 90 TABLET | Refills: 1 | Status: SHIPPED | OUTPATIENT
Start: 2024-06-19

## 2024-06-19 RX ORDER — PANTOPRAZOLE SODIUM 40 MG/1
40 TABLET, DELAYED RELEASE ORAL DAILY
Qty: 90 TABLET | Refills: 0 | Status: SHIPPED | OUTPATIENT
Start: 2024-06-19

## 2024-06-19 RX ORDER — METHION/INOS/CHOL BT/B COM/LIV 110MG-86MG
100 CAPSULE ORAL DAILY
Qty: 90 TABLET | Refills: 1 | Status: SHIPPED | OUTPATIENT
Start: 2024-06-19

## 2024-06-19 RX ORDER — SPIRONOLACTONE 50 MG/1
50 TABLET, FILM COATED ORAL DAILY
Qty: 90 TABLET | Refills: 0 | Status: SHIPPED | OUTPATIENT
Start: 2024-06-19

## 2024-06-19 NOTE — PATIENT INSTRUCTIONS
[facility]  [phone]    [facility]  Phone Number: [phone]    [facility] Milena Ross  [phone]
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
## 2024-06-21 NOTE — ASSESSMENT & PLAN NOTE
- Patient has history of multiple episodes of DVT/PE dating back as far as 2003.  - Patient was previously on Lovenox injections until about 1 year ago which she discontinued due to financial issues.  -Reviewed hematology/oncology note from 1/23/2023.  Patient is currently off Coumadin, will most likely have to restart since her GI evaluation of liver is complete.  -Recent bilateral lower extremity duplex was negative for DVT.  - Continue follow-up with hematology/oncology as scheduled.

## 2024-07-05 ENCOUNTER — APPOINTMENT (OUTPATIENT)
Dept: LAB | Facility: CLINIC | Age: 49
End: 2024-07-05
Payer: COMMERCIAL

## 2024-07-05 DIAGNOSIS — Z86.718 HISTORY OF DVT (DEEP VEIN THROMBOSIS): Chronic | ICD-10-CM

## 2024-07-05 LAB
INR PPP: 2.88 (ref 0.84–1.19)
PROTHROMBIN TIME: 29.5 SECONDS (ref 11.6–14.5)

## 2024-07-05 PROCEDURE — 85610 PROTHROMBIN TIME: CPT

## 2024-07-05 PROCEDURE — 36415 COLL VENOUS BLD VENIPUNCTURE: CPT

## 2024-07-08 ENCOUNTER — ANTICOAG VISIT (OUTPATIENT)
Dept: FAMILY MEDICINE CLINIC | Facility: CLINIC | Age: 49
End: 2024-07-08

## 2024-07-08 NOTE — PROGRESS NOTES
[facility] [place], [address]  [facility] [address]  [phone]  Clinical Pharmacy Anticoagulation Consultation  Encounter provider: Emanuel Gardner, Pharmacist    [phone]     Assessment/Plan  INR: 2.88. Therapeutic INR for goal of 2.0-3.0  Current warfarin dose:   Maintenance plan:10 mg every Sun, Tue, Fri; 7.5 mg all other days   New dose: no change    Recheck INR in 1 month  8/9, reminder sent 8/12 to check back   Counseled patient on compliance with medications, and consistency with vitamin k rich foods intake.    Subjective  Indication:  DVT hx, antiphospholipid syndrome   Bleeding signs/symptoms: no  Thrombosis signs/symptoms: no    Missed doses: no  Medication changes: no  Dietary changes: no  Bacterial/viral infection: no  Recent alcohol consumption: no  Other concerns: no    Objective  Lab Results   Component Value Date/Time    INR 2.88 (H) 07/05/2024 01:30 PM    INR 1.90 (H) 06/03/2024 04:01 PM    INR 1.74 (H) 05/22/2024 09:53 AM    INR 2.1 03/28/2024 03:17 AM    INR 1.8 03/27/2024 06:07 AM    INR 1.3 03/25/2024 04:52 AM    HGB 9.4 (L) 02/28/2024 02:58 PM    HGB 7.5 (L) 01/11/2024 04:53 AM    HGB 8.1 (L) 01/10/2024 05:33 AM    HCT 29.5 (L) 02/28/2024 02:58 PM    HCT 23.2 (L) 01/11/2024 04:53 AM    HCT 24.8 (L) 01/10/2024 05:33 AM    CREATININE 0.70 04/12/2024 07:17 AM    CREATININE 0.67 03/29/2024 04:19 AM    CREATININE 0.51 03/28/2024 03:17 AM    CREATININE 0.52 03/27/2024 06:07 AM    EGFR 102 04/12/2024 07:17 AM    EGFR 108 03/29/2024 04:19 AM    EGFR 115 03/28/2024 03:17 AM    EGFR 114 03/27/2024 06:07 AM    EGFR 77 06/26/2018 05:40 PM         Emanuel Gardner Pharmacist    ------------------------------------------------    Pharmacist Tracking Tool  Reason For Outreach: Embedded Pharmacist  Demographics:  Intervention Method: vozerot Message  Type of Intervention: Follow-Up  Topics Addressed: Anticoagulation  Pharmacologic Interventions:  Prevent or Manage TROY  Non-Pharmacologic Interventions: Care coordination, Chart update, Labs, and Medication Monitoring  Time:  Direct Patient Care:  10  mins  Care Coordination:  10  mins  Recommendation Recipient: Patient/Caregiver and Provider  Outcome: Accepted

## 2024-07-10 DIAGNOSIS — E55.9 VITAMIN D DEFICIENCY: ICD-10-CM

## 2024-07-10 DIAGNOSIS — E53.8 VITAMIN B 12 DEFICIENCY: ICD-10-CM

## 2024-07-10 DIAGNOSIS — K70.11 ALCOHOLIC HEPATITIS WITH ASCITES: ICD-10-CM

## 2024-07-10 DIAGNOSIS — R11.2 NAUSEA AND VOMITING, UNSPECIFIED VOMITING TYPE: ICD-10-CM

## 2024-07-10 DIAGNOSIS — F11.90 OPIOID USE: ICD-10-CM

## 2024-07-10 DIAGNOSIS — S37.13XS URETERAL LACERATION, SEQUELA: ICD-10-CM

## 2024-07-10 DIAGNOSIS — K70.10 ALCOHOLIC HEPATITIS WITHOUT ASCITES: ICD-10-CM

## 2024-07-10 DIAGNOSIS — F10.10 ALCOHOL ABUSE: ICD-10-CM

## 2024-07-10 DIAGNOSIS — K74.60 LIVER CIRRHOSIS (HCC): ICD-10-CM

## 2024-07-10 DIAGNOSIS — Z86.718 HISTORY OF DVT (DEEP VEIN THROMBOSIS): Chronic | ICD-10-CM

## 2024-07-10 DIAGNOSIS — E51.9 THIAMINE DEFICIENCY: ICD-10-CM

## 2024-07-10 DIAGNOSIS — F43.10 POSTTRAUMATIC STRESS DISORDER: ICD-10-CM

## 2024-07-11 DIAGNOSIS — S37.13XS URETERAL LACERATION, SEQUELA: ICD-10-CM

## 2024-07-11 DIAGNOSIS — F11.90 OPIOID USE: ICD-10-CM

## 2024-07-12 RX ORDER — CYANOCOBALAMIN (VITAMIN B-12) 1000 MCG
1 TABLET ORAL DAILY
Qty: 90 TABLET | Refills: 0 | Status: SHIPPED | OUTPATIENT
Start: 2024-07-12

## 2024-07-12 RX ORDER — WARFARIN SODIUM 2.5 MG/1
TABLET ORAL
Qty: 90 TABLET | Refills: 0 | Status: SHIPPED | OUTPATIENT
Start: 2024-07-12

## 2024-07-12 RX ORDER — FOLIC ACID 1 MG/1
1 TABLET ORAL DAILY
Qty: 90 TABLET | Refills: 0 | Status: SHIPPED | OUTPATIENT
Start: 2024-07-12

## 2024-07-12 RX ORDER — METHION/INOS/CHOL BT/B COM/LIV 110MG-86MG
100 CAPSULE ORAL DAILY
Qty: 90 TABLET | Refills: 0 | Status: SHIPPED | OUTPATIENT
Start: 2024-07-12

## 2024-07-12 RX ORDER — OXYCODONE HYDROCHLORIDE 10 MG/1
10 TABLET ORAL 2 TIMES DAILY PRN
Qty: 30 TABLET | Refills: 0 | OUTPATIENT
Start: 2024-07-12

## 2024-07-12 RX ORDER — PANTOPRAZOLE SODIUM 40 MG/1
40 TABLET, DELAYED RELEASE ORAL DAILY
Qty: 90 TABLET | Refills: 0 | Status: SHIPPED | OUTPATIENT
Start: 2024-07-12

## 2024-07-12 RX ORDER — METOCLOPRAMIDE 5 MG/1
5 TABLET ORAL 4 TIMES DAILY
Qty: 60 TABLET | Refills: 0 | Status: SHIPPED | OUTPATIENT
Start: 2024-07-12

## 2024-07-12 RX ORDER — OXYCODONE HYDROCHLORIDE 10 MG/1
10 TABLET ORAL 2 TIMES DAILY PRN
Qty: 30 TABLET | Refills: 0 | Status: SHIPPED | OUTPATIENT
Start: 2024-07-12

## 2024-07-12 RX ORDER — FLUOXETINE HYDROCHLORIDE 20 MG/1
20 CAPSULE ORAL DAILY
Qty: 90 CAPSULE | Refills: 0 | Status: SHIPPED | OUTPATIENT
Start: 2024-07-12

## 2024-07-12 RX ORDER — FUROSEMIDE 20 MG/1
20 TABLET ORAL DAILY
Qty: 90 TABLET | Refills: 0 | Status: SHIPPED | OUTPATIENT
Start: 2024-07-12

## 2024-07-12 RX ORDER — SPIRONOLACTONE 50 MG/1
50 TABLET, FILM COATED ORAL DAILY
Qty: 90 TABLET | Refills: 0 | Status: SHIPPED | OUTPATIENT
Start: 2024-07-12

## 2024-07-12 RX ORDER — WARFARIN SODIUM 5 MG/1
TABLET ORAL
Qty: 90 TABLET | Refills: 0 | Status: SHIPPED | OUTPATIENT
Start: 2024-07-12

## 2024-08-01 DIAGNOSIS — E53.8 VITAMIN B 12 DEFICIENCY: ICD-10-CM

## 2024-08-01 DIAGNOSIS — R11.2 NAUSEA AND VOMITING, UNSPECIFIED VOMITING TYPE: ICD-10-CM

## 2024-08-01 DIAGNOSIS — F11.90 OPIOID USE: ICD-10-CM

## 2024-08-01 DIAGNOSIS — Z86.718 HISTORY OF DVT (DEEP VEIN THROMBOSIS): Chronic | ICD-10-CM

## 2024-08-01 DIAGNOSIS — S37.13XS URETERAL LACERATION, SEQUELA: ICD-10-CM

## 2024-08-01 RX ORDER — WARFARIN SODIUM 2.5 MG/1
TABLET ORAL
Qty: 90 TABLET | Refills: 0 | Status: SHIPPED | OUTPATIENT
Start: 2024-08-01

## 2024-08-01 RX ORDER — ONDANSETRON HYDROCHLORIDE 8 MG/1
8 TABLET, FILM COATED ORAL EVERY 8 HOURS PRN
Qty: 30 TABLET | Refills: 0 | Status: SHIPPED | OUTPATIENT
Start: 2024-08-01

## 2024-08-01 RX ORDER — CYANOCOBALAMIN (VITAMIN B-12) 1000 MCG
1 TABLET ORAL DAILY
Qty: 90 TABLET | Refills: 0 | Status: SHIPPED | OUTPATIENT
Start: 2024-08-01

## 2024-08-01 RX ORDER — OXYCODONE HYDROCHLORIDE 10 MG/1
10 TABLET ORAL 2 TIMES DAILY PRN
Qty: 30 TABLET | Refills: 0 | Status: SHIPPED | OUTPATIENT
Start: 2024-08-01

## 2024-08-08 ENCOUNTER — APPOINTMENT (OUTPATIENT)
Dept: LAB | Facility: CLINIC | Age: 49
End: 2024-08-08
Payer: COMMERCIAL

## 2024-08-08 DIAGNOSIS — Z86.718 HISTORY OF DVT (DEEP VEIN THROMBOSIS): Chronic | ICD-10-CM

## 2024-08-08 LAB
INR PPP: 4 (ref 0.85–1.19)
PROTHROMBIN TIME: 38.3 SECONDS (ref 12.3–15)

## 2024-08-08 PROCEDURE — 36415 COLL VENOUS BLD VENIPUNCTURE: CPT

## 2024-08-08 PROCEDURE — 85610 PROTHROMBIN TIME: CPT

## 2024-08-12 ENCOUNTER — ANTICOAG VISIT (OUTPATIENT)
Dept: FAMILY MEDICINE CLINIC | Facility: CLINIC | Age: 49
End: 2024-08-12

## 2024-08-12 NOTE — PROGRESS NOTES
[address], [address]  [facility] [id number] [phone]  Clinical Pharmacy Anticoagulation Consultation  Encounter provider: Emanuel Gardner, Pharmacist    Spoke to pt via phone [phone]  Assessment/Plan  INR: 4.0 (8/8) Supratherapeutic INR for goal of 2.0-3.0  Current warfarin dose: 10 mg SunTueFri, 7.5 mg all other days   Confirmed patient continued with above doses etc.  New dose: HOLD TODAY 8/12, take reduced dose of 5 mg on Wednesday 8/14.  Recheck INR in 3 weeks  INR 8/28, check same day, check 8/29 if not resulted in time   Counseled patient on compliance with medications, and consistency with vitamin k rich foods intake.    Subjective  Indication: DVT hx, antiphospholipid syndrome   Bleeding signs/symptoms: no   She notes no increased bruising, bleeding etc. with INR today  Thrombosis signs/symptoms: no    Missed doses: no  Medication changes: no  Dietary changes: no  Bacterial/viral infection: no  Recent alcohol consumption: no  Other concerns: no    Objective  Lab Results   Component Value Date/Time    INR 4.00 (H) 08/08/2024 02:28 PM    INR 2.88 (H) 07/05/2024 01:30 PM    INR 1.90 (H) 06/03/2024 04:01 PM    INR 2.1 03/28/2024 03:17 AM    INR 1.8 03/27/2024 06:07 AM    INR 1.3 03/25/2024 04:52 AM    HGB 9.4 (L) 02/28/2024 02:58 PM    HGB 7.5 (L) 01/11/2024 04:53 AM    HGB 8.1 (L) 01/10/2024 05:33 AM    HCT 29.5 (L) 02/28/2024 02:58 PM    HCT 23.2 (L) 01/11/2024 04:53 AM    HCT 24.8 (L) 01/10/2024 05:33 AM    CREATININE 0.70 04/12/2024 07:17 AM    CREATININE 0.67 03/29/2024 04:19 AM    CREATININE 0.51 03/28/2024 03:17 AM    CREATININE 0.52 03/27/2024 06:07 AM    EGFR 102 04/12/2024 07:17 AM    EGFR 108 03/29/2024 04:19 AM    EGFR 115 03/28/2024 03:17 AM    EGFR 114 03/27/2024 06:07 AM    EGFR 77 06/26/2018 05:40 PM         Michael Wood    --------------    Pharmacist Tracking Tool  Reason For Outreach: Embedded  Pharmacist  Demographics:  Intervention Method: Phone  Type of Intervention: Follow-Up  Topics Addressed: Anticoagulation  Pharmacologic Interventions: Dose or Frequency Adjusted and Prevent or Manage TROY  Non-Pharmacologic Interventions: Care coordination, Chart update, Disease state education, Labs, and Medication Monitoring  Time:  Direct Patient Care:  10  mins  Care Coordination:  5  mins  Recommendation Recipient: Patient/Caregiver  Outcome: Accepted

## 2024-08-15 ENCOUNTER — TELEPHONE (OUTPATIENT)
Dept: FAMILY MEDICINE CLINIC | Facility: CLINIC | Age: 49
End: 2024-08-15

## 2024-08-15 NOTE — TELEPHONE ENCOUNTER
Pt came into office requesting medication refill for       melatonin 3 mg     metoclopramide (REGLAN) 5 mg tablet     oxyCODONE (ROXICODONE) 10 MG TABS     She also stated a muscle relaxer    Please send to pharmacy on file

## 2024-08-21 DIAGNOSIS — F11.90 OPIOID USE: ICD-10-CM

## 2024-08-21 DIAGNOSIS — K70.11 ALCOHOLIC HEPATITIS WITH ASCITES: ICD-10-CM

## 2024-08-21 DIAGNOSIS — K74.60 LIVER CIRRHOSIS (HCC): ICD-10-CM

## 2024-08-21 DIAGNOSIS — G47.00 INSOMNIA, UNSPECIFIED TYPE: ICD-10-CM

## 2024-08-21 DIAGNOSIS — S37.13XS URETERAL LACERATION, SEQUELA: ICD-10-CM

## 2024-08-21 DIAGNOSIS — Z86.718 HISTORY OF DVT (DEEP VEIN THROMBOSIS): Chronic | ICD-10-CM

## 2024-08-21 RX ORDER — WARFARIN SODIUM 5 MG/1
TABLET ORAL
Qty: 90 TABLET | Refills: 0 | Status: SHIPPED | OUTPATIENT
Start: 2024-08-21 | End: 2024-08-22 | Stop reason: SDUPTHER

## 2024-08-21 RX ORDER — WARFARIN SODIUM 2.5 MG/1
TABLET ORAL
Qty: 90 TABLET | Refills: 0 | Status: SHIPPED | OUTPATIENT
Start: 2024-08-21 | End: 2024-08-22 | Stop reason: SDUPTHER

## 2024-08-21 RX ORDER — PANTOPRAZOLE SODIUM 40 MG/1
40 TABLET, DELAYED RELEASE ORAL DAILY
Qty: 90 TABLET | Refills: 0 | Status: SHIPPED | OUTPATIENT
Start: 2024-08-21 | End: 2024-08-22 | Stop reason: SDUPTHER

## 2024-08-21 RX ORDER — LANOLIN ALCOHOL/MO/W.PET/CERES
3 CREAM (GRAM) TOPICAL
Qty: 90 TABLET | Refills: 0 | Status: SHIPPED | OUTPATIENT
Start: 2024-08-21 | End: 2024-08-22 | Stop reason: SDUPTHER

## 2024-08-21 RX ORDER — FUROSEMIDE 20 MG
20 TABLET ORAL DAILY
Qty: 90 TABLET | Refills: 0 | Status: SHIPPED | OUTPATIENT
Start: 2024-08-21 | End: 2024-08-22 | Stop reason: SDUPTHER

## 2024-08-21 NOTE — TELEPHONE ENCOUNTER
"Pt called requesting     oxyCODONE (ROXICODONE) 10 MG TABS   Pt asks to please send medication because they state they are in severe pain.     Last sent on 8/1/24  Last visit: 6/19/24    Pt is also requesting a muscle relaxant medication. Pt spelled it as \"mechocarbamol\", but can't find under medication list.     Please Advise,   Thank you!       Patient came into office to request if medications could be sent to pharmacy.    LINDSAY Jenkins is not in office.  would you be able to assist with these medication refills?    Please advise?  "

## 2024-08-21 NOTE — TELEPHONE ENCOUNTER
"Pt called requesting     oxyCODONE (ROXICODONE) 10 MG TABS   Pt asks to please send medication because they state they are in severe pain.     Last sent on 8/1/24  Last visit: 6/19/24    Pt is also requesting a muscle relaxant medication. Pt spelled it as \"mechocarbamol\", but can't find under medication list.     Please Advise,   Thank you!   "

## 2024-08-22 ENCOUNTER — HOSPITAL ENCOUNTER (EMERGENCY)
Facility: HOSPITAL | Age: 49
Discharge: HOME | End: 2024-08-22
Attending: EMERGENCY MEDICINE
Payer: COMMERCIAL

## 2024-08-22 ENCOUNTER — APPOINTMENT (EMERGENCY)
Dept: NON INVASIVE DIAGNOSTICS | Facility: HOSPITAL | Age: 49
End: 2024-08-22
Attending: EMERGENCY MEDICINE
Payer: COMMERCIAL

## 2024-08-22 ENCOUNTER — OFFICE VISIT (OUTPATIENT)
Dept: FAMILY MEDICINE CLINIC | Facility: CLINIC | Age: 49
End: 2024-08-22

## 2024-08-22 VITALS
WEIGHT: 206.4 LBS | DIASTOLIC BLOOD PRESSURE: 70 MMHG | SYSTOLIC BLOOD PRESSURE: 120 MMHG | OXYGEN SATURATION: 99 % | HEART RATE: 90 BPM | BODY MASS INDEX: 33.17 KG/M2 | TEMPERATURE: 96.3 F | RESPIRATION RATE: 16 BRPM | HEIGHT: 66 IN

## 2024-08-22 VITALS
TEMPERATURE: 97.9 F | SYSTOLIC BLOOD PRESSURE: 127 MMHG | HEART RATE: 67 BPM | RESPIRATION RATE: 12 BRPM | DIASTOLIC BLOOD PRESSURE: 73 MMHG | OXYGEN SATURATION: 97 % | WEIGHT: 207.23 LBS | BODY MASS INDEX: 33.45 KG/M2

## 2024-08-22 DIAGNOSIS — G47.00 INSOMNIA, UNSPECIFIED TYPE: ICD-10-CM

## 2024-08-22 DIAGNOSIS — K70.10 ALCOHOLIC HEPATITIS WITHOUT ASCITES: ICD-10-CM

## 2024-08-22 DIAGNOSIS — E55.9 VITAMIN D DEFICIENCY: ICD-10-CM

## 2024-08-22 DIAGNOSIS — K74.60 LIVER CIRRHOSIS (HCC): ICD-10-CM

## 2024-08-22 DIAGNOSIS — E51.9 THIAMINE DEFICIENCY: ICD-10-CM

## 2024-08-22 DIAGNOSIS — K65.1 INTRA-ABDOMINAL ABSCESS (HCC): ICD-10-CM

## 2024-08-22 DIAGNOSIS — M79.89 SWELLING OF LOWER EXTREMITY: Primary | ICD-10-CM

## 2024-08-22 DIAGNOSIS — F10.10 ALCOHOL ABUSE: ICD-10-CM

## 2024-08-22 DIAGNOSIS — E53.8 VITAMIN B 12 DEFICIENCY: ICD-10-CM

## 2024-08-22 DIAGNOSIS — Z86.718 HISTORY OF DVT (DEEP VEIN THROMBOSIS): ICD-10-CM

## 2024-08-22 DIAGNOSIS — K70.31 ALCOHOLIC CIRRHOSIS OF LIVER WITH ASCITES (HCC): ICD-10-CM

## 2024-08-22 DIAGNOSIS — F43.10 POSTTRAUMATIC STRESS DISORDER: ICD-10-CM

## 2024-08-22 DIAGNOSIS — R11.2 NAUSEA AND VOMITING, UNSPECIFIED VOMITING TYPE: ICD-10-CM

## 2024-08-22 DIAGNOSIS — M79.89 LEG SWELLING: Primary | ICD-10-CM

## 2024-08-22 DIAGNOSIS — K70.11 ALCOHOLIC HEPATITIS WITH ASCITES: ICD-10-CM

## 2024-08-22 DIAGNOSIS — S31.139D GUNSHOT WOUND OF ABDOMEN, SUBSEQUENT ENCOUNTER: ICD-10-CM

## 2024-08-22 LAB
ALBUMIN SERPL BCG-MCNC: 3.6 G/DL (ref 3.5–5)
ALP SERPL-CCNC: 104 U/L (ref 34–104)
ALT SERPL W P-5'-P-CCNC: 16 U/L (ref 7–52)
ANION GAP SERPL CALCULATED.3IONS-SCNC: 7 MMOL/L (ref 4–13)
APTT PPP: 58 SECONDS (ref 23–34)
AST SERPL W P-5'-P-CCNC: 26 U/L (ref 13–39)
BASOPHILS # BLD AUTO: 0.01 THOUSANDS/ÂΜL (ref 0–0.1)
BASOPHILS NFR BLD AUTO: 0 % (ref 0–1)
BILIRUB SERPL-MCNC: 0.33 MG/DL (ref 0.2–1)
BUN SERPL-MCNC: 17 MG/DL (ref 5–25)
CALCIUM SERPL-MCNC: 9.1 MG/DL (ref 8.4–10.2)
CHLORIDE SERPL-SCNC: 102 MMOL/L (ref 96–108)
CO2 SERPL-SCNC: 29 MMOL/L (ref 21–32)
CREAT SERPL-MCNC: 0.74 MG/DL (ref 0.6–1.3)
EOSINOPHIL # BLD AUTO: 0 THOUSAND/ÂΜL (ref 0–0.61)
EOSINOPHIL NFR BLD AUTO: 0 % (ref 0–6)
ERYTHROCYTE [DISTWIDTH] IN BLOOD BY AUTOMATED COUNT: 15 % (ref 11.6–15.1)
GFR SERPL CREATININE-BSD FRML MDRD: 96 ML/MIN/1.73SQ M
GLUCOSE SERPL-MCNC: 90 MG/DL (ref 65–140)
HCT VFR BLD AUTO: 33.6 % (ref 34.8–46.1)
HGB BLD-MCNC: 10.8 G/DL (ref 11.5–15.4)
IMM GRANULOCYTES # BLD AUTO: 0.02 THOUSAND/UL (ref 0–0.2)
IMM GRANULOCYTES NFR BLD AUTO: 0 % (ref 0–2)
INR PPP: 4.18 (ref 0.85–1.19)
LYMPHOCYTES # BLD AUTO: 2.43 THOUSANDS/ÂΜL (ref 0.6–4.47)
LYMPHOCYTES NFR BLD AUTO: 38 % (ref 14–44)
MCH RBC QN AUTO: 31.5 PG (ref 26.8–34.3)
MCHC RBC AUTO-ENTMCNC: 32.1 G/DL (ref 31.4–37.4)
MCV RBC AUTO: 98 FL (ref 82–98)
MONOCYTES # BLD AUTO: 0.51 THOUSAND/ÂΜL (ref 0.17–1.22)
MONOCYTES NFR BLD AUTO: 8 % (ref 4–12)
NEUTROPHILS # BLD AUTO: 3.37 THOUSANDS/ÂΜL (ref 1.85–7.62)
NEUTS SEG NFR BLD AUTO: 54 % (ref 43–75)
NRBC BLD AUTO-RTO: 0 /100 WBCS
PLATELET # BLD AUTO: 385 THOUSANDS/UL (ref 149–390)
PMV BLD AUTO: 9.4 FL (ref 8.9–12.7)
POTASSIUM SERPL-SCNC: 4.3 MMOL/L (ref 3.5–5.3)
PROT SERPL-MCNC: 7.5 G/DL (ref 6.4–8.4)
PROTHROMBIN TIME: 39.6 SECONDS (ref 12.3–15)
RBC # BLD AUTO: 3.43 MILLION/UL (ref 3.81–5.12)
SODIUM SERPL-SCNC: 138 MMOL/L (ref 135–147)
WBC # BLD AUTO: 6.34 THOUSAND/UL (ref 4.31–10.16)

## 2024-08-22 PROCEDURE — 3078F DIAST BP <80 MM HG: CPT | Performed by: STUDENT IN AN ORGANIZED HEALTH CARE EDUCATION/TRAINING PROGRAM

## 2024-08-22 PROCEDURE — 85025 COMPLETE CBC W/AUTO DIFF WBC: CPT | Performed by: EMERGENCY MEDICINE

## 2024-08-22 PROCEDURE — 99285 EMERGENCY DEPT VISIT HI MDM: CPT | Performed by: EMERGENCY MEDICINE

## 2024-08-22 PROCEDURE — 99284 EMERGENCY DEPT VISIT MOD MDM: CPT

## 2024-08-22 PROCEDURE — 99213 OFFICE O/P EST LOW 20 MIN: CPT | Performed by: STUDENT IN AN ORGANIZED HEALTH CARE EDUCATION/TRAINING PROGRAM

## 2024-08-22 PROCEDURE — 3074F SYST BP LT 130 MM HG: CPT | Performed by: STUDENT IN AN ORGANIZED HEALTH CARE EDUCATION/TRAINING PROGRAM

## 2024-08-22 PROCEDURE — 93971 EXTREMITY STUDY: CPT

## 2024-08-22 PROCEDURE — 85730 THROMBOPLASTIN TIME PARTIAL: CPT | Performed by: EMERGENCY MEDICINE

## 2024-08-22 PROCEDURE — 96374 THER/PROPH/DIAG INJ IV PUSH: CPT

## 2024-08-22 PROCEDURE — 80053 COMPREHEN METABOLIC PANEL: CPT | Performed by: EMERGENCY MEDICINE

## 2024-08-22 PROCEDURE — 36415 COLL VENOUS BLD VENIPUNCTURE: CPT | Performed by: EMERGENCY MEDICINE

## 2024-08-22 PROCEDURE — 85610 PROTHROMBIN TIME: CPT | Performed by: EMERGENCY MEDICINE

## 2024-08-22 RX ORDER — OXYCODONE AND ACETAMINOPHEN 5; 325 MG/1; MG/1
1 TABLET ORAL EVERY 8 HOURS PRN
Qty: 9 TABLET | Refills: 0 | Status: SHIPPED | OUTPATIENT
Start: 2024-08-22 | End: 2024-08-25

## 2024-08-22 RX ORDER — ONDANSETRON 8 MG/1
8 TABLET, FILM COATED ORAL EVERY 8 HOURS PRN
Qty: 30 TABLET | Refills: 0 | Status: SHIPPED | OUTPATIENT
Start: 2024-08-22

## 2024-08-22 RX ORDER — WARFARIN SODIUM 5 MG/1
TABLET ORAL
Qty: 90 TABLET | Refills: 0 | Status: SHIPPED | OUTPATIENT
Start: 2024-08-22

## 2024-08-22 RX ORDER — SPIRONOLACTONE 50 MG/1
50 TABLET, FILM COATED ORAL DAILY
Qty: 90 TABLET | Refills: 0 | Status: SHIPPED | OUTPATIENT
Start: 2024-08-22

## 2024-08-22 RX ORDER — LANOLIN ALCOHOL/MO/W.PET/CERES
3 CREAM (GRAM) TOPICAL
Qty: 90 TABLET | Refills: 0 | Status: SHIPPED | OUTPATIENT
Start: 2024-08-22

## 2024-08-22 RX ORDER — MORPHINE SULFATE 4 MG/ML
4 INJECTION, SOLUTION INTRAMUSCULAR; INTRAVENOUS ONCE
Status: COMPLETED | OUTPATIENT
Start: 2024-08-22 | End: 2024-08-22

## 2024-08-22 RX ORDER — CYANOCOBALAMIN (VITAMIN B-12) 1000 MCG
1 TABLET ORAL DAILY
Qty: 90 TABLET | Refills: 0 | Status: SHIPPED | OUTPATIENT
Start: 2024-08-22

## 2024-08-22 RX ORDER — NYSTATIN 100000 [USP'U]/G
POWDER TOPICAL 2 TIMES DAILY
Qty: 60 G | Refills: 0 | Status: SHIPPED | OUTPATIENT
Start: 2024-08-22 | End: 2024-08-27 | Stop reason: SDUPTHER

## 2024-08-22 RX ORDER — FUROSEMIDE 20 MG
20 TABLET ORAL DAILY
Qty: 90 TABLET | Refills: 0 | Status: SHIPPED | OUTPATIENT
Start: 2024-08-22

## 2024-08-22 RX ORDER — PANTOPRAZOLE SODIUM 40 MG/1
40 TABLET, DELAYED RELEASE ORAL DAILY
Qty: 90 TABLET | Refills: 0 | Status: SHIPPED | OUTPATIENT
Start: 2024-08-22 | End: 2024-08-27 | Stop reason: SDUPTHER

## 2024-08-22 RX ORDER — WARFARIN SODIUM 2.5 MG/1
TABLET ORAL
Qty: 90 TABLET | Refills: 0 | Status: SHIPPED | OUTPATIENT
Start: 2024-08-22

## 2024-08-22 RX ORDER — METHION/INOS/CHOL BT/B COM/LIV 110MG-86MG
100 CAPSULE ORAL DAILY
Qty: 90 TABLET | Refills: 0 | Status: SHIPPED | OUTPATIENT
Start: 2024-08-22

## 2024-08-22 RX ORDER — FOLIC ACID 1 MG/1
1 TABLET ORAL DAILY
Qty: 90 TABLET | Refills: 0 | Status: SHIPPED | OUTPATIENT
Start: 2024-08-22

## 2024-08-22 RX ADMIN — MORPHINE SULFATE 4 MG: 4 INJECTION, SOLUTION INTRAMUSCULAR; INTRAVENOUS at 12:50
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
## 2024-08-22 NOTE — Clinical Note
Mckenna Fischer was seen and treated in our emergency department on 8/22/2024.                Diagnosis:     Mckenna  may return to work on return date.    She may return on this date: 08/26/2024         If you have any questions or concerns, please don't hesitate to call.      Chong Watt MD    ______________________________           _______________          _______________  Hospital Representative                              Date                                Time
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
## 2024-08-24 PROBLEM — R20.2 PARESTHESIA OF LOWER EXTREMITY: Status: RESOLVED | Noted: 2018-06-08 | Resolved: 2024-08-24

## 2024-08-24 NOTE — ASSESSMENT & PLAN NOTE
Significant LLE swelling  Patient endorses has been worsening over the past week although stable  Patient requesting refills of all her medications  Suspect missing a Lasix dose    Plan:  -ED eval to rule out recurrent DVT  -All her medications are refilled

## 2024-08-26 ENCOUNTER — OFFICE VISIT (OUTPATIENT)
Dept: GASTROENTEROLOGY | Facility: CLINIC | Age: 49
End: 2024-08-26
Payer: COMMERCIAL

## 2024-08-26 ENCOUNTER — TELEPHONE (OUTPATIENT)
Dept: GASTROENTEROLOGY | Facility: CLINIC | Age: 49
End: 2024-08-26

## 2024-08-26 VITALS
BODY MASS INDEX: 33.37 KG/M2 | DIASTOLIC BLOOD PRESSURE: 77 MMHG | HEART RATE: 80 BPM | OXYGEN SATURATION: 99 % | HEIGHT: 66 IN | TEMPERATURE: 98.8 F | WEIGHT: 207.6 LBS | SYSTOLIC BLOOD PRESSURE: 116 MMHG

## 2024-08-26 DIAGNOSIS — K70.31 ALCOHOLIC CIRRHOSIS OF LIVER WITH ASCITES (HCC): Primary | ICD-10-CM

## 2024-08-26 DIAGNOSIS — Z12.11 SCREENING FOR COLON CANCER: ICD-10-CM

## 2024-08-26 DIAGNOSIS — R19.4 FREQUENT BOWEL MOVEMENTS: ICD-10-CM

## 2024-08-26 PROCEDURE — 99244 OFF/OP CNSLTJ NEW/EST MOD 40: CPT | Performed by: INTERNAL MEDICINE

## 2024-08-26 PROCEDURE — 93971 EXTREMITY STUDY: CPT | Performed by: INTERNAL MEDICINE

## 2024-08-26 RX ORDER — METHOCARBAMOL 500 MG/1
500 TABLET, FILM COATED ORAL AS NEEDED
COMMUNITY
End: 2024-08-27 | Stop reason: SDUPTHER

## 2024-08-26 RX ORDER — POLYETHYLENE GLYCOL 3350, SODIUM CHLORIDE, SODIUM BICARBONATE, POTASSIUM CHLORIDE 420; 11.2; 5.72; 1.48 G/4L; G/4L; G/4L; G/4L
4000 POWDER, FOR SOLUTION ORAL ONCE
Qty: 4000 ML | Refills: 0 | Status: SHIPPED | OUTPATIENT
Start: 2024-08-26 | End: 2024-08-26

## 2024-08-26 RX ORDER — OXYCODONE HYDROCHLORIDE 10 MG/1
10 TABLET ORAL 2 TIMES DAILY PRN
Qty: 30 TABLET | Refills: 0 | OUTPATIENT
Start: 2024-08-26

## 2024-08-26 RX ORDER — LOPERAMIDE HCL 2 MG
2 CAPSULE ORAL 4 TIMES DAILY PRN
Qty: 30 CAPSULE | Refills: 0 | Status: SHIPPED | OUTPATIENT
Start: 2024-08-26

## 2024-08-26 NOTE — TELEPHONE ENCOUNTER
Our mutual patient, Mckenna Fischer, is scheduled for the following   procedure: EGD/Colonoscopy              On: 09/12/2024              With: Dr. Fneg    Mckenna Fischer is taking the following blood thinner: Coumadin       Can this be stopped 5 days prior to the procedure?         Thank you,  Roselia HSIEH  [facility]
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
## 2024-08-27 ENCOUNTER — TELEPHONE (OUTPATIENT)
Dept: FAMILY MEDICINE CLINIC | Facility: CLINIC | Age: 49
End: 2024-08-27

## 2024-08-27 ENCOUNTER — OFFICE VISIT (OUTPATIENT)
Dept: FAMILY MEDICINE CLINIC | Facility: CLINIC | Age: 49
End: 2024-08-27

## 2024-08-27 VITALS
HEART RATE: 77 BPM | DIASTOLIC BLOOD PRESSURE: 70 MMHG | SYSTOLIC BLOOD PRESSURE: 120 MMHG | HEIGHT: 66 IN | OXYGEN SATURATION: 99 % | RESPIRATION RATE: 18 BRPM | TEMPERATURE: 98.7 F | BODY MASS INDEX: 33.75 KG/M2 | WEIGHT: 210 LBS

## 2024-08-27 DIAGNOSIS — E53.8 VITAMIN B 12 DEFICIENCY: Chronic | ICD-10-CM

## 2024-08-27 DIAGNOSIS — F11.90 OPIOID USE: ICD-10-CM

## 2024-08-27 DIAGNOSIS — E51.9 THIAMINE DEFICIENCY: Chronic | ICD-10-CM

## 2024-08-27 DIAGNOSIS — S37.13XS URETERAL LACERATION, SEQUELA: ICD-10-CM

## 2024-08-27 DIAGNOSIS — L30.4 INTERTRIGO: Primary | ICD-10-CM

## 2024-08-27 DIAGNOSIS — Z86.718 HISTORY OF DVT (DEEP VEIN THROMBOSIS): Chronic | ICD-10-CM

## 2024-08-27 DIAGNOSIS — D68.61 ANTIPHOSPHOLIPID SYNDROME (HCC): Chronic | ICD-10-CM

## 2024-08-27 DIAGNOSIS — F43.10 POSTTRAUMATIC STRESS DISORDER: Chronic | ICD-10-CM

## 2024-08-27 DIAGNOSIS — E55.9 VITAMIN D DEFICIENCY: Chronic | ICD-10-CM

## 2024-08-27 DIAGNOSIS — K65.1 INTRA-ABDOMINAL ABSCESS (HCC): ICD-10-CM

## 2024-08-27 DIAGNOSIS — S31.139D GUNSHOT WOUND OF ABDOMEN, SUBSEQUENT ENCOUNTER: Chronic | ICD-10-CM

## 2024-08-27 DIAGNOSIS — K74.60 LIVER CIRRHOSIS (HCC): ICD-10-CM

## 2024-08-27 DIAGNOSIS — R10.9 NONSPECIFIC ABDOMINAL PAIN: ICD-10-CM

## 2024-08-27 DIAGNOSIS — D50.8 OTHER IRON DEFICIENCY ANEMIAS: Chronic | ICD-10-CM

## 2024-08-27 PROCEDURE — 3074F SYST BP LT 130 MM HG: CPT | Performed by: PHYSICIAN ASSISTANT

## 2024-08-27 PROCEDURE — 99215 OFFICE O/P EST HI 40 MIN: CPT | Performed by: PHYSICIAN ASSISTANT

## 2024-08-27 PROCEDURE — 3078F DIAST BP <80 MM HG: CPT | Performed by: PHYSICIAN ASSISTANT

## 2024-08-27 RX ORDER — METHOCARBAMOL 500 MG/1
500 TABLET, FILM COATED ORAL 2 TIMES DAILY PRN
Qty: 60 TABLET | Refills: 2 | Status: SHIPPED | OUTPATIENT
Start: 2024-08-27 | End: 2024-09-13 | Stop reason: SDUPTHER

## 2024-08-27 RX ORDER — OXYCODONE HYDROCHLORIDE 10 MG/1
10 TABLET ORAL 2 TIMES DAILY PRN
Qty: 30 TABLET | Refills: 0 | Status: SHIPPED | OUTPATIENT
Start: 2024-08-27 | End: 2024-09-13 | Stop reason: SDUPTHER

## 2024-08-27 RX ORDER — NYSTATIN 100000 [USP'U]/G
POWDER TOPICAL 2 TIMES DAILY
Qty: 60 G | Refills: 2 | Status: SHIPPED | OUTPATIENT
Start: 2024-08-27 | End: 2024-09-13 | Stop reason: SDUPTHER

## 2024-08-27 RX ORDER — PANTOPRAZOLE SODIUM 40 MG/1
40 TABLET, DELAYED RELEASE ORAL DAILY
Qty: 90 TABLET | Refills: 1 | Status: SHIPPED | OUTPATIENT
Start: 2024-08-27 | End: 2024-09-13 | Stop reason: SDUPTHER
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
## 2024-08-27 NOTE — TELEPHONE ENCOUNTER
"Noted pt obtained an \"extra\" INR last week    Provided recommendation via phone msg (no answer)to hold a dose if not already done    F/u as planned tomorrow with another inr     Will need to determine if any changes, if dose was reduce correctly, etc    Pharmacist Tracking Tool  Reason For Outreach: Embedded Pharmacist  Demographics:  Intervention Method: Phone  Type of Intervention: Follow-Up  Topics Addressed: Anticoagulation  Pharmacologic Interventions: Dose or Frequency Adjusted and Prevent or Manage TROY  Non-Pharmacologic Interventions: Care coordination, Labs, and Medication Monitoring  Time:  Direct Patient Care:  5  mins  Care Coordination:  5  mins  Recommendation Recipient: Patient/Caregiver  Outcome: Accepted     Emanuel Gardner, PharmD, BCACP  Ambulatory Care Clinical Pharmacist     "

## 2024-08-28 ENCOUNTER — ANTICOAG VISIT (OUTPATIENT)
Dept: FAMILY MEDICINE CLINIC | Facility: CLINIC | Age: 49
End: 2024-08-28

## 2024-08-28 NOTE — PROGRESS NOTES
[facility]  [address] [place] [address], [address]  [facility] [id number] [phone]  Clinical Pharmacy Anticoagulation Consultation  Encounter provider: Emanuel Gardner, Pharmacist  Assessment/Plan  INR: 4.18. Supratherapeutic INR for goal of 2.0-3.0  Surprising as dose was just reduce,  Current warfarin dose: 10 mg every Sun, Tue, Fri; 7.5 mg all other days   Pt confirms dose was reduced    New dose: no change, was already held as ed instructed  Updated tracker   Recheck INR in 2 weeks  Counseled patient on compliance with medications, and consistency with vitamin k rich foods intake.    Subjective  Indication: DVT hx, antiphospholipid syndrome   Bleeding signs/symptoms: no  Thrombosis signs/symptoms: no    Missed doses: no  Medication changes: no  Dietary changes: no  Bacterial/viral infection: no  Recent alcohol consumption: no  Other concerns: no    Did discuss pt recent ED visit as pt had edema etc, pt followed up with GI appropriately     Objective  Lab Results   Component Value Date/Time    INR 4.18 (H) 08/22/2024 12:47 PM    INR 4.00 (H) 08/08/2024 02:28 PM    INR 2.88 (H) 07/05/2024 01:30 PM    INR 2.1 03/28/2024 03:17 AM    INR 1.8 03/27/2024 06:07 AM    INR 1.3 03/25/2024 04:52 AM    HGB 10.8 (L) 08/22/2024 12:47 PM    HGB 9.4 (L) 02/28/2024 02:58 PM    HGB 7.5 (L) 01/11/2024 04:53 AM    HCT 33.6 (L) 08/22/2024 12:47 PM    HCT 29.5 (L) 02/28/2024 02:58 PM    HCT 23.2 (L) 01/11/2024 04:53 AM    CREATININE 0.74 08/22/2024 12:47 PM    CREATININE 0.70 04/12/2024 07:17 AM    CREATININE 0.67 03/29/2024 04:19 AM    CREATININE 0.51 03/28/2024 03:17 AM    CREATININE 0.52 03/27/2024 06:07 AM    EGFR 96 08/22/2024 12:47 PM    EGFR 102 04/12/2024 07:17 AM    EGFR 108 03/29/2024 04:19 AM    EGFR 115 03/28/2024 03:17 AM    EGFR 114 03/27/2024 06:07 AM    EGFR 77 06/26/2018 05:40 PM         Emanuel Gardner,  Pharmacist    -----------------------------------------------------    Pharmacist Tracking Tool  Reason For Outreach: Embedded Pharmacist  Demographics:  Intervention Method: Phone  Type of Intervention: Follow-Up  Topics Addressed: Anticoagulation  Pharmacologic Interventions: Prevent or Manage TROY  Non-Pharmacologic Interventions: Care coordination, Labs, and Medication Monitoring  Time:  Direct Patient Care:  10  mins  Care Coordination:  10  mins  Recommendation Recipient: Patient/Caregiver  Outcome: Accepted

## 2024-08-30 ENCOUNTER — TELEPHONE (OUTPATIENT)
Dept: FAMILY MEDICINE CLINIC | Facility: CLINIC | Age: 49
End: 2024-08-30

## 2024-08-30 NOTE — TELEPHONE ENCOUNTER
Received completed Health Sustaining Medication Assessment From.       Patient notified form is ready for .   Form scanned into patient chart  Form placed in  bin

## 2024-09-02 NOTE — ASSESSMENT & PLAN NOTE
-Reviewed gastroenterology note from 8/26/2024.  Patient scheduled for EGD and right upper quadrant ultrasound.  Per chart review, patient may have progressed to a normal liver versus early stage cirrhosis.  -Continue with abstinence from alcohol.  Avoid NSAIDs.  - Continue follow-up with gastroenterology as scheduled.  - Continue Lasix 20 mg daily and spironolactone 50 mg daily.

## 2024-09-02 NOTE — ASSESSMENT & PLAN NOTE
- Patient has history of multiple episodes of DVT/PE dating back as far as 2003.   -Reviewed ED visit from 8/22/2024 due to left leg swelling.  -Reviewed venous duplex of left lower extremity which is negative for DVT.  -Swelling has decreased.  Advised patient she could return to taking Lasix 20 mg once daily instead of twice daily.  - Continue using compression stockings daily as well as elevation.  - Continue follow-up with hematology/oncology as scheduled.

## 2024-09-03 ENCOUNTER — TELEPHONE (OUTPATIENT)
Dept: GASTROENTEROLOGY | Facility: CLINIC | Age: 49
End: 2024-09-03

## 2024-09-04 ENCOUNTER — APPOINTMENT (OUTPATIENT)
Dept: LAB | Facility: CLINIC | Age: 49
End: 2024-09-04
Payer: COMMERCIAL

## 2024-09-04 DIAGNOSIS — Z86.718 HISTORY OF DVT (DEEP VEIN THROMBOSIS): Chronic | ICD-10-CM

## 2024-09-04 LAB
INR PPP: 2.79 (ref 0.85–1.19)
PROTHROMBIN TIME: 29.2 SECONDS (ref 12.3–15)

## 2024-09-04 PROCEDURE — 85610 PROTHROMBIN TIME: CPT

## 2024-09-04 PROCEDURE — 36415 COLL VENOUS BLD VENIPUNCTURE: CPT

## 2024-09-05 ENCOUNTER — ANTICOAG VISIT (OUTPATIENT)
Dept: FAMILY MEDICINE CLINIC | Facility: CLINIC | Age: 49
End: 2024-09-05

## 2024-09-05 DIAGNOSIS — Z86.718 HISTORY OF DVT (DEEP VEIN THROMBOSIS): Chronic | ICD-10-CM

## 2024-09-05 DIAGNOSIS — Z86.711 HISTORY OF PULMONARY EMBOLUS (PE): Primary | ICD-10-CM

## 2024-09-05 RX ORDER — ENOXAPARIN SODIUM 100 MG/ML
INJECTION SUBCUTANEOUS
Qty: 8 ML | Refills: 0 | Status: SHIPPED | OUTPATIENT
Start: 2024-09-05

## 2024-09-05 NOTE — PROGRESS NOTES
[facility]  [address] [place] [address], [address]  [facility] [id number] [phone]  Clinical Pharmacy Anticoagulation Consultation  Encounter provider: Emanuel Gardner, Pharmacist    Spoke via phone     Assessment/Plan  INR: 2.79. Therapeutic INR for goal of 2.0-3.0  Current warfarin dose: 10 mg every Sun, Tue, Fri; 7.5 mg all other days - HOWEVER, pt was not taking this. She was taking 7.5 mg daily   New dose: CONTINUE 7.5 mg daily - tracker updated   Made aware today of colonscopy 9/12 and full 5 day warfarin hold.   Pt is high risk with multiple DVT hx. Recommend lovenox bridge as an option. Discussed, pt uncomfortable already jhonatan off warfarin for multiple days, recommend lovenox bridge. See below   Recheck INR in 2 weeks  9/19 INR, one week post procedure   Counseled patient on compliance with medications, and consistency with vitamin k rich foods intake.     See letters section for specific lovenox bridge instructions. Faxed to pharmacy along with rx     Patient then gave a verbal and actual demonstration of technique & 10 step process, alternation of injection site, side effects and understanding of dosing calendar, and disposal of syringes & needles.  Patient self-administered own LMWH injection in clinic without incident.  No ADR reported.     Actual Body Weight 93kg     Labs Drawn on 8/22  Serem creatinine:  Estimated cratinine clearance: >> 30     Lovenox Dose: 80 mg twice daily (rounded down as only 80 or 100 mg syringes available)      Reviewed mechanism of action, adverse effects, benefits, risks, administration, frequency, and likely duration of therapy of lovenox. Benefits/risks of bridging vs not bridging. Pt aware and agreeable.     Subjective  Indication:  hx of multiple DVT  Bleeding signs/symptoms: no  Thrombosis signs/symptoms: no    Missed doses: no  Medication changes: no  Dietary changes: no  Bacterial/viral infection: no  Recent alcohol consumption:  no  Other concerns: no    Objective  Lab Results   Component Value Date/Time    INR 2.79 (H) 09/04/2024 02:29 PM    INR 4.18 (H) 08/22/2024 12:47 PM    INR 4.00 (H) 08/08/2024 02:28 PM    INR 2.1 03/28/2024 03:17 AM    INR 1.8 03/27/2024 06:07 AM    INR 1.3 03/25/2024 04:52 AM    HGB 10.8 (L) 08/22/2024 12:47 PM    HGB 9.4 (L) 02/28/2024 02:58 PM    HGB 7.5 (L) 01/11/2024 04:53 AM    HCT 33.6 (L) 08/22/2024 12:47 PM    HCT 29.5 (L) 02/28/2024 02:58 PM    HCT 23.2 (L) 01/11/2024 04:53 AM    CREATININE 0.74 08/22/2024 12:47 PM    CREATININE 0.70 04/12/2024 07:17 AM    CREATININE 0.67 03/29/2024 04:19 AM    CREATININE 0.51 03/28/2024 03:17 AM    CREATININE 0.52 03/27/2024 06:07 AM    EGFR 96 08/22/2024 12:47 PM    EGFR 102 04/12/2024 07:17 AM    EGFR 108 03/29/2024 04:19 AM    EGFR 115 03/28/2024 03:17 AM    EGFR 114 03/27/2024 06:07 AM    EGFR 77 06/26/2018 05:40 PM         Michael Wood    --------------------------------------    Pharmacist Tracking Tool  Reason For Outreach: Embedded Pharmacist  Demographics:  Intervention Method: Phone  Type of Intervention: Follow-Up  Topics Addressed: Anticoagulation  Pharmacologic Interventions: Dose or Frequency Adjusted  Non-Pharmacologic Interventions: Care coordination, Chart update, Disease state education, and Medication/Device education  Time:  Direct Patient Care:  10  mins  Care Coordination:  25  mins  Recommendation Recipient: Patient/Caregiver  Outcome: Accepted

## 2024-09-13 ENCOUNTER — TELEPHONE (OUTPATIENT)
Age: 49
End: 2024-09-13

## 2024-09-13 DIAGNOSIS — R10.9 NONSPECIFIC ABDOMINAL PAIN: ICD-10-CM

## 2024-09-13 DIAGNOSIS — S37.13XS URETERAL LACERATION, SEQUELA: ICD-10-CM

## 2024-09-13 DIAGNOSIS — L30.4 INTERTRIGO: ICD-10-CM

## 2024-09-13 DIAGNOSIS — E55.9 VITAMIN D DEFICIENCY: ICD-10-CM

## 2024-09-13 DIAGNOSIS — F11.90 OPIOID USE: ICD-10-CM

## 2024-09-13 DIAGNOSIS — K74.60 LIVER CIRRHOSIS (HCC): ICD-10-CM

## 2024-09-13 RX ORDER — NYSTATIN 100000 [USP'U]/G
POWDER TOPICAL 2 TIMES DAILY
Qty: 60 G | Refills: 0 | Status: SHIPPED | OUTPATIENT
Start: 2024-09-13

## 2024-09-13 RX ORDER — PANTOPRAZOLE SODIUM 40 MG/1
40 TABLET, DELAYED RELEASE ORAL DAILY
Qty: 90 TABLET | Refills: 0 | Status: SHIPPED | OUTPATIENT
Start: 2024-09-13

## 2024-09-13 RX ORDER — METHOCARBAMOL 500 MG/1
500 TABLET, FILM COATED ORAL 2 TIMES DAILY PRN
Qty: 60 TABLET | Refills: 0 | Status: SHIPPED | OUTPATIENT
Start: 2024-09-13

## 2024-09-13 RX ORDER — OXYCODONE HYDROCHLORIDE 10 MG/1
10 TABLET ORAL 2 TIMES DAILY PRN
Qty: 30 TABLET | Refills: 0 | Status: SHIPPED | OUTPATIENT
Start: 2024-09-13

## 2024-09-13 NOTE — TELEPHONE ENCOUNTER
Scheduled date of EGD/colonoscopy (as of today):09.26.24  Physician performing EGD/colonoscopy:Ping  Location of EGD/colonoscopy:  Desired bowel prep reviewed with patient:Akira  Instructions reviewed with patient by:Pt has instructions from office     Clearances:  Coumadin

## 2024-09-13 NOTE — TELEPHONE ENCOUNTER
Pt had a family emergency on her way to the proc yesterday, 09.12.24, she had already taken the Golytely prep so will need us to please send another script to the [facility] on file for her. She also take Coumadin so we may need a new approval to hold that. Pt still has the instructions for prep so wont need those.

## 2024-09-16 RX ORDER — MULTIVIT-MIN/IRON/FOLIC ACID/K 18-600-40
1000 CAPSULE ORAL DAILY
Qty: 90 TABLET | Refills: 0 | Status: SHIPPED | OUTPATIENT
Start: 2024-09-16

## 2024-09-16 RX ORDER — NYSTATIN TOPICAL POWDER 100000 U/G
1 POWDER TOPICAL 2 TIMES DAILY
Qty: 60 G | Refills: 0 | OUTPATIENT
Start: 2024-09-16

## 2024-09-19 ENCOUNTER — TELEPHONE (OUTPATIENT)
Dept: FAMILY MEDICINE CLINIC | Facility: CLINIC | Age: 49
End: 2024-09-19

## 2024-09-19 NOTE — TELEPHONE ENCOUNTER
Saw that patient's procedure was canceled due to personal matters    Contacted patient via phone at [phone]    No answer, left message inquiring if patient needs assistant's dose and warfarin    Procedure is rescheduled for 9/26.  Therefore warfarin hold will need to start 9/21.     Likely easiest to just stay on Lovenox in between procedures.  Patient did note that she already had some supply at home, she may have enough to hold her over.    Requested patient to call back if more is needed or if she needs assistance in warfarin dosing in any other way.      Emanuel Gardner, PharmD, BCACP  Ambulatory Care Clinical Pharmacist

## 2024-09-20 ENCOUNTER — TELEPHONE (OUTPATIENT)
Dept: GASTROENTEROLOGY | Facility: CLINIC | Age: 49
End: 2024-09-20

## 2024-09-20 DIAGNOSIS — Z12.11 SCREEN FOR COLON CANCER: Primary | ICD-10-CM

## 2024-09-20 NOTE — TELEPHONE ENCOUNTER
Spoke with pt. Who confirmed she is in receipt of and understanding Lovenox/coumadin instructions received from prescribe.      New golytely was needed due to procedure needing to be rescheduled.  Ordered, submitted for signature.

## 2024-09-24 DIAGNOSIS — L30.4 INTERTRIGO: ICD-10-CM

## 2024-09-24 DIAGNOSIS — Z86.711 HISTORY OF PULMONARY EMBOLUS (PE): ICD-10-CM

## 2024-09-24 DIAGNOSIS — Z86.718 HISTORY OF DVT (DEEP VEIN THROMBOSIS): Chronic | ICD-10-CM

## 2024-09-24 DIAGNOSIS — F11.90 OPIOID USE: ICD-10-CM

## 2024-09-24 DIAGNOSIS — S37.13XS URETERAL LACERATION, SEQUELA: ICD-10-CM

## 2024-09-24 DIAGNOSIS — R10.9 NONSPECIFIC ABDOMINAL PAIN: ICD-10-CM

## 2024-09-24 RX ORDER — ENOXAPARIN SODIUM 100 MG/ML
INJECTION SUBCUTANEOUS
Qty: 8 ML | Refills: 0 | Status: SHIPPED | OUTPATIENT
Start: 2024-09-24

## 2024-09-24 NOTE — TELEPHONE ENCOUNTER
Spoke to patient via phone, confirmed that she understands warfarin and Lovenox instructions    She appropriately is holding warfarin for her procedure right now, however she needs more Lovenox to cover her in the meantime    She will follow the instructions that were already given to her, and she is aware that she needs to simply change the procedure date, see [employer] message and fax letter for further details    -------------      Date Enoxaparin AM Enoxaparin PM Warfarin Notes   Pre-op Day -5 80 mg 80 mg HOLD     Pre-op Day -4 80 mg  80 mg HOLD     Pre-op Day -3 80 mg 80 mg HOLD     Pre-op Day -2 80 mg 80 mg HOLD     Pre-op Day -1   80 mg HOLD HOLD     Procedure Day  9/26 HOLD HOLD 12.5 mg  Confirm with surgery team anticoagulation can be restarted    Post-op Day +1   HOLD 80 mg 12.5 mg      Post-op Day +2   80 mg 80 mg 10 mg      Post-op Day +3   80 mg 80 mg 7.5 mg Return to weekly dose of warfarin. Lovenox is complete       Pharmacist Tracking Tool  Reason For Outreach: Embedded Pharmacist  Demographics:  Intervention Method: Phone  Type of Intervention: Follow-Up  Topics Addressed: Anticoagulation  Pharmacologic Interventions: N/A  Non-Pharmacologic Interventions: Care coordination  Time:  Direct Patient Care:  5  mins  Care Coordination:  5  mins  Recommendation Recipient: Patient/Caregiver and Provider  Outcome: Accepted    Martin WoodD, BCACP  Ambulatory Care Clinical Pharmacist

## 2024-09-25 RX ORDER — OXYCODONE HYDROCHLORIDE 10 MG/1
10 TABLET ORAL 2 TIMES DAILY PRN
Qty: 30 TABLET | Refills: 0 | Status: SHIPPED | OUTPATIENT
Start: 2024-09-25

## 2024-09-25 RX ORDER — NYSTATIN 100000 [USP'U]/G
POWDER TOPICAL 2 TIMES DAILY
Qty: 60 G | Refills: 0 | Status: SHIPPED | OUTPATIENT
Start: 2024-09-25

## 2024-09-25 RX ORDER — METHOCARBAMOL 500 MG/1
500 TABLET, FILM COATED ORAL 2 TIMES DAILY PRN
Qty: 60 TABLET | Refills: 0 | Status: SHIPPED | OUTPATIENT
Start: 2024-09-25

## 2024-10-02 ENCOUNTER — OFFICE VISIT (OUTPATIENT)
Dept: FAMILY MEDICINE CLINIC | Facility: CLINIC | Age: 49
End: 2024-10-02

## 2024-10-02 ENCOUNTER — DOCUMENTATION (OUTPATIENT)
Dept: HEMATOLOGY ONCOLOGY | Facility: CLINIC | Age: 49
End: 2024-10-02

## 2024-10-02 VITALS
DIASTOLIC BLOOD PRESSURE: 70 MMHG | BODY MASS INDEX: 34.55 KG/M2 | RESPIRATION RATE: 18 BRPM | TEMPERATURE: 98.6 F | HEART RATE: 75 BPM | HEIGHT: 66 IN | OXYGEN SATURATION: 99 % | WEIGHT: 215 LBS | SYSTOLIC BLOOD PRESSURE: 112 MMHG

## 2024-10-02 DIAGNOSIS — E53.8 VITAMIN B 12 DEFICIENCY: ICD-10-CM

## 2024-10-02 DIAGNOSIS — E55.9 VITAMIN D DEFICIENCY: Chronic | ICD-10-CM

## 2024-10-02 DIAGNOSIS — E51.9 THIAMINE DEFICIENCY: Chronic | ICD-10-CM

## 2024-10-02 DIAGNOSIS — K65.1 INTRA-ABDOMINAL ABSCESS (HCC): Chronic | ICD-10-CM

## 2024-10-02 DIAGNOSIS — R21 RASH: ICD-10-CM

## 2024-10-02 DIAGNOSIS — D50.8 OTHER IRON DEFICIENCY ANEMIAS: Primary | Chronic | ICD-10-CM

## 2024-10-02 DIAGNOSIS — Z86.718 HISTORY OF DVT (DEEP VEIN THROMBOSIS): Chronic | ICD-10-CM

## 2024-10-02 DIAGNOSIS — S31.139D GUNSHOT WOUND OF ABDOMEN, SUBSEQUENT ENCOUNTER: Chronic | ICD-10-CM

## 2024-10-02 DIAGNOSIS — K70.11 ALCOHOLIC HEPATITIS WITH ASCITES: ICD-10-CM

## 2024-10-02 DIAGNOSIS — D68.61 ANTIPHOSPHOLIPID SYNDROME (HCC): Chronic | ICD-10-CM

## 2024-10-02 DIAGNOSIS — F43.10 POSTTRAUMATIC STRESS DISORDER: Chronic | ICD-10-CM

## 2024-10-02 DIAGNOSIS — K70.31 ALCOHOLIC CIRRHOSIS OF LIVER WITH ASCITES (HCC): Chronic | ICD-10-CM

## 2024-10-02 PROCEDURE — 99214 OFFICE O/P EST MOD 30 MIN: CPT | Performed by: PHYSICIAN ASSISTANT

## 2024-10-02 PROCEDURE — 3074F SYST BP LT 130 MM HG: CPT | Performed by: PHYSICIAN ASSISTANT

## 2024-10-02 PROCEDURE — 3078F DIAST BP <80 MM HG: CPT | Performed by: PHYSICIAN ASSISTANT

## 2024-10-02 RX ORDER — TRIAMCINOLONE ACETONIDE 1 MG/G
CREAM TOPICAL 2 TIMES DAILY
Qty: 45 G | Refills: 1 | Status: SHIPPED | OUTPATIENT
Start: 2024-10-02

## 2024-10-02 RX ORDER — FUROSEMIDE 20 MG
20 TABLET ORAL 2 TIMES DAILY
Qty: 180 TABLET | Refills: 1 | Status: SHIPPED | OUTPATIENT
Start: 2024-10-02
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
## 2024-10-03 ENCOUNTER — TELEPHONE (OUTPATIENT)
Dept: FAMILY MEDICINE CLINIC | Facility: CLINIC | Age: 49
End: 2024-10-03

## 2024-10-03 NOTE — TELEPHONE ENCOUNTER
Spoke to patient via phone.    Explained unfortunately even though patient got labs yesterday, phlebotomist did not draw INR    Patient is willing to go to the lab today and get an INR    Follow-up later today    4:45 PM 10/03/24 no inr obtained    Check back Tuesday, left pt msg to make aware    Pharmacist Tracking Tool  Reason For Outreach: Embedded Pharmacist  Demographics:  Intervention Method: Phone  Type of Intervention: Follow-Up  Topics Addressed: Anticoagulation  Pharmacologic Interventions: Dose or Frequency Adjusted  Non-Pharmacologic Interventions: Care coordination, Home Monitoring, and Labs  Time:  Direct Patient Care:  5  mins  Care Coordination:  5  mins  Recommendation Recipient: Patient/Caregiver  Outcome: Accepted     Emanuel Gardner, PharmD, BCACP  Ambulatory Care Clinical Pharmacist

## 2024-10-04 ENCOUNTER — TELEPHONE (OUTPATIENT)
Age: 49
End: 2024-10-04

## 2024-10-04 NOTE — TELEPHONE ENCOUNTER
Called patient and informed her that we need to reschedule the visit for 10/9 to a consultation visit. Patient is at work and requested to call back. Gave [place] [phone] for patient to call back.

## 2024-10-04 NOTE — TELEPHONE ENCOUNTER
Received referral for pt to establish with heme onc. Upon review, realized [facility] with Sam Hagen at 3:40 PM. Pt is currently established with heme onc and seen previously by Dr Arevalo on 1/23/23.  Unsure how to proceed with current appt scheduled.

## 2024-10-06 NOTE — ASSESSMENT & PLAN NOTE
-PT/INR goal 2-3  - Patient had issues with compliance in the past, but currently seems to be compliant.  - Continue monitoring INR with clinical pharmacist.  -Continue warfarin plan as per clinical pharmacist.  - Continue lifelong anticoagulation with warfarin.

## 2024-10-06 NOTE — ASSESSMENT & PLAN NOTE
- Multifactorial  - H/o bariatric surgery  - History of blood transfusions, most recently in March 2024.  - Previously tolerating IV iron infusions  - Patient referred to Hematology at last visit but has yet to establish care. Advised to call to schedule.   - Will check updated CBC, iron studies.

## 2024-10-06 NOTE — ASSESSMENT & PLAN NOTE
- Patient has history of multiple episodes of DVT/PE dating back as far as 2003.   -Reviewed ED visit from 8/22/2024 due to left leg swelling.  -Reviewed venous duplex of left lower extremity which is negative for DVT.  - Patient continues with fluctuating swelling. Continue Lasix 20 mg twice daily  - Continue using compression stockings daily as well as elevation.  - Continue follow-up with hematology/oncology as scheduled.

## 2024-10-06 NOTE — ASSESSMENT & PLAN NOTE
-Reviewed gastroenterology note from 8/26/2024.  Patient scheduled for EGD and right upper quadrant ultrasound.  Per chart review, patient may have progressed to a normal liver versus early stage cirrhosis.  -Continue with abstinence from alcohol.  Avoid NSAIDs.  - Continue follow-up with gastroenterology as scheduled.  - Continue Lasix 20 mg twice daily as needed and spironolactone 50 mg daily.

## 2024-10-08 DIAGNOSIS — S37.13XS URETERAL LACERATION, SEQUELA: ICD-10-CM

## 2024-10-08 DIAGNOSIS — F11.90 OPIOID USE: ICD-10-CM

## 2024-10-08 DIAGNOSIS — L30.4 INTERTRIGO: ICD-10-CM

## 2024-10-08 DIAGNOSIS — Z12.11 SCREENING FOR COLON CANCER: ICD-10-CM

## 2024-10-08 DIAGNOSIS — K74.60 LIVER CIRRHOSIS (HCC): ICD-10-CM

## 2024-10-08 DIAGNOSIS — R10.9 NONSPECIFIC ABDOMINAL PAIN: ICD-10-CM

## 2024-10-08 DIAGNOSIS — R11.2 NAUSEA AND VOMITING, UNSPECIFIED VOMITING TYPE: ICD-10-CM

## 2024-10-08 RX ORDER — PANTOPRAZOLE SODIUM 40 MG/1
40 TABLET, DELAYED RELEASE ORAL DAILY
Qty: 90 TABLET | Refills: 0 | Status: SHIPPED | OUTPATIENT
Start: 2024-10-08

## 2024-10-08 RX ORDER — OXYCODONE HYDROCHLORIDE 10 MG/1
10 TABLET ORAL 2 TIMES DAILY PRN
Qty: 30 TABLET | Refills: 0 | Status: SHIPPED | OUTPATIENT
Start: 2024-10-08

## 2024-10-08 RX ORDER — METHOCARBAMOL 500 MG/1
500 TABLET, FILM COATED ORAL 2 TIMES DAILY PRN
Qty: 60 TABLET | Refills: 0 | Status: SHIPPED | OUTPATIENT
Start: 2024-10-08

## 2024-10-08 RX ORDER — NYSTATIN 100000 [USP'U]/G
POWDER TOPICAL 2 TIMES DAILY
Qty: 60 G | Refills: 0 | Status: SHIPPED | OUTPATIENT
Start: 2024-10-08

## 2024-10-08 RX ORDER — METOCLOPRAMIDE 5 MG/1
5 TABLET ORAL 4 TIMES DAILY
Qty: 60 TABLET | Refills: 0 | Status: SHIPPED | OUTPATIENT
Start: 2024-10-08

## 2024-10-10 ENCOUNTER — TELEPHONE (OUTPATIENT)
Dept: FAMILY MEDICINE CLINIC | Facility: CLINIC | Age: 49
End: 2024-10-10

## 2024-10-10 ENCOUNTER — TELEPHONE (OUTPATIENT)
Dept: HEMATOLOGY ONCOLOGY | Facility: CLINIC | Age: 49
End: 2024-10-10

## 2024-10-10 DIAGNOSIS — E53.8 VITAMIN B 12 DEFICIENCY: Primary | ICD-10-CM

## 2024-10-10 DIAGNOSIS — D53.9 MACROCYTIC ANEMIA: ICD-10-CM

## 2024-10-10 DIAGNOSIS — D50.8 OTHER IRON DEFICIENCY ANEMIAS: ICD-10-CM

## 2024-10-10 DIAGNOSIS — Z86.718 HISTORY OF DVT (DEEP VEIN THROMBOSIS): ICD-10-CM

## 2024-10-10 NOTE — TELEPHONE ENCOUNTER
Second phone call to pt in regards to INR this week     Patient had stated via phone that she was going to obtain an INR 10/9.    Do not see that INR was obtained.    Will check back next week 10/16.    Pharmacist Tracking Tool  Reason For Outreach: Embedded Pharmacist  Demographics:  Intervention Method: Phone  Type of Intervention: Follow-Up  Topics Addressed: Anticoagulation  Pharmacologic Interventions: Prevent or Manage TROY  Non-Pharmacologic Interventions: Adherence addressed and Labs  Time:  Direct Patient Care:  5  mins  Care Coordination:  5  mins  Recommendation Recipient: Patient/Caregiver  Outcome: Pending/ Follow-up Required     Emanuel Gardner, PharmD, BCACP  Ambulatory Care Clinical Pharmacist

## 2024-10-14 ENCOUNTER — APPOINTMENT (OUTPATIENT)
Dept: LAB | Facility: HOSPITAL | Age: 49
End: 2024-10-14
Payer: COMMERCIAL

## 2024-10-14 DIAGNOSIS — Z86.718 HISTORY OF DVT (DEEP VEIN THROMBOSIS): ICD-10-CM

## 2024-10-14 DIAGNOSIS — E55.9 VITAMIN D DEFICIENCY: Chronic | ICD-10-CM

## 2024-10-14 DIAGNOSIS — D50.8 OTHER IRON DEFICIENCY ANEMIAS: ICD-10-CM

## 2024-10-14 DIAGNOSIS — D53.9 MACROCYTIC ANEMIA: ICD-10-CM

## 2024-10-14 DIAGNOSIS — E53.8 VITAMIN B 12 DEFICIENCY: ICD-10-CM

## 2024-10-14 LAB
25(OH)D3 SERPL-MCNC: 37.4 NG/ML (ref 30–100)
ALBUMIN SERPL BCG-MCNC: 3.3 G/DL (ref 3.5–5)
ALP SERPL-CCNC: 151 U/L (ref 34–104)
ALT SERPL W P-5'-P-CCNC: 13 U/L (ref 7–52)
ANION GAP SERPL CALCULATED.3IONS-SCNC: 7 MMOL/L (ref 4–13)
AST SERPL W P-5'-P-CCNC: 18 U/L (ref 13–39)
BASOPHILS # BLD AUTO: 0.02 THOUSANDS/ΜL (ref 0–0.1)
BASOPHILS NFR BLD AUTO: 0 % (ref 0–1)
BILIRUB SERPL-MCNC: 0.36 MG/DL (ref 0.2–1)
BLD SMEAR INTERP: NORMAL
BUN SERPL-MCNC: 14 MG/DL (ref 5–25)
CALCIUM ALBUM COR SERPL-MCNC: 9.2 MG/DL (ref 8.3–10.1)
CALCIUM SERPL-MCNC: 8.6 MG/DL (ref 8.4–10.2)
CHLORIDE SERPL-SCNC: 100 MMOL/L (ref 96–108)
CO2 SERPL-SCNC: 30 MMOL/L (ref 21–32)
CREAT SERPL-MCNC: 0.78 MG/DL (ref 0.6–1.3)
CRP SERPL QL: 4.3 MG/L
EOSINOPHIL # BLD AUTO: 0.01 THOUSAND/ΜL (ref 0–0.61)
EOSINOPHIL NFR BLD AUTO: 0 % (ref 0–6)
ERYTHROCYTE [DISTWIDTH] IN BLOOD BY AUTOMATED COUNT: 15.4 % (ref 11.6–15.1)
ERYTHROCYTE [SEDIMENTATION RATE] IN BLOOD: 94 MM/HOUR (ref 0–19)
FERRITIN SERPL-MCNC: 11 NG/ML (ref 11–307)
FOLATE SERPL-MCNC: >22.3 NG/ML
GFR SERPL CREATININE-BSD FRML MDRD: 90 ML/MIN/1.73SQ M
GLUCOSE P FAST SERPL-MCNC: 76 MG/DL (ref 65–99)
HCT VFR BLD AUTO: 29.7 % (ref 34.8–46.1)
HGB BLD-MCNC: 9.7 G/DL (ref 11.5–15.4)
IMM GRANULOCYTES # BLD AUTO: 0.02 THOUSAND/UL (ref 0–0.2)
IMM GRANULOCYTES NFR BLD AUTO: 0 % (ref 0–2)
INR PPP: 4.35 (ref 0.85–1.19)
IRON SATN MFR SERPL: 16 % (ref 15–50)
IRON SERPL-MCNC: 61 UG/DL (ref 50–212)
LDH SERPL-CCNC: 128 U/L (ref 140–271)
LYMPHOCYTES # BLD AUTO: 3.27 THOUSANDS/ΜL (ref 0.6–4.47)
LYMPHOCYTES NFR BLD AUTO: 43 % (ref 14–44)
MAGNESIUM SERPL-MCNC: 1.6 MG/DL (ref 1.9–2.7)
MCH RBC QN AUTO: 30.8 PG (ref 26.8–34.3)
MCHC RBC AUTO-ENTMCNC: 32.7 G/DL (ref 31.4–37.4)
MCV RBC AUTO: 94 FL (ref 82–98)
MONOCYTES # BLD AUTO: 0.59 THOUSAND/ΜL (ref 0.17–1.22)
MONOCYTES NFR BLD AUTO: 8 % (ref 4–12)
NEUTROPHILS # BLD AUTO: 3.79 THOUSANDS/ΜL (ref 1.85–7.62)
NEUTS SEG NFR BLD AUTO: 49 % (ref 43–75)
NRBC BLD AUTO-RTO: 0 /100 WBCS
PLATELET # BLD AUTO: 475 THOUSANDS/UL (ref 149–390)
PMV BLD AUTO: 8.7 FL (ref 8.9–12.7)
POTASSIUM SERPL-SCNC: 4.1 MMOL/L (ref 3.5–5.3)
PROT SERPL-MCNC: 7.5 G/DL (ref 6.4–8.4)
PROTHROMBIN TIME: 40.8 SECONDS (ref 12.3–15)
RBC # BLD AUTO: 3.15 MILLION/UL (ref 3.81–5.12)
SODIUM SERPL-SCNC: 137 MMOL/L (ref 135–147)
TIBC SERPL-MCNC: 376 UG/DL (ref 250–450)
UIBC SERPL-MCNC: 315 UG/DL (ref 155–355)
VIT B12 SERPL-MCNC: 601 PG/ML (ref 180–914)
WBC # BLD AUTO: 7.7 THOUSAND/UL (ref 4.31–10.16)

## 2024-10-14 PROCEDURE — 85652 RBC SED RATE AUTOMATED: CPT

## 2024-10-14 PROCEDURE — 82746 ASSAY OF FOLIC ACID SERUM: CPT

## 2024-10-14 PROCEDURE — 83615 LACTATE (LD) (LDH) ENZYME: CPT

## 2024-10-14 PROCEDURE — 83550 IRON BINDING TEST: CPT

## 2024-10-14 PROCEDURE — 82728 ASSAY OF FERRITIN: CPT

## 2024-10-14 PROCEDURE — 82607 VITAMIN B-12: CPT

## 2024-10-14 PROCEDURE — 36415 COLL VENOUS BLD VENIPUNCTURE: CPT

## 2024-10-14 PROCEDURE — 83540 ASSAY OF IRON: CPT

## 2024-10-14 PROCEDURE — 83010 ASSAY OF HAPTOGLOBIN QUANT: CPT

## 2024-10-14 PROCEDURE — 82306 VITAMIN D 25 HYDROXY: CPT

## 2024-10-14 PROCEDURE — 85610 PROTHROMBIN TIME: CPT

## 2024-10-14 PROCEDURE — 80053 COMPREHEN METABOLIC PANEL: CPT

## 2024-10-14 PROCEDURE — 85025 COMPLETE CBC W/AUTO DIFF WBC: CPT

## 2024-10-14 PROCEDURE — 86140 C-REACTIVE PROTEIN: CPT

## 2024-10-14 PROCEDURE — 83735 ASSAY OF MAGNESIUM: CPT

## 2024-10-15 ENCOUNTER — TELEPHONE (OUTPATIENT)
Dept: FAMILY MEDICINE CLINIC | Facility: CLINIC | Age: 49
End: 2024-10-15

## 2024-10-15 ENCOUNTER — ANTICOAG VISIT (OUTPATIENT)
Dept: FAMILY MEDICINE CLINIC | Facility: CLINIC | Age: 49
End: 2024-10-15

## 2024-10-15 LAB — HAPTOGLOB SERPL-MCNC: 115 MG/DL (ref 42–296)

## 2024-10-15 NOTE — PROGRESS NOTES
[facility], [address]  [facility] [id number] [phone]  Clinical Pharmacy Anticoagulation Consultation  Encounter provider: Emanuel Gardner, Pharmacist  Assessment/Plan  INR: 4.35. Supratherapeutic INR for goal of 2.0-3.0  Current warfarin dose: Patient currently taking 10 mg on Mondays and Thursdays, 7.5 mg all the other days of the week.  Not as instructed, last note noted that she was going to take only 7.5 daily, this may be reason for supratherapeutic INR today  Overall, patient is very adherent and on top of things  New dose: Decrease dose back to 7.5 mg daily as planned  Additionally, because her INR is elevated today, take 2.5 mg only tomorrow for 1 day only  Recheck INR in 1 month  Patient is agreeable to obtain INR on 11/11 -reminder 11/12  Counseled patient on compliance with medications, and consistency with vitamin k rich foods intake.    Subjective  Indication: hx of multiple DVT  Bleeding signs/symptoms: --She does note after receiving blood work yesterday that her arm blood for significantly longer than in the past, however she has got this to resolve   She will monitor closely  Thrombosis signs/symptoms: no    Missed doses: no  Medication changes: no  Dietary changes: no  Bacterial/viral infection: no  Recent alcohol consumption: no  Other concerns: no    Objective  Lab Results   Component Value Date/Time    INR 4.35 (H) 10/14/2024 03:51 PM    INR 2.79 (H) 09/04/2024 02:29 PM    INR 4.18 (H) 08/22/2024 12:47 PM    INR 2.1 03/28/2024 03:17 AM    INR 1.8 03/27/2024 06:07 AM    INR 1.3 03/25/2024 04:52 AM    HGB 9.7 (L) 10/14/2024 03:51 PM    HGB 10.8 (L) 08/22/2024 12:47 PM    HGB 9.4 (L) 02/28/2024 02:58 PM    HCT 29.7 (L) 10/14/2024 03:51 PM    HCT 33.6 (L) 08/22/2024 12:47 PM    HCT 29.5 (L) 02/28/2024 02:58 PM    CREATININE 0.78 10/14/2024 03:51 PM    CREATININE 0.74 08/22/2024 12:47 PM    CREATININE 0.70 04/12/2024 07:17 AM     CREATININE 0.67 03/29/2024 04:19 AM    CREATININE 0.51 03/28/2024 03:17 AM    CREATININE 0.52 03/27/2024 06:07 AM    EGFR 90 10/14/2024 03:51 PM    EGFR 96 08/22/2024 12:47 PM    EGFR 102 04/12/2024 07:17 AM    EGFR 108 03/29/2024 04:19 AM    EGFR 115 03/28/2024 03:17 AM    EGFR 114 03/27/2024 06:07 AM    EGFR 77 06/26/2018 05:40 PM         Emanuel Gardner Pharmacist    Pharmacist Tracking Tool  Reason For Outreach: Embedded Pharmacist  Demographics:  Intervention Method: Phone  Type of Intervention: Follow-Up  Topics Addressed: Anticoagulation  Pharmacologic Interventions: Dose or Frequency Adjusted and Prevent or Manage TROY  Non-Pharmacologic Interventions: Care coordination, Chart update, Disease state education, Labs, Medication Monitoring, and Medication/Device education  Time:  Direct Patient Care:  5  mins  Care Coordination:  10  mins  Recommendation Recipient: Patient/Caregiver  Outcome: Accepted

## 2024-10-15 NOTE — TELEPHONE ENCOUNTER
Patient need asael for her pap smear     I call her and she stating that she use [facility] for this and I transfer the call

## 2024-10-21 DIAGNOSIS — S37.13XS URETERAL LACERATION, SEQUELA: ICD-10-CM

## 2024-10-21 DIAGNOSIS — L30.4 INTERTRIGO: ICD-10-CM

## 2024-10-21 DIAGNOSIS — K74.60 LIVER CIRRHOSIS (HCC): ICD-10-CM

## 2024-10-21 DIAGNOSIS — R21 RASH: ICD-10-CM

## 2024-10-21 DIAGNOSIS — K70.11 ALCOHOLIC HEPATITIS WITH ASCITES: ICD-10-CM

## 2024-10-21 DIAGNOSIS — F11.90 OPIOID USE: ICD-10-CM

## 2024-10-21 RX ORDER — TRIAMCINOLONE ACETONIDE 1 MG/G
CREAM TOPICAL 2 TIMES DAILY
Qty: 45 G | Refills: 0 | Status: CANCELLED | OUTPATIENT
Start: 2024-10-21

## 2024-10-21 RX ORDER — FUROSEMIDE 20 MG/1
20 TABLET ORAL 2 TIMES DAILY
Qty: 180 TABLET | Refills: 0 | Status: CANCELLED | OUTPATIENT
Start: 2024-10-21

## 2024-10-21 RX ORDER — PANTOPRAZOLE SODIUM 40 MG/1
40 TABLET, DELAYED RELEASE ORAL DAILY
Qty: 90 TABLET | Refills: 0 | Status: CANCELLED | OUTPATIENT
Start: 2024-10-21

## 2024-10-23 DIAGNOSIS — R10.9 NONSPECIFIC ABDOMINAL PAIN: ICD-10-CM

## 2024-10-23 DIAGNOSIS — R11.2 NAUSEA AND VOMITING, UNSPECIFIED VOMITING TYPE: ICD-10-CM

## 2024-10-23 RX ORDER — METHOCARBAMOL 500 MG/1
500 TABLET, FILM COATED ORAL 2 TIMES DAILY PRN
Qty: 60 TABLET | Refills: 0 | Status: SHIPPED | OUTPATIENT
Start: 2024-10-23

## 2024-10-23 RX ORDER — METOCLOPRAMIDE 5 MG/1
5 TABLET ORAL 4 TIMES DAILY
Qty: 60 TABLET | Refills: 0 | Status: SHIPPED | OUTPATIENT
Start: 2024-10-23

## 2024-10-23 NOTE — TELEPHONE ENCOUNTER
Pt is asking for a refill on the following medications.     Oxycodone sent on 10/8/24 but Pt states they ran out (15 day supply)     Went over the list of medications she requested and the other should be able to be filled at the pharmacy. Pt will reach back if they have issues.       Please advise,   Than you

## 2024-10-24 RX ORDER — OXYCODONE HYDROCHLORIDE 10 MG/1
10 TABLET ORAL 2 TIMES DAILY PRN
Qty: 30 TABLET | Refills: 0 | Status: SHIPPED | OUTPATIENT
Start: 2024-10-24

## 2024-10-24 RX ORDER — NYSTATIN 100000 [USP'U]/G
POWDER TOPICAL 2 TIMES DAILY
Qty: 60 G | Refills: 0 | Status: SHIPPED | OUTPATIENT
Start: 2024-10-24

## 2024-11-05 ENCOUNTER — TELEPHONE (OUTPATIENT)
Age: 49
End: 2024-11-05

## 2024-11-05 NOTE — TELEPHONE ENCOUNTER
Our mutual patient is scheduled for procedure: Colonoscopy/EGD    On: _11_/_ 21  _/_ 24   _      With:  _Lloyd_______    He/She is taking the following blood thinner:    Lovenox      Can this be stopped ___1___ days prior to the procedure?      Physician Approving clearance: ________________________      Our mutual patient is scheduled for procedure: Colonoscopy/EGD    On: _11___/_ 21 _/_ 24   _      With: Dr. Garcia_______    He/She is taking the following blood thinner:  Coumadin        Can this be stopped ___5___ days prior to the procedure?      Physician Approving clearance: ________________________

## 2024-11-06 ENCOUNTER — TELEPHONE (OUTPATIENT)
Dept: HEMATOLOGY ONCOLOGY | Facility: CLINIC | Age: 49
End: 2024-11-06

## 2024-11-06 NOTE — TELEPHONE ENCOUNTER
Called patient in regards to her missed appointment with Josie Bagley. I left a voicemail for patient if she would like to reschedule please call our office back at [phone].    Thank you

## 2024-11-07 DIAGNOSIS — Z86.718 HISTORY OF DVT (DEEP VEIN THROMBOSIS): ICD-10-CM

## 2024-11-07 RX ORDER — WARFARIN SODIUM 5 MG/1
TABLET ORAL
Qty: 30 TABLET | Refills: 2 | Status: SHIPPED | OUTPATIENT
Start: 2024-11-07

## 2024-11-08 DIAGNOSIS — F11.90 OPIOID USE: ICD-10-CM

## 2024-11-08 DIAGNOSIS — S37.13XS URETERAL LACERATION, SEQUELA: ICD-10-CM

## 2024-11-08 DIAGNOSIS — R21 RASH: ICD-10-CM

## 2024-11-08 DIAGNOSIS — K70.11 ALCOHOLIC HEPATITIS WITH ASCITES: ICD-10-CM

## 2024-11-08 DIAGNOSIS — Z86.711 HISTORY OF PULMONARY EMBOLUS (PE): ICD-10-CM

## 2024-11-08 DIAGNOSIS — K74.60 LIVER CIRRHOSIS (HCC): ICD-10-CM

## 2024-11-08 DIAGNOSIS — L30.4 INTERTRIGO: ICD-10-CM

## 2024-11-08 DIAGNOSIS — R11.2 NAUSEA AND VOMITING, UNSPECIFIED VOMITING TYPE: ICD-10-CM

## 2024-11-08 DIAGNOSIS — Z86.718 HISTORY OF DVT (DEEP VEIN THROMBOSIS): Chronic | ICD-10-CM

## 2024-11-11 ENCOUNTER — TELEPHONE (OUTPATIENT)
Dept: FAMILY MEDICINE CLINIC | Facility: CLINIC | Age: 49
End: 2024-11-11

## 2024-11-11 RX ORDER — PANTOPRAZOLE SODIUM 40 MG/1
40 TABLET, DELAYED RELEASE ORAL DAILY
Qty: 90 TABLET | Refills: 0 | Status: SHIPPED | OUTPATIENT
Start: 2024-11-11

## 2024-11-11 RX ORDER — FUROSEMIDE 20 MG/1
20 TABLET ORAL 2 TIMES DAILY
Qty: 180 TABLET | Refills: 0 | Status: SHIPPED | OUTPATIENT
Start: 2024-11-11

## 2024-11-11 RX ORDER — METOCLOPRAMIDE 5 MG/1
5 TABLET ORAL 4 TIMES DAILY
Qty: 60 TABLET | Refills: 0 | Status: SHIPPED | OUTPATIENT
Start: 2024-11-11

## 2024-11-11 RX ORDER — TRIAMCINOLONE ACETONIDE 1 MG/G
CREAM TOPICAL 2 TIMES DAILY
Qty: 45 G | Refills: 0 | Status: SHIPPED | OUTPATIENT
Start: 2024-11-11

## 2024-11-11 RX ORDER — OXYCODONE HYDROCHLORIDE 10 MG/1
10 TABLET ORAL 2 TIMES DAILY PRN
Qty: 30 TABLET | Refills: 0 | Status: SHIPPED | OUTPATIENT
Start: 2024-11-11

## 2024-11-11 RX ORDER — NYSTATIN 100000 [USP'U]/G
POWDER TOPICAL 2 TIMES DAILY
Qty: 60 G | Refills: 0 | Status: SHIPPED | OUTPATIENT
Start: 2024-11-11

## 2024-11-11 RX ORDER — ENOXAPARIN SODIUM 100 MG/ML
INJECTION SUBCUTANEOUS
Qty: 8 ML | Refills: 0 | Status: SHIPPED | OUTPATIENT
Start: 2024-11-11

## 2024-11-11 NOTE — TELEPHONE ENCOUNTER
INR follow-up scheduled today.  Do not see INR was obtained.    Additionally, was noted from PCP that patient is scheduled for an endoscopy soon and likely warfarin needs to be held with a Lovenox bridge    Patient's clotting risk is borderline, unclear if benefits outweigh risks from not bridging etc. discussed this in the past with patient and patient prefers bridging    Left message for patient to please obtain INR and call back clinic ASAP to discuss warfarin plan    Will try back tomorrow    Pharmacist Tracking Tool  Reason For Outreach: Embedded Pharmacist  Demographics:  Intervention Method: Phone  Type of Intervention: Follow-Up  Topics Addressed: Anticoagulation  Pharmacologic Interventions: Prevent or Manage TROY  Non-Pharmacologic Interventions: Care coordination, Labs, and Medication Monitoring  Time:  Direct Patient Care:  5  mins  Care Coordination:  5  mins  Recommendation Recipient: Patient/Caregiver  Outcome: Accepted     1:01 PM 11/14/24 contacted via phone again and left a msg reference lovenox bridge and outlining all needed information     Emanuel Gardner, PharmD, BCACP  Ambulatory Care Clinical Pharmacist

## 2024-11-12 ENCOUNTER — TELEPHONE (OUTPATIENT)
Dept: FAMILY MEDICINE CLINIC | Facility: CLINIC | Age: 49
End: 2024-11-12

## 2024-11-12 NOTE — TELEPHONE ENCOUNTER
Spoke to pt via phone    She will obtain INR tomorrow , call for f/u at that time.     Plan as of now is full lovenox bridge periprocedurally     Patient is having a edoscopy on 11/21.  Patient has DVT, PE hx with antiphosphlipid syndrome and requires Lovenox bridging.     Patient then gave a verbal and actual demonstration of technique & 10 step process, alternation of injection site, side effects and understanding of dosing calendar, and disposal of syringes & needles.  Patient self-administered own LMWH injection in clinic without incident.  No ADR reported.     Actual Body Weight 97.5kg    Labs Drawn on 10/14  Serem creatinine: 0.78  Estimated cratinine clearance: > 100     Lovenox Dose: 100 mg twice daily       Date Enoxaparin AM Enoxaparin PM Warfarin Notes   Pre-op Day -5  11/16 HOLD HOLD HOLD    Pre-op Day -4  11/17 HOLD HOLD HOLD    Pre-op Day -3  11/18 100 mg 100 mg HOLD    Pre-op Day -2  11/19 100 mg 100 mg HOLD    Pre-op Day -1  11/20 100 mg HOLD HOLD    Procedure Day  11/21 HOLD HOLD 15 mg (2 tabs) Confirm with surgery team anticoagulation can be restarted (2x average daily warfarin  dose)   Post-op Day +1  11/22 HOLD 100 mg 11.25 mg (1 + 1/2 tabs) (1.5x average daily warfarin dose)   Post-op Day +2  11/23 100 mg 100 mg 11.25 mg (1 + 1/2 tabs) (1.5x weekly warfarin dose)   Post-op Day +3  11/24 100 mg 100 mg 7.5 mg (1 tab) (Return to weekly dose)   Stop Lovenox. Consider INR      Review this with pt at visit 11/13    Pharmacist Tracking Tool  Reason For Outreach: Embedded Pharmacist  Demographics:  Intervention Method: Phone  Type of Intervention: Follow-Up  Topics Addressed: Anticoagulation  Pharmacologic Interventions: Dose or Frequency Adjusted and Prevent or Manage TROY  Non-Pharmacologic Interventions: Adherence addressed, Care coordination, Chart update, Disease state education, and Medication/Device education  Time:  Direct Patient Care:  10  mins  Care Coordination:  25  mins  Recommendation  Recipient: Patient/Caregiver  Outcome: Accepted    Emanuel Gardner PharmD, [facility]  Ambulatory Care Clinical Pharmacist       ----------------------update -----------------------    2:36 PM 11/13/24 noticed that 80 mg syringes have already been sent, will update dosing to reflect this, patient has gained weight recently, however this is close enough that it is okay to keep the dose    Left message for patient explaining that a [employer] message will be sent with Lovenox dosing instructions, and that the Rx was sent to the pharmacy    Also noted in msg that clinical pharmacy feels benefits outweigh risks for lovenox bridge due to antiphospholipid syndrome diagnosis     Emanuel Gardner PharmD, [facility]  Ambulatory Care Clinical Pharmacist

## 2024-11-12 NOTE — TELEPHONE ENCOUNTER
Called pt to conf. Proc. Sp w/ pt confirmed medication hold and prep instructions, confirmed prep was sent to [facility] on [address] in [place]

## 2024-11-26 DIAGNOSIS — L30.4 INTERTRIGO: ICD-10-CM

## 2024-11-26 DIAGNOSIS — K70.11 ALCOHOLIC HEPATITIS WITH ASCITES: ICD-10-CM

## 2024-11-26 DIAGNOSIS — K74.60 LIVER CIRRHOSIS (HCC): ICD-10-CM

## 2024-11-26 DIAGNOSIS — S37.13XS URETERAL LACERATION, SEQUELA: ICD-10-CM

## 2024-11-26 DIAGNOSIS — F11.90 OPIOID USE: ICD-10-CM

## 2024-11-26 DIAGNOSIS — R21 RASH: ICD-10-CM

## 2024-12-02 ENCOUNTER — TELEPHONE (OUTPATIENT)
Dept: FAMILY MEDICINE CLINIC | Facility: CLINIC | Age: 49
End: 2024-12-02

## 2024-12-02 DIAGNOSIS — R11.2 NAUSEA AND VOMITING, UNSPECIFIED VOMITING TYPE: ICD-10-CM

## 2024-12-02 RX ORDER — NYSTATIN 100000 [USP'U]/G
POWDER TOPICAL 2 TIMES DAILY
Qty: 60 G | Refills: 0 | Status: SHIPPED | OUTPATIENT
Start: 2024-12-02

## 2024-12-02 RX ORDER — OXYCODONE HYDROCHLORIDE 10 MG/1
10 TABLET ORAL 2 TIMES DAILY PRN
Qty: 30 TABLET | Refills: 0 | Status: SHIPPED | OUTPATIENT
Start: 2024-12-02

## 2024-12-02 RX ORDER — PANTOPRAZOLE SODIUM 40 MG/1
40 TABLET, DELAYED RELEASE ORAL DAILY
Qty: 90 TABLET | Refills: 0 | Status: SHIPPED | OUTPATIENT
Start: 2024-12-02

## 2024-12-02 RX ORDER — TRIAMCINOLONE ACETONIDE 1 MG/G
CREAM TOPICAL 2 TIMES DAILY
Qty: 45 G | Refills: 0 | Status: SHIPPED | OUTPATIENT
Start: 2024-12-02

## 2024-12-02 RX ORDER — FUROSEMIDE 20 MG/1
20 TABLET ORAL 2 TIMES DAILY
Qty: 180 TABLET | Refills: 0 | Status: SHIPPED | OUTPATIENT
Start: 2024-12-02

## 2024-12-02 RX ORDER — METOCLOPRAMIDE 5 MG/1
5 TABLET ORAL 4 TIMES DAILY
Qty: 60 TABLET | Refills: 0 | Status: SHIPPED | OUTPATIENT
Start: 2024-12-02

## 2024-12-02 NOTE — TELEPHONE ENCOUNTER
Spoke to patient via phone, she states that her procedure was canceled unfortunately    Unclear reason why    She wishes to reschedule her EGD    She will obtain INR today    Check back later today and call with result    Pharmacist Tracking Tool  Reason For Outreach: Embedded Pharmacist  Demographics:  Intervention Method: Phone  Type of Intervention: Follow-Up  Topics Addressed: Anticoagulation  Pharmacologic Interventions: Prevent or Manage TROY  Non-Pharmacologic Interventions: Adherence addressed  Time:  Direct Patient Care:  5  mins  Care Coordination:  5  mins  Recommendation Recipient: Patient/Caregiver  Outcome: Accepted      Martin WoodD, BCACP  Ambulatory Care Clinical Pharmacist

## 2024-12-03 ENCOUNTER — APPOINTMENT (OUTPATIENT)
Dept: LAB | Facility: CLINIC | Age: 49
End: 2024-12-03
Payer: COMMERCIAL

## 2024-12-03 DIAGNOSIS — D53.9 MACROCYTIC ANEMIA: ICD-10-CM

## 2024-12-03 DIAGNOSIS — E53.8 VITAMIN B12 DEFICIENCY: ICD-10-CM

## 2024-12-03 DIAGNOSIS — D50.8 OTHER IRON DEFICIENCY ANEMIAS: ICD-10-CM

## 2024-12-03 DIAGNOSIS — Z86.718 HISTORY OF DVT (DEEP VEIN THROMBOSIS): Chronic | ICD-10-CM

## 2024-12-03 PROCEDURE — 85610 PROTHROMBIN TIME: CPT

## 2024-12-03 PROCEDURE — 86880 COOMBS TEST DIRECT: CPT

## 2024-12-03 PROCEDURE — 36415 COLL VENOUS BLD VENIPUNCTURE: CPT

## 2024-12-04 ENCOUNTER — ANTICOAG VISIT (OUTPATIENT)
Dept: FAMILY MEDICINE CLINIC | Facility: CLINIC | Age: 49
End: 2024-12-04

## 2024-12-04 LAB
DAT POLY-SP REAG RBC QL: NEGATIVE
INR PPP: 2.31 (ref 0.85–1.19)
PROTHROMBIN TIME: 25.3 SECONDS (ref 12.3–15)

## 2024-12-04 NOTE — PROGRESS NOTES
[facility]  [address] [place] [address], [address]  [facility] [id number] [phone]  Clinical Pharmacy Anticoagulation Consultation  Encounter provider: Emanuel Gardner Pharmacist  Assessment/Plan  INR: 2.31. Therapeutic INR for goal of 2.0-3.0  Current warfarin dose: 7.5 mg every day   New dose: no change    Recheck INR in 1 month  Ck back 12/8  Of note, pt is planning EGD and she confirms she will call and let clinical pharmacist know date of procedure so lovenox bridge instructions can be given.     Subjective  Indication:  Indication: hx of multiple DVT  Bleeding signs/symptoms: no  Thrombosis signs/symptoms: no    Missed doses: no  Medication changes: no  Dietary changes: no  Bacterial/viral infection: no  Recent alcohol consumption: no  Other concerns: no    Objective  Lab Results   Component Value Date/Time    INR 2.31 (H) 12/03/2024 01:22 PM    INR 4.35 (H) 10/14/2024 03:51 PM    INR 2.79 (H) 09/04/2024 02:29 PM    INR 2.1 03/28/2024 03:17 AM    INR 1.8 03/27/2024 06:07 AM    INR 1.3 03/25/2024 04:52 AM    HGB 9.7 (L) 10/14/2024 03:51 PM    HGB 10.8 (L) 08/22/2024 12:47 PM    HGB 9.4 (L) 02/28/2024 02:58 PM    HCT 29.7 (L) 10/14/2024 03:51 PM    HCT 33.6 (L) 08/22/2024 12:47 PM    HCT 29.5 (L) 02/28/2024 02:58 PM    CREATININE 0.78 10/14/2024 03:51 PM    CREATININE 0.74 08/22/2024 12:47 PM    CREATININE 0.70 04/12/2024 07:17 AM    CREATININE 0.67 03/29/2024 04:19 AM    CREATININE 0.51 03/28/2024 03:17 AM    CREATININE 0.52 03/27/2024 06:07 AM    EGFR 90 10/14/2024 03:51 PM    EGFR 96 08/22/2024 12:47 PM    EGFR 102 04/12/2024 07:17 AM    EGFR 108 03/29/2024 04:19 AM    EGFR 115 03/28/2024 03:17 AM    EGFR 114 03/27/2024 06:07 AM    EGFR 77 06/26/2018 05:40 PM         Emanuel Gardner Pharmacist    --------------    Pharmacist Tracking Tool  Reason For Outreach: Embedded Pharmacist  Demographics:  Intervention Method: Phone  Type of Intervention: Follow-Up  Topics  Addressed: Anticoagulation  Pharmacologic Interventions: Prevent or Manage TROY  Non-Pharmacologic Interventions: Care coordination, Chart update, Labs, and Medication Monitoring  Time:  Direct Patient Care:  5  mins  Care Coordination:  5  mins  Recommendation Recipient: Patient/Caregiver  Outcome: Accepted

## 2024-12-09 DIAGNOSIS — K70.10 ALCOHOLIC HEPATITIS WITHOUT ASCITES: ICD-10-CM

## 2024-12-09 RX ORDER — SPIRONOLACTONE 50 MG/1
50 TABLET, FILM COATED ORAL DAILY
Qty: 90 TABLET | Refills: 0 | Status: SHIPPED | OUTPATIENT
Start: 2024-12-09 | End: 2024-12-18 | Stop reason: SDUPTHER

## 2024-12-18 ENCOUNTER — OFFICE VISIT (OUTPATIENT)
Dept: FAMILY MEDICINE CLINIC | Facility: CLINIC | Age: 49
End: 2024-12-18

## 2024-12-18 VITALS
HEIGHT: 66 IN | HEART RATE: 96 BPM | BODY MASS INDEX: 35.03 KG/M2 | DIASTOLIC BLOOD PRESSURE: 70 MMHG | OXYGEN SATURATION: 99 % | TEMPERATURE: 98.4 F | SYSTOLIC BLOOD PRESSURE: 110 MMHG | WEIGHT: 218 LBS | RESPIRATION RATE: 18 BRPM

## 2024-12-18 DIAGNOSIS — K74.60 LIVER CIRRHOSIS (HCC): ICD-10-CM

## 2024-12-18 DIAGNOSIS — Z86.718 HISTORY OF DVT (DEEP VEIN THROMBOSIS): ICD-10-CM

## 2024-12-18 DIAGNOSIS — F11.90 OPIOID USE: ICD-10-CM

## 2024-12-18 DIAGNOSIS — E55.9 VITAMIN D DEFICIENCY: ICD-10-CM

## 2024-12-18 DIAGNOSIS — D50.8 OTHER IRON DEFICIENCY ANEMIAS: Chronic | ICD-10-CM

## 2024-12-18 DIAGNOSIS — K70.11 ALCOHOLIC HEPATITIS WITH ASCITES: ICD-10-CM

## 2024-12-18 DIAGNOSIS — L30.4 INTERTRIGO: ICD-10-CM

## 2024-12-18 DIAGNOSIS — R10.9 NONSPECIFIC ABDOMINAL PAIN: ICD-10-CM

## 2024-12-18 DIAGNOSIS — R11.2 NAUSEA AND VOMITING, UNSPECIFIED VOMITING TYPE: ICD-10-CM

## 2024-12-18 DIAGNOSIS — F51.05 INSOMNIA DUE TO OTHER MENTAL DISORDER: ICD-10-CM

## 2024-12-18 DIAGNOSIS — E51.9 THIAMINE DEFICIENCY: ICD-10-CM

## 2024-12-18 DIAGNOSIS — D68.61 ANTIPHOSPHOLIPID SYNDROME (HCC): Chronic | ICD-10-CM

## 2024-12-18 DIAGNOSIS — K70.10 ALCOHOLIC HEPATITIS WITHOUT ASCITES: ICD-10-CM

## 2024-12-18 DIAGNOSIS — E53.8 VITAMIN B 12 DEFICIENCY: ICD-10-CM

## 2024-12-18 DIAGNOSIS — G47.00 INSOMNIA, UNSPECIFIED TYPE: Primary | ICD-10-CM

## 2024-12-18 DIAGNOSIS — F99 INSOMNIA DUE TO OTHER MENTAL DISORDER: ICD-10-CM

## 2024-12-18 DIAGNOSIS — F43.10 POSTTRAUMATIC STRESS DISORDER: Chronic | ICD-10-CM

## 2024-12-18 DIAGNOSIS — F10.10 ALCOHOL ABUSE: ICD-10-CM

## 2024-12-18 DIAGNOSIS — S37.13XS URETERAL LACERATION, SEQUELA: ICD-10-CM

## 2024-12-18 DIAGNOSIS — K65.1 INTRA-ABDOMINAL ABSCESS (HCC): Chronic | ICD-10-CM

## 2024-12-18 PROCEDURE — 99214 OFFICE O/P EST MOD 30 MIN: CPT | Performed by: PHYSICIAN ASSISTANT

## 2024-12-18 RX ORDER — SPIRONOLACTONE 50 MG/1
50 TABLET, FILM COATED ORAL DAILY
Qty: 90 TABLET | Refills: 1 | Status: SHIPPED | OUTPATIENT
Start: 2024-12-18

## 2024-12-18 RX ORDER — WARFARIN SODIUM 2.5 MG/1
TABLET ORAL
Qty: 30 TABLET | Refills: 1 | Status: SHIPPED | OUTPATIENT
Start: 2024-12-18

## 2024-12-18 RX ORDER — NYSTATIN 100000 [USP'U]/G
POWDER TOPICAL 2 TIMES DAILY
Qty: 60 G | Refills: 2 | Status: SHIPPED | OUTPATIENT
Start: 2024-12-18

## 2024-12-18 RX ORDER — METHION/INOS/CHOL BT/B COM/LIV 110MG-86MG
100 CAPSULE ORAL DAILY
Qty: 30 TABLET | Refills: 2 | Status: SHIPPED | OUTPATIENT
Start: 2024-12-18

## 2024-12-18 RX ORDER — ONDANSETRON 8 MG/1
8 TABLET, FILM COATED ORAL EVERY 8 HOURS PRN
Qty: 30 TABLET | Refills: 1 | Status: SHIPPED | OUTPATIENT
Start: 2024-12-18

## 2024-12-18 RX ORDER — FUROSEMIDE 20 MG/1
20 TABLET ORAL 2 TIMES DAILY
Qty: 180 TABLET | Refills: 1 | Status: SHIPPED | OUTPATIENT
Start: 2024-12-18

## 2024-12-18 RX ORDER — FOLIC ACID 1 MG/1
1 TABLET ORAL DAILY
Qty: 90 TABLET | Refills: 1 | Status: SHIPPED | OUTPATIENT
Start: 2024-12-18

## 2024-12-18 RX ORDER — METHOCARBAMOL 500 MG/1
500 TABLET, FILM COATED ORAL 2 TIMES DAILY PRN
Qty: 60 TABLET | Refills: 2 | Status: SHIPPED | OUTPATIENT
Start: 2024-12-18

## 2024-12-18 RX ORDER — WARFARIN SODIUM 5 MG/1
TABLET ORAL
Qty: 30 TABLET | Refills: 2 | Status: SHIPPED | OUTPATIENT
Start: 2024-12-18

## 2024-12-18 RX ORDER — CYANOCOBALAMIN (VITAMIN B-12) 1000 MCG
1 TABLET ORAL DAILY
Qty: 30 TABLET | Refills: 2 | Status: SHIPPED | OUTPATIENT
Start: 2024-12-18

## 2024-12-18 RX ORDER — OXYCODONE HYDROCHLORIDE 10 MG/1
10 TABLET ORAL 2 TIMES DAILY PRN
Qty: 30 TABLET | Refills: 0 | Status: SHIPPED | OUTPATIENT
Start: 2024-12-18

## 2024-12-18 RX ORDER — PANTOPRAZOLE SODIUM 40 MG/1
40 TABLET, DELAYED RELEASE ORAL DAILY
Qty: 30 TABLET | Refills: 2 | Status: SHIPPED | OUTPATIENT
Start: 2024-12-18

## 2024-12-18 RX ORDER — PHENOL 1.4 %
1 AEROSOL, SPRAY (ML) MUCOUS MEMBRANE
Qty: 30 TABLET | Refills: 2 | Status: SHIPPED | OUTPATIENT
Start: 2024-12-18

## 2024-12-18 NOTE — ASSESSMENT & PLAN NOTE
Orders:    Thiamine HCl (vitamin B-1) 100 MG TABS; Take 1 tablet (100 mg total) by mouth daily

## 2024-12-18 NOTE — ASSESSMENT & PLAN NOTE
- Patient has history of multiple episodes of DVT/PE dating back as far as 2003.   -Reviewed ED visit from 8/22/2024 due to left leg swelling.  -Reviewed venous duplex of left lower extremity which is negative for DVT.  - Patient continues with fluctuating swelling. Continue Lasix 20 mg twice daily  - Continue using compression stockings daily as well as elevation.  - Continue follow-up with hematology/oncology as scheduled.     Orders:    warfarin (COUMADIN) 2.5 mg tablet; Take 1 tablet by mouth once daily    warfarin (COUMADIN) 5 mg tablet; TAKE 1 TABLET DAILY

## 2024-12-18 NOTE — ASSESSMENT & PLAN NOTE
- Continue vitamin B12 1000 mcg daily.   -Reviewed vitamin B12 level from October 2024.  Level is at goal.  Orders:    Cyanocobalamin (B-12) 1000 MCG TABS; Take 1 tablet by mouth daily

## 2024-12-18 NOTE — ASSESSMENT & PLAN NOTE
- Continue vitamin D 1,000 units daily.   -Reviewed vitamin D level from October 2024.  Level is at goal.  Orders:    Cholecalciferol (D3-1000) 1,000 units tablet; Take 1 tablet (1,000 Units total) by mouth daily

## 2024-12-18 NOTE — ASSESSMENT & PLAN NOTE
-Reviewed gastroenterology note from 8/26/2024.  Patient scheduled for EGD and right upper quadrant ultrasound.  Per chart review, patient may have progressed to a normal liver versus early stage cirrhosis.  -Continue with abstinence from alcohol.  Avoid NSAIDs.  - Continue follow-up with gastroenterology as scheduled.  - Continue Lasix 20 mg twice daily as needed and spironolactone 50 mg daily.     Orders:    pantoprazole (PROTONIX) 40 mg tablet; Take 1 tablet (40 mg total) by mouth daily
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
## 2024-12-18 NOTE — PATIENT INSTRUCTIONS
Outpatient Mental Health Resources    If you would like to be placed on the wait list for services with [facility] you MUST contact intake at [facility] - [phone]    Emergency & Crisis Support    Suicide and Crisis Lifeline: Call or text 988 (Available 24 hours)  Crisis Text Line: Text HOME to 273650 (Available 24/7)  Warm Line: Call [phone] (Confidential mental health support, available Monday-Sunday: 6 AM-10 AM & 4 PM-12 AM)  Psychiatric Crisis: Call [phone] (For mental health emergencies, or visit your local Emergency Department)  Crime Victims [place]: Call [phone] (24/7 [facility] Hotline, counseling, court & hospital accompaniment, free services)    [facility]    [facility]  Call [phone]  Website: [email or web address].org  Support for mental health conditions. Free services for all    [facility]  [address], [place] [address] [phone]  Services for all ages; Bilingual (English/Wallisian); Accepts Medical Assistance, Medicare and commercial insurance    [facility]  [address], [place] [address] [phone]  Services for all ages; Bilingual (English/Wallisian); Accepts High[employer] Blue Cross Blue Shield, [employer]ellan, Aetna, Optum and Cigna    [facility]  [address], [place] [id number] [phone]  Services for ages 4+. English only; Does NOT accept Medical Assistance (only Commercial Insurance)    [facility]   [address], [place], [place]   [phone]  Services for all ages; English only; Accepts [employer] Blue Cross Blue Shield, Highmark Blue Cross Blue Sheild and Medicare    [facility]  [address] [place] [address] [phone]  Services for ages 6+. Bilingual (English/Wallisian); Accepts Medical Assistance only    [facility]  [address] [place] [address]  [phone]  Services for ages 5+.  Bilingual (English/Angolan); Accepts Medical Assistance    [facility]  [address], [place] [address]  [phone]  Services for ages 14+. Bilingual (English/Angolan); Accepts Medical Assistance, Medicare, and Commercial Insurance    [facility]  [address] [place] [address]  [phone]  Services for ages 5+. Bilingual (English/Angolan); Accepts Medical Assistance    [facility]  [address], [place] [address]  [phone]  Services for ages 3+. Bilingual (English/Angolan); Accepts Medical Assistance and Some Commercial Insurance    [facility]  [address], [address], [place] [address]  [phone]  Services for ages 5+. Bilingual (English/Angolan); Accepts Medical Assistance    [facility]  [address], [address], [place] [address]  [phone]  Services for ages 6+. Bilingual (English/Angolan); Accepts Medical Assistance and Commercial Insurance    [facility]   [address] [place] [address]  [phone]  Services for ages 5+. Bilingual (English/Angolan); Accepts Medical Assistance, Medicare and Commercial Insurance     [facility]  [address], [place] [address]  [phone]  Services for all ages (Therapy); 18+ (Psychiatry); English only; Accepts [employer] Blue Cross, Aetna, Highmark, Magellan, [facility] (CHIP & commercial insurance)      [email or web address] is a resource to find psychotherapy providers, patients can filter therapist search list based on several criteria including language, specialty, gender, insurance, etc. Individuals seeking will need to reach out to perspective providers through information in the directory. You are encouraged to contact multiple providers to given that many providers have a significant wait list for services as well as to find a provider is a good fit for you!     Please contact your insurance provider for additional information.

## 2024-12-23 ENCOUNTER — TELEPHONE (OUTPATIENT)
Dept: HEMATOLOGY ONCOLOGY | Facility: CLINIC | Age: 49
End: 2024-12-23

## 2024-12-23 DIAGNOSIS — D53.9 MACROCYTIC ANEMIA: ICD-10-CM

## 2024-12-23 DIAGNOSIS — Z86.718 HISTORY OF DVT (DEEP VEIN THROMBOSIS): ICD-10-CM

## 2024-12-23 DIAGNOSIS — Z79.01 LONG TERM (CURRENT) USE OF ANTICOAGULANTS: ICD-10-CM

## 2024-12-23 DIAGNOSIS — K70.31 ALCOHOLIC CIRRHOSIS OF LIVER WITH ASCITES (HCC): ICD-10-CM

## 2024-12-23 DIAGNOSIS — D64.89 ANEMIA DUE TO OTHER CAUSE, NOT CLASSIFIED: ICD-10-CM

## 2024-12-23 DIAGNOSIS — E53.8 VITAMIN B 12 DEFICIENCY: Primary | ICD-10-CM

## 2024-12-23 DIAGNOSIS — E53.8 FOLIC ACID DEFICIENCY: ICD-10-CM

## 2024-12-23 DIAGNOSIS — D50.8 OTHER IRON DEFICIENCY ANEMIAS: ICD-10-CM

## 2024-12-23 PROBLEM — F99 INSOMNIA DUE TO OTHER MENTAL DISORDER: Chronic | Status: ACTIVE | Noted: 2024-03-04

## 2024-12-23 PROBLEM — F51.05 INSOMNIA DUE TO OTHER MENTAL DISORDER: Chronic | Status: ACTIVE | Noted: 2024-03-04

## 2024-12-23 NOTE — TELEPHONE ENCOUNTER
Called out to patient to schedule virtual appointment per Josie Bagley. Virtual appointment is scheduled for 12/27/24 at 2:30 pm. Patient is agreeable to using my chart for the visit. Also advised patient to complete lab work prior to appointment. Advised patient that orders are in the system, labs are non-fasting, and can be completed at any [facility] labs. Patient verbalized understanding.

## 2024-12-23 NOTE — ASSESSMENT & PLAN NOTE
Orders:    oxyCODONE (ROXICODONE) 10 MG TABS; Take 1 tablet (10 mg total) by mouth 2 (two) times a day as needed for moderate pain or severe pain Max Daily Amount: 20 mg
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
## 2024-12-23 NOTE — ASSESSMENT & PLAN NOTE
- Patient experiencing frequent nightmares and flashbacks.  Patient notes she had seen a therapist when she was in the hospital for a few days, but never established with a therapist or psychiatrist outpatient.  Patient notes she is interested in this now.    -Will place referral to [facility] psychiatry for further evaluation and management.  Explained to patient there is currently a long wait list.  Also provided patient with list of local outpatient mental health resources and advised to call to establish care.  - Continue Prozac 20 mg daily.  -Patient denies any suicidal or homicidal ideations.    Orders:    Ambulatory referral to Behavioral Health Services; Future

## 2024-12-23 NOTE — ASSESSMENT & PLAN NOTE
Orders:    furosemide (LASIX) 20 mg tablet; Take 1 tablet (20 mg total) by mouth 2 (two) times a day    folic acid (FOLVITE) 1 mg tablet; Take 1 tablet (1 mg total) by mouth daily

## 2024-12-23 NOTE — ASSESSMENT & PLAN NOTE
- Multifactorial  - H/o bariatric surgery  - History of blood transfusions, most recently in March 2024.  - Previously tolerating IV iron infusions  - Patient referred to Hematology at last visit but has yet to establish care.  Patient was scheduled multiple times, but was unable to attend due to difficulties with transportation.  Patient interested in completing virtual visit.  Message sent to hematology/oncology to see if this is possible.  -Reviewed CBC, iron studies from October 2024.

## 2024-12-23 NOTE — ASSESSMENT & PLAN NOTE
-PT/INR goal 2-3  - Continue monitoring INR with clinical pharmacist.  -Continue warfarin plan as per clinical pharmacist.  - Continue lifelong anticoagulation with warfarin.

## 2024-12-27 ENCOUNTER — TELEPHONE (OUTPATIENT)
Dept: HEMATOLOGY ONCOLOGY | Facility: CLINIC | Age: 49
End: 2024-12-27

## 2024-12-27 NOTE — TELEPHONE ENCOUNTER
Left voicemail for patient advising her we were cancelling her virtual visit for today with Josie Bagley as she did not obtain her labwork.  Advised her appointment has been rescheduled to 1/7 at 1:30pm as a virtual visit.  Advised patient to return call if this appointment time does not work with her schedule.  [employer] number provided.

## 2024-12-30 DIAGNOSIS — R10.9 NONSPECIFIC ABDOMINAL PAIN: ICD-10-CM

## 2024-12-30 DIAGNOSIS — E51.9 THIAMINE DEFICIENCY: ICD-10-CM

## 2024-12-30 DIAGNOSIS — K70.10 ALCOHOLIC HEPATITIS WITHOUT ASCITES: ICD-10-CM

## 2024-12-30 DIAGNOSIS — K70.11 ALCOHOLIC HEPATITIS WITH ASCITES: ICD-10-CM

## 2024-12-30 DIAGNOSIS — E55.9 VITAMIN D DEFICIENCY: ICD-10-CM

## 2024-12-30 DIAGNOSIS — S37.13XS URETERAL LACERATION, SEQUELA: ICD-10-CM

## 2024-12-30 DIAGNOSIS — F11.90 OPIOID USE: ICD-10-CM

## 2024-12-30 DIAGNOSIS — Z86.718 HISTORY OF DVT (DEEP VEIN THROMBOSIS): ICD-10-CM

## 2024-12-30 DIAGNOSIS — E53.8 VITAMIN B 12 DEFICIENCY: ICD-10-CM

## 2024-12-30 DIAGNOSIS — R21 RASH: ICD-10-CM

## 2024-12-30 DIAGNOSIS — K74.60 LIVER CIRRHOSIS (HCC): ICD-10-CM

## 2024-12-30 DIAGNOSIS — R11.2 NAUSEA AND VOMITING, UNSPECIFIED VOMITING TYPE: ICD-10-CM

## 2024-12-31 RX ORDER — WARFARIN SODIUM 5 MG/1
TABLET ORAL
Qty: 30 TABLET | Refills: 0 | OUTPATIENT
Start: 2024-12-31

## 2024-12-31 RX ORDER — CYANOCOBALAMIN (VITAMIN B-12) 1000 MCG
1 TABLET ORAL DAILY
Qty: 30 TABLET | Refills: 0 | OUTPATIENT
Start: 2024-12-31

## 2024-12-31 RX ORDER — SPIRONOLACTONE 50 MG/1
50 TABLET, FILM COATED ORAL DAILY
Qty: 90 TABLET | Refills: 0 | OUTPATIENT
Start: 2024-12-31

## 2024-12-31 RX ORDER — METHION/INOS/CHOL BT/B COM/LIV 110MG-86MG
100 CAPSULE ORAL DAILY
Qty: 30 TABLET | Refills: 0 | OUTPATIENT
Start: 2024-12-31

## 2024-12-31 RX ORDER — TRIAMCINOLONE ACETONIDE 1 MG/G
CREAM TOPICAL 2 TIMES DAILY
Qty: 45 G | Refills: 0 | Status: SHIPPED | OUTPATIENT
Start: 2024-12-31

## 2024-12-31 RX ORDER — ONDANSETRON 8 MG/1
8 TABLET, FILM COATED ORAL EVERY 8 HOURS PRN
Qty: 30 TABLET | Refills: 0 | Status: SHIPPED | OUTPATIENT
Start: 2024-12-31

## 2024-12-31 RX ORDER — PANTOPRAZOLE SODIUM 40 MG/1
40 TABLET, DELAYED RELEASE ORAL DAILY
Qty: 30 TABLET | Refills: 0 | Status: SHIPPED | OUTPATIENT
Start: 2024-12-31

## 2024-12-31 RX ORDER — METHOCARBAMOL 500 MG/1
500 TABLET, FILM COATED ORAL 2 TIMES DAILY PRN
Qty: 60 TABLET | Refills: 0 | OUTPATIENT
Start: 2024-12-31

## 2024-12-31 RX ORDER — FUROSEMIDE 20 MG/1
20 TABLET ORAL 2 TIMES DAILY
Qty: 180 TABLET | Refills: 0 | OUTPATIENT
Start: 2024-12-31

## 2025-01-02 RX ORDER — OXYCODONE HYDROCHLORIDE 10 MG/1
10 TABLET ORAL 2 TIMES DAILY PRN
Qty: 30 TABLET | Refills: 0 | Status: SHIPPED | OUTPATIENT
Start: 2025-01-02

## 2025-01-07 ENCOUNTER — TELEPHONE (OUTPATIENT)
Dept: HEMATOLOGY ONCOLOGY | Facility: CLINIC | Age: 50
End: 2025-01-07

## 2025-01-07 ENCOUNTER — TELEMEDICINE (OUTPATIENT)
Dept: HEMATOLOGY ONCOLOGY | Facility: CLINIC | Age: 50
End: 2025-01-07

## 2025-01-07 DIAGNOSIS — K70.31 ALCOHOLIC CIRRHOSIS OF LIVER WITH ASCITES (HCC): ICD-10-CM

## 2025-01-07 DIAGNOSIS — Z79.01 LONG TERM (CURRENT) USE OF ANTICOAGULANTS: ICD-10-CM

## 2025-01-07 DIAGNOSIS — E53.8 FOLIC ACID DEFICIENCY: ICD-10-CM

## 2025-01-07 DIAGNOSIS — D50.8 OTHER IRON DEFICIENCY ANEMIAS: ICD-10-CM

## 2025-01-07 DIAGNOSIS — E53.8 VITAMIN B 12 DEFICIENCY: Primary | ICD-10-CM

## 2025-01-07 PROCEDURE — 99215 OFFICE O/P EST HI 40 MIN: CPT | Performed by: PHYSICIAN ASSISTANT

## 2025-01-07 RX ORDER — SODIUM CHLORIDE 9 MG/ML
20 INJECTION, SOLUTION INTRAVENOUS ONCE
Status: CANCELLED | OUTPATIENT
Start: 2025-01-21

## 2025-01-07 RX ORDER — CYANOCOBALAMIN 1000 UG/ML
1000 INJECTION, SOLUTION INTRAMUSCULAR; SUBCUTANEOUS ONCE
Status: CANCELLED | OUTPATIENT
Start: 2025-01-21 | End: 2025-01-21

## 2025-01-07 NOTE — PATIENT INSTRUCTIONS
[facility] Medical Oncology and Hematology Team  [place] - [phone]    Your Team Member:  Advanced Practitioner:  Josie Bagley PA-C    Please answer Private and Unavailable Calls - this may be your team(s) contacting you.  If you have medical questions/concerns/issues - contact us either by (1) My Chart (2) [facility] [place]

## 2025-01-07 NOTE — TELEPHONE ENCOUNTER
Good afternoon,                     Per provider please schedule patient for Iron Infusions.      Thank you

## 2025-01-07 NOTE — ASSESSMENT & PLAN NOTE
Etiology.  Bariatric surgery    Regimen:  B12 1000 mcg IM weekly x 5 in conjunction with iron treatments.    Continue to observe.  Orders:    CBC and differential; Future    Iron Panel (Includes Ferritin, Iron Sat%, Iron, and TIBC); Future    Vitamin B12; Future    Folate; Future

## 2025-01-07 NOTE — PROGRESS NOTES
Name: Mckenna Fischer      : 1975      MRN: [id number]  Encounter Provider: Josie Bagley PA-C  Encounter Date: 2025   Encounter department: [facility]  :  Assessment & Plan  Other iron deficiency anemias  This is a 49-year-old female with past medical habitual surgery.  Patient is presently symptomatic iron deficiency specifically with increased fatigue.  Patient's iron panel completed in October data deficiency.  Patient will update labs in the meantime, Venofer was recommended.    Regimen:  Venofer 200 mg x 5 doses    Patient will follow-up in approximately 6 months with blood work prior video visit acceptable.  Orders:    CBC and differential; Future    Iron Panel (Includes Ferritin, Iron Sat%, Iron, and TIBC); Future    Vitamin B12; Future    Folate; Future    Iron Panel (Includes Ferritin, Iron Sat%, Iron, and TIBC); Future    CBC and differential; Future    Vitamin B 12 deficiency  Etiology.  Bariatric surgery    Regimen:  B12 1000 mcg IM weekly x 5 in conjunction with iron treatments.    Continue to observe.  Orders:    CBC and differential; Future    Iron Panel (Includes Ferritin, Iron Sat%, Iron, and TIBC); Future    Vitamin B12; Future    Folate; Future    Folic acid deficiency  Etiology.  Bariatric surgery  Stable.  Continue to observe  Orders:    CBC and differential; Future    Iron Panel (Includes Ferritin, Iron Sat%, Iron, and TIBC); Future    Vitamin B12; Future    Folate; Future    Alcoholic cirrhosis of liver with ascites (HCC)  Patient is following with GI.  Colonoscopy and EG are pending.  Patient unfortunately had the flu and this was scheduled previously.  Pain is now cancellation list.       Long term (current) use of anticoagulants  Patient had multiple episodes of VTE.  Patient is on anticoagulation long-term managed outside this office.           History of Present Illness   No chief complaint on file.    Pertinent Medical History    This is a 47-year-old female history of alcohol and tobacco abuse sleep apnea, gastric bypass surgery, least 3 episodes of DVT with PE, substrate deficiencies leading to anemia.    VTE history:   Left lower extremity DVT with PE in 2003 unprovoked.  Warfarin was eventually discontinued.  Second episode 2016 occurred bariatric surgery, provoked.  Left lower extremity DVT.  Injections but discontinued this in 2021-unknown reasoning.  Hypercoag workup demonstrated positive lupus anticoagulant test.  This was confirmed 3 months later on repeat testing in 1/20/2023.    Patient recommend regimen: coumadin with Lovenox bridging.    Managed by PCP      Anemia:  9/28/2022 CT scan of the abdomen with contrast:  small ascites, hepatomegaly with severe steatosis.       10/31/2022: Cirrhosis with minimal activity and features of status oh hepatitis.   -History of positive anti-smooth muscle antibody.    1/20/2023 WBC 3.1, ANC 1.2, hemoglobin 9.6, MCV 85, platelet count 261   Sed rate elevated 35, CRP WNL   Positive lupus anticoagulant.   B12 257, folate greater than 20   Creatinine 0.8, calcium WNL, AST 39, ALT WNL, bili 0.5,    Haptoglobin = 96, LDH WNL, PHILIPPE negative   Hemolysis smear-helmet cells plus schistocytes.   Ferritin = 19, saturation = 5% ( still menstruating)  8/26/2024 endoscopic evaluation -recommended unfortunately, the patient became ill and was unable to participate at her scheduled appointment in November.  Patient is now on a cancellation list as of 1/7/2025    10/14/2024 WBC 7.7, hemoglobin 9.7, MCV 94, RDW 15.4, platelet count 475    Vitamin B12 601, folate greater than 22, ferritin 11, iron saturation 16, TIBC 376   Sed rate elevated,  (low), CRP slightly elevated at 4.3   PHILIPPE negative, haptoglobin 115, hemolysis smear negative schistocytes/helmet cells   Creatinine 0.78, BUN 14, EGFR 90, albumin 3.3, corrected calcium 9.2, Tbili-0.36    01/07/25: Patient notes that she is tired.  Patient is  pending colonoscopy as mentioned above.  Unfortunately patient was ill and had to cancel several appointments secondary to viral illnesses.     Review of Systems   Constitutional:  Positive for fatigue. Negative for appetite change, fever and unexpected weight change.   HENT:  Negative for nosebleeds.    Respiratory:  Negative for cough, choking and shortness of breath.         Negative hemoptysis.   Cardiovascular:  Negative for chest pain, palpitations and leg swelling.   Gastrointestinal: Negative.  Negative for abdominal distention, abdominal pain, anal bleeding, blood in stool, constipation, diarrhea, nausea and vomiting.   Endocrine: Negative.  Negative for cold intolerance.   Genitourinary: Negative.  Negative for hematuria, menstrual problem, vaginal bleeding, vaginal discharge and vaginal pain.   Musculoskeletal: Negative.  Negative for arthralgias, myalgias, neck pain and neck stiffness.   Skin: Negative.  Negative for color change, pallor and rash.   Allergic/Immunologic: Negative.  Negative for immunocompromised state.   Neurological: Negative.  Negative for weakness and headaches.   Hematological:  Negative for adenopathy. Does not bruise/bleed easily.   All other systems reviewed and are negative.        Objective   LMP 11/27/2024     Physical Exam  Constitutional:       General: She is not in acute distress.     Appearance: She is well-developed.   HENT:      Head: Normocephalic and atraumatic.   Eyes:      General: No scleral icterus.     Conjunctiva/sclera: Conjunctivae normal.   Cardiovascular:      Rate and Rhythm: Normal rate.   Pulmonary:      Effort: Pulmonary effort is normal. No respiratory distress.   Skin:     General: Skin is warm.      Coloration: Skin is not pale.      Findings: No rash.   Neurological:      Mental Status: She is alert and oriented to person, place, and time.   Psychiatric:         Thought Content: Thought content normal.       Labs: I have reviewed the following  labs:  Results for orders placed or performed in visit on 12/03/24   Protime-INR   Result Value Ref Range    Protime 25.3 (H) 12.3 - 15.0 seconds    INR 2.31 (H) 0.85 - 1.19   Direct antiglobulin test   Result Value Ref Range    DIRECT KALEY Negative      Lab Results   Component Value Date/Time    WBC 7.70 10/14/2024 03:51 PM    RBC 3.15 (L) 10/14/2024 03:51 PM    Hemoglobin 9.7 (L) 10/14/2024 03:51 PM    Hematocrit 29.7 (L) 10/14/2024 03:51 PM    MCV 94 10/14/2024 03:51 PM    MCH 30.8 10/14/2024 03:51 PM    RDW 15.4 (H) 10/14/2024 03:51 PM    Platelets 475 (H) 10/14/2024 03:51 PM    Segmented % 49 10/14/2024 03:51 PM    Lymphocytes % 43 10/14/2024 03:51 PM    Monocytes % 8 10/14/2024 03:51 PM    Eosinophils Relative 0 10/14/2024 03:51 PM    Basophils Relative 0 10/14/2024 03:51 PM    Immature Grans % 0 10/14/2024 03:51 PM    Absolute Neutrophils 3.79 10/14/2024 03:51 PM      Lab Results   Component Value Date/Time    Haptoglobin 115 10/14/2024 03:51 PM     (L) 10/14/2024 03:51 PM    Hemolysis Smear No Schistocytes or Helmet Cells noted 10/14/2024 03:51 PM    DIRECT KALEY Negative 12/03/2024 01:22 PM            Administrative Statements   Encounter provider Josie Bagley PA-C    The Patient is located at Home and in the following state in which I hold an active license [place].    The patient was identified by name and date of birth. Mckenna Fischer was informed that this is a telemedicine visit and that the visit is being conducted through the Epic Embedded platform. She agrees to proceed..  My office door was closed. No one else was in the room.  She acknowledged consent and understanding of privacy and security of the video platform. The patient has agreed to participate and understands they can discontinue the visit at any time.    I have spent a total time of 42 minutes in caring for this patient on the day of the visit/encounter including Instructions for management, Patient and family education,  Documenting in the medical record, Reviewing / ordering tests, medicine, procedures  , and Obtaining or reviewing history  .

## 2025-01-07 NOTE — ASSESSMENT & PLAN NOTE
This is a 49-year-old female with past medical habitual surgery.  Patient is presently symptomatic iron deficiency specifically with increased fatigue.  Patient's iron panel completed in October data deficiency.  Patient will update labs in the meantime, Venofer was recommended.    Regimen:  Venofer 200 mg x 5 doses    Patient will follow-up in approximately 6 months with blood work prior video visit acceptable.  Orders:    CBC and differential; Future    Iron Panel (Includes Ferritin, Iron Sat%, Iron, and TIBC); Future    Vitamin B12; Future    Folate; Future    Iron Panel (Includes Ferritin, Iron Sat%, Iron, and TIBC); Future    CBC and differential; Future

## 2025-01-07 NOTE — ASSESSMENT & PLAN NOTE
Patient is following with GI.  Colonoscopy and EG are pending.  Patient unfortunately had the flu and this was scheduled previously.  Pain is now cancellation list.

## 2025-01-07 NOTE — TELEPHONE ENCOUNTER
Called patient to get her checked out after virtual appointment. Patient verbally confirmed a 6 month follow -up with labs & infusions.      Thank you

## 2025-01-08 ENCOUNTER — TELEPHONE (OUTPATIENT)
Dept: FAMILY MEDICINE CLINIC | Facility: CLINIC | Age: 50
End: 2025-01-08

## 2025-01-08 NOTE — TELEPHONE ENCOUNTER
Spoke to patient via phone at [phone]    Patient doing well overall, going to start iron infusions due to anemia    Hemoglobin/platelet count is not low enough to hold anticoagulation, recommend continuing    Continue to monitor, she is obtaining labs this Friday, obtain INR at the same time -standing order is active    Follow-up Monday with results and dosing    Pharmacist Tracking Tool  Reason For Outreach: Embedded Pharmacist  Demographics:  Intervention Method: Phone  Type of Intervention: Follow-Up  Topics Addressed: Anticoagulation  Pharmacologic Interventions: Prevent or Manage TROY  Non-Pharmacologic Interventions: Care coordination, Chart update, Home Monitoring, and Labs  Time:  Direct Patient Care:  10  mins  Care Coordination:  5  mins  Recommendation Recipient: Patient/Caregiver  Outcome: Accepted    Emanuel Gardner, PharmD, BCACP  Ambulatory Care Clinical Pharmacist

## 2025-01-14 DIAGNOSIS — R11.2 NAUSEA AND VOMITING, UNSPECIFIED VOMITING TYPE: ICD-10-CM

## 2025-01-14 DIAGNOSIS — L30.4 INTERTRIGO: ICD-10-CM

## 2025-01-14 DIAGNOSIS — F11.90 OPIOID USE: ICD-10-CM

## 2025-01-14 DIAGNOSIS — F10.10 ALCOHOL ABUSE: ICD-10-CM

## 2025-01-14 DIAGNOSIS — K70.11 ALCOHOLIC HEPATITIS WITH ASCITES: ICD-10-CM

## 2025-01-14 DIAGNOSIS — S37.13XS URETERAL LACERATION, SEQUELA: ICD-10-CM

## 2025-01-14 DIAGNOSIS — G47.00 INSOMNIA, UNSPECIFIED TYPE: ICD-10-CM

## 2025-01-14 RX ORDER — OXYCODONE HYDROCHLORIDE 10 MG/1
10 TABLET ORAL 2 TIMES DAILY PRN
Qty: 30 TABLET | Refills: 0 | Status: CANCELLED | OUTPATIENT
Start: 2025-01-14

## 2025-01-15 ENCOUNTER — APPOINTMENT (OUTPATIENT)
Dept: LAB | Facility: CLINIC | Age: 50
End: 2025-01-15
Payer: COMMERCIAL

## 2025-01-15 DIAGNOSIS — E53.8 FOLIC ACID DEFICIENCY: ICD-10-CM

## 2025-01-15 DIAGNOSIS — K70.11 ALCOHOLIC HEPATITIS WITH ASCITES: ICD-10-CM

## 2025-01-15 DIAGNOSIS — K70.10 ALCOHOLIC HEPATITIS WITHOUT ASCITES: ICD-10-CM

## 2025-01-15 DIAGNOSIS — K74.60 LIVER CIRRHOSIS (HCC): ICD-10-CM

## 2025-01-15 DIAGNOSIS — L30.4 INTERTRIGO: ICD-10-CM

## 2025-01-15 DIAGNOSIS — Z86.718 HISTORY OF DVT (DEEP VEIN THROMBOSIS): ICD-10-CM

## 2025-01-15 DIAGNOSIS — G47.00 INSOMNIA, UNSPECIFIED TYPE: ICD-10-CM

## 2025-01-15 DIAGNOSIS — E55.9 VITAMIN D DEFICIENCY: ICD-10-CM

## 2025-01-15 DIAGNOSIS — D50.8 OTHER IRON DEFICIENCY ANEMIAS: ICD-10-CM

## 2025-01-15 DIAGNOSIS — F10.10 ALCOHOL ABUSE: ICD-10-CM

## 2025-01-15 DIAGNOSIS — Z86.718 HISTORY OF DVT (DEEP VEIN THROMBOSIS): Chronic | ICD-10-CM

## 2025-01-15 DIAGNOSIS — Z86.711 HISTORY OF PULMONARY EMBOLUS (PE): ICD-10-CM

## 2025-01-15 DIAGNOSIS — D53.9 MACROCYTIC ANEMIA: ICD-10-CM

## 2025-01-15 DIAGNOSIS — E53.8 VITAMIN B 12 DEFICIENCY: ICD-10-CM

## 2025-01-15 DIAGNOSIS — F43.10 POSTTRAUMATIC STRESS DISORDER: ICD-10-CM

## 2025-01-15 DIAGNOSIS — R11.2 NAUSEA AND VOMITING, UNSPECIFIED VOMITING TYPE: ICD-10-CM

## 2025-01-15 DIAGNOSIS — Z79.01 LONG TERM (CURRENT) USE OF ANTICOAGULANTS: ICD-10-CM

## 2025-01-15 DIAGNOSIS — S37.13XS URETERAL LACERATION, SEQUELA: ICD-10-CM

## 2025-01-15 DIAGNOSIS — E51.9 THIAMINE DEFICIENCY: ICD-10-CM

## 2025-01-15 DIAGNOSIS — K70.31 ALCOHOLIC CIRRHOSIS OF LIVER WITH ASCITES (HCC): ICD-10-CM

## 2025-01-15 DIAGNOSIS — F11.90 OPIOID USE: ICD-10-CM

## 2025-01-15 DIAGNOSIS — R21 RASH: ICD-10-CM

## 2025-01-15 DIAGNOSIS — D64.89 ANEMIA DUE TO OTHER CAUSE, NOT CLASSIFIED: ICD-10-CM

## 2025-01-15 DIAGNOSIS — R10.9 NONSPECIFIC ABDOMINAL PAIN: ICD-10-CM

## 2025-01-15 LAB
BASOPHILS # BLD AUTO: 0.01 THOUSANDS/ΜL (ref 0–0.1)
BASOPHILS NFR BLD AUTO: 0 % (ref 0–1)
EOSINOPHIL # BLD AUTO: 0 THOUSAND/ΜL (ref 0–0.61)
EOSINOPHIL NFR BLD AUTO: 0 % (ref 0–6)
ERYTHROCYTE [DISTWIDTH] IN BLOOD BY AUTOMATED COUNT: 18.5 % (ref 11.6–15.1)
FERRITIN SERPL-MCNC: 56 NG/ML (ref 11–307)
FOLATE SERPL-MCNC: 22.2 NG/ML
HCT VFR BLD AUTO: 31 % (ref 34.8–46.1)
HGB BLD-MCNC: 9.7 G/DL (ref 11.5–15.4)
IMM GRANULOCYTES # BLD AUTO: 0.03 THOUSAND/UL (ref 0–0.2)
IMM GRANULOCYTES NFR BLD AUTO: 0 % (ref 0–2)
INR PPP: 1.34 (ref 0.85–1.19)
IRON SATN MFR SERPL: 32 % (ref 15–50)
IRON SERPL-MCNC: 94 UG/DL (ref 50–212)
LYMPHOCYTES # BLD AUTO: 1.62 THOUSANDS/ΜL (ref 0.6–4.47)
LYMPHOCYTES NFR BLD AUTO: 21 % (ref 14–44)
MCH RBC QN AUTO: 31.6 PG (ref 26.8–34.3)
MCHC RBC AUTO-ENTMCNC: 31.3 G/DL (ref 31.4–37.4)
MCV RBC AUTO: 101 FL (ref 82–98)
MONOCYTES # BLD AUTO: 0.74 THOUSAND/ΜL (ref 0.17–1.22)
MONOCYTES NFR BLD AUTO: 10 % (ref 4–12)
NEUTROPHILS # BLD AUTO: 5.29 THOUSANDS/ΜL (ref 1.85–7.62)
NEUTS SEG NFR BLD AUTO: 69 % (ref 43–75)
NRBC BLD AUTO-RTO: 0 /100 WBCS
PLATELET # BLD AUTO: 351 THOUSANDS/UL (ref 149–390)
PMV BLD AUTO: 10 FL (ref 8.9–12.7)
PROTHROMBIN TIME: 16.8 SECONDS (ref 12.3–15)
RBC # BLD AUTO: 3.07 MILLION/UL (ref 3.81–5.12)
TIBC SERPL-MCNC: 298.2 UG/DL (ref 250–450)
TRANSFERRIN SERPL-MCNC: 213 MG/DL (ref 203–362)
UIBC SERPL-MCNC: 204 UG/DL (ref 155–355)
WBC # BLD AUTO: 7.69 THOUSAND/UL (ref 4.31–10.16)

## 2025-01-15 PROCEDURE — 82607 VITAMIN B-12: CPT

## 2025-01-15 PROCEDURE — 36415 COLL VENOUS BLD VENIPUNCTURE: CPT

## 2025-01-15 PROCEDURE — 85025 COMPLETE CBC W/AUTO DIFF WBC: CPT

## 2025-01-15 PROCEDURE — 85610 PROTHROMBIN TIME: CPT

## 2025-01-15 PROCEDURE — 83540 ASSAY OF IRON: CPT

## 2025-01-15 PROCEDURE — 82746 ASSAY OF FOLIC ACID SERUM: CPT

## 2025-01-15 PROCEDURE — 82728 ASSAY OF FERRITIN: CPT

## 2025-01-15 PROCEDURE — 83550 IRON BINDING TEST: CPT

## 2025-01-15 RX ORDER — OXYCODONE HYDROCHLORIDE 10 MG/1
10 TABLET ORAL 2 TIMES DAILY PRN
Qty: 30 TABLET | Refills: 0 | Status: CANCELLED | OUTPATIENT
Start: 2025-01-15

## 2025-01-16 ENCOUNTER — ANTICOAG VISIT (OUTPATIENT)
Dept: FAMILY MEDICINE CLINIC | Facility: CLINIC | Age: 50
End: 2025-01-16

## 2025-01-16 DIAGNOSIS — Z86.718 HISTORY OF DVT (DEEP VEIN THROMBOSIS): Primary | Chronic | ICD-10-CM

## 2025-01-16 DIAGNOSIS — F11.90 OPIOID USE: ICD-10-CM

## 2025-01-16 DIAGNOSIS — S37.13XS URETERAL LACERATION, SEQUELA: ICD-10-CM

## 2025-01-16 LAB — VIT B12 SERPL-MCNC: 657 PG/ML (ref 180–914)

## 2025-01-16 RX ORDER — OXYCODONE HYDROCHLORIDE 10 MG/1
10 TABLET ORAL 2 TIMES DAILY PRN
Qty: 30 TABLET | Refills: 0 | Status: SHIPPED | OUTPATIENT
Start: 2025-01-16

## 2025-01-16 RX ORDER — WARFARIN SODIUM 7.5 MG/1
TABLET ORAL
Qty: 45 TABLET | Refills: 1 | Status: SHIPPED | OUTPATIENT
Start: 2025-01-16

## 2025-01-16 NOTE — Clinical Note
ROGE Green going to be sharing this patients coag management with Rosa just so she can get some experience with warfarin dosing, she has not had a pt yet. You may see the next notes from her! Thank you

## 2025-01-16 NOTE — PROGRESS NOTES
[facility] [address], [address]  [facility] [id number] [phone]  Clinical Pharmacy Anticoagulation Consultation  Encounter provider: Emanuel Gardner, Pharmacist  Assessment/Plan  INR: 1.34. Subtherapeutic INR for goal of 2.0-3.0  Current warfarin dose: 7.5 mg every OTHER day   She was doing this to conserve tablets due to cost, explained how/why this is inappropriate   New dose: increase back to previous maintenance dose   Recheck INR in 2 weeks  Counseled patient on compliance with medications, and consistency with vitamin k rich foods intake.    Subjective  Indication: Indication: hx of multiple DVT, antiphospholipid syndrome   Bleeding signs/symptoms: no  Thrombosis signs/symptoms: no    Missed doses: no  Medication changes: no  Dietary changes: no  Bacterial/viral infection: no  Recent alcohol consumption: no  Other concerns: no    Objective  Lab Results   Component Value Date/Time    INR 1.34 (H) 01/15/2025 11:47 AM    INR 2.31 (H) 12/03/2024 01:22 PM    INR 4.35 (H) 10/14/2024 03:51 PM    INR 2.1 03/28/2024 03:17 AM    INR 1.8 03/27/2024 06:07 AM    INR 1.3 03/25/2024 04:52 AM    HGB 9.7 (L) 01/15/2025 11:47 AM    HGB 9.7 (L) 10/14/2024 03:51 PM    HGB 10.8 (L) 08/22/2024 12:47 PM    HCT 31.0 (L) 01/15/2025 11:47 AM    HCT 29.7 (L) 10/14/2024 03:51 PM    HCT 33.6 (L) 08/22/2024 12:47 PM    CREATININE 0.78 10/14/2024 03:51 PM    CREATININE 0.74 08/22/2024 12:47 PM    CREATININE 0.70 04/12/2024 07:17 AM    CREATININE 0.67 03/29/2024 04:19 AM    CREATININE 0.51 03/28/2024 03:17 AM    CREATININE 0.52 03/27/2024 06:07 AM    EGFR 90 10/14/2024 03:51 PM    EGFR 96 08/22/2024 12:47 PM    EGFR 102 04/12/2024 07:17 AM    EGFR 108 03/29/2024 04:19 AM    EGFR 115 03/28/2024 03:17 AM    EGFR 114 03/27/2024 06:07 AM    EGFR 77 06/26/2018 05:40 PM         Emanuel Gardner Pharmacist    --------------------------------------------    Pharmacist Tracking Tool  Reason For  Outreach: Embedded Pharmacist  Demographics:  Intervention Method: Phone  Type of Intervention: Follow-Up  Topics Addressed: Anticoagulation  Pharmacologic Interventions: Dose or Frequency Adjusted and Prevent or Manage TROY  Non-Pharmacologic Interventions: Adherence addressed, Care coordination, Chart update, Disease state education, Labs, and Medication Monitoring  Time:  Direct Patient Care:  10  mins  Care Coordination:  15  mins  Recommendation Recipient: Patient/Caregiver  Outcome: Accepted

## 2025-01-17 DIAGNOSIS — Z86.718 HISTORY OF DVT (DEEP VEIN THROMBOSIS): Chronic | ICD-10-CM

## 2025-01-17 DIAGNOSIS — F43.10 POSTTRAUMATIC STRESS DISORDER: ICD-10-CM

## 2025-01-17 DIAGNOSIS — Z86.711 HISTORY OF PULMONARY EMBOLUS (PE): ICD-10-CM

## 2025-01-17 RX ORDER — WARFARIN SODIUM 5 MG/1
TABLET ORAL
Qty: 30 TABLET | Refills: 2 | OUTPATIENT
Start: 2025-01-17

## 2025-01-17 RX ORDER — METHOCARBAMOL 500 MG/1
500 TABLET, FILM COATED ORAL 2 TIMES DAILY PRN
Qty: 60 TABLET | Refills: 2 | Status: SHIPPED | OUTPATIENT
Start: 2025-01-17

## 2025-01-17 RX ORDER — CYANOCOBALAMIN (VITAMIN B-12) 1000 MCG
1 TABLET ORAL DAILY
Qty: 30 TABLET | Refills: 2 | Status: SHIPPED | OUTPATIENT
Start: 2025-01-17

## 2025-01-17 RX ORDER — NYSTATIN 100000 [USP'U]/G
POWDER TOPICAL 2 TIMES DAILY
Qty: 60 G | Refills: 2 | OUTPATIENT
Start: 2025-01-17

## 2025-01-17 RX ORDER — ENOXAPARIN SODIUM 100 MG/ML
INJECTION SUBCUTANEOUS
OUTPATIENT
Start: 2025-01-17

## 2025-01-17 RX ORDER — PHENOL 1.4 %
1 AEROSOL, SPRAY (ML) MUCOUS MEMBRANE
Qty: 30 TABLET | Refills: 2 | OUTPATIENT
Start: 2025-01-17

## 2025-01-17 RX ORDER — PHENOL 1.4 %
1 AEROSOL, SPRAY (ML) MUCOUS MEMBRANE
Qty: 30 TABLET | Refills: 0 | Status: SHIPPED | OUTPATIENT
Start: 2025-01-17

## 2025-01-17 RX ORDER — NYSTATIN 100000 [USP'U]/G
POWDER TOPICAL 2 TIMES DAILY
Qty: 60 G | Refills: 0 | Status: SHIPPED | OUTPATIENT
Start: 2025-01-17

## 2025-01-17 RX ORDER — ENOXAPARIN SODIUM 100 MG/ML
INJECTION SUBCUTANEOUS
Qty: 8 ML | Refills: 0 | Status: SHIPPED | OUTPATIENT
Start: 2025-01-17

## 2025-01-17 RX ORDER — METOCLOPRAMIDE 5 MG/1
5 TABLET ORAL 4 TIMES DAILY
Qty: 60 TABLET | Refills: 0 | Status: SHIPPED | OUTPATIENT
Start: 2025-01-17

## 2025-01-17 RX ORDER — PANTOPRAZOLE SODIUM 40 MG/1
40 TABLET, DELAYED RELEASE ORAL DAILY
Qty: 30 TABLET | Refills: 0 | Status: SHIPPED | OUTPATIENT
Start: 2025-01-17

## 2025-01-17 RX ORDER — WARFARIN SODIUM 2.5 MG/1
TABLET ORAL
Qty: 30 TABLET | Refills: 1 | OUTPATIENT
Start: 2025-01-17

## 2025-01-17 RX ORDER — METOCLOPRAMIDE 5 MG/1
5 TABLET ORAL 4 TIMES DAILY
Qty: 60 TABLET | Refills: 0 | OUTPATIENT
Start: 2025-01-17

## 2025-01-17 RX ORDER — METHION/INOS/CHOL BT/B COM/LIV 110MG-86MG
100 CAPSULE ORAL DAILY
Qty: 30 TABLET | Refills: 2 | Status: SHIPPED | OUTPATIENT
Start: 2025-01-17

## 2025-01-17 RX ORDER — ONDANSETRON 8 MG/1
8 TABLET, FILM COATED ORAL EVERY 8 HOURS PRN
Qty: 30 TABLET | Refills: 0 | Status: SHIPPED | OUTPATIENT
Start: 2025-01-17

## 2025-01-17 RX ORDER — FUROSEMIDE 20 MG/1
20 TABLET ORAL 2 TIMES DAILY
Qty: 180 TABLET | Refills: 1 | Status: SHIPPED | OUTPATIENT
Start: 2025-01-17

## 2025-01-17 RX ORDER — FOLIC ACID 1 MG/1
1 TABLET ORAL DAILY
Qty: 90 TABLET | Refills: 1 | OUTPATIENT
Start: 2025-01-17

## 2025-01-17 RX ORDER — SPIRONOLACTONE 50 MG/1
50 TABLET, FILM COATED ORAL DAILY
Qty: 90 TABLET | Refills: 1 | Status: SHIPPED | OUTPATIENT
Start: 2025-01-17

## 2025-01-17 RX ORDER — TRIAMCINOLONE ACETONIDE 1 MG/G
CREAM TOPICAL 2 TIMES DAILY
Qty: 45 G | Refills: 0 | Status: SHIPPED | OUTPATIENT
Start: 2025-01-17

## 2025-01-17 RX ORDER — FOLIC ACID 1 MG/1
1 TABLET ORAL DAILY
Qty: 90 TABLET | Refills: 0 | Status: SHIPPED | OUTPATIENT
Start: 2025-01-17

## 2025-01-21 ENCOUNTER — HOSPITAL ENCOUNTER (OUTPATIENT)
Dept: INFUSION CENTER | Facility: HOSPITAL | Age: 50
Discharge: HOME | End: 2025-01-21
Attending: INTERNAL MEDICINE
Payer: COMMERCIAL

## 2025-01-21 VITALS
HEART RATE: 81 BPM | SYSTOLIC BLOOD PRESSURE: 112 MMHG | RESPIRATION RATE: 18 BRPM | DIASTOLIC BLOOD PRESSURE: 81 MMHG | TEMPERATURE: 97.8 F

## 2025-01-21 DIAGNOSIS — E53.8 VITAMIN B 12 DEFICIENCY: Primary | ICD-10-CM

## 2025-01-21 DIAGNOSIS — D50.8 OTHER IRON DEFICIENCY ANEMIAS: ICD-10-CM

## 2025-01-21 PROCEDURE — 96365 THER/PROPH/DIAG IV INF INIT: CPT

## 2025-01-21 PROCEDURE — 96372 THER/PROPH/DIAG INJ SC/IM: CPT

## 2025-01-21 RX ORDER — SODIUM CHLORIDE 9 MG/ML
20 INJECTION, SOLUTION INTRAVENOUS ONCE
Status: COMPLETED | OUTPATIENT
Start: 2025-01-21 | End: 2025-01-21

## 2025-01-21 RX ORDER — CYANOCOBALAMIN 1000 UG/ML
1000 INJECTION, SOLUTION INTRAMUSCULAR; SUBCUTANEOUS ONCE
Status: COMPLETED | OUTPATIENT
Start: 2025-01-21 | End: 2025-01-21

## 2025-01-21 RX ORDER — CYANOCOBALAMIN 1000 UG/ML
1000 INJECTION, SOLUTION INTRAMUSCULAR; SUBCUTANEOUS ONCE
OUTPATIENT
Start: 2025-01-28 | End: 2025-01-28

## 2025-01-21 RX ORDER — SODIUM CHLORIDE 9 MG/ML
20 INJECTION, SOLUTION INTRAVENOUS ONCE
OUTPATIENT
Start: 2025-01-28

## 2025-01-21 RX ADMIN — IRON SUCROSE 200 MG: 20 INJECTION, SOLUTION INTRAVENOUS at 13:39

## 2025-01-21 RX ADMIN — SODIUM CHLORIDE 20 ML/HR: 9 INJECTION, SOLUTION INTRAVENOUS at 13:39

## 2025-01-21 RX ADMIN — CYANOCOBALAMIN 1000 MCG: 1000 INJECTION INTRAMUSCULAR; SUBCUTANEOUS at 13:42

## 2025-01-21 NOTE — PROGRESS NOTES
Mckenna Fischer  tolerated treatment well with no complications.      Mckenna Fischer is aware of future appt on 1/28/25 at 1230.     AVS printed and given to Mckenna Fischer:  No (Declined by Mckenna Fischer)

## 2025-01-28 ENCOUNTER — HOSPITAL ENCOUNTER (OUTPATIENT)
Dept: INFUSION CENTER | Facility: HOSPITAL | Age: 50
End: 2025-01-28
Attending: INTERNAL MEDICINE

## 2025-01-29 DIAGNOSIS — F11.90 OPIOID USE: ICD-10-CM

## 2025-01-29 DIAGNOSIS — Z86.718 HISTORY OF DVT (DEEP VEIN THROMBOSIS): Chronic | ICD-10-CM

## 2025-01-29 DIAGNOSIS — E51.9 THIAMINE DEFICIENCY: ICD-10-CM

## 2025-01-29 DIAGNOSIS — S37.13XS URETERAL LACERATION, SEQUELA: ICD-10-CM

## 2025-01-29 DIAGNOSIS — E53.8 VITAMIN B 12 DEFICIENCY: Primary | ICD-10-CM

## 2025-01-29 DIAGNOSIS — D50.8 OTHER IRON DEFICIENCY ANEMIAS: ICD-10-CM

## 2025-01-29 DIAGNOSIS — G47.00 INSOMNIA, UNSPECIFIED TYPE: ICD-10-CM

## 2025-01-29 DIAGNOSIS — R10.9 NONSPECIFIC ABDOMINAL PAIN: ICD-10-CM

## 2025-01-29 RX ORDER — CYANOCOBALAMIN 1000 UG/ML
1000 INJECTION, SOLUTION INTRAMUSCULAR; SUBCUTANEOUS ONCE
Status: CANCELLED | OUTPATIENT
Start: 2025-01-30 | End: 2025-01-30

## 2025-01-29 RX ORDER — SODIUM CHLORIDE 9 MG/ML
20 INJECTION, SOLUTION INTRAVENOUS ONCE
Status: CANCELLED | OUTPATIENT
Start: 2025-01-30

## 2025-01-29 RX ORDER — WARFARIN SODIUM 7.5 MG/1
TABLET ORAL
Qty: 45 TABLET | Refills: 0 | OUTPATIENT
Start: 2025-01-29

## 2025-01-29 RX ORDER — PHENOL 1.4 %
1 AEROSOL, SPRAY (ML) MUCOUS MEMBRANE
Qty: 30 TABLET | Refills: 0 | OUTPATIENT
Start: 2025-01-29

## 2025-01-29 RX ORDER — METHION/INOS/CHOL BT/B COM/LIV 110MG-86MG
100 CAPSULE ORAL DAILY
Qty: 30 TABLET | Refills: 0 | OUTPATIENT
Start: 2025-01-29

## 2025-01-29 RX ORDER — METHOCARBAMOL 500 MG/1
500 TABLET, FILM COATED ORAL 2 TIMES DAILY PRN
Qty: 60 TABLET | Refills: 0 | OUTPATIENT
Start: 2025-01-29

## 2025-01-29 RX ORDER — OXYCODONE HYDROCHLORIDE 10 MG/1
10 TABLET ORAL 2 TIMES DAILY PRN
Qty: 30 TABLET | Refills: 0 | OUTPATIENT
Start: 2025-01-29

## 2025-01-31 DIAGNOSIS — S37.13XS URETERAL LACERATION, SEQUELA: ICD-10-CM

## 2025-01-31 DIAGNOSIS — F11.90 OPIOID USE: ICD-10-CM

## 2025-01-31 DIAGNOSIS — D68.61 ANTIPHOSPHOLIPID SYNDROME (HCC): Primary | Chronic | ICD-10-CM

## 2025-01-31 NOTE — TELEPHONE ENCOUNTER
Pt came into office requesting medication refill for     oxyCODONE (ROXICODONE) 10 MG TABS     Please send to pharmacy on file      Pt stated she is in a lot of pain and needs the medication

## 2025-02-02 RX ORDER — OXYCODONE HYDROCHLORIDE 10 MG/1
10 TABLET ORAL 2 TIMES DAILY PRN
Qty: 30 TABLET | Refills: 0 | Status: SHIPPED | OUTPATIENT
Start: 2025-02-02

## 2025-02-04 ENCOUNTER — HOSPITAL ENCOUNTER (OUTPATIENT)
Dept: INFUSION CENTER | Facility: HOSPITAL | Age: 50
Discharge: HOME | End: 2025-02-04
Attending: INTERNAL MEDICINE
Payer: COMMERCIAL

## 2025-02-04 VITALS
HEART RATE: 72 BPM | SYSTOLIC BLOOD PRESSURE: 112 MMHG | DIASTOLIC BLOOD PRESSURE: 67 MMHG | TEMPERATURE: 97.9 F | RESPIRATION RATE: 18 BRPM

## 2025-02-04 DIAGNOSIS — E53.8 VITAMIN B 12 DEFICIENCY: Primary | ICD-10-CM

## 2025-02-04 DIAGNOSIS — D50.8 OTHER IRON DEFICIENCY ANEMIAS: ICD-10-CM

## 2025-02-04 RX ORDER — SODIUM CHLORIDE 9 MG/ML
20 INJECTION, SOLUTION INTRAVENOUS ONCE
Status: COMPLETED | OUTPATIENT
Start: 2025-02-04 | End: 2025-02-04

## 2025-02-04 RX ORDER — SODIUM CHLORIDE 9 MG/ML
20 INJECTION, SOLUTION INTRAVENOUS ONCE
Status: CANCELLED | OUTPATIENT
Start: 2025-02-11

## 2025-02-04 RX ORDER — CYANOCOBALAMIN 1000 UG/ML
1000 INJECTION, SOLUTION INTRAMUSCULAR; SUBCUTANEOUS ONCE
Status: COMPLETED | OUTPATIENT
Start: 2025-02-04 | End: 2025-02-04

## 2025-02-04 RX ORDER — CYANOCOBALAMIN 1000 UG/ML
1000 INJECTION, SOLUTION INTRAMUSCULAR; SUBCUTANEOUS ONCE
Status: CANCELLED | OUTPATIENT
Start: 2025-02-11 | End: 2025-02-06

## 2025-02-04 RX ADMIN — IRON SUCROSE 200 MG: 20 INJECTION, SOLUTION INTRAVENOUS at 13:33

## 2025-02-04 RX ADMIN — CYANOCOBALAMIN 1000 MCG: 1000 INJECTION INTRAMUSCULAR; SUBCUTANEOUS at 13:33

## 2025-02-04 RX ADMIN — SODIUM CHLORIDE 20 ML/HR: 9 INJECTION, SOLUTION INTRAVENOUS at 13:31

## 2025-02-04 NOTE — PROGRESS NOTES
Pt tolerated Venofer infusion today with no adverse reactions. Declined AVS, next apt 2/11/25 @ 1200. Left unit ambulatory with a steady gait.

## 2025-02-07 ENCOUNTER — TELEPHONE (OUTPATIENT)
Dept: FAMILY MEDICINE CLINIC | Facility: CLINIC | Age: 50
End: 2025-02-07

## 2025-02-07 NOTE — TELEPHONE ENCOUNTER
Called patient to remind about INR, patient states she has been unable to do it due to work schedule issues but will be switched next week so will go to get her INR on Monday.

## 2025-02-08 DIAGNOSIS — Z86.718 HISTORY OF DVT (DEEP VEIN THROMBOSIS): Chronic | ICD-10-CM

## 2025-02-10 RX ORDER — WARFARIN SODIUM 7.5 MG/1
TABLET ORAL
Qty: 90 TABLET | Refills: 1 | OUTPATIENT
Start: 2025-02-10

## 2025-02-14 DIAGNOSIS — E53.8 VITAMIN B 12 DEFICIENCY: Primary | ICD-10-CM

## 2025-02-14 DIAGNOSIS — D50.8 OTHER IRON DEFICIENCY ANEMIAS: ICD-10-CM

## 2025-02-14 DIAGNOSIS — G47.00 INSOMNIA, UNSPECIFIED TYPE: ICD-10-CM

## 2025-02-14 RX ORDER — SODIUM CHLORIDE 9 MG/ML
20 INJECTION, SOLUTION INTRAVENOUS ONCE
OUTPATIENT
Start: 2025-02-18

## 2025-02-14 RX ORDER — MELATONIN 10 MG
10 CAPSULE ORAL
Qty: 90 CAPSULE | Refills: 1 | Status: SHIPPED | OUTPATIENT
Start: 2025-02-14 | End: 2025-02-18 | Stop reason: CLARIF

## 2025-02-14 RX ORDER — CYANOCOBALAMIN 1000 UG/ML
1000 INJECTION, SOLUTION INTRAMUSCULAR; SUBCUTANEOUS ONCE
OUTPATIENT
Start: 2025-02-18 | End: 2025-02-18

## 2025-02-17 DIAGNOSIS — K70.11 ALCOHOLIC HEPATITIS WITH ASCITES: ICD-10-CM

## 2025-02-17 DIAGNOSIS — E53.8 VITAMIN B 12 DEFICIENCY: ICD-10-CM

## 2025-02-17 DIAGNOSIS — G47.00 INSOMNIA, UNSPECIFIED TYPE: ICD-10-CM

## 2025-02-17 DIAGNOSIS — S37.13XS URETERAL LACERATION, SEQUELA: ICD-10-CM

## 2025-02-17 DIAGNOSIS — F11.90 OPIOID USE: ICD-10-CM

## 2025-02-17 DIAGNOSIS — L30.4 INTERTRIGO: ICD-10-CM

## 2025-02-17 DIAGNOSIS — K74.60 LIVER CIRRHOSIS (HCC): ICD-10-CM

## 2025-02-17 RX ORDER — PANTOPRAZOLE SODIUM 40 MG/1
40 TABLET, DELAYED RELEASE ORAL DAILY
Qty: 30 TABLET | Refills: 0 | Status: CANCELLED | OUTPATIENT
Start: 2025-02-17

## 2025-02-17 RX ORDER — OXYCODONE HYDROCHLORIDE 10 MG/1
10 TABLET ORAL 2 TIMES DAILY PRN
Qty: 30 TABLET | Refills: 0 | Status: CANCELLED | OUTPATIENT
Start: 2025-02-17

## 2025-02-18 ENCOUNTER — APPOINTMENT (OUTPATIENT)
Dept: LAB | Facility: CLINIC | Age: 50
End: 2025-02-18
Payer: COMMERCIAL

## 2025-02-18 ENCOUNTER — OFFICE VISIT (OUTPATIENT)
Dept: FAMILY MEDICINE CLINIC | Facility: CLINIC | Age: 50
End: 2025-02-18

## 2025-02-18 VITALS
BODY MASS INDEX: 34.72 KG/M2 | HEIGHT: 66 IN | RESPIRATION RATE: 18 BRPM | TEMPERATURE: 98.6 F | WEIGHT: 216 LBS | SYSTOLIC BLOOD PRESSURE: 116 MMHG | DIASTOLIC BLOOD PRESSURE: 80 MMHG

## 2025-02-18 DIAGNOSIS — G47.00 INSOMNIA, UNSPECIFIED TYPE: Primary | ICD-10-CM

## 2025-02-18 DIAGNOSIS — R21 RASH: ICD-10-CM

## 2025-02-18 DIAGNOSIS — Z86.718 HISTORY OF DVT (DEEP VEIN THROMBOSIS): Chronic | ICD-10-CM

## 2025-02-18 DIAGNOSIS — F43.10 POSTTRAUMATIC STRESS DISORDER: Chronic | ICD-10-CM

## 2025-02-18 DIAGNOSIS — D50.8 OTHER IRON DEFICIENCY ANEMIAS: Chronic | ICD-10-CM

## 2025-02-18 DIAGNOSIS — D68.61 ANTIPHOSPHOLIPID SYNDROME (HCC): Chronic | ICD-10-CM

## 2025-02-18 DIAGNOSIS — F11.90 OPIOID USE: ICD-10-CM

## 2025-02-18 DIAGNOSIS — F51.05 INSOMNIA DUE TO OTHER MENTAL DISORDER: Chronic | ICD-10-CM

## 2025-02-18 DIAGNOSIS — E53.8 VITAMIN B 12 DEFICIENCY: Chronic | ICD-10-CM

## 2025-02-18 DIAGNOSIS — F99 INSOMNIA DUE TO OTHER MENTAL DISORDER: Chronic | ICD-10-CM

## 2025-02-18 DIAGNOSIS — K74.60 LIVER CIRRHOSIS (HCC): ICD-10-CM

## 2025-02-18 DIAGNOSIS — S37.13XS URETERAL LACERATION, SEQUELA: ICD-10-CM

## 2025-02-18 DIAGNOSIS — E55.9 VITAMIN D DEFICIENCY: Chronic | ICD-10-CM

## 2025-02-18 DIAGNOSIS — K65.1 INTRA-ABDOMINAL ABSCESS (HCC): Chronic | ICD-10-CM

## 2025-02-18 LAB
INR PPP: 2.5 (ref 0.85–1.19)
PROTHROMBIN TIME: 26.9 SECONDS (ref 12.3–15)

## 2025-02-18 PROCEDURE — 99214 OFFICE O/P EST MOD 30 MIN: CPT | Performed by: PHYSICIAN ASSISTANT

## 2025-02-18 PROCEDURE — 3074F SYST BP LT 130 MM HG: CPT | Performed by: PHYSICIAN ASSISTANT

## 2025-02-18 PROCEDURE — 85610 PROTHROMBIN TIME: CPT

## 2025-02-18 PROCEDURE — 3079F DIAST BP 80-89 MM HG: CPT | Performed by: PHYSICIAN ASSISTANT

## 2025-02-18 PROCEDURE — 36415 COLL VENOUS BLD VENIPUNCTURE: CPT

## 2025-02-18 RX ORDER — TRIAMCINOLONE ACETONIDE 1 MG/G
CREAM TOPICAL 2 TIMES DAILY
Qty: 45 G | Refills: 2 | Status: SHIPPED | OUTPATIENT
Start: 2025-02-18

## 2025-02-18 RX ORDER — WARFARIN SODIUM 7.5 MG/1
TABLET ORAL
Qty: 45 TABLET | Refills: 1 | Status: SHIPPED | OUTPATIENT
Start: 2025-02-18

## 2025-02-18 RX ORDER — PANTOPRAZOLE SODIUM 40 MG/1
40 TABLET, DELAYED RELEASE ORAL DAILY
Qty: 90 TABLET | Refills: 1 | Status: SHIPPED | OUTPATIENT
Start: 2025-02-18

## 2025-02-18 RX ORDER — OXYCODONE HYDROCHLORIDE 10 MG/1
10 TABLET ORAL 2 TIMES DAILY PRN
Qty: 30 TABLET | Refills: 0 | Status: SHIPPED | OUTPATIENT
Start: 2025-02-18 | End: 2025-03-05 | Stop reason: SDUPTHER

## 2025-02-18 NOTE — ASSESSMENT & PLAN NOTE
- Patient has history of multiple episodes of DVT/PE dating back as far as 2003.   -Reviewed ED visit from 8/22/2024 due to left leg swelling.  -Reviewed venous duplex of left lower extremity which is negative for DVT.  - Patient continues with fluctuating swelling. Continue Lasix 20 mg twice daily  - Continue using compression stockings daily as well as elevation.  - Continue follow-up with hematology/oncology as scheduled.        Orders:    warfarin (COUMADIN) 7.5 mg tablet; Take one tablet by mouth daily or as directed. This is a 30 day supply
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
## 2025-02-19 ENCOUNTER — ANTICOAG VISIT (OUTPATIENT)
Dept: FAMILY MEDICINE CLINIC | Facility: CLINIC | Age: 50
End: 2025-02-19

## 2025-02-19 DIAGNOSIS — Z86.718 HISTORY OF DVT (DEEP VEIN THROMBOSIS): Primary | Chronic | ICD-10-CM

## 2025-02-19 NOTE — PROGRESS NOTES
[facility]  [address] [place] [address], [address]  [facility] [address] [phone]  Clinical Pharmacy Anticoagulation Consultation    Assessment/Plan  INR: 2.5. Therapeutic INR for goal of 2-3   Current warfarin dose:   New dose: no change    Recheck INR in 1 month  Counseled patient on compliance with medications, and consistency with vitamin k rich foods intake.    Subjective  Indication: antiphospholipid syndrome, multiple DVT   Bleeding signs/symptoms: no  Thrombosis signs/symptoms: no    Missed doses: no  Medication changes: no  Dietary changes: no  Bacterial/viral infection: no  Recent alcohol consumption: no  Other concerns: no  Paitent called via phone services, reports adherence to 7.5 mg daily since last visit in 1/16/25, no acute complaints, has refills, ok to follow up in 4 weeks.

## 2025-02-24 DIAGNOSIS — D50.8 OTHER IRON DEFICIENCY ANEMIAS: ICD-10-CM

## 2025-02-24 DIAGNOSIS — E53.8 VITAMIN B 12 DEFICIENCY: Primary | ICD-10-CM

## 2025-02-24 RX ORDER — CYANOCOBALAMIN 1000 UG/ML
1000 INJECTION, SOLUTION INTRAMUSCULAR; SUBCUTANEOUS ONCE
Status: CANCELLED | OUTPATIENT
Start: 2025-02-25 | End: 2025-02-25

## 2025-02-24 RX ORDER — CYANOCOBALAMIN (VITAMIN B-12) 1000 MCG
1 TABLET ORAL DAILY
Qty: 30 TABLET | Refills: 0 | Status: SHIPPED | OUTPATIENT
Start: 2025-02-24

## 2025-02-24 RX ORDER — NYSTATIN 100000 [USP'U]/G
POWDER TOPICAL 2 TIMES DAILY
Qty: 60 G | Refills: 0 | Status: SHIPPED | OUTPATIENT
Start: 2025-02-24

## 2025-02-24 RX ORDER — SODIUM CHLORIDE 9 MG/ML
20 INJECTION, SOLUTION INTRAVENOUS ONCE
Status: CANCELLED | OUTPATIENT
Start: 2025-02-25

## 2025-02-24 RX ORDER — MELATONIN 10 MG
10 CAPSULE ORAL
Qty: 90 CAPSULE | Refills: 0 | OUTPATIENT
Start: 2025-02-24

## 2025-02-24 RX ORDER — FUROSEMIDE 20 MG/1
20 TABLET ORAL 2 TIMES DAILY
Qty: 180 TABLET | Refills: 0 | Status: SHIPPED | OUTPATIENT
Start: 2025-02-24
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
## 2025-02-24 NOTE — ASSESSMENT & PLAN NOTE
- Continue vitamin D 1,000 units daily.   -Reviewed vitamin D level from October 2024.  Level is at goal.

## 2025-02-24 NOTE — ASSESSMENT & PLAN NOTE
- Patient experiencing frequent nightmares and flashbacks.  Patient notes she had seen a therapist when she was in the hospital for a few days, but never established with a therapist or psychiatrist outpatient.  Patient notes she is interested in this now.    - Referral previously placed to [facility] psychiatry for further evaluation and management.  Explained to patient there is currently a long wait list.  Also provided patient with list of local outpatient mental health resources and advised to call to establish care.  - Continue Prozac 20 mg daily.  -Patient denies any suicidal or homicidal ideations.

## 2025-02-24 NOTE — ASSESSMENT & PLAN NOTE
-Reviewed hematology/oncology note from 1/7/2025.  Patient scheduled for Venofer infusions 200 mg x 5 doses.  - Today, patient reports infusion center is suggesting patient to receive a PICC line because it is very difficult to find her veins.  Staff message sent to Josie Grossman PA-C in regards to this.  At this time, do not recommend patient receive a PICC line, however if she is further interested in this, recommend discussing further with hematology/oncology.

## 2025-02-24 NOTE — ASSESSMENT & PLAN NOTE
- Reviewed hematology/oncology note from 1/7/2025.  Patient scheduled for B12 1000 mcg IM weekly x 5 in conjunction with iron treatments.

## 2025-02-24 NOTE — ASSESSMENT & PLAN NOTE
-PT/INR goal 2-3  -Patient reports now being compliant with Coumadin dosing since she has insurance.  - Continue monitoring INR with clinical pharmacist.  -Continue warfarin plan as per clinical pharmacist.  - Continue lifelong anticoagulation with warfarin.
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
## 2025-03-03 DIAGNOSIS — S37.13XS URETERAL LACERATION, SEQUELA: ICD-10-CM

## 2025-03-03 DIAGNOSIS — F11.90 OPIOID USE: ICD-10-CM

## 2025-03-03 DIAGNOSIS — E51.9 THIAMINE DEFICIENCY: ICD-10-CM

## 2025-03-03 DIAGNOSIS — R11.2 NAUSEA AND VOMITING, UNSPECIFIED VOMITING TYPE: ICD-10-CM

## 2025-03-03 DIAGNOSIS — L30.4 INTERTRIGO: ICD-10-CM

## 2025-03-03 DIAGNOSIS — R21 RASH: ICD-10-CM

## 2025-03-03 RX ORDER — NYSTATIN 100000 [USP'U]/G
POWDER TOPICAL 2 TIMES DAILY
Qty: 60 G | Refills: 0 | OUTPATIENT
Start: 2025-03-03

## 2025-03-03 RX ORDER — OXYCODONE HYDROCHLORIDE 10 MG/1
10 TABLET ORAL 2 TIMES DAILY PRN
Qty: 30 TABLET | Refills: 0 | OUTPATIENT
Start: 2025-03-03

## 2025-03-03 RX ORDER — METOCLOPRAMIDE 5 MG/1
5 TABLET ORAL 4 TIMES DAILY
Qty: 60 TABLET | Refills: 0 | Status: ON HOLD | OUTPATIENT
Start: 2025-03-03

## 2025-03-03 RX ORDER — TRIAMCINOLONE ACETONIDE 1 MG/G
CREAM TOPICAL 2 TIMES DAILY
Qty: 45 G | Refills: 0 | OUTPATIENT
Start: 2025-03-03

## 2025-03-03 RX ORDER — ONDANSETRON 8 MG/1
8 TABLET, FILM COATED ORAL EVERY 8 HOURS PRN
Qty: 30 TABLET | Refills: 0 | Status: ON HOLD | OUTPATIENT
Start: 2025-03-03

## 2025-03-03 RX ORDER — METHION/INOS/CHOL BT/B COM/LIV 110MG-86MG
100 CAPSULE ORAL DAILY
Qty: 30 TABLET | Refills: 0 | Status: ON HOLD | OUTPATIENT
Start: 2025-03-03

## 2025-03-05 ENCOUNTER — TELEPHONE (OUTPATIENT)
Dept: FAMILY MEDICINE CLINIC | Facility: CLINIC | Age: 50
End: 2025-03-05

## 2025-03-05 ENCOUNTER — OFFICE VISIT (OUTPATIENT)
Dept: FAMILY MEDICINE CLINIC | Facility: CLINIC | Age: 50
End: 2025-03-05

## 2025-03-05 VITALS
SYSTOLIC BLOOD PRESSURE: 108 MMHG | HEIGHT: 66 IN | RESPIRATION RATE: 18 BRPM | DIASTOLIC BLOOD PRESSURE: 74 MMHG | OXYGEN SATURATION: 99 % | TEMPERATURE: 98 F | BODY MASS INDEX: 35.68 KG/M2 | HEART RATE: 94 BPM | WEIGHT: 222 LBS

## 2025-03-05 DIAGNOSIS — S37.13XS URETERAL LACERATION, SEQUELA: ICD-10-CM

## 2025-03-05 DIAGNOSIS — K64.8 OTHER HEMORRHOIDS: ICD-10-CM

## 2025-03-05 DIAGNOSIS — E53.8 VITAMIN B 12 DEFICIENCY: Primary | ICD-10-CM

## 2025-03-05 DIAGNOSIS — D50.8 OTHER IRON DEFICIENCY ANEMIAS: ICD-10-CM

## 2025-03-05 DIAGNOSIS — F11.90 OPIOID USE: Primary | ICD-10-CM

## 2025-03-05 PROCEDURE — 3078F DIAST BP <80 MM HG: CPT

## 2025-03-05 PROCEDURE — 99213 OFFICE O/P EST LOW 20 MIN: CPT

## 2025-03-05 PROCEDURE — 3074F SYST BP LT 130 MM HG: CPT

## 2025-03-05 RX ORDER — OXYCODONE HYDROCHLORIDE 10 MG/1
10 TABLET ORAL 2 TIMES DAILY PRN
Qty: 30 TABLET | Refills: 0 | Status: ON HOLD | OUTPATIENT
Start: 2025-03-05

## 2025-03-05 RX ORDER — HYDROCORTISONE 25 MG/G
CREAM TOPICAL 2 TIMES DAILY
Qty: 28 G | Refills: 3 | Status: ON HOLD | OUTPATIENT
Start: 2025-03-05

## 2025-03-05 RX ORDER — CYANOCOBALAMIN 1000 UG/ML
1000 INJECTION, SOLUTION INTRAMUSCULAR; SUBCUTANEOUS ONCE
OUTPATIENT
Start: 2025-03-07 | End: 2025-03-07

## 2025-03-05 RX ORDER — SODIUM CHLORIDE 9 MG/ML
20 INJECTION, SOLUTION INTRAVENOUS ONCE
OUTPATIENT
Start: 2025-03-07
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
## 2025-03-05 NOTE — ASSESSMENT & PLAN NOTE
- Chronic Abd pain management  - PDMP reviewed, no red flag noted  - Refill for 15 days sent  Orders:    oxyCODONE (ROXICODONE) 10 MG TABS; Take 1 tablet (10 mg total) by mouth 2 (two) times a day as needed for moderate pain or severe pain Max Daily Amount: 20 mg

## 2025-03-05 NOTE — TELEPHONE ENCOUNTER
Hi good morning, my name is Mckenna Kurtz. I'm calling because I put in a request for my medication which is my pain pill. I am due for a refill. I know that my doctor is on vacation until the 10th but I cannot wait till the 10th because I'm all out of my pills. The request that I put in it was denied. If someone can please give me a call back at [phone]. Once again my number is [phone]. I put in a request for my medication. I am in needing my medication. I've been leaving several messages throughout the week. Can someone please give me a call back? I really appreciate it. Thank you.    Pt left message in the nurse asking why oxy was denied, try calling pt to schedule a virtual asael to discuss with provider pt did not answer. Left message.

## 2025-03-07 ENCOUNTER — TELEPHONE (OUTPATIENT)
Age: 50
End: 2025-03-07

## 2025-03-07 NOTE — TELEPHONE ENCOUNTER
Patient on wait list for talk therapy and med mgmt services. Writer reached out to verify if Pt is still interested in service, and if would like to remain on [facility]. Spoke to Pt whom would like to remain on [facility].    [place] in [employer]:    Recipient ID: [id number]     [facility]

## 2025-03-08 ENCOUNTER — APPOINTMENT (EMERGENCY)
Dept: RADIOLOGY | Facility: HOSPITAL | Age: 50
End: 2025-03-08
Payer: COMMERCIAL

## 2025-03-08 ENCOUNTER — APPOINTMENT (EMERGENCY)
Dept: NON INVASIVE DIAGNOSTICS | Facility: HOSPITAL | Age: 50
End: 2025-03-08
Payer: COMMERCIAL

## 2025-03-08 ENCOUNTER — HOSPITAL ENCOUNTER (EMERGENCY)
Facility: HOSPITAL | Age: 50
Discharge: HOME | End: 2025-03-08
Attending: EMERGENCY MEDICINE | Admitting: EMERGENCY MEDICINE
Payer: COMMERCIAL

## 2025-03-08 VITALS
RESPIRATION RATE: 18 BRPM | OXYGEN SATURATION: 98 % | HEART RATE: 94 BPM | WEIGHT: 223.11 LBS | TEMPERATURE: 97.7 F | DIASTOLIC BLOOD PRESSURE: 72 MMHG | BODY MASS INDEX: 36.01 KG/M2 | SYSTOLIC BLOOD PRESSURE: 122 MMHG

## 2025-03-08 DIAGNOSIS — M79.89 LEFT LEG SWELLING: ICD-10-CM

## 2025-03-08 DIAGNOSIS — R20.8 RECTAL BURNING: ICD-10-CM

## 2025-03-08 DIAGNOSIS — R60.0 BILATERAL LOWER EXTREMITY EDEMA: Primary | ICD-10-CM

## 2025-03-08 LAB
ALBUMIN SERPL BCG-MCNC: 2.6 G/DL (ref 3.5–5)
ALP SERPL-CCNC: 118 U/L (ref 34–104)
ALT SERPL W P-5'-P-CCNC: 27 U/L (ref 7–52)
ANION GAP SERPL CALCULATED.3IONS-SCNC: 5 MMOL/L (ref 4–13)
APTT PPP: 42 SECONDS (ref 23–34)
AST SERPL W P-5'-P-CCNC: 26 U/L (ref 13–39)
BASOPHILS # BLD AUTO: 0.01 THOUSANDS/ÂΜL (ref 0–0.1)
BASOPHILS NFR BLD AUTO: 0 % (ref 0–1)
BILIRUB SERPL-MCNC: 0.45 MG/DL (ref 0.2–1)
BNP SERPL-MCNC: 91 PG/ML (ref 0–100)
BUN SERPL-MCNC: 20 MG/DL (ref 5–25)
CALCIUM ALBUM COR SERPL-MCNC: 8.8 MG/DL (ref 8.3–10.1)
CALCIUM SERPL-MCNC: 7.7 MG/DL (ref 8.4–10.2)
CHLORIDE SERPL-SCNC: 106 MMOL/L (ref 96–108)
CO2 SERPL-SCNC: 25 MMOL/L (ref 21–32)
CREAT SERPL-MCNC: 1.1 MG/DL (ref 0.6–1.3)
EOSINOPHIL # BLD AUTO: 0.01 THOUSAND/ÂΜL (ref 0–0.61)
EOSINOPHIL NFR BLD AUTO: 0 % (ref 0–6)
ERYTHROCYTE [DISTWIDTH] IN BLOOD BY AUTOMATED COUNT: 15.3 % (ref 11.6–15.1)
GFR SERPL CREATININE-BSD FRML MDRD: 59 ML/MIN/1.73SQ M
GLUCOSE SERPL-MCNC: 67 MG/DL (ref 65–140)
HCT VFR BLD AUTO: 29.8 % (ref 34.8–46.1)
HGB BLD-MCNC: 10 G/DL (ref 11.5–15.4)
IMM GRANULOCYTES # BLD AUTO: 0.01 THOUSAND/UL (ref 0–0.2)
IMM GRANULOCYTES NFR BLD AUTO: 0 % (ref 0–2)
INR PPP: 2.17 (ref 0.85–1.19)
LYMPHOCYTES # BLD AUTO: 2.34 THOUSANDS/ÂΜL (ref 0.6–4.47)
LYMPHOCYTES NFR BLD AUTO: 37 % (ref 14–44)
MCH RBC QN AUTO: 31.8 PG (ref 26.8–34.3)
MCHC RBC AUTO-ENTMCNC: 33.6 G/DL (ref 31.4–37.4)
MCV RBC AUTO: 95 FL (ref 82–98)
MONOCYTES # BLD AUTO: 0.48 THOUSAND/ÂΜL (ref 0.17–1.22)
MONOCYTES NFR BLD AUTO: 8 % (ref 4–12)
NEUTROPHILS # BLD AUTO: 3.45 THOUSANDS/ÂΜL (ref 1.85–7.62)
NEUTS SEG NFR BLD AUTO: 55 % (ref 43–75)
NRBC BLD AUTO-RTO: 0 /100 WBCS
PLATELET # BLD AUTO: 330 THOUSANDS/UL (ref 149–390)
PMV BLD AUTO: 8.9 FL (ref 8.9–12.7)
POTASSIUM SERPL-SCNC: 4 MMOL/L (ref 3.5–5.3)
PROT SERPL-MCNC: 5.5 G/DL (ref 6.4–8.4)
PROTHROMBIN TIME: 24.1 SECONDS (ref 12.3–15)
RBC # BLD AUTO: 3.14 MILLION/UL (ref 3.81–5.12)
SODIUM SERPL-SCNC: 136 MMOL/L (ref 135–147)
WBC # BLD AUTO: 6.3 THOUSAND/UL (ref 4.31–10.16)

## 2025-03-08 PROCEDURE — 36415 COLL VENOUS BLD VENIPUNCTURE: CPT

## 2025-03-08 PROCEDURE — 85610 PROTHROMBIN TIME: CPT

## 2025-03-08 PROCEDURE — 93971 EXTREMITY STUDY: CPT

## 2025-03-08 PROCEDURE — 85025 COMPLETE CBC W/AUTO DIFF WBC: CPT

## 2025-03-08 PROCEDURE — 85730 THROMBOPLASTIN TIME PARTIAL: CPT

## 2025-03-08 PROCEDURE — 83880 ASSAY OF NATRIURETIC PEPTIDE: CPT

## 2025-03-08 PROCEDURE — 80053 COMPREHEN METABOLIC PANEL: CPT

## 2025-03-08 PROCEDURE — 99283 EMERGENCY DEPT VISIT LOW MDM: CPT

## 2025-03-08 PROCEDURE — 71045 X-RAY EXAM CHEST 1 VIEW: CPT

## 2025-03-08 PROCEDURE — 93971 EXTREMITY STUDY: CPT | Performed by: STUDENT IN AN ORGANIZED HEALTH CARE EDUCATION/TRAINING PROGRAM

## 2025-03-08 PROCEDURE — 99284 EMERGENCY DEPT VISIT MOD MDM: CPT

## 2025-03-08 RX ORDER — LIDOCAINE 40 MG/G
CREAM TOPICAL ONCE
Status: COMPLETED | OUTPATIENT
Start: 2025-03-08 | End: 2025-03-08

## 2025-03-08 RX ADMIN — LIDOCAINE: 40 CREAM TOPICAL at 10:23

## 2025-03-08 NOTE — DISCHARGE INSTRUCTIONS
Follow-up with primary care provider.  Wear compression stockings. Continue home medications as prescribed. follow-up with colorectal surgeon.
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
## 2025-03-10 ENCOUNTER — TELEPHONE (OUTPATIENT)
Dept: FAMILY MEDICINE CLINIC | Facility: CLINIC | Age: 50
End: 2025-03-10

## 2025-03-10 NOTE — TELEPHONE ENCOUNTER
Patient called office today requesting Lidocaine medication. Pt stated medication was to be prescribed by ED but they did not send medication to pharmacy. Pt also stated that she requested pcp on 3/5 office visit to prescribed medication but hydrocortisone was sent instead and it is not helping at all. Appt scheduled for 3/11/25. Pt still requested to sent message to pcp. Please advise. Thank you!

## 2025-03-11 ENCOUNTER — OFFICE VISIT (OUTPATIENT)
Dept: FAMILY MEDICINE CLINIC | Facility: CLINIC | Age: 50
End: 2025-03-11

## 2025-03-11 VITALS
HEART RATE: 78 BPM | WEIGHT: 226.4 LBS | TEMPERATURE: 97.6 F | OXYGEN SATURATION: 98 % | HEIGHT: 66 IN | SYSTOLIC BLOOD PRESSURE: 102 MMHG | BODY MASS INDEX: 36.38 KG/M2 | RESPIRATION RATE: 18 BRPM | DIASTOLIC BLOOD PRESSURE: 70 MMHG

## 2025-03-11 DIAGNOSIS — K64.8 OTHER HEMORRHOIDS: ICD-10-CM

## 2025-03-11 DIAGNOSIS — K70.11 ALCOHOLIC HEPATITIS WITH ASCITES: ICD-10-CM

## 2025-03-11 DIAGNOSIS — M79.89 SWELLING OF LOWER EXTREMITY: Primary | ICD-10-CM

## 2025-03-11 PROCEDURE — 3078F DIAST BP <80 MM HG: CPT

## 2025-03-11 PROCEDURE — 99214 OFFICE O/P EST MOD 30 MIN: CPT

## 2025-03-11 PROCEDURE — 3074F SYST BP LT 130 MM HG: CPT

## 2025-03-11 RX ORDER — FUROSEMIDE 20 MG/1
20 TABLET ORAL DAILY
Qty: 3 TABLET | Refills: 0 | Status: ON HOLD | OUTPATIENT
Start: 2025-03-11 | End: 2025-03-18

## 2025-03-11 RX ORDER — LIDOCAINE HCL AND HYDROCORTISONE ACETATE 28; 5.5 MG/G; MG/G
1 GEL RECTAL 2 TIMES DAILY PRN
Qty: 100 G | Refills: 0 | Status: SHIPPED | OUTPATIENT
Start: 2025-03-11 | End: 2025-03-18
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
## 2025-03-11 NOTE — ASSESSMENT & PLAN NOTE
- Very edematous legs with weeping  - Review Venous Duplex  - Will add Lasix 20 mg daily for 3 days in addition to daily dose  - Encourage fluid restriction and legs elevation  - Ace wrapped bilateral legs in office today and encouraged Pt to wrap legs in the morning unwrap at bedtime  - Will refer to lymphedema clinic for evaluation  - F/U with PCP in 2 weeks  - ED precaution discussed  - Pt verbalized understanding with plan  Orders:    furosemide (LASIX) 20 mg tablet; Take 1 tablet (20 mg total) by mouth daily    Ambulatory Referral to PT/OT Lymphedema Therapy; Future     Subjective   Patient ID: Sena is a 30 year old female who presents today for shoulder pain and concern with LE.     Chief Complaint   Patient presents with    Office Visit    Follow-up     Shoulder pain- right arm        HPI  #Divet? in LLE  -noticed this one month or two ago  -feels like there's something there  -on R side, lower leg  -not painful but when she massages deep into the area, she can feel a sensation of nerves going down her feet  -otherwise, no one-sided swelling, no pain, not warm to touch, not TTP    #Shoulder Pain  -was last seen 11/21 for numbness,tingling, UE swelling; had workup including CT Chest and UE Duplex to evaluate for possible clot in region. All imaging was negative  -Pt states that swelling has now resolved; however, still experiencing pain in her R shoulder  -pain ongoing for 2 years now, started initially after playing golf which she no longer does  -did play softball in school  -occasional numbness/tingling down arm  -feels like she has to avoid lifting heavy objects/exercising in order to prevent pain from occurring     Review of Systems   Constitutional:  Negative for chills, fever and unexpected weight change.   Respiratory:  Negative for shortness of breath.    Cardiovascular:  Negative for chest pain and leg swelling.   Gastrointestinal:  Negative for constipation, diarrhea, nausea and vomiting.   Genitourinary:  Negative for difficulty urinating and dysuria.   Skin:  Negative for rash.       Objective    Visit Vitals  /72 (BP Location: RUE - Right upper extremity, Patient Position: Sitting)   Pulse 80   Temp 98.5 °F (36.9 °C) (Temporal)   Wt 68 kg (150 lb)   LMP 11/29/2024 (Approximate)   BMI 22.81 kg/m²       Physical Exam  Vitals reviewed.   Constitutional:       Appearance: Normal appearance.   HENT:      Head: Normocephalic and atraumatic.   Cardiovascular:      Rate and Rhythm: Normal rate.   Musculoskeletal:      Comments: R Shoulder:   Inspection:   No gross  deformity, overlying erythema, edema, ecchymosis or skin changes noted     Palpation:  No pain with palpation at  clavicle, A/C joint, coracoid process, biceps tendon, supraspinatus insertion.    No crepitus    AROM (degrees): No concerns    Strength:   Flexion: 5/5   External rotation: 5/5   Internal rotation: 5/5     Special Tests:   Supraspinatus:  Guzmán: positive  Neers: negative     Subscapularis  Lift off: positive     Apprehension:mild pain noted       Neurological:      Mental Status: She is alert.         Assessment   Sena is a 30 year old female who presents today for shoulder pain and concern with LE.     Chronic right shoulder pain  Likely rotator cuff pathology  Has been ongoing for 2 years now, still experiencing pain and numbness/tingling down the arm.  Pain has been tolerable, does not require any medications to help.  On physical exam, ROM, strength testing normal.  Lift off and Guzmán positive.  When doing apprehension testing, patient does experience mild pain but less likely to be full frozen shoulder as patient is able to have full ROM.  Has not received x-rays or gone through physical therapy for her shoulder yet.  Will obtain x-ray and order PT.  Will also refer patient to sports medicine given chronicity  - XR SHOULDER 3 VIEWS RIGHT; Future  - SERVICE TO PHYSICAL THERAPY  - SERVICE TO [facility]TS [facility]ICINE PHYSICIANS    Swelling of limb  Has been noticing an irregular bump/region in her left lower extremity which is not present on right lower extremity.  Nonpainful, no red flag signs and symptoms.  Patient does endorse touching the region causes a burst of sensation into her foot.  Not remarkable on exam, likely variant of normal however will obtain x-ray of the tib-fib region to make sure there is no bony abnormality present.  - XR TIBIA FIBULA 2 VIEWS LEFT; Future  - SERVICE TO SPORTS MEDICINE PHYSICIANS      Medical compliance with plan discussed and risks of non-compliance reviewed.    Patient education completed on disease process, etiology & prognosis.   Proper usage and side effects of medications reviewed & discussed.   Return to clinic as clinically indicated (worsening or new symptoms) as discussed with patient who verbalized understanding of & agreement with the plan      Patient discussed with Dr. PEDRO Cai    Signed,  Miguelina Dennis,    Family Medicine PGY-3    **This communication was assisted with Dragon assisted speech recognition. If there are any typographical errors or ambiguities, please contact the provider for further information or correction.**

## 2025-03-12 ENCOUNTER — TELEPHONE (OUTPATIENT)
Dept: FAMILY MEDICINE CLINIC | Facility: CLINIC | Age: 50
End: 2025-03-12

## 2025-03-12 RX ORDER — FUROSEMIDE 20 MG/1
20 TABLET ORAL DAILY
Qty: 90 TABLET | Refills: 0 | OUTPATIENT
Start: 2025-03-12

## 2025-03-12 NOTE — TELEPHONE ENCOUNTER
Pt called into office and stated the following medication needs to be changed due to pts insurance not covering. Pt is asking if new medication can be sent soon as possible due to pt being in a lot of pain. Per pharmacy a generic brand can be sent. If you have any questions please contact pt or pharmacy to get more details. Thank you !      Lidocaine-Hydrocortisone Ace 2.8-0.55 % GEL

## 2025-03-13 DIAGNOSIS — Z86.718 HISTORY OF DVT (DEEP VEIN THROMBOSIS): Chronic | ICD-10-CM

## 2025-03-14 RX ORDER — WARFARIN SODIUM 7.5 MG/1
7.5 TABLET ORAL DAILY
Qty: 90 TABLET | Refills: 1 | OUTPATIENT
Start: 2025-03-14

## 2025-03-15 ENCOUNTER — HOSPITAL ENCOUNTER (INPATIENT)
Facility: HOSPITAL | Age: 50
LOS: 1 days | Discharge: HOME | End: 2025-03-18
Attending: EMERGENCY MEDICINE | Admitting: INTERNAL MEDICINE
Payer: COMMERCIAL

## 2025-03-15 DIAGNOSIS — K64.9 HEMORRHOIDS, UNSPECIFIED HEMORRHOID TYPE: ICD-10-CM

## 2025-03-15 DIAGNOSIS — I89.0 LYMPHATIC EDEMA: ICD-10-CM

## 2025-03-15 DIAGNOSIS — K70.11 ALCOHOLIC HEPATITIS WITH ASCITES: ICD-10-CM

## 2025-03-15 DIAGNOSIS — M79.89 SWELLING OF LOWER EXTREMITY: ICD-10-CM

## 2025-03-15 DIAGNOSIS — L97.909 LOWER EXTREMITY ULCERATION (HCC): ICD-10-CM

## 2025-03-15 DIAGNOSIS — K74.60 LIVER CIRRHOSIS (HCC): ICD-10-CM

## 2025-03-15 DIAGNOSIS — R79.1 SUPRATHERAPEUTIC INR: ICD-10-CM

## 2025-03-15 DIAGNOSIS — R60.0 BILATERAL LOWER EXTREMITY EDEMA: Primary | ICD-10-CM

## 2025-03-15 PROBLEM — J02.9 SORE THROAT: Status: ACTIVE | Noted: 2025-03-15

## 2025-03-15 PROBLEM — R74.8 ELEVATED ALKALINE PHOSPHATASE LEVEL: Status: ACTIVE | Noted: 2025-03-15

## 2025-03-15 LAB
ALBUMIN SERPL BCG-MCNC: 2.8 G/DL (ref 3.5–5)
ALP SERPL-CCNC: 135 U/L (ref 34–104)
ALT SERPL W P-5'-P-CCNC: 31 U/L (ref 7–52)
ANION GAP SERPL CALCULATED.3IONS-SCNC: 7 MMOL/L (ref 4–13)
APTT PPP: 70 SECONDS (ref 23–34)
AST SERPL W P-5'-P-CCNC: 35 U/L (ref 13–39)
BASOPHILS # BLD AUTO: 0.01 THOUSANDS/ÂΜL (ref 0–0.1)
BASOPHILS NFR BLD AUTO: 0 % (ref 0–1)
BILIRUB SERPL-MCNC: 0.72 MG/DL (ref 0.2–1)
BUN SERPL-MCNC: 17 MG/DL (ref 5–25)
CALCIUM ALBUM COR SERPL-MCNC: 8.5 MG/DL (ref 8.3–10.1)
CALCIUM SERPL-MCNC: 7.5 MG/DL (ref 8.4–10.2)
CHLORIDE SERPL-SCNC: 102 MMOL/L (ref 96–108)
CO2 SERPL-SCNC: 25 MMOL/L (ref 21–32)
CREAT SERPL-MCNC: 0.99 MG/DL (ref 0.6–1.3)
EOSINOPHIL # BLD AUTO: 0.01 THOUSAND/ÂΜL (ref 0–0.61)
EOSINOPHIL NFR BLD AUTO: 0 % (ref 0–6)
ERYTHROCYTE [DISTWIDTH] IN BLOOD BY AUTOMATED COUNT: 15.2 % (ref 11.6–15.1)
GFR SERPL CREATININE-BSD FRML MDRD: 67 ML/MIN/1.73SQ M
GLUCOSE SERPL-MCNC: 86 MG/DL (ref 65–140)
HCT VFR BLD AUTO: 32.1 % (ref 34.8–46.1)
HGB BLD-MCNC: 11.2 G/DL (ref 11.5–15.4)
IMM GRANULOCYTES # BLD AUTO: 0.01 THOUSAND/UL (ref 0–0.2)
IMM GRANULOCYTES NFR BLD AUTO: 0 % (ref 0–2)
INR PPP: 5.58 (ref 0.85–1.19)
LACTATE SERPL-SCNC: 2 MMOL/L (ref 0.5–2)
LYMPHOCYTES # BLD AUTO: 2.14 THOUSANDS/ÂΜL (ref 0.6–4.47)
LYMPHOCYTES NFR BLD AUTO: 29 % (ref 14–44)
MCH RBC QN AUTO: 31.3 PG (ref 26.8–34.3)
MCHC RBC AUTO-ENTMCNC: 34.9 G/DL (ref 31.4–37.4)
MCV RBC AUTO: 90 FL (ref 82–98)
MONOCYTES # BLD AUTO: 0.56 THOUSAND/ÂΜL (ref 0.17–1.22)
MONOCYTES NFR BLD AUTO: 8 % (ref 4–12)
NEUTROPHILS # BLD AUTO: 4.76 THOUSANDS/ÂΜL (ref 1.85–7.62)
NEUTS SEG NFR BLD AUTO: 63 % (ref 43–75)
NRBC BLD AUTO-RTO: 0 /100 WBCS
PLATELET # BLD AUTO: 439 THOUSANDS/UL (ref 149–390)
PMV BLD AUTO: 9.3 FL (ref 8.9–12.7)
POTASSIUM SERPL-SCNC: 4.4 MMOL/L (ref 3.5–5.3)
PROCALCITONIN SERPL-MCNC: 0.16 NG/ML
PROT SERPL-MCNC: 6.2 G/DL (ref 6.4–8.4)
PROTHROMBIN TIME: 48.8 SECONDS (ref 12.3–15)
RBC # BLD AUTO: 3.58 MILLION/UL (ref 3.81–5.12)
S PYO DNA THROAT QL NAA+PROBE: NOT DETECTED
SODIUM SERPL-SCNC: 134 MMOL/L (ref 135–147)
WBC # BLD AUTO: 7.49 THOUSAND/UL (ref 4.31–10.16)

## 2025-03-15 PROCEDURE — 36415 COLL VENOUS BLD VENIPUNCTURE: CPT | Performed by: EMERGENCY MEDICINE

## 2025-03-15 PROCEDURE — 85610 PROTHROMBIN TIME: CPT | Performed by: EMERGENCY MEDICINE

## 2025-03-15 PROCEDURE — 85730 THROMBOPLASTIN TIME PARTIAL: CPT | Performed by: EMERGENCY MEDICINE

## 2025-03-15 PROCEDURE — 99285 EMERGENCY DEPT VISIT HI MDM: CPT | Performed by: EMERGENCY MEDICINE

## 2025-03-15 PROCEDURE — 83605 ASSAY OF LACTIC ACID: CPT | Performed by: EMERGENCY MEDICINE

## 2025-03-15 PROCEDURE — 99222 1ST HOSP IP/OBS MODERATE 55: CPT | Performed by: STUDENT IN AN ORGANIZED HEALTH CARE EDUCATION/TRAINING PROGRAM

## 2025-03-15 PROCEDURE — 87651 STREP A DNA AMP PROBE: CPT | Performed by: EMERGENCY MEDICINE

## 2025-03-15 PROCEDURE — 80053 COMPREHEN METABOLIC PANEL: CPT | Performed by: EMERGENCY MEDICINE

## 2025-03-15 PROCEDURE — 87040 BLOOD CULTURE FOR BACTERIA: CPT | Performed by: EMERGENCY MEDICINE

## 2025-03-15 PROCEDURE — 99283 EMERGENCY DEPT VISIT LOW MDM: CPT

## 2025-03-15 PROCEDURE — 84145 PROCALCITONIN (PCT): CPT | Performed by: EMERGENCY MEDICINE

## 2025-03-15 PROCEDURE — 96374 THER/PROPH/DIAG INJ IV PUSH: CPT

## 2025-03-15 PROCEDURE — 85025 COMPLETE CBC W/AUTO DIFF WBC: CPT | Performed by: EMERGENCY MEDICINE

## 2025-03-15 RX ORDER — HYDROCORTISONE 25 MG/G
CREAM TOPICAL 2 TIMES DAILY
Status: DISCONTINUED | OUTPATIENT
Start: 2025-03-15 | End: 2025-03-18 | Stop reason: HOSPADM

## 2025-03-15 RX ORDER — LANOLIN ALCOHOL/MO/W.PET/CERES
100 CREAM (GRAM) TOPICAL DAILY
Status: DISCONTINUED | OUTPATIENT
Start: 2025-03-16 | End: 2025-03-18 | Stop reason: HOSPADM

## 2025-03-15 RX ORDER — METOCLOPRAMIDE 5 MG/1
5 TABLET ORAL 4 TIMES DAILY
Status: DISCONTINUED | OUTPATIENT
Start: 2025-03-15 | End: 2025-03-18 | Stop reason: HOSPADM

## 2025-03-15 RX ORDER — KETOROLAC TROMETHAMINE 30 MG/ML
30 INJECTION, SOLUTION INTRAMUSCULAR; INTRAVENOUS ONCE
Status: COMPLETED | OUTPATIENT
Start: 2025-03-15 | End: 2025-03-15

## 2025-03-15 RX ORDER — FOLIC ACID 1 MG/1
1 TABLET ORAL DAILY
Status: DISCONTINUED | OUTPATIENT
Start: 2025-03-16 | End: 2025-03-18 | Stop reason: HOSPADM

## 2025-03-15 RX ORDER — ONDANSETRON HYDROCHLORIDE 4 MG/5ML
8 SOLUTION ORAL EVERY 8 HOURS PRN
Status: DISCONTINUED | OUTPATIENT
Start: 2025-03-15 | End: 2025-03-15 | Stop reason: CLARIF

## 2025-03-15 RX ORDER — SPIRONOLACTONE 50 MG/1
50 TABLET, FILM COATED ORAL DAILY
Status: DISCONTINUED | OUTPATIENT
Start: 2025-03-16 | End: 2025-03-18 | Stop reason: HOSPADM

## 2025-03-15 RX ORDER — METHOCARBAMOL 500 MG/1
500 TABLET, FILM COATED ORAL 2 TIMES DAILY PRN
Status: DISCONTINUED | OUTPATIENT
Start: 2025-03-15 | End: 2025-03-18 | Stop reason: HOSPADM

## 2025-03-15 RX ORDER — PANTOPRAZOLE SODIUM 40 MG/1
40 TABLET, DELAYED RELEASE ORAL
Status: DISCONTINUED | OUTPATIENT
Start: 2025-03-16 | End: 2025-03-18 | Stop reason: HOSPADM

## 2025-03-15 RX ORDER — OXYCODONE HYDROCHLORIDE 5 MG/1
5 TABLET ORAL EVERY 6 HOURS PRN
Refills: 0 | Status: DISCONTINUED | OUTPATIENT
Start: 2025-03-15 | End: 2025-03-18 | Stop reason: HOSPADM

## 2025-03-15 RX ORDER — ACETAMINOPHEN 325 MG/1
650 TABLET ORAL 3 TIMES DAILY
Status: DISCONTINUED | OUTPATIENT
Start: 2025-03-15 | End: 2025-03-18 | Stop reason: HOSPADM

## 2025-03-15 RX ORDER — FUROSEMIDE 20 MG/1
20 TABLET ORAL 2 TIMES DAILY
Status: DISCONTINUED | OUTPATIENT
Start: 2025-03-15 | End: 2025-03-16

## 2025-03-15 RX ORDER — ONDANSETRON 4 MG/1
8 TABLET, ORALLY DISINTEGRATING ORAL EVERY 8 HOURS PRN
Status: DISCONTINUED | OUTPATIENT
Start: 2025-03-15 | End: 2025-03-18 | Stop reason: HOSPADM

## 2025-03-15 RX ORDER — KETOROLAC TROMETHAMINE 30 MG/ML
30 INJECTION, SOLUTION INTRAMUSCULAR; INTRAVENOUS ONCE
Status: DISCONTINUED | OUTPATIENT
Start: 2025-03-15 | End: 2025-03-15 | Stop reason: SDUPTHER

## 2025-03-15 RX ADMIN — KETOROLAC TROMETHAMINE 30 MG: 30 INJECTION, SOLUTION INTRAMUSCULAR; INTRAVENOUS at 19:18

## 2025-03-15 RX ADMIN — OXYCODONE HYDROCHLORIDE 5 MG: 5 TABLET ORAL at 21:36

## 2025-03-15 RX ADMIN — ACETAMINOPHEN 650 MG: 325 TABLET, FILM COATED ORAL at 21:37

## 2025-03-15 RX ADMIN — METOCLOPRAMIDE 5 MG: 5 TABLET ORAL at 21:37

## 2025-03-15 RX ADMIN — MELATONIN 9 MG: 3 TAB ORAL at 21:37

## 2025-03-15 RX ADMIN — METHOCARBAMOL 500 MG: 500 TABLET ORAL at 21:36
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
## 2025-03-16 ENCOUNTER — TELEPHONE (OUTPATIENT)
Dept: FAMILY MEDICINE CLINIC | Facility: CLINIC | Age: 50
End: 2025-03-16

## 2025-03-16 PROBLEM — J02.9 SORE THROAT: Status: RESOLVED | Noted: 2025-03-15 | Resolved: 2025-03-16

## 2025-03-16 PROBLEM — I95.9 HYPOTENSION: Status: ACTIVE | Noted: 2025-03-16

## 2025-03-16 LAB
INR PPP: 7.34 (ref 0.85–1.19)
PROTHROMBIN TIME: 60 SECONDS (ref 12.3–15)

## 2025-03-16 PROCEDURE — 99233 SBSQ HOSP IP/OBS HIGH 50: CPT | Performed by: INTERNAL MEDICINE

## 2025-03-16 PROCEDURE — 85610 PROTHROMBIN TIME: CPT | Performed by: STUDENT IN AN ORGANIZED HEALTH CARE EDUCATION/TRAINING PROGRAM

## 2025-03-16 RX ORDER — MIDODRINE HYDROCHLORIDE 5 MG/1
10 TABLET ORAL ONCE
Status: COMPLETED | OUTPATIENT
Start: 2025-03-16 | End: 2025-03-16

## 2025-03-16 RX ORDER — MIDODRINE HYDROCHLORIDE 5 MG/1
10 TABLET ORAL
Status: DISCONTINUED | OUTPATIENT
Start: 2025-03-16 | End: 2025-03-18 | Stop reason: HOSPADM

## 2025-03-16 RX ORDER — ALBUMIN (HUMAN) 12.5 G/50ML
25 SOLUTION INTRAVENOUS 2 TIMES DAILY
Status: DISCONTINUED | OUTPATIENT
Start: 2025-03-16 | End: 2025-03-16

## 2025-03-16 RX ORDER — ALBUMIN (HUMAN) 12.5 G/50ML
25 SOLUTION INTRAVENOUS 2 TIMES DAILY
Status: DISCONTINUED | OUTPATIENT
Start: 2025-03-16 | End: 2025-03-18 | Stop reason: HOSPADM

## 2025-03-16 RX ORDER — FUROSEMIDE 10 MG/ML
40 INJECTION INTRAMUSCULAR; INTRAVENOUS
Status: DISCONTINUED | OUTPATIENT
Start: 2025-03-16 | End: 2025-03-16

## 2025-03-16 RX ORDER — FUROSEMIDE 10 MG/ML
40 INJECTION INTRAMUSCULAR; INTRAVENOUS
Status: DISCONTINUED | OUTPATIENT
Start: 2025-03-16 | End: 2025-03-18 | Stop reason: HOSPADM

## 2025-03-16 RX ORDER — ALBUMIN (HUMAN) 12.5 G/50ML
25 SOLUTION INTRAVENOUS ONCE
Status: COMPLETED | OUTPATIENT
Start: 2025-03-16 | End: 2025-03-16

## 2025-03-16 RX ORDER — PHYTONADIONE 5 MG/1
2.5 TABLET ORAL ONCE
Status: COMPLETED | OUTPATIENT
Start: 2025-03-16 | End: 2025-03-16

## 2025-03-16 RX ADMIN — OXYCODONE HYDROCHLORIDE 5 MG: 5 TABLET ORAL at 04:46

## 2025-03-16 RX ADMIN — METOCLOPRAMIDE 5 MG: 5 TABLET ORAL at 21:15

## 2025-03-16 RX ADMIN — Medication 1000 UNITS: at 09:01

## 2025-03-16 RX ADMIN — OXYCODONE HYDROCHLORIDE 5 MG: 5 TABLET ORAL at 11:23

## 2025-03-16 RX ADMIN — PHYTONADIONE 2.5 MG: 5 TABLET ORAL at 13:05

## 2025-03-16 RX ADMIN — ALBUMIN (HUMAN) 25 G: 0.25 INJECTION, SOLUTION INTRAVENOUS at 15:48

## 2025-03-16 RX ADMIN — MIDODRINE HYDROCHLORIDE 10 MG: 5 TABLET ORAL at 13:32

## 2025-03-16 RX ADMIN — METOCLOPRAMIDE 5 MG: 5 TABLET ORAL at 09:02

## 2025-03-16 RX ADMIN — PANTOPRAZOLE SODIUM 40 MG: 40 TABLET, DELAYED RELEASE ORAL at 05:05

## 2025-03-16 RX ADMIN — ALBUMIN (HUMAN) 25 G: 0.25 INJECTION, SOLUTION INTRAVENOUS at 13:05

## 2025-03-16 RX ADMIN — ACETAMINOPHEN 650 MG: 325 TABLET, FILM COATED ORAL at 21:15

## 2025-03-16 RX ADMIN — FLUOXETINE HYDROCHLORIDE 20 MG: 20 CAPSULE ORAL at 09:00

## 2025-03-16 RX ADMIN — HYDROCORTISONE: 25 CREAM TOPICAL at 17:09

## 2025-03-16 RX ADMIN — OXYCODONE HYDROCHLORIDE 5 MG: 5 TABLET ORAL at 18:42

## 2025-03-16 RX ADMIN — METOCLOPRAMIDE 5 MG: 5 TABLET ORAL at 17:07

## 2025-03-16 RX ADMIN — METOCLOPRAMIDE 5 MG: 5 TABLET ORAL at 11:23

## 2025-03-16 RX ADMIN — HYDROCORTISONE: 25 CREAM TOPICAL at 09:02

## 2025-03-16 RX ADMIN — MIDODRINE HYDROCHLORIDE 10 MG: 5 TABLET ORAL at 17:07

## 2025-03-16 RX ADMIN — MELATONIN 9 MG: 3 TAB ORAL at 21:15

## 2025-03-16 RX ADMIN — ACETAMINOPHEN 650 MG: 325 TABLET, FILM COATED ORAL at 09:01

## 2025-03-16 RX ADMIN — Medication 1000 MCG: at 09:01

## 2025-03-16 RX ADMIN — ALBUMIN (HUMAN) 25 G: 0.25 INJECTION, SOLUTION INTRAVENOUS at 23:17

## 2025-03-16 RX ADMIN — ACETAMINOPHEN 650 MG: 325 TABLET, FILM COATED ORAL at 15:48

## 2025-03-16 RX ADMIN — FOLIC ACID 1 MG: 1 TABLET ORAL at 09:01

## 2025-03-16 RX ADMIN — Medication 100 MG: at 09:01

## 2025-03-16 NOTE — ASSESSMENT & PLAN NOTE
Iso poor po intake and warfarin use  Hold warfarin  Monitor for signs of bleeding  No indication for vitamin K or reversal in the absence of active bleeding

## 2025-03-16 NOTE — ASSESSMENT & PLAN NOTE
Acute on chronic lymphedema  We will give albumin and furosemide to help mobilize fluid  Patient reports she was supposed to have a lymphedema specialist come to the house  Continue localized wound care  Leg wraps  Elevation as possible  BNP within normal limits, does have previous history of grade 1 diastolic dysfunction on echocardiogram from 2022  BNP may be falsely low and patient with obesity, will check echo

## 2025-03-16 NOTE — TELEPHONE ENCOUNTER
Hello. Looks like Mckenna is currently admitted to [facility].  She was in the ER with complains of lower extremity edema and complains unrelated to her INR however labs are showing supra therapeutic levels at 5.58 > 7.34.   Unsure of why her level is so high but will keep an eye out. Just ROGE.
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
## 2025-03-16 NOTE — ASSESSMENT & PLAN NOTE
Warfarin interrupted in the setting of supratherapeutic INR  History of pulmonary embolism 2018 complicated by right heart strain  Reported to have Antithrombin 3 deficiency

## 2025-03-16 NOTE — ASSESSMENT & PLAN NOTE
No evidence of bleeding and felt to be secondary to poor p.o. intake  Will give vitamin K 2.5 x 1, will give additional Vitamin K 5 mg x 1  Recheck in a.m.  Warfarin has been prescribed for history of pulmonary embolism  Recent Labs     03/15/25  1748 03/16/25  0959 03/17/25  0319   INR 5.58* 7.34* 7.40*

## 2025-03-16 NOTE — ASSESSMENT & PLAN NOTE
Continue with home Lasix  Wound care consult  Was already referred to lymphedema clinic for evaluation  Elevate legs

## 2025-03-16 NOTE — ASSESSMENT & PLAN NOTE
.4 Eyes Skin Assessment     NAME:  Amandeep Avila  YOB: 1937  MEDICAL RECORD NUMBER:  [id number]    The patient is being assess for  Admission    I agree that 2 RN's have performed a thorough Head to Toe Skin Assessment on the patient. ALL assessment sites listed below have been assessed. Areas assessed by both nurses:    Head, Face, Ears, Shoulders, Back, Chest, Arms, Elbows, Hands, Sacrum. Buttock, Coccyx, Ischium and Legs. Feet and Heels        Does the Patient have a Wound? Yes wound(s) were present on assessment.  LDA wound assessment was Initiated and completed        John Prevention initiated:  Yes   Wound Care Orders initiated:  No    Pressure Injury (Stage 3,4, Unstageable, DTI, NWPT, and Complex wounds) if present place consult order under [de-identified] No    New and Established Ostomies if present place consult order under : No      Nurse 1 eSignature: Electronically signed by Josue Lee RN on 11/29/21 at 5:39 PM EST    **SHARE this note so that the co-signing nurse is able to place an eSignature**    Nurse 2 eSignature: Electronically signed by Mainor Barrett RN on 11/29/21 at 5:41 PM EST Warfarin interrupted in the setting of supratherapeutic INR

## 2025-03-16 NOTE — ASSESSMENT & PLAN NOTE
No evidence of bleeding and felt to be secondary to poor p.o. intake  Will give vitamin K 2.5 x 1  Recheck in a.m.  Warfarin has been prescribed for history of pulmonary embolism  Recent Labs     03/15/25  1748 03/16/25  0959   INR 5.58* 7.34*

## 2025-03-16 NOTE — ASSESSMENT & PLAN NOTE
Unclear etiology, ?fatty liver seen on imaging previously  Has been elevated for the last 2 years  Continue to monitor, consider right upper quadrant ultrasound

## 2025-03-16 NOTE — PLAN OF CARE

## 2025-03-16 NOTE — PROGRESS NOTES
Progress Note - Hospitalist   Name: Mckenna Fischer 49 y.o. female I MRN: [id number]  Unit/Bed#: E5 -01 I Date of Admission: 3/15/2025   Date of Service: 3/16/2025 I Hospital Day: 0    Assessment & Plan  History of pulmonary embolus (PE)  Warfarin interrupted in the setting of supratherapeutic INR  History of pulmonary embolism 2018 complicated by right heart strain  Reported to have Antithrombin 3 deficiency    Supratherapeutic INR  No evidence of bleeding and felt to be secondary to poor p.o. intake  Will give vitamin K 2.5 x 1  Recheck in a.m.  Warfarin has been prescribed for history of pulmonary embolism  Recent Labs     03/15/25  1748 03/16/25  0959   INR 5.58* 7.34*       Lymphedema  Acute on chronic lymphedema  We will give albumin and furosemide to help mobilize fluid  Patient reports she was supposed to have a lymphedema specialist come to the house  Continue localized wound care  Leg wraps  Elevation as possible  BNP within normal limits, does have previous history of grade 1 diastolic dysfunction on echocardiogram from 2022  BNP may be falsely low and patient with obesity, will check echo  Hemorrhoids  Continue with hydrocortisone cream  Reports no bleeding of her hemorrhoids but mostly itchy anus  Continue outpatient follow-up with PCP  Elevated alkaline phosphatase level  Unclear etiology, ?fatty liver seen on imaging previously  Has been elevated for the last 2 years  Outpatient follow-up  Hypotension  Asymptomatic  Start midodrine 10 mg 3 times daily for now allow for blood pressure support for IV diuresis  Likely in the setting of diuretic use  Albumin as needed to avoid volume overload      The patient was changed from observation to inpatient secondary to hypotension, supratherapeutic INR and acute on chronic lymphedema requiring an additional 2 midnights for treatment and management.  For the past family and social history and review of systems please see observation H&P which was dated  3/15/2025, and is located in the patient's chart. I have reviewed the information and there have been no changes.      Hospital Course: 49-year-old female patient with history of lymphedema, obesity, history of pulmonary embolism presenting with multiple complaints including lower extremity swelling, elevated INR and itchy anus    Assessment:      Active Problems:    History of pulmonary embolus (PE)    Supratherapeutic INR    Lymphedema    Hemorrhoids    Elevated alkaline phosphatase level    Hypotension      Plan:    Continue Anusol cream  Start midodrine for hypotension  Check echocardiogram  Albumin and Lasix   Change diet to low-sodium fluid restricted       VTE Pharmacologic Prophylaxis:   Pharmacologic: Warfarin (Coumadin)  Mechanical VTE Prophylaxis in Place: Yes    AM-PAC Basic Mobility:            HLM Goal listed above. Continue with multidisciplinary rounding and encourage appropriate mobility to improve upon HLM goals.         Patient Centered Rounds: Case discussed and reviewed with nursing    Discussions with Specialists or Other Care Team Provider: Case management    Education and Discussions with Family / Patient: Patient updating family on cell phone while in the room.  Reports son is on the line and can listen into the conversation    Time Spent for Care: 80 minutes.  More than 50% of total time spent on counseling and coordination of care as described above.    Current Length of Stay: 0 day(s)    Current Patient Status: Observation   Certification Statement: The patient will continue to require additional inpatient hospital stay due to need for IV diuresis hypotension and supratherapeutic INR    Discharge Plan / Estimated Discharge Date: 48 hours    Code Status: Level 1 - Full Code      Subjective:   Seen and examined, patient reports weeping legs, she asks when wound care is coming.  I informed her that that is not tomorrow.  She states her legs are more swollen than they typically are.  She  denies any nausea or vomiting.  Denies any chest pain    A complete and comprehensive 14 point organ system review has been performed and all other systems are negative other than stated above.    Objective:     Vitals:   Temp (24hrs), Av.6 °F (37 °C), Min:98.4 °F (36.9 °C), Max:98.7 °F (37.1 °C)    Temp:  [98.4 °F (36.9 °C)-98.7 °F (37.1 °C)] 98.7 °F (37.1 °C)  HR:  [65-95] 69  Resp:  [15-20] 18  BP: ()/(49-83) 84/49  SpO2:  [94 %-100 %] 98 %  Body mass index is 35.33 kg/m².     Input and Output Summary (last 24 hours):     No intake or output data in the 24 hours ending 25 1327    Physical Exam:     General: well appearing, no acute distress  HEENT: atraumatic, PERRLA, moist mucosa, normal pharynx, normal tonsils and adenoids, normal tongue, no fluid in sinuses  Neck: Trachea midline, no carotid bruit, no masses  Respiratory: normal chest wall expansion, CTA B, no r/r/w, no rubs  Cardiovascular: RRR, no m/r/g, Normal S1 and S2  Abdomen: Soft, non-tender, non-distended, normal bowel sounds in all quadrants, no hepatosplenomegaly, no tympany  Rectal: deferred  Musculoskeletal: normal ROM in upper and lower extremities  Integumentary: warm, dry, and pink, with no rash, purpura, or petechia  Heme/Lymph: no lymphadenopathy, no bruises, 1+ edema to the thigh  Neurological: Cranial Nerves II-XII grossly intact  Psychiatric: cooperative with normal mood, affect, and cognition      Additional Data:     Labs:    Results from last 7 days   Lab Units 03/15/25  1748   WBC Thousand/uL 7.49   HEMOGLOBIN g/dL 11.2*   HEMATOCRIT % 32.1*   PLATELETS Thousands/uL 439*   SEGS PCT % 63   LYMPHO PCT % 29   MONO PCT % 8   EOS PCT % 0     Results from last 7 days   Lab Units 03/15/25  1748   POTASSIUM mmol/L 4.4   CHLORIDE mmol/L 102   CO2 mmol/L 25   BUN mg/dL 17   CREATININE mg/dL 0.99   CALCIUM mg/dL 7.5*   ALK PHOS U/L 135*   ALT U/L 31   AST U/L 35     Results from last 7 days   Lab Units 25  0959   INR  7.34*        * I Have Reviewed All Lab Data Listed Above.  * Additional Pertinent Lab Tests Reviewed: All Labs For Current Hospital Admission Reviewed      Lines/Drains:  Invasive Devices       Peripheral Intravenous Line  Duration             Peripheral IV 03/16/25 Left;Ventral (anterior) Forearm <1 day              Drain  Duration             Nephrostomy Left 442 days                          Imaging:  Imaging Reports Reviewed Today Include:   No results found.    Telemetry:       Recent Cultures (last 7 days):   Results from last 7 days   Lab Units 03/15/25  1748 03/15/25  1723   BLOOD CULTURE  Received in Microbiology Lab. Culture in Progress. Received in Microbiology Lab. Culture in Progress.       Last 24 Hours Medication List:   Current Facility-Administered Medications   Medication Dose Route Frequency Provider Last Rate    acetaminophen  650 mg Oral TID Rich Alfred Verona, DO      Albumin 25%  25 g Intravenous BID Cameron Cabrera, DO      Cholecalciferol  1,000 Units Oral Daily Rich Alfred Verona, DO      cyanocobalamin  1,000 mcg Oral Daily Rich Alfred Verona, DO      FLUoxetine  20 mg Oral Daily Rich Alfred Verona, DO      folic acid  1 mg Oral Daily Rich Alfred Verona, DO      furosemide  40 mg Intravenous BID (diuretic) Cameron Cabrera, DO      hydrocortisone   Topical BID Rich Alfred Verona, DO      melatonin  9 mg Oral HS Rich Alfred Verona, DO      methocarbamol  500 mg Oral BID PRN Rich Alfred Verona, DO      metoclopramide  5 mg Oral 4x Daily Rich Alfred Verona, DO      midodrine  10 mg Oral TID AC Cameron Cabrera, DO      ondansetron  8 mg Oral Q8H PRN Rich Alfred Verona, DO      oxyCODONE  5 mg Oral Q6H PRN Rich Alfred Verona, DO      pantoprazole  40 mg Oral Early Morning Rich Alfred Verona, DO      [Held by provider] spironolactone  50 mg Oral Daily Rich Alfred Verona, DO      vitamin B-1  100 mg Oral Daily Rich Alfred Verona, DO          AM-PAC Basic Mobility:            HLM Goal listed above. Continue with multidisciplinary rounding and encourage appropriate mobility to improve upon HLM goals.     Today, Patient Was Seen By: Cameron Cabrera DO    ** Please Note: This note was completed in part utilizing [employer] software dictation.  Grammatical errors, random word insertions, spelling mistakes, and incomplete sentences may be an occasional consequence of this system secondary to software limitations, ambient noise, and hardware issues.  If you have any questions or concerns about the content, text, or information contained within the body of this dictation, please contact the provider for clarification. **

## 2025-03-16 NOTE — ASSESSMENT & PLAN NOTE
Asymptomatic  Start midodrine 10 mg 3 times daily for now allow for blood pressure support for IV diuresis  Likely in the setting of diuretic use  Albumin as needed to avoid volume overload

## 2025-03-16 NOTE — ASSESSMENT & PLAN NOTE
Unclear etiology, ?fatty liver seen on imaging previously  Has been elevated for the last 2 years  Outpatient follow-up

## 2025-03-17 ENCOUNTER — VBI (OUTPATIENT)
Dept: ADMINISTRATIVE | Facility: OTHER | Age: 50
End: 2025-03-17

## 2025-03-17 ENCOUNTER — APPOINTMENT (OUTPATIENT)
Dept: NON INVASIVE DIAGNOSTICS | Facility: HOSPITAL | Age: 50
End: 2025-03-17
Payer: COMMERCIAL

## 2025-03-17 ENCOUNTER — APPOINTMENT (OUTPATIENT)
Dept: CT IMAGING | Facility: HOSPITAL | Age: 50
End: 2025-03-17
Payer: COMMERCIAL

## 2025-03-17 PROBLEM — D64.9 ANEMIA: Status: ACTIVE | Noted: 2025-03-17

## 2025-03-17 LAB
ALBUMIN SERPL BCG-MCNC: 2.6 G/DL (ref 3.5–5)
ALP SERPL-CCNC: 93 U/L (ref 34–104)
ALT SERPL W P-5'-P-CCNC: 23 U/L (ref 7–52)
ANION GAP SERPL CALCULATED.3IONS-SCNC: 5 MMOL/L (ref 4–13)
AORTIC ROOT: 2.7 CM
ASCENDING AORTA: 3.2 CM
AST SERPL W P-5'-P-CCNC: 36 U/L (ref 13–39)
BILIRUB DIRECT SERPL-MCNC: 0.24 MG/DL (ref 0–0.2)
BILIRUB SERPL-MCNC: 0.57 MG/DL (ref 0.2–1)
BSA FOR ECHO PROCEDURE: 2.07 M2
BUN SERPL-MCNC: 18 MG/DL (ref 5–25)
CALCIUM SERPL-MCNC: 7.3 MG/DL (ref 8.4–10.2)
CHLORIDE SERPL-SCNC: 109 MMOL/L (ref 96–108)
CO2 SERPL-SCNC: 25 MMOL/L (ref 21–32)
CREAT SERPL-MCNC: 0.71 MG/DL (ref 0.6–1.3)
E WAVE DECELERATION TIME: 191 MS
E/A RATIO: 1.12
ERYTHROCYTE [DISTWIDTH] IN BLOOD BY AUTOMATED COUNT: 15.6 % (ref 11.6–15.1)
FERRITIN SERPL-MCNC: 63 NG/ML (ref 11–307)
FRACTIONAL SHORTENING: 29 (ref 28–44)
GFR SERPL CREATININE-BSD FRML MDRD: 100 ML/MIN/1.73SQ M
GLUCOSE P FAST SERPL-MCNC: 75 MG/DL (ref 65–99)
GLUCOSE SERPL-MCNC: 75 MG/DL (ref 65–140)
HCT VFR BLD AUTO: 24.4 % (ref 34.8–46.1)
HGB BLD-MCNC: 8.5 G/DL (ref 11.5–15.4)
INR PPP: 7.4 (ref 0.85–1.19)
INTERVENTRICULAR SEPTUM IN DIASTOLE (PARASTERNAL SHORT AXIS VIEW): 0.9 CM
INTERVENTRICULAR SEPTUM: 0.9 CM (ref 0.6–1.1)
IRON SERPL-MCNC: 76 UG/DL (ref 50–212)
LAAS-AP2: 18.6 CM2
LAAS-AP4: 21.1 CM2
LEFT ATRIUM SIZE: 3.6 CM
LEFT ATRIUM VOLUME (MOD BIPLANE): 62 ML
LEFT ATRIUM VOLUME INDEX (MOD BIPLANE): 29.8 ML/M2
LEFT INTERNAL DIMENSION IN SYSTOLE: 3.2 CM (ref 2.1–4)
LEFT VENTRICULAR INTERNAL DIMENSION IN DIASTOLE: 4.5 CM (ref 3.5–6)
LEFT VENTRICULAR POSTERIOR WALL IN END DIASTOLE: 1.1 CM
LEFT VENTRICULAR STROKE VOLUME: 52 ML
LV EF US.2D.A4C+ESTIMATED: 57 %
LVSV (TEICH): 52 ML
MCH RBC QN AUTO: 30.8 PG (ref 26.8–34.3)
MCHC RBC AUTO-ENTMCNC: 34.8 G/DL (ref 31.4–37.4)
MCV RBC AUTO: 88 FL (ref 82–98)
MV E'TISSUE VEL-SEP: 15 CM/S
MV PEAK A VEL: 0.69 M/S
MV PEAK E VEL: 77 CM/S
MV STENOSIS PRESSURE HALF TIME: 55 MS
MV VALVE AREA P 1/2 METHOD: 4
PLATELET # BLD AUTO: 315 THOUSANDS/UL (ref 149–390)
PMV BLD AUTO: 9.6 FL (ref 8.9–12.7)
POTASSIUM SERPL-SCNC: 3.8 MMOL/L (ref 3.5–5.3)
PROT SERPL-MCNC: 4.6 G/DL (ref 6.4–8.4)
PROTHROMBIN TIME: 60.3 SECONDS (ref 12.3–15)
RA PRESSURE ESTIMATED: 3 MMHG
RBC # BLD AUTO: 2.76 MILLION/UL (ref 3.81–5.12)
RIGHT ATRIUM AREA SYSTOLE A4C: 17.5 CM2
RIGHT VENTRICLE ID DIMENSION: 3.1 CM
SL CV LEFT ATRIUM LENGTH A2C: 5.1 CM
SL CV LV EF: 65
SL CV PED ECHO LEFT VENTRICLE DIASTOLIC VOLUME (MOD BIPLANE) 2D: 93 ML
SL CV PED ECHO LEFT VENTRICLE SYSTOLIC VOLUME (MOD BIPLANE) 2D: 41 ML
SODIUM SERPL-SCNC: 139 MMOL/L (ref 135–147)
TRANSFERRIN SERPL-MCNC: <75 MG/DL (ref 203–362)
TRICUSPID ANNULAR PLANE SYSTOLIC EXCURSION: 2.3 CM
WBC # BLD AUTO: 4.88 THOUSAND/UL (ref 4.31–10.16)

## 2025-03-17 PROCEDURE — 99233 SBSQ HOSP IP/OBS HIGH 50: CPT | Performed by: INTERNAL MEDICINE

## 2025-03-17 PROCEDURE — 93306 TTE W/DOPPLER COMPLETE: CPT

## 2025-03-17 PROCEDURE — 85610 PROTHROMBIN TIME: CPT | Performed by: INTERNAL MEDICINE

## 2025-03-17 PROCEDURE — 83550 IRON BINDING TEST: CPT | Performed by: INTERNAL MEDICINE

## 2025-03-17 PROCEDURE — 80076 HEPATIC FUNCTION PANEL: CPT | Performed by: INTERNAL MEDICINE

## 2025-03-17 PROCEDURE — 82728 ASSAY OF FERRITIN: CPT | Performed by: INTERNAL MEDICINE

## 2025-03-17 PROCEDURE — 85027 COMPLETE CBC AUTOMATED: CPT | Performed by: INTERNAL MEDICINE

## 2025-03-17 PROCEDURE — 80048 BASIC METABOLIC PNL TOTAL CA: CPT | Performed by: INTERNAL MEDICINE

## 2025-03-17 PROCEDURE — 83540 ASSAY OF IRON: CPT | Performed by: INTERNAL MEDICINE

## 2025-03-17 PROCEDURE — 74177 CT ABD & PELVIS W/CONTRAST: CPT

## 2025-03-17 RX ORDER — PHYTONADIONE 5 MG/1
5 TABLET ORAL ONCE
Status: COMPLETED | OUTPATIENT
Start: 2025-03-17 | End: 2025-03-17

## 2025-03-17 RX ADMIN — OXYCODONE HYDROCHLORIDE 5 MG: 5 TABLET ORAL at 23:16

## 2025-03-17 RX ADMIN — ACETAMINOPHEN 650 MG: 325 TABLET, FILM COATED ORAL at 09:22

## 2025-03-17 RX ADMIN — IOHEXOL 50 ML: 240 INJECTION, SOLUTION INTRATHECAL; INTRAVASCULAR; INTRAVENOUS; ORAL at 12:04

## 2025-03-17 RX ADMIN — MELATONIN 9 MG: 3 TAB ORAL at 21:08

## 2025-03-17 RX ADMIN — FOLIC ACID 1 MG: 1 TABLET ORAL at 09:21

## 2025-03-17 RX ADMIN — FLUOXETINE HYDROCHLORIDE 20 MG: 20 CAPSULE ORAL at 09:21

## 2025-03-17 RX ADMIN — ALBUMIN (HUMAN) 25 G: 0.25 INJECTION, SOLUTION INTRAVENOUS at 11:24

## 2025-03-17 RX ADMIN — ALBUMIN (HUMAN) 25 G: 0.25 INJECTION, SOLUTION INTRAVENOUS at 22:19

## 2025-03-17 RX ADMIN — MIDODRINE HYDROCHLORIDE 10 MG: 5 TABLET ORAL at 16:46

## 2025-03-17 RX ADMIN — Medication 1000 UNITS: at 09:23

## 2025-03-17 RX ADMIN — MIDODRINE HYDROCHLORIDE 10 MG: 5 TABLET ORAL at 11:25

## 2025-03-17 RX ADMIN — METOCLOPRAMIDE 5 MG: 5 TABLET ORAL at 11:25

## 2025-03-17 RX ADMIN — HYDROCORTISONE: 25 CREAM TOPICAL at 16:49

## 2025-03-17 RX ADMIN — OXYCODONE HYDROCHLORIDE 5 MG: 5 TABLET ORAL at 05:56

## 2025-03-17 RX ADMIN — HYDROCORTISONE: 25 CREAM TOPICAL at 09:23

## 2025-03-17 RX ADMIN — ACETAMINOPHEN 650 MG: 325 TABLET, FILM COATED ORAL at 16:46

## 2025-03-17 RX ADMIN — OXYCODONE HYDROCHLORIDE 5 MG: 5 TABLET ORAL at 13:47

## 2025-03-17 RX ADMIN — METHOCARBAMOL 500 MG: 500 TABLET ORAL at 13:50

## 2025-03-17 RX ADMIN — MIDODRINE HYDROCHLORIDE 10 MG: 5 TABLET ORAL at 06:00

## 2025-03-17 RX ADMIN — METOCLOPRAMIDE 5 MG: 5 TABLET ORAL at 09:21

## 2025-03-17 RX ADMIN — ACETAMINOPHEN 650 MG: 325 TABLET, FILM COATED ORAL at 21:11

## 2025-03-17 RX ADMIN — PANTOPRAZOLE SODIUM 40 MG: 40 TABLET, DELAYED RELEASE ORAL at 05:56

## 2025-03-17 RX ADMIN — PHYTONADIONE 5 MG: 5 TABLET ORAL at 09:21

## 2025-03-17 RX ADMIN — METHOCARBAMOL 500 MG: 500 TABLET ORAL at 21:11

## 2025-03-17 RX ADMIN — METOCLOPRAMIDE 5 MG: 5 TABLET ORAL at 16:46

## 2025-03-17 RX ADMIN — Medication 1000 MCG: at 09:21

## 2025-03-17 RX ADMIN — METOCLOPRAMIDE 5 MG: 5 TABLET ORAL at 21:08

## 2025-03-17 RX ADMIN — IOHEXOL 100 ML: 350 INJECTION, SOLUTION INTRAVENOUS at 12:05

## 2025-03-17 RX ADMIN — Medication 100 MG: at 09:21

## 2025-03-17 NOTE — PHYSICAL THERAPY NOTE
Physical Therapy Cancellation Note           03/17/25 0823   Note Type   Note type Cancelled Session   Cancel Reasons Medical status   Additional Comments PT orders received, chart reviewed. Note INR 7.40 this morning. Will cancel PT evaluation for today. Will f/u and complete eval when medically appropriate.     Melanie Gold
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
## 2025-03-17 NOTE — PLAN OF CARE
Problem: Potential for Falls  Goal: Patient will remain free of falls  Description: INTERVENTIONS:  - Educate patient/family on patient safety including physical limitations  - Instruct patient to call for assistance with activity   - Consult OT/PT to assist with strengthening/mobility   - Keep Call bell within reach  - Keep bed low and locked with side rails adjusted as appropriate  - Keep care items and personal belongings within reach  - Initiate and maintain comfort rounds  - Make Fall Risk Sign visible to staff  - Offer Toileting every 2 Hours, in advance of need  - Initiate/Maintain bed alarm  - Apply yellow socks and bracelet for high fall risk patients  - Consider moving patient to room near nurses station  Outcome: Progressing     Problem: PAIN - ADULT  Goal: Verbalizes/displays adequate comfort level or baseline comfort level  Description: Interventions:  - Encourage patient to monitor pain and request assistance  - Assess pain using appropriate pain scale  - Administer analgesics based on type and severity of pain and evaluate response  - Implement non-pharmacological measures as appropriate and evaluate response  - Consider cultural and social influences on pain and pain management  - Notify physician/advanced practitioner if interventions unsuccessful or patient reports new pain  Outcome: Progressing     Problem: INFECTION - ADULT  Goal: Absence or prevention of progression during hospitalization  Description: INTERVENTIONS:  - Assess and monitor for signs and symptoms of infection  - Monitor lab/diagnostic results  - Monitor all insertion sites, i.e. indwelling lines, tubes, and drains  - Monitor endotracheal if appropriate and nasal secretions for changes in amount and color  - [place] appropriate cooling/warming therapies per order  - Administer medications as ordered  - Instruct and encourage patient and family to use good hand hygiene technique  - Identify and instruct in appropriate isolation  precautions for identified infection/condition  Outcome: Progressing  Goal: Absence of fever/infection during neutropenic period  Description: INTERVENTIONS:  - Monitor WBC    Outcome: Progressing     Problem: SAFETY ADULT  Goal: Patient will remain free of falls  Description: INTERVENTIONS:  - Educate patient/family on patient safety including physical limitations  - Instruct patient to call for assistance with activity   - Consult OT/PT to assist with strengthening/mobility   - Keep Call bell within reach  - Keep bed low and locked with side rails adjusted as appropriate  - Keep care items and personal belongings within reach  - Initiate and maintain comfort rounds  - Make Fall Risk Sign visible to staff  - Offer Toileting every 2 Hours, in advance of need  - Initiate/Maintain bed alarm  - Apply yellow socks and bracelet for high fall risk patients  - Consider moving patient to room near nurses station  Outcome: Progressing  Goal: Maintain or return to baseline ADL function  Description: INTERVENTIONS:  -  Assess patient's ability to carry out ADLs; assess patient's baseline for ADL function and identify physical deficits which impact ability to perform ADLs (bathing, care of mouth/teeth, toileting, grooming, dressing, etc.)  - Assess/evaluate cause of self-care deficits   - Assess range of motion  - Assess patient's mobility; develop plan if impaired  - Assess patient's need for assistive devices and provide as appropriate  - Encourage maximum independence but intervene and supervise when necessary  - Involve family in performance of ADLs  - Assess for home care needs following discharge   - Consider OT consult to assist with ADL evaluation and planning for discharge  - Provide patient education as appropriate  Outcome: Progressing  Goal: Maintains/Returns to pre admission functional level  Description: INTERVENTIONS:  - Perform AM-PAC 6 Click Basic Mobility/ Daily Activity assessment daily.  - Set and  communicate daily mobility goal to care team and patient/family/caregiver.   - Collaborate with rehabilitation services on mobility goals if consulted    - Out of bed for toileting  - Record patient progress and toleration of activity level   Outcome: Progressing     Problem: DISCHARGE PLANNING  Goal: Discharge to home or other facility with appropriate resources  Description: INTERVENTIONS:  - Identify barriers to discharge w/patient and caregiver  - Arrange for needed discharge resources and transportation as appropriate  - Identify discharge learning needs (meds, wound care, etc.)  - Arrange for interpretive services to assist at discharge as needed  - Refer to Case Management Department for coordinating discharge planning if the patient needs post-hospital services based on physician/advanced practitioner order or complex needs related to functional status, cognitive ability, or social support system  Outcome: Progressing     Problem: Knowledge Deficit  Goal: Patient/family/caregiver demonstrates understanding of disease process, treatment plan, medications, and discharge instructions  Description: Complete learning assessment and assess knowledge base.  Interventions:  - Provide teaching at level of understanding  - Provide teaching via preferred learning methods  Outcome: Progressing     Problem: Nutrition/Hydration-ADULT  Goal: Nutrient/Hydration intake appropriate for improving, restoring or maintaining nutritional needs  Description: Monitor and assess patient's nutrition/hydration status for malnutrition. Collaborate with interdisciplinary team and initiate plan and interventions as ordered.  Monitor patient's weight and dietary intake as ordered or per policy. Utilize nutrition screening tool and intervene as necessary. Determine patient's food preferences and provide high-protein, high-caloric foods as appropriate.     INTERVENTIONS:  - Monitor oral intake, urinary output, labs, and treatment plans  -  Assess nutrition and hydration status and recommend course of action  - Evaluate amount of meals eaten  - Assist patient with eating if necessary   - Allow adequate time for meals  - Recommend/ encourage appropriate diets, oral nutritional supplements, and vitamin/mineral supplements  - Order, calculate, and assess calorie counts as needed  - Recommend, monitor, and adjust tube feedings and TPN/PPN based on assessed needs  - Assess need for intravenous fluids  - Provide specific nutrition/hydration education as appropriate  - Include patient/family/caregiver in decisions related to nutrition  Outcome: Progressing

## 2025-03-17 NOTE — OCCUPATIONAL THERAPY NOTE
Occupational Therapy Cancel Note:    Patient Name: Mckenna Fischer  Today's Date: 3/17/2025    OT consult received. Chart reviewed. INR 7.40 this AM. Will cx and complete OT evaluation at later time as medically appropriate.     Selin Croft, OTR/L

## 2025-03-17 NOTE — PROGRESS NOTES
Progress Note - Hospitalist   Name: Mckenna Fischer 49 y.o. female I MRN: [id number]  Unit/Bed#: E5 -01 I Date of Admission: 3/15/2025   Date of Service: 3/17/2025 I Hospital Day: 0    Assessment & Plan  History of pulmonary embolus (PE)  Warfarin interrupted in the setting of supratherapeutic INR  History of pulmonary embolism 2018 complicated by right heart strain  Reported to have Antithrombin 3 deficiency  Has filter in place    Supratherapeutic INR  No evidence of bleeding and felt to be secondary to poor p.o. intake  Will give vitamin K 2.5 x 1, will give additional Vitamin K 5 mg x 1  Recheck in a.m.  Warfarin has been prescribed for history of pulmonary embolism  Recent Labs     03/15/25  1748 03/16/25  0959 03/17/25  0319   INR 5.58* 7.34* 7.40*       Lymphedema  Acute on chronic lymphedema  We will give albumin and furosemide to help mobilize fluid  Patient reports she was supposed to have a lymphedema specialist come to the house  Continue localized wound care  Leg wraps  Elevation as possible  Echocardiogram with no evidence of heart failure normal systolic and diastolic function  Hemorrhoids  Continue with hydrocortisone cream  Reports bleeding of her hemorrhoids  Elevated alkaline phosphatase level  Unclear etiology, ?fatty liver seen on imaging previously  Has been elevated for the last 2 years  Outpatient follow-up  Hypotension  Asymptomatic  Start midodrine 10 mg 3 times daily for now allow for blood pressure support for IV diuresis  Likely in the setting of diuretic use  Albumin as needed to avoid volume overload  Liver cirrhosis (HCC)  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  CT abdomen and pelvis with no evidence of intra-abdominal bleeding  Transfuse for hemoglobin less than 7  Iron panel with appropriate iron stores            Hospital Course:     49-year-old female patient presenting with lower extremity swelling, hemorrhoidal bleeding and wounds on her legs.    Assessment:      Active Problems:    History of pulmonary embolus (PE)    Liver cirrhosis (HCC)    Supratherapeutic INR    Lymphedema    Hemorrhoids    Elevated alkaline phosphatase level    Hypotension    Anemia      Plan:    Vitamin K x 1 given supratherapeutic INR  Recheck labs in a.m.  CT abdomen and pelvis    The patient was changed from observation to inpatient secondary to supratherapeutic INR, anemia requiring an additional 2 midnights for treatment and management.  For the past family and social history and review of systems please see observation H&P which was dated 3/15/2025, and is located in the patient's chart. I have reviewed the information and there have been no changes.       VTE Pharmacologic Prophylaxis:   Pharmacologic: Pharmacologic VTE Prophylaxis contraindicated due to supratherapeutic INR  Mechanical VTE Prophylaxis in Place: Yes    AM-PAC Basic Mobility:  Basic Mobility Inpatient Raw Score: 24    JH-HLM Achieved: 6: Walk 10 steps or more  JH-HLM Goal: 8: Walk 250 feet or more    HLM Goal listed above. Continue with multidisciplinary rounding and encourage appropriate mobility to improve upon HLM goals.         Patient Centered Rounds: Case discussed and reviewed with nursing    Discussions with Specialists or Other Care Team Provider: Case management    Education and Discussions with Family / Patient: Patient family member at bedside    Time Spent for Care: 80 minutes.  More than 50% of total time spent on counseling and  coordination of care as described above.    Current Length of Stay: 0 day(s)    Current Patient Status: Inpatient   Certification Statement: The patient will continue to require additional inpatient hospital stay due to evaluation of bleeding    Discharge Plan / Estimated Discharge Date: To be determined but likely 24 to 48-hour    Code Status: Level 1 - Full Code      Subjective:   Seen and examined, no acute complaints.  No nausea vomiting, no abdominal pain    A complete and comprehensive 14 point organ system review has been performed and all other systems are negative other than stated above.    Objective:     Vitals:   Temp (24hrs), Av.7 °F (37.1 °C), Min:98.4 °F (36.9 °C), Max:99.1 °F (37.3 °C)    Temp:  [98.4 °F (36.9 °C)-99.1 °F (37.3 °C)] 98.6 °F (37 °C)  HR:  [65-82] 75  Resp:  [16-22] 22  BP: ()/(50-66) 91/51  SpO2:  [96 %-98 %] 97 %  Body mass index is 35.19 kg/m².     Input and Output Summary (last 24 hours):       Intake/Output Summary (Last 24 hours) at 3/17/2025 1721  Last data filed at 3/16/2025 2211  Gross per 24 hour   Intake 480 ml   Output --   Net 480 ml       Physical Exam:     General: well appearing, no acute distress  HEENT: atraumatic, PERRLA, moist mucosa, normal pharynx, normal tonsils and adenoids, normal tongue, no fluid in sinuses  Neck: Trachea midline, no carotid bruit, no masses  Respiratory: normal chest wall expansion, CTA B, no r/r/w, no rubs  Cardiovascular: RRR, no m/r/g, Normal S1 and S2  Abdomen: Soft, non-tender, non-distended, normal bowel sounds in all quadrants, no hepatosplenomegaly, no tympany  Rectal: deferred  Musculoskeletal: normal ROM in upper and lower extremities  Integumentary: warm, dry, and pink, with no rash, purpura, or petechia  Heme/Lymph: no lymphadenopathy, no bruises  Neurological: Cranial Nerves II-XII grossly intact  Psychiatric: cooperative with normal mood, affect, and cognition      Additional Data:     Labs:    Results from last 7 days    Lab Units 03/17/25  0319 03/15/25  1748   WBC Thousand/uL 4.88 7.49   HEMOGLOBIN g/dL 8.5* 11.2*   HEMATOCRIT % 24.4* 32.1*   PLATELETS Thousands/uL 315 439*   SEGS PCT %  --  63   LYMPHO PCT %  --  29   MONO PCT %  --  8   EOS PCT %  --  0     Results from last 7 days   Lab Units 03/17/25  0319   POTASSIUM mmol/L 3.8   CHLORIDE mmol/L 109*   CO2 mmol/L 25   BUN mg/dL 18   CREATININE mg/dL 0.71   CALCIUM mg/dL 7.3*   ALK PHOS U/L 93   ALT U/L 23   AST U/L 36     Results from last 7 days   Lab Units 03/17/25  0319   INR  7.40*       * I Have Reviewed All Lab Data Listed Above.  * Additional Pertinent Lab Tests Reviewed: All Labs For Current Hospital Admission Reviewed      Lines/Drains:  Invasive Devices       Peripheral Intravenous Line  Duration             Peripheral IV 03/16/25 Left;Ventral (anterior) Forearm 1 day              Drain  Duration             Nephrostomy Left 443 days                          Imaging:  Imaging Reports Reviewed Today Include:   CT abdomen pelvis w contrast  Result Date: 3/17/2025  Impression: No evidence of rectus sheath or psoas hematoma. Multiple areas of small bowel dilatation with anastomotic sutures and possible areas of tethering. This may represent partial obstruction. Delayed imaging may be useful to see if oral contrast reaches the colon. Tubular collection in the right hemipelvis of uncertain etiology. The appendix does extend to this area; however this could be related to prior collections of the right adnexa. Correlation with any acute abdominal pain is advised that would suggest appendicitis. Prior CT has been requested. Bladder wall thickening should be correlated with urinalysis. This was discussed with Dr. Cabrera at 4:05 p.m. Workstation performed: [id number]       Telemetry:       Recent Cultures (last 7 days):   Results from last 7 days   Lab Units 03/15/25  1748 03/15/25  1723   BLOOD CULTURE  No Growth at 24 hrs. No Growth at 24 hrs.       Last 24 Hours  Medication List:   Current Facility-Administered Medications   Medication Dose Route Frequency Provider Last Rate    acetaminophen  650 mg Oral TID Rich Alfred Verona, DO      Albumin 25%  25 g Intravenous BID Cameron Cabrera DO      Cholecalciferol  1,000 Units Oral Daily Rich Alfred Verona, DO      cyanocobalamin  1,000 mcg Oral Daily Rich Alfred Verona, DO      FLUoxetine  20 mg Oral Daily Rich Alfred Verona, DO      folic acid  1 mg Oral Daily Rich Alfred Verona, DO      furosemide  40 mg Intravenous BID (diuretic) Cameron Cabrera DO      hydrocortisone   Topical BID Rich Alfred Verona, DO      melatonin  9 mg Oral HS Rich Alfred Verona, DO      methocarbamol  500 mg Oral BID PRN Rich Alfred Verona, DO      metoclopramide  5 mg Oral 4x Daily Rich Alfred Verona, DO      midodrine  10 mg Oral TID AC Cameron Cabrera,       ondansetron  8 mg Oral Q8H PRN Rich Alfred Verona, DO      oxyCODONE  5 mg Oral Q6H PRN Rich Alfred Verona, DO      pantoprazole  40 mg Oral Early Morning Rich Alfred Verona, DO      [Held by provider] spironolactone  50 mg Oral Daily Rich Alfred Verona, DO      vitamin B-1  100 mg Oral Daily Rich Alfred Verona, DO         AM-PAC Basic Mobility:  Basic Mobility Inpatient Raw Score: 24    JH-HLM Achieved: 6: Walk 10 steps or more  JH-HLM Goal: 8: Walk 250 feet or more    HLM Goal listed above. Continue with multidisciplinary rounding and encourage appropriate mobility to improve upon HLM goals.     Today, Patient Was Seen By: Cameron Cabrera DO    ** Please Note: This note was completed in part utilizing [employer] One [employer] software dictation.  Grammatical errors, random word insertions, spelling mistakes, and incomplete sentences may be an occasional consequence of this system secondary to software limitations, ambient noise, and hardware issues.  If you have any questions or concerns about the content, text, or information  contained within the body of this dictation, please contact the provider for clarification. **

## 2025-03-17 NOTE — ASSESSMENT & PLAN NOTE
Warfarin interrupted in the setting of supratherapeutic INR  History of pulmonary embolism 2018 complicated by right heart strain  Reported to have Antithrombin 3 deficiency  Has filter in place

## 2025-03-17 NOTE — ASSESSMENT & PLAN NOTE
Significant hemoglobin drop  Recheck in a.m.  CT abdomen and pelvis with no evidence of intra-abdominal bleeding  Transfuse for hemoglobin less than 7  Iron panel with appropriate iron stores

## 2025-03-17 NOTE — ASSESSMENT & PLAN NOTE
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

## 2025-03-17 NOTE — TELEPHONE ENCOUNTER
03/17/25 9:38 AM     Chart reviewed for Pap Smear (HPV) aka Cervical Cancer Screening ; nothing is submitted to the patient's insurance at this time.     Sonu Kaur MA   [facility]

## 2025-03-17 NOTE — ASSESSMENT & PLAN NOTE
Acute on chronic lymphedema  We will give albumin and furosemide to help mobilize fluid  Patient reports she was supposed to have a lymphedema specialist come to the house  Continue localized wound care  Leg wraps  Elevation as possible  Echocardiogram with no evidence of heart failure normal systolic and diastolic function

## 2025-03-17 NOTE — DISCHARGE INSTR - OTHER ORDERS
Skin care plans:  1-Hydraguard to bilateral sacrum, buttock and heels BID and PRN  2-Elevate heels to offload pressure.  3-Ehob cushion in chair when out of bed.  4-Moisturize skin daily with skin nourishing cream.  5-Turn/reposition q2h or when medically stable for pressure re-distribution on skin.   6-Cleanse B/L leg wounds with normal saline, apply adaptic and melgisorb ag to wound bed, cover with ABD, secure with kina and tape. Change every other day and PRN soilage/displacement.   7-Cleanse midline abdominal wound with soap and water. Apply melgisorb ag and cover with Silicone bordered foam, arden T for treatment, and change every other day and PRN soilage/displacement

## 2025-03-18 VITALS
DIASTOLIC BLOOD PRESSURE: 55 MMHG | OXYGEN SATURATION: 97 % | BODY MASS INDEX: 35.03 KG/M2 | TEMPERATURE: 98.3 F | SYSTOLIC BLOOD PRESSURE: 98 MMHG | WEIGHT: 218 LBS | HEIGHT: 66 IN | RESPIRATION RATE: 18 BRPM | HEART RATE: 63 BPM

## 2025-03-18 LAB
AFP-TM SERPL-MCNC: 1.26 NG/ML (ref 0–9)
ALBUMIN SERPL BCG-MCNC: 3.3 G/DL (ref 3.5–5)
ALP SERPL-CCNC: 100 U/L (ref 34–104)
ALT SERPL W P-5'-P-CCNC: 22 U/L (ref 7–52)
ANION GAP SERPL CALCULATED.3IONS-SCNC: 5 MMOL/L (ref 4–13)
AST SERPL W P-5'-P-CCNC: 23 U/L (ref 13–39)
BASOPHILS # BLD AUTO: 0.01 THOUSANDS/ÂΜL (ref 0–0.1)
BASOPHILS NFR BLD AUTO: 0 % (ref 0–1)
BILIRUB SERPL-MCNC: 1.7 MG/DL (ref 0.2–1)
BUN SERPL-MCNC: 13 MG/DL (ref 5–25)
CALCIUM ALBUM COR SERPL-MCNC: 8.6 MG/DL (ref 8.3–10.1)
CALCIUM SERPL-MCNC: 8 MG/DL (ref 8.4–10.2)
CHLORIDE SERPL-SCNC: 106 MMOL/L (ref 96–108)
CO2 SERPL-SCNC: 29 MMOL/L (ref 21–32)
CREAT SERPL-MCNC: 0.67 MG/DL (ref 0.6–1.3)
EOSINOPHIL # BLD AUTO: 0.02 THOUSAND/ÂΜL (ref 0–0.61)
EOSINOPHIL NFR BLD AUTO: 0 % (ref 0–6)
ERYTHROCYTE [DISTWIDTH] IN BLOOD BY AUTOMATED COUNT: 15.6 % (ref 11.6–15.1)
GFR SERPL CREATININE-BSD FRML MDRD: 103 ML/MIN/1.73SQ M
GLUCOSE SERPL-MCNC: 81 MG/DL (ref 65–140)
HCT VFR BLD AUTO: 28 % (ref 34.8–46.1)
HGB BLD-MCNC: 9.7 G/DL (ref 11.5–15.4)
IMM GRANULOCYTES # BLD AUTO: 0.01 THOUSAND/UL (ref 0–0.2)
IMM GRANULOCYTES NFR BLD AUTO: 0 % (ref 0–2)
INR PPP: 4.15 (ref 0.85–1.19)
LYMPHOCYTES # BLD AUTO: 2.34 THOUSANDS/ÂΜL (ref 0.6–4.47)
LYMPHOCYTES NFR BLD AUTO: 43 % (ref 14–44)
MCH RBC QN AUTO: 31 PG (ref 26.8–34.3)
MCHC RBC AUTO-ENTMCNC: 34.6 G/DL (ref 31.4–37.4)
MCV RBC AUTO: 90 FL (ref 82–98)
MONOCYTES # BLD AUTO: 0.46 THOUSAND/ÂΜL (ref 0.17–1.22)
MONOCYTES NFR BLD AUTO: 9 % (ref 4–12)
NEUTROPHILS # BLD AUTO: 2.55 THOUSANDS/ÂΜL (ref 1.85–7.62)
NEUTS SEG NFR BLD AUTO: 48 % (ref 43–75)
NRBC BLD AUTO-RTO: 0 /100 WBCS
PLATELET # BLD AUTO: 360 THOUSANDS/UL (ref 149–390)
PMV BLD AUTO: 8.8 FL (ref 8.9–12.7)
POTASSIUM SERPL-SCNC: 3.6 MMOL/L (ref 3.5–5.3)
PROT SERPL-MCNC: 5.7 G/DL (ref 6.4–8.4)
PROTHROMBIN TIME: 39.1 SECONDS (ref 12.3–15)
RBC # BLD AUTO: 3.13 MILLION/UL (ref 3.81–5.12)
SODIUM SERPL-SCNC: 140 MMOL/L (ref 135–147)
WBC # BLD AUTO: 5.39 THOUSAND/UL (ref 4.31–10.16)

## 2025-03-18 PROCEDURE — 97166 OT EVAL MOD COMPLEX 45 MIN: CPT

## 2025-03-18 PROCEDURE — 97162 PT EVAL MOD COMPLEX 30 MIN: CPT

## 2025-03-18 PROCEDURE — 85610 PROTHROMBIN TIME: CPT | Performed by: PHYSICIAN ASSISTANT

## 2025-03-18 PROCEDURE — 82105 ALPHA-FETOPROTEIN SERUM: CPT | Performed by: PHYSICIAN ASSISTANT

## 2025-03-18 PROCEDURE — 80053 COMPREHEN METABOLIC PANEL: CPT | Performed by: PHYSICIAN ASSISTANT

## 2025-03-18 PROCEDURE — 85025 COMPLETE CBC W/AUTO DIFF WBC: CPT | Performed by: PHYSICIAN ASSISTANT

## 2025-03-18 PROCEDURE — 99239 HOSP IP/OBS DSCHRG MGMT >30: CPT | Performed by: INTERNAL MEDICINE

## 2025-03-18 RX ORDER — FUROSEMIDE 20 MG/1
20 TABLET ORAL DAILY
Qty: 30 TABLET | Refills: 0 | Status: SHIPPED | OUTPATIENT
Start: 2025-03-18 | End: 2025-03-27 | Stop reason: SDUPTHER

## 2025-03-18 RX ORDER — PHYTONADIONE 5 MG/1
5 TABLET ORAL ONCE
Status: COMPLETED | OUTPATIENT
Start: 2025-03-18 | End: 2025-03-18

## 2025-03-18 RX ORDER — MIDODRINE HYDROCHLORIDE 10 MG/1
10 TABLET ORAL
Qty: 90 TABLET | Refills: 0 | Status: SHIPPED | OUTPATIENT
Start: 2025-03-18 | End: 2025-04-17

## 2025-03-18 RX ADMIN — OXYCODONE HYDROCHLORIDE 5 MG: 5 TABLET ORAL at 06:10

## 2025-03-18 RX ADMIN — ALBUMIN (HUMAN) 25 G: 0.25 INJECTION, SOLUTION INTRAVENOUS at 10:49

## 2025-03-18 RX ADMIN — HYDROCORTISONE: 25 CREAM TOPICAL at 08:14

## 2025-03-18 RX ADMIN — MIDODRINE HYDROCHLORIDE 10 MG: 5 TABLET ORAL at 06:09

## 2025-03-18 RX ADMIN — OXYCODONE HYDROCHLORIDE 5 MG: 5 TABLET ORAL at 13:02

## 2025-03-18 RX ADMIN — Medication 1000 MCG: at 08:12

## 2025-03-18 RX ADMIN — METOCLOPRAMIDE 5 MG: 5 TABLET ORAL at 08:12

## 2025-03-18 RX ADMIN — METOCLOPRAMIDE 5 MG: 5 TABLET ORAL at 10:49

## 2025-03-18 RX ADMIN — ACETAMINOPHEN 650 MG: 325 TABLET, FILM COATED ORAL at 08:12

## 2025-03-18 RX ADMIN — METHOCARBAMOL 500 MG: 500 TABLET ORAL at 13:03

## 2025-03-18 RX ADMIN — FOLIC ACID 1 MG: 1 TABLET ORAL at 08:12

## 2025-03-18 RX ADMIN — PHYTONADIONE 5 MG: 5 TABLET ORAL at 10:49

## 2025-03-18 RX ADMIN — MIDODRINE HYDROCHLORIDE 10 MG: 5 TABLET ORAL at 10:49

## 2025-03-18 RX ADMIN — Medication 100 MG: at 08:12

## 2025-03-18 RX ADMIN — Medication 1000 UNITS: at 08:13

## 2025-03-18 RX ADMIN — FLUOXETINE HYDROCHLORIDE 20 MG: 20 CAPSULE ORAL at 08:12

## 2025-03-18 RX ADMIN — PANTOPRAZOLE SODIUM 40 MG: 40 TABLET, DELAYED RELEASE ORAL at 06:08

## 2025-03-18 NOTE — ASSESSMENT & PLAN NOTE
PE 2018, reported antithrombin 3 deficiency. Has filter in place. On Coumadin PTA, currently on hold in s/o supratherapeutic INR. Mgmt per SLIM
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
## 2025-03-18 NOTE — ASSESSMENT & PLAN NOTE
Significant hemoglobin drop, hemoglobin now increasing  Recent Labs     03/15/25  1748 03/17/25  0319 03/18/25  0641   HGB 11.2* 8.5* 9.7*       CT abdomen and pelvis with no evidence of intra-abdominal bleeding  Transfuse for hemoglobin less than 7  Iron panel with appropriate iron stores

## 2025-03-18 NOTE — ASSESSMENT & PLAN NOTE
Intermittent rectal bleeding, unclear etiology. On Anusol. Will need lower endoscopy to evaluate.

## 2025-03-18 NOTE — ASSESSMENT & PLAN NOTE
Acute on chronic lymphedema  We will give albumin and furosemide to help mobilize fluid  Patient reports she was supposed to have a lymphedema specialist come to the house  Continue localized wound care  Leg wraps  Elevation as possible  Echocardiogram with no evidence of heart failure normal systolic and diastolic function  Referral sent to wound care and lymphedema PT therapy
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
## 2025-03-18 NOTE — PLAN OF CARE
Problem: Potential for Falls  Goal: Patient will remain free of falls  Description: INTERVENTIONS:  - Educate patient/family on patient safety including physical limitations  - Instruct patient to call for assistance with activity   - Consult OT/PT to assist with strengthening/mobility   - Keep Call bell within reach  - Keep bed low and locked with side rails adjusted as appropriate  - Keep care items and personal belongings within reach  - Initiate and maintain comfort rounds  - Make Fall Risk Sign visible to staff  - Offer Toileting every 2 Hours, in advance of need  - Initiate/Maintain bed alarm  - Apply yellow socks and bracelet for high fall risk patients  - Consider moving patient to room near nurses station  3/18/2025 1427 by Fabien Baker RN  Outcome: Adequate for Discharge  3/18/2025 0718 by Faiben Baker RN  Outcome: Progressing     Problem: PAIN - ADULT  Goal: Verbalizes/displays adequate comfort level or baseline comfort level  Description: Interventions:  - Encourage patient to monitor pain and request assistance  - Assess pain using appropriate pain scale  - Administer analgesics based on type and severity of pain and evaluate response  - Implement non-pharmacological measures as appropriate and evaluate response  - Consider cultural and social influences on pain and pain management  - Notify physician/advanced practitioner if interventions unsuccessful or patient reports new pain  3/18/2025 1427 by Fabien Baker RN  Outcome: Adequate for Discharge  3/18/2025 0718 by Fabien Baker RN  Outcome: Progressing     Problem: INFECTION - ADULT  Goal: Absence or prevention of progression during hospitalization  Description: INTERVENTIONS:  - Assess and monitor for signs and symptoms of infection  - Monitor lab/diagnostic results  - Monitor all insertion sites, i.e. indwelling lines, tubes, and drains  - Monitor endotracheal if appropriate and nasal secretions for changes in amount and color  -  [place] appropriate cooling/warming therapies per order  - Administer medications as ordered  - Instruct and encourage patient and family to use good hand hygiene technique  - Identify and instruct in appropriate isolation precautions for identified infection/condition  3/18/2025 1427 by Fabien Baker RN  Outcome: Adequate for Discharge  3/18/2025 0718 by Fabien Baker RN  Outcome: Progressing  Goal: Absence of fever/infection during neutropenic period  Description: INTERVENTIONS:  - Monitor WBC    3/18/2025 1427 by Fabien Baker RN  Outcome: Adequate for Discharge  3/18/2025 0718 by Fabien Baker RN  Outcome: Progressing     Problem: SAFETY ADULT  Goal: Patient will remain free of falls  Description: INTERVENTIONS:  - Educate patient/family on patient safety including physical limitations  - Instruct patient to call for assistance with activity   - Consult OT/PT to assist with strengthening/mobility   - Keep Call bell within reach  - Keep bed low and locked with side rails adjusted as appropriate  - Keep care items and personal belongings within reach  - Initiate and maintain comfort rounds  - Make Fall Risk Sign visible to staff  - Offer Toileting every 2 Hours, in advance of need  - Initiate/Maintain bed alarm  - Apply yellow socks and bracelet for high fall risk patients  - Consider moving patient to room near nurses station  3/18/2025 1427 by Fabien Baker RN  Outcome: Adequate for Discharge  3/18/2025 0718 by Fabien Baker RN  Outcome: Progressing  Goal: Maintain or return to baseline ADL function  Description: INTERVENTIONS:  -  Assess patient's ability to carry out ADLs; assess patient's baseline for ADL function and identify physical deficits which impact ability to perform ADLs (bathing, care of mouth/teeth, toileting, grooming, dressing, etc.)  - Assess/evaluate cause of self-care deficits   - Assess range of motion  - Assess patient's mobility; develop plan if impaired  - Assess  patient's need for assistive devices and provide as appropriate  - Encourage maximum independence but intervene and supervise when necessary  - Involve family in performance of ADLs  - Assess for home care needs following discharge   - Consider OT consult to assist with ADL evaluation and planning for discharge  - Provide patient education as appropriate  3/18/2025 1427 by Fabien Baker RN  Outcome: Adequate for Discharge  3/18/2025 0718 by Fabien Baker RN  Outcome: Progressing  Goal: Maintains/Returns to pre admission functional level  Description: INTERVENTIONS:  - Perform AM-PAC 6 Click Basic Mobility/ Daily Activity assessment daily.  - Set and communicate daily mobility goal to care team and patient/family/caregiver.   - Collaborate with rehabilitation services on mobility goals if consulted    - Out of bed for toileting  - Record patient progress and toleration of activity level   3/18/2025 1427 by Fabien Baker RN  Outcome: Adequate for Discharge  3/18/2025 0718 by Fabien Baker RN  Outcome: Progressing     Problem: DISCHARGE PLANNING  Goal: Discharge to home or other facility with appropriate resources  Description: INTERVENTIONS:  - Identify barriers to discharge w/patient and caregiver  - Arrange for needed discharge resources and transportation as appropriate  - Identify discharge learning needs (meds, wound care, etc.)  - Arrange for interpretive services to assist at discharge as needed  - Refer to Case Management Department for coordinating discharge planning if the patient needs post-hospital services based on physician/advanced practitioner order or complex needs related to functional status, cognitive ability, or social support system  3/18/2025 1427 by Fabien Baker RN  Outcome: Adequate for Discharge  3/18/2025 0718 by Fabien Baker RN  Outcome: Progressing     Problem: Knowledge Deficit  Goal: Patient/family/caregiver demonstrates understanding of disease process,  treatment plan, medications, and discharge instructions  Description: Complete learning assessment and assess knowledge base.  Interventions:  - Provide teaching at level of understanding  - Provide teaching via preferred learning methods  3/18/2025 1427 by Fabien Baker RN  Outcome: Adequate for Discharge  3/18/2025 0718 by Fabien Baker RN  Outcome: Progressing     Problem: Nutrition/Hydration-ADULT  Goal: Nutrient/Hydration intake appropriate for improving, restoring or maintaining nutritional needs  Description: Monitor and assess patient's nutrition/hydration status for malnutrition. Collaborate with interdisciplinary team and initiate plan and interventions as ordered.  Monitor patient's weight and dietary intake as ordered or per policy. Utilize nutrition screening tool and intervene as necessary. Determine patient's food preferences and provide high-protein, high-caloric foods as appropriate.     INTERVENTIONS:  - Monitor oral intake, urinary output, labs, and treatment plans  - Assess nutrition and hydration status and recommend course of action  - Evaluate amount of meals eaten  - Assist patient with eating if necessary   - Allow adequate time for meals  - Recommend/ encourage appropriate diets, oral nutritional supplements, and vitamin/mineral supplements  - Order, calculate, and assess calorie counts as needed  - Recommend, monitor, and adjust tube feedings and TPN/PPN based on assessed needs  - Assess need for intravenous fluids  - Provide specific nutrition/hydration education as appropriate  - Include patient/family/caregiver in decisions related to nutrition  3/18/2025 1427 by Fabien Baker RN  Outcome: Adequate for Discharge  3/18/2025 0718 by Fabien Baker RN  Outcome: Progressing

## 2025-03-18 NOTE — PLAN OF CARE
Problem: Potential for Falls  Goal: Patient will remain free of falls  Description: INTERVENTIONS:  - Educate patient/family on patient safety including physical limitations  - Instruct patient to call for assistance with activity   - Consult OT/PT to assist with strengthening/mobility   - Keep Call bell within reach  - Keep bed low and locked with side rails adjusted as appropriate  - Keep care items and personal belongings within reach  - Initiate and maintain comfort rounds  - Make Fall Risk Sign visible to staff  - Offer Toileting every 2 Hours, in advance of need  - Initiate/Maintain bed alarm  - Apply yellow socks and bracelet for high fall risk patients  - Consider moving patient to room near nurses station  Outcome: Progressing     Problem: PAIN - ADULT  Goal: Verbalizes/displays adequate comfort level or baseline comfort level  Description: Interventions:  - Encourage patient to monitor pain and request assistance  - Assess pain using appropriate pain scale  - Administer analgesics based on type and severity of pain and evaluate response  - Implement non-pharmacological measures as appropriate and evaluate response  - Consider cultural and social influences on pain and pain management  - Notify physician/advanced practitioner if interventions unsuccessful or patient reports new pain  Outcome: Progressing     Problem: INFECTION - ADULT  Goal: Absence or prevention of progression during hospitalization  Description: INTERVENTIONS:  - Assess and monitor for signs and symptoms of infection  - Monitor lab/diagnostic results  - Monitor all insertion sites, i.e. indwelling lines, tubes, and drains  - Monitor endotracheal if appropriate and nasal secretions for changes in amount and color  - [place] appropriate cooling/warming therapies per order  - Administer medications as ordered  - Instruct and encourage patient and family to use good hand hygiene technique  - Identify and instruct in appropriate isolation  precautions for identified infection/condition  Outcome: Progressing  Goal: Absence of fever/infection during neutropenic period  Description: INTERVENTIONS:  - Monitor WBC    Outcome: Progressing     Problem: SAFETY ADULT  Goal: Patient will remain free of falls  Description: INTERVENTIONS:  - Educate patient/family on patient safety including physical limitations  - Instruct patient to call for assistance with activity   - Consult OT/PT to assist with strengthening/mobility   - Keep Call bell within reach  - Keep bed low and locked with side rails adjusted as appropriate  - Keep care items and personal belongings within reach  - Initiate and maintain comfort rounds  - Make Fall Risk Sign visible to staff  - Offer Toileting every 2 Hours, in advance of need  - Initiate/Maintain bed alarm  - Apply yellow socks and bracelet for high fall risk patients  - Consider moving patient to room near nurses station  Outcome: Progressing  Goal: Maintain or return to baseline ADL function  Description: INTERVENTIONS:  -  Assess patient's ability to carry out ADLs; assess patient's baseline for ADL function and identify physical deficits which impact ability to perform ADLs (bathing, care of mouth/teeth, toileting, grooming, dressing, etc.)  - Assess/evaluate cause of self-care deficits   - Assess range of motion  - Assess patient's mobility; develop plan if impaired  - Assess patient's need for assistive devices and provide as appropriate  - Encourage maximum independence but intervene and supervise when necessary  - Involve family in performance of ADLs  - Assess for home care needs following discharge   - Consider OT consult to assist with ADL evaluation and planning for discharge  - Provide patient education as appropriate  Outcome: Progressing  Goal: Maintains/Returns to pre admission functional level  Description: INTERVENTIONS:  - Perform AM-PAC 6 Click Basic Mobility/ Daily Activity assessment daily.  - Set and  communicate daily mobility goal to care team and patient/family/caregiver.   - Collaborate with rehabilitation services on mobility goals if consulted    - Out of bed for toileting  - Record patient progress and toleration of activity level   Outcome: Progressing     Problem: DISCHARGE PLANNING  Goal: Discharge to home or other facility with appropriate resources  Description: INTERVENTIONS:  - Identify barriers to discharge w/patient and caregiver  - Arrange for needed discharge resources and transportation as appropriate  - Identify discharge learning needs (meds, wound care, etc.)  - Arrange for interpretive services to assist at discharge as needed  - Refer to Case Management Department for coordinating discharge planning if the patient needs post-hospital services based on physician/advanced practitioner order or complex needs related to functional status, cognitive ability, or social support system  Outcome: Progressing     Problem: Knowledge Deficit  Goal: Patient/family/caregiver demonstrates understanding of disease process, treatment plan, medications, and discharge instructions  Description: Complete learning assessment and assess knowledge base.  Interventions:  - Provide teaching at level of understanding  - Provide teaching via preferred learning methods  Outcome: Progressing     Problem: Nutrition/Hydration-ADULT  Goal: Nutrient/Hydration intake appropriate for improving, restoring or maintaining nutritional needs  Description: Monitor and assess patient's nutrition/hydration status for malnutrition. Collaborate with interdisciplinary team and initiate plan and interventions as ordered.  Monitor patient's weight and dietary intake as ordered or per policy. Utilize nutrition screening tool and intervene as necessary. Determine patient's food preferences and provide high-protein, high-caloric foods as appropriate.     INTERVENTIONS:  - Monitor oral intake, urinary output, labs, and treatment plans  -  Assess nutrition and hydration status and recommend course of action  - Evaluate amount of meals eaten  - Assist patient with eating if necessary   - Allow adequate time for meals  - Recommend/ encourage appropriate diets, oral nutritional supplements, and vitamin/mineral supplements  - Order, calculate, and assess calorie counts as needed  - Recommend, monitor, and adjust tube feedings and TPN/PPN based on assessed needs  - Assess need for intravenous fluids  - Provide specific nutrition/hydration education as appropriate  - Include patient/family/caregiver in decisions related to nutrition  Outcome: Progressing
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
## 2025-03-18 NOTE — PLAN OF CARE
Problem: Potential for Falls  Goal: Patient will remain free of falls  Description: INTERVENTIONS:  - Educate patient/family on patient safety including physical limitations  - Instruct patient to call for assistance with activity   - Consult OT/PT to assist with strengthening/mobility   - Keep Call bell within reach  - Keep bed low and locked with side rails adjusted as appropriate  - Keep care items and personal belongings within reach  - Initiate and maintain comfort rounds  - Make Fall Risk Sign visible to staff  - Offer Toileting every 2 Hours, in advance of need  - Initiate/Maintain bed alarm  - Apply yellow socks and bracelet for high fall risk patients  - Consider moving patient to room near nurses station  Outcome: Progressing     Problem: PAIN - ADULT  Goal: Verbalizes/displays adequate comfort level or baseline comfort level  Description: Interventions:  - Encourage patient to monitor pain and request assistance  - Assess pain using appropriate pain scale  - Administer analgesics based on type and severity of pain and evaluate response  - Implement non-pharmacological measures as appropriate and evaluate response  - Consider cultural and social influences on pain and pain management  - Notify physician/advanced practitioner if interventions unsuccessful or patient reports new pain  Outcome: Progressing     Problem: INFECTION - ADULT  Goal: Absence or prevention of progression during hospitalization  Description: INTERVENTIONS:  - Assess and monitor for signs and symptoms of infection  - Monitor lab/diagnostic results  - Monitor all insertion sites, i.e. indwelling lines, tubes, and drains  - Monitor endotracheal if appropriate and nasal secretions for changes in amount and color  - [place] appropriate cooling/warming therapies per order  - Administer medications as ordered  - Instruct and encourage patient and family to use good hand hygiene technique  - Identify and instruct in appropriate isolation  precautions for identified infection/condition  Outcome: Progressing  Goal: Absence of fever/infection during neutropenic period  Description: INTERVENTIONS:  - Monitor WBC    Outcome: Progressing     Problem: SAFETY ADULT  Goal: Patient will remain free of falls  Description: INTERVENTIONS:  - Educate patient/family on patient safety including physical limitations  - Instruct patient to call for assistance with activity   - Consult OT/PT to assist with strengthening/mobility   - Keep Call bell within reach  - Keep bed low and locked with side rails adjusted as appropriate  - Keep care items and personal belongings within reach  - Initiate and maintain comfort rounds  - Make Fall Risk Sign visible to staff  - Offer Toileting every 2 Hours, in advance of need  - Initiate/Maintain bed alarm  - Apply yellow socks and bracelet for high fall risk patients  - Consider moving patient to room near nurses station  Outcome: Progressing  Goal: Maintain or return to baseline ADL function  Description: INTERVENTIONS:  -  Assess patient's ability to carry out ADLs; assess patient's baseline for ADL function and identify physical deficits which impact ability to perform ADLs (bathing, care of mouth/teeth, toileting, grooming, dressing, etc.)  - Assess/evaluate cause of self-care deficits   - Assess range of motion  - Assess patient's mobility; develop plan if impaired  - Assess patient's need for assistive devices and provide as appropriate  - Encourage maximum independence but intervene and supervise when necessary  - Involve family in performance of ADLs  - Assess for home care needs following discharge   - Consider OT consult to assist with ADL evaluation and planning for discharge  - Provide patient education as appropriate  Outcome: Progressing  Goal: Maintains/Returns to pre admission functional level  Description: INTERVENTIONS:  - Perform AM-PAC 6 Click Basic Mobility/ Daily Activity assessment daily.  - Set and  communicate daily mobility goal to care team and patient/family/caregiver.   - Collaborate with rehabilitation services on mobility goals if consulted    - Out of bed for toileting  - Record patient progress and toleration of activity level   Outcome: Progressing     Problem: DISCHARGE PLANNING  Goal: Discharge to home or other facility with appropriate resources  Description: INTERVENTIONS:  - Identify barriers to discharge w/patient and caregiver  - Arrange for needed discharge resources and transportation as appropriate  - Identify discharge learning needs (meds, wound care, etc.)  - Arrange for interpretive services to assist at discharge as needed  - Refer to Case Management Department for coordinating discharge planning if the patient needs post-hospital services based on physician/advanced practitioner order or complex needs related to functional status, cognitive ability, or social support system  Outcome: Progressing     Problem: Knowledge Deficit  Goal: Patient/family/caregiver demonstrates understanding of disease process, treatment plan, medications, and discharge instructions  Description: Complete learning assessment and assess knowledge base.  Interventions:  - Provide teaching at level of understanding  - Provide teaching via preferred learning methods  Outcome: Progressing     Problem: Nutrition/Hydration-ADULT  Goal: Nutrient/Hydration intake appropriate for improving, restoring or maintaining nutritional needs  Description: Monitor and assess patient's nutrition/hydration status for malnutrition. Collaborate with interdisciplinary team and initiate plan and interventions as ordered.  Monitor patient's weight and dietary intake as ordered or per policy. Utilize nutrition screening tool and intervene as necessary. Determine patient's food preferences and provide high-protein, high-caloric foods as appropriate.     INTERVENTIONS:  - Monitor oral intake, urinary output, labs, and treatment plans  -  Assess nutrition and hydration status and recommend course of action  - Evaluate amount of meals eaten  - Assist patient with eating if necessary   - Allow adequate time for meals  - Recommend/ encourage appropriate diets, oral nutritional supplements, and vitamin/mineral supplements  - Order, calculate, and assess calorie counts as needed  - Recommend, monitor, and adjust tube feedings and TPN/PPN based on assessed needs  - Assess need for intravenous fluids  - Provide specific nutrition/hydration education as appropriate  - Include patient/family/caregiver in decisions related to nutrition  Outcome: Progressing

## 2025-03-18 NOTE — DISCHARGE INSTR - AVS FIRST PAGE
Dear Mckenna Fischer,     It was our pleasure to care for you here at [facility].  It is our hope that we were always able to exceed the expected standards for your care during your stay.  You were hospitalized due to lower extremity edema, supratherapeutic INR and hemorrhoidal bleeding.  You were cared for on the fifth floor by Cameron Cabrera,  with the [facility] who covers for your primary care physician (PCP), Norma Jenkins PA-C, while you were hospitalized.  If you have any questions or concerns related to this hospitalization, you may contact us at .  For follow up as well as any medication refills, we recommend that you follow up with your primary care physician.  A registered nurse will reach out to you by phone within a few days after your discharge to answer any additional questions that you may have after going home.  However, at this time we provide for you here, the most important instructions / recommendations at discharge:     Notable Medication Adjustments -   Midodrine 10 mg 3 times a day this is a medication to reassure blood pressure  Continue Lasix 20 mg once a day and Aldactone 50 mg once a day which will help remove fluid from your legs  Hold warfarin until PCP appointment to discuss when to restart this    Testing Required after Discharge -   INR check in 2 to 3 days  CMP or kidney function test in 1 week  Important follow up information -   You have been discharged on Warfarin a medication that requires monitoring.   For history of PE and DVT your INR values range between 2-3   Recent Labs     03/16/25  0959 03/17/25  0319 03/18/25  0641   INR 7.34* 7.40* 4.15*       At discharge you must arrange with your primary care physician, cardiologist, specialist, or anti-coagulation clinic appropriate follow up.  -If you have any unexplained bleed, worst headache of your life, strike your head or any other body part in  any manner, you need to go the Emergency room.   Please do not participate in any high risk or contact sports while you are on a blood thinner.   If there are any questions, feel free to contact [facility] or your Primary care provider. Thank you for allowing us to participate in your care.  Other Instructions -   Continue low-sodium diet as well as fluid restriction  Please follow-up with GI referral has been placed to discuss endoscopy and colonoscopy  Please review this entire after visit summary as additional general instructions including medication list, appointments, activity, diet, any pertinent wound care, and other additional recommendations from your care team that may be provided for you.      Sincerely,     Cameron Cabrera DO and Nurse Fabien Baker

## 2025-03-18 NOTE — ASSESSMENT & PLAN NOTE
No evidence of bleeding and felt to be secondary to poor p.o. intake  Will give vitamin K 2.5 x 1, will give additional Vitamin K 5 mg x 1  Warfarin has been prescribed for history of pulmonary embolism  Patient advised to hold warfarin as level still supratherapeutic and to contact PCP for further titration of her Coumadin  Recent Labs     03/16/25  0959 03/17/25  0319 03/18/25  0641   INR 7.34* 7.40* 4.15*

## 2025-03-18 NOTE — UTILIZATION REVIEW
Continued Stay Review    Date:   3/17/25                          Current Patient Class: Inpatient  Current Level of Care:  med surg    HPI:49 y.o. female initially admitted on WAS OBSERVATION 3/15/25 @ 1920 CONVERTED TO INPATIENT ADMISSION 3/17/25 @ 1306 DUE TO CONTINUED STAY REQUIRED TO CARE FOR PATIENT WITH Rectal bleed and Supratherapeutic INR requiring close monitoring of labs and pending work up.        Current Diagnosis:  Supratherapeutic  INR   H/O  PE  Lymphedema    Assessment/Plan:   3/17    INR  today   7.40.   Continue  IV  albumin and  IV  lasix.  Giving additional Vit  k  X1.  Denies  nausea or abdominal pain.   On midodrine, continue all current meds.  Continue local wound care to legs and  abdomen.    Medications:   Scheduled Medications:  acetaminophen, 650 mg, Oral, TID  Albumin 25%, 25 g, Intravenous, BID  Cholecalciferol, 1,000 Units, Oral, Daily  cyanocobalamin, 1,000 mcg, Oral, Daily  FLUoxetine, 20 mg, Oral, Daily  folic acid, 1 mg, Oral, Daily  furosemide, 40 mg, Intravenous, BID (diuretic)  hydrocortisone, , Topical, BID  melatonin, 9 mg, Oral, HS  metoclopramide, 5 mg, Oral, 4x Daily  midodrine, 10 mg, Oral, TID AC  pantoprazole, 40 mg, Oral, Early Morning  [Held by provider] spironolactone, 50 mg, Oral, Daily  vitamin B-1, 100 mg, Oral, Daily      Continuous IV Infusions:     PRN Meds:  methocarbamol, 500 mg, Oral, BID PRN  ondansetron, 8 mg, Oral, Q8H PRN  oxyCODONE, 5 mg, Oral, Q6H PRN      Discharge Plan:   TBD    Vital Signs (last 3 days)       Date/Time Temp Pulse Resp BP MAP (mmHg) SpO2 O2 Device Patient Position - Orthostatic VS Pain    03/18/25 10:50:04 -- 63 18 98/55 69 97 % -- -- --    03/18/25 0814 -- -- -- -- -- -- None (Room air) -- No Pain    03/18/25 07:10:38 98.3 °F (36.8 °C) 54 18 94/56 69 97 % -- Lying --    03/18/25 0610 -- -- -- -- -- -- -- -- 9    03/18/25 06:08:48 -- 63 -- 112/68 83 98 % -- -- --    03/17/25 2316 -- -- -- -- -- -- -- -- 8 03/17/25 22:46:51 98.7 °F  (37.1 °C) 55 19 93/54 67 97 % -- -- --    03/17/25 2137 -- -- -- -- -- 94 % None (Room air) -- --    03/17/25 2111 -- -- -- -- -- -- -- -- 8    03/17/25 21:09:11 -- 75 -- 83/49 60 97 % -- -- --    03/17/25 1646 -- -- -- -- -- -- -- -- 7    03/17/25 15:24:01 98.6 °F (37 °C) 75 -- 91/51 64 97 % -- -- --    03/17/25 1347 -- -- -- -- -- -- -- -- 8    03/17/25 11:25:08 -- 82 -- 89/58 68 98 % -- -- --    03/17/25 0929 -- 65 -- 102/61 -- 96 % None (Room air) -- --    03/17/25 0922 -- -- -- -- -- -- -- -- 5    03/17/25 07:40:41 99.1 °F (37.3 °C) 65 22 102/61 75 97 % None (Room air) Lying --    03/17/25 05:56:11 -- 74 -- 97/61 73 97 % -- -- --    03/17/25 0556 -- -- -- -- -- -- -- -- 8    03/16/25 2300 -- 73 -- -- -- -- -- -- --    03/16/25 22:58:38 98.4 °F (36.9 °C) 70 16 92/50 64 96 % None (Room air) Lying No Pain    03/16/25 21:18:25 98.5 °F (36.9 °C) 78 16 94/62 73 97 % None (Room air) Lying No Pain    03/16/25 1842 -- -- -- -- -- -- -- -- 10 - Worst Possible Pain    03/16/25 18:41:50 -- 72 -- 106/66 79 98 % -- -- --    03/16/25 17:07:57 -- 75 -- 96/54 68 98 % -- -- --    03/16/25 1548 -- -- -- -- -- -- -- -- 5    03/16/25 15:43:33 -- 67 -- 83/45 58 97 % -- -- --    03/16/25 15:30:31 98.5 °F (36.9 °C) 74 15 86/48 61 97 % -- -- --    03/16/25 14:34:22 -- 64 -- 102/59 73 96 % -- -- --    03/16/25 13:17:30 -- 69 -- 84/49 61 98 % -- -- --    03/16/25 1123 -- -- -- -- -- -- -- -- 9    03/16/25 11:14:07 -- 65 -- 89/57 68 100 % -- -- --    03/16/25 09:01:24 -- 72 -- 90/53 65 98 % -- -- --    03/16/25 0901 -- -- -- -- -- -- -- -- 8    03/16/25 08:19:06 98.7 °F (37.1 °C) 80 18 83/49 60 100 % -- -- --    03/16/25 0446 -- -- -- -- -- -- -- -- 10 - Worst Possible Pain    03/15/25 22:54:22 -- 79 15 99/53 68 98 % None (Room air) Lying --    03/15/25 2136 -- -- -- -- -- -- -- -- 9    03/15/25 21:10:04 98.6 °F (37 °C) 79 15 102/60 74 94 % None (Room air) Lying --    03/15/25 2019 -- -- -- -- -- -- -- -- 7    03/15/25 1918 -- -- -- -- --  -- -- -- 10 - Worst Possible Pain    03/15/25 1905 -- 81 18 103/50 -- 100 % None (Room air) Sitting --    03/15/25 1605 98.4 °F (36.9 °C) 95 20 132/83 -- 100 % None (Room air) Sitting 10 - Worst Possible Pain          Weight (last 2 days)       Date/Time Weight    03/17/25 0929 98.9 (218)            Pertinent Labs/Diagnostic Results:   Radiology:  CT abdomen pelvis w contrast   Final Interpretation by Ernesto Fernandes MD (03/17 1614)      No evidence of rectus sheath or psoas hematoma.      Multiple areas of small bowel dilatation with anastomotic sutures and possible areas of tethering. This may represent partial obstruction. Delayed imaging may be useful to see if oral contrast reaches the colon.      Tubular collection in the right hemipelvis of uncertain etiology. The appendix does extend to this area; however this could be related to prior collections of the right adnexa. Correlation with any acute abdominal pain is advised that would suggest    appendicitis.      Prior CT has been requested.      Bladder wall thickening should be correlated with urinalysis.      This was discussed with Dr. Cabrera at 4:05 p.m.         Workstation performed: [id number]           Cardiology:  Echo complete w/ contrast if indicated   Final Result by Ren Porter DO (03/17 1341)        Left Ventricle: Left ventricular cavity size is normal. Wall thickness    is normal. The left ventricular ejection fraction is 65%. Systolic    function is normal. Wall motion is normal. Diastolic function is normal.           GI:      Results from last 7 days   Lab Units 03/18/25  0641 03/17/25  0319 03/15/25  1748   WBC Thousand/uL 5.39 4.88 7.49   HEMOGLOBIN g/dL 9.7* 8.5* 11.2*   HEMATOCRIT % 28.0* 24.4* 32.1*   PLATELETS Thousands/uL 360 315 439*   TOTAL NEUT ABS Thousands/µL 2.55  --  4.76         Results from last 7 days   Lab Units 03/18/25  0641 03/17/25  0319 03/15/25  1748   SODIUM mmol/L 140 139 134*   POTASSIUM mmol/L 3.6 3.8 4.4    CHLORIDE mmol/L 106 109* 102   CO2 mmol/L 29 25 25   ANION GAP mmol/L 5 5 7   BUN mg/dL 13 18 17   CREATININE mg/dL 0.67 0.71 0.99   EGFR ml/min/1.73sq m 103 100 67   CALCIUM mg/dL 8.0* 7.3* 7.5*     Results from last 7 days   Lab Units 03/18/25  0641 03/17/25  0319 03/15/25  1748   AST U/L 23 36 35   ALT U/L 22 23 31   ALK PHOS U/L 100 93 135*   TOTAL PROTEIN g/dL 5.7* 4.6* 6.2*   ALBUMIN g/dL 3.3* 2.6* 2.8*   TOTAL BILIRUBIN mg/dL 1.70* 0.57 0.72   BILIRUBIN DIRECT mg/dL  --  0.24*  --          Results from last 7 days   Lab Units 03/18/25  0641 03/17/25  0319 03/15/25  1748   GLUCOSE RANDOM mg/dL 81 75 86               Results from last 7 days   Lab Units 03/18/25  0641 03/17/25  0319 03/16/25  0959 03/15/25  1748   PROTIME seconds 39.1* 60.3* 60.0* 48.8*   INR  4.15* 7.40* 7.34* 5.58*   PTT seconds  --   --   --  70*         Results from last 7 days   Lab Units 03/15/25  1748   PROCALCITONIN ng/ml 0.16     Results from last 7 days   Lab Units 03/15/25  1748   LACTIC ACID mmol/L 2.0                 Results from last 7 days   Lab Units 03/17/25  0319   FERRITIN ng/mL 63   IRON ug/dL 76     Results from last 7 days   Lab Units 03/17/25  0319   TRANSFERRIN mg/dL <75*               Results from last 7 days   Lab Units 03/15/25  1748 03/15/25  1723   BLOOD CULTURE  No Growth at 48 hrs. No Growth at 48 hrs.                   Network Utilization Review Department  ATTENTION: Please call with any questions or concerns to [phone] and carefully listen to the prompts so that you are directed to the right person. All voicemails are confidential.   For Discharge needs, contact Care Management DC Support Team at [phone] opt. 2  Send all requests for admission clinical reviews, approved or denied determinations and any other requests to dedicated fax number below belonging to the campus where the patient is receiving treatment. List of dedicated fax numbers for the Facilities:  FACILITY NAME UR FAX NUMBER    ADMISSION DENIALS (Administrative/Medical Necessity) [phone]   DISCHARGE SUPPORT TEAM (NETWORK) [phone]   PARENT CHILD HEALTH (Maternity/NICU/Pediatrics) [phone]   [facility] [phone]   [facility] [phone]   [facility] [phone]   [facility] [phone]   [facility] [phone]   [facility] [phone]   [facility] [phone]   [facility] [phone]   [facility] [phone]   [facility] [phone]   [facility] [phone]   [facility] [phone]

## 2025-03-18 NOTE — ASSESSMENT & PLAN NOTE
Acute on chronic anemia likely multifactorial (chronic disease, gi losses) in the setting of supratherapeutic INR. Has seen Hematology rec'd IV iron/B12 in the past. Continue to monitor CBC, transfuse if necessary. Given no prior GI evaluation, will attempt to complete prior to discharge once INR <3

## 2025-03-18 NOTE — PROGRESS NOTES
Progress Note - Gastroenterology   Name: Mckenna Fischer 49 y.o. female I MRN: [id number]  Unit/Bed#: E5 -01 I Date of Admission: 3/15/2025   Date of Service: 3/18/2025 I Hospital Day: 1     Assessment & Plan  Liver cirrhosis (HCC)  Decompensated alcoholic cirrhosis of the liver (d/b ascites) likely related to alcohol. No alcohol use since liver disease diagnosis. ECHO 3/17/25 noted.    MELD 3.0: 23 at 3/18/2025  6:41 AM  MELD-Na: 25 at 3/18/2025  6:41 AM  Calculated from:  Serum Creatinine: 0.67 mg/dL (Using min of 1 mg/dL) at 3/18/2025  6:41 AM  Serum Sodium: 140 mmol/L (Using max of 137 mmol/L) at 3/18/2025  6:41 AM  Total Bilirubin: 1.7 mg/dL at 3/18/2025  6:41 AM  Serum Albumin: 3.3 g/dL at 3/18/2025  6:41 AM  INR(ratio): 4.15 at 3/18/2025  6:41 AM  Age at listing (hypothetical): 49 years  Sex: Female at 3/18/2025  6:41 AM    MELD may be artificially elevated in the s/o supratherapeutic INR and coumadin.   EV screening status: none prior. EGD being arranged as OP but patient has cancelled several times. Will attempt prior to discharge once INR <1.5. If coumadin bridge needed, will defer bridge to primary team with heparin held 6 hours prior to endoscopy.    Ascites: Lasix 20 and andrez 50 at home. No ascites appreciated on exam. Monitor daily weight. Pt with poor dietary compliance, reviewed 2g Na diet recs.   HE: none and no Hx   HCC screening: Imaging and AFP q6mo. CT without hepatoma. AFP pending.   2g Na restriction; nutrition goal of 30cal / kg body weight and ~1.5g protein / kg body weight. Emphasized importance of dietary compliance.   alc abstinence, avoid NSAIDs, 2g limit of tylenol  Anemia  Acute on chronic anemia likely multifactorial (chronic disease, gi losses) in the setting of supratherapeutic INR. Has seen Hematology rec'd IV iron/B12 in the past. Continue to monitor CBC, transfuse if necessary. Given no prior GI evaluation, will attempt to complete prior to discharge once INR  "<3  Hemorrhoids  Intermittent rectal bleeding, unclear etiology. On Anusol. Will need lower endoscopy to evaluate.   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 Has filter in place. On Coumadin PTA, currently on hold in s/o supratherapeutic INR. Mgmt per SLIM  Supratherapeutic INR  [place] 2/2 poor PO intake. Status post Vit K x 2. AM INR pending    Abnormal CT findings  CT A/P suggesting SB dilation with anastomotic sutures and possible areas of tethering and tubular collection in R hemipelvis. Patient offers no complaints to suggest pSBO or appendicitis. No leukocytosis and remains afebrile.         Subjective   Patient denies lower abdominal pain. Has mild epigastric discomfort without reflux. Vitals stable. Reports brown BM this morning with red streaks noted in stool while wiping. No nausea or vomiting.    Vitals  Blood pressure 94/56, pulse (!) 54, temperature 98.3 °F (36.8 °C), temperature source Oral, resp. rate 18, height 5' 6\" (1.676 m), weight 98.9 kg (218 lb), last menstrual period 03/05/2025, SpO2 97%, not currently breastfeeding.  Physical Exam:     General Appearance:    Awake, alert, oriented x3, no distress, well developed, well    nourished   Head:    Normocephalic without obvious abnormality   Eyes:    PERRL, conjunctiva/corneas clear, EOM's intact        Neck:   Supple, no adenopathy   Throat:   Mucous membranes moist   Lungs:     Clear to auscultation bilaterally, no wheezing or rhonchi   Heart:    Regular rate and rhythm, S1 and S2 normal, no murmur   Abdomen:     Soft, obese, mild epigastric ttp, non-distended. bowel sounds active. No masses, rebound or guarding.    Extremities:   Extremities wrapped   Psych  Derm:   Normal affect    No jaundice.    Neurologic:   CNII-XII grossly intact. Speech intact         Laboratory Studies  Results from last 7 days   Lab Units 03/18/25  0641 03/17/25  0319 03/16/25  0959 03/15/25  1748   WBC Thousand/uL 5.39 4.88  --  7.49 "   HEMOGLOBIN g/dL 9.7* 8.5*  --  11.2*   HEMATOCRIT % 28.0* 24.4*  --  32.1*   PLATELETS Thousands/uL 360 315  --  439*   INR  4.15* 7.40* 7.34* 5.58*     Results from last 7 days   Lab Units 03/18/25  0641 03/17/25  0319 03/15/25  1748   SODIUM mmol/L 140 139 134*   POTASSIUM mmol/L 3.6 3.8 4.4   CHLORIDE mmol/L 106 109* 102   CO2 mmol/L 29 25 25   BUN mg/dL 13 18 17   CREATININE mg/dL 0.67 0.71 0.99   CALCIUM mg/dL 8.0* 7.3* 7.5*   ALBUMIN g/dL 3.3* 2.6* 2.8*   TOTAL BILIRUBIN mg/dL 1.70* 0.57 0.72   ALK PHOS U/L 100 93 135*   ALT U/L 22 23 31   AST U/L 23 36 35         Imaging and Other Studies  CT A/P 3/17  No evidence of rectus sheath or psoas hematoma.     Multiple areas of small bowel dilatation with anastomotic sutures and possible areas of tethering. This may represent partial obstruction. Delayed imaging may be useful to see if oral contrast reaches the colon.     Tubular collection in the right hemipelvis of uncertain etiology. The appendix does extend to this area; however this could be related to prior collections of the right adnexa. Correlation with any acute abdominal pain is advised that would suggest   appendicitis.     Prior CT has been requested.     Bladder wall thickening should be correlated with urinalysis.    Inhouse Medications     Current Facility-Administered Medications:     acetaminophen (TYLENOL) tablet 650 mg, 650 mg, Oral, TID, 650 mg at 03/18/25 0812    albumin human (FLEXBUMIN) 25 % injection 25 g, 25 g, Intravenous, BID, 25 g at 03/17/25 2219    Cholecalciferol (VITAMIN D3) tablet 1,000 Units, 1,000 Units, Oral, Daily, 1,000 Units at 03/18/25 0813    cyanocobalamin (VITAMIN B-12) tablet 1,000 mcg, 1,000 mcg, Oral, Daily, 1,000 mcg at 03/18/25 0812    FLUoxetine (PROzac) capsule 20 mg, 20 mg, Oral, Daily, 20 mg at 03/18/25 0812    folic acid (FOLVITE) tablet 1 mg, 1 mg, Oral, Daily, 1 mg at 03/18/25 0812    furosemide (LASIX) injection 40 mg, 40 mg, Intravenous, BID (diuretic)     hydrocortisone (ANUSOL-HC) 2.5 % rectal cream, , Topical, BID, Given at 03/18/25 0814    melatonin tablet 9 mg, 9 mg, Oral, HS, 9 mg at 03/17/25 2108    methocarbamol (ROBAXIN) tablet 500 mg, 500 mg, Oral, BID PRN, 500 mg at 03/17/25 2111    metoclopramide (REGLAN) tablet 5 mg, 5 mg, Oral, 4x Daily, 5 mg at 03/18/25 0812    midodrine (PROAMATINE) tablet 10 mg, 10 mg, Oral, TID AC, 10 mg at 03/18/25 0609    ondansetron (ZOFRAN-ODT) dispersible tablet 8 mg, 8 mg, Oral, Q8H PRN    oxyCODONE (ROXICODONE) IR tablet 5 mg, 5 mg, Oral, Q6H PRN, 5 mg at 03/18/25 0610    pantoprazole (PROTONIX) EC tablet 40 mg, 40 mg, Oral, Early Morning, 40 mg at 03/18/25 0608    [Held by provider] spironolactone (ALDACTONE) tablet 50 mg, 50 mg, Oral, Daily    thiamine tablet 100 mg, 100 mg, Oral, Daily, 100 mg at 03/18/25 0812    Kimmie Barnhart PA-C  [facility]

## 2025-03-18 NOTE — ASSESSMENT & PLAN NOTE
Asymptomatic  Start midodrine 10 mg 3 times daily for now allow for blood pressure support  Likely in the setting of diuretic use

## 2025-03-18 NOTE — UTILIZATION REVIEW
NOTIFICATION OF INPATIENT ADMISSION   AUTHORIZATION REQUEST   SERVICING FACILITY:   [place] [address]  Tax ID: [id number]  NPI: [id number] ATTENDING PROVIDER:  Attending Name and NPI#: Cameron Cabrera Do [[id number]]  Address: [address] [place] [address]  Phone: [phone]   ADMISSION INFORMATION:  Place of Service: Inpatient [facility]  Place of Service Code: 21  Inpatient Admission Date/Time: 3/17/25  1:06 PM  Discharge Date/Time: No discharge date for patient encounter.  Admitting Diagnosis Code/Description:  Leg swelling [M79.89]  Lower extremity ulceration (HCC) [L97.909]  Supratherapeutic INR [R79.1]  Bilateral lower extremity edema [R60.0]  Hemorrhoids, unspecified hemorrhoid type [K64.9]     UTILIZATION REVIEW CONTACT:  Theodora Crawford, Utilization   Network Utilization Review Department  Phone: [phone]  Fax: [phone]  Email: [email or web address]  Contact for approvals/pending authorizations, clinical reviews, and discharge.     PHYSICIAN ADVISORY SERVICES:  Medical Necessity Denial & [employer] Review  Phone: [phone]  Fax: [phone]  Email: [email or web address]     DISCHARGE SUPPORT TEAM:  For Patients Discharge Needs & Updates  Phone: [phone] opt. 2 Fax: [phone]  Email: [email or web address]

## 2025-03-18 NOTE — ASSESSMENT & PLAN NOTE
Decompensated alcoholic cirrhosis of the liver (d/b ascites) likely related to alcohol. No alcohol use since liver disease diagnosis. ECHO 3/17/25 noted.    MELD 3.0: 23 at 3/18/2025  6:41 AM  MELD-Na: 25 at 3/18/2025  6:41 AM  Calculated from:  Serum Creatinine: 0.67 mg/dL (Using min of 1 mg/dL) at 3/18/2025  6:41 AM  Serum Sodium: 140 mmol/L (Using max of 137 mmol/L) at 3/18/2025  6:41 AM  Total Bilirubin: 1.7 mg/dL at 3/18/2025  6:41 AM  Serum Albumin: 3.3 g/dL at 3/18/2025  6:41 AM  INR(ratio): 4.15 at 3/18/2025  6:41 AM  Age at listing (hypothetical): 49 years  Sex: Female at 3/18/2025  6:41 AM    MELD may be artificially elevated in the s/o supratherapeutic INR and coumadin.   EV screening status: none prior. EGD being arranged as OP but patient has cancelled several times. Will attempt prior to discharge once INR <1.5. If coumadin bridge needed, will defer bridge to primary team with heparin held 6 hours prior to endoscopy.    Ascites: Lasix 20 and andrez 50 at home. No ascites appreciated on exam. Monitor daily weight. Pt with poor dietary compliance, reviewed 2g Na diet recs.   HE: none and no Hx   HCC screening: Imaging and AFP q6mo. CT without hepatoma. AFP pending.   2g Na restriction; nutrition goal of 30cal / kg body weight and ~1.5g protein / kg body weight. Emphasized importance of dietary compliance.   alc abstinence, avoid NSAIDs, 2g limit of tylenol

## 2025-03-18 NOTE — ASSESSMENT & PLAN NOTE
History of liver cirrhosis secondary to alcohol  MELD score cannot be calculated in the setting of Coumadin use  EGD and colonoscopy pending, previously canceled on multiple occasions  No known history of varices  Prior to admission on Aldactone 50 mg once daily as well as furosemide 20 mg daily and as needed  Continue localized follow-up with GI  Low-sodium diet-she is likely noncompliant  Will need outpatient GI follow-up

## 2025-03-19 ENCOUNTER — OFFICE VISIT (OUTPATIENT)
Dept: FAMILY MEDICINE CLINIC | Facility: CLINIC | Age: 50
End: 2025-03-19

## 2025-03-19 ENCOUNTER — TRANSITIONAL CARE MANAGEMENT (OUTPATIENT)
Dept: FAMILY MEDICINE CLINIC | Facility: CLINIC | Age: 50
End: 2025-03-19

## 2025-03-19 VITALS
RESPIRATION RATE: 18 BRPM | HEIGHT: 66 IN | WEIGHT: 226 LBS | SYSTOLIC BLOOD PRESSURE: 122 MMHG | HEART RATE: 90 BPM | BODY MASS INDEX: 36.32 KG/M2 | OXYGEN SATURATION: 99 % | TEMPERATURE: 97.3 F | DIASTOLIC BLOOD PRESSURE: 66 MMHG

## 2025-03-19 DIAGNOSIS — S37.13XS URETERAL LACERATION, SEQUELA: ICD-10-CM

## 2025-03-19 DIAGNOSIS — R79.1 SUPRATHERAPEUTIC INR: Primary | ICD-10-CM

## 2025-03-19 DIAGNOSIS — K64.8 OTHER HEMORRHOIDS: ICD-10-CM

## 2025-03-19 DIAGNOSIS — I89.0 LYMPHEDEMA: ICD-10-CM

## 2025-03-19 DIAGNOSIS — I95.9 ACUTE HYPOTENSION: ICD-10-CM

## 2025-03-19 DIAGNOSIS — F11.90 OPIOID USE: ICD-10-CM

## 2025-03-19 PROCEDURE — 99496 TRANSJ CARE MGMT HIGH F2F 7D: CPT

## 2025-03-19 RX ORDER — HYDROCORTISONE 25 MG/G
CREAM TOPICAL 2 TIMES DAILY
Qty: 28 G | Refills: 3 | Status: SHIPPED | OUTPATIENT
Start: 2025-03-19

## 2025-03-19 RX ORDER — OXYCODONE HYDROCHLORIDE 10 MG/1
10 TABLET ORAL 2 TIMES DAILY PRN
Qty: 30 TABLET | Refills: 0 | Status: SHIPPED | OUTPATIENT
Start: 2025-03-19 | End: 2025-03-27 | Stop reason: SDUPTHER

## 2025-03-19 NOTE — ASSESSMENT & PLAN NOTE
- Discussed side effect of continue steroid use   - Encourage appointment with GI/colorectal surgery for evaluation  Orders:    hydrocortisone (ANUSOL-HC) 2.5 % rectal cream; Apply topically 2 (two) times a day

## 2025-03-19 NOTE — UTILIZATION REVIEW
NOTIFICATION OF ADMISSION DISCHARGE   This is a Notification of Discharge from [facility]. Please be advised that this patient has been discharge from our facility. Below you will find the admission and discharge date and time including the patient’s disposition.   UTILIZATION REVIEW CONTACT:  Melissa Mcdaniels  Utilization   Network Utilization Review Department  Phone: [phone] x carefully listen to the prompts. All voicemails are confidential.  Email: [email or web address]     ADMISSION INFORMATION  PRESENTATION DATE: 3/15/2025  4:06 PM  OBERVATION ADMISSION DATE: 03/15/2025 1920  INPATIENT ADMISSION DATE: 3/17/25  1:06 PM   DISCHARGE DATE: 3/18/2025  2:37 PM   DISPOSITION:Home/Self Care    Network Utilization Review Department  ATTENTION: Please call with any questions or concerns to [phone] and carefully listen to the prompts so that you are directed to the right person. All voicemails are confidential.   For Discharge needs, contact Care Management DC Support Team at [phone] opt. 2  Send all requests for admission clinical reviews, approved or denied determinations and any other requests to dedicated fax number below belonging to the campus where the patient is receiving treatment. List of dedicated fax numbers for the Facilities:  FACILITY NAME UR FAX NUMBER   ADMISSION DENIALS (Administrative/Medical Necessity) [phone]   DISCHARGE SUPPORT TEAM ([facility]) [phone]   PARENT CHILD HEALTH (Maternity/NICU/Pediatrics) [phone]   [facility] [phone]   [facility] [phone]   [facility] [phone]   [facility] [phone]   [facility] [phone]   [facility] [phone]   [facility] [phone]   [facility]  [phone]   [facility] [phone]   [facility] [phone]   [facility] [phone]   [facility] [phone]

## 2025-03-19 NOTE — ASSESSMENT & PLAN NOTE
- PDMP reviewed, no red flag noted  Orders:    oxyCODONE (ROXICODONE) 10 MG TABS; Take 1 tablet (10 mg total) by mouth 2 (two) times a day as needed for moderate pain or severe pain Max Daily Amount: 20 mg
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
## 2025-03-20 LAB
DME PARACHUTE DELIVERY DATE REQUESTED: NORMAL
DME PARACHUTE ITEM DESCRIPTION: NORMAL
DME PARACHUTE ORDER STATUS: NORMAL
DME PARACHUTE SUPPLIER NAME: NORMAL
DME PARACHUTE SUPPLIER PHONE: NORMAL

## 2025-03-20 NOTE — ASSESSMENT & PLAN NOTE
- Vit K given in-patient   - INR 4.15 at discharge (3/18/25)  - Warfarin on hold   - pending follow up with clinical pharmacist for management  - Denied any bleeding  Orders:    Ambulatory referral to clinical pharmacy; Future

## 2025-03-20 NOTE — ASSESSMENT & PLAN NOTE
- Treated with Albumin in-patient   - Ulcer/wound    - Left leg re-wrapped during visit  - Pending wound care and lymphedema appointment   [facility] request for assistance with ambulation

## 2025-03-21 DIAGNOSIS — R10.9 NONSPECIFIC ABDOMINAL PAIN: ICD-10-CM

## 2025-03-21 LAB
BACTERIA BLD CULT: NORMAL
BACTERIA BLD CULT: NORMAL

## 2025-03-24 RX ORDER — METHOCARBAMOL 500 MG/1
500 TABLET, FILM COATED ORAL 2 TIMES DAILY PRN
Qty: 60 TABLET | Refills: 1 | Status: SHIPPED | OUTPATIENT
Start: 2025-03-24

## 2025-03-26 DIAGNOSIS — Z86.718 HISTORY OF DVT (DEEP VEIN THROMBOSIS): Primary | Chronic | ICD-10-CM

## 2025-03-26 NOTE — PROGRESS NOTES
Wen message sent     Troy Andres,   I placed an order for INR lab. We need to recheck your level to make sure that your INR has been regulated and adjust your dose.   Please get the lab done as soon as possible.   Thank you.

## 2025-03-27 ENCOUNTER — OFFICE VISIT (OUTPATIENT)
Dept: FAMILY MEDICINE CLINIC | Facility: CLINIC | Age: 50
End: 2025-03-27

## 2025-03-27 ENCOUNTER — OFFICE VISIT (OUTPATIENT)
Dept: WOUND CARE | Facility: CLINIC | Age: 50
End: 2025-03-27
Payer: COMMERCIAL

## 2025-03-27 VITALS
HEIGHT: 66 IN | WEIGHT: 215 LBS | SYSTOLIC BLOOD PRESSURE: 120 MMHG | BODY MASS INDEX: 34.55 KG/M2 | TEMPERATURE: 96.8 F | HEART RATE: 72 BPM | RESPIRATION RATE: 16 BRPM | DIASTOLIC BLOOD PRESSURE: 72 MMHG

## 2025-03-27 VITALS
TEMPERATURE: 98.6 F | SYSTOLIC BLOOD PRESSURE: 130 MMHG | RESPIRATION RATE: 18 BRPM | HEART RATE: 59 BPM | BODY MASS INDEX: 36.48 KG/M2 | WEIGHT: 227 LBS | HEIGHT: 66 IN | OXYGEN SATURATION: 99 % | DIASTOLIC BLOOD PRESSURE: 70 MMHG

## 2025-03-27 DIAGNOSIS — L97.912 NON-PRESSURE CHRONIC ULCER OF RIGHT LOWER LEG WITH FAT LAYER EXPOSED (HCC): ICD-10-CM

## 2025-03-27 DIAGNOSIS — R79.1 SUPRATHERAPEUTIC INR: ICD-10-CM

## 2025-03-27 DIAGNOSIS — F43.10 POSTTRAUMATIC STRESS DISORDER: ICD-10-CM

## 2025-03-27 DIAGNOSIS — K74.60 LIVER CIRRHOSIS (HCC): ICD-10-CM

## 2025-03-27 DIAGNOSIS — S37.13XS URETERAL LACERATION, SEQUELA: ICD-10-CM

## 2025-03-27 DIAGNOSIS — K70.10 ALCOHOLIC HEPATITIS WITHOUT ASCITES: ICD-10-CM

## 2025-03-27 DIAGNOSIS — D68.61 ANTIPHOSPHOLIPID SYNDROME (HCC): Chronic | ICD-10-CM

## 2025-03-27 DIAGNOSIS — I87.311 CHRONIC VENOUS HYPERTENSION (IDIOPATHIC) WITH ULCER OF RIGHT LOWER EXTREMITY (HCC): ICD-10-CM

## 2025-03-27 DIAGNOSIS — I89.0 LYMPHEDEMA: Chronic | ICD-10-CM

## 2025-03-27 DIAGNOSIS — I95.9 HYPOTENSION, UNSPECIFIED HYPOTENSION TYPE: ICD-10-CM

## 2025-03-27 DIAGNOSIS — F11.90 OPIOID USE: ICD-10-CM

## 2025-03-27 DIAGNOSIS — I87.312 CHRONIC VENOUS HYPERTENSION (IDIOPATHIC) WITH ULCER OF LEFT LOWER EXTREMITY (CODE) (HCC): ICD-10-CM

## 2025-03-27 DIAGNOSIS — M79.89 SWELLING OF LOWER EXTREMITY: ICD-10-CM

## 2025-03-27 DIAGNOSIS — E55.9 VITAMIN D DEFICIENCY: ICD-10-CM

## 2025-03-27 DIAGNOSIS — I87.311 CHRONIC VENOUS HYPERTENSION (IDIOPATHIC) WITH ULCER OF RIGHT LOWER EXTREMITY (CODE) (HCC): Primary | ICD-10-CM

## 2025-03-27 DIAGNOSIS — L97.922 NON-PRESSURE CHRONIC ULCER OF LEFT LOWER LEG WITH FAT LAYER EXPOSED (HCC): ICD-10-CM

## 2025-03-27 DIAGNOSIS — I89.0 LYMPHEDEMA: ICD-10-CM

## 2025-03-27 DIAGNOSIS — L97.919 CHRONIC VENOUS HYPERTENSION (IDIOPATHIC) WITH ULCER OF RIGHT LOWER EXTREMITY (HCC): ICD-10-CM

## 2025-03-27 DIAGNOSIS — E51.9 THIAMINE DEFICIENCY: ICD-10-CM

## 2025-03-27 DIAGNOSIS — K64.9 HEMORRHOIDS, UNSPECIFIED HEMORRHOID TYPE: Primary | ICD-10-CM

## 2025-03-27 DIAGNOSIS — F10.10 ALCOHOL ABUSE: ICD-10-CM

## 2025-03-27 DIAGNOSIS — K70.11 ALCOHOLIC HEPATITIS WITH ASCITES: ICD-10-CM

## 2025-03-27 DIAGNOSIS — E53.8 VITAMIN B 12 DEFICIENCY: ICD-10-CM

## 2025-03-27 PROCEDURE — 97597 DBRDMT OPN WND 1ST 20 CM/<: CPT | Performed by: NURSE PRACTITIONER

## 2025-03-27 PROCEDURE — 99204 OFFICE O/P NEW MOD 45 MIN: CPT | Performed by: NURSE PRACTITIONER

## 2025-03-27 PROCEDURE — 97598 DBRDMT OPN WND ADDL 20CM/<: CPT | Performed by: NURSE PRACTITIONER

## 2025-03-27 RX ORDER — WARFARIN SODIUM 5 MG/1
TABLET ORAL
Qty: 30 TABLET | Refills: 1 | Status: SHIPPED | OUTPATIENT
Start: 2025-03-27

## 2025-03-27 RX ORDER — PANTOPRAZOLE SODIUM 40 MG/1
40 TABLET, DELAYED RELEASE ORAL DAILY
Qty: 90 TABLET | Refills: 1 | Status: SHIPPED | OUTPATIENT
Start: 2025-03-27

## 2025-03-27 RX ORDER — LIDOCAINE 40 MG/G
CREAM TOPICAL ONCE
Status: COMPLETED | OUTPATIENT
Start: 2025-03-27 | End: 2025-03-27

## 2025-03-27 RX ORDER — FOLIC ACID 1 MG/1
1 TABLET ORAL DAILY
Qty: 90 TABLET | Refills: 1 | Status: SHIPPED | OUTPATIENT
Start: 2025-03-27

## 2025-03-27 RX ORDER — FUROSEMIDE 20 MG/1
20 TABLET ORAL DAILY
Qty: 30 TABLET | Refills: 1 | Status: SHIPPED | OUTPATIENT
Start: 2025-03-27

## 2025-03-27 RX ORDER — OXYCODONE HYDROCHLORIDE 10 MG/1
10 TABLET ORAL 2 TIMES DAILY PRN
Qty: 30 TABLET | Refills: 0 | Status: SHIPPED | OUTPATIENT
Start: 2025-03-27

## 2025-03-27 RX ORDER — SPIRONOLACTONE 50 MG/1
50 TABLET, FILM COATED ORAL DAILY
Qty: 90 TABLET | Refills: 1 | Status: SHIPPED | OUTPATIENT
Start: 2025-03-27

## 2025-03-27 RX ORDER — METHION/INOS/CHOL BT/B COM/LIV 110MG-86MG
100 CAPSULE ORAL DAILY
Qty: 90 TABLET | Refills: 1 | Status: SHIPPED | OUTPATIENT
Start: 2025-03-27

## 2025-03-27 RX ADMIN — LIDOCAINE: 40 CREAM TOPICAL at 15:37

## 2025-03-27 NOTE — PROGRESS NOTES
Name: Mckenna Fischer      : 1975      MRN: [id number]  Encounter Provider: JANES Grimaldo  Encounter Date: 3/27/2025   Encounter department: [facility]  :  Assessment & Plan  Chronic venous hypertension (idiopathic) with ulcer of right lower extremity (CODE) (HCC)    Orders:    Wound cleansing and dressings; Future    lidocaine (LMX) 4 % cream    Non-pressure chronic ulcer of right lower leg with fat layer exposed (HCC)         Chronic venous hypertension (idiopathic) with ulcer of left lower extremity (CODE) (HCC)    Orders:    Wound cleansing and dressings; Future    lidocaine (LMX) 4 % cream    Non-pressure chronic ulcer of left lower leg with fat layer exposed (HCC)         Lymphedema       Plan:  1.  Initial visit.  Wounds debrided.  Patient has full-thickness venous ulcers of her bilateral lower legs not showing any signs or symptoms of infection.  2.  ABIs obtained in wound center today: 1.02 bilaterally  3.  Will have PolyMem Ag applied to wounds covered with ABDs and secured with Coflex lite wraps for compression.  Counseled patient to not get dressings wet while showering.  4.  Counseled patient on importance of trying to increase her protein intake to enhance wound healing.  5.  Patient will follow-up in 1 week      History of Present Illness   Chief Complaint   Patient presents with    New Patient Visit     New patient visit for wounds to BL ankles & belly button.  Pt has history of lymphedema, developed wounds to BLE about 3 weeks.  Pt feels wound to belly button has been healing but has also been present for 3 weeks.    Here for wound follow up.  Patient is a 49-year-old female who presents to the  wound center as a new patient for venous ulcers of her bilateral lower legs.  Patient reports her wounds have been present for approximately the past 3 weeks.  She reports she has a history of lymphedema of her bilateral lower legs, but denies a prior  "history of lower leg wounds.  She reports her wounds have been draining a moderate amount of drainage.  Patient also complains prior to her wounds developing she noticed increased lower extremity edema.  She denies ever receiving lymphedema treatment.  Patient has been managing her wounds with antibiotic ointment and dry gauze changing her dressings daily.  She is currently using Ace bandages for compression therapy.  Patient also reports she has had a recent lack of appetite ever since her wounds developed.  Patient admits there have been days where she has not eaten anything.  Patient is not diabetic.  She denies any pain, fevers, or chills.      Objective   /72   Pulse 72   Temp (!) 96.8 °F (36 °C) (Tympanic)   Resp 16   Ht 5' 6\" (1.676 m)   Wt 97.5 kg (215 lb)   LMP 03/05/2025 (Approximate)   BMI 34.70 kg/m²     Physical Exam  Vitals and nursing note reviewed.   Constitutional:       General: She is not in acute distress.     Appearance: Normal appearance. She is obese.   HENT:      Head: Normocephalic and atraumatic.   Eyes:      General:         Right eye: No discharge.         Left eye: No discharge.   Pulmonary:      Effort: Pulmonary effort is normal. No respiratory distress.   Musculoskeletal:      Cervical back: Normal range of motion and neck supple. No rigidity.      Right lower leg: Edema present.      Left lower leg: Edema present.      Comments: Bilateral lower extremity edema, LLE > RLE   Skin:     General: Skin is warm and dry.      Findings: Wound present. No erythema.          Neurological:      General: No focal deficit present.      Mental Status: She is alert and oriented to person, place, and time. Mental status is at baseline.   Psychiatric:         Mood and Affect: Mood normal.         Behavior: Behavior normal.         Thought Content: Thought content normal.         Judgment: Judgment normal.       Wound 03/27/25 Vascular Ulcer Venous Leg Right;Medial (Active)   Wound " "Description [place]cul[place];Pale 03/27/25 1429   Non-staged Wound Description Full thickness 03/27/25 1429   Wound Length (cm) 0.9 cm 03/27/25 1429   Wound Width (cm) 1.1 cm 03/27/25 1429   Wound Depth (cm) 0.1 cm 03/27/25 1429   Wound Surface Area (cm^2) 0.99 cm^2 03/27/25 1429   Wound Volume (cm^3) 0.099 cm^3 03/27/25 1429   Calculated Wound Volume (cm^3) 0.1 cm^3 03/27/25 1429   Drainage Amount Moderate 03/27/25 1429   Drainage Description Serosanguineous;Yellow 03/27/25 1429   Adali-wound Assessment Hyperpigmented;Edema;Dry;Intact 03/27/25 1429   Dressing Status Removed 03/27/25 1429       Wound 03/27/25 Vascular Ulcer Leg Left;Medial (Active)   Wound Description Epithelialization;Pink;Yellow;Tan 03/27/25 1431   Non-staged Wound Description Full thickness 03/27/25 1431   Wound Length (cm) 4.3 cm 03/27/25 1431   Wound Width (cm) 9.6 cm 03/27/25 1431   Wound Depth (cm) 0.1 cm 03/27/25 1431   Wound Surface Area (cm^2) 41.28 cm^2 03/27/25 1431   Wound Volume (cm^3) 4.128 cm^3 03/27/25 1431   Calculated Wound Volume (cm^3) 4.13 cm^3 03/27/25 1431   Drainage Amount Large 03/27/25 1431   Drainage Description Serosanguineous;Yellow 03/27/25 1431   Adali-wound Assessment Hyperpigmented;Edema;Intact;Dry 03/27/25 1431   Dressing Status Removed 03/27/25 1431       Debridement   Wound 03/27/25 Vascular Ulcer Venous Leg Right;Medial     Date/Time: 3/27/2025 2:00 PM  Universal Protocol:  procedure performed by consultantConsent: Written consent obtained.  Consent given by: patient  Time out: Immediately prior to procedure a \"time out\" was called to verify the correct patient, procedure, equipment, support staff and site/side marked as required.  Timeout called at: 3/27/2025 4:59 PM.  Patient identity confirmed: verbally with patient    Debridement Details  Performed by: NP  Debridement type: selective  Pain control: lidocaine 4%    Post-debridement measurements  Length (cm): 0.9  Width (cm): 1.1  Depth (cm): 0.1  Percent debrided: " "100%  Surface Area (cm^2): 0.99  Area Debrided (cm^2): 0.99  Volume (cm^3): 0.1    Devitalized tissue debrided: biofilm, fibrin and slough  Instrument(s) utilized: curette  Bleeding: small  Hemostasis obtained with: pressure  Procedural pain (0-10): 0  Post-procedural pain: 0   Response to treatment: procedure was tolerated well    Debridement   Wound 03/27/25 Vascular Ulcer Leg Left;Medial     Date/Time: 3/27/2025 2:00 PM  Universal Protocol:  procedure performed by consultantConsent: Written consent obtained.  Consent given by: patient  Time out: Immediately prior to procedure a \"time out\" was called to verify the correct patient, procedure, equipment, support staff and site/side marked as required.  Timeout called at: 3/27/2025 5:00 PM.  Patient identity confirmed: verbally with patient    Debridement Details  Performed by: NP  Debridement type: selective  Pain control: lidocaine 4%    Post-debridement measurements  Length (cm): 4.3  Width (cm): 9.6  Depth (cm): 0.1  Percent debrided: 50%  Surface Area (cm^2): 41.28  Area Debrided (cm^2): 20.64  Volume (cm^3): 4.13    Devitalized tissue debrided: biofilm, fibrin and slough  Instrument(s) utilized: curette  Bleeding: small  Hemostasis obtained with: pressure  Procedural pain (0-10): 0  Post-procedural pain: 0   Response to treatment: procedure was tolerated well               "

## 2025-03-27 NOTE — ASSESSMENT & PLAN NOTE
Plan:  1.  Initial visit.  Wounds debrided.  Patient has full-thickness venous ulcers of her bilateral lower legs not showing any signs or symptoms of infection.  2.  ABIs obtained in wound center today: 1.02 bilaterally  3.  Will have PolyMem Ag applied to wounds covered with ABDs and secured with Coflex lite wraps for compression.  Counseled patient to not get dressings wet while showering.  4.  Counseled patient on importance of trying to increase her protein intake to enhance wound healing.  5.  Patient will follow-up in 1 week

## 2025-03-27 NOTE — ASSESSMENT & PLAN NOTE
Orders:    Cholecalciferol (D3-1000) 1,000 units tablet; Take 1 tablet (1,000 Units total) by mouth daily

## 2025-03-27 NOTE — PATIENT INSTRUCTIONS
Orders Placed This Encounter   Procedures    Wound cleansing and dressings     Wash your hands with soap and water.    Remove old dressing, discard into plastic bag and place in trash.    Cleanse the wound with Mild Soap & Water prior to applying a clean dressing.   Do not use tissue or cotton balls. Do not scrub the wound. Pat dry using gauze.    Shower, only with protection.  Please keep your dressings dry.  If they do get wet it is important to remove them & contact the wound center.     Apply Polymem Max Ag to the wound.  Cover with abd pad  Secure with rolled gauze & tape.  COFLEX LITE for gentle compression BLE  Change dressing weekly @ wound center.     Coflex Lite Multi-layer compression wrap Instructions  Keep compression wrap/wraps clean and dry. If wraps are too tight and you experience numbness/tingling, call the wound center. If after hours, remove wraps or proceed to nearest E.R. and call wound center in AM.  Avoid prolonged standing in one place.  Elevate leg(s) above the level of the heart when sitting or as much as possible.   Wrap will be changed 1 x weekly      Please try to consume 3-4 servings of protein daily.   - Each serving of protein should contain about 30 mg protein.   - Good sources of protein include, lean meats (fish, chicken, etc), eggs, dairy products (yogurt, cheese), tofu, legumes (chickpeas, lentils, peas, black beans), nuts (cashew, walnut, peanuts, etc), & quinoa.     Standing Status:   Future     Expiration Date:   4/3/2025

## 2025-03-27 NOTE — ASSESSMENT & PLAN NOTE
-Midodrine 10 mg 3 times daily was started during recent hospital stay.  However, patient reports she has been checking her blood pressure daily and it has been within 130s/70s-80s range, so she has not been taking the medication.  - Blood pressure stable today.  Advised patient she could hold midodrine for now, but recommend close monitoring of blood pressure.  If blood pressure decreases, recommend restarting midodrine.

## 2025-03-27 NOTE — PATIENT INSTRUCTIONS
[facility]- Please to reschedule EGD and colonoscopy. Thanks!   Phone Number: [phone]    [facility] - To discuss hemorrhoids   Please contact us at [phone] to schedule your appointment.

## 2025-03-27 NOTE — ASSESSMENT & PLAN NOTE
-Little relief with hydrocortisone 2.5% rectal cream.  - Will prescribe nifedipine-lidocaine rectal ointment.  - Will refer to colorectal surgery for further evaluation and management.  Orders:    NIFEdipine 0.3%-lidocaine 5% rectal ointment; Apply 1 Application topically every 4 (four) hours as needed for discomfort or pain Apply a small amount to anal fissure    Ambulatory Referral to Colorectal Surgery; Future
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
## 2025-03-27 NOTE — ASSESSMENT & PLAN NOTE
-Patient had held Coumadin since hospital discharge on 3/18/2025.  Patient was nervous about still not taking any movement and so she restarted Coumadin 5 mg daily on 3/24/2025.  - Advised to complete updated PT/INR at earliest convenience.  - Continue follow-up with clinical pharmacist for Coumadin monitoring/titration.

## 2025-03-28 PROBLEM — L97.919 CHRONIC VENOUS HYPERTENSION (IDIOPATHIC) WITH ULCER OF RIGHT LOWER EXTREMITY (HCC): Status: ACTIVE | Noted: 2025-03-28

## 2025-03-28 PROBLEM — L97.919 CHRONIC VENOUS HYPERTENSION (IDIOPATHIC) WITH ULCER OF RIGHT LOWER EXTREMITY (HCC): Chronic | Status: ACTIVE | Noted: 2025-03-28

## 2025-03-28 PROBLEM — I87.311 CHRONIC VENOUS HYPERTENSION (IDIOPATHIC) WITH ULCER OF RIGHT LOWER EXTREMITY (HCC): Status: ACTIVE | Noted: 2025-03-28

## 2025-03-28 PROBLEM — I89.0 LYMPHEDEMA: Chronic | Status: ACTIVE | Noted: 2025-03-15

## 2025-03-28 PROBLEM — I87.311 CHRONIC VENOUS HYPERTENSION (IDIOPATHIC) WITH ULCER OF RIGHT LOWER EXTREMITY (HCC): Chronic | Status: ACTIVE | Noted: 2025-03-28

## 2025-03-28 RX ORDER — FUROSEMIDE 20 MG/1
20 TABLET ORAL DAILY
Qty: 90 TABLET | OUTPATIENT
Start: 2025-03-28

## 2025-03-28 NOTE — ASSESSMENT & PLAN NOTE
-Patient previously referred to lymphedema clinic.  Patient has yet to establish.  Provided patient with phone number and advised to call to schedule appointment.  -Continue follow-up with wound care as scheduled.

## 2025-03-28 NOTE — ASSESSMENT & PLAN NOTE
-Reviewed wound care note from 3/27/2025.  Continue close follow-up with wound care as scheduled.

## 2025-03-31 ENCOUNTER — TELEPHONE (OUTPATIENT)
Dept: GASTROENTEROLOGY | Facility: MEDICAL CENTER | Age: 50
End: 2025-03-31

## 2025-03-31 ENCOUNTER — TELEPHONE (OUTPATIENT)
Age: 50
End: 2025-03-31

## 2025-03-31 NOTE — TELEPHONE ENCOUNTER
Patient is already scheduled for scopes on April 8.  I would prefer that she keep her EGD and colonoscopy as scheduled (as the patient was seen in August )but will forward to endoscopist to see if he is okay with keeping this appointment.  Thank you

## 2025-03-31 NOTE — TELEPHONE ENCOUNTER
Pt was schedule for her colon/EGD in August if 2024 and had several R/S's before no showing to the scope in November. She is calling back now to schedule ASAP due to rectal bleeding. Pt takes Coumadin which was previously approved to hold 5 days. Pt was also in the ED on 03.15.25 for the same symptom of rectal bleeding and she says they did keep her over night. I advised that, because we haven't seen her in a few months on top of the ED visit, we may need an OV for her but she would prefer to just get the proc done. I let her know I would need to reach out to the Providers team and have them advise, she asked me to book her in the first available ,in order to hold it for her while I check. Please advise?

## 2025-03-31 NOTE — TELEPHONE ENCOUNTER
03/31/25  Screened by: Miguel Angel Pena    Referring Provider     Pre- Screening:     There is no height or weight on file to calculate BMI.  Has patient been referred for a routine screening Colonoscopy? no  Is the patient between 45-75 years old? yes      Previous Colonoscopy no    Does the patient want to see a Gastroenterologist prior to their procedure OR are they having any GI symptoms? yes    Has the patient been hospitalized or had abdominal surgery in the past 6 months? yes    Does the patient use supplemental oxygen? no    Does the patient take Coumadin, Lovenox, Plavix, Elliquis, Xarelto, or other blood thinning medication? yes    Has the patient had a stroke, cardiac event, or stent placed in the past year? no    If patient is between 45yrs - 49yrs, please advise patient that we will have to confirm benefits & coverage with their insurance company for a routine screening colonoscopy.

## 2025-03-31 NOTE — TELEPHONE ENCOUNTER
Scheduled date of colonoscopy (as of today):04.08.25    Physician performing colonoscopy:Shukri    Location of colonoscopy:    Bowel prep reviewed with patient:Akira    Instructions reviewed with patient by:Lsims and sent to chart     Clearances: Coumadin

## 2025-03-31 NOTE — TELEPHONE ENCOUNTER
Our mutual patient, Mckenna Fischer, is scheduled for the following   procedure: Colonoscopy and EGD   On: April 8   With: Dr. Watt    Mckenna Fischer is taking the following blood thinner: Coumadin      Can this be stopped 5 days prior to the procedure?         Thank you,  Breanne Aaron [facility]

## 2025-04-01 NOTE — TELEPHONE ENCOUNTER
Spoke with patient to let her know her coumadin hold has been approved and to hold it 5 days prior to her procedure   Yes

## 2025-04-02 ENCOUNTER — TELEMEDICINE (OUTPATIENT)
Dept: FAMILY MEDICINE CLINIC | Facility: CLINIC | Age: 50
End: 2025-04-02

## 2025-04-02 DIAGNOSIS — Z12.11 SCREENING FOR COLON CANCER: Primary | ICD-10-CM

## 2025-04-02 DIAGNOSIS — R79.1 SUPRATHERAPEUTIC INR: ICD-10-CM

## 2025-04-02 PROCEDURE — PBNCHG PB NO CHARGE PLACEHOLDER: Performed by: PHARMACIST

## 2025-04-02 NOTE — PROGRESS NOTES
[facility] [place]  [address] [place] [address], [address]  [facility] [address] [phone]  Clinical Pharmacy Anticoagulation Consultation  Encounter provider: Emanuel Gardner, Pharmacist    Spoke to pt via phone, she is agreeable to obtain INR today, check back    5:31 PM pt still didn't get INR, states she will go tomorrow    Ck back tomorrow (see note geo)    Emanuel Gardner, PharmD, BCACP  Ambulatory Care Clinical Pharmacist

## 2025-04-02 NOTE — TELEPHONE ENCOUNTER
----- Message from Monalisa CERDA sent at 4/2/2025 12:03 PM EDT -----  Dear Clinical Staff:    Patient Mckenna Fischer is scheduled for Colonoscopy/EGD on 4/8/25 at [facility] with Dr. Christopher Watt.  Ms. Fischer states that her pharmacy has not received an Rx for Golytely bowel prep.  Please forward Rx for Golytely to patient's pharmacy listed in medical chart.    Thank you!    Monalisa

## 2025-04-07 ENCOUNTER — ANTICOAG VISIT (OUTPATIENT)
Dept: FAMILY MEDICINE CLINIC | Facility: CLINIC | Age: 50
End: 2025-04-07

## 2025-04-07 RX ORDER — SODIUM CHLORIDE, SODIUM LACTATE, POTASSIUM CHLORIDE, CALCIUM CHLORIDE 600; 310; 30; 20 MG/100ML; MG/100ML; MG/100ML; MG/100ML
125 INJECTION, SOLUTION INTRAVENOUS CONTINUOUS
OUTPATIENT
Start: 2025-04-07

## 2025-04-07 NOTE — PROGRESS NOTES
[facility], [address]  [facility] [address] [phone]  Clinical Pharmacy Anticoagulation Consultation  Encounter provider: Emanuel Gardner, Pharmacist     spoke via phone      Assessment/Plan  INR: 4.7 (4/4/25) Supratherapeutic INR for goal of 2.0-3.0  Current warfarin dose: 7.5 mg of warfarin daily   New dose: Patient is holding warfarin right now  She is currently admitted at [facility] due to renal and hepatic problems  Recheck INR in 1 week post hosp d/c  Ck back 4/16/25    Subjective  Indication: Indication: hx of multiple DVT, antiphospholipid syndrome   Bleeding signs/symptoms: no  Thrombosis signs/symptoms: no    Missed doses: no  Medication changes: no  Dietary changes: no  Bacterial/viral infection: no  Recent alcohol consumption: no  Other concerns: no    Objective  Lab Results   Component Value Date/Time    INR 4.7 04/04/2025 12:04 PM    INR 4.15 (H) 03/18/2025 06:41 AM    INR 7.40 (HH) 03/17/2025 03:19 AM    INR 7.34 (HH) 03/16/2025 09:59 AM    INR 2.1 03/28/2024 03:17 AM    INR 1.8 03/27/2024 06:07 AM    HGB 9.7 (L) 03/18/2025 06:41 AM    HGB 8.5 (L) 03/17/2025 03:19 AM    HGB 11.2 (L) 03/15/2025 05:48 PM    HCT 28.0 (L) 03/18/2025 06:41 AM    HCT 24.4 (L) 03/17/2025 03:19 AM    HCT 32.1 (L) 03/15/2025 05:48 PM    CREATININE 1.18 (H) 04/04/2025 12:04 PM    CREATININE 0.67 03/18/2025 06:41 AM    CREATININE 0.71 03/17/2025 03:19 AM    CREATININE 0.99 03/15/2025 05:48 PM    CREATININE 0.67 03/29/2024 04:19 AM    CREATININE 0.51 03/28/2024 03:17 AM    EGFR 57 (L) 04/04/2025 12:04 PM    EGFR 103 03/18/2025 06:41 AM    EGFR 100 03/17/2025 03:19 AM    EGFR 67 03/15/2025 05:48 PM    EGFR 108 03/29/2024 04:19 AM    EGFR 115 03/28/2024 03:17 AM    EGFR 77 06/26/2018 05:40 PM         Michael Wood    ----------------    Pharmacist Tracking Tool  Reason For Outreach: Embedded Pharmacist  Demographics:  Intervention Method: Phone  Type of  Intervention: Follow-Up  Topics Addressed: Anticoagulation  Pharmacologic Interventions: Dose or Frequency Adjusted and Prevent or Manage TROY  Non-Pharmacologic Interventions: Adherence addressed, Care coordination, Disease state education, Labs, and Medication Monitoring  Time:  Direct Patient Care:  10  mins  Care Coordination:  10  mins  Recommendation Recipient: Patient/Caregiver  Outcome: Accepted

## 2025-04-11 DIAGNOSIS — G47.00 INSOMNIA, UNSPECIFIED TYPE: ICD-10-CM

## 2025-04-11 DIAGNOSIS — F11.90 OPIOID USE: ICD-10-CM

## 2025-04-11 DIAGNOSIS — K70.10 ALCOHOLIC HEPATITIS WITHOUT ASCITES: ICD-10-CM

## 2025-04-11 DIAGNOSIS — E51.9 THIAMINE DEFICIENCY: ICD-10-CM

## 2025-04-11 DIAGNOSIS — F10.10 ALCOHOL ABUSE: ICD-10-CM

## 2025-04-11 DIAGNOSIS — S37.13XS URETERAL LACERATION, SEQUELA: ICD-10-CM

## 2025-04-11 DIAGNOSIS — K74.60 LIVER CIRRHOSIS (HCC): ICD-10-CM

## 2025-04-11 DIAGNOSIS — K70.11 ALCOHOLIC HEPATITIS WITH ASCITES: ICD-10-CM

## 2025-04-14 ENCOUNTER — TELEPHONE (OUTPATIENT)
Dept: FAMILY MEDICINE CLINIC | Facility: CLINIC | Age: 50
End: 2025-04-14

## 2025-04-14 RX ORDER — SPIRONOLACTONE 50 MG/1
50 TABLET, FILM COATED ORAL DAILY
Qty: 90 TABLET | Refills: 0 | OUTPATIENT
Start: 2025-04-14

## 2025-04-14 RX ORDER — PANTOPRAZOLE SODIUM 40 MG/1
40 TABLET, DELAYED RELEASE ORAL DAILY
Qty: 90 TABLET | Refills: 0 | OUTPATIENT
Start: 2025-04-14

## 2025-04-14 RX ORDER — FOLIC ACID 1 MG/1
1 TABLET ORAL DAILY
Qty: 90 TABLET | Refills: 0 | OUTPATIENT
Start: 2025-04-14

## 2025-04-14 RX ORDER — METHION/INOS/CHOL BT/B COM/LIV 110MG-86MG
100 CAPSULE ORAL DAILY
Qty: 90 TABLET | Refills: 0 | OUTPATIENT
Start: 2025-04-14

## 2025-04-14 RX ORDER — OXYCODONE HYDROCHLORIDE 10 MG/1
10 TABLET ORAL 2 TIMES DAILY PRN
Qty: 30 TABLET | Refills: 0 | Status: SHIPPED | OUTPATIENT
Start: 2025-04-14

## 2025-04-15 ENCOUNTER — APPOINTMENT (OUTPATIENT)
Dept: LAB | Facility: HOSPITAL | Age: 50
End: 2025-04-15

## 2025-04-15 ENCOUNTER — TRANSITIONAL CARE MANAGEMENT (OUTPATIENT)
Dept: FAMILY MEDICINE CLINIC | Facility: CLINIC | Age: 50
End: 2025-04-15

## 2025-04-15 DIAGNOSIS — D50.8 OTHER IRON DEFICIENCY ANEMIAS: ICD-10-CM

## 2025-04-15 DIAGNOSIS — Z86.718 HISTORY OF DVT (DEEP VEIN THROMBOSIS): Chronic | ICD-10-CM

## 2025-04-16 ENCOUNTER — APPOINTMENT (OUTPATIENT)
Dept: LAB | Facility: CLINIC | Age: 50
End: 2025-04-16

## 2025-04-16 ENCOUNTER — TELEPHONE (OUTPATIENT)
Dept: FAMILY MEDICINE CLINIC | Facility: CLINIC | Age: 50
End: 2025-04-16

## 2025-04-16 DIAGNOSIS — E53.8 FOLIC ACID DEFICIENCY: ICD-10-CM

## 2025-04-16 DIAGNOSIS — Z86.718 HISTORY OF DVT (DEEP VEIN THROMBOSIS): Primary | Chronic | ICD-10-CM

## 2025-04-16 DIAGNOSIS — E53.8 VITAMIN B 12 DEFICIENCY: ICD-10-CM

## 2025-04-16 DIAGNOSIS — D50.8 OTHER IRON DEFICIENCY ANEMIAS: ICD-10-CM

## 2025-04-16 NOTE — TELEPHONE ENCOUNTER
Patient went to lab today    It appears phlebotomist was unable to draw INR?     Unclear reason why - possibly only order used was used yesterday when fingerstick failed?     Called [phone]    Left detailed message for patient explaining that it is unclear, INR may have been a not obtained    Call tomorrow and follow-up to see if INR results    Emanuel Gardner, PharmD, [facility]  Ambulatory Care Clinical Pharmacist

## 2025-04-17 ENCOUNTER — TELEPHONE (OUTPATIENT)
Dept: FAMILY MEDICINE CLINIC | Facility: CLINIC | Age: 50
End: 2025-04-17

## 2025-04-17 NOTE — TELEPHONE ENCOUNTER
"Spoke to pt via phone on whether or not he obtained INR.  She attempted this,  However she was a \"hard stick\" and not able to be obtained.      Upon further conversation, it was confirmed that she is not taking her warfarin currently.  It was held by the hospital at discharge, they feel she likely does not have a strong enough indication for warfarin for her to be needed.    [facility]  \"To ensure resolution of supratherapeutic INR, patient will be sent for INR check on Monday 4/14.  Of note, patient has no strong, recent indication for Warfarin. There may be value in repeating investigation for supposed APLS diagnosis for which she is on Warfarin with suprathe difficult situation.  [place] INRs.\"    Difficult scenario.  If patient's diagnosis of antiphospholipid syndrome is valid, she definitely should restart warfarin.  It appears that her hemoglobin and platelet count was not too low, and she should be able to tolerate this therapy.  However, if the antiphospholipid syndrome was misdiagnosed, she may be able to take Eliquis instead, or even no anticoagulation at all.  Strongly recommend an evaluation from hematology.    Long conversation via phone about the nuances of above.  Joint decision made to restart warfarin.  She will take 7.5 mg tonight and tomorrow, followed by 5 mg daily.  She believes she was taking 5 mg daily consistently in February and March when her INR was therapeutic. Tracker updated.     She will obtain an INR on Tuesday, April 22.  Clinical pharmacist will call that same day for follow-up    Pharmacist Tracking Tool  Reason For Outreach: Embedded Pharmacist  Demographics:  Intervention Method: Phone  Type of Intervention: Follow-Up  Topics Addressed: Anticoagulation  Pharmacologic Interventions: Medication Initiation, Dose or Frequency Adjusted, and Prevent or Manage TROY  Non-Pharmacologic Interventions: Care coordination, Disease state education, Home Monitoring, Medication " Monitoring, and Medication/Device education  Time:  Direct Patient Care:  20  mins  Care Coordination:  10  mins  Recommendation Recipient: Patient/Caregiver  Outcome: Accepted    Emanuel Gardner, PharmD, BCACP  Ambulatory Care Clinical Pharmacist

## 2025-04-22 ENCOUNTER — ANTICOAG VISIT (OUTPATIENT)
Dept: FAMILY MEDICINE CLINIC | Facility: CLINIC | Age: 50
End: 2025-04-22

## 2025-04-22 NOTE — PROGRESS NOTES
[facility]  [address] [place] [address], [address]  [facility] [address]  [phone]  Clinical Pharmacy Anticoagulation Consultation  Encounter provider: Emanuel Gardner, Pharmacist    Spoke to patient via phone, she is currently admitted at [place] for cellulitis    Inpatient team is managing warfarin at this time    Check back early next week for plan    Emanuel Gardner, PharmD, BCACP  Ambulatory Care Clinical Pharmacist

## 2025-05-01 ENCOUNTER — TELEPHONE (OUTPATIENT)
Dept: FAMILY MEDICINE CLINIC | Facility: CLINIC | Age: 50
End: 2025-05-01

## 2025-05-01 NOTE — TELEPHONE ENCOUNTER
Spoke to patient via phone, she is still admitted at [facility] for cellulitis - now having renal issues      Inpatient team is managing warfarin at this time     Check back early next week for plan ~5/13    Pt notes she will call also      Emanuel Gardner, Rivas, BCACP  Ambulatory Care Clinical Pharmacist

## 2025-05-08 ENCOUNTER — TELEPHONE (OUTPATIENT)
Dept: FAMILY MEDICINE CLINIC | Facility: CLINIC | Age: 50
End: 2025-05-08

## 2025-05-08 NOTE — TELEPHONE ENCOUNTER
Spoke to family member/friend briefly via phone. Pt still admitted. Ck back 1 week    Emanuel Gardner, PharmD, BCACP  Ambulatory Care Clinical Pharmacist

## 2025-05-16 ENCOUNTER — OFFICE VISIT (OUTPATIENT)
Dept: FAMILY MEDICINE CLINIC | Facility: CLINIC | Age: 50
End: 2025-05-16

## 2025-05-16 VITALS
SYSTOLIC BLOOD PRESSURE: 126 MMHG | RESPIRATION RATE: 18 BRPM | HEIGHT: 66 IN | WEIGHT: 220 LBS | OXYGEN SATURATION: 97 % | HEART RATE: 76 BPM | BODY MASS INDEX: 35.36 KG/M2 | TEMPERATURE: 98.2 F | DIASTOLIC BLOOD PRESSURE: 74 MMHG

## 2025-05-16 DIAGNOSIS — F11.90 OPIOID USE: ICD-10-CM

## 2025-05-16 DIAGNOSIS — F10.10 ALCOHOL ABUSE: ICD-10-CM

## 2025-05-16 DIAGNOSIS — K70.11 ALCOHOLIC HEPATITIS WITH ASCITES: ICD-10-CM

## 2025-05-16 DIAGNOSIS — S37.13XS URETERAL LACERATION, SEQUELA: ICD-10-CM

## 2025-05-16 DIAGNOSIS — F43.10 POSTTRAUMATIC STRESS DISORDER: ICD-10-CM

## 2025-05-16 DIAGNOSIS — R10.9 NONSPECIFIC ABDOMINAL PAIN: ICD-10-CM

## 2025-05-16 DIAGNOSIS — M79.89 SWELLING OF LOWER EXTREMITY: ICD-10-CM

## 2025-05-16 DIAGNOSIS — M79.89 SWELLING OF LOWER EXTREMITY: Primary | ICD-10-CM

## 2025-05-16 DIAGNOSIS — G47.00 INSOMNIA, UNSPECIFIED TYPE: ICD-10-CM

## 2025-05-16 DIAGNOSIS — E51.9 THIAMINE DEFICIENCY: ICD-10-CM

## 2025-05-16 DIAGNOSIS — K70.10 ALCOHOLIC HEPATITIS WITHOUT ASCITES: ICD-10-CM

## 2025-05-16 DIAGNOSIS — E55.9 VITAMIN D DEFICIENCY: ICD-10-CM

## 2025-05-16 RX ORDER — METHOCARBAMOL 500 MG/1
500 TABLET, FILM COATED ORAL 2 TIMES DAILY PRN
Qty: 60 TABLET | Refills: 1 | Status: SHIPPED | OUTPATIENT
Start: 2025-05-16

## 2025-05-16 RX ORDER — ACETAMINOPHEN 500 MG
1000 TABLET ORAL 3 TIMES DAILY
Qty: 24 TABLET | Refills: 0 | Status: SHIPPED | OUTPATIENT
Start: 2025-05-16 | End: 2025-05-20
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
## 2025-05-19 ENCOUNTER — TELEPHONE (OUTPATIENT)
Dept: GASTROENTEROLOGY | Facility: CLINIC | Age: 50
End: 2025-05-19

## 2025-05-20 RX ORDER — OXYCODONE HYDROCHLORIDE 10 MG/1
10 TABLET ORAL 2 TIMES DAILY PRN
Qty: 30 TABLET | Refills: 0 | OUTPATIENT
Start: 2025-05-20

## 2025-05-20 RX ORDER — FOLIC ACID 1 MG/1
1 TABLET ORAL DAILY
Qty: 90 TABLET | Refills: 0 | OUTPATIENT
Start: 2025-05-20

## 2025-05-20 RX ORDER — FUROSEMIDE 20 MG/1
20 TABLET ORAL DAILY
Qty: 30 TABLET | Refills: 0 | OUTPATIENT
Start: 2025-05-20

## 2025-05-20 RX ORDER — METHION/INOS/CHOL BT/B COM/LIV 110MG-86MG
100 CAPSULE ORAL DAILY
Qty: 90 TABLET | Refills: 0 | OUTPATIENT
Start: 2025-05-20

## 2025-05-20 RX ORDER — SPIRONOLACTONE 50 MG/1
50 TABLET, FILM COATED ORAL DAILY
Qty: 90 TABLET | Refills: 0 | OUTPATIENT
Start: 2025-05-20

## 2025-05-20 RX ORDER — METHOCARBAMOL 500 MG/1
500 TABLET, FILM COATED ORAL 2 TIMES DAILY PRN
Qty: 60 TABLET | Refills: 0 | OUTPATIENT
Start: 2025-05-20

## 2025-05-20 NOTE — TELEPHONE ENCOUNTER
Per chart review, patient currently hospitalized at [facility]. Will review with patient at TCM visit as to which medication refills are appropriate.

## 2025-05-27 ENCOUNTER — TELEPHONE (OUTPATIENT)
Dept: FAMILY MEDICINE CLINIC | Facility: CLINIC | Age: 50
End: 2025-05-27

## 2025-05-27 NOTE — TELEPHONE ENCOUNTER
Doctor jason nichole from a medical center in [place] call regarding pt medication. Doctor had questions on pt not being on aspirin or olavix for her heart condition. Please call Doctor Cabrera back at [phone]

## 2025-05-28 ENCOUNTER — TELEPHONE (OUTPATIENT)
Dept: FAMILY MEDICINE CLINIC | Facility: CLINIC | Age: 50
End: 2025-05-28

## 2025-05-28 NOTE — TELEPHONE ENCOUNTER
Called patient to reschedule her appointment for today due pcp won't in office, left a vm stating I schedule her with Quinten. If patient call please assist.

## 2025-05-29 ENCOUNTER — TELEPHONE (OUTPATIENT)
Dept: FAMILY MEDICINE CLINIC | Facility: CLINIC | Age: 50
End: 2025-05-29

## 2025-05-29 NOTE — TELEPHONE ENCOUNTER
Chart reviewed. INR therapeutic this week 5/25 at 2.8    Being managed as inpatient. Check back 1-2 weeks    Emanuel Gardner, PharmD, BCACP  Ambulatory Care Clinical Pharmacist

## 2025-05-30 NOTE — TELEPHONE ENCOUNTER
"Patient daughter came into office today stating that pt is currently admitted at [facility]. They are asking if pcp can contact hospital at [phone] and request to talk with the \"[facility] team\". Daughter stating that pt is actively dying.       Doctor wants to know:  When was pt diagnosed deep vein thrombosis?  when pt had the stents place in heart?  Why was pt stared on cumadin?    Please contact medical center. Thanks!      "

## 2025-06-06 ENCOUNTER — TELEPHONE (OUTPATIENT)
Dept: FAMILY MEDICINE CLINIC | Facility: CLINIC | Age: 50
End: 2025-06-06

## 2025-06-06 NOTE — TELEPHONE ENCOUNTER
Received a call from pt daughter diana cortes, about pt being hospitalized in [facility]. Daughter is aware we are unable to provide any type of pt information due to her not having authorization.     She requested for a provider to please contact [facility] at [phone] and ask for DrAkil On call, Mckenna Fischer.     Daughter stated pt is in need of a liver transplant and also stated pt is dying.     I spoke with dr Jack. Dr. Jack will be looking over pt's chart prior making any phone call.

## 2025-06-06 NOTE — TELEPHONE ENCOUNTER
Dr. Grayson called me back stating patient is in ICU in [place], decompensating with staph scalded skin syndrome. They were in touch with [facility] doctors regarding liver transplant but is too sick to get transferred.     He told me the ICU has all the information they need regarding her past medical history. I told him to contact us if anything else is needed.

## 2025-06-06 NOTE — TELEPHONE ENCOUNTER
Tried calling the number [phone] for on-call doctor at [facility] but number had gone to a fax-type noise. So I called Dr. Rick Cardona at [phone] who told me he is a Resident who took care of Mckenna but [place]. So he gave me the number of his attending Dr. Grayson [phone]. So I called this number as well and left a message.

## 2025-06-11 ENCOUNTER — TELEPHONE (OUTPATIENT)
Dept: FAMILY MEDICINE CLINIC | Facility: CLINIC | Age: 50
End: 2025-06-11

## 2025-06-11 NOTE — TELEPHONE ENCOUNTER
Spoke with pt daughter via phone    She states she is currently admitted in hosp in [facility] - prognosis is very poor.     Physicians confirm will not survive this admission, moved to palliative care     Unforuntely, no reason to f/u for INR at this point.     Gave warm regards to family    D/c from coag service    Emanuel Gardner, PharmD, BCACP  Ambulatory Care Clinical Pharmacist

## 2025-06-12 ENCOUNTER — TELEPHONE (OUTPATIENT)
Age: 50
End: 2025-06-12

## 2025-06-12 NOTE — TELEPHONE ENCOUNTER
Called Pt to offer an appt for TT w/ Rafaelina Reyes.  Pt's daughter answer the phone and stated that Pt was not available at this time.

## 2025-06-26 DIAGNOSIS — E55.9 VITAMIN D DEFICIENCY: ICD-10-CM

## 2025-06-26 RX ORDER — MULTIVIT-MIN/IRON/FOLIC ACID/K 18-600-40
1000 CAPSULE ORAL DAILY
Qty: 90 TABLET | Refills: 0 | Status: SHIPPED | OUTPATIENT
Start: 2025-06-26

## 2025-07-02 ENCOUNTER — TELEPHONE (OUTPATIENT)
Dept: FAMILY MEDICINE CLINIC | Facility: CLINIC | Age: 50
End: 2025-07-02

## 2025-07-02 NOTE — TELEPHONE ENCOUNTER
Looks like pt discharged and readmitted?    non warfarin status.     [phone]    Spoke to daughter Breanne    Pt d/c from hosp Friday evening in [place]. Could not walk. Brought back to [facility] - currently admitted    Plans to keep heme/onc 7/11 appt. F/u on warfarin status at that time. Unclear if will be continued or not     Pharmacist Tracking Tool  Reason For Outreach: Embedded Pharmacist  Demographics:  Intervention Method: Phone  Type of Intervention: Follow-Up  Topics Addressed: Anticoagulation  Pharmacologic Interventions: Dose or Frequency Adjusted and Prevent or Manage TROY  Non-Pharmacologic Interventions: Care coordination, Disease state education, Medication Monitoring, and Medication/Device education  Time:  Direct Patient Care: 10 mins  Care Coordination: 10 mins  Recommendation Recipient: Patient/Caregiver  Outcome: Accepted     Emanuel Gardner, PharmD, BCACP  Ambulatory Care Clinical Pharmacist

## 2025-07-11 ENCOUNTER — TELEPHONE (OUTPATIENT)
Dept: HEMATOLOGY ONCOLOGY | Facility: CLINIC | Age: 50
End: 2025-07-11

## 2025-07-11 NOTE — TELEPHONE ENCOUNTER
Called pt's daughter in regards to the pt's missed appt on 7/11/25 for 12:40pm. Pt is currently in rehab so she was unable to make the call or reschedule. Daughter said she would call the [facility] back once the pt was out of rehab. [facility] # is .

## 2025-07-28 ENCOUNTER — TELEPHONE (OUTPATIENT)
Dept: FAMILY MEDICINE CLINIC | Facility: CLINIC | Age: 50
End: 2025-07-28

## 2025-08-04 ENCOUNTER — TELEPHONE (OUTPATIENT)
Dept: FAMILY MEDICINE CLINIC | Facility: CLINIC | Age: 50
End: 2025-08-04

## 2025-08-06 ENCOUNTER — TRANSITIONAL CARE MANAGEMENT (OUTPATIENT)
Dept: FAMILY MEDICINE CLINIC | Facility: CLINIC | Age: 50
End: 2025-08-06

## 2025-08-12 ENCOUNTER — APPOINTMENT (OUTPATIENT)
Dept: LAB | Facility: CLINIC | Age: 50
End: 2025-08-12
Payer: COMMERCIAL

## 2025-08-12 LAB
BASOPHILS # BLD AUTO: 0.01 THOUSANDS/ÂΜL (ref 0–0.1)
BASOPHILS NFR BLD AUTO: 0 % (ref 0–1)
EOSINOPHIL # BLD AUTO: 0.01 THOUSAND/ÂΜL (ref 0–0.61)
EOSINOPHIL NFR BLD AUTO: 0 % (ref 0–6)
ERYTHROCYTE [DISTWIDTH] IN BLOOD BY AUTOMATED COUNT: 16.4 % (ref 11.6–15.1)
FERRITIN SERPL-MCNC: 136 NG/ML (ref 30–307)
FOLATE SERPL-MCNC: >22.3 NG/ML
HCT VFR BLD AUTO: 31.6 % (ref 34.8–46.1)
HGB BLD-MCNC: 10.2 G/DL (ref 11.5–15.4)
IMM GRANULOCYTES # BLD AUTO: 0.01 THOUSAND/UL (ref 0–0.2)
IMM GRANULOCYTES NFR BLD AUTO: 0 % (ref 0–2)
INR PPP: 1.17 (ref 0.85–1.19)
IRON SATN MFR SERPL: 31 % (ref 15–50)
IRON SERPL-MCNC: 64 UG/DL (ref 50–212)
LYMPHOCYTES # BLD AUTO: 3.06 THOUSANDS/ÂΜL (ref 0.6–4.47)
LYMPHOCYTES NFR BLD AUTO: 40 % (ref 14–44)
MCH RBC QN AUTO: 30.4 PG (ref 26.8–34.3)
MCHC RBC AUTO-ENTMCNC: 32.3 G/DL (ref 31.4–37.4)
MCV RBC AUTO: 94 FL (ref 82–98)
MONOCYTES # BLD AUTO: 0.59 THOUSAND/ÂΜL (ref 0.17–1.22)
MONOCYTES NFR BLD AUTO: 8 % (ref 4–12)
NEUTROPHILS # BLD AUTO: 3.98 THOUSANDS/ÂΜL (ref 1.85–7.62)
NEUTS SEG NFR BLD AUTO: 52 % (ref 43–75)
NRBC BLD AUTO-RTO: 0 /100 WBCS
PLATELET # BLD AUTO: 285 THOUSANDS/UL (ref 149–390)
PMV BLD AUTO: 10.7 FL (ref 8.9–12.7)
PROTHROMBIN TIME: 15.2 SECONDS (ref 12.3–15)
RBC # BLD AUTO: 3.36 MILLION/UL (ref 3.81–5.12)
TIBC SERPL-MCNC: 204.4 UG/DL (ref 250–450)
TRANSFERRIN SERPL-MCNC: 146 MG/DL (ref 203–362)
UIBC SERPL-MCNC: 140 UG/DL (ref 155–355)
VIT B12 SERPL-MCNC: 514 PG/ML (ref 180–914)
WBC # BLD AUTO: 7.66 THOUSAND/UL (ref 4.31–10.16)

## 2025-08-15 ENCOUNTER — TELEPHONE (OUTPATIENT)
Dept: FAMILY MEDICINE CLINIC | Facility: CLINIC | Age: 50
End: 2025-08-15

## 2025-08-15 ENCOUNTER — OFFICE VISIT (OUTPATIENT)
Dept: FAMILY MEDICINE CLINIC | Facility: CLINIC | Age: 50
End: 2025-08-15

## 2025-08-15 VITALS
BODY MASS INDEX: 32.95 KG/M2 | WEIGHT: 205 LBS | HEART RATE: 84 BPM | OXYGEN SATURATION: 98 % | DIASTOLIC BLOOD PRESSURE: 70 MMHG | HEIGHT: 66 IN | SYSTOLIC BLOOD PRESSURE: 112 MMHG | RESPIRATION RATE: 18 BRPM | TEMPERATURE: 98.7 F

## 2025-08-15 DIAGNOSIS — F43.10 POSTTRAUMATIC STRESS DISORDER: ICD-10-CM

## 2025-08-15 DIAGNOSIS — R32 URINARY INCONTINENCE, UNSPECIFIED TYPE: ICD-10-CM

## 2025-08-15 DIAGNOSIS — K70.10 ALCOHOLIC HEPATITIS WITHOUT ASCITES: ICD-10-CM

## 2025-08-15 DIAGNOSIS — M79.89 SWELLING OF LOWER EXTREMITY: ICD-10-CM

## 2025-08-15 DIAGNOSIS — F11.20 OPIOID TYPE DEPENDENCE, CONTINUOUS (HCC): Primary | ICD-10-CM

## 2025-08-15 DIAGNOSIS — R10.9 NONSPECIFIC ABDOMINAL PAIN: ICD-10-CM

## 2025-08-15 DIAGNOSIS — R60.0 BILATERAL LOWER EXTREMITY EDEMA: ICD-10-CM

## 2025-08-15 DIAGNOSIS — E53.8 VITAMIN B 12 DEFICIENCY: ICD-10-CM

## 2025-08-15 DIAGNOSIS — F10.10 ALCOHOL ABUSE: ICD-10-CM

## 2025-08-15 DIAGNOSIS — I89.0 LYMPHEDEMA: Chronic | ICD-10-CM

## 2025-08-15 DIAGNOSIS — E51.9 THIAMINE DEFICIENCY: ICD-10-CM

## 2025-08-15 DIAGNOSIS — K70.11 ALCOHOLIC HEPATITIS WITH ASCITES: ICD-10-CM

## 2025-08-15 DIAGNOSIS — K74.60 LIVER CIRRHOSIS (HCC): ICD-10-CM

## 2025-08-15 DIAGNOSIS — K64.8 OTHER HEMORRHOIDS: ICD-10-CM

## 2025-08-15 DIAGNOSIS — E55.9 VITAMIN D DEFICIENCY: ICD-10-CM

## 2025-08-15 DIAGNOSIS — G47.00 INSOMNIA, UNSPECIFIED TYPE: ICD-10-CM

## 2025-08-15 PROCEDURE — 80349 CANNABINOIDS NATURAL: CPT | Performed by: PHYSICIAN ASSISTANT

## 2025-08-15 PROCEDURE — 80373 DRUG SCREENING TRAMADOL: CPT | Performed by: PHYSICIAN ASSISTANT

## 2025-08-15 PROCEDURE — 80324 DRUG SCREEN AMPHETAMINES 1/2: CPT | Performed by: PHYSICIAN ASSISTANT

## 2025-08-15 PROCEDURE — 3078F DIAST BP <80 MM HG: CPT | Performed by: PHYSICIAN ASSISTANT

## 2025-08-15 PROCEDURE — 80366 DRUG SCREENING PREGABALIN: CPT | Performed by: PHYSICIAN ASSISTANT

## 2025-08-15 PROCEDURE — 99214 OFFICE O/P EST MOD 30 MIN: CPT | Performed by: PHYSICIAN ASSISTANT

## 2025-08-15 PROCEDURE — 80359 METHYLENEDIOXYAMPHETAMINES: CPT | Performed by: PHYSICIAN ASSISTANT

## 2025-08-15 PROCEDURE — 80362 OPIOIDS & OPIATE ANALOGS 1/2: CPT | Performed by: PHYSICIAN ASSISTANT

## 2025-08-15 PROCEDURE — 80355 GABAPENTIN NON-BLOOD: CPT | Performed by: PHYSICIAN ASSISTANT

## 2025-08-15 PROCEDURE — 3074F SYST BP LT 130 MM HG: CPT | Performed by: PHYSICIAN ASSISTANT

## 2025-08-15 PROCEDURE — 80348 DRUG SCREENING BUPRENORPHINE: CPT | Performed by: PHYSICIAN ASSISTANT

## 2025-08-15 RX ORDER — CYANOCOBALAMIN (VITAMIN B-12) 1000 MCG
1 TABLET ORAL DAILY
Qty: 30 TABLET | Refills: 1 | Status: SHIPPED | OUTPATIENT
Start: 2025-08-15

## 2025-08-15 RX ORDER — METHION/INOS/CHOL BT/B COM/LIV 110MG-86MG
100 CAPSULE ORAL DAILY
Qty: 90 TABLET | Refills: 1 | Status: SHIPPED | OUTPATIENT
Start: 2025-08-15

## 2025-08-15 RX ORDER — FOLIC ACID 1 MG/1
1 TABLET ORAL DAILY
Qty: 90 TABLET | Refills: 1 | Status: SHIPPED | OUTPATIENT
Start: 2025-08-15

## 2025-08-15 RX ORDER — FUROSEMIDE 20 MG/1
20 TABLET ORAL DAILY
Qty: 30 TABLET | Refills: 1 | Status: SHIPPED | OUTPATIENT
Start: 2025-08-15

## 2025-08-15 RX ORDER — METHOCARBAMOL 500 MG/1
500 TABLET, FILM COATED ORAL 2 TIMES DAILY PRN
Qty: 60 TABLET | Refills: 1 | Status: SHIPPED | OUTPATIENT
Start: 2025-08-15

## 2025-08-15 RX ORDER — PANTOPRAZOLE SODIUM 40 MG/1
40 TABLET, DELAYED RELEASE ORAL DAILY
Qty: 90 TABLET | Refills: 1 | Status: SHIPPED | OUTPATIENT
Start: 2025-08-15

## 2025-08-15 RX ORDER — UNDERPADS 23" X 36"
EACH MISCELLANEOUS
Qty: 200 EACH | Refills: 5 | Status: SHIPPED | OUTPATIENT
Start: 2025-08-15

## 2025-08-15 RX ORDER — HYDROCORTISONE 25 MG/G
CREAM TOPICAL 2 TIMES DAILY
Qty: 28 G | Refills: 3 | Status: SHIPPED | OUTPATIENT
Start: 2025-08-15

## 2025-08-15 RX ORDER — OXYCODONE HYDROCHLORIDE 5 MG/1
5 TABLET ORAL 2 TIMES DAILY PRN
Qty: 60 TABLET | Refills: 0 | Status: SHIPPED | OUTPATIENT
Start: 2025-08-15

## 2025-08-15 RX ORDER — MIDODRINE HYDROCHLORIDE 10 MG/1
10 TABLET ORAL
Qty: 90 TABLET | Refills: 2 | Status: SHIPPED | OUTPATIENT
Start: 2025-08-15

## 2025-08-15 RX ORDER — SPIRONOLACTONE 50 MG/1
50 TABLET, FILM COATED ORAL DAILY
Qty: 90 TABLET | Refills: 1 | Status: SHIPPED | OUTPATIENT
Start: 2025-08-15

## 2025-08-15 RX ORDER — TRAZODONE HYDROCHLORIDE 100 MG/1
100 TABLET ORAL
Qty: 30 TABLET | Refills: 2 | Status: SHIPPED | OUTPATIENT
Start: 2025-08-15

## 2025-08-18 ENCOUNTER — TELEPHONE (OUTPATIENT)
Dept: FAMILY MEDICINE CLINIC | Facility: CLINIC | Age: 50
End: 2025-08-18

## 2025-08-18 DIAGNOSIS — E53.8 VITAMIN B 12 DEFICIENCY: Primary | ICD-10-CM

## 2025-08-18 DIAGNOSIS — E53.8 FOLIC ACID DEFICIENCY: ICD-10-CM

## 2025-08-19 ENCOUNTER — OFFICE VISIT (OUTPATIENT)
Dept: FAMILY MEDICINE CLINIC | Facility: CLINIC | Age: 50
End: 2025-08-19

## 2025-08-19 VITALS
BODY MASS INDEX: 32.47 KG/M2 | OXYGEN SATURATION: 95 % | HEART RATE: 90 BPM | SYSTOLIC BLOOD PRESSURE: 102 MMHG | TEMPERATURE: 96.1 F | RESPIRATION RATE: 16 BRPM | WEIGHT: 202 LBS | DIASTOLIC BLOOD PRESSURE: 60 MMHG | HEIGHT: 66 IN

## 2025-08-19 DIAGNOSIS — B37.89 CANDIDA RASH OF GROIN: ICD-10-CM

## 2025-08-19 DIAGNOSIS — I89.0 LYMPHEDEMA: Chronic | ICD-10-CM

## 2025-08-19 DIAGNOSIS — M79.89 SWELLING OF LOWER EXTREMITY: Primary | ICD-10-CM

## 2025-08-19 LAB
6-ACETYLMORPHINE IA: NEGATIVE NG/ML
ACCEPTABLE CREAT UR QL: 235 MG/DL
AMPHET UR QL CFM: NOT DETECTED NG/MG CREAT
AMPHET UR QL SCN: NORMAL NG/ML
AMPHETAMINES: NEGATIVE
ANTICONVULSANTS: NEGATIVE
BARBITURATES UR QL SCN: NEGATIVE NG/ML
BENZODIAZ UR QL SCN: NEGATIVE NG/ML
BUPRENORPHINE UR QL CFM: NORMAL NG/ML
BUPRENORPHINE: NEGATIVE
CANNABINOIDS SERPLBLD QL SCN: ABNORMAL
CANNABINOIDS UR QL SCN: NORMAL NG/ML
CANNABINOIDS/CREAT UR: 3 NG/MG CREAT
CARISOPRODOL UR QL: NEGATIVE NG/ML
COCAINE+BZE UR QL SCN: NEGATIVE NG/ML
ETHYL GLUCURONIDE UR QL SCN: NEGATIVE NG/ML
FENTANYL UR QL SCN: NEGATIVE NG/ML
GABAPENTIN SERPLBLD QL SCN: NORMAL UG/ML
GABAPENTIN UR QL CFM: NOT DETECTED
MDMA UR QL CFM: NOT DETECTED NG/MG CREAT
MDMA UR QL CFM: NOT DETECTED NG/MG CREAT
METHADONE UR QL SCN: NEGATIVE NG/ML
METHAMPHET UR QL CFM: NOT DETECTED NG/MG CREAT
N-NORTRAMADOL/CREAT UR CFM: 1957 NG/MG CREAT
NALOXONE UR CFM-MCNC: NOT DETECTED NG/MG CREAT
NITRITE UR QL STRIP: NEGATIVE UG/ML
NORBUP/BUP RATIO: NORMAL
NORBUPRENORPHINE UR CFM-MCNC: NOT DETECTED NG/MG CREAT
NORBUPRENORPHINE/CREAT UR: NOT DETECTED NG/MG CREAT
O-NORTRAMADOL UR QL: >2128 NG/MG CREAT
OPIATE ANTAGONIST: NEGATIVE
OPIATES UR QL SCN: NEGATIVE NG/ML
OXYCODONE+OXYMORPHONE UR QL SCN: NEGATIVE NG/ML
PCP UR QL SCN: NEGATIVE NG/ML
PREGABALIN UR QL CFM: NOT DETECTED
PROPOXYPH UR QL SCN: NEGATIVE NG/ML
SPECIMEN PH ACCEPTABLE UR: 6 (ref 4.5–8.9)
TAPENTADOL UR QL SCN: NEGATIVE NG/ML
TRAMADOL & MTBS: ABNORMAL
TRAMADOL UR QL CFM: >2128 NG/MG CREAT
TRAMADOL UR QL SCN: NORMAL NG/ML

## 2025-08-19 PROCEDURE — 3078F DIAST BP <80 MM HG: CPT

## 2025-08-19 PROCEDURE — 99214 OFFICE O/P EST MOD 30 MIN: CPT

## 2025-08-19 PROCEDURE — 3074F SYST BP LT 130 MM HG: CPT

## 2025-08-20 ENCOUNTER — TELEPHONE (OUTPATIENT)
Dept: FAMILY MEDICINE CLINIC | Facility: CLINIC | Age: 50
End: 2025-08-20

## 2025-08-21 RX ORDER — CLOTRIMAZOLE 1 %
CREAM (GRAM) TOPICAL 2 TIMES DAILY
Qty: 113 G | Refills: 1 | Status: SHIPPED | OUTPATIENT
Start: 2025-08-21

## 2025-08-26 LAB
DME PARACHUTE DELIVERY DATE ACTUAL: NORMAL
DME PARACHUTE DELIVERY DATE REQUESTED: NORMAL
DME PARACHUTE ITEM DESCRIPTION: NORMAL
DME PARACHUTE ORDER STATUS: NORMAL
DME PARACHUTE SUPPLIER NAME: NORMAL
DME PARACHUTE SUPPLIER PHONE: NORMAL